# Patient Record
Sex: FEMALE | Race: ASIAN | NOT HISPANIC OR LATINO | Employment: OTHER | URBAN - METROPOLITAN AREA
[De-identification: names, ages, dates, MRNs, and addresses within clinical notes are randomized per-mention and may not be internally consistent; named-entity substitution may affect disease eponyms.]

---

## 2017-03-06 ENCOUNTER — HOSPITAL ENCOUNTER (OUTPATIENT)
Facility: MEDICAL CENTER | Age: 52
End: 2017-03-06
Attending: FAMILY MEDICINE
Payer: MEDICAID

## 2017-03-06 ENCOUNTER — OFFICE VISIT (OUTPATIENT)
Dept: MEDICAL GROUP | Facility: MEDICAL CENTER | Age: 52
End: 2017-03-06
Attending: FAMILY MEDICINE
Payer: MEDICAID

## 2017-03-06 VITALS
RESPIRATION RATE: 12 BRPM | OXYGEN SATURATION: 99 % | BODY MASS INDEX: 18.21 KG/M2 | TEMPERATURE: 97.7 F | WEIGHT: 116 LBS | SYSTOLIC BLOOD PRESSURE: 146 MMHG | HEIGHT: 67 IN | DIASTOLIC BLOOD PRESSURE: 106 MMHG | HEART RATE: 88 BPM

## 2017-03-06 DIAGNOSIS — E11.8 DM (DIABETES MELLITUS) WITH COMPLICATIONS (HCC): ICD-10-CM

## 2017-03-06 DIAGNOSIS — Z12.31 SCREENING MAMMOGRAM, ENCOUNTER FOR: ICD-10-CM

## 2017-03-06 DIAGNOSIS — E11.69 HYPERLIPIDEMIA ASSOCIATED WITH TYPE 2 DIABETES MELLITUS (HCC): ICD-10-CM

## 2017-03-06 DIAGNOSIS — F39 MOOD DISORDER (HCC): ICD-10-CM

## 2017-03-06 DIAGNOSIS — R18.8 CIRRHOSIS OF LIVER WITH ASCITES, UNSPECIFIED HEPATIC CIRRHOSIS TYPE (HCC): ICD-10-CM

## 2017-03-06 DIAGNOSIS — Z12.11 COLON CANCER SCREENING: ICD-10-CM

## 2017-03-06 DIAGNOSIS — K74.60 CIRRHOSIS OF LIVER WITH ASCITES, UNSPECIFIED HEPATIC CIRRHOSIS TYPE (HCC): ICD-10-CM

## 2017-03-06 DIAGNOSIS — I42.1 HOCM (HYPERTROPHIC OBSTRUCTIVE CARDIOMYOPATHY) (HCC): ICD-10-CM

## 2017-03-06 DIAGNOSIS — R32 INCONTINENCE IN FEMALE: ICD-10-CM

## 2017-03-06 DIAGNOSIS — E78.5 HYPERLIPIDEMIA ASSOCIATED WITH TYPE 2 DIABETES MELLITUS (HCC): ICD-10-CM

## 2017-03-06 LAB
APPEARANCE UR: CLEAR
APPEARANCE UR: CLEAR
BILIRUB UR QL STRIP.AUTO: NEGATIVE
COLOR UR AUTO: COLORLESS
COLOR UR AUTO: YELLOW
CULTURE IF INDICATED INDCX: NO UA CULTURE
GLUCOSE BLD-MCNC: 526 MG/DL (ref 70–100)
GLUCOSE UR STRIP.AUTO-MCNC: 2000 MG/DL
GLUCOSE UR STRIP.AUTO-MCNC: >1000 MG/DL
KETONES UR STRIP.AUTO-MCNC: NEGATIVE MG/DL
KETONES UR STRIP.AUTO-MCNC: NORMAL MG/DL
LEUKOCYTE ESTERASE UR QL STRIP.AUTO: NEGATIVE
LEUKOCYTE ESTERASE UR QL STRIP.AUTO: NORMAL
MICRO URNS: ABNORMAL
NITRITE UR QL STRIP.AUTO: NEGATIVE
NITRITE UR QL STRIP.AUTO: NORMAL
PH UR STRIP.AUTO: 5 [PH] (ref 5–8)
PH UR: 5.5 [PH]
PROT UR QL STRIP: NEGATIVE MG/DL
PROT UR QL STRIP: NORMAL MG/DL
RBC UR QL AUTO: NEGATIVE
RBC UR QL AUTO: NORMAL
SP GR UR STRIP.AUTO: 1.01
SP GR UR STRIP.AUTO: 1.03

## 2017-03-06 PROCEDURE — 99215 OFFICE O/P EST HI 40 MIN: CPT | Performed by: FAMILY MEDICINE

## 2017-03-06 PROCEDURE — 82948 REAGENT STRIP/BLOOD GLUCOSE: CPT | Performed by: FAMILY MEDICINE

## 2017-03-06 PROCEDURE — 81002 URINALYSIS NONAUTO W/O SCOPE: CPT | Mod: 59 | Performed by: FAMILY MEDICINE

## 2017-03-06 PROCEDURE — 99214 OFFICE O/P EST MOD 30 MIN: CPT | Performed by: FAMILY MEDICINE

## 2017-03-06 RX ORDER — INSULIN GLARGINE 100 [IU]/ML
20 INJECTION, SOLUTION SUBCUTANEOUS DAILY
Qty: 10 ML | Refills: 3 | Status: SHIPPED | OUTPATIENT
Start: 2017-03-06 | End: 2017-06-27

## 2017-03-06 RX ORDER — ATORVASTATIN CALCIUM 20 MG/1
20 TABLET, FILM COATED ORAL
Qty: 30 TAB | Refills: 3 | Status: SHIPPED | OUTPATIENT
Start: 2017-03-06 | End: 2017-06-05 | Stop reason: SDUPTHER

## 2017-03-06 RX ORDER — OXYBUTYNIN CHLORIDE 5 MG/1
5 TABLET ORAL 3 TIMES DAILY
Qty: 30 TAB | Refills: 3 | Status: SHIPPED | OUTPATIENT
Start: 2017-03-06 | End: 2017-06-05 | Stop reason: SDUPTHER

## 2017-03-06 RX ORDER — OXYBUTYNIN CHLORIDE 5 MG/1
5 TABLET ORAL 3 TIMES DAILY
Qty: 90 TAB | Refills: 3 | Status: CANCELLED | OUTPATIENT
Start: 2017-03-06

## 2017-03-06 RX ORDER — SPIRONOLACTONE 25 MG/1
25 TABLET ORAL DAILY
Qty: 30 TAB | Refills: 3 | Status: CANCELLED | OUTPATIENT
Start: 2017-03-06

## 2017-03-06 RX ORDER — METOPROLOL TARTRATE 100 MG/1
50 TABLET ORAL 2 TIMES DAILY
Qty: 180 TAB | Refills: 6 | Status: CANCELLED | OUTPATIENT
Start: 2017-03-06

## 2017-03-06 RX ORDER — SPIRONOLACTONE 25 MG/1
25 TABLET ORAL DAILY
Qty: 30 TAB | Refills: 3 | Status: SHIPPED | OUTPATIENT
Start: 2017-03-06 | End: 2017-06-05 | Stop reason: SDUPTHER

## 2017-03-06 RX ORDER — METOPROLOL TARTRATE 100 MG/1
50 TABLET ORAL 2 TIMES DAILY
Qty: 60 TAB | Refills: 3 | Status: SHIPPED | OUTPATIENT
Start: 2017-03-06 | End: 2017-06-05 | Stop reason: SDUPTHER

## 2017-03-06 RX ORDER — ATORVASTATIN CALCIUM 20 MG/1
20 TABLET, FILM COATED ORAL
Qty: 30 TAB | Refills: 11 | Status: CANCELLED | OUTPATIENT
Start: 2017-03-06

## 2017-03-06 RX ORDER — POTASSIUM CHLORIDE 750 MG/1
10 TABLET, FILM COATED, EXTENDED RELEASE ORAL DAILY
Qty: 30 TAB | Refills: 3 | Status: CANCELLED | OUTPATIENT
Start: 2017-03-06

## 2017-03-06 ASSESSMENT — ENCOUNTER SYMPTOMS
COUGH: 0
HEADACHES: 0
CHILLS: 0
FEVER: 0
SHORTNESS OF BREATH: 0
PALPITATIONS: 0
NAUSEA: 0
ABDOMINAL PAIN: 1
VOMITING: 0
DIARRHEA: 0
CONSTIPATION: 0

## 2017-03-06 ASSESSMENT — PATIENT HEALTH QUESTIONNAIRE - PHQ9: CLINICAL INTERPRETATION OF PHQ2 SCORE: 0

## 2017-03-06 NOTE — MR AVS SNAPSHOT
"        Rigoberto Lakhani Chew   3/6/2017 3:30 PM   Office Visit   MRN: [id number]    [facility]   Dept Phone:  [phone]    Description:  Female : 1965   Provider:  Vladimir Corral M.D.           Reason for Visit     Diabetes     Medication Management           Allergies as of 3/6/2017     No Known Allergies      You were diagnosed with     DM (diabetes mellitus) with complications (CMS-HCC)   [157522]       HOCM (hypertrophic obstructive cardiomyopathy) (CMS-HCC)   [417614]       Cirrhosis of liver with ascites, unspecified hepatic cirrhosis type (CMS-HCC)   [6421291]         Vital Signs     Blood Pressure Pulse Temperature Respirations Height Weight    146/106 mmHg 88 36.5 °C (97.7 °F) 12 1.702 m (5' 7.01\") 52.617 kg (116 lb)    Body Mass Index Oxygen Saturation Smoking Status             18.16 kg/m2 99% Current Every Day Smoker         Basic Information     Date Of Birth Sex Race Ethnicity Preferred Language    1965 Female  Non- English      Your appointments     Mar 20, 2017  2:10 PM   Established Patient with Vladimir Corral M.D.   [facility]    [address] [facility] [id number] [phone]           You will be receiving a confirmation call a few days before your appointment from our automated call confirmation system.            May 25, 2017 12:45 PM   FOLLOW UP with Liam Galicia M.D.   [facility] (--)    [address], [facility] [address]  [facility] [id number] [phone]              Problem List              ICD-10-CM Priority Class Noted - Resolved    Diabetes mellitus, type 2 (CMS-HCC) E11.9 Low  2011 - Present    Tobacco abuse Z72.0 Low  2014 - Present    Hypertension, benign I10 Medium  2014 - Present    HOCM (hypertrophic obstructive cardiomyopathy) (CMS-HCC) I42.1 Medium  2015 - Present    Liver disease due to alcohol (CMS-HCC) K70.9 Medium  2015 - Present    Macrocytic " anemia D53.9 Low  10/12/2015 - Present    Hypokalemia E87.6 Medium  10/12/2015 - Present    Weakness R53.1 Low  10/14/2015 - Present    Protein-calorie malnutrition, moderate (CMS-HCC) E44.0 Medium  10/23/2015 - Present    Cirrhosis (CMS-HCC) K74.60 Low  3/15/2016 - Present    Elevated troponin R79.89 Medium  6/23/2016 - Present    Hypotension I95.9 Medium  6/23/2016 - Present    Hyperammonemia (CMS-HCC) E72.20 High  6/24/2016 - Present    Thrombocytopenia (CMS-HCC) D69.6   6/24/2016 - Present    Cirrhosis of liver with ascites (CMS-HCC) K74.60   3/6/2017 - Present      Health Maintenance        Date Due Completion Dates    IMM HEP B VACCINE (1 of 3 - Primary Series) 1965 ---    DIABETES MONOFILAMENT / LE EXAM 1965 ---    RETINAL SCREENING 1/24/1984 ---    IMM DTaP/Tdap/Td Vaccine (1 - Tdap) 1/24/1985 ---    MAMMOGRAM 1/24/2006 9/12/2005    IMM INFLUENZA (1) 9/1/2016 10/14/2015, 11/30/2014    A1C SCREENING 10/18/2016 4/18/2016    FASTING LIPID PROFILE 4/18/2017 4/18/2016    URINE ACR / MICROALBUMIN 4/18/2017 4/18/2016    SERUM CREATININE 6/24/2017 6/24/2016, 6/23/2016, 6/23/2016, 6/23/2016, 4/18/2016, 10/27/2015, 10/18/2015, 10/15/2015, 10/14/2015, 10/13/2015, 10/12/2015, 10/4/2015, 12/8/2014, 12/7/2014, 5/27/2006, 5/27/2006    COLONOSCOPY 8/9/2017 8/9/2016    PAP SMEAR 4/13/2019 4/13/2016            Current Immunizations     Influenza TIV (IM) 11/30/2014    Influenza Vaccine Quad Inj (Preserved) 10/14/2015  4:47 PM    Pneumococcal polysaccharide vaccine (PPSV-23) 12/8/2014  9:05 PM      Below and/or attached are the medications your provider expects you to take. Review all of your home medications and newly ordered medications with your provider and/or pharmacist. Follow medication instructions as directed by your provider and/or pharmacist. Please keep your medication list with you and share with your provider. Update the information when medications are discontinued, doses are changed, or new  medications (including over-the-counter products) are added; and carry medication information at all times in the event of emergency situations     Allergies:  No Known Allergies          Medications  Valid as of: March 06, 2017 -  3:36 PM    Generic Name Brand Name Tablet Size Instructions for use    Atorvastatin Calcium (Tab) LIPITOR 20 MG Take 1 Tab by mouth every bedtime.        Insulin Glargine (Solution) LANTUS 100 UNIT/ML Inject 20 Units as instructed every day.        MetFORMIN HCl (Tab) GLUCOPHAGE 500 MG Take 1 Tab by mouth 2 times a day, with meals.        Metoprolol Tartrate (Tab) LOPRESSOR 100 MG Take 0.5 Tabs by mouth 2 times a day.        Oxybutynin Chloride (Tab) DITROPAN 5 MG Take 1 Tab by mouth 3 times a day.        Potassium Chloride (Tab CR) KLOR-CON 10 MEQ Take 1 Tab by mouth every day.        QUEtiapine Fumarate (Tab) SEROQUEL 50 MG Take 1 Tab by mouth every evening.        Spironolactone (Tab) ALDACTONE 25 MG Take 1 Tab by mouth every day.        .                 Medicines prescribed today were sent to:     [facility] [address] [place] [address] - [address] [facility]    [address] [place] [address]    Phone: [phone] Fax: [phone]    Open 24 Hours?: No      Medication refill instructions:       If your prescription bottle indicates you have medication refills left, it is not necessary to call your provider’s office. Please contact your pharmacy and they will refill your medication.    If your prescription bottle indicates you do not have any refills left, you may request refills at any time through one of the following ways: The online [employer] system (except Urgent Care), by calling your provider’s office, or by asking your pharmacy to contact your provider’s office with a refill request. Medication refills are processed only during regular business hours and may not be available until the next business day. Your provider may request additional information or to have a follow-up visit with you  prior to refilling your medication.   *Please Note: Medication refills are assigned a new Rx number when refilled electronically. Your pharmacy may indicate that no refills were authorized even though a new prescription for the same medication is available at the pharmacy. Please request the medicine by name with the pharmacy before contacting your provider for a refill.        Your To Do List     Future Labs/Procedures Complete By Expires    AMMONIA  As directed 3/6/2018    CBC WITH DIFFERENTIAL  As directed 3/6/2018    COMP METABOLIC PANEL  As directed 3/6/2018    HEMOGLOBIN A1C  As directed 3/6/2018    LIPID PROFILE  As directed 3/6/2018    MICROALBUMIN CREAT RATIO URINE  As directed 3/6/2018    TSH WITH REFLEX TO FT4  As directed 3/6/2018    US-ABDOMEN COMPLETE SURVEY  As directed 3/6/2018      Referral     A referral request has been sent to our patient care coordination department. Please allow 3-5 business days for us to process this request and contact you either by phone or mail. If you do not hear from us by the 5th business day, please call us at [phone].           GigSky Access Code: [email or web address]  Expires: 4/5/2017  3:36 PM    GigSky  A secure, online tool to manage your health information     [employer]’s GigSky® is a secure, online tool that connects you to your personalized health information from the privacy of your home -- day or night - making it very easy for you to manage your healthcare. Once the activation process is completed, you can even access your medical information using the GigSky torrey, which is available for free in the Apple Torrey store or Google Play store.     GigSky provides the following levels of access (as shown below):   My Chart Features   [facility] Doctor [facility]  Specialists [facility]  Urgent  Care Non-[facility]  Primary Care  Doctor   Email your healthcare team securely and privately 24/7 X X X    Manage appointments: schedule your next appointment; view  details of past/upcoming appointments X      Request prescription refills. X      View recent personal medical records, including lab and immunizations X X X X   View health record, including health history, allergies, medications X X X X   Read reports about your outpatient visits, procedures, consult and ER notes X X X X   See your discharge summary, which is a recap of your hospital and/or ER visit that includes your diagnosis, lab results, and care plan. X X       How to register for Medigram:  1. Go to  [email or web address].  2. Click on the Sign Up Now box, which takes you to the New Member Sign Up page. You will need to provide the following information:  a. Enter your Medigram Access Code exactly as it appears at the top of this page. (You will not need to use this code after you’ve completed the sign-up process. If you do not sign up before the expiration date, you must request a new code.)   b. Enter your date of birth.   c. Enter your home email address.   d. Click Submit, and follow the next screen’s instructions.  3. Create a Medigram ID. This will be your Medigram login ID and cannot be changed, so think of one that is secure and easy to remember.  4. Create a Medigram password. You can change your password at any time.  5. Enter your Password Reset Question and Answer. This can be used at a later time if you forget your password.   6. Enter your e-mail address. This allows you to receive e-mail notifications when new information is available in Medigram.  7. Click Sign Up. You can now view your health information.    For assistance activating your Medigram account, call [phone]

## 2017-03-06 NOTE — PROGRESS NOTES
Subjective:      Rigoberto Adames is a 52 y.o. female who presents with No chief complaint on file.            HPI Comments: Pt here to re establish with the clinic, she has not been seen since last July. She has not gotten any recent blood work and has not been testing her blood sugars regularly. Will reorder blood work and have her use her medications as directed. Will also have her start lantus 20 u and will have her check her blood sugars daily.  Since her blood sugar is above 500, she has been advised to go to the ER for better control of her blood sugar. She declined [facility] but states she will go on her own. An AMA has been signed by pt.    We'll have her continue taking her medication as directed. Will reorder an A1c and a microalbumin creatinine ratio as well as another metabolic panel. She has been advised to check her feet daily for any skin breakdown or changes in sensation. She has also been advised to schedule an appointment with an ophthalmologist for a yearly diabetic retinal exam.     She is also not been following up with her testing gastroenterologist for her history of cirrhosis. Will once again we order an ultrasound of her abdomen to further evaluate her liver. We'll also recheck her liver enzymes but the complete metabolic panel. I will refer to GI for continued assistance in management of her cirrhosis. Also have her continue to take her medication as directed.    Blood pressure is slightly elevated today level resume her blood pressure medications which she has not been taking on a regular basis. Due to her history of hypertrophic cardiomyopathy she will be referred back to cardiology as well as an echocardiogram being reorder to further evaluate her problem. She has not been having any chest pain, shortness of breath or unusual swelling in her lower extremities. She has been advised if she has any of these symptoms to see  emergency room for a further evaluation.    Pt also has a history of a  "mood disorder and has not recently been seen by psychiatry. She has not had any urges to harm herself or others but she has been once again warned that if she has any worsened sxs she will need to go to the er for a further evaluation of her sxs.      Current medications, allergies, and problem list reviewed with patient and updated in EPIC.        Review of Systems   Constitutional: Negative for fever and chills.   HENT: Negative for hearing loss.    Respiratory: Negative for cough and shortness of breath.    Cardiovascular: Negative for chest pain and palpitations.   Gastrointestinal: Positive for abdominal pain. Negative for nausea, vomiting, diarrhea and constipation.   Neurological: Negative for headaches.          Objective:     /106 mmHg  Pulse 88  Temp(Src) 36.5 °C (97.7 °F)  Resp 12  Ht 1.702 m (5' 7.01\")  Wt 52.617 kg (116 lb)  BMI 18.16 kg/m2  SpO2 99%     Physical Exam   HENT:   Right Ear: External ear normal.   Left Ear: External ear normal.   Nose: Nose normal.   Mouth/Throat: Oropharynx is clear and moist.   Eyes: EOM are normal. Pupils are equal, round, and reactive to light.   Neck: Normal range of motion.   Cardiovascular: Normal rate, regular rhythm and normal heart sounds.  Exam reveals no friction rub.    No murmur heard.  Pulmonary/Chest: Breath sounds normal. No respiratory distress. She has no wheezes. She has no rales.   Abdominal: Soft. Bowel sounds are normal. She exhibits distension. There is tenderness. There is no rebound and no guarding.   Neurological: She is alert.   Skin: Skin is warm and dry.   Psychiatric: Her behavior is normal.               Assessment/Plan:     1. DM (diabetes mellitus) with complications (CMS-[facility])  Will have her take her medications as directed. Will have her start lantus daily and check her blood sugars daily. Will also have her get an A1c and microalb/creat ratio. Will have her check her feet daily for skin breakdown and changes in sensation. " Will refer to ophthalmology for a further evaluation for diabetic retinopathies. Since her blood sugar was above 500 and pt refused to go to er for assistance in lowering her sugar, an AMA was signed by the pt. Will continue to follow.   - REFERRAL TO ENDOCRINOLOGY  - insulin glargine (LANTUS) 100 UNIT/ML Solution; Inject 20 Units as instructed every day.  Dispense: 10 mL; Refill: 3  - TSH WITH REFLEX TO FT4; Future  - COMP METABOLIC PANEL; Future  - LIPID PROFILE; Future  - CBC WITH DIFFERENTIAL; Future  - HEMOGLOBIN A1C; Future  - MICROALBUMIN CREAT RATIO URINE; Future  - REFERRAL FOR RETINAL SCREENING EXAM  - POCT Urinalysis Glucose  - metformin (GLUCOPHAGE) 500 MG Tab; Take 2 Tabs by mouth 2 times a day, with meals.  Dispense: 120 Tab; Refill: 3    2. HOCM (hypertrophic obstructive cardiomyopathy) (CMS-HCC)  An echocardiogram has been ordered and a referral was made back to cardiology. She will also continue to take her medication as directed. Will continue to follow. ER precautions have been given to pt.  - REFERRAL TO CARDIOLOGY  - ECHOCARDIOGRAM COMP W/O CONT; Future  - metoprolol (LOPRESSOR) 100 MG Tab; Take 0.5 Tabs by mouth 2 times a day.  Dispense: 60 Tab; Refill: 3    3. Cirrhosis of liver with ascites, unspecified hepatic cirrhosis type (CMS-HCC)  Will reorder an ultrasound of her liver and will refer back to GI. Will have her continue to take her medications as directed and will order a CMP for a further evaluation. Will continue to follow.   - AMMONIA; Future  - US-ABDOMEN COMPLETE SURVEY; Future  - REFERRAL TO GASTROENTEROLOGY  - spironolactone (ALDACTONE) 25 MG Tab; Take 1 Tab by mouth every day.  Dispense: 30 Tab; Refill: 3    4. Incontinence in female  Will have her continue to take her medication as directed. Will continue to follow.   - oxybutynin (DITROPAN) 5 MG Tab; Take 1 Tab by mouth 3 times a day.  Dispense: 30 Tab; Refill: 3    5. Hyperlipidemia associated with type 2 diabetes mellitus  (CMS-HCC)  Will have her restart her medication and will recheck her cholesterol. She has been advised to increase the fibers in his diet, avoid fatty/fried foods and try fish oil supplements if not allergic to seafood. Also advised to exercise as tolerated.     - atorvastatin (LIPITOR) 20 MG Tab; Take 1 Tab by mouth every bedtime.  Dispense: 30 Tab; Refill: 3    6. Mood disorder (CMS-HCC)  Will have her further evaluated by psych for the need to continue to take her psychiatric medication. She is not having any urge to hurt herself or others. Will continue to follow.   - REFERRAL TO PSYCHIATRY    >40 min spent face to face with patient, >50% of time spent counseling, coordinating care, reviewing records, discussing POC, educating patient

## 2017-03-21 ENCOUNTER — TELEPHONE (OUTPATIENT)
Dept: MEDICAL GROUP | Facility: MEDICAL CENTER | Age: 52
End: 2017-03-21

## 2017-03-21 NOTE — TELEPHONE ENCOUNTER
Phone Number Called: [phone] (home)     Message: patient left message for a call back. Called patient, voice mail full.    Left Message for patient to call back: no

## 2017-03-30 ENCOUNTER — HOSPITAL ENCOUNTER (OUTPATIENT)
Dept: LAB | Facility: MEDICAL CENTER | Age: 52
End: 2017-03-30
Attending: PHYSICIAN ASSISTANT
Payer: MEDICAID

## 2017-03-30 ENCOUNTER — TELEPHONE (OUTPATIENT)
Dept: MEDICAL GROUP | Facility: MEDICAL CENTER | Age: 52
End: 2017-03-30

## 2017-03-30 ENCOUNTER — HOSPITAL ENCOUNTER (OUTPATIENT)
Dept: LAB | Facility: MEDICAL CENTER | Age: 52
End: 2017-03-30
Attending: FAMILY MEDICINE
Payer: MEDICAID

## 2017-03-30 DIAGNOSIS — R18.8 CIRRHOSIS OF LIVER WITH ASCITES, UNSPECIFIED HEPATIC CIRRHOSIS TYPE (HCC): ICD-10-CM

## 2017-03-30 DIAGNOSIS — E11.8 DM (DIABETES MELLITUS) WITH COMPLICATIONS (HCC): ICD-10-CM

## 2017-03-30 DIAGNOSIS — K74.60 CIRRHOSIS OF LIVER WITH ASCITES, UNSPECIFIED HEPATIC CIRRHOSIS TYPE (HCC): ICD-10-CM

## 2017-03-30 LAB
ALBUMIN SERPL BCP-MCNC: 3.7 G/DL (ref 3.2–4.9)
ALBUMIN/GLOB SERPL: 0.7 G/DL
ALP SERPL-CCNC: 239 U/L (ref 30–99)
ALT SERPL-CCNC: 199 U/L (ref 2–50)
AMMONIA PLAS-SCNC: 85 UMOL/L (ref 11–45)
ANION GAP SERPL CALC-SCNC: 10 MMOL/L (ref 0–11.9)
AST SERPL-CCNC: 148 U/L (ref 12–45)
BASOPHILS # BLD AUTO: 0.05 K/UL (ref 0–0.12)
BASOPHILS NFR BLD AUTO: 0.9 % (ref 0–1.8)
BILIRUB SERPL-MCNC: 2.8 MG/DL (ref 0.1–1.5)
BUN SERPL-MCNC: 16 MG/DL (ref 8–22)
CALCIUM SERPL-MCNC: 9.7 MG/DL (ref 8.5–10.5)
CHLORIDE SERPL-SCNC: 91 MMOL/L (ref 96–112)
CHOLEST SERPL-MCNC: 159 MG/DL (ref 100–199)
CO2 SERPL-SCNC: 23 MMOL/L (ref 20–33)
CREAT SERPL-MCNC: 0.65 MG/DL (ref 0.5–1.4)
CREAT UR-MCNC: 93.3 MG/DL
EOSINOPHIL # BLD: 0.12 K/UL (ref 0–0.51)
EOSINOPHIL NFR BLD AUTO: 2.2 % (ref 0–6.9)
ERYTHROCYTE [DISTWIDTH] IN BLOOD BY AUTOMATED COUNT: 52.9 FL (ref 35.9–50)
EST. AVERAGE GLUCOSE BLD GHB EST-MCNC: 338 MG/DL
GLOBULIN SER CALC-MCNC: 5.2 G/DL (ref 1.9–3.5)
GLUCOSE SERPL-MCNC: 341 MG/DL (ref 65–99)
HBA1C MFR BLD: 13.4 % (ref 0–5.6)
HCT VFR BLD AUTO: 41.3 % (ref 37–47)
HDLC SERPL-MCNC: 24 MG/DL
HGB BLD-MCNC: 14.3 G/DL (ref 12–16)
IMM GRANULOCYTES # BLD AUTO: 0.01 K/UL (ref 0–0.11)
IMM GRANULOCYTES NFR BLD AUTO: 0.2 % (ref 0–0.9)
INR PPP: 0.93 (ref 0.87–1.13)
IRON SERPL-MCNC: 252 UG/DL (ref 40–170)
LDLC SERPL CALC-MCNC: ABNORMAL MG/DL
LYMPHOCYTES # BLD: 2.97 K/UL (ref 1–4.8)
LYMPHOCYTES NFR BLD AUTO: 54.7 % (ref 22–41)
MCH RBC QN AUTO: 32.4 PG (ref 27–33)
MCHC RBC AUTO-ENTMCNC: 34.6 G/DL (ref 33.6–35)
MCV RBC AUTO: 93.7 FL (ref 81.4–97.8)
MICROALBUMIN UR-MCNC: 10.2 MG/DL
MICROALBUMIN/CREAT UR: 109 MG/G (ref 0–30)
MONOCYTES # BLD: 0.31 K/UL (ref 0–0.85)
MONOCYTES NFR BLD AUTO: 5.7 % (ref 0–13.4)
NEUTROPHILS # BLD: 1.97 K/UL (ref 2–7.15)
NEUTROPHILS NFR BLD AUTO: 36.3 % (ref 44–72)
NRBC # BLD AUTO: 0.02 K/UL
NRBC BLD-RTO: 0.4 /100 WBC
PLATELET # BLD AUTO: 104 K/UL (ref 164–446)
PMV BLD AUTO: 13 FL (ref 9–12.9)
POTASSIUM SERPL-SCNC: 3.3 MMOL/L (ref 3.6–5.5)
PROT SERPL-MCNC: 8.9 G/DL (ref 6–8.2)
PROTHROMBIN TIME: 12.7 SEC (ref 12–14.6)
RBC # BLD AUTO: 4.41 M/UL (ref 4.2–5.4)
SODIUM SERPL-SCNC: 124 MMOL/L (ref 135–145)
T4 FREE SERPL-MCNC: 1.18 NG/DL (ref 0.53–1.43)
TRIGL SERPL-MCNC: 486 MG/DL (ref 0–149)
TSH SERPL DL<=0.005 MIU/L-ACNC: 5.53 UIU/ML (ref 0.3–3.7)
WBC # BLD AUTO: 5.4 K/UL (ref 4.8–10.8)

## 2017-03-30 PROCEDURE — 82390 ASSAY OF CERULOPLASMIN: CPT

## 2017-03-30 PROCEDURE — 86039 ANTINUCLEAR ANTIBODIES (ANA): CPT

## 2017-03-30 PROCEDURE — 85610 PROTHROMBIN TIME: CPT

## 2017-03-30 PROCEDURE — 83540 ASSAY OF IRON: CPT

## 2017-03-30 PROCEDURE — 86708 HEPATITIS A ANTIBODY: CPT

## 2017-03-30 PROCEDURE — 36415 COLL VENOUS BLD VENIPUNCTURE: CPT

## 2017-03-30 PROCEDURE — 86038 ANTINUCLEAR ANTIBODIES: CPT

## 2017-03-30 PROCEDURE — 83516 IMMUNOASSAY NONANTIBODY: CPT

## 2017-03-30 PROCEDURE — 86235 NUCLEAR ANTIGEN ANTIBODY: CPT | Mod: 91

## 2017-03-30 PROCEDURE — 86225 DNA ANTIBODY NATIVE: CPT

## 2017-03-30 NOTE — TELEPHONE ENCOUNTER
----- Message from Vladimir Corral M.D. sent at 3/30/2017  4:55 PM PDT -----  Please call pt to tell she should go to the er for a further evaluation. Her ammonia level is very high and her sodium level is very. Low is at risk for seizures or a coma.     Thanks

## 2017-03-31 LAB
CERULOPLASMIN SERPL-MCNC: 27 MG/DL (ref 17–54)
HAV AB SER QL IA: POSITIVE
SMA IGG SER-ACNC: 58 UNITS (ref 0–19)

## 2017-03-31 NOTE — TELEPHONE ENCOUNTER
Phone Number Called: [phone] (home)     Message: spoke with patient, she was advised of lab results and the risk of a seizure or a coma, and needing to follow up at the ER for further evaluation. Patient states ok.     Left Message for patient to call back: no

## 2017-04-02 LAB
NUCLEAR IGG SER QL IA: DETECTED
NUCLEAR IGG TITR SER IF: ABNORMAL {TITER}

## 2017-06-05 DIAGNOSIS — E11.8 DM (DIABETES MELLITUS) WITH COMPLICATIONS (HCC): ICD-10-CM

## 2017-06-05 DIAGNOSIS — K74.60 CIRRHOSIS OF LIVER WITH ASCITES, UNSPECIFIED HEPATIC CIRRHOSIS TYPE (HCC): ICD-10-CM

## 2017-06-05 DIAGNOSIS — R32 INCONTINENCE IN FEMALE: ICD-10-CM

## 2017-06-05 DIAGNOSIS — E78.5 HYPERLIPIDEMIA ASSOCIATED WITH TYPE 2 DIABETES MELLITUS (HCC): ICD-10-CM

## 2017-06-05 DIAGNOSIS — F39 MOOD DISORDER (HCC): ICD-10-CM

## 2017-06-05 DIAGNOSIS — R18.8 CIRRHOSIS OF LIVER WITH ASCITES, UNSPECIFIED HEPATIC CIRRHOSIS TYPE (HCC): ICD-10-CM

## 2017-06-05 DIAGNOSIS — E11.69 HYPERLIPIDEMIA ASSOCIATED WITH TYPE 2 DIABETES MELLITUS (HCC): ICD-10-CM

## 2017-06-05 DIAGNOSIS — I42.1 HOCM (HYPERTROPHIC OBSTRUCTIVE CARDIOMYOPATHY) (HCC): ICD-10-CM

## 2017-06-05 RX ORDER — QUETIAPINE FUMARATE 50 MG/1
50 TABLET, FILM COATED ORAL EVERY EVENING
Qty: 60 TAB | Refills: 3 | OUTPATIENT
Start: 2017-06-05

## 2017-06-05 RX ORDER — SPIRONOLACTONE 25 MG/1
25 TABLET ORAL DAILY
Qty: 30 TAB | Refills: 0 | Status: SHIPPED | OUTPATIENT
Start: 2017-06-05 | End: 2017-06-27 | Stop reason: SDUPTHER

## 2017-06-05 RX ORDER — METOPROLOL TARTRATE 100 MG/1
50 TABLET ORAL 2 TIMES DAILY
Qty: 60 TAB | Refills: 0 | Status: SHIPPED | OUTPATIENT
Start: 2017-06-05 | End: 2017-06-27 | Stop reason: SDUPTHER

## 2017-06-05 RX ORDER — OXYBUTYNIN CHLORIDE 5 MG/1
5 TABLET ORAL 3 TIMES DAILY
Qty: 30 TAB | Refills: 0 | Status: SHIPPED | OUTPATIENT
Start: 2017-06-05 | End: 2017-06-27 | Stop reason: SDUPTHER

## 2017-06-05 RX ORDER — ATORVASTATIN CALCIUM 20 MG/1
20 TABLET, FILM COATED ORAL
Qty: 30 TAB | Refills: 0 | Status: SHIPPED | OUTPATIENT
Start: 2017-06-05 | End: 2017-06-27 | Stop reason: SDUPTHER

## 2017-06-08 RX ORDER — GABAPENTIN 400 MG/1
400 CAPSULE ORAL 3 TIMES DAILY
Qty: 90 CAP | Refills: 3 | Status: SHIPPED | OUTPATIENT
Start: 2017-06-08 | End: 2017-06-27 | Stop reason: SDUPTHER

## 2017-06-27 ENCOUNTER — OFFICE VISIT (OUTPATIENT)
Dept: MEDICAL GROUP | Facility: MEDICAL CENTER | Age: 52
End: 2017-06-27
Attending: FAMILY MEDICINE
Payer: MEDICAID

## 2017-06-27 VITALS
OXYGEN SATURATION: 97 % | WEIGHT: 119 LBS | TEMPERATURE: 97.3 F | SYSTOLIC BLOOD PRESSURE: 150 MMHG | HEART RATE: 80 BPM | BODY MASS INDEX: 18.68 KG/M2 | DIASTOLIC BLOOD PRESSURE: 88 MMHG | RESPIRATION RATE: 16 BRPM | HEIGHT: 67 IN

## 2017-06-27 DIAGNOSIS — E78.5 HYPERLIPIDEMIA ASSOCIATED WITH TYPE 2 DIABETES MELLITUS (HCC): ICD-10-CM

## 2017-06-27 DIAGNOSIS — K74.60 CIRRHOSIS OF LIVER WITH ASCITES, UNSPECIFIED HEPATIC CIRRHOSIS TYPE (HCC): ICD-10-CM

## 2017-06-27 DIAGNOSIS — I42.1 HOCM (HYPERTROPHIC OBSTRUCTIVE CARDIOMYOPATHY) (HCC): ICD-10-CM

## 2017-06-27 DIAGNOSIS — E11.8 DM (DIABETES MELLITUS) WITH COMPLICATIONS (HCC): ICD-10-CM

## 2017-06-27 DIAGNOSIS — I10 ESSENTIAL HYPERTENSION: ICD-10-CM

## 2017-06-27 DIAGNOSIS — R32 INCONTINENCE IN FEMALE: ICD-10-CM

## 2017-06-27 DIAGNOSIS — E11.69 HYPERLIPIDEMIA ASSOCIATED WITH TYPE 2 DIABETES MELLITUS (HCC): ICD-10-CM

## 2017-06-27 DIAGNOSIS — R18.8 CIRRHOSIS OF LIVER WITH ASCITES, UNSPECIFIED HEPATIC CIRRHOSIS TYPE (HCC): ICD-10-CM

## 2017-06-27 LAB
GLUCOSE BLD-MCNC: 315 MG/DL (ref 70–100)
HBA1C MFR BLD: NORMAL % (ref ?–5.8)
INT CON NEG: NEGATIVE
INT CON POS: POSITIVE

## 2017-06-27 PROCEDURE — 83036 HEMOGLOBIN GLYCOSYLATED A1C: CPT | Performed by: FAMILY MEDICINE

## 2017-06-27 PROCEDURE — 99214 OFFICE O/P EST MOD 30 MIN: CPT | Performed by: FAMILY MEDICINE

## 2017-06-27 PROCEDURE — 82962 GLUCOSE BLOOD TEST: CPT | Performed by: FAMILY MEDICINE

## 2017-06-27 PROCEDURE — 82948 REAGENT STRIP/BLOOD GLUCOSE: CPT | Performed by: FAMILY MEDICINE

## 2017-06-27 PROCEDURE — 99213 OFFICE O/P EST LOW 20 MIN: CPT | Performed by: FAMILY MEDICINE

## 2017-06-27 RX ORDER — OXYBUTYNIN CHLORIDE 5 MG/1
5 TABLET ORAL 3 TIMES DAILY
Qty: 30 TAB | Refills: 3 | Status: SHIPPED | OUTPATIENT
Start: 2017-06-27 | End: 2017-09-27 | Stop reason: SDUPTHER

## 2017-06-27 RX ORDER — LISINOPRIL 10 MG/1
10 TABLET ORAL DAILY
Qty: 30 TAB | Refills: 3 | Status: SHIPPED | OUTPATIENT
Start: 2017-06-27 | End: 2018-01-16 | Stop reason: SDUPTHER

## 2017-06-27 RX ORDER — GABAPENTIN 400 MG/1
400 CAPSULE ORAL 3 TIMES DAILY
Qty: 90 CAP | Refills: 3 | Status: SHIPPED | OUTPATIENT
Start: 2017-06-27 | End: 2017-06-27

## 2017-06-27 RX ORDER — METOPROLOL TARTRATE 100 MG/1
50 TABLET ORAL 2 TIMES DAILY
Qty: 60 TAB | Refills: 3 | Status: SHIPPED | OUTPATIENT
Start: 2017-06-27 | End: 2017-09-21 | Stop reason: SDUPTHER

## 2017-06-27 RX ORDER — GABAPENTIN 800 MG/1
800 TABLET ORAL 3 TIMES DAILY
Qty: 90 TAB | Refills: 3 | Status: SHIPPED | OUTPATIENT
Start: 2017-06-27 | End: 2018-01-16 | Stop reason: SDUPTHER

## 2017-06-27 RX ORDER — SPIRONOLACTONE 25 MG/1
25 TABLET ORAL DAILY
Qty: 30 TAB | Refills: 3 | Status: SHIPPED | OUTPATIENT
Start: 2017-06-27 | End: 2018-01-16 | Stop reason: SDUPTHER

## 2017-06-27 RX ORDER — ATORVASTATIN CALCIUM 20 MG/1
20 TABLET, FILM COATED ORAL
Qty: 30 TAB | Refills: 3 | Status: SHIPPED | OUTPATIENT
Start: 2017-06-27 | End: 2018-01-16 | Stop reason: SDUPTHER

## 2017-06-27 ASSESSMENT — ENCOUNTER SYMPTOMS
FEVER: 0
PALPITATIONS: 0
ABDOMINAL PAIN: 0
COUGH: 0
SHORTNESS OF BREATH: 0
FOCAL WEAKNESS: 1
CHILLS: 0
HEADACHES: 0
TINGLING: 1
VOMITING: 0
NAUSEA: 0

## 2017-06-27 ASSESSMENT — PAIN SCALES - GENERAL: PAINLEVEL: NO PAIN

## 2017-06-27 NOTE — MR AVS SNAPSHOT
"        Rigoberto Lakhani Chew   2017 4:10 PM   Office Visit   MRN: [id number]    [facility]   Dept Phone:  [phone]    Description:  Female : 1965   Provider:  Vladimir Corral M.D.           Reason for Visit     Follow-Up           Allergies as of 2017     No Known Allergies      You were diagnosed with     Cirrhosis of liver with ascites, unspecified hepatic cirrhosis type (CMS-HCC)   [1206653]       DM (diabetes mellitus), secondary, uncontrolled, w/neurologic complic (CMS-HCC)   [182009]       Essential hypertension   [4404743]       Incontinence in female   [4811237]       HOCM (hypertrophic obstructive cardiomyopathy) (CMS-HCC)   [562336]       DM (diabetes mellitus) with complications (CMS-HCC)   [415326]   blood sugar today > 500    Hyperlipidemia associated with type 2 diabetes mellitus (CMS-HCC)   [0039576]         Vital Signs     Blood Pressure Pulse Temperature Respirations Height Weight    150/88 mmHg 80 36.3 °C (97.3 °F) 16 1.702 m (5' 7.01\") 53.978 kg (119 lb)    Body Mass Index Oxygen Saturation Breastfeeding? Smoking Status          18.63 kg/m2 97% No Current Every Day Smoker        Basic Information     Date Of Birth Sex Race Ethnicity Preferred Language    1965 Female  Non- English      Your appointments     Sep 20, 2017  8:00 AM   FOLLOW UP with Liam Galicia M.D.   [facility] (--)    [address] [place] [address]  [facility] [id number] [phone]              Problem List              ICD-10-CM Priority Class Noted - Resolved    Diabetes mellitus, type 2 (CMS-HCC) E11.9 Low  2011 - Present    Tobacco abuse Z72.0 Low  2014 - Present    Hypertension, benign I10 Medium  2014 - Present    HOCM (hypertrophic obstructive cardiomyopathy) (CMS-HCC) I42.1 Medium  2015 - Present    Liver disease due to alcohol (CMS-HCC) K70.9 Medium  2015 - Present    Macrocytic anemia D53.9 Low  10/12/2015 - " Present    Hypokalemia E87.6 Medium  10/12/2015 - Present    Weakness R53.1 Low  10/14/2015 - Present    Protein-calorie malnutrition, moderate (CMS-HCC) E44.0 Medium  10/23/2015 - Present    Cirrhosis (CMS-HCC) K74.60 Low  3/15/2016 - Present    Elevated troponin R74.8 Medium  6/23/2016 - Present    Hypotension I95.9 Medium  6/23/2016 - Present    Hyperammonemia (CMS-HCC) E72.20 High  6/24/2016 - Present    Thrombocytopenia (CMS-HCC) D69.6   6/24/2016 - Present    Cirrhosis of liver with ascites (CMS-HCC) K74.60   3/6/2017 - Present      Health Maintenance        Date Due Completion Dates    COLON CANCER SCREENING ANNUAL FIT 1965 ---    IMM HEP B VACCINE (1 of 3 - Primary Series) 1965 ---    DIABETES MONOFILAMENT / LE EXAM 1965 ---    RETINAL SCREENING 1/24/1984 ---    IMM DTaP/Tdap/Td Vaccine (1 - Tdap) 1/24/1985 ---    MAMMOGRAM 1/24/2006 9/12/2005    A1C SCREENING 9/30/2017 3/30/2017, 4/18/2016    FASTING LIPID PROFILE 3/30/2018 3/30/2017, 4/18/2016    URINE ACR / MICROALBUMIN 3/30/2018 3/30/2017, 4/18/2016    SERUM CREATININE 3/30/2018 3/30/2017, 6/24/2016, 6/23/2016, 6/23/2016, 6/23/2016, 4/18/2016, 10/27/2015, 10/18/2015, 10/15/2015, 10/14/2015, 10/13/2015, 10/12/2015, 10/4/2015, 12/8/2014, 12/7/2014, 5/27/2006, 5/27/2006    PAP SMEAR 4/13/2019 4/13/2016            Results     POCT Glucose      Component Value Standard Range & Units    Glucose - Accu-Ck 315 70 - 100 mg/dL    provider notified                POCT Hemoglobin A1C      Component    Glycohemoglobin    11.3%    Internal Control Negative    Negative    Internal Control Positive    Positive                        Current Immunizations     Influenza TIV (IM) 11/30/2014    Influenza Vaccine Quad Inj (Preserved) 10/14/2015  4:47 PM    Pneumococcal polysaccharide vaccine (PPSV-23) 12/8/2014  9:05 PM      Below and/or attached are the medications your provider expects you to take. Review all of your home medications and newly ordered  medications with your provider and/or pharmacist. Follow medication instructions as directed by your provider and/or pharmacist. Please keep your medication list with you and share with your provider. Update the information when medications are discontinued, doses are changed, or new medications (including over-the-counter products) are added; and carry medication information at all times in the event of emergency situations     Allergies:  No Known Allergies          Medications  Valid as of: June 27, 2017 -  4:47 PM    Generic Name Brand Name Tablet Size Instructions for use    Atorvastatin Calcium (Tab) LIPITOR 20 MG Take 1 Tab by mouth every bedtime.        Gabapentin (Tab) NEURONTIN 800 MG Take 1 Tab by mouth 3 times a day.        Insulin Glargine (Solution Pen-injector) LANTUS 100 UNIT/ML Inject 25 Units as instructed every evening.        Insulin Pen Needle (Misc) Insulin Pen Needle 33G X 5 MM 1 Device by Subdermal route every day.        Lisinopril (Tab) PRINIVIL 10 MG Take 1 Tab by mouth every day.        MetFORMIN HCl (Tab) GLUCOPHAGE 500 MG Take 2 Tabs by mouth 2 times a day, with meals.        Metoprolol Tartrate (Tab) LOPRESSOR 100 MG Take 0.5 Tabs by mouth 2 times a day.        Oxybutynin Chloride (Tab) DITROPAN 5 MG Take 1 Tab by mouth 3 times a day.        QUEtiapine Fumarate (Tab) SEROQUEL 50 MG Take 1 Tab by mouth every evening.        Spironolactone (Tab) ALDACTONE 25 MG Take 1 Tab by mouth every day.        .                 Medicines prescribed today were sent to:     [facility] [address] [place] [address]    Phone: [phone] Fax: [phone]    Open 24 Hours?: No      Medication refill instructions:       If your prescription bottle indicates you have medication refills left, it is not necessary to call your provider’s office. Please contact your pharmacy and they will refill your medication.    If your prescription bottle  indicates you do not have any refills left, you may request refills at any time through one of the following ways: The online [facility] system (except Urgent Care), by calling your provider’s office, or by asking your pharmacy to contact your provider’s office with a refill request. Medication refills are processed only during regular business hours and may not be available until the next business day. Your provider may request additional information or to have a follow-up visit with you prior to refilling your medication.   *Please Note: Medication refills are assigned a new Rx number when refilled electronically. Your pharmacy may indicate that no refills were authorized even though a new prescription for the same medication is available at the pharmacy. Please request the medicine by name with the pharmacy before contacting your provider for a refill.        Your To Do List     Future Labs/Procedures Complete By Expires    CBC WITH DIFFERENTIAL  As directed 6/27/2018    COMP METABOLIC PANEL  As directed 6/27/2018    HEMOGLOBIN A1C  As directed 6/27/2018    LIPID PROFILE  As directed 6/27/2018    MICROALBUMIN CREAT RATIO URINE  As directed 6/27/2018    TSH WITH REFLEX TO FT4  As directed 6/27/2018    US-ABDOMEN COMPLETE SURVEY  As directed 6/27/2018    VITAMIN D,25 HYDROXY  As directed 6/27/2018      Instructions    Pt advised to go to the er for worsened sxs.             [facility] Access Code: [email or web address]  Expires: 7/26/2017  8:51 AM    [facility]  A secure, online tool to manage your health information     [employer]’s [facility] [employer]® is a secure, online tool that connects you to your personalized health information from the privacy of your home -- day or night - making it very easy for you to manage your healthcare. Once the activation process is completed, you can even access your medical information using the [facility] torrey, which is available for free in the Apple Torrey store or Google Play store.     [facility] provides  the following levels of access (as shown below):   My Chart Features   [facility] Doctor [facility]  Specialists [facility]  Urgent  Care Non-[facility]  Primary Care  Doctor   Email your healthcare team securely and privately 24/7 X X X    Manage appointments: schedule your next appointment; view details of past/upcoming appointments X      Request prescription refills. X      View recent personal medical records, including lab and immunizations X X X X   View health record, including health history, allergies, medications X X X X   Read reports about your outpatient visits, procedures, consult and ER notes X X X X   See your discharge summary, which is a recap of your hospital and/or ER visit that includes your diagnosis, lab results, and care plan. X X       How to register for [employer]:  1. Go to  [email or web address].  2. Click on the Sign Up Now box, which takes you to the New Member Sign Up page. You will need to provide the following information:  a. Enter your [employer] Access Code exactly as it appears at the top of this page. (You will not need to use this code after you’ve completed the sign-up process. If you do not sign up before the expiration date, you must request a new code.)   b. Enter your date of birth.   c. Enter your home email address.   d. Click Submit, and follow the next screen’s instructions.  3. Create a [employer] ID. This will be your [employer] login ID and cannot be changed, so think of one that is secure and easy to remember.  4. Create a [employer] password. You can change your password at any time.  5. Enter your Password Reset Question and Answer. This can be used at a later time if you forget your password.   6. Enter your e-mail address. This allows you to receive e-mail notifications when new information is available in [employer].  7. Click Sign Up. You can now view your health information.    For assistance activating your [employer] account, call [phone]        Quit Tobacco  Information     Do you want to quit using tobacco?    Quitting tobacco decreases risks of cancer, heart and lung disease, increases life expectancy, improves sense of taste and smell, and increases spending money, among other benefits.    If you are thinking about quitting, we can help.  • [facility] Quit Tobacco Program: [phone]  o Program occurs weekly for four weeks and includes pharmacist consultation on products to support quitting smoking or chewing tobacco. A provider referral is needed for pharmacist consultation.  • Tobacco Users Help Hotline: 5-272-QUIT-NOW (203-9294) or https://[place].quitlogix.org/  o Free, confidential telephone and online coaching for [place] residents. Sessions are designed on a schedule that is convenient for you. Eligible clients receive free nicotine replacement therapy.  • Nationally: www.smokefree.gov  o Information and professional assistance to support both immediate and long-term needs as you become, and remain, a non-smoker. Smokefree.gov allows you to choose the help that best fits your needs.

## 2017-06-27 NOTE — PROGRESS NOTES
Subjective:      Rigoberto Adames is a 52 y.o. female who presents with Follow-Up            HPI Comments: Patient here for follow-up of her poorly controlled diabetes, neuropathy, hypertension, hyperlipidemia and cirrhosis with ascites.    She has been using her insulin as directed but her blood sugars are still elevated her last care A1c was at 11.3. We'll have her continue to use her medications as directed and check her blood sugars on a daily basis will increase her insulin Lantus to 25 units daily. If her blood sugars continue to remain elevated will increase her insulin. She has also been referred to endocrinology due to her poorly controlled numbers. We'll have her check her feet daily for any skin breakdown or changes in sensation. She has also been advised to follow-up with her ophthalmologist for a diabetic retinal exam.     Her blood pressure today is elevated but she states that she has been using her blood pressure medications as directed. Will add lisinopril 10 mg daily and will have her continue to check her blood pressures at home. She is not having any chest pain, shortness of breath or other neurologic changes. We'll continue to follow.    We'll have her recheck her lipid panel. Her previous lipid panel showed her triglycerides to be above 400. Will continue to use her cholesterol medications as directed. We'll also have her watch her diet avoiding fatty and fried foods. Also increasing fibers in her diet. She was advised to exercise as tolerated.    We'll increase her Neurontin to 800 mg 3 times a day since she has been doubling up on her 400 mg Neurontin, she states that it does work for her. If she continues to have uncontrolled neuropathic pain will refer to pain management for additional assistance in management of her chronic pain. We'll continue to follow.    For his cirrhosis and ascites will have her continue to take her medication as directed. Her swelling has diminished with her  "spironolactone. She also will continue to follow-up with GI for additional assistance in management of her cirrhosis. We'll continue to follow.     Current medications, allergies, and problem list reviewed with patient and updated in EPIC.        Review of Systems   Constitutional: Negative for fever and chills.   HENT: Negative for hearing loss.    Respiratory: Negative for cough and shortness of breath.    Cardiovascular: Negative for chest pain and palpitations.   Gastrointestinal: Negative for nausea, vomiting and abdominal pain.   Neurological: Positive for tingling and focal weakness. Negative for headaches.          Objective:     /88 mmHg  Pulse 80  Temp(Src) 36.3 °C (97.3 °F)  Resp 16  Ht 1.702 m (5' 7.01\")  Wt 53.978 kg (119 lb)  BMI 18.63 kg/m2  SpO2 97%  Breastfeeding? No     Physical Exam   HENT:   Right Ear: External ear normal.   Left Ear: External ear normal.   Nose: Nose normal.   Mouth/Throat: Oropharynx is clear and moist.   Eyes: EOM are normal. Pupils are equal, round, and reactive to light.   Cardiovascular: Normal rate, regular rhythm and normal heart sounds.  Exam reveals no friction rub.    No murmur heard.  Pulmonary/Chest: Breath sounds normal. No respiratory distress. She has no wheezes. She has no rales.   Abdominal: Soft. Bowel sounds are normal. She exhibits distension. There is tenderness. There is no rebound and no guarding.   Slight abdominal distension   Neurological: She is alert. She displays normal reflexes. No cranial nerve deficit. Coordination normal.   B neuropathy of LE   Skin: Skin is warm and dry.   Psychiatric: Her behavior is normal.               Assessment/Plan:     1. Cirrhosis of liver with ascites, unspecified hepatic cirrhosis type (CMS-HCC)  We'll have her repeat her metabolic panel as well as an ultrasound of her abdomen for further evaluation of her cirrhotic liver and presence of ascites. We'll have her continue to follow-up with GI.  - COMP " METABOLIC PANEL; Future  - LIPID PROFILE; Future  - CBC WITH DIFFERENTIAL; Future  - TSH WITH REFLEX TO FT4; Future  - HEMOGLOBIN A1C; Future  - MICROALBUMIN CREAT RATIO URINE; Future  - VITAMIN D,25 HYDROXY; Future  - gabapentin (NEURONTIN) 800 MG tablet; Take 1 Tab by mouth 3 times a day.  Dispense: 90 Tab; Refill: 3  - spironolactone (ALDACTONE) 25 MG Tab; Take 1 Tab by mouth every day.  Dispense: 30 Tab; Refill: 3  - US-ABDOMEN COMPLETE SURVEY; Future    2. DM (diabetes mellitus), secondary, uncontrolled, w/neurologic complic (CMS-HCC)  We'll have her continue to use her Lantus as directed and will increase her Lantus dose to 25 units. We'll repeat an A1c as well as a microalbumin creatinine ratio. We'll also have her continue to check her feet daily for any skin breakdown or changes in sensation. She has also been advised to follow up with ophthalmology for a diabetic retinal exam.  - COMP METABOLIC PANEL; Future  - LIPID PROFILE; Future  - CBC WITH DIFFERENTIAL; Future  - TSH WITH REFLEX TO FT4; Future  - HEMOGLOBIN A1C; Future  - MICROALBUMIN CREAT RATIO URINE; Future  - VITAMIN D,25 HYDROXY; Future  - gabapentin (NEURONTIN) 800 MG tablet; Take 1 Tab by mouth 3 times a day.  Dispense: 90 Tab; Refill: 3  - insulin glargine (BASAGLAR KWIKPEN) 100 UNIT/ML Solution Pen-injector injection; Inject 25 Units as instructed every evening.  Dispense: 10 PEN; Refill: 3  - Insulin Pen Needle 33G X 5 MM Misc; 1 Device by Subdermal route every day.  Dispense: 30 Each; Refill: 11    3. Essential hypertension  We'll have her continue to take her medication as directed will add lisinopril at 10 mg daily. She has been advised to monitor blood pressure at home and keep notes. If blood pressure elevated or having symptoms of CP, SOB or neurologic changes to go to the er.      4. Incontinence in female  We'll have her continue to take her Ditropan as directed. We'll continue to follow.  - oxybutynin (DITROPAN) 5 MG Tab; Take 1 Tab  by mouth 3 times a day.  Dispense: 30 Tab; Refill: 3    5. HOCM (hypertrophic obstructive cardiomyopathy) (CMS-HCC)  We'll have continue to follow-up with cardiology as needed and continue take her metoprolol as directed. Will continue to follow.  - metoprolol (LOPRESSOR) 100 MG Tab; Take 0.5 Tabs by mouth 2 times a day.  Dispense: 60 Tab; Refill: 3    6. DM (diabetes mellitus) with complications (CMS-HCC)  See above plan.  - metformin (GLUCOPHAGE) 500 MG Tab; Take 2 Tabs by mouth 2 times a day, with meals.  Dispense: 120 Tab; Refill: 3    7. Hyperlipidemia associated with type 2 diabetes mellitus (CMS-HCC)  We'll have her continue to take medication as directed. We'll repeat a lipid panel. She has been advised to increase the fibers in his diet, avoid fatty/fried foods and try fish oil supplements if not allergic to seafood. Also advised to exercise as tolerated.     - atorvastatin (LIPITOR) 20 MG Tab; Take 1 Tab by mouth every bedtime.  Dispense: 30 Tab; Refill: 3

## 2017-06-28 ENCOUNTER — TELEPHONE (OUTPATIENT)
Dept: MEDICAL GROUP | Facility: MEDICAL CENTER | Age: 52
End: 2017-06-28

## 2017-07-21 ENCOUNTER — HOSPITAL ENCOUNTER (OUTPATIENT)
Dept: LAB | Facility: MEDICAL CENTER | Age: 52
End: 2017-07-21
Attending: FAMILY MEDICINE
Payer: MEDICAID

## 2017-07-21 DIAGNOSIS — R18.8 CIRRHOSIS OF LIVER WITH ASCITES, UNSPECIFIED HEPATIC CIRRHOSIS TYPE (HCC): ICD-10-CM

## 2017-07-21 DIAGNOSIS — K74.60 CIRRHOSIS OF LIVER WITH ASCITES, UNSPECIFIED HEPATIC CIRRHOSIS TYPE (HCC): ICD-10-CM

## 2017-07-21 LAB
25(OH)D3 SERPL-MCNC: 34 NG/ML (ref 30–100)
ALBUMIN SERPL BCP-MCNC: 4.1 G/DL (ref 3.2–4.9)
ALBUMIN/GLOB SERPL: 0.8 G/DL
ALP SERPL-CCNC: 139 U/L (ref 30–99)
ALT SERPL-CCNC: 32 U/L (ref 2–50)
ANION GAP SERPL CALC-SCNC: 6 MMOL/L (ref 0–11.9)
AST SERPL-CCNC: 28 U/L (ref 12–45)
BASOPHILS # BLD AUTO: 0.3 % (ref 0–1.8)
BASOPHILS # BLD: 0.02 K/UL (ref 0–0.12)
BILIRUB SERPL-MCNC: 0.6 MG/DL (ref 0.1–1.5)
BUN SERPL-MCNC: 22 MG/DL (ref 8–22)
CALCIUM SERPL-MCNC: 10.5 MG/DL (ref 8.5–10.5)
CHLORIDE SERPL-SCNC: 103 MMOL/L (ref 96–112)
CHOLEST SERPL-MCNC: 62 MG/DL (ref 100–199)
CO2 SERPL-SCNC: 21 MMOL/L (ref 20–33)
CREAT SERPL-MCNC: 0.57 MG/DL (ref 0.5–1.4)
CREAT UR-MCNC: 102.8 MG/DL
EOSINOPHIL # BLD AUTO: 0.16 K/UL (ref 0–0.51)
EOSINOPHIL NFR BLD: 2 % (ref 0–6.9)
ERYTHROCYTE [DISTWIDTH] IN BLOOD BY AUTOMATED COUNT: 48.4 FL (ref 35.9–50)
EST. AVERAGE GLUCOSE BLD GHB EST-MCNC: 309 MG/DL
GFR SERPL CREATININE-BSD FRML MDRD: >60 ML/MIN/1.73 M 2
GLOBULIN SER CALC-MCNC: 4.9 G/DL (ref 1.9–3.5)
GLUCOSE SERPL-MCNC: 311 MG/DL (ref 65–99)
HBA1C MFR BLD: 12.4 % (ref 0–5.6)
HCT VFR BLD AUTO: 37.6 % (ref 37–47)
HDLC SERPL-MCNC: 28 MG/DL
HGB BLD-MCNC: 12.3 G/DL (ref 12–16)
IMM GRANULOCYTES # BLD AUTO: 0.04 K/UL (ref 0–0.11)
IMM GRANULOCYTES NFR BLD AUTO: 0.5 % (ref 0–0.9)
LDLC SERPL CALC-MCNC: 15 MG/DL
LYMPHOCYTES # BLD AUTO: 2.89 K/UL (ref 1–4.8)
LYMPHOCYTES NFR BLD: 37 % (ref 22–41)
MCH RBC QN AUTO: 32.3 PG (ref 27–33)
MCHC RBC AUTO-ENTMCNC: 32.7 G/DL (ref 33.6–35)
MCV RBC AUTO: 98.7 FL (ref 81.4–97.8)
MICROALBUMIN UR-MCNC: 4.9 MG/DL
MICROALBUMIN/CREAT UR: 48 MG/G (ref 0–30)
MONOCYTES # BLD AUTO: 0.51 K/UL (ref 0–0.85)
MONOCYTES NFR BLD AUTO: 6.5 % (ref 0–13.4)
NEUTROPHILS # BLD AUTO: 4.2 K/UL (ref 2–7.15)
NEUTROPHILS NFR BLD: 53.7 % (ref 44–72)
NRBC # BLD AUTO: 0 K/UL
NRBC BLD AUTO-RTO: 0 /100 WBC
PLATELET # BLD AUTO: 159 K/UL (ref 164–446)
PMV BLD AUTO: 13.1 FL (ref 9–12.9)
POTASSIUM SERPL-SCNC: 5.7 MMOL/L (ref 3.6–5.5)
PROT SERPL-MCNC: 9 G/DL (ref 6–8.2)
RBC # BLD AUTO: 3.81 M/UL (ref 4.2–5.4)
SODIUM SERPL-SCNC: 130 MMOL/L (ref 135–145)
TRIGL SERPL-MCNC: 95 MG/DL (ref 0–149)
TSH SERPL DL<=0.005 MIU/L-ACNC: 2.17 UIU/ML (ref 0.3–3.7)
WBC # BLD AUTO: 7.8 K/UL (ref 4.8–10.8)

## 2017-07-21 PROCEDURE — 82570 ASSAY OF URINE CREATININE: CPT

## 2017-07-21 PROCEDURE — 82306 VITAMIN D 25 HYDROXY: CPT

## 2017-07-21 PROCEDURE — 80061 LIPID PANEL: CPT

## 2017-07-21 PROCEDURE — 82043 UR ALBUMIN QUANTITATIVE: CPT

## 2017-07-21 PROCEDURE — 85025 COMPLETE CBC W/AUTO DIFF WBC: CPT

## 2017-07-21 PROCEDURE — 36415 COLL VENOUS BLD VENIPUNCTURE: CPT

## 2017-07-21 PROCEDURE — 80053 COMPREHEN METABOLIC PANEL: CPT

## 2017-07-21 PROCEDURE — 84443 ASSAY THYROID STIM HORMONE: CPT

## 2017-07-21 PROCEDURE — 83036 HEMOGLOBIN GLYCOSYLATED A1C: CPT

## 2017-07-31 DIAGNOSIS — E87.5 HYPERKALEMIA: ICD-10-CM

## 2017-08-01 ENCOUNTER — TELEPHONE (OUTPATIENT)
Dept: MEDICAL GROUP | Facility: MEDICAL CENTER | Age: 52
End: 2017-08-01

## 2017-08-01 NOTE — TELEPHONE ENCOUNTER
----- Message from Vladimir Corral M.D. sent at 7/31/2017  7:36 AM PDT -----  Please let pt know that her potassium is elevated so if she has any CP, SOB or irregular heart beats she should go to the er for an evaluation. Will reorder her BMP to recheck her level before her next appt. Please confirm her next appt.    Thanks

## 2017-08-08 ENCOUNTER — TELEPHONE (OUTPATIENT)
Dept: MEDICAL GROUP | Facility: MEDICAL CENTER | Age: 52
End: 2017-08-08

## 2017-08-08 NOTE — TELEPHONE ENCOUNTER
Phone Number Called: [phone] (home)     Message: called patient phone number not in service    Left Message for patient to call back: no

## 2017-08-19 ENCOUNTER — HOSPITAL ENCOUNTER (OUTPATIENT)
Dept: RADIOLOGY | Facility: MEDICAL CENTER | Age: 52
End: 2017-08-19
Attending: FAMILY MEDICINE
Payer: MEDICAID

## 2017-08-19 DIAGNOSIS — R18.8 CIRRHOSIS OF LIVER WITH ASCITES, UNSPECIFIED HEPATIC CIRRHOSIS TYPE (HCC): ICD-10-CM

## 2017-08-19 DIAGNOSIS — K74.60 CIRRHOSIS OF LIVER WITH ASCITES, UNSPECIFIED HEPATIC CIRRHOSIS TYPE (HCC): ICD-10-CM

## 2017-08-19 PROCEDURE — 76700 US EXAM ABDOM COMPLETE: CPT

## 2017-09-13 ENCOUNTER — OFFICE VISIT (OUTPATIENT)
Dept: MEDICAL GROUP | Facility: MEDICAL CENTER | Age: 52
End: 2017-09-13
Attending: FAMILY MEDICINE
Payer: MEDICAID

## 2017-09-13 VITALS
RESPIRATION RATE: 12 BRPM | DIASTOLIC BLOOD PRESSURE: 62 MMHG | HEIGHT: 67 IN | WEIGHT: 109 LBS | TEMPERATURE: 97 F | BODY MASS INDEX: 17.11 KG/M2 | SYSTOLIC BLOOD PRESSURE: 94 MMHG | OXYGEN SATURATION: 98 % | HEART RATE: 78 BPM

## 2017-09-13 DIAGNOSIS — E87.5 HYPERKALEMIA: ICD-10-CM

## 2017-09-13 DIAGNOSIS — K70.31 ALCOHOLIC CIRRHOSIS OF LIVER WITH ASCITES (HCC): ICD-10-CM

## 2017-09-13 DIAGNOSIS — R77.9 ELEVATED BLOOD PROTEIN: ICD-10-CM

## 2017-09-13 DIAGNOSIS — Z23 INFLUENZA VACCINE NEEDED: ICD-10-CM

## 2017-09-13 DIAGNOSIS — R01.1 MURMUR, CARDIAC: ICD-10-CM

## 2017-09-13 DIAGNOSIS — R05.9 COUGH: ICD-10-CM

## 2017-09-13 LAB
HBA1C MFR BLD: 12.7 % (ref ?–5.8)
INT CON NEG: NORMAL
INT CON POS: NORMAL

## 2017-09-13 PROCEDURE — 83036 HEMOGLOBIN GLYCOSYLATED A1C: CPT | Performed by: FAMILY MEDICINE

## 2017-09-13 PROCEDURE — 90686 IIV4 VACC NO PRSV 0.5 ML IM: CPT | Performed by: FAMILY MEDICINE

## 2017-09-13 PROCEDURE — 90471 IMMUNIZATION ADMIN: CPT | Performed by: FAMILY MEDICINE

## 2017-09-13 PROCEDURE — 99214 OFFICE O/P EST MOD 30 MIN: CPT | Performed by: FAMILY MEDICINE

## 2017-09-13 PROCEDURE — 99214 OFFICE O/P EST MOD 30 MIN: CPT | Mod: 25 | Performed by: FAMILY MEDICINE

## 2017-09-13 ASSESSMENT — ENCOUNTER SYMPTOMS
PALPITATIONS: 0
TREMORS: 0
VOMITING: 0
SPUTUM PRODUCTION: 0
WEIGHT LOSS: 0
RHINORRHEA: 0
COUGH: 1
HEADACHES: 0
HEMOPTYSIS: 0
SENSORY CHANGE: 0
HEARTBURN: 0
SHORTNESS OF BREATH: 0
SORE THROAT: 0
CHILLS: 0
MYALGIAS: 0
SPEECH CHANGE: 0
SWEATS: 0
TINGLING: 1
ABDOMINAL PAIN: 0
NAUSEA: 0
WHEEZING: 0
FEVER: 0

## 2017-09-13 NOTE — PROGRESS NOTES
Subjective:      Rigoberto Adames is a 52 y.o. female who presents with Diabetes and Results            Patient here for follow-up of her diabetes, cirrhosis, hyperkalemia, elevated blood protein, and low BMI.    Her recent blood work showed her hemoglobin A1c to be at 12.7. She states that she has not been using her insulin because she was not able to get it due to her insurance not covering the insulin. She did not bother to call the clinic, but is letting us know today. So a different prescription will be sent to her pharmacy for her to start using. We'll also have her continue to check her blood sugars at home as well as checking her feet daily for any skin breakdown or changes in sensation.    She has an appointment to see her liver specialist in November. She recently had an ultrasound of her abdomen and the results are as follows    1.  Slightly heterogeneous mildly enlarged liver without focal masses. This can be seen with fatty infiltration or underlying hepatocellular disease.  2.  There is cholelithiasis without biliary dilatation or wall thickening.    Her serum protein levels were slightly elevated so we will order a protein electrophoresis to further evaluate the elevations in her proteins. We'll continue to follow.    On her last blood work she was noted to have an elevated potassium level. Patient says she has not had any chest pain, palpitations or shortness of breath. Due to her elevated potassium levels patient had been advised if she has any symptoms to the emergency room for further assessment of possible treatment. Patient states she had never gone to the ER since she feels fine. Will repeat her metabolic panel to recheck her potassium levels as well as her protein levels. We'll continue to follow.    She had an echocardiogram ordered previously for murmur that she appears to have. She has not gotten the echocardiogram done yet so patient will be advised to call to schedule for the echocardiogram.  "We'll continue to follow.     Current medications, allergies, and problem list reviewed with patient and updated in [facility].        Cough   This is a new problem. The current episode started in the past 7 days. The problem has been resolved. The cough is non-productive. Associated symptoms include nasal congestion. Pertinent negatives include no chest pain, chills, ear congestion, ear pain, fever, headaches, heartburn, hemoptysis, myalgias, postnasal drip, rash, rhinorrhea, sore throat, shortness of breath, sweats, weight loss or wheezing. Nothing aggravates the symptoms. Risk factors for lung disease include smoking/tobacco exposure. She has tried nothing for the symptoms.       Review of Systems   Constitutional: Negative for chills, fever and weight loss.   HENT: Positive for congestion. Negative for ear pain, hearing loss, postnasal drip, rhinorrhea and sore throat.    Respiratory: Positive for cough. Negative for hemoptysis, sputum production, shortness of breath and wheezing.    Cardiovascular: Positive for leg swelling. Negative for chest pain and palpitations.   Gastrointestinal: Negative for abdominal pain, heartburn, nausea and vomiting.   Musculoskeletal: Negative for myalgias.   Skin: Negative for rash.   Neurological: Positive for tingling. Negative for tremors, sensory change, speech change and headaches.          Objective:     BP (!) 94/62   Pulse 78   Temp 36.1 °C (97 °F)   Resp 12   Ht 1.702 m (5' 7.01\")   Wt 49.4 kg (109 lb)   SpO2 98%   BMI 17.07 kg/m²      Physical Exam   Constitutional: She is oriented to person, place, and time.   BMI 17.07   HENT:   Head: Normocephalic and atraumatic.   Nose: Nose normal.   Mouth/Throat: Oropharynx is clear and moist.   Neck: Normal range of motion. No thyromegaly present.   Cardiovascular: Normal rate and regular rhythm.  Exam reveals no friction rub.    Murmur heard.  Pulmonary/Chest: Effort normal and breath sounds normal. No respiratory distress. She " has no wheezes.   Abdominal: Soft. Bowel sounds are normal. She exhibits no distension. There is no tenderness.   Lymphadenopathy:     She has no cervical adenopathy.   Neurological: She is alert and oriented to person, place, and time.   Skin: Skin is warm and dry.   Nursing note and vitals reviewed.              Assessment/Plan:     1. Diabetes mellitus due to underlying condition with hyperosmolarity without coma, with long-term current use of insulin (CMS-HCC)  We'll have her use her insulin as directed. We'll recheck her A1c in 2-3 months as well as repeat her complete metabolic panel. We'll also repeat a microalbumin creatinine ratio to further assess her kidney function. We'll continue to follow.  - POCT  A1C  - MICROALBUMIN CREAT RATIO URINE; Future    2. Hyperkalemia  We'll recheck her potassium levels have continued to take her medication as directed. If her levels continue to be on the higher end of normal start Lasix as directed.  ER precautions of also been given to patient.    3. Influenza vaccine needed  She would like to get a flu shot, she understands the risks and benefits of the flu shot. No recent fevers, no egg allergies, and no hx of GBS.    - Flu Quad Inj >3 Year Pre-Filled PF    4. Elevated blood protein  A serum protein electrophoresis has been ordered to further evaluate her elevated blood protein. We'll continue to follow.  - COMP METABOLIC PANEL; Future  - MISCELLANEOUS LAB TEST ([facility]/Other); Future      5. Murmur, cardiac  We'll have her schedule her echocardiogram to further assess her murmur. ER precautions have been given to patient she should develop any chest pain, shortness of breath or other concerning symptoms.    6. Alcoholic cirrhosis of liver with ascites (CMS-HCC)  Reviewed the results of her recent ultrasound. She has an appointment with GI for further evaluation and possible management of her cirrhosis. We'll continue to follow.

## 2017-09-19 ENCOUNTER — TELEPHONE (OUTPATIENT)
Dept: MEDICAL GROUP | Facility: MEDICAL CENTER | Age: 52
End: 2017-09-19

## 2017-09-20 ENCOUNTER — TELEPHONE (OUTPATIENT)
Dept: MEDICAL GROUP | Facility: MEDICAL CENTER | Age: 52
End: 2017-09-20

## 2017-09-21 DIAGNOSIS — I42.1 HOCM (HYPERTROPHIC OBSTRUCTIVE CARDIOMYOPATHY) (HCC): ICD-10-CM

## 2017-09-21 DIAGNOSIS — E11.8 DM (DIABETES MELLITUS) WITH COMPLICATIONS (HCC): ICD-10-CM

## 2017-09-21 RX ORDER — METOPROLOL TARTRATE 100 MG/1
50 TABLET ORAL 2 TIMES DAILY
Qty: 90 TAB | Refills: 3 | Status: SHIPPED | OUTPATIENT
Start: 2017-09-21 | End: 2017-10-11 | Stop reason: SDUPTHER

## 2017-10-11 ENCOUNTER — OFFICE VISIT (OUTPATIENT)
Dept: CARDIOLOGY | Facility: MEDICAL CENTER | Age: 52
End: 2017-10-11
Payer: MEDICAID

## 2017-10-11 VITALS
HEIGHT: 67 IN | OXYGEN SATURATION: 93 % | SYSTOLIC BLOOD PRESSURE: 122 MMHG | WEIGHT: 116 LBS | BODY MASS INDEX: 18.21 KG/M2 | HEART RATE: 72 BPM | DIASTOLIC BLOOD PRESSURE: 76 MMHG

## 2017-10-11 DIAGNOSIS — E11.9 TYPE 2 DIABETES MELLITUS WITHOUT COMPLICATION, UNSPECIFIED LONG TERM INSULIN USE STATUS: ICD-10-CM

## 2017-10-11 DIAGNOSIS — I10 HYPERTENSION, BENIGN: ICD-10-CM

## 2017-10-11 DIAGNOSIS — K70.9 LIVER DISEASE DUE TO ALCOHOL (HCC): ICD-10-CM

## 2017-10-11 DIAGNOSIS — K70.31 ALCOHOLIC CIRRHOSIS OF LIVER WITH ASCITES (HCC): ICD-10-CM

## 2017-10-11 DIAGNOSIS — Z72.0 TOBACCO ABUSE: ICD-10-CM

## 2017-10-11 DIAGNOSIS — I42.1 HOCM (HYPERTROPHIC OBSTRUCTIVE CARDIOMYOPATHY) (HCC): ICD-10-CM

## 2017-10-11 PROCEDURE — 99214 OFFICE O/P EST MOD 30 MIN: CPT | Performed by: INTERNAL MEDICINE

## 2017-10-11 RX ORDER — METOPROLOL TARTRATE 50 MG/1
50 TABLET, FILM COATED ORAL 2 TIMES DAILY
Qty: 180 TAB | Refills: 3 | Status: SHIPPED | OUTPATIENT
Start: 2017-10-11 | End: 2018-01-16 | Stop reason: SDUPTHER

## 2017-10-11 NOTE — PROGRESS NOTES
Subjective:   Rigoberto Adames is a 52 y.o. female who presents today for followup. I met her when she presented as a STEMI. She was heavily intoxicated at the time and complaining of substernal chest pain with a long-standing history of a murmur and exertional dyspnea NYHA class III. She had normal epicardial arteries with significant LVOT gradient with a normal-appearing valve. Subsequent cardiac MRI confirms a diagnosis of hypertrophic cardiomyopathy with obstruction. She has clear stigmata of chronic liver disease however and is unable to admit she is currently having a problem with alcohol. She has difficulty understanding medications and with medical compliance. Holter monitor was unrevealing for NSVT last year. MRI without scar.     In the interim she did not complete her follow-up echocardiogram as requested. She has ran out of her Lopressor and has developed some of her characteristic exertional chest discomfort since being off this medication. This visit she does remember the diagnosis of hypertrophic tendinopathy and has her daughter present with her again to help her remember. Otherwise she has been feeling well with NYHA class II dyspnea on exertion.    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) 01/20/2015     Priority: Medium   • Hypertension, benign 12/09/2014     Priority: Medium   • Cirrhosis (CMS-HCC) 03/15/2016     Priority: Low   • Weakness 10/14/2015     Priority: Low   • Macrocytic anemia 10/12/2015     Priority: Low   • Tobacco abuse 12/08/2014     Priority: Low   •  Diabetes mellitus, type 2 (CMS-HCC) 06/27/2011     Priority: Low   • Cirrhosis of liver with ascites (CMS-HCC) 03/06/2017   • Thrombocytopenia (CMS-HCC) 06/24/2016       Past Medical History:   Diagnosis Date   • Diabetes mellitus, type 2 (CMS-HCC)    • HOCM (hypertrophic obstructive cardiomyopathy) (CMS-HCC)    • Hypertension      Past Surgical History:   Procedure Laterality Date   • OTHER      c- section     Family History   Problem Relation Age of Onset   • Cancer Father      stomach   • Hypertension Mother      History   Smoking Status   • Current Every Day Smoker   • Packs/day: 1.00   • Years: 10.00   • Types: Cigarettes   • Start date: 8/12/2015   Smokeless Tobacco   • Never Used     No Known Allergies  Outpatient Encounter Prescriptions as of 10/11/2017   Medication Sig Dispense Refill   • metoprolol (LOPRESSOR) 50 MG Tab Take 1 Tab by mouth 2 times a day. 180 Tab 3   • oxybutynin (DITROPAN) 5 MG Tab TAKE ONE TABLET BY MOUTH THREE TIMES DAILY 90 Tab 0   • metformin (GLUCOPHAGE) 500 MG Tab Take 2 Tabs by mouth 2 times a day, with meals. 360 Tab 3   • [DISCONTINUED] metoprolol (LOPRESSOR) 100 MG Tab Take 0.5 Tabs by mouth 2 times a day. 90 Tab 3   • insulin glargine (LANTUS) 100 UNIT/ML Solution Pen-injector injection Inject 25 Units as instructed every evening. 10 PEN 11   • insulin glargine (BASAGLAR KWIKPEN) 100 UNIT/ML Solution Pen-injector injection Inject 25 Units as instructed every evening. 10 PEN 3   • Insulin Pen Needle 33G X 5 MM Misc 1 Device by Subdermal route every day. 30 Each 11   • gabapentin (NEURONTIN) 800 MG tablet Take 1 Tab by mouth 3 times a day. 90 Tab 3   • spironolactone (ALDACTONE) 25 MG Tab Take 1 Tab by mouth every day. 30 Tab 3   • atorvastatin (LIPITOR) 20 MG Tab Take 1 Tab by mouth every bedtime. 30 Tab 3   • lisinopril (PRINIVIL) 10 MG Tab Take 1 Tab by mouth every day. 30 Tab 3   • quetiapine (SEROQUEL) 50 MG tablet Take 1 Tab by mouth every evening. 60 Tab 3     No  "facility-administered encounter medications on file as of 10/11/2017.      Review of Systems   All other systems reviewed and are negative.       Objective:   /76   Pulse 72   Ht 1.702 m (5' 7\")   Wt 52.6 kg (116 lb)   SpO2 93%   BMI 18.17 kg/m²     Physical Exam   Constitutional: She is oriented to person, place, and time. She appears well-developed and well-nourished. No distress.   HENT:   Head: Normocephalic and atraumatic.   Mouth/Throat: Oropharynx is clear and moist. No oropharyngeal exudate.   Eyes: Conjunctivae are normal. Pupils are equal, round, and reactive to light.   Neck: Normal range of motion. Neck supple. No JVD present. No thyromegaly present.   Cardiovascular: Normal rate, regular rhythm and intact distal pulses.  Exam reveals no gallop and no friction rub.    Murmur (2/6 left upper sternal border) heard.  Pulmonary/Chest: Effort normal and breath sounds normal. She has no wheezes. She has no rales.   Abdominal: Soft. Bowel sounds are normal. She exhibits no distension. There is no tenderness.   Musculoskeletal: She exhibits no edema or tenderness.   Neurological: She is alert and oriented to person, place, and time. No cranial nerve deficit.   Skin: Skin is warm and dry. No rash noted. She is not diaphoretic. No erythema.   Spider veins present, scattered   Psychiatric: She has a normal mood and affect. Her behavior is normal.   Vitals reviewed.    Cardiac MRI (12/16/2014): Mid and basal septal hypertrophy with WENDY and mitral regurgitation. Septal thickness 16 MM. No fibrosis. Mild concentric LVH otherwise. Moderate mitral regurgitation.    HOLTER SUMMARY (2/19/2015):  Sinus rhythm with good heart rate variability and PVCs. No episodes of VT or nonsustained VT.    Assessment:     1. HOCM (hypertrophic obstructive cardiomyopathy) (CMS-HCC)  metoprolol (LOPRESSOR) 50 MG Tab    ECHOCARDIOGRAM COMP W/O CONT   2. Hypertension, benign  metoprolol (LOPRESSOR) 50 MG Tab   3. Liver disease due " to alcohol (CMS-HCC)     4. Type 2 diabetes mellitus without complication, unspecified long term insulin use status (CMS-HCC)     5. Tobacco abuse     6. Alcoholic cirrhosis of liver with ascites (CMS-HCC)     7. 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 She is off her medications again. She has a daughter with her today. When she is 19 she require screening for hypertrophic cardiopathy.    Recommended restarting her Lopressor at 50 mg twice a day and continue her other medical therapy. She indicates she has not been drinking recently.    Recommendations:    1. Avoid afterload reduction  2. Continue Lopressor uninterrupted  3. [place] hydration with salt-containing fluids including Gatorade and avoidance of dehydration  4. Treatment of alcohol abuse disorder.   5. Close followup with her primary care physician for her diabetes and judicious up titration of her beta blocker therapy both for blood pressure and LVOT obstruction, goal heart rate less than 55.  6. Family screening for hypertrophic cardiomyopathy when appropriate  7. I reordered her echocardiogram she failed to get done at last visit. She also failed to follow-up as scheduled previously.    She will require yearly Holter monitoring. This is due now I will order a 24-hour Holter.    Follow up with me in 6 months

## 2018-01-16 ENCOUNTER — OFFICE VISIT (OUTPATIENT)
Dept: MEDICAL GROUP | Facility: MEDICAL CENTER | Age: 53
End: 2018-01-16
Attending: FAMILY MEDICINE
Payer: MEDICAID

## 2018-01-16 VITALS
TEMPERATURE: 97.7 F | SYSTOLIC BLOOD PRESSURE: 165 MMHG | HEIGHT: 67 IN | OXYGEN SATURATION: 98 % | DIASTOLIC BLOOD PRESSURE: 100 MMHG | WEIGHT: 120 LBS | HEART RATE: 88 BPM | RESPIRATION RATE: 16 BRPM | BODY MASS INDEX: 18.83 KG/M2

## 2018-01-16 DIAGNOSIS — I10 HYPERTENSION, BENIGN: ICD-10-CM

## 2018-01-16 DIAGNOSIS — E11.69 HYPERLIPIDEMIA ASSOCIATED WITH TYPE 2 DIABETES MELLITUS (HCC): ICD-10-CM

## 2018-01-16 DIAGNOSIS — H92.02 OTALGIA OF LEFT EAR: ICD-10-CM

## 2018-01-16 DIAGNOSIS — E78.5 HYPERLIPIDEMIA ASSOCIATED WITH TYPE 2 DIABETES MELLITUS (HCC): ICD-10-CM

## 2018-01-16 DIAGNOSIS — R18.8 CIRRHOSIS OF LIVER WITH ASCITES, UNSPECIFIED HEPATIC CIRRHOSIS TYPE (HCC): ICD-10-CM

## 2018-01-16 DIAGNOSIS — E11.8 DM (DIABETES MELLITUS) WITH COMPLICATIONS (HCC): ICD-10-CM

## 2018-01-16 DIAGNOSIS — I42.1 HOCM (HYPERTROPHIC OBSTRUCTIVE CARDIOMYOPATHY) (HCC): ICD-10-CM

## 2018-01-16 DIAGNOSIS — K74.60 CIRRHOSIS OF LIVER WITH ASCITES, UNSPECIFIED HEPATIC CIRRHOSIS TYPE (HCC): ICD-10-CM

## 2018-01-16 PROCEDURE — 99214 OFFICE O/P EST MOD 30 MIN: CPT | Performed by: FAMILY MEDICINE

## 2018-01-16 PROCEDURE — 99213 OFFICE O/P EST LOW 20 MIN: CPT | Performed by: FAMILY MEDICINE

## 2018-01-16 RX ORDER — SPIRONOLACTONE 25 MG/1
25 TABLET ORAL DAILY
Qty: 30 TAB | Refills: 3 | Status: SHIPPED | OUTPATIENT
Start: 2018-01-16 | End: 2018-05-14 | Stop reason: SDUPTHER

## 2018-01-16 RX ORDER — GABAPENTIN 800 MG/1
800 TABLET ORAL 3 TIMES DAILY
Qty: 90 TAB | Refills: 3 | Status: SHIPPED | OUTPATIENT
Start: 2018-01-16 | End: 2018-08-07 | Stop reason: SDUPTHER

## 2018-01-16 RX ORDER — AMOXICILLIN 500 MG/1
500 CAPSULE ORAL 3 TIMES DAILY
Qty: 30 CAP | Refills: 0 | Status: SHIPPED | OUTPATIENT
Start: 2018-01-16 | End: 2018-03-13

## 2018-01-16 RX ORDER — ATORVASTATIN CALCIUM 20 MG/1
20 TABLET, FILM COATED ORAL
Qty: 30 TAB | Refills: 3 | Status: SHIPPED | OUTPATIENT
Start: 2018-01-16 | End: 2018-08-07 | Stop reason: SDUPTHER

## 2018-01-16 RX ORDER — METOPROLOL TARTRATE 50 MG/1
50 TABLET, FILM COATED ORAL 2 TIMES DAILY
Qty: 180 TAB | Refills: 3 | Status: SHIPPED | OUTPATIENT
Start: 2018-01-16 | End: 2018-11-08 | Stop reason: SDUPTHER

## 2018-01-16 RX ORDER — LISINOPRIL 10 MG/1
10 TABLET ORAL DAILY
Qty: 30 TAB | Refills: 3 | Status: SHIPPED | OUTPATIENT
Start: 2018-01-16 | End: 2018-11-08 | Stop reason: SDUPTHER

## 2018-01-16 ASSESSMENT — ENCOUNTER SYMPTOMS
NECK PAIN: 0
SHORTNESS OF BREATH: 0
CHILLS: 0
FOCAL WEAKNESS: 1
SORE THROAT: 1
PALPITATIONS: 0
TINGLING: 1
SPEECH CHANGE: 0
HEADACHES: 0
COUGH: 1
BACK PAIN: 1
VOMITING: 0
TREMORS: 0
SENSORY CHANGE: 0
RHINORRHEA: 1
DEPRESSION: 0
FEVER: 1
SPUTUM PRODUCTION: 0
HALLUCINATIONS: 0
ABDOMINAL PAIN: 0
DIARRHEA: 0

## 2018-01-16 ASSESSMENT — LIFESTYLE VARIABLES: SUBSTANCE_ABUSE: 0

## 2018-02-27 ENCOUNTER — HOSPITAL ENCOUNTER (OUTPATIENT)
Dept: LAB | Facility: MEDICAL CENTER | Age: 53
End: 2018-02-27
Attending: FAMILY MEDICINE
Payer: MEDICAID

## 2018-02-27 DIAGNOSIS — E11.8 DM (DIABETES MELLITUS) WITH COMPLICATIONS (HCC): ICD-10-CM

## 2018-02-27 DIAGNOSIS — E78.5 HYPERLIPIDEMIA ASSOCIATED WITH TYPE 2 DIABETES MELLITUS (HCC): ICD-10-CM

## 2018-02-27 DIAGNOSIS — I10 HYPERTENSION, BENIGN: ICD-10-CM

## 2018-02-27 DIAGNOSIS — E11.69 HYPERLIPIDEMIA ASSOCIATED WITH TYPE 2 DIABETES MELLITUS (HCC): ICD-10-CM

## 2018-02-27 LAB
ALBUMIN SERPL BCP-MCNC: 4 G/DL (ref 3.2–4.9)
ALBUMIN/GLOB SERPL: 0.9 G/DL
ALP SERPL-CCNC: 177 U/L (ref 30–99)
ALT SERPL-CCNC: 22 U/L (ref 2–50)
ANION GAP SERPL CALC-SCNC: 8 MMOL/L (ref 0–11.9)
AST SERPL-CCNC: 20 U/L (ref 12–45)
BASOPHILS # BLD AUTO: 0.7 % (ref 0–1.8)
BASOPHILS # BLD: 0.04 K/UL (ref 0–0.12)
BILIRUB SERPL-MCNC: 0.6 MG/DL (ref 0.1–1.5)
BUN SERPL-MCNC: 11 MG/DL (ref 8–22)
CALCIUM SERPL-MCNC: 9.6 MG/DL (ref 8.5–10.5)
CHLORIDE SERPL-SCNC: 97 MMOL/L (ref 96–112)
CHOLEST SERPL-MCNC: 80 MG/DL (ref 100–199)
CO2 SERPL-SCNC: 23 MMOL/L (ref 20–33)
CREAT SERPL-MCNC: 0.55 MG/DL (ref 0.5–1.4)
CREAT UR-MCNC: 30.2 MG/DL
EOSINOPHIL # BLD AUTO: 0.03 K/UL (ref 0–0.51)
EOSINOPHIL NFR BLD: 0.5 % (ref 0–6.9)
ERYTHROCYTE [DISTWIDTH] IN BLOOD BY AUTOMATED COUNT: 58.1 FL (ref 35.9–50)
EST. AVERAGE GLUCOSE BLD GHB EST-MCNC: 289 MG/DL
GLOBULIN SER CALC-MCNC: 4.3 G/DL (ref 1.9–3.5)
GLUCOSE SERPL-MCNC: 423 MG/DL (ref 65–99)
HBA1C MFR BLD: 11.7 % (ref 0–5.6)
HCT VFR BLD AUTO: 35.2 % (ref 37–47)
HDLC SERPL-MCNC: 36 MG/DL
HGB BLD-MCNC: 11.4 G/DL (ref 12–16)
IMM GRANULOCYTES # BLD AUTO: 0.02 K/UL (ref 0–0.11)
IMM GRANULOCYTES NFR BLD AUTO: 0.3 % (ref 0–0.9)
LDLC SERPL CALC-MCNC: 16 MG/DL
LYMPHOCYTES # BLD AUTO: 2.06 K/UL (ref 1–4.8)
LYMPHOCYTES NFR BLD: 36 % (ref 22–41)
MCH RBC QN AUTO: 36.4 PG (ref 27–33)
MCHC RBC AUTO-ENTMCNC: 32.4 G/DL (ref 33.6–35)
MCV RBC AUTO: 112.5 FL (ref 81.4–97.8)
MICROALBUMIN UR-MCNC: 5.9 MG/DL
MICROALBUMIN/CREAT UR: 195 MG/G (ref 0–30)
MONOCYTES # BLD AUTO: 0.61 K/UL (ref 0–0.85)
MONOCYTES NFR BLD AUTO: 10.7 % (ref 0–13.4)
NEUTROPHILS # BLD AUTO: 2.96 K/UL (ref 2–7.15)
NEUTROPHILS NFR BLD: 51.8 % (ref 44–72)
NRBC # BLD AUTO: 0 K/UL
NRBC BLD-RTO: 0 /100 WBC
PLATELET # BLD AUTO: 222 K/UL (ref 164–446)
PMV BLD AUTO: 11.9 FL (ref 9–12.9)
POTASSIUM SERPL-SCNC: 4.9 MMOL/L (ref 3.6–5.5)
PROT SERPL-MCNC: 8.3 G/DL (ref 6–8.2)
RBC # BLD AUTO: 3.13 M/UL (ref 4.2–5.4)
SODIUM SERPL-SCNC: 128 MMOL/L (ref 135–145)
TRIGL SERPL-MCNC: 139 MG/DL (ref 0–149)
TSH SERPL DL<=0.005 MIU/L-ACNC: 4.18 UIU/ML (ref 0.38–5.33)
WBC # BLD AUTO: 5.7 K/UL (ref 4.8–10.8)

## 2018-02-27 PROCEDURE — 83036 HEMOGLOBIN GLYCOSYLATED A1C: CPT

## 2018-02-27 PROCEDURE — 84443 ASSAY THYROID STIM HORMONE: CPT

## 2018-02-27 PROCEDURE — 36415 COLL VENOUS BLD VENIPUNCTURE: CPT

## 2018-02-27 PROCEDURE — 85025 COMPLETE CBC W/AUTO DIFF WBC: CPT

## 2018-02-27 PROCEDURE — 80053 COMPREHEN METABOLIC PANEL: CPT

## 2018-02-27 PROCEDURE — 82570 ASSAY OF URINE CREATININE: CPT

## 2018-02-27 PROCEDURE — 82043 UR ALBUMIN QUANTITATIVE: CPT

## 2018-02-27 PROCEDURE — 80061 LIPID PANEL: CPT

## 2018-03-13 ENCOUNTER — OFFICE VISIT (OUTPATIENT)
Dept: MEDICAL GROUP | Facility: MEDICAL CENTER | Age: 53
End: 2018-03-13
Attending: FAMILY MEDICINE
Payer: MEDICAID

## 2018-03-13 VITALS
RESPIRATION RATE: 14 BRPM | SYSTOLIC BLOOD PRESSURE: 115 MMHG | TEMPERATURE: 97.3 F | BODY MASS INDEX: 18.21 KG/M2 | HEIGHT: 67 IN | WEIGHT: 116 LBS | DIASTOLIC BLOOD PRESSURE: 80 MMHG | HEART RATE: 80 BPM | OXYGEN SATURATION: 98 %

## 2018-03-13 DIAGNOSIS — E11.8 DM (DIABETES MELLITUS) WITH COMPLICATIONS (HCC): ICD-10-CM

## 2018-03-13 DIAGNOSIS — I42.1 HOCM (HYPERTROPHIC OBSTRUCTIVE CARDIOMYOPATHY) (HCC): ICD-10-CM

## 2018-03-13 DIAGNOSIS — H92.02 OTALGIA OF LEFT EAR: ICD-10-CM

## 2018-03-13 DIAGNOSIS — D53.9 MACROCYTIC ANEMIA: ICD-10-CM

## 2018-03-13 DIAGNOSIS — R18.8 CIRRHOSIS OF LIVER WITH ASCITES, UNSPECIFIED HEPATIC CIRRHOSIS TYPE (HCC): ICD-10-CM

## 2018-03-13 DIAGNOSIS — K74.60 CIRRHOSIS OF LIVER WITH ASCITES, UNSPECIFIED HEPATIC CIRRHOSIS TYPE (HCC): ICD-10-CM

## 2018-03-13 LAB
HBA1C MFR BLD: 14.3 % (ref ?–5.8)
INT CON NEG: NEGATIVE
INT CON POS: POSITIVE

## 2018-03-13 PROCEDURE — 99213 OFFICE O/P EST LOW 20 MIN: CPT | Performed by: FAMILY MEDICINE

## 2018-03-13 PROCEDURE — 83036 HEMOGLOBIN GLYCOSYLATED A1C: CPT | Performed by: FAMILY MEDICINE

## 2018-03-13 PROCEDURE — 99214 OFFICE O/P EST MOD 30 MIN: CPT | Performed by: FAMILY MEDICINE

## 2018-03-13 RX ORDER — OXYBUTYNIN CHLORIDE 5 MG/1
TABLET ORAL
Qty: 90 TAB | Refills: 0 | Status: SHIPPED | OUTPATIENT
Start: 2018-03-13 | End: 2018-05-10 | Stop reason: SDUPTHER

## 2018-03-13 RX ORDER — AMOXICILLIN AND CLAVULANATE POTASSIUM 875; 125 MG/1; MG/1
1 TABLET, FILM COATED ORAL 2 TIMES DAILY
Qty: 14 TAB | Refills: 1 | Status: SHIPPED | OUTPATIENT
Start: 2018-03-13 | End: 2018-11-08

## 2018-03-13 ASSESSMENT — ENCOUNTER SYMPTOMS
NECK PAIN: 0
VOMITING: 0
ABDOMINAL PAIN: 0
COUGH: 0
SPEECH CHANGE: 0
TREMORS: 0
DIARRHEA: 0
CHILLS: 0
FEVER: 0
SHORTNESS OF BREATH: 0
SORE THROAT: 0
RHINORRHEA: 1
TINGLING: 1
PALPITATIONS: 0
PSYCHIATRIC NEGATIVE: 1
SINUS PAIN: 0
SENSORY CHANGE: 1
SPUTUM PRODUCTION: 0
EYES NEGATIVE: 1
HEADACHES: 0

## 2018-03-13 NOTE — PROGRESS NOTES
Subjective:      Rigoberto Adames is a 53 y.o. female who presents with Follow-Up and Blood Pressure Problem            Patient here for follow-up of her diabetes, hypertrophic obstructive cardiomyopathy, macrocytic anemia, cirrhosis, and otalgia for left ear.    Will increase her insulin Lantus to 30 units daily. Her hemoglobin A1c today was at 14. She states that she had not been using her insulin on a daily basis, so we'll have her start using her insulin daily but since her A1c is still high will increase her Lantus to 30 units from 25. We'll also have her check her blood sugars on a daily basis and also check her feet daily for any skin breakdown or changes in sensation. She has also been reminded to schedule a diabetic retinal exam.    Will repeat her CBC to recheck her macrocytosis as well as anemia. Patient states that she has completely stopped drinking since her last blood test was done. We'll continue to follow.    She will also continue to follow-up with gastroenterology for her cirrhosis as well as alcoholic liver disease. His CMP has been reordered to recheck her liver enzymes. We'll continue to follow.    She'll continue to take her medications as directed by cardiology for her cardiomyopathy. She has not had any recent chest pain, shortness of breath or leg swelling. We will continue to follow.     Current medications, allergies, and problem list reviewed with patient and updated in [facility].        Otalgia    There is pain in the left ear. This is a recurrent problem. The current episode started 1 to 4 weeks ago. The problem occurs constantly. The problem has been unchanged. There has been no fever. The pain is mild. Associated symptoms include ear discharge, hearing loss and rhinorrhea. Pertinent negatives include no abdominal pain, coughing, diarrhea, headaches, neck pain, rash, sore throat or vomiting. She has tried antibiotics (will repeat with augmentin, will continue to follow) for the symptoms.  "      Review of Systems   Constitutional: Negative for chills and fever.   HENT: Positive for congestion, ear discharge, ear pain, hearing loss and rhinorrhea. Negative for sinus pain and sore throat.    Eyes: Negative.    Respiratory: Negative for cough, sputum production and shortness of breath.    Cardiovascular: Negative for chest pain, palpitations and leg swelling.   Gastrointestinal: Negative for abdominal pain, diarrhea and vomiting.   Musculoskeletal: Negative for neck pain.   Skin: Negative for rash.   Neurological: Positive for tingling and sensory change. Negative for tremors, speech change and headaches.   Psychiatric/Behavioral: Negative.           Objective:     /80   Pulse 80   Temp 36.3 °C (97.3 °F)   Resp 14   Ht 1.702 m (5' 7\")   Wt 52.6 kg (116 lb)   SpO2 98%   BMI 18.17 kg/m²      Physical Exam   Constitutional: She is oriented to person, place, and time.   BMI 18   HENT:   Head: Normocephalic and atraumatic.   Discharge in L ear canal   Neck: Normal range of motion. Neck supple. No thyromegaly present.   Cardiovascular: Normal rate and regular rhythm.  Exam reveals no friction rub.    Murmur heard.  Pulmonary/Chest: Effort normal and breath sounds normal. No respiratory distress. She has no wheezes.   Abdominal: Soft. Bowel sounds are normal. She exhibits no distension. There is no tenderness.   Lymphadenopathy:     She has no cervical adenopathy.   Neurological: She is alert and oriented to person, place, and time.   Skin: Skin is warm and dry.   Psychiatric: She has a normal mood and affect. Her behavior is normal.   Nursing note and vitals reviewed.              Assessment/Plan:     1. HOCM (hypertrophic obstructive cardiomyopathy) (CMS-HCC)  Will have her continue to take her medications as directed by her cardiologist. She has been given instructions to go to the er if she has any worsened sxs.    2. Macrocytic anemia  Will have her recheck her CBC, will continue to " follow.    3. Cirrhosis of liver with ascites, unspecified hepatic cirrhosis type (CMS-HCC)  Will have her follow up with GI and take her medications as directed. She will repeat a CMP and will continue to follow.    4. Otalgia of left ear  Will repeat augmentin and if still present at next check will refer to ENT. Will continue to follow.    5. DM (diabetes mellitus) with complications (CMS-HCC)  Will have her increase her insulin to 30 u and will have her continue to check her blood sugars. Labs also reordered.   - COMP METABOLIC PANEL; Future  - CBC WITH DIFFERENTIAL; Future  - MICROALBUMIN CREAT RATIO URINE; Future  - HEMOGLOBIN A1C; Future

## 2018-03-21 ENCOUNTER — TELEPHONE (OUTPATIENT)
Dept: MEDICAL GROUP | Facility: MEDICAL CENTER | Age: 53
End: 2018-03-21

## 2018-03-21 NOTE — TELEPHONE ENCOUNTER
1. Caller Name: [email or web address]                                         Call Back Number: [phone] (home)         Patient approves a detailed voicemail message: N\A    Patient states that insurance will not cover lantus and is requesting a different rx be sent in to pharmacy. Please advise.

## 2018-04-02 ENCOUNTER — TELEPHONE (OUTPATIENT)
Dept: MEDICAL GROUP | Facility: MEDICAL CENTER | Age: 53
End: 2018-04-02

## 2018-04-02 NOTE — TELEPHONE ENCOUNTER
MEDICATION PRIOR AUTHORIZATION NEEDED:    1. Name of Medication: toujeo    2. Requested By (Name of Pharmacy): [facility]     3. Is insurance on file current? yes    4. What is the name & phone number of the 3rd party payor? Medicaid ffs

## 2018-04-17 ENCOUNTER — TELEPHONE (OUTPATIENT)
Dept: MEDICAL GROUP | Facility: MEDICAL CENTER | Age: 53
End: 2018-04-17

## 2018-05-14 ENCOUNTER — TELEPHONE (OUTPATIENT)
Dept: MEDICAL GROUP | Facility: MEDICAL CENTER | Age: 53
End: 2018-05-14

## 2018-05-14 DIAGNOSIS — K74.60 CIRRHOSIS OF LIVER WITH ASCITES, UNSPECIFIED HEPATIC CIRRHOSIS TYPE (HCC): ICD-10-CM

## 2018-05-14 DIAGNOSIS — R18.8 CIRRHOSIS OF LIVER WITH ASCITES, UNSPECIFIED HEPATIC CIRRHOSIS TYPE (HCC): ICD-10-CM

## 2018-05-14 RX ORDER — AMOXICILLIN AND CLAVULANATE POTASSIUM 875; 125 MG/1; MG/1
TABLET, FILM COATED ORAL
Refills: 1 | OUTPATIENT
Start: 2018-05-14

## 2018-05-14 RX ORDER — SPIRONOLACTONE 25 MG/1
25 TABLET ORAL DAILY
Qty: 90 TAB | Refills: 1 | Status: SHIPPED | OUTPATIENT
Start: 2018-05-14 | End: 2018-05-22 | Stop reason: SDUPTHER

## 2018-05-14 RX ORDER — OXYBUTYNIN CHLORIDE 5 MG/1
TABLET ORAL
Qty: 90 TAB | Refills: 0 | Status: SHIPPED | OUTPATIENT
Start: 2018-05-14 | End: 2018-06-25 | Stop reason: SDUPTHER

## 2018-05-14 NOTE — TELEPHONE ENCOUNTER
1. Caller Name: [facility]                                         Call Back Number: [phone] (home)         Patient approves a detailed voicemail message: N\A    Pharmacy is requesting a 90 day supply for spironolactone 25 mg. Please advise.

## 2018-05-15 ENCOUNTER — HOSPITAL ENCOUNTER (OUTPATIENT)
Dept: LAB | Facility: MEDICAL CENTER | Age: 53
End: 2018-05-15
Attending: FAMILY MEDICINE
Payer: MEDICAID

## 2018-05-15 ENCOUNTER — HOSPITAL ENCOUNTER (OUTPATIENT)
Dept: RADIOLOGY | Facility: MEDICAL CENTER | Age: 53
End: 2018-05-15
Attending: INTERNAL MEDICINE
Payer: MEDICAID

## 2018-05-15 DIAGNOSIS — E11.8 DM (DIABETES MELLITUS) WITH COMPLICATIONS (HCC): ICD-10-CM

## 2018-05-15 DIAGNOSIS — C18.9 MALIGNANT NEOPLASM OF COLON, UNSPECIFIED PART OF COLON (HCC): ICD-10-CM

## 2018-05-15 DIAGNOSIS — K70.30 ALCOHOLIC CIRRHOSIS OF LIVER WITHOUT ASCITES (HCC): ICD-10-CM

## 2018-05-15 LAB
ALBUMIN SERPL BCP-MCNC: 4.3 G/DL (ref 3.2–4.9)
ALBUMIN/GLOB SERPL: 1.1 G/DL
ALP SERPL-CCNC: 137 U/L (ref 30–99)
ALT SERPL-CCNC: 27 U/L (ref 2–50)
ANION GAP SERPL CALC-SCNC: 10 MMOL/L (ref 0–11.9)
AST SERPL-CCNC: 21 U/L (ref 12–45)
BASOPHILS # BLD AUTO: 0.3 % (ref 0–1.8)
BASOPHILS # BLD: 0.02 K/UL (ref 0–0.12)
BILIRUB SERPL-MCNC: 0.6 MG/DL (ref 0.1–1.5)
BUN SERPL-MCNC: 16 MG/DL (ref 8–22)
CALCIUM SERPL-MCNC: 10.4 MG/DL (ref 8.5–10.5)
CHLORIDE SERPL-SCNC: 96 MMOL/L (ref 96–112)
CO2 SERPL-SCNC: 25 MMOL/L (ref 20–33)
CREAT SERPL-MCNC: 0.7 MG/DL (ref 0.5–1.4)
CREAT UR-MCNC: 29.4 MG/DL
EOSINOPHIL # BLD AUTO: 0.17 K/UL (ref 0–0.51)
EOSINOPHIL NFR BLD: 2.7 % (ref 0–6.9)
ERYTHROCYTE [DISTWIDTH] IN BLOOD BY AUTOMATED COUNT: 45.6 FL (ref 35.9–50)
EST. AVERAGE GLUCOSE BLD GHB EST-MCNC: 453 MG/DL
GLOBULIN SER CALC-MCNC: 4 G/DL (ref 1.9–3.5)
GLUCOSE SERPL-MCNC: 494 MG/DL (ref 65–99)
HBA1C MFR BLD: 17.4 % (ref 0–5.6)
HCT VFR BLD AUTO: 38.8 % (ref 37–47)
HGB BLD-MCNC: 12.8 G/DL (ref 12–16)
IMM GRANULOCYTES # BLD AUTO: 0.02 K/UL (ref 0–0.11)
IMM GRANULOCYTES NFR BLD AUTO: 0.3 % (ref 0–0.9)
LYMPHOCYTES # BLD AUTO: 2.46 K/UL (ref 1–4.8)
LYMPHOCYTES NFR BLD: 39.1 % (ref 22–41)
MCH RBC QN AUTO: 30.9 PG (ref 27–33)
MCHC RBC AUTO-ENTMCNC: 33 G/DL (ref 33.6–35)
MCV RBC AUTO: 93.7 FL (ref 81.4–97.8)
MICROALBUMIN UR-MCNC: 1.1 MG/DL
MICROALBUMIN/CREAT UR: 37 MG/G (ref 0–30)
MONOCYTES # BLD AUTO: 0.4 K/UL (ref 0–0.85)
MONOCYTES NFR BLD AUTO: 6.4 % (ref 0–13.4)
NEUTROPHILS # BLD AUTO: 3.22 K/UL (ref 2–7.15)
NEUTROPHILS NFR BLD: 51.2 % (ref 44–72)
NRBC # BLD AUTO: 0 K/UL
NRBC BLD-RTO: 0 /100 WBC
PLATELET # BLD AUTO: 145 K/UL (ref 164–446)
PMV BLD AUTO: 12.7 FL (ref 9–12.9)
POTASSIUM SERPL-SCNC: 5.2 MMOL/L (ref 3.6–5.5)
PROT SERPL-MCNC: 8.3 G/DL (ref 6–8.2)
RBC # BLD AUTO: 4.14 M/UL (ref 4.2–5.4)
SODIUM SERPL-SCNC: 131 MMOL/L (ref 135–145)
WBC # BLD AUTO: 6.3 K/UL (ref 4.8–10.8)

## 2018-05-15 PROCEDURE — 36415 COLL VENOUS BLD VENIPUNCTURE: CPT

## 2018-05-15 PROCEDURE — 82043 UR ALBUMIN QUANTITATIVE: CPT

## 2018-05-15 PROCEDURE — 76700 US EXAM ABDOM COMPLETE: CPT

## 2018-05-15 PROCEDURE — 80053 COMPREHEN METABOLIC PANEL: CPT

## 2018-05-15 PROCEDURE — 83036 HEMOGLOBIN GLYCOSYLATED A1C: CPT

## 2018-05-15 PROCEDURE — 82570 ASSAY OF URINE CREATININE: CPT

## 2018-05-15 PROCEDURE — 85025 COMPLETE CBC W/AUTO DIFF WBC: CPT

## 2018-05-22 DIAGNOSIS — K74.60 CIRRHOSIS OF LIVER WITH ASCITES, UNSPECIFIED HEPATIC CIRRHOSIS TYPE (HCC): ICD-10-CM

## 2018-05-22 DIAGNOSIS — R18.8 CIRRHOSIS OF LIVER WITH ASCITES, UNSPECIFIED HEPATIC CIRRHOSIS TYPE (HCC): ICD-10-CM

## 2018-05-22 RX ORDER — SPIRONOLACTONE 25 MG/1
25 TABLET ORAL DAILY
Qty: 90 TAB | Refills: 0 | Status: SHIPPED | OUTPATIENT
Start: 2018-05-22 | End: 2018-11-08 | Stop reason: SDUPTHER

## 2018-06-05 ENCOUNTER — OFFICE VISIT (OUTPATIENT)
Dept: MEDICAL GROUP | Facility: MEDICAL CENTER | Age: 53
End: 2018-06-05
Attending: FAMILY MEDICINE
Payer: MEDICAID

## 2018-06-05 VITALS
RESPIRATION RATE: 14 BRPM | HEART RATE: 96 BPM | SYSTOLIC BLOOD PRESSURE: 110 MMHG | HEIGHT: 67 IN | TEMPERATURE: 98.5 F | OXYGEN SATURATION: 100 % | DIASTOLIC BLOOD PRESSURE: 65 MMHG | BODY MASS INDEX: 17.74 KG/M2 | WEIGHT: 113 LBS

## 2018-06-05 DIAGNOSIS — H92.02 OTALGIA OF LEFT EAR: ICD-10-CM

## 2018-06-05 DIAGNOSIS — H91.92 DECREASED HEARING OF LEFT EAR: ICD-10-CM

## 2018-06-05 PROCEDURE — 99214 OFFICE O/P EST MOD 30 MIN: CPT | Performed by: FAMILY MEDICINE

## 2018-06-05 PROCEDURE — 99213 OFFICE O/P EST LOW 20 MIN: CPT | Performed by: FAMILY MEDICINE

## 2018-06-05 RX ORDER — AZITHROMYCIN 250 MG/1
TABLET, FILM COATED ORAL
Qty: 6 TAB | Refills: 0 | Status: SHIPPED | OUTPATIENT
Start: 2018-06-05 | End: 2018-11-08

## 2018-06-05 ASSESSMENT — ENCOUNTER SYMPTOMS
NECK PAIN: 0
SENSORY CHANGE: 0
FEVER: 0
COUGH: 0
CHILLS: 0
TINGLING: 1
TREMORS: 0
PALPITATIONS: 0
SORE THROAT: 0
RHINORRHEA: 1
DIARRHEA: 0
SPUTUM PRODUCTION: 0
FOCAL WEAKNESS: 1
SPEECH CHANGE: 0
ABDOMINAL PAIN: 0
VOMITING: 0
HEADACHES: 0
SHORTNESS OF BREATH: 0
BACK PAIN: 1

## 2018-06-05 NOTE — PROGRESS NOTES
"Subjective:      Rigoberto Adames is a 53 y.o. female who presents with Follow-Up (ear infection)            Otalgia    There is pain in the left ear. This is a recurrent problem. The current episode started 1 to 4 weeks ago. The problem occurs constantly. The problem has been gradually worsening. There has been no fever. Associated symptoms include hearing loss and rhinorrhea. Pertinent negatives include no abdominal pain, coughing, diarrhea, ear discharge, headaches, neck pain, rash, sore throat or vomiting. She has tried antibiotics for the symptoms.       Review of Systems   Constitutional: Negative for chills and fever.   HENT: Positive for congestion, ear pain, hearing loss and rhinorrhea. Negative for ear discharge, sore throat and tinnitus.    Respiratory: Negative for cough, sputum production and shortness of breath.    Cardiovascular: Negative for chest pain and palpitations.   Gastrointestinal: Negative for abdominal pain, diarrhea and vomiting.   Musculoskeletal: Positive for back pain and joint pain. Negative for neck pain.   Skin: Negative for rash.   Neurological: Positive for tingling and focal weakness. Negative for tremors, sensory change, speech change and headaches.          Objective:     /65   Pulse 96   Temp 36.9 °C (98.5 °F)   Resp 14   Ht 1.702 m (5' 7\")   Wt 51.3 kg (113 lb)   SpO2 100%   BMI 17.70 kg/m²      Physical Exam   Constitutional: She is oriented to person, place, and time.   BMI 17   HENT:   Head: Normocephalic and atraumatic.   TM dull on L    Eyes: Conjunctivae and EOM are normal. Pupils are equal, round, and reactive to light.   Neck: Normal range of motion. Neck supple.   Cardiovascular: Normal rate, regular rhythm and normal heart sounds.  Exam reveals no friction rub.    No murmur heard.  Pulmonary/Chest: Effort normal and breath sounds normal. No respiratory distress. She has no wheezes. She has no rales.   Abdominal: Soft. Bowel sounds are normal. She exhibits " no distension. There is no tenderness.   Neurological: She is alert and oriented to person, place, and time.   Skin: Skin is warm and dry.   Psychiatric: She has a normal mood and affect. Her behavior is normal.   Nursing note and vitals reviewed.              Assessment/Plan:     1. Otalgia of left ear  Will have her try nasal saline and will have her try a Zpak and a referral to ENT also made, will continue to follow.  - azithromycin (ZITHROMAX) 250 MG Tab; Use as directed  Dispense: 6 Tab; Refill: 0  - REFERRAL TO ENT    2. Decreased hearing of left ear  See above plan.   - azithromycin (ZITHROMAX) 250 MG Tab; Use as directed  Dispense: 6 Tab; Refill: 0  - REFERRAL TO ENT

## 2018-06-25 RX ORDER — OXYBUTYNIN CHLORIDE 5 MG/1
TABLET ORAL
Qty: 90 TAB | Refills: 0 | Status: SHIPPED | OUTPATIENT
Start: 2018-06-25 | End: 2018-08-07 | Stop reason: SDUPTHER

## 2018-08-07 DIAGNOSIS — R18.8 CIRRHOSIS OF LIVER WITH ASCITES, UNSPECIFIED HEPATIC CIRRHOSIS TYPE (HCC): ICD-10-CM

## 2018-08-07 DIAGNOSIS — E78.5 HYPERLIPIDEMIA ASSOCIATED WITH TYPE 2 DIABETES MELLITUS (HCC): ICD-10-CM

## 2018-08-07 DIAGNOSIS — K74.60 CIRRHOSIS OF LIVER WITH ASCITES, UNSPECIFIED HEPATIC CIRRHOSIS TYPE (HCC): ICD-10-CM

## 2018-08-07 DIAGNOSIS — E11.69 HYPERLIPIDEMIA ASSOCIATED WITH TYPE 2 DIABETES MELLITUS (HCC): ICD-10-CM

## 2018-08-13 RX ORDER — OXYBUTYNIN CHLORIDE 5 MG/1
TABLET ORAL
Qty: 270 TAB | Refills: 0 | Status: SHIPPED | OUTPATIENT
Start: 2018-08-13 | End: 2018-11-08 | Stop reason: SDUPTHER

## 2018-08-13 RX ORDER — GABAPENTIN 800 MG/1
TABLET ORAL
Qty: 270 TAB | Refills: 0 | Status: SHIPPED | OUTPATIENT
Start: 2018-08-13 | End: 2018-11-08 | Stop reason: SDUPTHER

## 2018-08-13 RX ORDER — ATORVASTATIN CALCIUM 20 MG/1
TABLET, FILM COATED ORAL
Qty: 90 TAB | Refills: 0 | Status: SHIPPED | OUTPATIENT
Start: 2018-08-13 | End: 2018-11-08 | Stop reason: SDUPTHER

## 2018-11-08 ENCOUNTER — OFFICE VISIT (OUTPATIENT)
Dept: MEDICAL GROUP | Facility: MEDICAL CENTER | Age: 53
End: 2018-11-08
Attending: FAMILY MEDICINE
Payer: MEDICAID

## 2018-11-08 VITALS
TEMPERATURE: 97.2 F | DIASTOLIC BLOOD PRESSURE: 80 MMHG | RESPIRATION RATE: 14 BRPM | BODY MASS INDEX: 17.27 KG/M2 | SYSTOLIC BLOOD PRESSURE: 120 MMHG | HEART RATE: 100 BPM | OXYGEN SATURATION: 99 % | WEIGHT: 110 LBS | HEIGHT: 67 IN

## 2018-11-08 DIAGNOSIS — E11.8 DM (DIABETES MELLITUS) WITH COMPLICATIONS (HCC): ICD-10-CM

## 2018-11-08 DIAGNOSIS — E78.5 HYPERLIPIDEMIA ASSOCIATED WITH TYPE 2 DIABETES MELLITUS (HCC): ICD-10-CM

## 2018-11-08 DIAGNOSIS — R29.898 LEG WEAKNESS, BILATERAL: ICD-10-CM

## 2018-11-08 DIAGNOSIS — E11.69 HYPERLIPIDEMIA ASSOCIATED WITH TYPE 2 DIABETES MELLITUS (HCC): ICD-10-CM

## 2018-11-08 DIAGNOSIS — Z78.9 DECREASED ACTIVITIES OF DAILY LIVING (ADL): ICD-10-CM

## 2018-11-08 DIAGNOSIS — R32 INCONTINENCE IN FEMALE: ICD-10-CM

## 2018-11-08 DIAGNOSIS — I42.1 HOCM (HYPERTROPHIC OBSTRUCTIVE CARDIOMYOPATHY) (HCC): ICD-10-CM

## 2018-11-08 DIAGNOSIS — R18.8 CIRRHOSIS OF LIVER WITH ASCITES, UNSPECIFIED HEPATIC CIRRHOSIS TYPE (HCC): ICD-10-CM

## 2018-11-08 DIAGNOSIS — Z12.31 SCREENING MAMMOGRAM, ENCOUNTER FOR: ICD-10-CM

## 2018-11-08 DIAGNOSIS — K74.60 CIRRHOSIS OF LIVER WITH ASCITES, UNSPECIFIED HEPATIC CIRRHOSIS TYPE (HCC): ICD-10-CM

## 2018-11-08 DIAGNOSIS — Z23 INFLUENZA VACCINE NEEDED: ICD-10-CM

## 2018-11-08 DIAGNOSIS — I10 HYPERTENSION, BENIGN: ICD-10-CM

## 2018-11-08 LAB
HBA1C MFR BLD: 12.6 % (ref ?–5.8)
INT CON NEG: NEGATIVE
INT CON POS: POSITIVE

## 2018-11-08 PROCEDURE — 83036 HEMOGLOBIN GLYCOSYLATED A1C: CPT | Performed by: FAMILY MEDICINE

## 2018-11-08 PROCEDURE — 99214 OFFICE O/P EST MOD 30 MIN: CPT | Performed by: FAMILY MEDICINE

## 2018-11-08 PROCEDURE — 99213 OFFICE O/P EST LOW 20 MIN: CPT | Mod: 25 | Performed by: FAMILY MEDICINE

## 2018-11-08 PROCEDURE — 90686 IIV4 VACC NO PRSV 0.5 ML IM: CPT

## 2018-11-08 RX ORDER — SPIRONOLACTONE 25 MG/1
25 TABLET ORAL DAILY
Qty: 90 TAB | Refills: 0 | Status: SHIPPED | OUTPATIENT
Start: 2018-11-08 | End: 2019-05-14 | Stop reason: SDUPTHER

## 2018-11-08 RX ORDER — METOPROLOL TARTRATE 50 MG/1
50 TABLET, FILM COATED ORAL 2 TIMES DAILY
Qty: 180 TAB | Refills: 3 | Status: SHIPPED | OUTPATIENT
Start: 2018-11-08 | End: 2019-12-03 | Stop reason: SDUPTHER

## 2018-11-08 RX ORDER — ATORVASTATIN CALCIUM 20 MG/1
20 TABLET, FILM COATED ORAL DAILY
Qty: 90 TAB | Refills: 3 | Status: SHIPPED | OUTPATIENT
Start: 2018-11-08 | End: 2020-01-30

## 2018-11-08 RX ORDER — GABAPENTIN 800 MG/1
800 TABLET ORAL 3 TIMES DAILY
Qty: 270 TAB | Refills: 3 | Status: SHIPPED | OUTPATIENT
Start: 2018-11-08 | End: 2019-11-17 | Stop reason: SDUPTHER

## 2018-11-08 RX ORDER — LISINOPRIL 10 MG/1
10 TABLET ORAL DAILY
Qty: 30 TAB | Refills: 3 | Status: SHIPPED | OUTPATIENT
Start: 2018-11-08 | End: 2019-05-14 | Stop reason: SDUPTHER

## 2018-11-08 RX ORDER — OXYBUTYNIN CHLORIDE 5 MG/1
5 TABLET ORAL 3 TIMES DAILY
Qty: 270 TAB | Refills: 3 | Status: SHIPPED | OUTPATIENT
Start: 2018-11-08 | End: 2020-04-26

## 2018-11-08 ASSESSMENT — ENCOUNTER SYMPTOMS
SHORTNESS OF BREATH: 0
SENSORY CHANGE: 1
FOCAL WEAKNESS: 1
NAUSEA: 0
SPEECH CHANGE: 0
TINGLING: 1
NERVOUS/ANXIOUS: 0
HALLUCINATIONS: 0
ABDOMINAL PAIN: 0
BACK PAIN: 1
FEVER: 0
MEMORY LOSS: 1
TREMORS: 0
DEPRESSION: 0
VOMITING: 0
PALPITATIONS: 0
CHILLS: 0
COUGH: 0
NECK PAIN: 0
SPUTUM PRODUCTION: 0

## 2018-11-08 ASSESSMENT — LIFESTYLE VARIABLES: SUBSTANCE_ABUSE: 0

## 2018-11-08 NOTE — PROGRESS NOTES
Subjective:      Rigoberto Adames is a 53 y.o. female who presents with Diabetes and Medication Refill            Patient 53-year-old female here for follow-up of her bilateral leg weakness, decreased IADLs, diabetes, hyperlipidemia, cirrhosis, cardiomyopathy, hypertension and need for flu shot and mammogram.    She will need all of her medications refilled today.  Patient states that she ran out of her medications several days ago and has not been on any medication since.  She states that before she ran out of her medications she had been taking her medications as directed without any issues.  Medications will be sent to her pharmacy for her to continue using as previously directed.    Patient states that she has not been using her insulin as previously directed.  She had been referred to an endocrinologist in the past but has never seen them.  She is currently not on any diabetic medications but her hemoglobin A1c today was at 12.6.  We will have her continue to take her medications as directed and will also have her check her blood sugars at home.  She will also check her feet at home for any further skin breakdown or changes in sensation.  She has also been reminded to schedule an appointment with an ophthalmologist for diabetic retinal exam.    She has been noticing that her legs have been coming increasingly weak.  She will be given a 4 wheeled walker with a seat today and a referral will be made to physical therapy for additional assistance in management.  We will continue to follow.    She will continue to take her medications for her cardiomyopathy as directed.  She will also continue to follow-up with her cardiologist.  She is not having any chest pain, shortness of breath or other cardiac related symptoms.  We will continue to follow.    She will also continue to follow-up with her gastroenterologist for her cirrhosis.  She recently had an ultrasound of her abdomen which did not show any masses.  We will  "continue to follow.    We will have her get a flu shot today.  She is not allergic to eggs has no history of GBS and no recent fevers.    We will also order a mammogram for her today.  She has had no recent mammograms.  We will continue to follow.     Current medications, allergies, and problem list reviewed with patient and updated in EPIC.          Review of Systems   Constitutional: Negative for chills and fever.   HENT: Negative for hearing loss and tinnitus.    Respiratory: Negative for cough, sputum production and shortness of breath.    Cardiovascular: Negative for chest pain and palpitations.   Gastrointestinal: Negative for abdominal pain, nausea and vomiting.   Musculoskeletal: Positive for back pain and joint pain. Negative for neck pain.   Skin: Negative for rash.   Neurological: Positive for tingling, sensory change and focal weakness. Negative for tremors and speech change.   Psychiatric/Behavioral: Positive for memory loss. Negative for depression, hallucinations, substance abuse and suicidal ideas. The patient is not nervous/anxious.           Objective:     /80 (BP Location: Left arm, Patient Position: Sitting)   Pulse 100   Temp 36.2 °C (97.2 °F)   Resp 14   Ht 1.702 m (5' 7\")   Wt 49.9 kg (110 lb)   SpO2 99%   BMI 17.23 kg/m²      Physical Exam   Constitutional: She is oriented to person, place, and time.   BMI 17   HENT:   Head: Normocephalic and atraumatic.   Cardiovascular: Normal rate, regular rhythm and normal heart sounds.  Exam reveals no friction rub.    No murmur heard.  Pulmonary/Chest: Effort normal and breath sounds normal. No respiratory distress. She has no wheezes. She has no rales.   Abdominal: Soft. Bowel sounds are normal. She exhibits no distension. There is no tenderness.   Neurological: She is alert and oriented to person, place, and time. She displays abnormal reflex. She exhibits abnormal muscle tone.   weakness   Skin: Skin is warm and dry.   Psychiatric: She " has a normal mood and affect. Her behavior is normal.   Nursing note and vitals reviewed.              Assessment/Plan:     1. Leg weakness, bilateral  A referral has been made to physical therapy and an order for a walker has also been written.  Will continue to follow.  - REFERRAL TO PHYSICAL THERAPY Reason for Therapy: Eval/Treat/Report  - Flu Quad Inj >3 Year Pre-Filled PF  - atorvastatin (LIPITOR) 20 MG Tab; Take 1 Tab by mouth every day.  Dispense: 90 Tab; Refill: 3  - gabapentin (NEURONTIN) 800 MG tablet; Take 1 Tab by mouth 3 times a day.  Dispense: 270 Tab; Refill: 3  - oxybutynin (DITROPAN) 5 MG Tab; Take 1 Tab by mouth 3 times a day.  Dispense: 270 Tab; Refill: 3  - spironolactone (ALDACTONE) 25 MG Tab; Take 1 Tab by mouth every day.  Dispense: 90 Tab; Refill: 0  - metoprolol (LOPRESSOR) 50 MG Tab; Take 1 Tab by mouth 2 times a day.  Dispense: 180 Tab; Refill: 3  - metFORMIN (GLUCOPHAGE) 500 MG Tab; Take 2 Tabs by mouth 2 times a day, with meals.  Dispense: 360 Tab; Refill: 3  - Insulin Pen Needle 33G X 5 MM Misc; 1 Device by Subdermal route every day.  Dispense: 30 Each; Refill: 11  - lisinopril (PRINIVIL) 10 MG Tab; Take 1 Tab by mouth every day.  Dispense: 30 Tab; Refill: 3  - Insulin Glargine (TOUJEO SOLOSTAR) 300 UNIT/ML Solution Pen-injector; Inject 30 Units as instructed every day.  Dispense: 16 PEN; Refill: 2  - TSH WITH REFLEX TO FT4; Future  - COMP METABOLIC PANEL; Future  - Lipid Profile; Future  - CBC WITH DIFFERENTIAL; Future  - VITAMIN D,25 HYDROXY; Future  - Misc. Devices Misc; Walker with 4 wheels and a seat to use as directed  Dispense: 1 Device; Refill: 0    2. Decreased activities of daily living (ADL)  See above plan.  - REFERRAL TO PHYSICAL THERAPY Reason for Therapy: Eval/Treat/Report  - Flu Quad Inj >3 Year Pre-Filled PF  - atorvastatin (LIPITOR) 20 MG Tab; Take 1 Tab by mouth every day.  Dispense: 90 Tab; Refill: 3  - gabapentin (NEURONTIN) 800 MG tablet; Take 1 Tab by mouth 3  times a day.  Dispense: 270 Tab; Refill: 3  - oxybutynin (DITROPAN) 5 MG Tab; Take 1 Tab by mouth 3 times a day.  Dispense: 270 Tab; Refill: 3  - spironolactone (ALDACTONE) 25 MG Tab; Take 1 Tab by mouth every day.  Dispense: 90 Tab; Refill: 0  - metoprolol (LOPRESSOR) 50 MG Tab; Take 1 Tab by mouth 2 times a day.  Dispense: 180 Tab; Refill: 3  - metFORMIN (GLUCOPHAGE) 500 MG Tab; Take 2 Tabs by mouth 2 times a day, with meals.  Dispense: 360 Tab; Refill: 3  - Insulin Pen Needle 33G X 5 MM Misc; 1 Device by Subdermal route every day.  Dispense: 30 Each; Refill: 11  - lisinopril (PRINIVIL) 10 MG Tab; Take 1 Tab by mouth every day.  Dispense: 30 Tab; Refill: 3  - Insulin Glargine (TOUJEO SOLOSTAR) 300 UNIT/ML Solution Pen-injector; Inject 30 Units as instructed every day.  Dispense: 16 PEN; Refill: 2  - TSH WITH REFLEX TO FT4; Future  - COMP METABOLIC PANEL; Future  - Lipid Profile; Future  - CBC WITH DIFFERENTIAL; Future  - VITAMIN D,25 HYDROXY; Future  - Misc. Devices Misc; Walker with 4 wheels and a seat to use as directed  Dispense: 1 Device; Refill: 0    3. Influenza vaccine needed  She would like to get a flu shot, she understands the risks and benefits of the flu shot. No recent fevers, no egg allergies, and no hx of GBS.    - REFERRAL TO PHYSICAL THERAPY Reason for Therapy: Eval/Treat/Report  - Flu Quad Inj >3 Year Pre-Filled PF  - atorvastatin (LIPITOR) 20 MG Tab; Take 1 Tab by mouth every day.  Dispense: 90 Tab; Refill: 3  - gabapentin (NEURONTIN) 800 MG tablet; Take 1 Tab by mouth 3 times a day.  Dispense: 270 Tab; Refill: 3  - oxybutynin (DITROPAN) 5 MG Tab; Take 1 Tab by mouth 3 times a day.  Dispense: 270 Tab; Refill: 3  - spironolactone (ALDACTONE) 25 MG Tab; Take 1 Tab by mouth every day.  Dispense: 90 Tab; Refill: 0  - metoprolol (LOPRESSOR) 50 MG Tab; Take 1 Tab by mouth 2 times a day.  Dispense: 180 Tab; Refill: 3  - metFORMIN (GLUCOPHAGE) 500 MG Tab; Take 2 Tabs by mouth 2 times a day, with meals.   Dispense: 360 Tab; Refill: 3  - Insulin Pen Needle 33G X 5 MM Misc; 1 Device by Subdermal route every day.  Dispense: 30 Each; Refill: 11  - lisinopril (PRINIVIL) 10 MG Tab; Take 1 Tab by mouth every day.  Dispense: 30 Tab; Refill: 3  - Insulin Glargine (TOUJEO SOLOSTAR) 300 UNIT/ML Solution Pen-injector; Inject 30 Units as instructed every day.  Dispense: 16 PEN; Refill: 2  - TSH WITH REFLEX TO FT4; Future  - COMP METABOLIC PANEL; Future  - Lipid Profile; Future  - CBC WITH DIFFERENTIAL; Future  - VITAMIN D,25 HYDROXY; Future    4. Hyperlipidemia associated with type 2 diabetes mellitus (HCC)  We will have her continue to use her medication as directed.  We will recheck her lipid levels. She has been advised to increase the fibers in his diet, avoid fatty/fried foods and try fish oil supplements if not allergic to seafood. Also advised to exercise as tolerated.     - REFERRAL TO PHYSICAL THERAPY Reason for Therapy: Eval/Treat/Report  - Flu Quad Inj >3 Year Pre-Filled PF  - atorvastatin (LIPITOR) 20 MG Tab; Take 1 Tab by mouth every day.  Dispense: 90 Tab; Refill: 3  - gabapentin (NEURONTIN) 800 MG tablet; Take 1 Tab by mouth 3 times a day.  Dispense: 270 Tab; Refill: 3  - oxybutynin (DITROPAN) 5 MG Tab; Take 1 Tab by mouth 3 times a day.  Dispense: 270 Tab; Refill: 3  - spironolactone (ALDACTONE) 25 MG Tab; Take 1 Tab by mouth every day.  Dispense: 90 Tab; Refill: 0  - metoprolol (LOPRESSOR) 50 MG Tab; Take 1 Tab by mouth 2 times a day.  Dispense: 180 Tab; Refill: 3  - metFORMIN (GLUCOPHAGE) 500 MG Tab; Take 2 Tabs by mouth 2 times a day, with meals.  Dispense: 360 Tab; Refill: 3  - Insulin Pen Needle 33G X 5 MM Misc; 1 Device by Subdermal route every day.  Dispense: 30 Each; Refill: 11  - lisinopril (PRINIVIL) 10 MG Tab; Take 1 Tab by mouth every day.  Dispense: 30 Tab; Refill: 3  - Insulin Glargine (TOUJEO SOLOSTAR) 300 UNIT/ML Solution Pen-injector; Inject 30 Units as instructed every day.  Dispense: 16 PEN;  Refill: 2  - TSH WITH REFLEX TO FT4; Future  - COMP METABOLIC PANEL; Future  - Lipid Profile; Future  - CBC WITH DIFFERENTIAL; Future  - VITAMIN D,25 HYDROXY; Future    5. Cirrhosis of liver with ascites, unspecified hepatic cirrhosis type (HCC)  She will continue to take her medications as directed by GI.  We will continue to follow.  - REFERRAL TO PHYSICAL THERAPY Reason for Therapy: Eval/Treat/Report  - Flu Quad Inj >3 Year Pre-Filled PF  - atorvastatin (LIPITOR) 20 MG Tab; Take 1 Tab by mouth every day.  Dispense: 90 Tab; Refill: 3  - gabapentin (NEURONTIN) 800 MG tablet; Take 1 Tab by mouth 3 times a day.  Dispense: 270 Tab; Refill: 3  - oxybutynin (DITROPAN) 5 MG Tab; Take 1 Tab by mouth 3 times a day.  Dispense: 270 Tab; Refill: 3  - spironolactone (ALDACTONE) 25 MG Tab; Take 1 Tab by mouth every day.  Dispense: 90 Tab; Refill: 0  - metoprolol (LOPRESSOR) 50 MG Tab; Take 1 Tab by mouth 2 times a day.  Dispense: 180 Tab; Refill: 3  - metFORMIN (GLUCOPHAGE) 500 MG Tab; Take 2 Tabs by mouth 2 times a day, with meals.  Dispense: 360 Tab; Refill: 3  - Insulin Pen Needle 33G X 5 MM Misc; 1 Device by Subdermal route every day.  Dispense: 30 Each; Refill: 11  - lisinopril (PRINIVIL) 10 MG Tab; Take 1 Tab by mouth every day.  Dispense: 30 Tab; Refill: 3  - Insulin Glargine (TOUJEO SOLOSTAR) 300 UNIT/ML Solution Pen-injector; Inject 30 Units as instructed every day.  Dispense: 16 PEN; Refill: 2  - TSH WITH REFLEX TO FT4; Future  - COMP METABOLIC PANEL; Future  - Lipid Profile; Future  - CBC WITH DIFFERENTIAL; Future  - VITAMIN D,25 HYDROXY; Future    6. DM (diabetes mellitus), secondary, uncontrolled, w/neurologic complic (HCC)  We will have her restart her insulin.  We will recheck labs.  Her last hemoglobin A1c was 12.6.  We will continue to follow.  - REFERRAL TO PHYSICAL THERAPY Reason for Therapy: Eval/Treat/Report  - Flu Quad Inj >3 Year Pre-Filled PF  - atorvastatin (LIPITOR) 20 MG Tab; Take 1 Tab by mouth every  day.  Dispense: 90 Tab; Refill: 3  - gabapentin (NEURONTIN) 800 MG tablet; Take 1 Tab by mouth 3 times a day.  Dispense: 270 Tab; Refill: 3  - oxybutynin (DITROPAN) 5 MG Tab; Take 1 Tab by mouth 3 times a day.  Dispense: 270 Tab; Refill: 3  - spironolactone (ALDACTONE) 25 MG Tab; Take 1 Tab by mouth every day.  Dispense: 90 Tab; Refill: 0  - metoprolol (LOPRESSOR) 50 MG Tab; Take 1 Tab by mouth 2 times a day.  Dispense: 180 Tab; Refill: 3  - metFORMIN (GLUCOPHAGE) 500 MG Tab; Take 2 Tabs by mouth 2 times a day, with meals.  Dispense: 360 Tab; Refill: 3  - Insulin Pen Needle 33G X 5 MM Misc; 1 Device by Subdermal route every day.  Dispense: 30 Each; Refill: 11  - lisinopril (PRINIVIL) 10 MG Tab; Take 1 Tab by mouth every day.  Dispense: 30 Tab; Refill: 3  - Insulin Glargine (TOUJEO SOLOSTAR) 300 UNIT/ML Solution Pen-injector; Inject 30 Units as instructed every day.  Dispense: 16 PEN; Refill: 2  - TSH WITH REFLEX TO FT4; Future  - COMP METABOLIC PANEL; Future  - Lipid Profile; Future  - CBC WITH DIFFERENTIAL; Future  - VITAMIN D,25 HYDROXY; Future    7. HOCM (hypertrophic obstructive cardiomyopathy) (HCC)  She will continue to take her medications as directed by cardiology.  We will continue to follow.  - REFERRAL TO PHYSICAL THERAPY Reason for Therapy: Eval/Treat/Report  - Flu Quad Inj >3 Year Pre-Filled PF  - atorvastatin (LIPITOR) 20 MG Tab; Take 1 Tab by mouth every day.  Dispense: 90 Tab; Refill: 3  - gabapentin (NEURONTIN) 800 MG tablet; Take 1 Tab by mouth 3 times a day.  Dispense: 270 Tab; Refill: 3  - oxybutynin (DITROPAN) 5 MG Tab; Take 1 Tab by mouth 3 times a day.  Dispense: 270 Tab; Refill: 3  - spironolactone (ALDACTONE) 25 MG Tab; Take 1 Tab by mouth every day.  Dispense: 90 Tab; Refill: 0  - metoprolol (LOPRESSOR) 50 MG Tab; Take 1 Tab by mouth 2 times a day.  Dispense: 180 Tab; Refill: 3  - metFORMIN (GLUCOPHAGE) 500 MG Tab; Take 2 Tabs by mouth 2 times a day, with meals.  Dispense: 360 Tab; Refill:  3  - Insulin Pen Needle 33G X 5 MM Misc; 1 Device by Subdermal route every day.  Dispense: 30 Each; Refill: 11  - lisinopril (PRINIVIL) 10 MG Tab; Take 1 Tab by mouth every day.  Dispense: 30 Tab; Refill: 3  - Insulin Glargine (TOUJEO SOLOSTAR) 300 UNIT/ML Solution Pen-injector; Inject 30 Units as instructed every day.  Dispense: 16 PEN; Refill: 2  - TSH WITH REFLEX TO FT4; Future  - COMP METABOLIC PANEL; Future  - Lipid Profile; Future  - CBC WITH DIFFERENTIAL; Future  - VITAMIN D,25 HYDROXY; Future    8. Hypertension, benign  We will have her continue to take her medications as directed. She has been advised to monitor blood pressure at home and keep notes. If blood pressure elevated or having symptoms of CP, SOB or neurologic changes to go to the er.    - REFERRAL TO PHYSICAL THERAPY Reason for Therapy: Eval/Treat/Report  - Flu Quad Inj >3 Year Pre-Filled PF  - atorvastatin (LIPITOR) 20 MG Tab; Take 1 Tab by mouth every day.  Dispense: 90 Tab; Refill: 3  - gabapentin (NEURONTIN) 800 MG tablet; Take 1 Tab by mouth 3 times a day.  Dispense: 270 Tab; Refill: 3  - oxybutynin (DITROPAN) 5 MG Tab; Take 1 Tab by mouth 3 times a day.  Dispense: 270 Tab; Refill: 3  - spironolactone (ALDACTONE) 25 MG Tab; Take 1 Tab by mouth every day.  Dispense: 90 Tab; Refill: 0  - metoprolol (LOPRESSOR) 50 MG Tab; Take 1 Tab by mouth 2 times a day.  Dispense: 180 Tab; Refill: 3  - metFORMIN (GLUCOPHAGE) 500 MG Tab; Take 2 Tabs by mouth 2 times a day, with meals.  Dispense: 360 Tab; Refill: 3  - Insulin Pen Needle 33G X 5 MM Misc; 1 Device by Subdermal route every day.  Dispense: 30 Each; Refill: 11  - lisinopril (PRINIVIL) 10 MG Tab; Take 1 Tab by mouth every day.  Dispense: 30 Tab; Refill: 3  - Insulin Glargine (TOUJEO SOLOSTAR) 300 UNIT/ML Solution Pen-injector; Inject 30 Units as instructed every day.  Dispense: 16 PEN; Refill: 2  - TSH WITH REFLEX TO FT4; Future  - COMP METABOLIC PANEL; Future  - Lipid Profile; Future  - CBC WITH  DIFFERENTIAL; Future  - VITAMIN D,25 HYDROXY; Future    9. DM (diabetes mellitus) with complications (HCC)  See above plan.  - REFERRAL TO PHYSICAL THERAPY Reason for Therapy: Eval/Treat/Report  - Flu Quad Inj >3 Year Pre-Filled PF  - atorvastatin (LIPITOR) 20 MG Tab; Take 1 Tab by mouth every day.  Dispense: 90 Tab; Refill: 3  - gabapentin (NEURONTIN) 800 MG tablet; Take 1 Tab by mouth 3 times a day.  Dispense: 270 Tab; Refill: 3  - oxybutynin (DITROPAN) 5 MG Tab; Take 1 Tab by mouth 3 times a day.  Dispense: 270 Tab; Refill: 3  - spironolactone (ALDACTONE) 25 MG Tab; Take 1 Tab by mouth every day.  Dispense: 90 Tab; Refill: 0  - metoprolol (LOPRESSOR) 50 MG Tab; Take 1 Tab by mouth 2 times a day.  Dispense: 180 Tab; Refill: 3  - metFORMIN (GLUCOPHAGE) 500 MG Tab; Take 2 Tabs by mouth 2 times a day, with meals.  Dispense: 360 Tab; Refill: 3  - Insulin Pen Needle 33G X 5 MM Misc; 1 Device by Subdermal route every day.  Dispense: 30 Each; Refill: 11  - lisinopril (PRINIVIL) 10 MG Tab; Take 1 Tab by mouth every day.  Dispense: 30 Tab; Refill: 3  - Insulin Glargine (TOUJEO SOLOSTAR) 300 UNIT/ML Solution Pen-injector; Inject 30 Units as instructed every day.  Dispense: 16 PEN; Refill: 2  - TSH WITH REFLEX TO FT4; Future  - COMP METABOLIC PANEL; Future  - Lipid Profile; Future  - CBC WITH DIFFERENTIAL; Future  - VITAMIN D,25 HYDROXY; Future    10. Incontinence in female  She will continue to take her medication as directed.  We will continue to follow.  - REFERRAL TO PHYSICAL THERAPY Reason for Therapy: Eval/Treat/Report  - Flu Quad Inj >3 Year Pre-Filled PF  - atorvastatin (LIPITOR) 20 MG Tab; Take 1 Tab by mouth every day.  Dispense: 90 Tab; Refill: 3  - gabapentin (NEURONTIN) 800 MG tablet; Take 1 Tab by mouth 3 times a day.  Dispense: 270 Tab; Refill: 3  - oxybutynin (DITROPAN) 5 MG Tab; Take 1 Tab by mouth 3 times a day.  Dispense: 270 Tab; Refill: 3  - spironolactone (ALDACTONE) 25 MG Tab; Take 1 Tab by mouth every  day.  Dispense: 90 Tab; Refill: 0  - metoprolol (LOPRESSOR) 50 MG Tab; Take 1 Tab by mouth 2 times a day.  Dispense: 180 Tab; Refill: 3  - metFORMIN (GLUCOPHAGE) 500 MG Tab; Take 2 Tabs by mouth 2 times a day, with meals.  Dispense: 360 Tab; Refill: 3  - Insulin Pen Needle 33G X 5 MM Misc; 1 Device by Subdermal route every day.  Dispense: 30 Each; Refill: 11  - lisinopril (PRINIVIL) 10 MG Tab; Take 1 Tab by mouth every day.  Dispense: 30 Tab; Refill: 3  - Insulin Glargine (TOUJEO SOLOSTAR) 300 UNIT/ML Solution Pen-injector; Inject 30 Units as instructed every day.  Dispense: 16 PEN; Refill: 2  - TSH WITH REFLEX TO FT4; Future  - COMP METABOLIC PANEL; Future  - Lipid Profile; Future  - CBC WITH DIFFERENTIAL; Future  - VITAMIN D,25 HYDROXY; Future    11. Screening mammogram, encounter for  A mammogram has been ordered for her.  We will continue to follow.  - MA-SCREENING DIGITAL MAMMO; Future

## 2018-11-20 ENCOUNTER — TELEPHONE (OUTPATIENT)
Dept: MEDICAL GROUP | Facility: MEDICAL CENTER | Age: 53
End: 2018-11-20

## 2018-11-20 DIAGNOSIS — E11.8 DM (DIABETES MELLITUS) WITH COMPLICATIONS (HCC): ICD-10-CM

## 2018-11-20 DIAGNOSIS — K70.9 LIVER DISEASE DUE TO ALCOHOL (HCC): ICD-10-CM

## 2018-11-20 DIAGNOSIS — E44.0 PROTEIN-CALORIE MALNUTRITION, MODERATE (HCC): ICD-10-CM

## 2018-11-20 NOTE — TELEPHONE ENCOUNTER
1. Caller Name: Rigoberto Adames                                           Call Back Number: [phone] (home)         Patient approves a detailed voicemail message: N\A    Patient is requesting a rx sent in for vitamins. Please advise.

## 2018-12-05 ENCOUNTER — APPOINTMENT (OUTPATIENT)
Dept: PHYSICAL THERAPY | Facility: REHABILITATION | Age: 53
End: 2018-12-05
Attending: FAMILY MEDICINE
Payer: MEDICAID

## 2018-12-24 ENCOUNTER — APPOINTMENT (OUTPATIENT)
Dept: PHYSICAL THERAPY | Facility: REHABILITATION | Age: 53
End: 2018-12-24
Payer: MEDICAID

## 2018-12-26 ENCOUNTER — APPOINTMENT (OUTPATIENT)
Dept: PHYSICAL THERAPY | Facility: REHABILITATION | Age: 53
End: 2018-12-26
Payer: MEDICAID

## 2018-12-31 ENCOUNTER — APPOINTMENT (OUTPATIENT)
Dept: PHYSICAL THERAPY | Facility: REHABILITATION | Age: 53
End: 2018-12-31
Payer: MEDICAID

## 2019-01-02 ENCOUNTER — APPOINTMENT (OUTPATIENT)
Dept: PHYSICAL THERAPY | Facility: REHABILITATION | Age: 54
End: 2019-01-02
Payer: MEDICAID

## 2019-01-07 ENCOUNTER — APPOINTMENT (OUTPATIENT)
Dept: PHYSICAL THERAPY | Facility: REHABILITATION | Age: 54
End: 2019-01-07
Payer: MEDICAID

## 2019-01-09 ENCOUNTER — APPOINTMENT (OUTPATIENT)
Dept: PHYSICAL THERAPY | Facility: REHABILITATION | Age: 54
End: 2019-01-09
Payer: MEDICAID

## 2019-01-14 ENCOUNTER — APPOINTMENT (OUTPATIENT)
Dept: PHYSICAL THERAPY | Facility: REHABILITATION | Age: 54
End: 2019-01-14
Payer: MEDICAID

## 2019-02-19 ENCOUNTER — HOSPITAL ENCOUNTER (OUTPATIENT)
Dept: RADIOLOGY | Facility: MEDICAL CENTER | Age: 54
End: 2019-02-19
Attending: PHYSICIAN ASSISTANT
Payer: MEDICAID

## 2019-02-19 ENCOUNTER — HOSPITAL ENCOUNTER (OUTPATIENT)
Dept: LAB | Facility: MEDICAL CENTER | Age: 54
End: 2019-02-19
Attending: PHYSICIAN ASSISTANT
Payer: MEDICAID

## 2019-02-19 ENCOUNTER — HOSPITAL ENCOUNTER (OUTPATIENT)
Dept: LAB | Facility: MEDICAL CENTER | Age: 54
End: 2019-02-19
Attending: FAMILY MEDICINE
Payer: MEDICAID

## 2019-02-19 DIAGNOSIS — K21.9 GASTROESOPHAGEAL REFLUX DISEASE, ESOPHAGITIS PRESENCE NOT SPECIFIED: ICD-10-CM

## 2019-02-19 DIAGNOSIS — R18.8 CIRRHOSIS OF LIVER WITH ASCITES, UNSPECIFIED HEPATIC CIRRHOSIS TYPE (HCC): ICD-10-CM

## 2019-02-19 DIAGNOSIS — R29.898 LEG WEAKNESS, BILATERAL: ICD-10-CM

## 2019-02-19 DIAGNOSIS — E11.69 HYPERLIPIDEMIA ASSOCIATED WITH TYPE 2 DIABETES MELLITUS (HCC): ICD-10-CM

## 2019-02-19 DIAGNOSIS — Z78.9 DECREASED ACTIVITIES OF DAILY LIVING (ADL): ICD-10-CM

## 2019-02-19 DIAGNOSIS — K92.9: ICD-10-CM

## 2019-02-19 DIAGNOSIS — I10 HYPERTENSION, BENIGN: ICD-10-CM

## 2019-02-19 DIAGNOSIS — Z23 INFLUENZA VACCINE NEEDED: ICD-10-CM

## 2019-02-19 DIAGNOSIS — K74.60 CIRRHOSIS OF LIVER WITH ASCITES, UNSPECIFIED HEPATIC CIRRHOSIS TYPE (HCC): ICD-10-CM

## 2019-02-19 DIAGNOSIS — I42.1 HOCM (HYPERTROPHIC OBSTRUCTIVE CARDIOMYOPATHY) (HCC): ICD-10-CM

## 2019-02-19 DIAGNOSIS — F10.10 ALCOHOL ABUSE: ICD-10-CM

## 2019-02-19 DIAGNOSIS — K70.30 ALCOHOLIC CIRRHOSIS, UNSPECIFIED WHETHER ASCITES PRESENT (HCC): ICD-10-CM

## 2019-02-19 DIAGNOSIS — R32 INCONTINENCE IN FEMALE: ICD-10-CM

## 2019-02-19 DIAGNOSIS — E78.5 HYPERLIPIDEMIA ASSOCIATED WITH TYPE 2 DIABETES MELLITUS (HCC): ICD-10-CM

## 2019-02-19 DIAGNOSIS — E11.8 DM (DIABETES MELLITUS) WITH COMPLICATIONS (HCC): ICD-10-CM

## 2019-02-19 LAB
25(OH)D3 SERPL-MCNC: 11 NG/ML (ref 30–100)
25(OH)D3 SERPL-MCNC: 12 NG/ML (ref 30–100)
ALBUMIN SERPL BCP-MCNC: 4 G/DL (ref 3.2–4.9)
ALBUMIN SERPL BCP-MCNC: 4.1 G/DL (ref 3.2–4.9)
ALBUMIN/GLOB SERPL: 0.9 G/DL
ALBUMIN/GLOB SERPL: 0.9 G/DL
ALP SERPL-CCNC: 107 U/L (ref 30–99)
ALP SERPL-CCNC: 108 U/L (ref 30–99)
ALT SERPL-CCNC: 12 U/L (ref 2–50)
ALT SERPL-CCNC: 14 U/L (ref 2–50)
ANION GAP SERPL CALC-SCNC: 7 MMOL/L (ref 0–11.9)
ANION GAP SERPL CALC-SCNC: 8 MMOL/L (ref 0–11.9)
AST SERPL-CCNC: 18 U/L (ref 12–45)
AST SERPL-CCNC: 19 U/L (ref 12–45)
BASOPHILS # BLD AUTO: 0.3 % (ref 0–1.8)
BASOPHILS # BLD AUTO: 0.5 % (ref 0–1.8)
BASOPHILS # BLD: 0.02 K/UL (ref 0–0.12)
BASOPHILS # BLD: 0.03 K/UL (ref 0–0.12)
BILIRUB SERPL-MCNC: 0.9 MG/DL (ref 0.1–1.5)
BILIRUB SERPL-MCNC: 0.9 MG/DL (ref 0.1–1.5)
BUN SERPL-MCNC: 13 MG/DL (ref 8–22)
BUN SERPL-MCNC: 14 MG/DL (ref 8–22)
CALCIUM SERPL-MCNC: 10 MG/DL (ref 8.5–10.5)
CALCIUM SERPL-MCNC: 9.9 MG/DL (ref 8.5–10.5)
CHLORIDE SERPL-SCNC: 99 MMOL/L (ref 96–112)
CHLORIDE SERPL-SCNC: 99 MMOL/L (ref 96–112)
CHOLEST SERPL-MCNC: 126 MG/DL (ref 100–199)
CO2 SERPL-SCNC: 27 MMOL/L (ref 20–33)
CO2 SERPL-SCNC: 27 MMOL/L (ref 20–33)
CREAT SERPL-MCNC: 0.47 MG/DL (ref 0.5–1.4)
CREAT SERPL-MCNC: 0.47 MG/DL (ref 0.5–1.4)
EOSINOPHIL # BLD AUTO: 0.18 K/UL (ref 0–0.51)
EOSINOPHIL # BLD AUTO: 0.19 K/UL (ref 0–0.51)
EOSINOPHIL NFR BLD: 3 % (ref 0–6.9)
EOSINOPHIL NFR BLD: 3.1 % (ref 0–6.9)
ERYTHROCYTE [DISTWIDTH] IN BLOOD BY AUTOMATED COUNT: 53.1 FL (ref 35.9–50)
ERYTHROCYTE [DISTWIDTH] IN BLOOD BY AUTOMATED COUNT: 53.6 FL (ref 35.9–50)
FASTING STATUS PATIENT QL REPORTED: NORMAL
GLOBULIN SER CALC-MCNC: 4.7 G/DL (ref 1.9–3.5)
GLOBULIN SER CALC-MCNC: 4.7 G/DL (ref 1.9–3.5)
GLUCOSE SERPL-MCNC: 232 MG/DL (ref 65–99)
GLUCOSE SERPL-MCNC: 234 MG/DL (ref 65–99)
HBV CORE AB SERPL QL IA: NEGATIVE
HCT VFR BLD AUTO: 39.5 % (ref 37–47)
HCT VFR BLD AUTO: 40 % (ref 37–47)
HDLC SERPL-MCNC: 36 MG/DL
HGB BLD-MCNC: 12.2 G/DL (ref 12–16)
HGB BLD-MCNC: 12.4 G/DL (ref 12–16)
IMM GRANULOCYTES # BLD AUTO: 0.01 K/UL (ref 0–0.11)
IMM GRANULOCYTES # BLD AUTO: 0.03 K/UL (ref 0–0.11)
IMM GRANULOCYTES NFR BLD AUTO: 0.2 % (ref 0–0.9)
IMM GRANULOCYTES NFR BLD AUTO: 0.5 % (ref 0–0.9)
INR PPP: 1.04 (ref 0.87–1.13)
LDLC SERPL CALC-MCNC: 68 MG/DL
LYMPHOCYTES # BLD AUTO: 2.09 K/UL (ref 1–4.8)
LYMPHOCYTES # BLD AUTO: 2.16 K/UL (ref 1–4.8)
LYMPHOCYTES NFR BLD: 34.4 % (ref 22–41)
LYMPHOCYTES NFR BLD: 36.1 % (ref 22–41)
MCH RBC QN AUTO: 32.8 PG (ref 27–33)
MCH RBC QN AUTO: 33.4 PG (ref 27–33)
MCHC RBC AUTO-ENTMCNC: 30.5 G/DL (ref 33.6–35)
MCHC RBC AUTO-ENTMCNC: 31.4 G/DL (ref 33.6–35)
MCV RBC AUTO: 106.5 FL (ref 81.4–97.8)
MCV RBC AUTO: 107.5 FL (ref 81.4–97.8)
MONOCYTES # BLD AUTO: 0.32 K/UL (ref 0–0.85)
MONOCYTES # BLD AUTO: 0.34 K/UL (ref 0–0.85)
MONOCYTES NFR BLD AUTO: 5.3 % (ref 0–13.4)
MONOCYTES NFR BLD AUTO: 5.6 % (ref 0–13.4)
NEUTROPHILS # BLD AUTO: 3.28 K/UL (ref 2–7.15)
NEUTROPHILS # BLD AUTO: 3.41 K/UL (ref 2–7.15)
NEUTROPHILS NFR BLD: 54.8 % (ref 44–72)
NEUTROPHILS NFR BLD: 56.2 % (ref 44–72)
NRBC # BLD AUTO: 0 K/UL
NRBC # BLD AUTO: 0 K/UL
NRBC BLD-RTO: 0 /100 WBC
NRBC BLD-RTO: 0 /100 WBC
PLATELET # BLD AUTO: 72 K/UL (ref 164–446)
PLATELET # BLD AUTO: 78 K/UL (ref 164–446)
PMV BLD AUTO: 13 FL (ref 9–12.9)
PMV BLD AUTO: 13.6 FL (ref 9–12.9)
POTASSIUM SERPL-SCNC: 4.1 MMOL/L (ref 3.6–5.5)
POTASSIUM SERPL-SCNC: 4.1 MMOL/L (ref 3.6–5.5)
PROT SERPL-MCNC: 8.7 G/DL (ref 6–8.2)
PROT SERPL-MCNC: 8.8 G/DL (ref 6–8.2)
PROTHROMBIN TIME: 13.7 SEC (ref 12–14.6)
RBC # BLD AUTO: 3.71 M/UL (ref 4.2–5.4)
RBC # BLD AUTO: 3.72 M/UL (ref 4.2–5.4)
SODIUM SERPL-SCNC: 133 MMOL/L (ref 135–145)
SODIUM SERPL-SCNC: 134 MMOL/L (ref 135–145)
TRIGL SERPL-MCNC: 109 MG/DL (ref 0–149)
TSH SERPL DL<=0.005 MIU/L-ACNC: 3.4 UIU/ML (ref 0.38–5.33)
WBC # BLD AUTO: 6.1 K/UL (ref 4.8–10.8)
WBC # BLD AUTO: 6.2 K/UL (ref 4.8–10.8)

## 2019-02-19 PROCEDURE — 36415 COLL VENOUS BLD VENIPUNCTURE: CPT

## 2019-02-19 PROCEDURE — 82306 VITAMIN D 25 HYDROXY: CPT | Mod: 91

## 2019-02-19 PROCEDURE — 86704 HEP B CORE ANTIBODY TOTAL: CPT

## 2019-02-19 PROCEDURE — 82306 VITAMIN D 25 HYDROXY: CPT

## 2019-02-19 PROCEDURE — 82105 ALPHA-FETOPROTEIN SERUM: CPT

## 2019-02-19 PROCEDURE — 76700 US EXAM ABDOM COMPLETE: CPT

## 2019-02-19 PROCEDURE — 84590 ASSAY OF VITAMIN A: CPT

## 2019-02-19 PROCEDURE — 84443 ASSAY THYROID STIM HORMONE: CPT

## 2019-02-19 PROCEDURE — 84630 ASSAY OF ZINC: CPT

## 2019-02-19 PROCEDURE — 85025 COMPLETE CBC W/AUTO DIFF WBC: CPT | Mod: 91

## 2019-02-19 PROCEDURE — 80053 COMPREHEN METABOLIC PANEL: CPT

## 2019-02-19 PROCEDURE — 80061 LIPID PANEL: CPT

## 2019-02-19 PROCEDURE — 85610 PROTHROMBIN TIME: CPT

## 2019-02-19 PROCEDURE — 80053 COMPREHEN METABOLIC PANEL: CPT | Mod: 91

## 2019-02-19 PROCEDURE — 85025 COMPLETE CBC W/AUTO DIFF WBC: CPT

## 2019-02-21 LAB — AFP-TM SERPL-MCNC: 9 NG/ML (ref 0–9)

## 2019-02-22 LAB — ZINC BLD-MCNC: 710.6 UG/DL (ref 440–860)

## 2019-02-23 LAB
ANNOTATION COMMENT IMP: NORMAL
RETINYL PALMITATE SERPL-MCNC: <0.02 MG/L (ref 0–0.1)
VIT A SERPL-MCNC: 0.42 MG/L (ref 0.3–1.2)

## 2019-04-02 ENCOUNTER — OFFICE VISIT (OUTPATIENT)
Dept: MEDICAL GROUP | Facility: MEDICAL CENTER | Age: 54
End: 2019-04-02
Attending: FAMILY MEDICINE
Payer: MEDICAID

## 2019-04-02 VITALS
WEIGHT: 118 LBS | TEMPERATURE: 97.8 F | HEART RATE: 84 BPM | HEIGHT: 67 IN | RESPIRATION RATE: 14 BRPM | OXYGEN SATURATION: 100 % | BODY MASS INDEX: 18.52 KG/M2 | SYSTOLIC BLOOD PRESSURE: 112 MMHG | DIASTOLIC BLOOD PRESSURE: 60 MMHG

## 2019-04-02 DIAGNOSIS — K74.60 CIRRHOSIS OF LIVER WITH ASCITES, UNSPECIFIED HEPATIC CIRRHOSIS TYPE (HCC): ICD-10-CM

## 2019-04-02 DIAGNOSIS — E78.5 HYPERLIPIDEMIA ASSOCIATED WITH TYPE 2 DIABETES MELLITUS (HCC): ICD-10-CM

## 2019-04-02 DIAGNOSIS — I42.1 HOCM (HYPERTROPHIC OBSTRUCTIVE CARDIOMYOPATHY) (HCC): ICD-10-CM

## 2019-04-02 DIAGNOSIS — E11.69 HYPERLIPIDEMIA ASSOCIATED WITH TYPE 2 DIABETES MELLITUS (HCC): ICD-10-CM

## 2019-04-02 DIAGNOSIS — I10 HYPERTENSION, BENIGN: ICD-10-CM

## 2019-04-02 DIAGNOSIS — R29.898 LEG WEAKNESS, BILATERAL: ICD-10-CM

## 2019-04-02 DIAGNOSIS — Z78.9 DECREASED ACTIVITIES OF DAILY LIVING (ADL): ICD-10-CM

## 2019-04-02 DIAGNOSIS — R18.8 CIRRHOSIS OF LIVER WITH ASCITES, UNSPECIFIED HEPATIC CIRRHOSIS TYPE (HCC): ICD-10-CM

## 2019-04-02 PROCEDURE — 99214 OFFICE O/P EST MOD 30 MIN: CPT | Performed by: FAMILY MEDICINE

## 2019-04-02 PROCEDURE — 99212 OFFICE O/P EST SF 10 MIN: CPT | Performed by: FAMILY MEDICINE

## 2019-04-02 RX ORDER — ERGOCALCIFEROL 1.25 MG/1
50000 CAPSULE ORAL
Qty: 3 CAP | Refills: 3 | Status: SHIPPED | OUTPATIENT
Start: 2019-04-02 | End: 2022-03-18

## 2019-04-02 ASSESSMENT — ENCOUNTER SYMPTOMS
DEPRESSION: 0
SPEECH CHANGE: 0
PALPITATIONS: 0
SHORTNESS OF BREATH: 0
HEADACHES: 0
NECK PAIN: 0
SPUTUM PRODUCTION: 0
SEIZURES: 0
VOMITING: 0
FEVER: 0
HALLUCINATIONS: 0
ABDOMINAL PAIN: 0
COUGH: 0
BACK PAIN: 1
TINGLING: 1
FOCAL WEAKNESS: 1
NAUSEA: 0
CHILLS: 0
NERVOUS/ANXIOUS: 1
TREMORS: 0
SENSORY CHANGE: 1

## 2019-04-02 ASSESSMENT — LIFESTYLE VARIABLES: SUBSTANCE_ABUSE: 0

## 2019-04-02 NOTE — PROGRESS NOTES
Subjective:      Rigoberto Adames is a 54 y.o. female who presents with Follow-Up            Patient 54-year-old female here for follow-up of her recent abdominal ultrasound as well as blood work.  The results of her recent tests are as follows:    1. Heterogeneous liver with equivocal contra nodularity. It may be due to underlying hepatocellular disease. No liver lesions detected.  2. Cholelithiasis. No cholecystitis.  3. Simple renal cysts.    2/19/2019 11:50  WBC: 6.2  RBC: 3.71 (L)  Hemoglobin: 12.4  Hematocrit: 39.5  MCV: 106.5 (H)  MCH: 33.4 (H)  MCHC: 31.4 (L)  RDW: 53.1 (H)  Platelet Count: 72 (L)  MPV: 13.6 (H)  Neutrophils-Polys: 54.80  Neutrophils (Absolute): 3.28  Lymphocytes: 36.10  Lymphs (Absolute): 2.16  Monocytes: 5.30  Monos (Absolute): 0.32  Eosinophils: 3.00  Eos (Absolute): 0.18  Basophils: 0.30  Baso (Absolute): 0.02  Immature Granulocytes: 0.50  Immature Granulocytes (abs): 0.03  Nucleated RBC: 0.00  NRBC (Absolute): 0.00  Sodium: 134 (L)  Potassium: 4.1  Chloride: 99  Co2: 27  Anion Gap: 8.0  Glucose: 232 (H)  Bun: 13  Creatinine: 0.47 (L)  GFR If : >60  GFR If Non : >60  Calcium: 10.0  AST(SGOT): 18  ALT(SGPT): 14  Alkaline Phosphatase: 108 (H)  Total Bilirubin: 0.9  Albumin: 4.1  Total Protein: 8.8 (H)  Globulin: 4.7 (H)  A-G Ratio: 0.9  Zinc Serum: 710.6  PT: 13.7  INR: 1.04  Alpha Fetoprotein: 9  Vitamin A: 0.42  Retinol Palm: <0.02  Retinal Interp: Normal  25-Hydroxy   Vitamin D 25: 11 (L)  Hepatitis B Core Ab, Total: Negative    2/19/2019 11:53  WBC: 6.1  RBC: 3.72 (L)  Hemoglobin: 12.2  Hematocrit: 40.0  MCV: 107.5 (H)  MCH: 32.8  MCHC: 30.5 (L)  RDW: 53.6 (H)  Platelet Count: 78 (L)  MPV: 13.0 (H)  Neutrophils-Polys: 56.20  Neutrophils (Absolute): 3.41  Lymphocytes: 34.40  Lymphs (Absolute): 2.09  Monocytes: 5.60  Monos (Absolute): 0.34  Eosinophils: 3.10  Eos (Absolute): 0.19  Basophils: 0.50  Baso (Absolute): 0.03  Immature Granulocytes: 0.20  Immature  Granulocytes (abs): 0.01  Nucleated RBC: 0.00  NRBC (Absolute): 0.00  Sodium: 133 (L)  Potassium: 4.1  Chloride: 99  Co2: 27  Anion Gap: 7.0  Glucose: 234 (H)  Bun: 14  Creatinine: 0.47 (L)  GFR If : >60  GFR If Non : >60  Calcium: 9.9  AST(SGOT): 19  ALT(SGPT): 12  Alkaline Phosphatase: 107 (H)  Total Bilirubin: 0.9  Albumin: 4.0  Total Protein: 8.7 (H)  Globulin: 4.7 (H)  A-G Ratio: 0.9  Fasting Status: Fasting  Cholesterol,Tot: 126  Triglycerides: 109  HDL: 36 (A)  LDL: 68  25-Hydroxy   Vitamin D 25: 12 (L)  TSH: 3.400    She has a past medical history of alcoholic cirrhosis, insulin-dependent diabetes, neuropathy with bilateral leg weakness, hyperlipidemia, hypertension and cardiomyopathy.    Her hemoglobin A1c today was at 12.6.  She has been using 25 units of Lantus daily with metformin 1000 mg twice daily.  We will have her start using 30 units of Lantus on a daily basis, and will increase her Lantus by 2 units every week until her blood sugars average below 200.  We will have her check her blood sugars at least twice daily.  She will also check her feet daily for any skin changes or changes in sensation.  She will also continue to follow-up with ophthalmology for diabetic retinal exams as well as management of her cataracts.  We will continue to follow.    The results of her recent abdominal ultrasound showed a nodularity to her liver but no masses.  She was also noted to have stones in her gallbladder.  She will also address these problems with her gastroenterologist for continued management of her liver disease.  We will continue to follow.    Her vitamin D level is also noted to be low so we will have her do monthly vitamin D at 50,000 units.  We will continue to monitor her vitamin D levels.  We will continue to follow.        Review of Systems   Constitutional: Negative for chills and fever.   HENT: Negative for hearing loss and tinnitus.    Respiratory: Negative for  "cough, sputum production and shortness of breath.    Cardiovascular: Negative for chest pain and palpitations.   Gastrointestinal: Negative for abdominal pain, nausea and vomiting.   Musculoskeletal: Positive for back pain and joint pain. Negative for neck pain.   Skin: Negative for rash.   Neurological: Positive for tingling, sensory change and focal weakness. Negative for tremors, speech change, seizures and headaches.   Psychiatric/Behavioral: Negative for depression, hallucinations, substance abuse and suicidal ideas. The patient is nervous/anxious.           Objective:     /60 (BP Location: Left arm, Patient Position: Sitting)   Pulse 84   Temp 36.6 °C (97.8 °F)   Resp 14   Ht 1.702 m (5' 7\")   Wt 53.5 kg (118 lb)   SpO2 100%   BMI 18.48 kg/m²      Physical Exam   Constitutional: She is oriented to person, place, and time.   BMI 18   HENT:   Head: Normocephalic and atraumatic.   Eyes: Pupils are equal, round, and reactive to light. Conjunctivae and EOM are normal.   Cardiovascular: Normal rate, regular rhythm and normal heart sounds.  Exam reveals no friction rub.    No murmur heard.  Pulmonary/Chest: Effort normal and breath sounds normal. No respiratory distress. She has no wheezes. She has no rales.   Abdominal: Soft. Bowel sounds are normal. She exhibits no distension. There is no tenderness.   Neurological: She is alert and oriented to person, place, and time.   Slight altered gait   Skin: Skin is warm and dry. No erythema.   Psychiatric: She has a normal mood and affect. Her behavior is normal.   Nursing note and vitals reviewed.              Assessment/Plan:     1. DM (diabetes mellitus), secondary, uncontrolled, w/neurologic complic (HCC)  We will have her increase her insulin Lantus by 2 units every week until her blood sugars average below 200.  We will have her check her feet on a daily basis for any changes in sensation or skin breakdown.  We will also have her continue to follow-up " with ophthalmology for retinal exams.  And also for management of her cataracts.  - Insulin Glargine (TOUJEO SOLOSTAR) 300 UNIT/ML Solution Pen-injector; Inject 30 Units as instructed every day.  Dispense: 16 PEN; Refill: 2  - Misc. Devices Misc; Glucometer covered by insurance to use as directed  Dispense: 1 Device; Refill: 0    2. Leg weakness, bilateral  We will have her continue to use her walker as needed.  We will continue to follow.  - Insulin Glargine (TOUJEO SOLOSTAR) 300 UNIT/ML Solution Pen-injector; Inject 30 Units as instructed every day.  Dispense: 16 PEN; Refill: 2    3. Decreased activities of daily living (ADL)  See above plan.  - Insulin Glargine (TOUJEO SOLOSTAR) 300 UNIT/ML Solution Pen-injector; Inject 30 Units as instructed every day.  Dispense: 16 PEN; Refill: 2    4. Hyperlipidemia associated with type 2 diabetes mellitus (HCC)  We will have her continue to use her medications as directed.  Discussed diet and exercise to help manage her cholesterol as well as her blood sugars.  We will continue to follow.  - Insulin Glargine (TOUJEO SOLOSTAR) 300 UNIT/ML Solution Pen-injector; Inject 30 Units as instructed every day.  Dispense: 16 PEN; Refill: 2    5. Cirrhosis of liver with ascites, unspecified hepatic cirrhosis type (HCC)  We will have her continue to follow-up with GI for continued assistance in management of her cirrhosis.  Reviewed the results of her recent blood work as well as ultrasound.  We will continue to follow.  - Insulin Glargine (TOUJEO SOLOSTAR) 300 UNIT/ML Solution Pen-injector; Inject 30 Units as instructed every day.  Dispense: 16 PEN; Refill: 2    6. HOCM (hypertrophic obstructive cardiomyopathy) (HCC)  We will have her continue to take her medications as directed and also to follow-up with cardiology as needed.  ER precautions have been given if she should develop any chest pain, shortness of breath or syncope.  We will continue to follow.  - Insulin Glargine (TOUJEO  SOLOSTAR) 300 UNIT/ML Solution Pen-injector; Inject 30 Units as instructed every day.  Dispense: 16 PEN; Refill: 2    7. Hypertension, benign  We will have her continue to take her medications as directed.  We will also have her check her blood pressures at home and keep notes.  We will continue to follow.  - Insulin Glargine (TOUJEO SOLOSTAR) 300 UNIT/ML Solution Pen-injector; Inject 30 Units as instructed every day.  Dispense: 16 PEN; Refill: 2

## 2019-04-11 ENCOUNTER — HOSPITAL ENCOUNTER (OUTPATIENT)
Dept: LAB | Facility: MEDICAL CENTER | Age: 54
End: 2019-04-11
Attending: PHYSICIAN ASSISTANT
Payer: MEDICAID

## 2019-04-11 LAB
BASOPHILS # BLD AUTO: 0.3 % (ref 0–1.8)
BASOPHILS # BLD: 0.02 K/UL (ref 0–0.12)
EOSINOPHIL # BLD AUTO: 0.32 K/UL (ref 0–0.51)
EOSINOPHIL NFR BLD: 4.6 % (ref 0–6.9)
ERYTHROCYTE [DISTWIDTH] IN BLOOD BY AUTOMATED COUNT: 47.3 FL (ref 35.9–50)
FOLATE SERPL-MCNC: >23.6 NG/ML
HCT VFR BLD AUTO: 41 % (ref 37–47)
HGB BLD-MCNC: 12.7 G/DL (ref 12–16)
IMM GRANULOCYTES # BLD AUTO: 0.01 K/UL (ref 0–0.11)
IMM GRANULOCYTES NFR BLD AUTO: 0.1 % (ref 0–0.9)
LYMPHOCYTES # BLD AUTO: 3.03 K/UL (ref 1–4.8)
LYMPHOCYTES NFR BLD: 44 % (ref 22–41)
MCH RBC QN AUTO: 28.9 PG (ref 27–33)
MCHC RBC AUTO-ENTMCNC: 31 G/DL (ref 33.6–35)
MCV RBC AUTO: 93.4 FL (ref 81.4–97.8)
MONOCYTES # BLD AUTO: 0.31 K/UL (ref 0–0.85)
MONOCYTES NFR BLD AUTO: 4.5 % (ref 0–13.4)
NEUTROPHILS # BLD AUTO: 3.2 K/UL (ref 2–7.15)
NEUTROPHILS NFR BLD: 46.5 % (ref 44–72)
NRBC # BLD AUTO: 0 K/UL
NRBC BLD-RTO: 0 /100 WBC
PLATELET # BLD AUTO: 147 K/UL (ref 164–446)
PMV BLD AUTO: 13.1 FL (ref 9–12.9)
RBC # BLD AUTO: 4.39 M/UL (ref 4.2–5.4)
VIT B12 SERPL-MCNC: 326 PG/ML (ref 211–911)
WBC # BLD AUTO: 6.9 K/UL (ref 4.8–10.8)

## 2019-04-11 PROCEDURE — 36415 COLL VENOUS BLD VENIPUNCTURE: CPT

## 2019-04-11 PROCEDURE — 82607 VITAMIN B-12: CPT

## 2019-04-11 PROCEDURE — 82746 ASSAY OF FOLIC ACID SERUM: CPT

## 2019-04-11 PROCEDURE — 80053 COMPREHEN METABOLIC PANEL: CPT

## 2019-04-11 PROCEDURE — 85025 COMPLETE CBC W/AUTO DIFF WBC: CPT

## 2019-04-12 LAB
ALBUMIN SERPL BCP-MCNC: 4.2 G/DL (ref 3.2–4.9)
ALBUMIN/GLOB SERPL: 1 G/DL
ALP SERPL-CCNC: 100 U/L (ref 30–99)
ALT SERPL-CCNC: 12 U/L (ref 2–50)
ANION GAP SERPL CALC-SCNC: 7 MMOL/L (ref 0–11.9)
AST SERPL-CCNC: 13 U/L (ref 12–45)
BILIRUB SERPL-MCNC: 0.5 MG/DL (ref 0.1–1.5)
BUN SERPL-MCNC: 19 MG/DL (ref 8–22)
CALCIUM SERPL-MCNC: 10.1 MG/DL (ref 8.5–10.5)
CHLORIDE SERPL-SCNC: 102 MMOL/L (ref 96–112)
CO2 SERPL-SCNC: 27 MMOL/L (ref 20–33)
CREAT SERPL-MCNC: 0.57 MG/DL (ref 0.5–1.4)
GLOBULIN SER CALC-MCNC: 4.2 G/DL (ref 1.9–3.5)
GLUCOSE SERPL-MCNC: 285 MG/DL (ref 65–99)
POTASSIUM SERPL-SCNC: 4.6 MMOL/L (ref 3.6–5.5)
PROT SERPL-MCNC: 8.4 G/DL (ref 6–8.2)
SODIUM SERPL-SCNC: 136 MMOL/L (ref 135–145)

## 2019-05-02 ENCOUNTER — HOME HEALTH ADMISSION (OUTPATIENT)
Dept: HOME HEALTH SERVICES | Facility: HOME HEALTHCARE | Age: 54
End: 2019-05-02
Payer: MEDICAID

## 2019-05-02 ENCOUNTER — OFFICE VISIT (OUTPATIENT)
Dept: MEDICAL GROUP | Facility: MEDICAL CENTER | Age: 54
End: 2019-05-02
Attending: FAMILY MEDICINE
Payer: MEDICAID

## 2019-05-02 VITALS
TEMPERATURE: 97.7 F | RESPIRATION RATE: 14 BRPM | WEIGHT: 114 LBS | BODY MASS INDEX: 17.89 KG/M2 | SYSTOLIC BLOOD PRESSURE: 100 MMHG | HEART RATE: 80 BPM | HEIGHT: 67 IN | OXYGEN SATURATION: 97 % | DIASTOLIC BLOOD PRESSURE: 62 MMHG

## 2019-05-02 DIAGNOSIS — Z78.9 DECREASED ACTIVITIES OF DAILY LIVING (ADL): ICD-10-CM

## 2019-05-02 DIAGNOSIS — R29.898 LEG WEAKNESS, BILATERAL: ICD-10-CM

## 2019-05-02 DIAGNOSIS — I10 HYPERTENSION, BENIGN: ICD-10-CM

## 2019-05-02 DIAGNOSIS — H00.015 HORDEOLUM EXTERNUM OF LEFT LOWER EYELID: ICD-10-CM

## 2019-05-02 LAB
HBA1C MFR BLD: 12.5 % (ref 0–5.6)
INT CON NEG: NEGATIVE
INT CON POS: POSITIVE

## 2019-05-02 PROCEDURE — 83036 HEMOGLOBIN GLYCOSYLATED A1C: CPT | Performed by: FAMILY MEDICINE

## 2019-05-02 PROCEDURE — 99213 OFFICE O/P EST LOW 20 MIN: CPT | Performed by: FAMILY MEDICINE

## 2019-05-02 PROCEDURE — 99214 OFFICE O/P EST MOD 30 MIN: CPT | Performed by: FAMILY MEDICINE

## 2019-05-02 RX ORDER — POLYMYXIN B SULFATE AND TRIMETHOPRIM 1; 10000 MG/ML; [USP'U]/ML
1 SOLUTION OPHTHALMIC EVERY 4 HOURS
Qty: 10 ML | Refills: 0 | Status: SHIPPED | OUTPATIENT
Start: 2019-05-02 | End: 2019-07-18

## 2019-05-02 RX ORDER — ESOMEPRAZOLE MAGNESIUM 20 MG
20 CAPSULE,DELAYED RELEASE (ENTERIC COATED) ORAL DAILY
Refills: 6 | COMMUNITY
Start: 2019-04-23 | End: 2020-04-26

## 2019-05-02 ASSESSMENT — ENCOUNTER SYMPTOMS
CHILLS: 0
FOCAL WEAKNESS: 1
FEVER: 0
NERVOUS/ANXIOUS: 1
VOMITING: 0
SHORTNESS OF BREATH: 0
SPEECH CHANGE: 0
ABDOMINAL PAIN: 0
BACK PAIN: 1
NAUSEA: 0
TREMORS: 0
MEMORY LOSS: 1
SENSORY CHANGE: 0
HEADACHES: 0
COUGH: 0
TINGLING: 1
NECK PAIN: 0
PALPITATIONS: 0
EYES NEGATIVE: 1
SPUTUM PRODUCTION: 0

## 2019-05-02 NOTE — PROGRESS NOTES
Subjective:      Rigoberto Adames is a 54 y.o. female who presents with Diabetes            Patient 54-year-old female here for follow-up of her diabetes, decreased instrumental activities of daily living, and hordeolum on her left lower leg.    Today her hemoglobin A1c was at 12.5.  She states she has been using her insulin as directed but has not been increasing her dose beyond 30 units.  Today since her hemoglobin A1c is still elevated we will have her start with 34 units daily and will have her increase her dose by 2 units every week until her blood sugars average below 200.  We will also add Januvia at 25 mg daily along with her metformin of 1000 mg twice daily.  She will also continue checking her blood sugars on a regular basis as well as checking her feet daily for any skin breakdown or changes in sensation.  We will also have her follow-up with her ophthalmologist for diabetic retinal exam.    On her left lower eyelid she is noticed some swelling and redness and tenderness.  She has not noticed any drainage from the lesion.  The lesion is not affecting her vision, but very irritating to her.  We will have her start by using warm compresses, and information on hordeolum's have been given to patient today.  Since patient is insisting on antibiotic we will have her hold onto Polytrim to use as needed if the hordeolum persists.  If it continues to persist after doing conservative management at using the antibiotic will refer to ophthalmology for further evaluation and possible drainage if necessary.  We will continue to follow.    Due to her inability to perform her activities of daily living, and her leg weakness.  She has been using her daughter for assistance in doing these tasks.  Soon her daughter will be going off to college and is also working part-time, and will not be able to help her as much with her daily activities.  She is requesting a home health aide to assist her with some of the test that her  "daughter has been performing.  A referral to home health for home health has been made today.  We will continue to follow.     Current medications, allergies, and problem list reviewed with patient and updated in EPIC.          Review of Systems   Constitutional: Negative for chills and fever.   HENT: Negative for hearing loss and tinnitus.    Eyes: Negative.         Hordeolum on left lower eyelid   Respiratory: Negative for cough, sputum production and shortness of breath.    Cardiovascular: Negative for chest pain and palpitations.   Gastrointestinal: Negative for abdominal pain, nausea and vomiting.   Musculoskeletal: Positive for back pain and joint pain. Negative for neck pain.   Skin: Negative for rash.   Neurological: Positive for tingling and focal weakness. Negative for tremors, sensory change, speech change and headaches.   Psychiatric/Behavioral: Positive for memory loss. The patient is nervous/anxious.           Objective:     /62 (BP Location: Left arm, Patient Position: Sitting)   Pulse 80   Temp 36.5 °C (97.7 °F)   Resp 14   Ht 1.702 m (5' 7\")   Wt 51.7 kg (114 lb)   SpO2 97%   BMI 17.85 kg/m²      Physical Exam   Constitutional: She is oriented to person, place, and time.   BMI 17   HENT:   Head: Normocephalic and atraumatic.   Cardiovascular: Normal rate, regular rhythm and normal heart sounds.  Exam reveals no friction rub.    No murmur heard.  Pulmonary/Chest: Effort normal and breath sounds normal. No respiratory distress. She has no wheezes. She has no rales.   Abdominal: Soft. Bowel sounds are normal. She exhibits no distension. There is no tenderness.   Neurological: She is alert and oriented to person, place, and time. She displays abnormal reflex. She exhibits abnormal muscle tone. Coordination abnormal.   Bilateral leg weakness   Skin: Skin is warm and dry.   Psychiatric: She has a normal mood and affect. Her behavior is normal.   Nursing note and vitals reviewed.            "   Assessment/Plan:     1. DM (diabetes mellitus), secondary, uncontrolled, w/neurologic complic (HCC)  We will have her increase her insulin on a weekly basis if necessary to control her blood sugars, will also start Januvia at 25 mg daily to use along with her metformin 1000 mg twice daily.  Her A1c today was at 12.5.  We will have her check her feet daily for any skin breakdown or changes in sensation.  And also follow-up with ophthalmology for retinal exams.  - Insulin Glargine (TOUJEO SOLOSTAR) 300 UNIT/ML Solution Pen-injector; Inject 34 Units as instructed every day.  Dispense: 16 PEN; Refill: 2  - Insulin Pen Needle 33G X 5 MM Misc; 1 Device by Subdermal route every day.  Dispense: 30 Each; Refill: 11    2. Leg weakness, bilateral  She is requesting a home health aide to help her with her daily activities due to her leg weakness and inability to perform her activities of daily living.  We will continue to follow.  - Insulin Glargine (TOUJEO SOLOSTAR) 300 UNIT/ML Solution Pen-injector; Inject 34 Units as instructed every day.  Dispense: 16 PEN; Refill: 2  - Insulin Pen Needle 33G X 5 MM Misc; 1 Device by Subdermal route every day.  Dispense: 30 Each; Refill: 11  - REFERRAL TO HOME HEALTH    3. Decreased activities of daily living (ADL)  See above plan.  - Insulin Glargine (TOUJEO SOLOSTAR) 300 UNIT/ML Solution Pen-injector; Inject 34 Units as instructed every day.  Dispense: 16 PEN; Refill: 2  - Insulin Pen Needle 33G X 5 MM Misc; 1 Device by Subdermal route every day.  Dispense: 30 Each; Refill: 11  - REFERRAL TO HOME HEALTH    4. Hypertension, benign  We will have her monitor her blood pressure since her blood pressure is slightly on the lower side.  She is not having any dizziness or syncopal episodes.  If her blood pressures remain low we may need to adjust medications.  ER precautions have also been given to patient.  - Insulin Glargine (TOUJEO SOLOSTAR) 300 UNIT/ML Solution Pen-injector; Inject 34 Units  as instructed every day.  Dispense: 16 PEN; Refill: 2  - Insulin Pen Needle 33G X 5 MM Misc; 1 Device by Subdermal route every day.  Dispense: 30 Each; Refill: 11    5. Hordeolum externum of left lower eyelid  Information on hordeolum's have been given to patient today.  Advised to use warm compresses regularly, and if that does not work after a week or so then Polytrim will be added.  If she continues to have issues will refer to ophthalmology for further assistance and management.  - polymixin-trimethoprim (POLYTRIM) 87842-1.1 UNIT/ML-% Solution; Place 1 Drop in both eyes every 4 hours.  Dispense: 10 mL; Refill: 0

## 2019-05-06 ENCOUNTER — TELEPHONE (OUTPATIENT)
Dept: MEDICAL GROUP | Facility: MEDICAL CENTER | Age: 54
End: 2019-05-06

## 2019-05-06 NOTE — TELEPHONE ENCOUNTER
Toujeo requires prior auth, below are the medications his insurance will cover.  Thank you,please advise.          Insulins (Vials, Pens and Inhaled)    APIDRA®       HUMALOG®      HUMULIN®      LANTUS®      LEVEMIR ®      NOVOLIN®      NOVOLOG®      TRESIBA FLEX INJ

## 2019-05-09 ENCOUNTER — HOME CARE VISIT (OUTPATIENT)
Dept: HOME HEALTH SERVICES | Facility: HOME HEALTHCARE | Age: 54
End: 2019-05-09
Payer: MEDICAID

## 2019-05-09 PROCEDURE — 665001 SOC-HOME HEALTH

## 2019-05-09 PROCEDURE — G0493 RN CARE EA 15 MIN HH/HOSPICE: HCPCS

## 2019-05-09 ASSESSMENT — ENCOUNTER SYMPTOMS
NAUSEA: DENIES
SHORTNESS OF BREATH: T
VOMITING: DENIES

## 2019-05-09 ASSESSMENT — PATIENT HEALTH QUESTIONNAIRE - PHQ9
1. LITTLE INTEREST OR PLEASURE IN DOING THINGS: 00
2. FEELING DOWN, DEPRESSED, IRRITABLE, OR HOPELESS: 00

## 2019-05-09 ASSESSMENT — ACTIVITIES OF DAILY LIVING (ADL): OASIS_M1830: 03

## 2019-05-10 ENCOUNTER — TELEPHONE (OUTPATIENT)
Dept: VASCULAR LAB | Facility: MEDICAL CENTER | Age: 54
End: 2019-05-10

## 2019-05-10 ENCOUNTER — HOME CARE VISIT (OUTPATIENT)
Dept: HOME HEALTH SERVICES | Facility: HOME HEALTHCARE | Age: 54
End: 2019-05-10
Payer: MEDICAID

## 2019-05-10 NOTE — TELEPHONE ENCOUNTER
Medications reviewed  No clinically significant interactions.  Pt has been on lisinopril and spironolactone together since 2016, and her K+ have been monitored appropriately.     Katharina Michaels, PharmD

## 2019-05-11 VITALS
HEART RATE: 89 BPM | SYSTOLIC BLOOD PRESSURE: 132 MMHG | TEMPERATURE: 98.6 F | HEIGHT: 67 IN | BODY MASS INDEX: 16.79 KG/M2 | RESPIRATION RATE: 20 BRPM | OXYGEN SATURATION: 99 % | DIASTOLIC BLOOD PRESSURE: 84 MMHG | WEIGHT: 107 LBS

## 2019-05-11 SDOH — ECONOMIC STABILITY: HOUSING INSECURITY
HOME SAFETY: PT HAS SMALL DOGS, THROW RUGS, MODERATE CLUTTER IN BATHROOM AND BEDROOM THAT CREATES A POTENTIAL RISK AS A TRIP/SLIP HAZARD.

## 2019-05-11 ASSESSMENT — ENCOUNTER SYMPTOMS
SEVERE DYSPNEA: 1
DEBILITATING PAIN: 1

## 2019-05-13 ENCOUNTER — HOME CARE VISIT (OUTPATIENT)
Dept: HOME HEALTH SERVICES | Facility: HOME HEALTHCARE | Age: 54
End: 2019-05-13
Payer: MEDICAID

## 2019-05-14 ENCOUNTER — HOME CARE VISIT (OUTPATIENT)
Dept: HOME HEALTH SERVICES | Facility: HOME HEALTHCARE | Age: 54
End: 2019-05-14
Payer: MEDICAID

## 2019-05-14 VITALS
TEMPERATURE: 97.2 F | SYSTOLIC BLOOD PRESSURE: 160 MMHG | HEART RATE: 77 BPM | DIASTOLIC BLOOD PRESSURE: 100 MMHG | RESPIRATION RATE: 18 BRPM | OXYGEN SATURATION: 98 %

## 2019-05-14 DIAGNOSIS — Z78.9 DECREASED ACTIVITIES OF DAILY LIVING (ADL): ICD-10-CM

## 2019-05-14 DIAGNOSIS — R18.8 CIRRHOSIS OF LIVER WITH ASCITES, UNSPECIFIED HEPATIC CIRRHOSIS TYPE (HCC): ICD-10-CM

## 2019-05-14 DIAGNOSIS — E11.69 HYPERLIPIDEMIA ASSOCIATED WITH TYPE 2 DIABETES MELLITUS (HCC): ICD-10-CM

## 2019-05-14 DIAGNOSIS — R29.898 LEG WEAKNESS, BILATERAL: ICD-10-CM

## 2019-05-14 DIAGNOSIS — K74.60 CIRRHOSIS OF LIVER WITH ASCITES, UNSPECIFIED HEPATIC CIRRHOSIS TYPE (HCC): ICD-10-CM

## 2019-05-14 DIAGNOSIS — I10 HYPERTENSION, BENIGN: ICD-10-CM

## 2019-05-14 DIAGNOSIS — I42.1 HOCM (HYPERTROPHIC OBSTRUCTIVE CARDIOMYOPATHY) (HCC): ICD-10-CM

## 2019-05-14 DIAGNOSIS — E11.8 DM (DIABETES MELLITUS) WITH COMPLICATIONS (HCC): ICD-10-CM

## 2019-05-14 DIAGNOSIS — E78.5 HYPERLIPIDEMIA ASSOCIATED WITH TYPE 2 DIABETES MELLITUS (HCC): ICD-10-CM

## 2019-05-14 DIAGNOSIS — R32 INCONTINENCE IN FEMALE: ICD-10-CM

## 2019-05-14 DIAGNOSIS — Z23 INFLUENZA VACCINE NEEDED: ICD-10-CM

## 2019-05-14 PROCEDURE — G0495 RN CARE TRAIN/EDU IN HH: HCPCS

## 2019-05-14 RX ORDER — LISINOPRIL 10 MG/1
10 TABLET ORAL DAILY
Qty: 30 TAB | Refills: 3 | Status: SHIPPED | OUTPATIENT
Start: 2019-05-14 | End: 2020-02-21

## 2019-05-14 RX ORDER — SPIRONOLACTONE 25 MG/1
25 TABLET ORAL DAILY
Qty: 90 TAB | Refills: 0 | Status: SHIPPED | OUTPATIENT
Start: 2019-05-14 | End: 2019-09-09 | Stop reason: SDUPTHER

## 2019-05-14 ASSESSMENT — ENCOUNTER SYMPTOMS
NAUSEA: DENIES
VOMITING: DENIES

## 2019-05-15 ENCOUNTER — HOME CARE VISIT (OUTPATIENT)
Dept: HOME HEALTH SERVICES | Facility: HOME HEALTHCARE | Age: 54
End: 2019-05-15
Payer: MEDICAID

## 2019-05-16 ENCOUNTER — HOME CARE VISIT (OUTPATIENT)
Dept: HOME HEALTH SERVICES | Facility: HOME HEALTHCARE | Age: 54
End: 2019-05-16
Payer: MEDICAID

## 2019-05-16 VITALS
OXYGEN SATURATION: 98 % | TEMPERATURE: 98.2 F | HEART RATE: 75 BPM | SYSTOLIC BLOOD PRESSURE: 118 MMHG | RESPIRATION RATE: 18 BRPM | DIASTOLIC BLOOD PRESSURE: 70 MMHG

## 2019-05-16 PROCEDURE — G0299 HHS/HOSPICE OF RN EA 15 MIN: HCPCS

## 2019-05-16 PROCEDURE — G0152 HHCP-SERV OF OT,EA 15 MIN: HCPCS

## 2019-05-16 ASSESSMENT — ENCOUNTER SYMPTOMS
NAUSEA: DENIES
VOMITING: DENIES

## 2019-05-17 ENCOUNTER — HOME CARE VISIT (OUTPATIENT)
Dept: HOME HEALTH SERVICES | Facility: HOME HEALTHCARE | Age: 54
End: 2019-05-17
Payer: MEDICAID

## 2019-05-17 VITALS
SYSTOLIC BLOOD PRESSURE: 144 MMHG | DIASTOLIC BLOOD PRESSURE: 70 MMHG | OXYGEN SATURATION: 98 % | TEMPERATURE: 97.6 F | RESPIRATION RATE: 18 BRPM | HEART RATE: 75 BPM

## 2019-05-17 VITALS
HEART RATE: 72 BPM | DIASTOLIC BLOOD PRESSURE: 70 MMHG | OXYGEN SATURATION: 98 % | RESPIRATION RATE: 18 BRPM | TEMPERATURE: 99.2 F | SYSTOLIC BLOOD PRESSURE: 118 MMHG

## 2019-05-17 PROCEDURE — G0151 HHCP-SERV OF PT,EA 15 MIN: HCPCS

## 2019-05-17 ASSESSMENT — ACTIVITIES OF DAILY LIVING (ADL)
ORAL_CARE_ASSISTANCE: 4
SHOPPING_ASSISTANCE: 6
DRESSING_LB_ASSISTANCE: 6
TOILETING_ASSISTANCE: 5
GROOMING_ASSISTANCE: 4
BATHING_ASSISTIVE_EQUIPMENT_NEEDED: SHOWER CHAIR, HANDHELD SHOWER
GROOMING_ASSISTANCE: 5
MEAL_PREP_ASSISTANCE: 6
TOILETING_ASSISTANCE: 4
TELEPHONE_ASSISTANCE: 0
DRESSING_LB_ASSISTANCE: 5
TRANSPORTATION_ASSISTANCE: 6
BATHING_ASSISTANCE: 5
EATING_ASSISTANCE: 4
LAUNDRY_ASSISTANCE: 6
DRESSING_UB_ASSISTANCE: 4
BATHING_ASSISTANCE: 6
HOUSEKEEPING_ASSISTANCE: 6

## 2019-05-17 ASSESSMENT — ENCOUNTER SYMPTOMS: DEBILITATING PAIN: 1

## 2019-05-18 ASSESSMENT — BALANCE ASSESSMENTS
EYES CLOSED AT MAXIMUM POSITION NUDGED: 0
ATTEMPTS TO ARISE: 1
TURNING 360 DEGREES STEADINESS: 0
SITTING BALANCE: 1
SITTING DOWN: 1
BALANCE SCORE: 5
TURNING 360 DEGREES STEPS: 0
IMMEDIATE STANDING BALANCE FIRST 5 SECONDS: 0
STANDING BALANCE: 1
SURVEY COMPLETE: TRUE
ARISES: 1
NUDGED: 0

## 2019-05-18 ASSESSMENT — GAIT ASSESSMENTS
GAIT SCORE: 3
LEFT STEP PASS: 1
SURVEY COMPLETE: TRUE
STEP SYMMETRY: 0
STEP CONTINUITY: 0
TIME: 0
RIGHT STEP CLEAR: 0
TRUNK: 0
BALANCE AND GAIT SCORE: 8
INITIATION OF GAIT IMMEDIATELY AFTER GO: 1
PATH: 0
RIGHT STEP PASS: 1
LEFT STEP CLEAR: 0

## 2019-05-18 ASSESSMENT — ACTIVITIES OF DAILY LIVING (ADL)
IADLS_COMMENTS: <!--EPICS-->SEE OT REPORT<!--EPICE-->
ADLS_COMMENTS: <!--EPICS-->SEE OT REPORT<!--EPICE-->

## 2019-05-20 ENCOUNTER — HOME CARE VISIT (OUTPATIENT)
Dept: HOME HEALTH SERVICES | Facility: HOME HEALTHCARE | Age: 54
End: 2019-05-20
Payer: MEDICAID

## 2019-05-20 VITALS
TEMPERATURE: 98.1 F | OXYGEN SATURATION: 97 % | SYSTOLIC BLOOD PRESSURE: 148 MMHG | HEART RATE: 72 BPM | RESPIRATION RATE: 18 BRPM | DIASTOLIC BLOOD PRESSURE: 86 MMHG

## 2019-05-20 PROCEDURE — G0299 HHS/HOSPICE OF RN EA 15 MIN: HCPCS

## 2019-05-20 ASSESSMENT — ENCOUNTER SYMPTOMS
VOMITING: DENIES
NAUSEA: DENIES

## 2019-05-21 ENCOUNTER — HOME CARE VISIT (OUTPATIENT)
Dept: HOME HEALTH SERVICES | Facility: HOME HEALTHCARE | Age: 54
End: 2019-05-21
Payer: MEDICAID

## 2019-05-21 VITALS
HEART RATE: 75 BPM | RESPIRATION RATE: 18 BRPM | DIASTOLIC BLOOD PRESSURE: 68 MMHG | TEMPERATURE: 97.8 F | SYSTOLIC BLOOD PRESSURE: 144 MMHG | OXYGEN SATURATION: 98 %

## 2019-05-21 PROCEDURE — G0151 HHCP-SERV OF PT,EA 15 MIN: HCPCS

## 2019-05-22 ENCOUNTER — HOME CARE VISIT (OUTPATIENT)
Dept: HOME HEALTH SERVICES | Facility: HOME HEALTHCARE | Age: 54
End: 2019-05-22
Payer: MEDICAID

## 2019-05-22 PROCEDURE — G0155 HHCP-SVS OF CSW,EA 15 MIN: HCPCS

## 2019-05-22 ASSESSMENT — SOCIAL DETERMINANTS OF HEALTH (SDOH): IS CONCERNED ABOUT INCOME: Y

## 2019-05-23 ENCOUNTER — HOME CARE VISIT (OUTPATIENT)
Dept: HOME HEALTH SERVICES | Facility: HOME HEALTHCARE | Age: 54
End: 2019-05-23
Payer: MEDICAID

## 2019-05-23 VITALS
TEMPERATURE: 97.8 F | DIASTOLIC BLOOD PRESSURE: 64 MMHG | OXYGEN SATURATION: 99 % | SYSTOLIC BLOOD PRESSURE: 142 MMHG | HEART RATE: 81 BPM | RESPIRATION RATE: 16 BRPM

## 2019-05-23 PROCEDURE — G0151 HHCP-SERV OF PT,EA 15 MIN: HCPCS

## 2019-05-23 PROCEDURE — G0152 HHCP-SERV OF OT,EA 15 MIN: HCPCS

## 2019-05-23 PROCEDURE — G0495 RN CARE TRAIN/EDU IN HH: HCPCS

## 2019-05-23 ASSESSMENT — ENCOUNTER SYMPTOMS
VOMITING: DENIES
NAUSEA: INTERMITTENT

## 2019-05-24 VITALS
RESPIRATION RATE: 16 BRPM | SYSTOLIC BLOOD PRESSURE: 136 MMHG | DIASTOLIC BLOOD PRESSURE: 78 MMHG | HEART RATE: 85 BPM | TEMPERATURE: 97.9 F | OXYGEN SATURATION: 98 %

## 2019-05-27 ENCOUNTER — HOME CARE VISIT (OUTPATIENT)
Dept: HOME HEALTH SERVICES | Facility: HOME HEALTHCARE | Age: 54
End: 2019-05-27
Payer: MEDICAID

## 2019-05-27 VITALS
WEIGHT: 111.4 LBS | SYSTOLIC BLOOD PRESSURE: 140 MMHG | BODY MASS INDEX: 17.45 KG/M2 | DIASTOLIC BLOOD PRESSURE: 82 MMHG | TEMPERATURE: 96.8 F | OXYGEN SATURATION: 98 % | RESPIRATION RATE: 16 BRPM | HEART RATE: 78 BPM

## 2019-05-27 PROCEDURE — G0495 RN CARE TRAIN/EDU IN HH: HCPCS

## 2019-05-27 ASSESSMENT — ENCOUNTER SYMPTOMS
VOMITING: DENIES
NAUSEA: DENIES

## 2019-05-28 ENCOUNTER — HOME CARE VISIT (OUTPATIENT)
Dept: HOME HEALTH SERVICES | Facility: HOME HEALTHCARE | Age: 54
End: 2019-05-28
Payer: MEDICAID

## 2019-05-28 VITALS
HEART RATE: 82 BPM | RESPIRATION RATE: 16 BRPM | TEMPERATURE: 97.6 F | SYSTOLIC BLOOD PRESSURE: 126 MMHG | OXYGEN SATURATION: 98 % | DIASTOLIC BLOOD PRESSURE: 80 MMHG

## 2019-05-28 PROCEDURE — G0180 MD CERTIFICATION HHA PATIENT: HCPCS | Performed by: FAMILY MEDICINE

## 2019-05-28 PROCEDURE — G0151 HHCP-SERV OF PT,EA 15 MIN: HCPCS

## 2019-05-29 ENCOUNTER — HOME CARE VISIT (OUTPATIENT)
Dept: HOME HEALTH SERVICES | Facility: HOME HEALTHCARE | Age: 54
End: 2019-05-29
Payer: MEDICAID

## 2019-05-29 PROCEDURE — G0152 HHCP-SERV OF OT,EA 15 MIN: HCPCS

## 2019-05-30 ENCOUNTER — HOME CARE VISIT (OUTPATIENT)
Dept: HOME HEALTH SERVICES | Facility: HOME HEALTHCARE | Age: 54
End: 2019-05-30
Payer: MEDICAID

## 2019-05-30 VITALS
OXYGEN SATURATION: 96 % | TEMPERATURE: 97.9 F | SYSTOLIC BLOOD PRESSURE: 100 MMHG | DIASTOLIC BLOOD PRESSURE: 68 MMHG | HEART RATE: 81 BPM | RESPIRATION RATE: 16 BRPM

## 2019-05-30 VITALS
DIASTOLIC BLOOD PRESSURE: 72 MMHG | OXYGEN SATURATION: 98 % | RESPIRATION RATE: 16 BRPM | SYSTOLIC BLOOD PRESSURE: 124 MMHG | TEMPERATURE: 97.8 F | HEART RATE: 81 BPM

## 2019-05-30 PROCEDURE — G0299 HHS/HOSPICE OF RN EA 15 MIN: HCPCS

## 2019-05-30 PROCEDURE — G0151 HHCP-SERV OF PT,EA 15 MIN: HCPCS

## 2019-05-30 ASSESSMENT — ENCOUNTER SYMPTOMS
NAUSEA: DENIES
VOMITING: DENIES

## 2019-06-04 ENCOUNTER — HOME CARE VISIT (OUTPATIENT)
Dept: HOME HEALTH SERVICES | Facility: HOME HEALTHCARE | Age: 54
End: 2019-06-04
Payer: MEDICAID

## 2019-06-04 VITALS
DIASTOLIC BLOOD PRESSURE: 100 MMHG | HEART RATE: 80 BPM | SYSTOLIC BLOOD PRESSURE: 180 MMHG | RESPIRATION RATE: 16 BRPM | OXYGEN SATURATION: 98 % | TEMPERATURE: 97.7 F

## 2019-06-04 PROCEDURE — G0151 HHCP-SERV OF PT,EA 15 MIN: HCPCS

## 2019-06-05 ENCOUNTER — HOME CARE VISIT (OUTPATIENT)
Dept: HOME HEALTH SERVICES | Facility: HOME HEALTHCARE | Age: 54
End: 2019-06-05
Payer: MEDICAID

## 2019-06-05 VITALS
RESPIRATION RATE: 16 BRPM | DIASTOLIC BLOOD PRESSURE: 70 MMHG | SYSTOLIC BLOOD PRESSURE: 128 MMHG | HEART RATE: 77 BPM | TEMPERATURE: 98.4 F

## 2019-06-05 VITALS
DIASTOLIC BLOOD PRESSURE: 70 MMHG | RESPIRATION RATE: 16 BRPM | SYSTOLIC BLOOD PRESSURE: 128 MMHG | OXYGEN SATURATION: 99 % | TEMPERATURE: 98.4 F | HEART RATE: 77 BPM

## 2019-06-05 PROCEDURE — G0299 HHS/HOSPICE OF RN EA 15 MIN: HCPCS

## 2019-06-05 PROCEDURE — G0152 HHCP-SERV OF OT,EA 15 MIN: HCPCS

## 2019-06-05 ASSESSMENT — ACTIVITIES OF DAILY LIVING (ADL): TRANSPORTATION COMMENTS: NEEDS ASSISTANCE TO LEAVE HOME

## 2019-06-05 ASSESSMENT — ENCOUNTER SYMPTOMS
VOMITING: DENIES
DEPRESSED MOOD: 1
NAUSEA: DENIES

## 2019-06-06 ENCOUNTER — HOME CARE VISIT (OUTPATIENT)
Dept: HOME HEALTH SERVICES | Facility: HOME HEALTHCARE | Age: 54
End: 2019-06-06
Payer: MEDICAID

## 2019-06-06 VITALS
HEART RATE: 88 BPM | OXYGEN SATURATION: 99 % | DIASTOLIC BLOOD PRESSURE: 68 MMHG | RESPIRATION RATE: 16 BRPM | TEMPERATURE: 97.6 F | SYSTOLIC BLOOD PRESSURE: 128 MMHG

## 2019-06-06 PROCEDURE — G0151 HHCP-SERV OF PT,EA 15 MIN: HCPCS

## 2019-06-06 ASSESSMENT — BALANCE ASSESSMENTS
SITTING DOWN: 1
STANDING BALANCE: 1
ARISES: 1
EYES CLOSED AT MAXIMUM POSITION NUDGED: 1
TURNING 360 DEGREES STEADINESS: 1
SITTING BALANCE: 1
NUDGED: 1
BALANCE SCORE: 10
SURVEY COMPLETE: TRUE
IMMEDIATE STANDING BALANCE FIRST 5 SECONDS: 1
ATTEMPTS TO ARISE: 2
TURNING 360 DEGREES STEPS: 0

## 2019-06-06 ASSESSMENT — GAIT ASSESSMENTS
BALANCE AND GAIT SCORE: 17
STEP SYMMETRY: 1
RIGHT STEP PASS: 1
INITIATION OF GAIT IMMEDIATELY AFTER GO: 1
SURVEY COMPLETE: TRUE
RIGHT STEP CLEAR: 1
STEP CONTINUITY: 1
PATH: 0
LEFT STEP CLEAR: 1
TIME: 0
GAIT SCORE: 7
LEFT STEP PASS: 1
TRUNK: 0

## 2019-06-12 ENCOUNTER — HOME CARE VISIT (OUTPATIENT)
Dept: HOME HEALTH SERVICES | Facility: HOME HEALTHCARE | Age: 54
End: 2019-06-12
Payer: MEDICAID

## 2019-06-12 PROCEDURE — G0299 HHS/HOSPICE OF RN EA 15 MIN: HCPCS

## 2019-06-13 VITALS
SYSTOLIC BLOOD PRESSURE: 138 MMHG | RESPIRATION RATE: 16 BRPM | DIASTOLIC BLOOD PRESSURE: 82 MMHG | TEMPERATURE: 98.8 F | HEART RATE: 77 BPM | OXYGEN SATURATION: 97 %

## 2019-06-13 ASSESSMENT — ENCOUNTER SYMPTOMS
VOMITING: DENIES
NAUSEA: DENIES

## 2019-06-18 ENCOUNTER — TELEPHONE (OUTPATIENT)
Dept: MEDICAL GROUP | Facility: MEDICAL CENTER | Age: 54
End: 2019-06-18

## 2019-06-18 DIAGNOSIS — I10 ESSENTIAL HYPERTENSION: ICD-10-CM

## 2019-06-18 NOTE — TELEPHONE ENCOUNTER
1. Caller Name: Rigoberto Adames                                           Call Back Number: [phone] (home)         Patient approves a detailed voicemail message: N\A     is requesting a blood pressure cuff order be sent to [facility] chest. Please advise.

## 2019-06-19 ENCOUNTER — HOME CARE VISIT (OUTPATIENT)
Dept: HOME HEALTH SERVICES | Facility: HOME HEALTHCARE | Age: 54
End: 2019-06-19
Payer: MEDICAID

## 2019-06-19 VITALS
HEART RATE: 70 BPM | SYSTOLIC BLOOD PRESSURE: 142 MMHG | RESPIRATION RATE: 16 BRPM | OXYGEN SATURATION: 97 % | DIASTOLIC BLOOD PRESSURE: 82 MMHG | TEMPERATURE: 97.9 F

## 2019-06-19 PROCEDURE — G0493 RN CARE EA 15 MIN HH/HOSPICE: HCPCS

## 2019-06-19 ASSESSMENT — PATIENT HEALTH QUESTIONNAIRE - PHQ9: CLINICAL INTERPRETATION OF PHQ2 SCORE: 0

## 2019-06-19 ASSESSMENT — ACTIVITIES OF DAILY LIVING (ADL)
OASIS_M1830: 02
HOME_HEALTH_OASIS: 01

## 2019-07-18 ENCOUNTER — OFFICE VISIT (OUTPATIENT)
Dept: MEDICAL GROUP | Facility: MEDICAL CENTER | Age: 54
End: 2019-07-18
Attending: FAMILY MEDICINE
Payer: MEDICAID

## 2019-07-18 VITALS
WEIGHT: 106 LBS | HEART RATE: 78 BPM | RESPIRATION RATE: 16 BRPM | BODY MASS INDEX: 16.64 KG/M2 | SYSTOLIC BLOOD PRESSURE: 104 MMHG | OXYGEN SATURATION: 96 % | TEMPERATURE: 98.6 F | DIASTOLIC BLOOD PRESSURE: 64 MMHG | HEIGHT: 67 IN

## 2019-07-18 DIAGNOSIS — R00.2 HEART PALPITATIONS: ICD-10-CM

## 2019-07-18 DIAGNOSIS — I42.1 HOCM (HYPERTROPHIC OBSTRUCTIVE CARDIOMYOPATHY) (HCC): ICD-10-CM

## 2019-07-18 DIAGNOSIS — R18.8 CIRRHOSIS OF LIVER WITH ASCITES, UNSPECIFIED HEPATIC CIRRHOSIS TYPE (HCC): ICD-10-CM

## 2019-07-18 DIAGNOSIS — G62.9 NEUROPATHY: ICD-10-CM

## 2019-07-18 DIAGNOSIS — R19.7 DIARRHEA, UNSPECIFIED TYPE: ICD-10-CM

## 2019-07-18 DIAGNOSIS — E11.8 DM (DIABETES MELLITUS) WITH COMPLICATIONS (HCC): ICD-10-CM

## 2019-07-18 DIAGNOSIS — K74.60 CIRRHOSIS OF LIVER WITH ASCITES, UNSPECIFIED HEPATIC CIRRHOSIS TYPE (HCC): ICD-10-CM

## 2019-07-18 PROCEDURE — 99214 OFFICE O/P EST MOD 30 MIN: CPT | Performed by: FAMILY MEDICINE

## 2019-07-18 RX ORDER — DIPHENOXYLATE HYDROCHLORIDE AND ATROPINE SULFATE 2.5; .025 MG/1; MG/1
1 TABLET ORAL 4 TIMES DAILY PRN
Qty: 60 TAB | Refills: 1 | Status: SHIPPED | OUTPATIENT
Start: 2019-07-18 | End: 2019-08-17

## 2019-07-18 ASSESSMENT — ENCOUNTER SYMPTOMS
SPEECH CHANGE: 0
VOMITING: 0
ABDOMINAL PAIN: 0
BACK PAIN: 1
EYE DISCHARGE: 0
FOCAL WEAKNESS: 1
FEVER: 0
PHOTOPHOBIA: 0
TINGLING: 1
TREMORS: 0
HALLUCINATIONS: 0
NERVOUS/ANXIOUS: 1
CHILLS: 0
SPUTUM PRODUCTION: 0
DIZZINESS: 1
BLURRED VISION: 1
DIARRHEA: 1
PALPITATIONS: 1
EYE PAIN: 0
NAUSEA: 0
DEPRESSION: 0
HEADACHES: 0
COUGH: 0
DOUBLE VISION: 0
SENSORY CHANGE: 1

## 2019-07-18 ASSESSMENT — LIFESTYLE VARIABLES: SUBSTANCE_ABUSE: 0

## 2019-07-18 NOTE — PROGRESS NOTES
Subjective:      Rigoberto Adames is a 54 y.o. female who presents with Diabetes Mellitus (follow up)            Patient 54-year-old female here for follow-up of her heart palpitations, hypertrophic obstructive cardiomyopathy, poorly controlled diabetes, cirrhosis, and neuropathy.    She is also here for follow-up of her recent abdominal ultrasound, her ultrasound results were as follows:    1. Heterogeneous liver with equivocal contra nodularity. It may be due to underlying hepatocellular disease. No liver lesions detected.  2. Cholelithiasis. No cholecystitis.  3. Simple renal cysts.    She was noted to have a nodular liver which is consistent with her cirrhosis.  But no masses were seen.  She also has choledocholithiasis, but no cholecystitis.  She is not having any right upper quadrant pain, nausea or vomiting, but has been having diarrhea.  We will have her increase her fluid intake as well as increase her fibers.  We will have her use either over-the-counter Imodium or Lomotil as needed.  She also sees gastroenterology and was scheduled for an endoscopy, until she was noted to have palpitations on her heart monitor.  The procedure was not completed, she was instructed to follow-up with cardiology.  She has an upcoming appointment with her cardiologist whom she has not seen in over a year.  Discussed the possibility that she will need to get a Holter or event monitor for a further evaluation of her palpitations.  Currently she is not having any palpitations, no chest pain, no shortness of breath and no worsening leg swelling.  We will have her continue to take her medications as previously directed and will continue to follow.  We will also order a complete metabolic panel as well as a thyroid function to assess her electrolyte levels as well as her other metabolic functions.  We will also order a microalbumin creatinine ratio to assess for any proteins in her urine.  We will continue to follow.    Her blood sugar  "at home patient states have been running under 150.  Her hemoglobin A1c today was at 7.4.  Her last hemoglobin A1c was at 12.5.  She is currently taking insulin as well as Januvia.  She has been using her medications regularly, and has not been having any issues with them.  A referral to the pharmacotherapy service is also been made today for additional assistance in evaluating as well as managing if necessary.  She will also continue checking her feet daily for any skin breakdown or changes in sensation.  She has noticed that she has been having some numbness and tingling in her fingers as well as her feet.  We will also have her follow-up with ophthalmology for yearly retinal exams.     Current medications, allergies, and problem list reviewed with patient and updated in EPIC.          Review of Systems   Constitutional: Negative for chills and fever.   HENT: Negative for hearing loss and tinnitus.    Eyes: Positive for blurred vision. Negative for double vision, photophobia, pain and discharge.   Respiratory: Negative for cough and sputum production.    Cardiovascular: Positive for palpitations. Negative for chest pain and leg swelling.   Gastrointestinal: Positive for diarrhea. Negative for abdominal pain, nausea and vomiting.   Musculoskeletal: Positive for back pain and joint pain.   Skin: Negative for rash.   Neurological: Positive for dizziness, tingling, sensory change and focal weakness. Negative for tremors, speech change and headaches.   Psychiatric/Behavioral: Negative for depression, hallucinations, substance abuse and suicidal ideas. The patient is nervous/anxious.           Objective:     /64 (BP Location: Left arm, Patient Position: Sitting, BP Cuff Size: Adult)   Pulse 78   Temp 37 °C (98.6 °F) (Temporal)   Resp 16   Ht 1.702 m (5' 7\")   Wt 48.1 kg (106 lb)   SpO2 96%   BMI 16.60 kg/m²      Physical Exam   Constitutional: She is oriented to person, place, and time.   BMI 16.60   HENT: "   Head: Normocephalic and atraumatic.   Nose: Nose normal.   Mouth/Throat: Oropharynx is clear and moist.   Neck: Normal range of motion. Neck supple.   Cardiovascular: Normal rate, regular rhythm and normal heart sounds.  Exam reveals no friction rub.    No murmur heard.  Pulmonary/Chest: Effort normal and breath sounds normal. No respiratory distress. She has no wheezes. She has no rales.   Abdominal: Soft. Bowel sounds are normal. She exhibits no distension. There is no tenderness.   Neurological: She is alert and oriented to person, place, and time.   Skin: Skin is warm and dry.   Psychiatric: She has a normal mood and affect. Her behavior is normal.   Nursing note and vitals reviewed.              Assessment/Plan:       1. HOCM (hypertrophic obstructive cardiomyopathy) ([facility])  We will have her continue to follow-up with her cardiologist as directed.  We will order an echocardiogram of her heart for a further evaluation.  Discussed going to the emergency room for the EKG changes noted today.  Patient is not having any chest pain at this moment, but has been feeling off and on dizzy.  Patient is declining ambulance transport at this time.  Precautions to go to the emergency room if she should develop any dizziness or other systemic symptoms.    2. Heart palpitations  Discussed a possible event or Holter monitor to further evaluate her heart palpitations.  She will contact her cardiologist for a further evaluation.  We will continue to follow.    3. DM (diabetes mellitus) with complications ([facility])  We will have her continue to use her medication as directed.  Her hemoglobin A1c today was at 7.4.  We will have her referred to pharmacotherapy service as well.  We will continue to follow.  - REFERRAL TO PHARMACOTHERAPY SERVICE  - Comp Metabolic Panel; Future  - TSH WITH REFLEX TO FT4; Future  - MICROALBUMIN CREAT RATIO URINE; Future    4. Neuropathy (HCC)  We will have her continue to take her medications as directed.   She will also continue following up with her neurologist as needed.  We will continue to follow.    5. Cirrhosis of liver with ascites, unspecified hepatic cirrhosis type (HCC)  She will continue to follow-up with her gastroenterologist for continued management of her cirrhosis as well as her other GI issues.  Since she has been having diarrhea we will have her stay hydrated as well as using over-the-counter Imodium or Lomotil as needed.  We will also have her increase her fiber intake.  We will continue to follow.    6. Diarrhea, unspecified type  See above plan.

## 2019-07-25 ENCOUNTER — OFFICE VISIT (OUTPATIENT)
Dept: MEDICAL GROUP | Facility: MEDICAL CENTER | Age: 54
End: 2019-07-25
Attending: FAMILY MEDICINE
Payer: MEDICAID

## 2019-07-25 VITALS
DIASTOLIC BLOOD PRESSURE: 60 MMHG | HEART RATE: 78 BPM | WEIGHT: 109 LBS | HEIGHT: 67 IN | BODY MASS INDEX: 17.11 KG/M2 | OXYGEN SATURATION: 96 % | RESPIRATION RATE: 16 BRPM | SYSTOLIC BLOOD PRESSURE: 110 MMHG | TEMPERATURE: 97.6 F

## 2019-07-25 DIAGNOSIS — E11.8 DM (DIABETES MELLITUS) WITH COMPLICATIONS (HCC): ICD-10-CM

## 2019-07-25 DIAGNOSIS — K74.60 CIRRHOSIS OF LIVER WITH ASCITES, UNSPECIFIED HEPATIC CIRRHOSIS TYPE (HCC): ICD-10-CM

## 2019-07-25 DIAGNOSIS — I42.1 HOCM (HYPERTROPHIC OBSTRUCTIVE CARDIOMYOPATHY) (HCC): ICD-10-CM

## 2019-07-25 DIAGNOSIS — R18.8 CIRRHOSIS OF LIVER WITH ASCITES, UNSPECIFIED HEPATIC CIRRHOSIS TYPE (HCC): ICD-10-CM

## 2019-07-25 LAB — GLUCOSE BLD-MCNC: 134 MG/DL (ref 70–100)

## 2019-07-25 PROCEDURE — 99213 OFFICE O/P EST LOW 20 MIN: CPT | Performed by: FAMILY MEDICINE

## 2019-07-25 PROCEDURE — 82962 GLUCOSE BLOOD TEST: CPT | Performed by: FAMILY MEDICINE

## 2019-07-25 PROCEDURE — 99212 OFFICE O/P EST SF 10 MIN: CPT | Performed by: FAMILY MEDICINE

## 2019-07-25 ASSESSMENT — ENCOUNTER SYMPTOMS
COUGH: 0
NAUSEA: 0
PSYCHIATRIC NEGATIVE: 1
VOMITING: 0
SHORTNESS OF BREATH: 0
ABDOMINAL PAIN: 0
SPUTUM PRODUCTION: 0
PALPITATIONS: 0
BACK PAIN: 1
FEVER: 0
TINGLING: 1
CHILLS: 0

## 2019-07-25 NOTE — PROGRESS NOTES
"Subjective:      Rigoberto Adames is a 54 y.o. female who presents with Follow-Up            Patient 54-year-old female here for follow-up of visit last week she had been having discomfort in her chest with heart palpitations.  She had not been in contact with her cardiologist in some time prior to her visit last week.  Today her symptoms have improved and she is not having any further discomfort in her chest or palpitations.  She had not gone to the ER as initially advised, but she does have an appointment with her cardiologist set up for next week.  She understands that she develops any further discomfort in her chest or further worsening of her heart palpitations she is to go to the emergency room for further assistance and management.    Patient is also no longer having loose bowels.  She has been given Lomotil at her last visit.  We will have her continue to stay well-hydrated.  I will continue to follow.    Her blood sugar today was at 134.  She has been taking her medications for diabetes without any issues.  She will be following up with her diabetic specialist for continued assistance in management of her diabetes.  We will continue to follow.     Current medications, allergies, and problem list reviewed with patient and updated in EPIC.          Review of Systems   Constitutional: Negative for chills and fever.   HENT: Negative for hearing loss and tinnitus.    Respiratory: Negative for cough, sputum production and shortness of breath.    Cardiovascular: Negative for chest pain and palpitations.   Gastrointestinal: Negative for abdominal pain, nausea and vomiting.   Musculoskeletal: Positive for back pain and joint pain.   Neurological: Positive for tingling.   Psychiatric/Behavioral: Negative.           Objective:     /60 (BP Location: Left arm, Patient Position: Sitting, BP Cuff Size: Adult)   Pulse 78   Temp 36.4 °C (97.6 °F) (Temporal)   Resp 16   Ht 1.702 m (5' 7\")   Wt 49.4 kg (109 lb)   " SpO2 96%   BMI 17.07 kg/m²      Physical Exam   Constitutional: She is oriented to person, place, and time.   BMI 17.07   HENT:   Head: Normocephalic and atraumatic.   Cardiovascular: Normal rate, regular rhythm and normal heart sounds.  Exam reveals no friction rub.    No murmur heard.  Pulmonary/Chest: Effort normal and breath sounds normal. No respiratory distress. She has no wheezes. She has no rales.   Abdominal: Soft. Bowel sounds are normal. She exhibits no distension. There is no tenderness.   Neurological: She is alert and oriented to person, place, and time.   Skin: Skin is warm and dry.   Psychiatric: She has a normal mood and affect. Her behavior is normal.   Nursing note and vitals reviewed.              Assessment/Plan:     1. DM (diabetes mellitus) with complications (HCC)  We will have her continue to take her medications as previously directed.  Her blood sugar today was at 134, and her A1c last week was at 7.4.  She will continue to check her feet daily for any skin breakdown or changes in sensation.  I will continue to follow.    2. Cirrhosis of liver with ascites, unspecified hepatic cirrhosis type (HCC)  We will have her follow-up with her gastroenterologist for further assistance in management of her cirrhosis as well as other GI problems.  We will continue to follow.    3. HOCM (hypertrophic obstructive cardiomyopathy) (HCC)  She will also follow-up with her cardiologist in regards to her hyper trophic cardiomyopathy.  Last week she had been having palpitations which have since resolved.  We will continue to follow.  ER precautions have been given if she should develop any worsening of her previous symptoms.

## 2019-07-30 ENCOUNTER — NON-PROVIDER VISIT (OUTPATIENT)
Dept: MEDICAL GROUP | Facility: MEDICAL CENTER | Age: 54
End: 2019-07-30
Attending: FAMILY MEDICINE
Payer: MEDICAID

## 2019-07-30 VITALS
BODY MASS INDEX: 16.95 KG/M2 | HEART RATE: 80 BPM | SYSTOLIC BLOOD PRESSURE: 62 MMHG | DIASTOLIC BLOOD PRESSURE: 42 MMHG | HEIGHT: 67 IN | WEIGHT: 108 LBS

## 2019-07-30 DIAGNOSIS — E11.8 DM (DIABETES MELLITUS) WITH COMPLICATIONS (HCC): Primary | ICD-10-CM

## 2019-07-30 PROCEDURE — 99213 OFFICE O/P EST LOW 20 MIN: CPT | Performed by: FAMILY MEDICINE

## 2019-07-30 NOTE — Clinical Note
Patient's feeling of tiredness and dizziness along with her very low BP could be due to the combination of the three BP medications she has on board (lisinopril, metoprolol and spironolactone). Pt might benefit from stopping er spironolactone and metoprolol and increasing her lisinopril as it becomes necessary in the future.

## 2019-07-30 NOTE — PROGRESS NOTES
New Patient Consult Note 02:30-03:30pm  Primary care physician: Vladimir Corral M.D.    Reason for consult: Management of Uncontrolled Type 2 Diabetes    HPI:  Rigoberto Adames is a 54 y.o. old patient who comes in today for evaluation of above stated problem. Pt is here with her two kids who helped a lot with some of the life style questions that I had for her. Pt seems to eat two meals a day and continuously snacks through out the day. She eats more than 3 big croissants a day and she also eats rice on daily basis. This patient is very thin despite all of the carbohydrate she eats every day which got me thinking that she might have a type I diabetes and that she might not be making any insulin. I ordered few labs for her in order to confirm the diagnosis of type II diabetes. This pt also seems to be dehydrated and from what she said she has been having diarrhea for the past 2 months and she barely drink 2 small bottles of water a day (1000ml). I explained the importance of hydration to help slow down any kidney diseases that she might need up fighting as a result of her diabetes. She also reported being tired and dizzy most of the times which could be due to her diabetes and dehydration as well. Her blood pressure was take in the office today and was very low at 62/42 !! which could be a result of her dehydration and one of the causes for her dizziness. Basic physiology of DMII was explained to patient as well as microvascular/macrovascular complications. The importance of increasing physical activity to improve diabetes control was discussed with the patient. Patient was also educated on changing her diet and making better choices to help control blood sugar. Her most recent lab work shows good kidney function. Pt is due for a retinal exam and foot exam will be done next visit.  Patient's feeling of tiredness and dizziness along with her very low BP could be due to the combination of the three BP medications she has on  board (lisinopril, metoprolol and spironolactone). Pt might benefit from stopping er spironolactone and metoprolol and increasing her lisinopril as it becomes necessary in the future. PCP was notified.      Most Recent HbA1c:   Lab Results   Component Value Date/Time    HBA1C 12.5 (A) 05/02/2019 10:48 AM        Current Diabetes Regimen:  Metformin 2000mg daily    Before Breakfast: not checking  Before Lunch:  Before Dinner:  Before Bedtime:  Other times:  Hypoglycemia:  None      ROS:  Constitutional: No weight loss  Cardiac: No palpitations or racing heart  Resp: No shortness of breath  Neuro: No numbness or tinging in feet  Endo: No heat or cold intolerance, no polyuria or polydipsia  All other systems were reviewed and were negative.    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) 01/20/2015     Priority: Medium   • Hypertension, benign 12/09/2014     Priority: Medium   • Cirrhosis (HCC) 03/15/2016     Priority: Low   • Weakness 10/14/2015     Priority: Low   • Macrocytic anemia 10/12/2015     Priority: Low   • Tobacco abuse 12/08/2014     Priority: Low   • Diabetes mellitus, type 2 (HCC) 06/27/2011     Priority: Low   • Neuropathy (HCC) 07/18/2019   • Diarrhea 07/18/2019   • Cirrhosis of liver with ascites (HCC) 03/06/2017   • Thrombocytopenia (HCC) 06/24/2016       Past Surgical History:  Past Surgical History:   Procedure Laterality Date   • OTHER      c- section       Allergies:  Patient has no known allergies.    Social History:  Social History     Social History   •  Marital status: Single     Spouse name: N/A   • Number of children: N/A   • Years of education: N/A     Occupational History   • restaurant owner      Social History Main Topics   • Smoking status: Current Every Day Smoker     Packs/day: 1.00     Years: 10.00     Types: Cigarettes     Start date: 8/12/2015   • Smokeless tobacco: Never Used   • Alcohol use No      Comment: quit 08/2015, occasionally prior to then   • Drug use: No   • Sexual activity: Not on file     Other Topics Concern   • Not on file     Social History Narrative    Single- 2 children.       Family History:  Family History   Problem Relation Age of Onset   • Cancer Father         stomach   • Hypertension Mother        Medications:    Current Outpatient Prescriptions:   •  metformin (GLUCOPHAGE) 1000 MG tablet, Take 1 Tab by mouth 2 times a day, with meals., Disp: 60 Tab, Rfl: 5  •  Empagliflozin (JARDIANCE) 25 MG Tab, Take 1 tablet by mouth every morning before breakfast., Disp: 30 Tab, Rfl: 5  •  diphenoxylate-atropine (LOMOTIL) 2.5-0.025 MG Tab, Take 1 Tab by mouth 4 times a day as needed for Diarrhea for up to 30 days., Disp: 60 Tab, Rfl: 1  •  Misc. Devices Misc, Blood pressure cuff to use as directed, Disp: 1 Device, Rfl: 0  •  spironolactone (ALDACTONE) 25 MG Tab, Take 1 Tab by mouth every day., Disp: 90 Tab, Rfl: 0  •  lisinopril (PRINIVIL) 10 MG Tab, Take 1 Tab by mouth every day., Disp: 30 Tab, Rfl: 3  •  insulin glargine (LANTUS) 100 UNIT/ML Solution Pen-injector injection, Inject 34 Units as instructed every evening., Disp: 10 PEN, Rfl: 11  •  Misc. Devices Misc, Test strips and fine gauge lancets covered by insurance to test blood sugars bid, Disp: 150 Device, Rfl: 3  •  Insulin Pen Needle 33G X 5 MM Misc, 1 Device by Subdermal route every day., Disp: 30 Each, Rfl: 11  •  SITagliptin (JANUVIA) 25 MG Tab, Take 1 Tab by mouth every day., Disp: 30 Tab, Rfl: 3  •  NEXIUM 20 MG capsule, Take 20 mg by mouth every day., Disp: , Rfl: 6  •   vitamin D, Ergocalciferol, (DRISDOL) 41560 units Cap capsule, Take 1 Cap by mouth every 28 days., Disp: 3 Cap, Rfl: 3  •  Misc. Devices Misc, Glucometer covered by insurance to use as directed, Disp: 1 Device, Rfl: 0  •  Multiple Vitamins-Minerals (MULTI-VITAMIN MENOPAUSAL) Tab, Take 1 Each by mouth every day., Disp: 90 Tab, Rfl: 3  •  atorvastatin (LIPITOR) 20 MG Tab, Take 1 Tab by mouth every day., Disp: 90 Tab, Rfl: 3  •  gabapentin (NEURONTIN) 800 MG tablet, Take 1 Tab by mouth 3 times a day., Disp: 270 Tab, Rfl: 3  •  oxybutynin (DITROPAN) 5 MG Tab, Take 1 Tab by mouth 3 times a day., Disp: 270 Tab, Rfl: 3  •  metoprolol (LOPRESSOR) 50 MG Tab, Take 1 Tab by mouth 2 times a day., Disp: 180 Tab, Rfl: 3  •  Misc. Devices Misc, Walker with 4 wheels and a seat to use as directed, Disp: 1 Device, Rfl: 0  •  quetiapine (SEROQUEL) 50 MG tablet, Take 1 Tab by mouth every evening. (Patient not taking: Reported on 5/9/2019), Disp: 60 Tab, Rfl: 3    Labs: Reviewed    Physical Examination:  Vital signs: There were no vitals taken for this visit. There is no height or weight on file to calculate BMI.  General: No apparent distress, cooperative  Eyes: No scleral icterus or discharge  ENMT: Normal on external inspection of nose, lips, normal thyroid exam  Neck: No abnormal masses on inspection  Resp: Normal effort, clear to auscultation bilaterally   CVS: Regular rate and rhythm, S1 S2 normal, no murmur   Extremities: No edema  Abdomen: abdominal obesity present  Neuro: Alert and oriented  Skin: No rash  Psych: Normal mood and affect, intact memory and able to make informed decisions    Assessment and Plan:    1. DM (diabetes mellitus) with complications (HCC)      Start Jardiance 25 mg daily, SE and MOA was explaiened to patient and the importance of drinking an additional 500 ml of water was also explained.     DC metformin 500mg 2 po BID and replace it with Metformin 1000 mg BID    Get below labs and the ones ordered by   done ASAP.    - GAD65 AND INSULIN AB W/RFLX TO IA-2 AB; Future  - C-PEPTIDE; Future  - Blood Glucose; Future    F/u in 2 weeks      Thank you for allowing me to participate in the care of this patient.    Tiarra Batista, PharmD  07/30/19    CC:   Vladimir Corral M.D.

## 2019-07-31 ENCOUNTER — APPOINTMENT (OUTPATIENT)
Dept: CARDIOLOGY | Facility: MEDICAL CENTER | Age: 54
End: 2019-07-31
Attending: FAMILY MEDICINE
Payer: MEDICAID

## 2019-08-09 ENCOUNTER — APPOINTMENT (OUTPATIENT)
Dept: CARDIOLOGY | Facility: MEDICAL CENTER | Age: 54
End: 2019-08-09
Attending: FAMILY MEDICINE
Payer: MEDICAID

## 2019-08-21 ENCOUNTER — APPOINTMENT (OUTPATIENT)
Dept: CARDIOLOGY | Facility: MEDICAL CENTER | Age: 54
End: 2019-08-21
Attending: FAMILY MEDICINE
Payer: MEDICAID

## 2019-09-02 ENCOUNTER — HOSPITAL ENCOUNTER (OUTPATIENT)
Dept: CARDIOLOGY | Facility: MEDICAL CENTER | Age: 54
End: 2019-09-02
Attending: FAMILY MEDICINE
Payer: MEDICAID

## 2019-09-02 DIAGNOSIS — I42.1 HOCM (HYPERTROPHIC OBSTRUCTIVE CARDIOMYOPATHY) (HCC): ICD-10-CM

## 2019-09-02 DIAGNOSIS — R00.2 HEART PALPITATIONS: ICD-10-CM

## 2019-09-02 LAB
LV EJECT FRACT  99904: 75
LV EJECT FRACT MOD 2C 99903: 65.59
LV EJECT FRACT MOD 4C 99902: 77.87
LV EJECT FRACT MOD BP 99901: 78.14

## 2019-09-02 PROCEDURE — 93306 TTE W/DOPPLER COMPLETE: CPT

## 2019-09-02 PROCEDURE — 93306 TTE W/DOPPLER COMPLETE: CPT | Mod: 26 | Performed by: INTERNAL MEDICINE

## 2019-09-04 ENCOUNTER — TELEPHONE (OUTPATIENT)
Dept: MEDICAL GROUP | Facility: MEDICAL CENTER | Age: 54
End: 2019-09-04

## 2019-09-05 ENCOUNTER — OFFICE VISIT (OUTPATIENT)
Dept: CARDIOLOGY | Facility: MEDICAL CENTER | Age: 54
End: 2019-09-05
Payer: MEDICAID

## 2019-09-05 VITALS
BODY MASS INDEX: 17.55 KG/M2 | OXYGEN SATURATION: 97 % | DIASTOLIC BLOOD PRESSURE: 70 MMHG | WEIGHT: 111.8 LBS | HEIGHT: 67 IN | SYSTOLIC BLOOD PRESSURE: 132 MMHG | HEART RATE: 80 BPM

## 2019-09-05 DIAGNOSIS — R18.8 CIRRHOSIS OF LIVER WITH ASCITES, UNSPECIFIED HEPATIC CIRRHOSIS TYPE (HCC): ICD-10-CM

## 2019-09-05 DIAGNOSIS — I10 HYPERTENSION, BENIGN: ICD-10-CM

## 2019-09-05 DIAGNOSIS — I42.1 HOCM (HYPERTROPHIC OBSTRUCTIVE CARDIOMYOPATHY) (HCC): ICD-10-CM

## 2019-09-05 DIAGNOSIS — K74.60 CIRRHOSIS OF LIVER WITH ASCITES, UNSPECIFIED HEPATIC CIRRHOSIS TYPE (HCC): ICD-10-CM

## 2019-09-05 DIAGNOSIS — K70.9 LIVER DISEASE DUE TO ALCOHOL (HCC): ICD-10-CM

## 2019-09-05 DIAGNOSIS — E11.9 TYPE 2 DIABETES MELLITUS WITHOUT COMPLICATION, UNSPECIFIED WHETHER LONG TERM INSULIN USE (HCC): ICD-10-CM

## 2019-09-05 PROCEDURE — 99214 OFFICE O/P EST MOD 30 MIN: CPT | Performed by: INTERNAL MEDICINE

## 2019-09-05 NOTE — TELEPHONE ENCOUNTER
Phone Number Called: [phone] (home)       Call outcome: spoke to patient regarding message below    Message: Pt currently in an appt at [facility] with a heart doctor. Pt is wondering if she still needs another appt?  Thank you

## 2019-09-05 NOTE — PROGRESS NOTES
Subjective:   Rigoberto Adames is a 54 y.o. female who presents today for followup. I met her when she presented as a STEMI. She was heavily intoxicated at the time and complaining of substernal chest pain with a long-standing history of a murmur and exertional dyspnea NYHA class III. She had normal epicardial arteries with significant LVOT gradient with a normal-appearing valve. Subsequent cardiac MRI confirms a diagnosis of hypertrophic cardiomyopathy with obstruction. She has clear stigmata of chronic liver disease however and is unable to admit she is currently having a problem with alcohol. She has difficulty understanding medications and with medical compliance. Holter monitor was unrevealing for NSVT last year. MRI without scar.     In the interim she continues to have issues with medical noncompliance and lost to follow-up.  She returns at the best of her primary care physician with an echocardiogram that was ordered unfortunately by her PCP and therefore not interpreted in the context of her clinical scenario by another provider.  Personal review today shows progressive thickness of her intraventricular septum but no significant LVOT obstruction at the time of that imaging.  She notes palpitations.  She has not followed up repeatedly for her surveillance Holter monitoring or monitoring of her medical therapy.  She denies any alcohol use currently.  Her daughter who is about to turn 18 and go to college is with her.  She does not have much input into the situation even when asked.    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) 01/20/2015     Priority: Medium   • Hypertension, benign 12/09/2014     Priority: Medium   • Cirrhosis (HCC) 03/15/2016     Priority: Low   • Weakness 10/14/2015     Priority: Low   • Macrocytic anemia 10/12/2015     Priority: Low   • Tobacco abuse 12/08/2014     Priority: Low   • Diabetes mellitus, type 2 (HCC) 06/27/2011     Priority: Low   • Neuropathy (HCC) 07/18/2019   • Diarrhea 07/18/2019   • Cirrhosis of liver with ascites (HCC) 03/06/2017   • Thrombocytopenia (HCC) 06/24/2016       Past Medical History:   Diagnosis Date   • Diabetes mellitus, type 2 (HCC)    • HOCM (hypertrophic obstructive cardiomyopathy) (HCC)    • Hypertension      Past Surgical History:   Procedure Laterality Date   • OTHER      c- section     Family History   Problem Relation Age of Onset   • Cancer Father         stomach   • Hypertension Mother      Social History     Tobacco Use   Smoking Status Current Every Day Smoker   • Packs/day: 1.00   • Years: 10.00   • Pack years: 10.00   • Types: Cigarettes   • Start date: 8/12/2015   Smokeless Tobacco Never Used     No Known Allergies  Outpatient Encounter Medications as of 9/5/2019   Medication Sig Dispense Refill   • metformin (GLUCOPHAGE) 1000 MG tablet Take 1 Tab by mouth 2 times a day, with meals. 60 Tab 5   • Empagliflozin (JARDIANCE) 25 MG Tab Take 1 tablet by mouth every morning before breakfast. 30 Tab 5   • Misc. Devices Misc Blood pressure cuff to use as directed 1 Device 0   • spironolactone (ALDACTONE) 25 MG Tab Take 1 Tab by mouth every day. 90 Tab 0   • lisinopril (PRINIVIL) 10 MG Tab Take 1 Tab by mouth every day. 30 Tab 3   • insulin glargine (LANTUS) 100 UNIT/ML Solution Pen-injector injection Inject 34 Units as instructed every evening. 10 PEN 11   • Misc. Devices Misc Test strips and fine gauge lancets covered by insurance to test blood sugars bid 150 Device 3   • Insulin Pen Needle 33G X 5 MM Misc 1 Device by Subdermal route every day.  "30 Each 11   • SITagliptin (JANUVIA) 25 MG Tab Take 1 Tab by mouth every day. 30 Tab 3   • NEXIUM 20 MG capsule Take 20 mg by mouth every day.  6   • vitamin D, Ergocalciferol, (DRISDOL) 92763 units Cap capsule Take 1 Cap by mouth every 28 days. 3 Cap 3   • Misc. Devices Misc Glucometer covered by insurance to use as directed 1 Device 0   • Multiple Vitamins-Minerals (MULTI-VITAMIN MENOPAUSAL) Tab Take 1 Each by mouth every day. 90 Tab 3   • atorvastatin (LIPITOR) 20 MG Tab Take 1 Tab by mouth every day. 90 Tab 3   • gabapentin (NEURONTIN) 800 MG tablet Take 1 Tab by mouth 3 times a day. 270 Tab 3   • oxybutynin (DITROPAN) 5 MG Tab Take 1 Tab by mouth 3 times a day. 270 Tab 3   • metoprolol (LOPRESSOR) 50 MG Tab Take 1 Tab by mouth 2 times a day. 180 Tab 3   • Misc. Devices Misc Walker with 4 wheels and a seat to use as directed 1 Device 0   • quetiapine (SEROQUEL) 50 MG tablet Take 1 Tab by mouth every evening. (Patient not taking: Reported on 5/9/2019) 60 Tab 3     No facility-administered encounter medications on file as of 9/5/2019.      Review of Systems   All other systems reviewed and are negative.       Objective:   /70 (BP Location: Left arm, Patient Position: Sitting, BP Cuff Size: Adult)   Pulse 80   Ht 1.702 m (5' 7\")   Wt 50.7 kg (111 lb 12.8 oz)   SpO2 97%   BMI 17.51 kg/m²     Physical Exam   Constitutional: She is oriented to person, place, and time. She appears well-developed and well-nourished. No distress.   HENT:   Head: Normocephalic and atraumatic.   Mouth/Throat: Oropharynx is clear and moist. No oropharyngeal exudate.   Eyes: Pupils are equal, round, and reactive to light. Conjunctivae are normal.   Neck: Normal range of motion. Neck supple. No JVD present. No thyromegaly present.   Cardiovascular: Normal rate, regular rhythm and intact distal pulses. Exam reveals no gallop and no friction rub.   Murmur (2/6 left upper sternal border) heard.  Pulmonary/Chest: Effort normal and " breath sounds normal. She has no wheezes. She has no rales.   Abdominal: Soft. Bowel sounds are normal. She exhibits no distension. There is no tenderness.   Musculoskeletal: She exhibits no edema or tenderness.   Neurological: She is alert and oriented to person, place, and time. No cranial nerve deficit.   Skin: Skin is warm and dry. No rash noted. She is not diaphoretic. No erythema.   Spider veins present, scattered   Psychiatric: She has a normal mood and affect. Her behavior is normal.   Vitals reviewed.    Cardiac MRI (12/16/2014): Mid and basal septal hypertrophy with WENDY and mitral regurgitation. Septal thickness 16 MM. No fibrosis. Mild concentric LVH otherwise. Moderate mitral regurgitation.    HOLTER SUMMARY (2/19/2015):  Sinus rhythm with good heart rate variability and PVCs. No episodes of VT or nonsustained VT.    Assessment:     1. HOCM (hypertrophic obstructive cardiomyopathy) ([facility])  Holter Monitor Study   2. Cirrhosis of liver with ascites, unspecified hepatic cirrhosis type (HCC)  Holter Monitor Study   3. Type 2 diabetes mellitus without complication, unspecified whether long term insulin use (HCC)  Holter Monitor Study   4. 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 Hypertension, benign     6. 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  She indicates she is taking her medications.  This is new as she usually is noncompliant.  It is unclear whether this is truthful.  We discussed again that this is a genetic disease that her family members will need screening.  I told her daughter who is almost 18 that in person today.  Also her diabetes is completely uncontrolled with hemoglobin A1c greater than 12.    We discussed that if she continues to not follow through with any  testing medication recommendations or follow-up she will be discharged from this practice for chronic medical noncompliance.  Her and her daughter both demonstrated understanding of this.    Recommendations:    1. Avoid afterload reduction  2. Continue Lopressor uninterrupted  3. [place] hydration with salt-containing fluids including Gatorade and avoidance of dehydration  4. Treatment of alcohol abuse disorder.   5. Close followup with her primary care physician for her diabetes and judicious up titration of her beta blocker therapy both for blood pressure and LVOT obstruction, goal heart rate less than 55.  6. Family screening for hypertrophic cardiomyopathy when appropriate  7.  Yearly Holter monitoring.  I have ordered this again for her today.    Follow up with me in 6 months.  Again she was educated that if she no-shows or cancels her appointments again she will be discharged from the practice permanently.

## 2019-09-05 NOTE — TELEPHONE ENCOUNTER
Phone Number Called: [phone] (home)       Call outcome: left message for patient to call back regarding message below    Message: Lm for patient to call (1st attempt)

## 2019-09-05 NOTE — TELEPHONE ENCOUNTER
----- Message from Vladimir Corral M.D. sent at 9/3/2019  7:58 AM PDT -----  She will need to be further evaluated by cardiology. Will also discuss with pt at her next appt.     Thanks

## 2019-09-09 ENCOUNTER — TELEPHONE (OUTPATIENT)
Dept: CARDIOLOGY | Facility: MEDICAL CENTER | Age: 54
End: 2019-09-09

## 2019-09-09 DIAGNOSIS — Z78.9 DECREASED ACTIVITIES OF DAILY LIVING (ADL): ICD-10-CM

## 2019-09-09 DIAGNOSIS — E11.8 DM (DIABETES MELLITUS) WITH COMPLICATIONS (HCC): ICD-10-CM

## 2019-09-09 DIAGNOSIS — I10 HYPERTENSION, BENIGN: ICD-10-CM

## 2019-09-09 DIAGNOSIS — Z23 INFLUENZA VACCINE NEEDED: ICD-10-CM

## 2019-09-09 DIAGNOSIS — I42.1 HOCM (HYPERTROPHIC OBSTRUCTIVE CARDIOMYOPATHY) (HCC): ICD-10-CM

## 2019-09-09 DIAGNOSIS — R18.8 CIRRHOSIS OF LIVER WITH ASCITES, UNSPECIFIED HEPATIC CIRRHOSIS TYPE (HCC): ICD-10-CM

## 2019-09-09 DIAGNOSIS — R32 INCONTINENCE IN FEMALE: ICD-10-CM

## 2019-09-09 DIAGNOSIS — E11.69 HYPERLIPIDEMIA ASSOCIATED WITH TYPE 2 DIABETES MELLITUS (HCC): ICD-10-CM

## 2019-09-09 DIAGNOSIS — R29.898 LEG WEAKNESS, BILATERAL: ICD-10-CM

## 2019-09-09 DIAGNOSIS — K74.60 CIRRHOSIS OF LIVER WITH ASCITES, UNSPECIFIED HEPATIC CIRRHOSIS TYPE (HCC): ICD-10-CM

## 2019-09-09 DIAGNOSIS — E78.5 HYPERLIPIDEMIA ASSOCIATED WITH TYPE 2 DIABETES MELLITUS (HCC): ICD-10-CM

## 2019-09-09 RX ORDER — SITAGLIPTIN 25 MG/1
TABLET, FILM COATED ORAL
Qty: 90 TAB | Refills: 1 | Status: SHIPPED | OUTPATIENT
Start: 2019-09-09 | End: 2019-12-04

## 2019-09-09 RX ORDER — SPIRONOLACTONE 25 MG/1
TABLET ORAL
Qty: 90 TAB | Refills: 0 | Status: SHIPPED | OUTPATIENT
Start: 2019-09-09 | End: 2020-02-21

## 2019-09-19 ENCOUNTER — APPOINTMENT (OUTPATIENT)
Dept: ADMISSIONS | Facility: MEDICAL CENTER | Age: 54
End: 2019-09-19
Payer: MEDICAID

## 2019-09-23 DIAGNOSIS — Z01.812 PRE-OPERATIVE LABORATORY EXAMINATION: ICD-10-CM

## 2019-09-23 DIAGNOSIS — Z01.810 PRE-OPERATIVE CARDIOVASCULAR EXAMINATION: ICD-10-CM

## 2019-09-23 LAB
EKG IMPRESSION: NORMAL
ERYTHROCYTE [DISTWIDTH] IN BLOOD BY AUTOMATED COUNT: 52.8 FL (ref 35.9–50)
HCT VFR BLD AUTO: 39.8 % (ref 37–47)
HGB BLD-MCNC: 12 G/DL (ref 12–16)
MCH RBC QN AUTO: 29.9 PG (ref 27–33)
MCHC RBC AUTO-ENTMCNC: 30.2 G/DL (ref 33.6–35)
MCV RBC AUTO: 99 FL (ref 81.4–97.8)
PLATELET # BLD AUTO: 104 K/UL (ref 164–446)
PMV BLD AUTO: 12.8 FL (ref 9–12.9)
RBC # BLD AUTO: 4.02 M/UL (ref 4.2–5.4)
WBC # BLD AUTO: 9.2 K/UL (ref 4.8–10.8)

## 2019-09-23 PROCEDURE — 36415 COLL VENOUS BLD VENIPUNCTURE: CPT

## 2019-09-23 PROCEDURE — 85027 COMPLETE CBC AUTOMATED: CPT

## 2019-09-23 PROCEDURE — 93010 ELECTROCARDIOGRAM REPORT: CPT | Performed by: INTERNAL MEDICINE

## 2019-09-23 PROCEDURE — 80053 COMPREHEN METABOLIC PANEL: CPT

## 2019-09-23 PROCEDURE — 93005 ELECTROCARDIOGRAM TRACING: CPT | Performed by: INTERNAL MEDICINE

## 2019-09-23 NOTE — OR NURSING
"Preadmit appointment: \" Preparing for your Procedure information\" sheet given to patient with verbal and written instructions. Patient instructed to continue prescribed medications through the day before surgery, instructed to take the following medications the day of surgery per anesthesia protocol:  Neurontin, Metoprolol.  Pt is scheduled at 0800, Pt will hold oral diabetic medications.  Pt is a poor historian, not sure what medications she is really taking.  Daughter was trying to help out the best she could.  Pt states, \"no issues with anesthesia\".  New Fasting guidelines handout given to Pt with 4 hour cut off for clear liquids.  All Pt's and Daughter's questions and concerns answered.  "

## 2019-09-24 ENCOUNTER — NON-PROVIDER VISIT (OUTPATIENT)
Dept: MEDICAL GROUP | Facility: MEDICAL CENTER | Age: 54
End: 2019-09-24
Attending: FAMILY MEDICINE
Payer: MEDICAID

## 2019-09-24 VITALS
BODY MASS INDEX: 17.7 KG/M2 | SYSTOLIC BLOOD PRESSURE: 80 MMHG | HEART RATE: 101 BPM | WEIGHT: 113 LBS | DIASTOLIC BLOOD PRESSURE: 54 MMHG

## 2019-09-24 LAB
ALBUMIN SERPL BCP-MCNC: 4 G/DL (ref 3.2–4.9)
ALBUMIN/GLOB SERPL: 1.3 G/DL
ALP SERPL-CCNC: 107 U/L (ref 30–99)
ALT SERPL-CCNC: 60 U/L (ref 2–50)
ANION GAP SERPL CALC-SCNC: 7 MMOL/L (ref 0–11.9)
AST SERPL-CCNC: 53 U/L (ref 12–45)
BILIRUB SERPL-MCNC: 0.9 MG/DL (ref 0.1–1.5)
BUN SERPL-MCNC: 12 MG/DL (ref 8–22)
CALCIUM SERPL-MCNC: 9 MG/DL (ref 8.5–10.5)
CHLORIDE SERPL-SCNC: 104 MMOL/L (ref 96–112)
CO2 SERPL-SCNC: 26 MMOL/L (ref 20–33)
CREAT SERPL-MCNC: 0.58 MG/DL (ref 0.5–1.4)
GLOBULIN SER CALC-MCNC: 3.2 G/DL (ref 1.9–3.5)
GLUCOSE SERPL-MCNC: 117 MG/DL (ref 65–99)
HBA1C MFR BLD: 6.1 % (ref 0–5.6)
INT CON NEG: ABNORMAL
INT CON POS: ABNORMAL
POTASSIUM SERPL-SCNC: 3.8 MMOL/L (ref 3.6–5.5)
PROT SERPL-MCNC: 7.2 G/DL (ref 6–8.2)
SODIUM SERPL-SCNC: 137 MMOL/L (ref 135–145)

## 2019-09-24 PROCEDURE — 83036 HEMOGLOBIN GLYCOSYLATED A1C: CPT

## 2019-09-24 PROCEDURE — 99212 OFFICE O/P EST SF 10 MIN: CPT | Performed by: FAMILY MEDICINE

## 2019-09-24 PROCEDURE — 83036 HEMOGLOBIN GLYCOSYLATED A1C: CPT | Performed by: FAMILY MEDICINE

## 2019-09-24 NOTE — NON-PROVIDER
New Patient Consult Note Time: 5789-1784  Primary care physician: Vladimir Corral M.D.    Reason for consult: Management of Uncontrolled Type 2 Diabetes    HPI:  Rigoberto Adames is a 54 y.o. old patient who comes in today for evaluation of above stated problem.    Pt missed her last 2 appointments. She came to today's appt without any BG readings. She has been tolerating the new medications we put her on (SGLT2i and DPPi). She's been following her dietary recommendations. Pt needs to be more compliant with checking her FBG. Pt understands that in order to stay adherent, she needs to come to her appts with her BG log filled out.    A1c was checked in the office today and demonstrated a 50% reduction.    Pt is past due for foot exam; this will be done at next visit. Most recent labs show good renal function. Pt is due for retinal exam.     Pt asked to be seen in 2 weeks and promised to bring BG log and to be more adherent with her therapy in order for us to help her. Pt did not get labs done (C-peptide, GAD65, and blood glucose) as instructed, so we provided her with hard copies of the lab orders for her to get done.    Most Recent HbA1c:   Lab Results   Component Value Date/Time    HBA1C 6.1 (A) 09/24/2019 10:42 AM        Current Diabetes Regimen:  DPP4i: Januvia 25 mg qday  SGLT-2 Inhibitor:  Empagliflozin 25 mg once daily   Metformin: 2000 mg qday    Before Breakfast: 200-300s  Before Lunch:  Before Dinner:  Before Bedtime:  Other times:  Hypoglycemia:  None      ROS:  Constitutional: No weight loss  Cardiac: No palpitations or racing heart  Resp: No shortness of breath  Neuro: No numbness or tinging in feet  Endo: No heat or cold intolerance, no polyuria or polydipsia  All other systems were reviewed and were negative.    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) 01/20/2015     Priority: Medium   • Hypertension, benign 12/09/2014     Priority: Medium   • Cirrhosis (HCC) 03/15/2016     Priority: Low   • Weakness 10/14/2015     Priority: Low   • Macrocytic anemia 10/12/2015     Priority: Low   • Tobacco abuse 12/08/2014     Priority: Low   • Diabetes mellitus, type 2 (HCC) 06/27/2011     Priority: Low   • Neuropathy (HCC) 07/18/2019   • Diarrhea 07/18/2019   • Cirrhosis of liver with ascites (HCC) 03/06/2017   • Thrombocytopenia (HCC) 06/24/2016       Past Surgical History:  Past Surgical History:   Procedure Laterality Date   • PRIMARY C SECTION  2006   • OTHER      c- section       Allergies:  Patient has no known allergies.    Social History:  Social History     Socioeconomic History   • Marital status: Single     Spouse name: Not on file   • Number of children: Not on file   • Years of education: Not on file   • Highest education level: Not on file   Occupational History   • Occupation: restaurant owner   Social Needs   • Financial resource strain: Not on file   • Food insecurity:     Worry: Not on file     Inability: Not on file   • Transportation needs:     Medical: Not on file     Non-medical: Not on file   Tobacco Use   • Smoking status: Current Every Day Smoker     Packs/day: 1.00     Years: 10.00     Pack years: 10.00     Types: Cigarettes     Start date: 8/12/2015   • Smokeless tobacco: Never Used   Substance and Sexual Activity   • Alcohol use: No     Comment: quit 08/2015, occasionally prior to then   • Drug use: No   • Sexual activity: Not on file   Lifestyle   • Physical activity:     Days per week: Not on file     Minutes per session: Not on file   • Stress: Not on file    Relationships   • Social connections:     Talks on phone: Not on file     Gets together: Not on file     Attends [employer] service: Not on file     Active member of club or organization: Not on file     Attends meetings of clubs or organizations: Not on file     Relationship status: Not on file   • Intimate partner violence:     Fear of current or ex partner: Not on file     Emotionally abused: Not on file     Physically abused: Not on file     Forced sexual activity: Not on file   Other Topics Concern   • Not on file   Social History Narrative    Single- 2 children.       Family History:  Family History   Problem Relation Age of Onset   • Cancer Father         stomach   • Hypertension Mother        Medications:    Current Outpatient Medications:   •  metFORMIN (GLUCOPHAGE) 500 MG Tab, Take 1,000 mg by mouth., Disp: , Rfl: 3  •  spironolactone (ALDACTONE) 25 MG Tab, TAKE 1 TABLET BY MOUTH ONCE DAILY, Disp: 90 Tab, Rfl: 0  •  JANUVIA 25 MG Tab, TAKE 1 TABLET BY MOUTH ONCE DAILY, Disp: 90 Tab, Rfl: 1  •  Empagliflozin (JARDIANCE) 25 MG Tab, Take 1 tablet by mouth every morning before breakfast., Disp: 30 Tab, Rfl: 5  •  Misc. Devices Misc, Blood pressure cuff to use as directed, Disp: 1 Device, Rfl: 0  •  lisinopril (PRINIVIL) 10 MG Tab, Take 1 Tab by mouth every day., Disp: 30 Tab, Rfl: 3  •  Misc. Devices Misc, Test strips and fine gauge lancets covered by insurance to test blood sugars bid, Disp: 150 Device, Rfl: 3  •  NEXIUM 20 MG capsule, Take 20 mg by mouth every day., Disp: , Rfl: 6  •  vitamin D, Ergocalciferol, (DRISDOL) 09412 units Cap capsule, Take 1 Cap by mouth every 28 days., Disp: 3 Cap, Rfl: 3  •  Misc. Devices Misc, Glucometer covered by insurance to use as directed, Disp: 1 Device, Rfl: 0  •  Multiple Vitamins-Minerals (MULTI-VITAMIN MENOPAUSAL) Tab, Take 1 Each by mouth every day., Disp: 90 Tab, Rfl: 3  •  atorvastatin (LIPITOR) 20 MG Tab, Take 1 Tab by mouth every day., Disp: 90 Tab, Rfl: 3  •   gabapentin (NEURONTIN) 800 MG tablet, Take 1 Tab by mouth 3 times a day., Disp: 270 Tab, Rfl: 3  •  oxybutynin (DITROPAN) 5 MG Tab, Take 1 Tab by mouth 3 times a day., Disp: 270 Tab, Rfl: 3  •  metoprolol (LOPRESSOR) 50 MG Tab, Take 1 Tab by mouth 2 times a day., Disp: 180 Tab, Rfl: 3  •  Misc. Devices Misc, Walker with 4 wheels and a seat to use as directed, Disp: 1 Device, Rfl: 0    Labs: Reviewed    Physical Examination:  Vital signs: BP (!) 80/54   Pulse (!) 101   Wt 51.3 kg (113 lb)   BMI 17.70 kg/m²  Body mass index is 17.7 kg/m².  General: No apparent distress, cooperative  Eyes: No scleral icterus or discharge  ENMT: Normal on external inspection of nose, lips, normal thyroid exam  Neck: No abnormal masses on inspection  Resp: Normal effort, clear to auscultation bilaterally   CVS: Regular rate and rhythm, S1 S2 normal, no murmur   Extremities: No edema  Abdomen: abdominal obesity present  Neuro: Alert and oriented  Skin: No rash  Psych: Normal mood and affect, intact memory and able to make informed decisions    Assessment and Plan:    Continue current therapy  Check FBG for the next 2 weeks and bring BG log at next visit  Get labs done ASAP    F/u in 2 weeks    Thank you for allowing me to participate in the care of this patient.  Tiarra Batista. PharmD  Sanjay OlmsteadD  09/24/19    CC:   Vladimir Corral M.D.

## 2019-09-25 ENCOUNTER — ANESTHESIA (OUTPATIENT)
Dept: SURGERY | Facility: MEDICAL CENTER | Age: 54
End: 2019-09-25
Payer: MEDICAID

## 2019-09-25 ENCOUNTER — HOSPITAL ENCOUNTER (OUTPATIENT)
Facility: MEDICAL CENTER | Age: 54
End: 2019-09-25
Attending: INTERNAL MEDICINE | Admitting: INTERNAL MEDICINE
Payer: MEDICAID

## 2019-09-25 ENCOUNTER — ANESTHESIA EVENT (OUTPATIENT)
Dept: SURGERY | Facility: MEDICAL CENTER | Age: 54
End: 2019-09-25
Payer: MEDICAID

## 2019-09-25 VITALS
HEIGHT: 67 IN | OXYGEN SATURATION: 95 % | TEMPERATURE: 98.8 F | HEART RATE: 92 BPM | BODY MASS INDEX: 17.37 KG/M2 | DIASTOLIC BLOOD PRESSURE: 86 MMHG | WEIGHT: 110.67 LBS | RESPIRATION RATE: 16 BRPM | SYSTOLIC BLOOD PRESSURE: 140 MMHG

## 2019-09-25 LAB
GLUCOSE BLD-MCNC: 146 MG/DL (ref 65–99)
PATHOLOGY CONSULT NOTE: NORMAL

## 2019-09-25 PROCEDURE — 700101 HCHG RX REV CODE 250: Performed by: INTERNAL MEDICINE

## 2019-09-25 PROCEDURE — 700101 HCHG RX REV CODE 250: Performed by: ANESTHESIOLOGY

## 2019-09-25 PROCEDURE — 160203 HCHG ENDO MINUTES - 1ST 30 MINS LEVEL 4: Performed by: INTERNAL MEDICINE

## 2019-09-25 PROCEDURE — 700105 HCHG RX REV CODE 258: Performed by: INTERNAL MEDICINE

## 2019-09-25 PROCEDURE — 160208 HCHG ENDO MINUTES - EA ADDL 1 MIN LEVEL 4: Performed by: INTERNAL MEDICINE

## 2019-09-25 PROCEDURE — 82962 GLUCOSE BLOOD TEST: CPT

## 2019-09-25 PROCEDURE — 88305 TISSUE EXAM BY PATHOLOGIST: CPT | Mod: 59

## 2019-09-25 PROCEDURE — 160046 HCHG PACU - 1ST 60 MINS PHASE II: Performed by: INTERNAL MEDICINE

## 2019-09-25 PROCEDURE — 500066 HCHG BITE BLOCK, ECT: Performed by: INTERNAL MEDICINE

## 2019-09-25 PROCEDURE — 160048 HCHG OR STATISTICAL LEVEL 1-5: Performed by: INTERNAL MEDICINE

## 2019-09-25 PROCEDURE — 700111 HCHG RX REV CODE 636 W/ 250 OVERRIDE (IP): Performed by: ANESTHESIOLOGY

## 2019-09-25 PROCEDURE — 88312 SPECIAL STAINS GROUP 1: CPT

## 2019-09-25 PROCEDURE — 160002 HCHG RECOVERY MINUTES (STAT): Performed by: INTERNAL MEDICINE

## 2019-09-25 PROCEDURE — 160035 HCHG PACU - 1ST 60 MINS PHASE I: Performed by: INTERNAL MEDICINE

## 2019-09-25 PROCEDURE — 160009 HCHG ANES TIME/MIN: Performed by: INTERNAL MEDICINE

## 2019-09-25 PROCEDURE — 160025 RECOVERY II MINUTES (STATS): Performed by: INTERNAL MEDICINE

## 2019-09-25 RX ORDER — DIPHENHYDRAMINE HYDROCHLORIDE 50 MG/ML
12.5 INJECTION INTRAMUSCULAR; INTRAVENOUS
Status: DISCONTINUED | OUTPATIENT
Start: 2019-09-25 | End: 2019-09-25 | Stop reason: HOSPADM

## 2019-09-25 RX ORDER — LABETALOL HYDROCHLORIDE 5 MG/ML
5 INJECTION, SOLUTION INTRAVENOUS
Status: DISCONTINUED | OUTPATIENT
Start: 2019-09-25 | End: 2019-09-25 | Stop reason: HOSPADM

## 2019-09-25 RX ORDER — LABETALOL HYDROCHLORIDE 5 MG/ML
INJECTION, SOLUTION INTRAVENOUS PRN
Status: DISCONTINUED | OUTPATIENT
Start: 2019-09-25 | End: 2019-09-25 | Stop reason: SURG

## 2019-09-25 RX ORDER — ONDANSETRON 2 MG/ML
INJECTION INTRAMUSCULAR; INTRAVENOUS PRN
Status: DISCONTINUED | OUTPATIENT
Start: 2019-09-25 | End: 2019-09-25 | Stop reason: SURG

## 2019-09-25 RX ORDER — SODIUM CHLORIDE, SODIUM LACTATE, POTASSIUM CHLORIDE, CALCIUM CHLORIDE 600; 310; 30; 20 MG/100ML; MG/100ML; MG/100ML; MG/100ML
INJECTION, SOLUTION INTRAVENOUS CONTINUOUS
Status: DISCONTINUED | OUTPATIENT
Start: 2019-09-25 | End: 2019-09-25 | Stop reason: HOSPADM

## 2019-09-25 RX ORDER — DEXAMETHASONE SODIUM PHOSPHATE 4 MG/ML
INJECTION, SOLUTION INTRA-ARTICULAR; INTRALESIONAL; INTRAMUSCULAR; INTRAVENOUS; SOFT TISSUE PRN
Status: DISCONTINUED | OUTPATIENT
Start: 2019-09-25 | End: 2019-09-25 | Stop reason: SURG

## 2019-09-25 RX ORDER — METOPROLOL TARTRATE 1 MG/ML
1 INJECTION, SOLUTION INTRAVENOUS
Status: DISCONTINUED | OUTPATIENT
Start: 2019-09-25 | End: 2019-09-25 | Stop reason: HOSPADM

## 2019-09-25 RX ORDER — METOPROLOL TARTRATE 1 MG/ML
INJECTION, SOLUTION INTRAVENOUS PRN
Status: DISCONTINUED | OUTPATIENT
Start: 2019-09-25 | End: 2019-09-25 | Stop reason: SURG

## 2019-09-25 RX ORDER — ROCURONIUM BROMIDE 10 MG/ML
INJECTION, SOLUTION INTRAVENOUS PRN
Status: DISCONTINUED | OUTPATIENT
Start: 2019-09-25 | End: 2019-09-25 | Stop reason: SURG

## 2019-09-25 RX ORDER — SUCCINYLCHOLINE CHLORIDE 20 MG/ML
INJECTION INTRAMUSCULAR; INTRAVENOUS PRN
Status: DISCONTINUED | OUTPATIENT
Start: 2019-09-25 | End: 2019-09-25 | Stop reason: SURG

## 2019-09-25 RX ORDER — HALOPERIDOL 5 MG/ML
1 INJECTION INTRAMUSCULAR
Status: DISCONTINUED | OUTPATIENT
Start: 2019-09-25 | End: 2019-09-25 | Stop reason: HOSPADM

## 2019-09-25 RX ORDER — MIDAZOLAM HYDROCHLORIDE 1 MG/ML
INJECTION INTRAMUSCULAR; INTRAVENOUS PRN
Status: DISCONTINUED | OUTPATIENT
Start: 2019-09-25 | End: 2019-09-25 | Stop reason: SURG

## 2019-09-25 RX ADMIN — METOPROLOL TARTRATE 1 MG: 5 INJECTION INTRAVENOUS at 08:09

## 2019-09-25 RX ADMIN — LABETALOL HYDROCHLORIDE 5 MG: 5 INJECTION, SOLUTION INTRAVENOUS at 08:17

## 2019-09-25 RX ADMIN — PROPOFOL 150 MG: 10 INJECTION, EMULSION INTRAVENOUS at 08:17

## 2019-09-25 RX ADMIN — SUCCINYLCHOLINE CHLORIDE 100 MG: 20 INJECTION, SOLUTION INTRAMUSCULAR; INTRAVENOUS at 08:17

## 2019-09-25 RX ADMIN — LIDOCAINE HYDROCHLORIDE 0.5 ML: 10 INJECTION, SOLUTION INFILTRATION; PERINEURAL at 06:59

## 2019-09-25 RX ADMIN — METOPROLOL TARTRATE 1 MG: 5 INJECTION INTRAVENOUS at 08:30

## 2019-09-25 RX ADMIN — LABETALOL HYDROCHLORIDE 10 MG: 5 INJECTION, SOLUTION INTRAVENOUS at 08:40

## 2019-09-25 RX ADMIN — ONDANSETRON 4 MG: 2 INJECTION INTRAMUSCULAR; INTRAVENOUS at 08:21

## 2019-09-25 RX ADMIN — LABETALOL HYDROCHLORIDE 5 MG: 5 INJECTION INTRAVENOUS at 09:14

## 2019-09-25 RX ADMIN — LABETALOL HYDROCHLORIDE 5 MG: 5 INJECTION INTRAVENOUS at 09:07

## 2019-09-25 RX ADMIN — SODIUM CHLORIDE, POTASSIUM CHLORIDE, SODIUM LACTATE AND CALCIUM CHLORIDE: 600; 310; 30; 20 INJECTION, SOLUTION INTRAVENOUS at 07:00

## 2019-09-25 RX ADMIN — METOPROLOL TARTRATE 1 MG: 5 INJECTION INTRAVENOUS at 08:15

## 2019-09-25 RX ADMIN — METOPROLOL TARTRATE 2 MG: 5 INJECTION INTRAVENOUS at 08:39

## 2019-09-25 RX ADMIN — DEXAMETHASONE SODIUM PHOSPHATE 4 MG: 4 INJECTION, SOLUTION INTRAMUSCULAR; INTRAVENOUS at 08:04

## 2019-09-25 RX ADMIN — ROCURONIUM BROMIDE 5 MG: 10 INJECTION, SOLUTION INTRAVENOUS at 08:17

## 2019-09-25 RX ADMIN — MIDAZOLAM HYDROCHLORIDE 1 MG: 1 INJECTION, SOLUTION INTRAMUSCULAR; INTRAVENOUS at 08:04

## 2019-09-25 ASSESSMENT — PAIN SCALES - GENERAL: PAIN_LEVEL: 0

## 2019-09-25 NOTE — ANESTHESIA QCDR
2019 [facility] Clinical Data Registry (for Quality Improvement)     Postoperative nausea/vomiting risk protocol (Adult = 18 yrs and Pediatric 3-17 yrs)- (430 and 463)  General inhalation anesthetic (NOT TIVA) with PONV risk factors: Yes  Provision of anti-emetic therapy with at least 2 different classes of agents: Yes   Patient DID NOT receive anti-emetic therapy and reason is documented in Medical Record:  N/A    Multimodal Pain Management- (AQI59)  Patient undergoing Elective Surgery (i.e. Outpatient, or ASC, or Prescheduled Surgery prior to Hospital Admission): No  Use of Multimodal Pain Management, two or more drugs and/or interventions, NOT including systemic opioids: N/A  Exception: Documented allergy to multiple classes of analgesics: N/A    PACU assessment of acute postoperative pain prior to Anesthesia Care End- Applies to Patients Age = 18- (ABG7)  Initial PACU pain score is which of the following: < 7/10  Patient unable to report pain score: N/A    Post-anesthetic transfer of care checklist/protocol to PACU/ICU- (426 and 427)  Upon conclusion of case, patient transferred to which of the following locations: PACU/Non-ICU  Use of transfer checklist/protocol: Yes  Exclusion: Service Performed in Patient Hospital Room (and thus did not require transfer): N/A    PACU Reintubation- (AQI31)  General anesthesia requiring endotracheal intubation (ETT) along with subsequent extubation in OR or PACU: Yes  Required reintubation in the PACU: No   Extubation was a planned trial documented in the medical record prior to removal of the original airway device:  N/A    Unplanned admission to ICU related to anesthesia service up through end of PACU care- (MD51)  Unplanned admission to ICU (not initially anticipated at anesthesia start time): No

## 2019-09-25 NOTE — ANESTHESIA PREPROCEDURE EVALUATION
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)      ENDO   (+) Diabetes mellitus, type 2 (HCC)       Physical Exam    Airway   Mallampati: II  TM distance: >3 FB  Neck ROM: full       Cardiovascular - normal exam  Rhythm: regular  Rate: normal  (-) murmur     Dental - normal exam         Pulmonary - normal exam  Breath sounds clear to auscultation     Abdominal    Neurological - normal exam                 Anesthesia Plan    ASA 3   ASA physical status 3 criteria: alcohol and/or substance dependence or abuse    Plan - general       Airway plan will be ETT        Induction: intravenous    Postoperative Plan: Postoperative administration of opioids is intended.    Pertinent diagnostic labs and testing reviewed    Informed Consent:    Anesthetic plan and risks discussed with patient.    Use of blood products discussed with: patient whom consented to blood products.

## 2019-09-25 NOTE — OR SURGEON
Immediate Post OP Note    PreOp Diagnosis: cirrhosis, gastric intestinal metaplasia  PostOp Diagnosis: suspected Salamanca's esophagus not biopsied, portal hypertensive gastropathy, scalloped duodenal folds biopsied    Procedure(s):  GASTROSCOPY - Wound Class: Clean Contaminated  GASTROSCOPY, WITH VARICEAL BANDING - WITH GASTRIC MAPPING - Wound Class: Clean Contaminated    Surgeon(s):  Sandi Espinoza M.D.    Anesthesiologist/Type of Anesthesia:  Anesthesiologist: Jasmeet Green D.O./* No anesthesia type entered *    Surgical Staff:  Circulator: Papito Arboleda R.N.  Endoscopy Technician: Nia Brewer    Specimens removed if any:  ID Type Source Tests Collected by Time Destination   A :  Tissue Duodenum PATHOLOGY SPECIMEN Sandi Espinoza M.D. 9/25/2019  8:21 AM    B : geater curvature of antrum bx Tissue Gastric PATHOLOGY SPECIMEN Sandi Espinoza M.D. 9/25/2019  8:25 AM    C : lesser curvature of antrum bx Tissue Gastric PATHOLOGY SPECIMEN Sandi Espinzoa M.D. 9/25/2019  8:26 AM    D : incisura bx Tissue Gastric PATHOLOGY SPECIMEN Sandi Espinoza M.D. 9/25/2019  8:27 AM    E : lesser curvature of body bx Tissue Gastric PATHOLOGY SPECIMEN Sandi Espinoza M.D. 9/25/2019  8:30 AM    F : greater curvature of body bx Tissue Gastric PATHOLOGY SPECIMEN Sandi Espinoza M.D. 9/25/2019  8:31 AM        Estimated Blood Loss: none    Findings:   Esoph:  No varices, columnar appearing mucosa 40-41cm not biopsied  Stomach:  Mosaic pattern proximal half of stomach, patchy erythema antrum.  Gastric mapping biopsies as noted above  Duod:  Folds in bulb and D2 had scalloped appearance, bx taken to eval for celiac     Complications: none      9/25/2019 8:36 AM Sandi Espinoza M.D.

## 2019-09-25 NOTE — OR NURSING
0842: To PACU post EGD. OPA dc'd on arrival. Breathing is spontaneous and unlabored. Pt denies pain/nausea.  0900: BP trending upward, see MAR.  0925: Remains pain/nausea free. Meets criteria for stage ll per anesthesiologist.

## 2019-09-25 NOTE — ANESTHESIA POSTPROCEDURE EVALUATION
Patient: Rigoberto Adames    Procedure Summary     Date:  09/25/19 Room / Location:   ENDOSCOPIC ULTRASOUND ROOM / SURGERY [facility]    Anesthesia Start:  0804 Anesthesia Stop:  0845    Procedures:       GASTROSCOPY      GASTROSCOPY, WITH VARICEAL BANDING - WITH GASTRIC MAPPING Diagnosis:       Cirrhosis (HCC)      (CIRRHOSIS)    Surgeon:  Sandi Espinoza M.D. Responsible Provider:  Jasmeet Green D.O.    Anesthesia Type:  general ASA Status:  3          Final Anesthesia Type: general  Last vitals  BP   Blood Pressure: 131/82    Temp   36.8 °C (98.2 °F)    Pulse   Pulse: (!) 105   Resp   16    SpO2   97 %      Anesthesia Post Evaluation    Patient location during evaluation: PACU  Patient participation: complete - patient participated  Level of consciousness: awake and alert  Pain score: 0    Airway patency: patent  Anesthetic complications: no  Cardiovascular status: hemodynamically stable  Respiratory status: acceptable  Hydration status: euvolemic    PONV: none           Nurse Pain Score: 8 (NPRS)

## 2019-09-25 NOTE — OR NURSING
0950- Pt dc'd home to daughter via w/c to private vehicle.  VSS. Pt denied pain/nausea.  Pt and daughter verbalized DC instructions, all questions answered.

## 2019-09-25 NOTE — DISCHARGE INSTRUCTIONS
ENDOSCOPY HOME CARE INSTRUCTIONS    GASTROSCOPY OR ERCP  1. Don't eat or drink anything for about an hour after the test. You can then resume your regular diet.  2. Don't drive or drink alcohol for 24 hours. The medication you received will make you too drowsy.  3. Don't take any coffee, tea, or aspirin products until after you see your doctor. These can harm the lining of your stomach.  4. If you begin to vomit bloody material, or develop black or bloody stools, call your doctor as soon as possible.  5. If you have any neck, chest, abdominal pain or temp of 100 degrees, call your doctor.  6. See your doctor ***  7. Additional instructions: ***  8. Prescriptions: ***  May resume home diet and meds.  Dr. Espinoza's office will contact you in 7-10 days with pathology results.    Dr. Espinoza [phone]    You should call 911 if you develop problems with breathing or chest pain.  If any questions arise, call your doctor. If your doctor is not available, please feel free to call [phone].     You can also call the HEALTH HOTLINE open 24 hours/day, 7 days/week and speak to a nurse at [phone], or toll free [phone].    Depression / Suicide Risk    As you are discharged from this [facility] facility, it is important to learn how to keep safe from harming yourself.    Recognize the warning signs:  · Abrupt changes in personality, positive or negative- including increase in energy   · Giving away possessions  · Change in eating patterns- significant weight changes-  positive or negative  · Change in sleeping patterns- unable to sleep or sleeping all the time   · Unwillingness or inability to communicate  · Depression  · Unusual sadness, discouragement and loneliness  · Talk of wanting to die  · Neglect of personal appearance   · Rebelliousness- reckless behavior  · Withdrawal from people/activities they love  · Confusion- inability to concentrate     If you or a loved one observes any of these  behaviors or has concerns about self-harm, here's what you can do:  · Talk about it- your feelings and reasons for harming yourself  · Remove any means that you might use to hurt yourself (examples: pills, rope, extension cords, firearm)  · Get professional help from the community (Mental Health, Substance Abuse, psychological counseling)  · Do not be alone:Call your Safe Contact- someone whom you trust who will be there for you.  · Call your local CRISIS HOTLINE 419-2476 or 533-204-8623  · Call your local Children's Mobile Crisis Response Team [place] (692) 698-6761 or www.Pin or Peg  · Call the toll free National Suicide Prevention Hotlines   · National Suicide Prevention Lifeline 761-713-SAXE (0392)  · National Hope Line Network 800-SUICIDE (331-4885)    I acknowledge receipt and understanding of these Home Care Instructions.

## 2019-09-25 NOTE — ANESTHESIA TIME REPORT
Anesthesia Start and Stop Event Times     Date Time Event    9/25/2019 0804 Anesthesia Start     0845 Anesthesia Stop        Responsible Staff  09/25/19    Name Role Begin End    Jasmeet Green D.O. Anesth 0804 0845        Preop Diagnosis (Free Text):  Pre-op Diagnosis     CIRRHOSIS, ALCOHOL ABUSE, GERD, GASTRIC INTESTINAL METAPLASIA, DIABETES MELLITUS TYPE 2        Preop Diagnosis (Codes):  Diagnosis Information     Diagnosis Code(s): Cirrhosis (HCC) [K74.60]        Post op Diagnosis  Alcoholic cirrhosis (HCC)      Premium Reason  Non-Premium    Comments:

## 2019-09-25 NOTE — ANESTHESIA PROCEDURE NOTES
Airway  Date/Time: 9/25/2019 8:17 AM  Performed by: Jasmeet Green D.O.  Authorized by: Jasmeet Green D.O.     Location:  OR  Urgency:  Elective  Indications for Airway Management:  Anesthesia  Spontaneous Ventilation: absent    Sedation Level:  Deep  Preoxygenated: Yes    Patient Position:  Sniffing  Final Airway Type:  Endotracheal airway  Final Endotracheal Airway:  ETT  Cuffed: Yes    Technique Used for Successful ETT Placement:  Direct laryngoscopy  Insertion Site:  Oral  Blade Type:  Hay  Laryngoscope Blade/Videolaryngoscope Blade Size:  3  ETT Size (mm):  7.0  Measured from:  Lips  ETT to Lips (cm):  21  Placement Verified by: auscultation and capnometry    Cormack-Lehane Classification:  Grade I - full view of glottis  Number of Attempts at Approach:  1

## 2019-09-26 ENCOUNTER — TELEPHONE (OUTPATIENT)
Dept: MEDICAL GROUP | Facility: MEDICAL CENTER | Age: 54
End: 2019-09-26

## 2019-09-26 NOTE — TELEPHONE ENCOUNTER
Left message with patient about no show to appointment today 9/26/19.  Explained this was his first no show and the no show policy.

## 2019-09-26 NOTE — PROCEDURES
DATE OF SERVICE:  09/25/2019    PROCEDURE:  Esophagogastroduodenoscopy with biopsies.    PREPROCEDURE DIAGNOSES:  Cirrhosis and gastric intestinal metaplasia.    POSTPROCEDURE DIAGNOSES:  1.  Suspected Salamanca's esophagus, not biopsied.  2.  Portal hypertensive gastropathy.  3.  Nonerosive antral gastritis.  4.  Abnormal-appearing duodenal folds.    CONSENT:  Risks, benefits, and alternatives had been explained to the patient.    Patient gave consent to proceed.    ANESTHESIA:  General anesthesia.    ANESTHESIOLOGIST:  Jasmeet Green DO    PROCEDURE:  The patient was turned into the left lateral decubitus position   and anesthesia monitoring was in place.  The Olympus flexible adult endoscope   was inserted into the esophagus under direct visualization.  The endoscope was   advanced slowly to the GE junction.  There was no evidence for esophageal   varices.  In the distal esophagus, there was columnar-appearing mucosa from   40-41 cm.  Biopsies were not taken due to risk of subclinical varices.  There   was no mucosal nodularity.  The endoscope was advanced into the stomach and   immediately the proximal gastric folds had a mosaic pattern consistent with   portal hypertensive gastropathy.  The endoscope was advanced slowly to the   antrum.  There was patchy erythema throughout the antrum.  Retroflexion was   performed and no abnormalities in the fundic cap or at the cardia.    Retroflexion was taken down and the endoscope was advanced easily through the   pylorus into the first and second portions of the duodenum.  The folds of the   duodenum had a scalloped appearance.  Biopsies were taken to evaluate for   celiac disease and labeled jar A.  The endoscope was pulled back into the   stomach and gastric mapping was performed.  Jar A was biopsies from the   greater curvature of the antrum.  Jar C was biopsies from the lesser curvature   of the antrum.  Jar D. was biopsies from the incisura.  The endoscope was   pulled  more proximally and biopsies in jar E were from the lesser curvature of   the body and fundus.  Jar F was biopsies from the greater curvature of the   body and fundus.  There was no significant bleeding with multiple biopsies   taken.  Air was removed and the endoscope was removed.  The patient tolerated   procedure well.  There were no complications.       ____________________________________     MD PARTH CANELA / LAZARO    DD:  09/25/2019 08:43:29  DT:  09/26/2019 06:49:55    D#:  [id number]  Job#:  [id number]

## 2019-10-10 ENCOUNTER — TELEPHONE (OUTPATIENT)
Dept: MEDICAL GROUP | Facility: MEDICAL CENTER | Age: 54
End: 2019-10-10

## 2019-10-10 NOTE — TELEPHONE ENCOUNTER
Left message with patient about no show to appointment today 10/10/19.  Explained this was her 2nd no show and the no show policy.

## 2019-11-07 ENCOUNTER — HOSPITAL ENCOUNTER (OUTPATIENT)
Dept: LAB | Facility: MEDICAL CENTER | Age: 54
End: 2019-11-07
Attending: FAMILY MEDICINE
Payer: MEDICAID

## 2019-11-07 DIAGNOSIS — E11.8 DM (DIABETES MELLITUS) WITH COMPLICATIONS (HCC): ICD-10-CM

## 2019-11-07 LAB — GLUCOSE SERPL-MCNC: 621 MG/DL (ref 65–99)

## 2019-11-07 PROCEDURE — 36415 COLL VENOUS BLD VENIPUNCTURE: CPT

## 2019-11-07 PROCEDURE — 82947 ASSAY GLUCOSE BLOOD QUANT: CPT

## 2019-11-07 PROCEDURE — 84681 ASSAY OF C-PEPTIDE: CPT

## 2019-11-07 PROCEDURE — 86341 ISLET CELL ANTIBODY: CPT

## 2019-11-07 PROCEDURE — 86337 INSULIN ANTIBODIES: CPT

## 2019-11-09 LAB — C PEPTIDE SERPL-MCNC: 3 NG/ML (ref 0.8–3.5)

## 2019-11-10 LAB
GAD65 AB SER IA-ACNC: <5 IU/ML (ref 0–5)
INSULIN HUMAN AB SER-ACNC: <0.4 U/ML (ref 0–0.4)

## 2019-11-15 LAB — ISLET CELL512 AB SER IA-ACNC: <5.4 U/ML (ref 0–7.4)

## 2019-11-17 DIAGNOSIS — R29.898 LEG WEAKNESS, BILATERAL: ICD-10-CM

## 2019-11-17 DIAGNOSIS — I42.1 HOCM (HYPERTROPHIC OBSTRUCTIVE CARDIOMYOPATHY) (HCC): ICD-10-CM

## 2019-11-17 DIAGNOSIS — I10 HYPERTENSION, BENIGN: ICD-10-CM

## 2019-11-17 DIAGNOSIS — E11.69 HYPERLIPIDEMIA ASSOCIATED WITH TYPE 2 DIABETES MELLITUS (HCC): ICD-10-CM

## 2019-11-17 DIAGNOSIS — R32 INCONTINENCE IN FEMALE: ICD-10-CM

## 2019-11-17 DIAGNOSIS — Z78.9 DECREASED ACTIVITIES OF DAILY LIVING (ADL): ICD-10-CM

## 2019-11-17 DIAGNOSIS — R18.8 CIRRHOSIS OF LIVER WITH ASCITES, UNSPECIFIED HEPATIC CIRRHOSIS TYPE (HCC): ICD-10-CM

## 2019-11-17 DIAGNOSIS — Z23 INFLUENZA VACCINE NEEDED: ICD-10-CM

## 2019-11-17 DIAGNOSIS — E78.5 HYPERLIPIDEMIA ASSOCIATED WITH TYPE 2 DIABETES MELLITUS (HCC): ICD-10-CM

## 2019-11-17 DIAGNOSIS — K74.60 CIRRHOSIS OF LIVER WITH ASCITES, UNSPECIFIED HEPATIC CIRRHOSIS TYPE (HCC): ICD-10-CM

## 2019-11-17 DIAGNOSIS — E11.8 DM (DIABETES MELLITUS) WITH COMPLICATIONS (HCC): ICD-10-CM

## 2019-11-18 RX ORDER — GABAPENTIN 800 MG/1
TABLET ORAL
Qty: 270 TAB | Refills: 0 | Status: ON HOLD
Start: 2019-11-18 | End: 2020-05-01

## 2019-12-04 ENCOUNTER — NON-PROVIDER VISIT (OUTPATIENT)
Dept: MEDICAL GROUP | Facility: MEDICAL CENTER | Age: 54
End: 2019-12-04
Attending: NURSE PRACTITIONER
Payer: MEDICAID

## 2019-12-04 VITALS
SYSTOLIC BLOOD PRESSURE: 121 MMHG | DIASTOLIC BLOOD PRESSURE: 71 MMHG | HEART RATE: 92 BPM | BODY MASS INDEX: 18.42 KG/M2 | WEIGHT: 117.6 LBS

## 2019-12-04 DIAGNOSIS — E11.8 DM (DIABETES MELLITUS) WITH COMPLICATIONS (HCC): Primary | ICD-10-CM

## 2019-12-04 LAB
HBA1C MFR BLD: 14.8 % (ref 0–5.6)
INT CON NEG: ABNORMAL
INT CON POS: ABNORMAL

## 2019-12-04 PROCEDURE — 83036 HEMOGLOBIN GLYCOSYLATED A1C: CPT | Performed by: NURSE PRACTITIONER

## 2019-12-04 PROCEDURE — 83036 HEMOGLOBIN GLYCOSYLATED A1C: CPT

## 2019-12-04 PROCEDURE — 99213 OFFICE O/P EST LOW 20 MIN: CPT | Performed by: PHARMACIST

## 2019-12-04 NOTE — PROGRESS NOTES
New Patient Consult Note 110:00-12:00 pm  Primary care physician: Vladimir Corral M.D.    Reason for consult: Management of Uncontrolled Type 2 Diabetes    HPI:  Rigoberto Adames is a 54 y.o. old patient who comes in today for evaluation of above stated problem.  This patient is very non compliant with her diet and tried to overeat carbs to gain weight. I have explained to her several times that if her sugar is not controlled she will continue to lose weight because she is probably in ketosis most times.    She reports that her BG reading at home ranges from 130-150 and her daughter agreed with her as she is the one that checks her BG for her but her A1C doesn't reflect that. Her A1C went from 6.1 - 14.8 over the past 3 months which indicated that her BG are in the 400+ most of the times.    This patient is not a T1DM based on the most recent labs that I did but her problem could be missing medication doses and over eating carbs. She is hard to understand as she has a very thick accent but her daughter usually helps translating.    She is due for a monofilament exam which will be does'n next visit.    She refuses to be on GLP1 agonist because she doesn't want to have a loss of appetite. We might be able to get her to be on GLP1 after controlling her glycemic level with insulin and when she sees that she is actually gaining weight as a SE of insulin.    Most Recent HbA1c:   Lab Results   Component Value Date/Time    HBA1C 14.8 (A) 12/04/2019 11:00 AM        Current Diabetes Regimen:  SGLT-2 Inhibitor:  Empagliflozin 25 mg once daily   Metformin 2000 mg daily  januvia 25 mg daily    Before Breakfast:130-150 pt reported two readings to be above 500, above machine range.  Before Lunch:  Before Dinner:  Before Bedtime:  Other times:  Hypoglycemia:  None      ROS:  Constitutional: No weight loss  Cardiac: No palpitations or racing heart  Resp: No shortness of breath  Neuro: No numbness or tinging in feet  Endo: No heat or cold  intolerance, no polyuria or polydipsia  All other systems were reviewed and were negative.    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) 01/20/2015     Priority: Medium   • Hypertension, benign 12/09/2014     Priority: Medium   • Cirrhosis (HCC) 03/15/2016     Priority: Low   • Weakness 10/14/2015     Priority: Low   • Macrocytic anemia 10/12/2015     Priority: Low   • Tobacco abuse 12/08/2014     Priority: Low   • Diabetes mellitus, type 2 (HCC) 06/27/2011     Priority: Low   • Neuropathy (HCC) 07/18/2019   • Diarrhea 07/18/2019   • Cirrhosis of liver with ascites (HCC) 03/06/2017   • Thrombocytopenia (HCC) 06/24/2016       Past Surgical History:  Past Surgical History:   Procedure Laterality Date   • PB UPPER GI ENDOSCOPY,LIGAT VARIX  9/25/2019    Procedure: GASTROSCOPY, WITH VARICEAL BANDING - WITH GASTRIC MAPPING;  Surgeon: Sandi Espinoza M.D.;  Location: [facility];  Service: Gastroenterology   • GASTROSCOPY-ENDO  9/25/2019    Procedure: GASTROSCOPY;  Surgeon: Sandi Espinoza M.D.;  Location: [facility];  Service: Gastroenterology   • PRIMARY C SECTION  2006   • OTHER      c- section       Allergies:  Patient has no known allergies.    Social History:  Social History     Socioeconomic History   • Marital status: Single     Spouse name: Not on file   • Number of children: Not on file   • Years of education: Not on file   • Highest education level: Not on file   Occupational History   • Occupation: restaurant owner   Social Needs   •  Financial resource strain: Not on file   • Food insecurity:     Worry: Not on file     Inability: Not on file   • Transportation needs:     Medical: Not on file     Non-medical: Not on file   Tobacco Use   • Smoking status: Current Every Day Smoker     Packs/day: 1.00     Years: 10.00     Pack years: 10.00     Types: Cigarettes     Start date: 8/12/2015   • Smokeless tobacco: Never Used   Substance and Sexual Activity   • Alcohol use: No     Comment: quit 08/2015, occasionally prior to then   • Drug use: No   • Sexual activity: Not on file   Lifestyle   • Physical activity:     Days per week: Not on file     Minutes per session: Not on file   • Stress: Not on file   Relationships   • Social connections:     Talks on phone: Not on file     Gets together: Not on file     Attends Hinduism service: Not on file     Active member of club or organization: Not on file     Attends meetings of clubs or organizations: Not on file     Relationship status: Not on file   • Intimate partner violence:     Fear of current or ex partner: Not on file     Emotionally abused: Not on file     Physically abused: Not on file     Forced sexual activity: Not on file   Other Topics Concern   • Not on file   Social History Narrative    Single- 2 children.       Family History:  Family History   Problem Relation Age of Onset   • Cancer Father         stomach   • Hypertension Mother        Medications:    Current Outpatient Medications:   •  insulin glargine (INSULIN GLARGINE) 100 UNIT/ML Solution Pen-injector injection, Inject 20 Units as instructed every evening., Disp: 15 mL, Rfl: 5  •  SITagliptin (JANUVIA) 100 MG Tab, Take 1 Tab by mouth every day., Disp: 30 Tab, Rfl: 3  •  Blood Glucose Test Strips, Please dispense lancets and test strips 100 plus 5 refills to test up to 3 times daily., Disp: 1 Each, Rfl: 0  •  metformin (GLUCOPHAGE) 1000 MG tablet, Take 1 Tab by mouth 2 times a day, with meals., Disp: 60 Tab, Rfl: 5  •  metoprolol  (LOPRESSOR) 50 MG Tab, TAKE 1 TABLET BY MOUTH TWICE DAILY, Disp: 90 Tab, Rfl: 2  •  gabapentin (NEURONTIN) 800 MG tablet, TAKE 1 TABLET BY MOUTH THREE TIMES DAILY, Disp: 270 Tab, Rfl: 0  •  metFORMIN (GLUCOPHAGE) 500 MG Tab, Take 1,000 mg by mouth., Disp: , Rfl: 3  •  spironolactone (ALDACTONE) 25 MG Tab, TAKE 1 TABLET BY MOUTH ONCE DAILY, Disp: 90 Tab, Rfl: 0  •  Empagliflozin (JARDIANCE) 25 MG Tab, Take 1 tablet by mouth every morning before breakfast., Disp: 30 Tab, Rfl: 5  •  Misc. Devices Misc, Blood pressure cuff to use as directed, Disp: 1 Device, Rfl: 0  •  lisinopril (PRINIVIL) 10 MG Tab, Take 1 Tab by mouth every day., Disp: 30 Tab, Rfl: 3  •  Misc. Devices Misc, Test strips and fine gauge lancets covered by insurance to test blood sugars bid, Disp: 150 Device, Rfl: 3  •  NEXIUM 20 MG capsule, Take 20 mg by mouth every day., Disp: , Rfl: 6  •  vitamin D, Ergocalciferol, (DRISDOL) 84387 units Cap capsule, Take 1 Cap by mouth every 28 days., Disp: 3 Cap, Rfl: 3  •  Misc. Devices Misc, Glucometer covered by insurance to use as directed, Disp: 1 Device, Rfl: 0  •  Multiple Vitamins-Minerals (MULTI-VITAMIN MENOPAUSAL) Tab, Take 1 Each by mouth every day., Disp: 90 Tab, Rfl: 3  •  atorvastatin (LIPITOR) 20 MG Tab, Take 1 Tab by mouth every day., Disp: 90 Tab, Rfl: 3  •  oxybutynin (DITROPAN) 5 MG Tab, Take 1 Tab by mouth 3 times a day., Disp: 270 Tab, Rfl: 3  •  Misc. Devices Misc, Walker with 4 wheels and a seat to use as directed, Disp: 1 Device, Rfl: 0    Labs: Reviewed    Physical Examination:  Vital signs: /71   Pulse 92   Wt 53.3 kg (117 lb 9.6 oz)   BMI 18.42 kg/m²  Body mass index is 18.42 kg/m².  General: No apparent distress, cooperative  Eyes: No scleral icterus or discharge  ENMT: Normal on external inspection of nose, lips, normal thyroid exam  Neck: No abnormal masses on inspection  Resp: Normal effort, clear to auscultation bilaterally   CVS: Regular rate and rhythm, S1 S2 normal, no  murmur   Extremities: No edema  Abdomen: abdominal obesity present  Neuro: Alert and oriented  Skin: No rash  Psych: Normal mood and affect, intact memory and able to make informed decisions    Assessment and Plan:  Start checking you BG at : fasting before breakfast and 2 hours after each meal. Log numbers in the log provided and bring with you to next visit.    Increase Januvia to 100 mg daily    Continue Jardiance 25 mg daily and metformin 2000mg daily    Start Basaglar 20 units qhs. Patient was educated on how to identify and treat hypoglycemia if indicated (BG <70).    F/u in 2 weeks.    Thank you for allowing me to participate in the care of this patient.    Tiarra Batista, PharmD  12/04/19    CC:   Vladimir Corral M.D.

## 2019-12-18 ENCOUNTER — NON-PROVIDER VISIT (OUTPATIENT)
Dept: MEDICAL GROUP | Facility: MEDICAL CENTER | Age: 54
End: 2019-12-18
Attending: FAMILY MEDICINE
Payer: MEDICAID

## 2019-12-18 VITALS
WEIGHT: 123 LBS | SYSTOLIC BLOOD PRESSURE: 130 MMHG | HEART RATE: 77 BPM | BODY MASS INDEX: 19.26 KG/M2 | DIASTOLIC BLOOD PRESSURE: 90 MMHG

## 2019-12-18 PROCEDURE — 99213 OFFICE O/P EST LOW 20 MIN: CPT | Performed by: PHARMACIST

## 2019-12-18 NOTE — PROGRESS NOTES
New Patient Consult Note 11:30-12:30 pm  Primary care physician: Vladimir Corral M.D.    Reason for consult: Management of Uncontrolled Type 2 Diabetes    HPI:  Rigoberto Adames is a 54 y.o. old patient who comes in today for evaluation of above stated problem.    This patient is finally on track as far as showing up to her visit and actually taking her meds and checking her BG and logging them.    Last visit she was placed on insulin 20 units qhs and was asked to log her FBG and PPBG after lunch and dinner. Her numbers shows a drop from 300s to 170-190 as an average through out the day.    Pt reports felling better and excited to gain 5 pounds.    She does complain of diarrhea which she said she had over 5 months ago and Dr Corral  Gave her a pill for it but it didn't do anything.    I have a suspetion that it is due to the Metformin dose so I instructed her daughter to titrate the does down and watch for a respond.     Most Recent HbA1c:   Lab Results   Component Value Date/Time    HBA1C 14.8 (A) 12/04/2019 11:00 AM        Current Diabetes Regimen:  SGLT-2 Inhibitor:  Empagliflozin 25 mg once daily   Metformin 2000 mg daily  Basal Insulin: Lantus(Insulin glargine)  20 qhs sc once daily     Before Breakfast: 170-190  Before Lunch:  Before Dinner:  Before Bedtime:  Other times:  Hypoglycemia:  None      ROS:  Constitutional: No weight loss  Cardiac: No palpitations or racing heart  Resp: No shortness of breath  Neuro: No numbness or tinging in feet  Endo: No heat or cold intolerance, no polyuria or polydipsia  All other systems were reviewed and were negative.    Past Medical History:  Patient Active Problem List    Diagnosis Date Noted   • Hyperammonemia (HCC) 06/24/2016     Priority: High   • Elevated troponin 06/23/2016     Priority: Medium   • Hypotension 06/23/2016     Priority: Medium   • Protein-calorie malnutrition, moderate (HCC) 10/23/2015     Priority: Medium   • Hypokalemia 10/12/2015     Priority: Medium   •  HOCM (hypertrophic obstructive cardiomyopathy) (HCC) 01/20/2015     Priority: Medium   • 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) 01/20/2015     Priority: Medium   • Hypertension, benign 12/09/2014     Priority: Medium   • Cirrhosis (HCC) 03/15/2016     Priority: Low   • Weakness 10/14/2015     Priority: Low   • Macrocytic anemia 10/12/2015     Priority: Low   • Tobacco abuse 12/08/2014     Priority: Low   • Diabetes mellitus, type 2 (HCC) 06/27/2011     Priority: Low   • Neuropathy (HCC) 07/18/2019   • Diarrhea 07/18/2019   • Cirrhosis of liver with ascites (HCC) 03/06/2017   • Thrombocytopenia (HCC) 06/24/2016       Past Surgical History:  Past Surgical History:   Procedure Laterality Date   • PB UPPER GI ENDOSCOPY,LIGAT VARIX  9/25/2019    Procedure: GASTROSCOPY, WITH VARICEAL BANDING - WITH GASTRIC MAPPING;  Surgeon: Sandi Espinoza M.D.;  Location: [facility];  Service: Gastroenterology   • GASTROSCOPY-ENDO  9/25/2019    Procedure: GASTROSCOPY;  Surgeon: Sandi Espinoza M.D.;  Location: [facility];  Service: Gastroenterology   • PRIMARY C SECTION  2006   • OTHER      c- section       Allergies:  Patient has no known allergies.    Social History:  Social History     Socioeconomic History   • Marital status: Single     Spouse name: Not on file   • Number of children: Not on file   • Years of education: Not on file   • Highest education level: Not on file   Occupational History   • Occupation: restaurant owner   Social Needs   • Financial resource strain: Not on file   • Food insecurity:     Worry: Not on file     Inability: Not on file   • Transportation needs:     Medical: Not on file     Non-medical: Not on file   Tobacco Use   • Smoking status: Current Every Day Smoker     Packs/day: 1.00     Years: 10.00     Pack years: 10.00     Types: Cigarettes     Start date: 8/12/2015   • Smokeless tobacco: Never Used   Substance and Sexual Activity   • Alcohol use: No     Comment: quit  08/2015, occasionally prior to then   • Drug use: No   • Sexual activity: Not on file   Lifestyle   • Physical activity:     Days per week: Not on file     Minutes per session: Not on file   • Stress: Not on file   Relationships   • Social connections:     Talks on phone: Not on file     Gets together: Not on file     Attends Christian service: Not on file     Active member of club or organization: Not on file     Attends meetings of clubs or organizations: Not on file     Relationship status: Not on file   • Intimate partner violence:     Fear of current or ex partner: Not on file     Emotionally abused: Not on file     Physically abused: Not on file     Forced sexual activity: Not on file   Other Topics Concern   • Not on file   Social History Narrative    Single- 2 children.       Family History:  Family History   Problem Relation Age of Onset   • Cancer Father         stomach   • Hypertension Mother        Medications:    Current Outpatient Medications:   •  metoprolol (LOPRESSOR) 50 MG Tab, TAKE 1 TABLET BY MOUTH TWICE DAILY, Disp: 90 Tab, Rfl: 2  •  insulin glargine (INSULIN GLARGINE) 100 UNIT/ML Solution Pen-injector injection, Inject 20 Units as instructed every evening., Disp: 15 mL, Rfl: 5  •  SITagliptin (JANUVIA) 100 MG Tab, Take 1 Tab by mouth every day., Disp: 30 Tab, Rfl: 3  •  Blood Glucose Test Strips, Please dispense lancets and test strips 100 plus 5 refills to test up to 3 times daily., Disp: 1 Each, Rfl: 0  •  metformin (GLUCOPHAGE) 1000 MG tablet, Take 1 Tab by mouth 2 times a day, with meals., Disp: 60 Tab, Rfl: 5  •  gabapentin (NEURONTIN) 800 MG tablet, TAKE 1 TABLET BY MOUTH THREE TIMES DAILY, Disp: 270 Tab, Rfl: 0  •  spironolactone (ALDACTONE) 25 MG Tab, TAKE 1 TABLET BY MOUTH ONCE DAILY, Disp: 90 Tab, Rfl: 0  •  Empagliflozin (JARDIANCE) 25 MG Tab, Take 1 tablet by mouth every morning before breakfast., Disp: 30 Tab, Rfl: 5  •  lisinopril (PRINIVIL) 10 MG Tab, Take 1 Tab by mouth every  day., Disp: 30 Tab, Rfl: 3  •  NEXIUM 20 MG capsule, Take 20 mg by mouth every day., Disp: , Rfl: 6  •  vitamin D, Ergocalciferol, (DRISDOL) 07327 units Cap capsule, Take 1 Cap by mouth every 28 days., Disp: 3 Cap, Rfl: 3  •  Multiple Vitamins-Minerals (MULTI-VITAMIN MENOPAUSAL) Tab, Take 1 Each by mouth every day., Disp: 90 Tab, Rfl: 3  •  atorvastatin (LIPITOR) 20 MG Tab, Take 1 Tab by mouth every day., Disp: 90 Tab, Rfl: 3  •  oxybutynin (DITROPAN) 5 MG Tab, Take 1 Tab by mouth 3 times a day., Disp: 270 Tab, Rfl: 3    Labs: Reviewed    Physical Examination:  Vital signs: /90   Pulse 77   Wt 55.8 kg (123 lb)   BMI 19.26 kg/m²  Body mass index is 19.26 kg/m².  General: No apparent distress, cooperative  Eyes: No scleral icterus or discharge  ENMT: Normal on external inspection of nose, lips, normal thyroid exam  Neck: No abnormal masses on inspection  Resp: Normal effort, clear to auscultation bilaterally   CVS: Regular rate and rhythm, S1 S2 normal, no murmur   Extremities: No edema  Abdomen: abdominal obesity present  Neuro: Alert and oriented  Skin: No rash  Psych: Normal mood and affect, intact memory and able to make informed decisions    Assessment and Plan:  Continue Januvia and Jardiance dose, (might stop Januvia in the future to decrease pill burden)    Decrease metformin to half of current dose (give 1/2 tab BID ) for few days and look for improvement in her diarrhea then stop all metformin and look for improvement in diarrhea for few days. Report back to me in next f/u    Increase Insulin to 25 units qhs for a better control of glucose levels. Reevaluate next f/u.    F/u in 2 weeks.    Thank you for allowing me to participate in the care of this patient.    Tiarra Batista, PharmD  12/18/19    CC:   Vladimir Corral M.D.

## 2020-01-02 ENCOUNTER — NON-PROVIDER VISIT (OUTPATIENT)
Dept: MEDICAL GROUP | Facility: MEDICAL CENTER | Age: 55
End: 2020-01-02
Attending: FAMILY MEDICINE
Payer: MEDICAID

## 2020-01-02 VITALS
BODY MASS INDEX: 18.79 KG/M2 | SYSTOLIC BLOOD PRESSURE: 118 MMHG | DIASTOLIC BLOOD PRESSURE: 62 MMHG | WEIGHT: 120 LBS | HEART RATE: 61 BPM

## 2020-01-02 DIAGNOSIS — E11.8 DM (DIABETES MELLITUS) WITH COMPLICATIONS (HCC): ICD-10-CM

## 2020-01-02 RX ORDER — PEN NEEDLE, DIABETIC 30 GX3/16"
1 NEEDLE, DISPOSABLE MISCELLANEOUS DAILY
Qty: 100 EACH | Refills: 5 | Status: SHIPPED
Start: 2020-01-02 | End: 2020-04-26

## 2020-01-02 NOTE — PROGRESS NOTES
New Patient Consult Note 09:00-10:00 am  Primary care physician: Vladimir Corral M.D.    Reason for consult: Management of Uncontrolled Type 2 Diabetes    HPI:  Rigoberto Adames is a 54 y.o. old patient who comes in today for evaluation of above stated problem.  This pt is still struggling with diarrhea and she is looks dehydrated even though she reported drinking plenty of water. She feels dizzy today and doesn't has lack of energy.     I instructed her to drink plenty of water. I read the last note from her GI specialist after she had her last colonoscopy procedure and he recommended some fiber to help bulk her stool and also some imodium but she didn't try either.    She denies any blood or mucus in her stool. No cramping or pain and no n/v.    I gave her a pack of OTC imodium and explained to her how to use it.    She stopped her Metformin for the past 2 weeks but it didn't help with her diarrhea so I instructed her to start back on it.      Her BG is continuing to be high which could have something to do with her diarrhea but we will be increasing her insulin TDD.    She is due for a foot exam which will be done next visit and she is aware of her due appointment for retinal exam.    Most Recent HbA1c:   Lab Results   Component Value Date/Time    HBA1C 14.8 (A) 12/04/2019 11:00 AM        Current Diabetes Regimen:  SGLT-2 Inhibitor:  Empagliflozin 25 mg once daily   Metformin 2000 mg daily  Basal Insulin: 25 u of basaglar       Before Breakfast: 200-300  Before Lunch:  Before Dinner:  Before Bedtime:  Other times: 200-300  Hypoglycemia:  None      ROS:  Constitutional: No weight loss  Cardiac: No palpitations or racing heart  Resp: No shortness of breath  Neuro: No numbness or tinging in feet  Endo: No heat or cold intolerance, no polyuria or polydipsia  All other systems were reviewed and were negative.    Past Medical History:  Patient Active Problem List    Diagnosis Date Noted   • Hyperammonemia (HCC) 06/24/2016      Priority: High   • Elevated troponin 06/23/2016     Priority: Medium   • Hypotension 06/23/2016     Priority: Medium   • Protein-calorie malnutrition, moderate (HCC) 10/23/2015     Priority: Medium   • Hypokalemia 10/12/2015     Priority: Medium   • HOCM (hypertrophic obstructive cardiomyopathy) (HCC) 01/20/2015     Priority: Medium   • 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) 01/20/2015     Priority: Medium   • Hypertension, benign 12/09/2014     Priority: Medium   • Cirrhosis (HCC) 03/15/2016     Priority: Low   • Weakness 10/14/2015     Priority: Low   • Macrocytic anemia 10/12/2015     Priority: Low   • Tobacco abuse 12/08/2014     Priority: Low   • Diabetes mellitus, type 2 (HCC) 06/27/2011     Priority: Low   • Neuropathy (HCC) 07/18/2019   • Diarrhea 07/18/2019   • Cirrhosis of liver with ascites (HCC) 03/06/2017   • Thrombocytopenia (HCC) 06/24/2016       Past Surgical History:  Past Surgical History:   Procedure Laterality Date   • PB UPPER GI ENDOSCOPY,LIGAT VARIX  9/25/2019    Procedure: GASTROSCOPY, WITH VARICEAL BANDING - WITH GASTRIC MAPPING;  Surgeon: Sandi Espinoza M.D.;  Location: [facility];  Service: Gastroenterology   • GASTROSCOPY-ENDO  9/25/2019    Procedure: GASTROSCOPY;  Surgeon: Sandi Espinoza M.D.;  Location: [facility];  Service: Gastroenterology   • PRIMARY C SECTION  2006   • OTHER      c- section       Allergies:  Patient has no known allergies.    Social History:  Social History     Socioeconomic History   • Marital status: Single     Spouse name: Not on file   • Number of children: Not on file   • Years of education: Not on file   • Highest education level: Not on file   Occupational History   • Occupation: restaurant owner   Social Needs   • Financial resource strain: Not on file   • Food insecurity:     Worry: Not on file     Inability: Not on file   • Transportation needs:     Medical: Not on file     Non-medical: Not on file   Tobacco Use   •  Smoking status: Current Every Day Smoker     Packs/day: 1.00     Years: 10.00     Pack years: 10.00     Types: Cigarettes     Start date: 8/12/2015   • Smokeless tobacco: Never Used   Substance and Sexual Activity   • Alcohol use: No     Comment: quit 08/2015, occasionally prior to then   • Drug use: No   • Sexual activity: Not on file   Lifestyle   • Physical activity:     Days per week: Not on file     Minutes per session: Not on file   • Stress: Not on file   Relationships   • Social connections:     Talks on phone: Not on file     Gets together: Not on file     Attends Baptism service: Not on file     Active member of club or organization: Not on file     Attends meetings of clubs or organizations: Not on file     Relationship status: Not on file   • Intimate partner violence:     Fear of current or ex partner: Not on file     Emotionally abused: Not on file     Physically abused: Not on file     Forced sexual activity: Not on file   Other Topics Concern   • Not on file   Social History Narrative    Single- 2 children.       Family History:  Family History   Problem Relation Age of Onset   • Cancer Father         stomach   • Hypertension Mother        Medications:    Current Outpatient Medications:   •  insulin glargine (INSULIN GLARGINE) 100 UNIT/ML Solution Pen-injector injection, Inject 35 Units as instructed every evening. E11.8, Disp: 15 mL, Rfl: 5  •  Insulin Pen Needle (PEN NEEDLES) 32G X 4 MM Misc, 1 Each by Does not apply route every day. Use with insulin pen to inject once daily E11.8, Disp: 100 Each, Rfl: 5  •  metoprolol (LOPRESSOR) 50 MG Tab, TAKE 1 TABLET BY MOUTH TWICE DAILY, Disp: 90 Tab, Rfl: 2  •  SITagliptin (JANUVIA) 100 MG Tab, Take 1 Tab by mouth every day., Disp: 30 Tab, Rfl: 3  •  Blood Glucose Test Strips, Please dispense lancets and test strips 100 plus 5 refills to test up to 3 times daily., Disp: 1 Each, Rfl: 0  •  metformin (GLUCOPHAGE) 1000 MG tablet, Take 1 Tab by mouth 2 times a  day, with meals., Disp: 60 Tab, Rfl: 5  •  gabapentin (NEURONTIN) 800 MG tablet, TAKE 1 TABLET BY MOUTH THREE TIMES DAILY, Disp: 270 Tab, Rfl: 0  •  spironolactone (ALDACTONE) 25 MG Tab, TAKE 1 TABLET BY MOUTH ONCE DAILY, Disp: 90 Tab, Rfl: 0  •  Empagliflozin (JARDIANCE) 25 MG Tab, Take 1 tablet by mouth every morning before breakfast., Disp: 30 Tab, Rfl: 5  •  lisinopril (PRINIVIL) 10 MG Tab, Take 1 Tab by mouth every day., Disp: 30 Tab, Rfl: 3  •  NEXIUM 20 MG capsule, Take 20 mg by mouth every day., Disp: , Rfl: 6  •  vitamin D, Ergocalciferol, (DRISDOL) 40952 units Cap capsule, Take 1 Cap by mouth every 28 days., Disp: 3 Cap, Rfl: 3  •  Multiple Vitamins-Minerals (MULTI-VITAMIN MENOPAUSAL) Tab, Take 1 Each by mouth every day., Disp: 90 Tab, Rfl: 3  •  atorvastatin (LIPITOR) 20 MG Tab, Take 1 Tab by mouth every day., Disp: 90 Tab, Rfl: 3  •  oxybutynin (DITROPAN) 5 MG Tab, Take 1 Tab by mouth 3 times a day., Disp: 270 Tab, Rfl: 3    Labs: Reviewed    Physical Examination:  Vital signs: /62   Pulse 61   Wt 54.4 kg (120 lb)   BMI 18.79 kg/m²  Body mass index is 18.79 kg/m².  General: No apparent distress, cooperative  Eyes: No scleral icterus or discharge  ENMT: Normal on external inspection of nose, lips, normal thyroid exam  Neck: No abnormal masses on inspection  Resp: Normal effort, clear to auscultation bilaterally   CVS: Regular rate and rhythm, S1 S2 normal, no murmur   Extremities: No edema  Abdomen: abdominal obesity present  Neuro: Alert and oriented  Skin: No rash  Psych: Normal mood and affect, intact memory and able to make informed decisions    Assessment and Plan:  Drink water to avoid dehydration    Increase insulin to 35 u qhs, call in 7 days with numbers or as soon as she start having lows (<70)    Star taking Imodium to help with the diarrhea.      Thank you for allowing me to participate in the care of this patient.    Tiarra Batista, PharmD  01/02/20    CC:   Vladimir Corral M.D.

## 2020-01-29 DIAGNOSIS — E78.5 HYPERLIPIDEMIA ASSOCIATED WITH TYPE 2 DIABETES MELLITUS (HCC): ICD-10-CM

## 2020-01-29 DIAGNOSIS — R29.898 LEG WEAKNESS, BILATERAL: ICD-10-CM

## 2020-01-29 DIAGNOSIS — Z78.9 DECREASED ACTIVITIES OF DAILY LIVING (ADL): ICD-10-CM

## 2020-01-29 DIAGNOSIS — R18.8 CIRRHOSIS OF LIVER WITH ASCITES, UNSPECIFIED HEPATIC CIRRHOSIS TYPE (HCC): ICD-10-CM

## 2020-01-29 DIAGNOSIS — Z23 INFLUENZA VACCINE NEEDED: ICD-10-CM

## 2020-01-29 DIAGNOSIS — R32 INCONTINENCE IN FEMALE: ICD-10-CM

## 2020-01-29 DIAGNOSIS — E11.8 DM (DIABETES MELLITUS) WITH COMPLICATIONS (HCC): ICD-10-CM

## 2020-01-29 DIAGNOSIS — I10 HYPERTENSION, BENIGN: ICD-10-CM

## 2020-01-29 DIAGNOSIS — I42.1 HOCM (HYPERTROPHIC OBSTRUCTIVE CARDIOMYOPATHY) (HCC): ICD-10-CM

## 2020-01-29 DIAGNOSIS — K74.60 CIRRHOSIS OF LIVER WITH ASCITES, UNSPECIFIED HEPATIC CIRRHOSIS TYPE (HCC): ICD-10-CM

## 2020-01-29 DIAGNOSIS — E11.69 HYPERLIPIDEMIA ASSOCIATED WITH TYPE 2 DIABETES MELLITUS (HCC): ICD-10-CM

## 2020-01-30 RX ORDER — ATORVASTATIN CALCIUM 20 MG/1
TABLET, FILM COATED ORAL
Qty: 90 TAB | Refills: 0 | Status: SHIPPED | OUTPATIENT
Start: 2020-01-30 | End: 2023-05-11

## 2020-02-06 ENCOUNTER — TELEPHONE (OUTPATIENT)
Dept: MEDICAL GROUP | Facility: MEDICAL CENTER | Age: 55
End: 2020-02-06

## 2020-02-07 NOTE — TELEPHONE ENCOUNTER
Voicemail not set up.  Emailed patient about no show to appointment today 2/6/20.  Explained this was her 3rd no show, the no show policy and that she will be dismissed from our [facility].

## 2020-02-20 DIAGNOSIS — R18.8 CIRRHOSIS OF LIVER WITH ASCITES, UNSPECIFIED HEPATIC CIRRHOSIS TYPE (HCC): ICD-10-CM

## 2020-02-20 DIAGNOSIS — I42.1 HOCM (HYPERTROPHIC OBSTRUCTIVE CARDIOMYOPATHY) (HCC): ICD-10-CM

## 2020-02-20 DIAGNOSIS — E11.8 DM (DIABETES MELLITUS) WITH COMPLICATIONS (HCC): ICD-10-CM

## 2020-02-20 DIAGNOSIS — K74.60 CIRRHOSIS OF LIVER WITH ASCITES, UNSPECIFIED HEPATIC CIRRHOSIS TYPE (HCC): ICD-10-CM

## 2020-02-20 DIAGNOSIS — E11.69 HYPERLIPIDEMIA ASSOCIATED WITH TYPE 2 DIABETES MELLITUS (HCC): ICD-10-CM

## 2020-02-20 DIAGNOSIS — I10 HYPERTENSION, BENIGN: ICD-10-CM

## 2020-02-20 DIAGNOSIS — R32 INCONTINENCE IN FEMALE: ICD-10-CM

## 2020-02-20 DIAGNOSIS — Z23 INFLUENZA VACCINE NEEDED: ICD-10-CM

## 2020-02-20 DIAGNOSIS — R29.898 LEG WEAKNESS, BILATERAL: ICD-10-CM

## 2020-02-20 DIAGNOSIS — E78.5 HYPERLIPIDEMIA ASSOCIATED WITH TYPE 2 DIABETES MELLITUS (HCC): ICD-10-CM

## 2020-02-20 DIAGNOSIS — Z78.9 DECREASED ACTIVITIES OF DAILY LIVING (ADL): ICD-10-CM

## 2020-02-21 RX ORDER — SPIRONOLACTONE 25 MG/1
TABLET ORAL
Qty: 90 TAB | Refills: 0 | Status: ON HOLD
Start: 2020-02-21 | End: 2020-05-01

## 2020-02-21 RX ORDER — LISINOPRIL 10 MG/1
TABLET ORAL
Qty: 90 TAB | Refills: 0 | Status: ON HOLD
Start: 2020-02-21 | End: 2020-05-01

## 2020-02-21 NOTE — TELEPHONE ENCOUNTER
Please advise patient to schedule appointment with new PCP for f/u on chronic conditions. Thanks! Eun Olivera P.A.-C. covering for Vladimir Corral M.D.

## 2020-02-27 ENCOUNTER — HOSPITAL ENCOUNTER (OUTPATIENT)
Dept: RADIOLOGY | Facility: MEDICAL CENTER | Age: 55
End: 2020-02-27
Attending: PHYSICIAN ASSISTANT
Payer: MEDICAID

## 2020-03-03 ENCOUNTER — APPOINTMENT (OUTPATIENT)
Dept: CARDIOLOGY | Facility: MEDICAL CENTER | Age: 55
End: 2020-03-03
Payer: MEDICAID

## 2020-04-26 ENCOUNTER — HOSPITAL ENCOUNTER (INPATIENT)
Facility: MEDICAL CENTER | Age: 55
LOS: 9 days | DRG: 871 | End: 2020-05-05
Attending: EMERGENCY MEDICINE | Admitting: INTERNAL MEDICINE
Payer: MEDICAID

## 2020-04-26 ENCOUNTER — APPOINTMENT (OUTPATIENT)
Dept: RADIOLOGY | Facility: MEDICAL CENTER | Age: 55
DRG: 871 | End: 2020-04-26
Attending: STUDENT IN AN ORGANIZED HEALTH CARE EDUCATION/TRAINING PROGRAM
Payer: MEDICAID

## 2020-04-26 ENCOUNTER — APPOINTMENT (OUTPATIENT)
Dept: RADIOLOGY | Facility: MEDICAL CENTER | Age: 55
DRG: 871 | End: 2020-04-26
Attending: EMERGENCY MEDICINE
Payer: MEDICAID

## 2020-04-26 DIAGNOSIS — R65.21 SEPTIC SHOCK DUE TO ESCHERICHIA COLI (HCC): ICD-10-CM

## 2020-04-26 DIAGNOSIS — A41.9 SEPSIS, DUE TO UNSPECIFIED ORGANISM, UNSPECIFIED WHETHER ACUTE ORGAN DYSFUNCTION PRESENT (HCC): ICD-10-CM

## 2020-04-26 DIAGNOSIS — N10 ACUTE PYELONEPHRITIS: ICD-10-CM

## 2020-04-26 DIAGNOSIS — R29.898 WEAKNESS OF BOTH LOWER EXTREMITIES: Primary | ICD-10-CM

## 2020-04-26 DIAGNOSIS — N12 PYELONEPHRITIS: ICD-10-CM

## 2020-04-26 DIAGNOSIS — E11.621 DIABETIC ULCER OF TOE ASSOCIATED WITH TYPE 2 DIABETES MELLITUS, UNSPECIFIED LATERALITY, UNSPECIFIED ULCER STAGE (HCC): ICD-10-CM

## 2020-04-26 DIAGNOSIS — E11.10 DIABETIC KETOACIDOSIS WITHOUT COMA ASSOCIATED WITH TYPE 2 DIABETES MELLITUS (HCC): ICD-10-CM

## 2020-04-26 DIAGNOSIS — L97.509 DIABETIC ULCER OF TOE ASSOCIATED WITH TYPE 2 DIABETES MELLITUS, UNSPECIFIED LATERALITY, UNSPECIFIED ULCER STAGE (HCC): ICD-10-CM

## 2020-04-26 DIAGNOSIS — I95.9 HYPOTENSION, UNSPECIFIED HYPOTENSION TYPE: ICD-10-CM

## 2020-04-26 DIAGNOSIS — A41.51 SEPTIC SHOCK DUE TO ESCHERICHIA COLI (HCC): ICD-10-CM

## 2020-04-26 DIAGNOSIS — G62.9 NEUROPATHY: ICD-10-CM

## 2020-04-26 PROBLEM — N39.0 URINARY TRACT INFECTION: Status: ACTIVE | Noted: 2020-04-26

## 2020-04-26 PROBLEM — E87.1 HYPONATREMIA: Status: ACTIVE | Noted: 2020-04-26

## 2020-04-26 PROBLEM — J06.9 URI (UPPER RESPIRATORY INFECTION): Status: ACTIVE | Noted: 2020-04-26

## 2020-04-26 PROBLEM — N17.9 ACUTE KIDNEY INJURY (HCC): Status: ACTIVE | Noted: 2020-04-26

## 2020-04-26 LAB
ALBUMIN SERPL BCP-MCNC: 2.2 G/DL (ref 3.2–4.9)
ALBUMIN/GLOB SERPL: 0.6 G/DL
ALP SERPL-CCNC: 241 U/L (ref 30–99)
ALT SERPL-CCNC: 21 U/L (ref 2–50)
ANION GAP SERPL CALC-SCNC: 16 MMOL/L (ref 7–16)
ANION GAP SERPL CALC-SCNC: 18 MMOL/L (ref 7–16)
ANISOCYTOSIS BLD QL SMEAR: ABNORMAL
APPEARANCE UR: ABNORMAL
AST SERPL-CCNC: 21 U/L (ref 12–45)
B-OH-BUTYR SERPL-MCNC: 1.12 MMOL/L (ref 0.02–0.27)
BACTERIA #/AREA URNS HPF: ABNORMAL /HPF
BASOPHILS # BLD AUTO: 0 % (ref 0–1.8)
BASOPHILS # BLD: 0 K/UL (ref 0–0.12)
BILIRUB SERPL-MCNC: 1 MG/DL (ref 0.1–1.5)
BILIRUB UR QL STRIP.AUTO: NEGATIVE
BUN SERPL-MCNC: 37 MG/DL (ref 8–22)
BUN SERPL-MCNC: 44 MG/DL (ref 8–22)
CALCIUM SERPL-MCNC: 7 MG/DL (ref 8.5–10.5)
CALCIUM SERPL-MCNC: 7.8 MG/DL (ref 8.5–10.5)
CHLORIDE SERPL-SCNC: 82 MMOL/L (ref 96–112)
CHLORIDE SERPL-SCNC: 90 MMOL/L (ref 96–112)
CO2 SERPL-SCNC: 16 MMOL/L (ref 20–33)
CO2 SERPL-SCNC: 16 MMOL/L (ref 20–33)
COLOR UR: YELLOW
COVID ORDER STATUS COVID19: NORMAL
CREAT SERPL-MCNC: 1.05 MG/DL (ref 0.5–1.4)
CREAT SERPL-MCNC: 1.24 MG/DL (ref 0.5–1.4)
EKG IMPRESSION: NORMAL
EOSINOPHIL # BLD AUTO: 0 K/UL (ref 0–0.51)
EOSINOPHIL NFR BLD: 0 % (ref 0–6.9)
EPI CELLS #/AREA URNS HPF: NEGATIVE /HPF
ERYTHROCYTE [DISTWIDTH] IN BLOOD BY AUTOMATED COUNT: 51.8 FL (ref 35.9–50)
GLOBULIN SER CALC-MCNC: 3.6 G/DL (ref 1.9–3.5)
GLUCOSE BLD-MCNC: 461 MG/DL (ref 65–99)
GLUCOSE BLD-MCNC: 483 MG/DL (ref 65–99)
GLUCOSE BLD-MCNC: 569 MG/DL (ref 65–99)
GLUCOSE SERPL-MCNC: 514 MG/DL (ref 65–99)
GLUCOSE SERPL-MCNC: 624 MG/DL (ref 65–99)
GLUCOSE UR STRIP.AUTO-MCNC: >=1000 MG/DL
HCT VFR BLD AUTO: 30.3 % (ref 37–47)
HGB BLD-MCNC: 9.9 G/DL (ref 12–16)
HYALINE CASTS #/AREA URNS LPF: ABNORMAL /LPF
HYPOCHROMIA BLD QL SMEAR: ABNORMAL
INR PPP: 1.31 (ref 0.87–1.13)
KETONES UR STRIP.AUTO-MCNC: NEGATIVE MG/DL
LACTATE BLD-SCNC: 2.2 MMOL/L (ref 0.5–2)
LACTATE BLD-SCNC: 3.1 MMOL/L (ref 0.5–2)
LACTATE BLD-SCNC: 3.7 MMOL/L (ref 0.5–2)
LEUKOCYTE ESTERASE UR QL STRIP.AUTO: ABNORMAL
LG PLATELETS BLD QL SMEAR: NORMAL
LYMPHOCYTES # BLD AUTO: 0.38 K/UL (ref 1–4.8)
LYMPHOCYTES NFR BLD: 1.7 % (ref 22–41)
MACROCYTES BLD QL SMEAR: ABNORMAL
MAGNESIUM SERPL-MCNC: 1.4 MG/DL (ref 1.5–2.5)
MANUAL DIFF BLD: NORMAL
MCH RBC QN AUTO: 30.2 PG (ref 27–33)
MCHC RBC AUTO-ENTMCNC: 32.7 G/DL (ref 33.6–35)
MCV RBC AUTO: 92.4 FL (ref 81.4–97.8)
METAMYELOCYTES NFR BLD MANUAL: 0.9 %
MICRO URNS: ABNORMAL
MONOCYTES # BLD AUTO: 0 K/UL (ref 0–0.85)
MONOCYTES NFR BLD AUTO: 0 % (ref 0–13.4)
MORPHOLOGY BLD-IMP: NORMAL
NEUTROPHILS # BLD AUTO: 21.72 K/UL (ref 2–7.15)
NEUTROPHILS NFR BLD: 90.5 % (ref 44–72)
NEUTS BAND NFR BLD MANUAL: 6.9 % (ref 0–10)
NITRITE UR QL STRIP.AUTO: NEGATIVE
NRBC # BLD AUTO: 0 K/UL
NRBC BLD-RTO: 0 /100 WBC
PH UR STRIP.AUTO: 5 [PH] (ref 5–8)
PHOSPHATE SERPL-MCNC: 3 MG/DL (ref 2.5–4.5)
PLATELET # BLD AUTO: 209 K/UL (ref 164–446)
PLATELET BLD QL SMEAR: NORMAL
PMV BLD AUTO: 10.9 FL (ref 9–12.9)
POIKILOCYTOSIS BLD QL SMEAR: NORMAL
POLYCHROMASIA BLD QL SMEAR: NORMAL
POTASSIUM SERPL-SCNC: 4.2 MMOL/L (ref 3.6–5.5)
POTASSIUM SERPL-SCNC: 4.6 MMOL/L (ref 3.6–5.5)
PROCALCITONIN SERPL-MCNC: 5.23 NG/ML
PROT SERPL-MCNC: 5.8 G/DL (ref 6–8.2)
PROT UR QL STRIP: NEGATIVE MG/DL
PROTHROMBIN TIME: 16.6 SEC (ref 12–14.6)
RBC # BLD AUTO: 3.28 M/UL (ref 4.2–5.4)
RBC # URNS HPF: ABNORMAL /HPF
RBC BLD AUTO: PRESENT
RBC UR QL AUTO: ABNORMAL
ROULEAUX BLD QL SMEAR: SLIGHT
SARS-COV-2 RNA RESP QL NAA+PROBE: NEGATIVE
SODIUM SERPL-SCNC: 116 MMOL/L (ref 135–145)
SODIUM SERPL-SCNC: 122 MMOL/L (ref 135–145)
SP GR UR STRIP.AUTO: 1.02
SPECIMEN SOURCE: NORMAL
TOXIC GRANULES BLD QL SMEAR: NORMAL
TROPONIN T SERPL-MCNC: 46 NG/L (ref 6–19)
UROBILINOGEN UR STRIP.AUTO-MCNC: 1 MG/DL
WBC # BLD AUTO: 22.3 K/UL (ref 4.8–10.8)
WBC #/AREA URNS HPF: ABNORMAL /HPF

## 2020-04-26 PROCEDURE — 80048 BASIC METABOLIC PNL TOTAL CA: CPT

## 2020-04-26 PROCEDURE — 85610 PROTHROMBIN TIME: CPT

## 2020-04-26 PROCEDURE — 84100 ASSAY OF PHOSPHORUS: CPT

## 2020-04-26 PROCEDURE — 81001 URINALYSIS AUTO W/SCOPE: CPT

## 2020-04-26 PROCEDURE — 700105 HCHG RX REV CODE 258: Performed by: STUDENT IN AN ORGANIZED HEALTH CARE EDUCATION/TRAINING PROGRAM

## 2020-04-26 PROCEDURE — 700102 HCHG RX REV CODE 250 W/ 637 OVERRIDE(OP): Performed by: INTERNAL MEDICINE

## 2020-04-26 PROCEDURE — 700105 HCHG RX REV CODE 258: Performed by: INTERNAL MEDICINE

## 2020-04-26 PROCEDURE — 82962 GLUCOSE BLOOD TEST: CPT | Mod: 91

## 2020-04-26 PROCEDURE — 700102 HCHG RX REV CODE 250 W/ 637 OVERRIDE(OP): Performed by: EMERGENCY MEDICINE

## 2020-04-26 PROCEDURE — 83735 ASSAY OF MAGNESIUM: CPT

## 2020-04-26 PROCEDURE — 700111 HCHG RX REV CODE 636 W/ 250 OVERRIDE (IP): Performed by: STUDENT IN AN ORGANIZED HEALTH CARE EDUCATION/TRAINING PROGRAM

## 2020-04-26 PROCEDURE — 87040 BLOOD CULTURE FOR BACTERIA: CPT | Mod: 91

## 2020-04-26 PROCEDURE — 700117 HCHG RX CONTRAST REV CODE 255: Performed by: STUDENT IN AN ORGANIZED HEALTH CARE EDUCATION/TRAINING PROGRAM

## 2020-04-26 PROCEDURE — 87077 CULTURE AEROBIC IDENTIFY: CPT

## 2020-04-26 PROCEDURE — 85027 COMPLETE CBC AUTOMATED: CPT

## 2020-04-26 PROCEDURE — 3E043XZ INTRODUCTION OF VASOPRESSOR INTO CENTRAL VEIN, PERCUTANEOUS APPROACH: ICD-10-PCS | Performed by: INTERNAL MEDICINE

## 2020-04-26 PROCEDURE — 87186 SC STD MICRODIL/AGAR DIL: CPT

## 2020-04-26 PROCEDURE — 700111 HCHG RX REV CODE 636 W/ 250 OVERRIDE (IP): Performed by: EMERGENCY MEDICINE

## 2020-04-26 PROCEDURE — 84145 PROCALCITONIN (PCT): CPT

## 2020-04-26 PROCEDURE — B548ZZA ULTRASONOGRAPHY OF SUPERIOR VENA CAVA, GUIDANCE: ICD-10-PCS | Performed by: INTERNAL MEDICINE

## 2020-04-26 PROCEDURE — G2023 SPECIMEN COLLECT COVID-19: HCPCS | Performed by: EMERGENCY MEDICINE

## 2020-04-26 PROCEDURE — A9576 INJ PROHANCE MULTIPACK: HCPCS | Performed by: STUDENT IN AN ORGANIZED HEALTH CARE EDUCATION/TRAINING PROGRAM

## 2020-04-26 PROCEDURE — 96375 TX/PRO/DX INJ NEW DRUG ADDON: CPT

## 2020-04-26 PROCEDURE — 36556 INSERT NON-TUNNEL CV CATH: CPT

## 2020-04-26 PROCEDURE — A9270 NON-COVERED ITEM OR SERVICE: HCPCS | Performed by: INTERNAL MEDICINE

## 2020-04-26 PROCEDURE — 72158 MRI LUMBAR SPINE W/O & W/DYE: CPT

## 2020-04-26 PROCEDURE — 02HV33Z INSERTION OF INFUSION DEVICE INTO SUPERIOR VENA CAVA, PERCUTANEOUS APPROACH: ICD-10-PCS | Performed by: INTERNAL MEDICINE

## 2020-04-26 PROCEDURE — 99291 CRITICAL CARE FIRST HOUR: CPT

## 2020-04-26 PROCEDURE — 770021 HCHG ROOM/CARE - ISO PRIVATE

## 2020-04-26 PROCEDURE — 87086 URINE CULTURE/COLONY COUNT: CPT

## 2020-04-26 PROCEDURE — 83605 ASSAY OF LACTIC ACID: CPT

## 2020-04-26 PROCEDURE — 72157 MRI CHEST SPINE W/O & W/DYE: CPT

## 2020-04-26 PROCEDURE — 99292 CRITICAL CARE ADDL 30 MIN: CPT | Performed by: INTERNAL MEDICINE

## 2020-04-26 PROCEDURE — A9270 NON-COVERED ITEM OR SERVICE: HCPCS | Performed by: EMERGENCY MEDICINE

## 2020-04-26 PROCEDURE — 700101 HCHG RX REV CODE 250: Performed by: STUDENT IN AN ORGANIZED HEALTH CARE EDUCATION/TRAINING PROGRAM

## 2020-04-26 PROCEDURE — U0004 COV-19 TEST NON-CDC HGH THRU: HCPCS

## 2020-04-26 PROCEDURE — C1751 CATH, INF, PER/CENT/MIDLINE: HCPCS | Performed by: EMERGENCY MEDICINE

## 2020-04-26 PROCEDURE — 96367 TX/PROPH/DG ADDL SEQ IV INF: CPT

## 2020-04-26 PROCEDURE — 85007 BL SMEAR W/DIFF WBC COUNT: CPT

## 2020-04-26 PROCEDURE — 80053 COMPREHEN METABOLIC PANEL: CPT

## 2020-04-26 PROCEDURE — 71045 X-RAY EXAM CHEST 1 VIEW: CPT

## 2020-04-26 PROCEDURE — 82010 KETONE BODYS QUAN: CPT

## 2020-04-26 PROCEDURE — 96368 THER/DIAG CONCURRENT INF: CPT

## 2020-04-26 PROCEDURE — 93005 ELECTROCARDIOGRAM TRACING: CPT

## 2020-04-26 PROCEDURE — 99291 CRITICAL CARE FIRST HOUR: CPT | Performed by: INTERNAL MEDICINE

## 2020-04-26 PROCEDURE — 700105 HCHG RX REV CODE 258: Performed by: EMERGENCY MEDICINE

## 2020-04-26 PROCEDURE — C1751 CATH, INF, PER/CENT/MIDLINE: HCPCS

## 2020-04-26 PROCEDURE — 96365 THER/PROPH/DIAG IV INF INIT: CPT

## 2020-04-26 PROCEDURE — 84484 ASSAY OF TROPONIN QUANT: CPT

## 2020-04-26 PROCEDURE — 93005 ELECTROCARDIOGRAM TRACING: CPT | Performed by: EMERGENCY MEDICINE

## 2020-04-26 RX ORDER — DEXTROSE, SODIUM CHLORIDE, SODIUM LACTATE, POTASSIUM CHLORIDE, AND CALCIUM CHLORIDE 5; .6; .31; .03; .02 G/100ML; G/100ML; G/100ML; G/100ML; G/100ML
INJECTION, SOLUTION INTRAVENOUS CONTINUOUS
Status: DISCONTINUED | OUTPATIENT
Start: 2020-04-26 | End: 2020-04-26

## 2020-04-26 RX ORDER — EMPAGLIFLOZIN 25 MG/1
25 TABLET, FILM COATED ORAL EVERY MORNING
Status: ON HOLD | COMMUNITY
End: 2020-05-01

## 2020-04-26 RX ORDER — MAGNESIUM SULFATE HEPTAHYDRATE 40 MG/ML
4 INJECTION, SOLUTION INTRAVENOUS
Status: DISCONTINUED | OUTPATIENT
Start: 2020-04-26 | End: 2020-04-28

## 2020-04-26 RX ORDER — SODIUM CHLORIDE, SODIUM LACTATE, POTASSIUM CHLORIDE, AND CALCIUM CHLORIDE .6; .31; .03; .02 G/100ML; G/100ML; G/100ML; G/100ML
30 INJECTION, SOLUTION INTRAVENOUS
Status: COMPLETED | OUTPATIENT
Start: 2020-04-26 | End: 2020-04-26

## 2020-04-26 RX ORDER — MAGNESIUM SULFATE HEPTAHYDRATE 40 MG/ML
2 INJECTION, SOLUTION INTRAVENOUS
Status: DISCONTINUED | OUTPATIENT
Start: 2020-04-26 | End: 2020-04-28

## 2020-04-26 RX ORDER — OXYBUTYNIN CHLORIDE 5 MG/1
5 TABLET ORAL 3 TIMES DAILY
Status: ON HOLD | COMMUNITY
End: 2020-05-01

## 2020-04-26 RX ORDER — SODIUM CHLORIDE 9 MG/ML
2000 INJECTION, SOLUTION INTRAVENOUS ONCE
Status: DISCONTINUED | OUTPATIENT
Start: 2020-04-26 | End: 2020-04-26

## 2020-04-26 RX ORDER — SODIUM CHLORIDE, SODIUM LACTATE, POTASSIUM CHLORIDE, CALCIUM CHLORIDE 600; 310; 30; 20 MG/100ML; MG/100ML; MG/100ML; MG/100ML
INJECTION, SOLUTION INTRAVENOUS CONTINUOUS
Status: DISCONTINUED | OUTPATIENT
Start: 2020-04-26 | End: 2020-04-26

## 2020-04-26 RX ORDER — AMOXICILLIN 250 MG
2 CAPSULE ORAL 2 TIMES DAILY
Status: DISCONTINUED | OUTPATIENT
Start: 2020-04-26 | End: 2020-05-05 | Stop reason: HOSPADM

## 2020-04-26 RX ORDER — BISACODYL 10 MG
10 SUPPOSITORY, RECTAL RECTAL
Status: DISCONTINUED | OUTPATIENT
Start: 2020-04-26 | End: 2020-05-05 | Stop reason: HOSPADM

## 2020-04-26 RX ORDER — SODIUM CHLORIDE 9 MG/ML
1000 INJECTION, SOLUTION INTRAVENOUS ONCE
Status: COMPLETED | OUTPATIENT
Start: 2020-04-26 | End: 2020-04-26

## 2020-04-26 RX ORDER — ACETAMINOPHEN 325 MG/1
650 TABLET ORAL ONCE
Status: COMPLETED | OUTPATIENT
Start: 2020-04-26 | End: 2020-04-26

## 2020-04-26 RX ORDER — MAGNESIUM SULFATE HEPTAHYDRATE 40 MG/ML
4 INJECTION, SOLUTION INTRAVENOUS ONCE
Status: COMPLETED | OUTPATIENT
Start: 2020-04-26 | End: 2020-04-27

## 2020-04-26 RX ORDER — ACETAMINOPHEN 325 MG/1
650 TABLET ORAL EVERY 6 HOURS PRN
Status: DISCONTINUED | OUTPATIENT
Start: 2020-04-26 | End: 2020-05-05 | Stop reason: HOSPADM

## 2020-04-26 RX ORDER — LINEZOLID 2 MG/ML
600 INJECTION, SOLUTION INTRAVENOUS EVERY 12 HOURS
Status: DISCONTINUED | OUTPATIENT
Start: 2020-04-26 | End: 2020-04-27

## 2020-04-26 RX ORDER — SODIUM CHLORIDE, SODIUM LACTATE, POTASSIUM CHLORIDE, AND CALCIUM CHLORIDE .6; .31; .03; .02 G/100ML; G/100ML; G/100ML; G/100ML
2000 INJECTION, SOLUTION INTRAVENOUS ONCE
Status: DISCONTINUED | OUTPATIENT
Start: 2020-04-26 | End: 2020-04-26

## 2020-04-26 RX ORDER — SODIUM CHLORIDE, SODIUM LACTATE, POTASSIUM CHLORIDE, AND CALCIUM CHLORIDE .6; .31; .03; .02 G/100ML; G/100ML; G/100ML; G/100ML
2000 INJECTION, SOLUTION INTRAVENOUS ONCE
Status: COMPLETED | OUTPATIENT
Start: 2020-04-26 | End: 2020-04-27

## 2020-04-26 RX ORDER — ONDANSETRON 2 MG/ML
4 INJECTION INTRAMUSCULAR; INTRAVENOUS ONCE
Status: COMPLETED | OUTPATIENT
Start: 2020-04-26 | End: 2020-04-26

## 2020-04-26 RX ORDER — METOPROLOL TARTRATE 50 MG/1
50 TABLET, FILM COATED ORAL 2 TIMES DAILY
Status: ON HOLD | COMMUNITY
End: 2020-05-04

## 2020-04-26 RX ORDER — POLYETHYLENE GLYCOL 3350 17 G/17G
1 POWDER, FOR SOLUTION ORAL
Status: DISCONTINUED | OUTPATIENT
Start: 2020-04-26 | End: 2020-05-05 | Stop reason: HOSPADM

## 2020-04-26 RX ORDER — NOREPINEPHRINE BITARTRATE 0.03 MG/ML
0-30 INJECTION, SOLUTION INTRAVENOUS CONTINUOUS
Status: DISCONTINUED | OUTPATIENT
Start: 2020-04-26 | End: 2020-04-27

## 2020-04-26 RX ORDER — DEXTROSE AND SODIUM CHLORIDE 10; .45 G/100ML; G/100ML
INJECTION, SOLUTION INTRAVENOUS CONTINUOUS
Status: DISCONTINUED | OUTPATIENT
Start: 2020-04-26 | End: 2020-04-26

## 2020-04-26 RX ADMIN — CEFTRIAXONE SODIUM 1 G: 1 INJECTION, POWDER, FOR SOLUTION INTRAMUSCULAR; INTRAVENOUS at 16:21

## 2020-04-26 RX ADMIN — GADOTERIDOL 10 ML: 279.3 INJECTION, SOLUTION INTRAVENOUS at 23:24

## 2020-04-26 RX ADMIN — ONDANSETRON 4 MG: 2 INJECTION INTRAMUSCULAR; INTRAVENOUS at 15:14

## 2020-04-26 RX ADMIN — SODIUM CHLORIDE 6 UNITS/HR: 9 INJECTION, SOLUTION INTRAVENOUS at 20:49

## 2020-04-26 RX ADMIN — ACETAMINOPHEN 650 MG: 325 TABLET, FILM COATED ORAL at 15:15

## 2020-04-26 RX ADMIN — NOREPINEPHRINE BITARTRATE 2 MCG/MIN: 1 INJECTION INTRAVENOUS at 20:46

## 2020-04-26 RX ADMIN — MAGNESIUM SULFATE IN WATER 4 G: 40 INJECTION, SOLUTION INTRAVENOUS at 23:51

## 2020-04-26 RX ADMIN — SODIUM CHLORIDE, POTASSIUM CHLORIDE, SODIUM LACTATE AND CALCIUM CHLORIDE 2000 ML: 600; 310; 30; 20 INJECTION, SOLUTION INTRAVENOUS at 18:57

## 2020-04-26 RX ADMIN — INSULIN HUMAN 6 UNITS: 100 INJECTION, SOLUTION PARENTERAL at 18:31

## 2020-04-26 RX ADMIN — SODIUM CHLORIDE 1000 ML: 9 INJECTION, SOLUTION INTRAVENOUS at 17:19

## 2020-04-26 RX ADMIN — LINEZOLID 600 MG: 600 INJECTION, SOLUTION INTRAVENOUS at 20:30

## 2020-04-26 RX ADMIN — ACETAMINOPHEN 650 MG: 325 TABLET, FILM COATED ORAL at 22:09

## 2020-04-26 RX ADMIN — SODIUM CHLORIDE, POTASSIUM CHLORIDE, SODIUM LACTATE AND CALCIUM CHLORIDE: 600; 310; 30; 20 INJECTION, SOLUTION INTRAVENOUS at 23:41

## 2020-04-26 RX ADMIN — SODIUM CHLORIDE, POTASSIUM CHLORIDE, SODIUM LACTATE AND CALCIUM CHLORIDE 1632 ML: 600; 310; 30; 20 INJECTION, SOLUTION INTRAVENOUS at 15:15

## 2020-04-26 SDOH — HEALTH STABILITY: MENTAL HEALTH: HOW MANY STANDARD DRINKS CONTAINING ALCOHOL DO YOU HAVE ON A TYPICAL DAY?: 1 OR 2

## 2020-04-26 ASSESSMENT — ENCOUNTER SYMPTOMS
TREMORS: 0
FOCAL WEAKNESS: 0
EYE DISCHARGE: 0
WEAKNESS: 1
PALPITATIONS: 1
LOSS OF CONSCIOUSNESS: 0
SPUTUM PRODUCTION: 0
FOCAL WEAKNESS: 1
SEIZURES: 0
SORE THROAT: 1
BACK PAIN: 1
SENSORY CHANGE: 0
PHOTOPHOBIA: 0
HEADACHES: 0
MYALGIAS: 0
WHEEZING: 0
CARDIOVASCULAR NEGATIVE: 1
FALLS: 0
VOMITING: 1
FEVER: 1
DIAPHORESIS: 0
SENSORY CHANGE: 1
POLYDIPSIA: 0
NAUSEA: 0
ABDOMINAL PAIN: 1
MUSCULOSKELETAL NEGATIVE: 1
BRUISES/BLEEDS EASILY: 0
COUGH: 1
VOMITING: 0
HEMOPTYSIS: 0
BLOOD IN STOOL: 0
DIARRHEA: 0
SPEECH CHANGE: 0
NERVOUS/ANXIOUS: 0
CHILLS: 1
WEIGHT LOSS: 1
TINGLING: 0
NECK PAIN: 0
CONSTIPATION: 0
BLURRED VISION: 1
DIZZINESS: 0
NAUSEA: 1
SHORTNESS OF BREATH: 0

## 2020-04-26 ASSESSMENT — LIFESTYLE VARIABLES
DO YOU DRINK ALCOHOL: NO
SUBSTANCE_ABUSE: 0

## 2020-04-26 ASSESSMENT — FIBROSIS 4 INDEX: FIB4 SCORE: 3.55

## 2020-04-26 ASSESSMENT — PAIN SCALES - WONG BAKER: WONGBAKER_NUMERICALRESPONSE: DOESN'T HURT AT ALL

## 2020-04-26 NOTE — ED TRIAGE NOTES
Brought in by EMS, pt hasn't been eating/drinking or taking meds in 3 days - family told EMS. Glucose on arrival to ER is 569. Pt lethargic and states her muscles ache.    EMS VS: BP 69/43, , sats 91% on RA

## 2020-04-26 NOTE — ED NOTES
Med Rec complete per phone interview with Pt daughter   Allergies Reviewed  No ABX in the last 14 days

## 2020-04-26 NOTE — ED PROVIDER NOTES
ED Provider Note    CHIEF COMPLAINT  Chief Complaint   Patient presents with   • Weakness   • Fever       HPI  Rigoberto Adames is a 55 y.o. female with history of type 2 diabetes mellitus, hypertension, noncompliance with medication who presents by EMS with complaints of not feeling well for the past 3 days.  The patient has had some intermittent nausea, vomiting, and left upper quadrant abdominal pain.  She says she has not been able to eat or drink or take her medications for the past 3 days.  The patient has had a mild cough, but denies any difficulty breathing.  She has had fever, chills, and body aches and pains.  She has been noncompliant with her diabetes medications.  Her fingerstick blood sugar was 569 on arrival.  The patient was previously seen at the [facility] but was recently discharged from the practice because of missed appointments.    REVIEW OF SYSTEMS  See HPI for further details. All other systems are negative.     PAST MEDICAL HISTORY  Past Medical History:   Diagnosis Date   • Cataract     Bilateral   • Dental disorder     Upper   • Diabetes mellitus, type 2 (HCC)     Oral medications   • diarrhea 2019    been going on for about a year   • Heart murmur    • HOCM (hypertrophic obstructive cardiomyopathy) (HCC)    • Hypertension        FAMILY HISTORY  Family History   Problem Relation Age of Onset   • Cancer Father         stomach   • Hypertension Mother        SOCIAL HISTORY  Social History     Tobacco Use   • Smoking status: Former Smoker     Packs/day: 1.00     Years: 10.00     Pack years: 10.00     Types: Cigarettes     Start date: 8/12/2015   • Smokeless tobacco: Never Used   Substance Use Topics   • Alcohol use: No     Comment: quit 08/2015, occasionally prior to then   • Drug use: No      Social History     Substance and Sexual Activity   Drug Use No       SURGICAL HISTORY  Past Surgical History:   Procedure Laterality Date   • PB UPPER GI ENDOSCOPY,LIGAT VARIX  9/25/2019     "Procedure: GASTROSCOPY, WITH VARICEAL BANDING - WITH GASTRIC MAPPING;  Surgeon: Sandi Espinoza M.D.;  Location: [facility];  Service: Gastroenterology   • GASTROSCOPY-ENDO  9/25/2019    Procedure: GASTROSCOPY;  Surgeon: Sandi Espinoza M.D.;  Location: [facility];  Service: Gastroenterology   • PRIMARY C SECTION  2006   • OTHER      c- section       CURRENT MEDICATIONS  Home Medications     Reviewed by Ally Vale (Pharmacy Tech) on 04/26/20 at 1612  Med List Status: Complete    Medication Last Dose Status    atorvastatin (LIPITOR) 20 MG Tab 4/23/2020 Active    Empagliflozin (JARDIANCE) 25 MG Tab 4/25/2020 Active    esomeprazole (NEXIUM) 20 MG capsule 4/25/2020 Active    gabapentin (NEURONTIN) 800 MG tablet 4/25/2020 Active    insulin glargine (INSULIN GLARGINE) 100 UNIT/ML Solution Pen-injector injection 4/24/2020 Active    lisinopril (PRINIVIL) 10 MG Tab 4/25/2020 Active    metFORMIN (GLUCOPHAGE) 500 MG Tab 4/25/2020 Active    metoprolol (LOPRESSOR) 50 MG Tab 4/25/2020 Active    oxybutynin (DITROPAN) 5 MG Tab 4/24/2020 Active    spironolactone (ALDACTONE) 25 MG Tab 4/25/2020 Active    vitamin D, Ergocalciferol, (DRISDOL) 58161 units Cap capsule 4/1/2020 Active                ALLERGIES  No Known Allergies    PHYSICAL EXAM0  VITAL SIGNS: Blood Pressure (Abnormal) 82/53   Pulse 92   Temperature 37.1 °C (98.8 °F) (Temporal)   Respiration (Abnormal) 22   Height 1.702 m (5' 7\")   Weight 54.4 kg (120 lb)   Oxygen Saturation 100%   Body Mass Index 18.79 kg/m²   Constitutional: Awake, somewhat somnolent appearing but arousable, in no acute distress, mildly ill-appearing.  HENT: Atraumatic. Bilateral external ears normal, mucous membranes very dry, throat nonerythematous without exudates, nose is normal.  Eyes: PERRL, EOMI, conjunctiva moist, noninjected.  Neck: Nontender, Normal range of motion, No nuchal rigidity, No stridor.   Lymphatic: No lymphadenopathy noted. "   Cardiovascular: Regular rhythm, tachycardic, rate in the 110s, no murmurs, rubs, gallops.  Thorax & Lungs:  Good breath sounds bilaterally, no wheezes, rales, or retractions.  No chest tenderness.  Abdomen: Bowel sounds normal, Soft, nontender, nondistended, no rebound, guarding, masses.  Back: No CVA or spinal tenderness.  Extremities: Intact distal pulses, No edema, No tenderness.   Skin: Warm, Dry, No rashes.   Musculoskeletal: No joint swelling or tenderness.  Neurologic: Alert & oriented x 3, cranial nerves II through XII appear intact, no facial asymmetry, no dysarthria, sensory intact light touch, and motor function shows good movement of the upper and lower extremities, with 4/5 strength noted to the upper and lower extremities.   Psychiatric: Affect normal, Judgment normal, Mood normal.     EKG  EKG Interpretation:  Interpreted by me    Rhythm: Sinus tachycardia  Rate: 111  Intervals: Normal  Axis: normal  Ectopy: none  Conduction: normal  ST Segments: no evidence of elevation or depression  T Waves: Normal  Q Waves: none  Clinical Impression: No acute injury or ischemic pattern.   Comparison to previous EKG from September 2019 shows resolution of previous ST depressions and T wave inversions in leads V4 through V6.      LABS  Labs Reviewed   LACTIC ACID - Abnormal; Notable for the following components:       Result Value    Lactic Acid 3.7 (*)     All other components within normal limits   LACTIC ACID - Abnormal; Notable for the following components:    Lactic Acid 3.1 (*)     All other components within normal limits    Narrative:     Repeat if initial lactic acid result is greater than 2   CBC WITH DIFFERENTIAL - Abnormal; Notable for the following components:    WBC 22.3 (*)     RBC 3.28 (*)     Hemoglobin 9.9 (*)     Hematocrit 30.3 (*)     MCHC 32.7 (*)     RDW 51.8 (*)     Neutrophils-Polys 90.50 (*)     Lymphocytes 1.70 (*)     Neutrophils (Absolute) 21.72 (*)     Lymphs (Absolute) 0.38 (*)      All other components within normal limits   COMP METABOLIC PANEL - Abnormal; Notable for the following components:    Sodium 116 (*)     Chloride 82 (*)     Co2 16 (*)     Anion Gap 18.0 (*)     Glucose 624 (*)     Bun 44 (*)     Calcium 7.8 (*)     Alkaline Phosphatase 241 (*)     Albumin 2.2 (*)     Total Protein 5.8 (*)     Globulin 3.6 (*)     All other components within normal limits   PROCALCITONIN - Abnormal; Notable for the following components:    Procalcitonin 5.23 (*)     All other components within normal limits    Narrative:     Droplet, Contact, and Eye Protection   TROPONIN - Abnormal; Notable for the following components:    Troponin T 46 (*)     All other components within normal limits    Narrative:     Droplet, Contact, and Eye Protection   MAGNESIUM - Abnormal; Notable for the following components:    Magnesium 1.4 (*)     All other components within normal limits    Narrative:     Droplet, Contact, and Eye Protection   BETA-HYDROXYBUTYRIC ACID - Abnormal; Notable for the following components:    beta-Hydroxybutyric Acid 1.12 (*)     All other components within normal limits    Narrative:     Droplet, Contact, and Eye Protection   ESTIMATED GFR - Abnormal; Notable for the following components:    GFR If  54 (*)     GFR If Non  45 (*)     All other components within normal limits   URINALYSIS,CULTURE IF INDICATED - Abnormal; Notable for the following components:    Character Turbid (*)     Glucose >=1000 (*)     Leukocyte Esterase Moderate (*)     Occult Blood Moderate (*)     All other components within normal limits    Narrative:     Droplet, Contact, and Eye Protection   PROTHROMBIN TIME - Abnormal; Notable for the following components:    PT 16.6 (*)     INR 1.31 (*)     All other components within normal limits    Narrative:     Droplet, Contact, and Eye Protection  If not done within the last 4 hours  Indicate which anticoagulants the patient is on:->UNKNOWN  "  URINE MICROSCOPIC (W/UA) - Abnormal; Notable for the following components:    WBC Packed (*)     Bacteria Many (*)     All other components within normal limits    Narrative:     Droplet, Contact, and Eye Protection   ACCU-CHEK GLUCOSE - Abnormal; Notable for the following components:    Glucose - Accu-Ck 569 (*)     All other components within normal limits   ACCU-CHEK GLUCOSE - Abnormal; Notable for the following components:    Glucose - Accu-Ck 483 (*)     All other components within normal limits   BLOOD CULTURE    Narrative:     Per Hospital Policy: Only change Specimen Src: to \"Line\" if  specified by physician order.   BLOOD CULTURE    Narrative:     Per Hospital Policy: Only change Specimen Src: to \"Line\" if  specified by physician order.   COVID/SARS COV-2    Narrative:     Droplet, Contact, and Eye Protection  Rule-out COVID-19 panel, includes droplet/contact/eye  isolation order.  Check influenza to order this test only if  specifically indicated.   PHOSPHORUS    Narrative:     Droplet, Contact, and Eye Protection   SARS-COV-2, PCR (IN-HOUSE)    Narrative:     Droplet, Contact, and Eye Protection  Rule-out COVID-19 panel, includes droplet/contact/eye  isolation order.  Check influenza to order this test only if  specifically indicated.   DIFFERENTIAL MANUAL   PERIPHERAL SMEAR REVIEW   PLATELET ESTIMATE   MORPHOLOGY   COVID/SARS COV-2   URINE CULTURE(NEW)    Narrative:     Droplet, Contact, and Eye Protection   LACTIC ACID   URINALYSIS   URINE CULTURE(NEW)   BASIC METABOLIC PANEL   LACTIC ACID       All labs reviewed by me.      RADIOLOGY/PROCEDURES  DX-CHEST-PORTABLE (1 VIEW)   Final Result      No radiographic evidence of acute cardiopulmonary process.      MR-THORACIC SPINE-WITH & W/O    (Results Pending)   MR-LUMBAR SPINE-WITH & W/O    (Results Pending)          COURSE & MEDICAL DECISION MAKING  Pertinent Labs & Imaging studies reviewed. (See chart for details)  The patient presents with the above " complaints.  She was noted to be febrile, hypotensive, and tachycardic on arrival.  Sepsis protocol was initiated by nursing.  IV is placed, she was given a 30 cc/kg bolus of lactated Ringer's.  She is given Tylenol for the fever, Zofran for nausea.    EKG showed a sinus tachycardia.  Chest ray shows no acute cardiac or pulmonary abnormalities.  CBC showed a white count 22,300, hemoglobin 9.9, 91% polys, Chem-8 shows a sodium of 116, CO2 16, anion gap 18, glucose 624, BUN 44, creatinine 1.24, lactic acid 3.7, troponin 46, beta hydroxybutyric acid elevated 1.12.  Urine shows greater than 1000 glucose, moderate blood, moderate leukocyte esterase, and packed WBCs.  Patient appears to be in DKA with an anion gap and elevated beta hydroxybutyric acid.  She also appears to have pyelonephritis with packed white blood cells.  Patient was given Rocephin 1 g IVPB.  Patient will be given insulin and started on insulin infusion.  Dr. Lopez of [facility] [place] was consulted for admission to the ICU.  Critical care time of 35 minutes    FINAL IMPRESSION  1. Diabetic ketoacidosis without coma associated with type 2 diabetes mellitus (HCC)    2. Hypotension, unspecified hypotension type    3. Sepsis, due to unspecified organism, unspecified whether acute organ dysfunction present (HCC)    4. Pyelonephritis          Electronically signed by: Joshua Lara M.D., 4/26/2020 3:05 PM

## 2020-04-26 NOTE — ED NOTES
Pt resting with eyes closed, arouses to verbal stimuli. Follows commands. Very dry mucous membranes.  IVF's as ordered. Continue to monitor.

## 2020-04-26 NOTE — ED NOTES
Familia from Lab called with critical result of Na 116 and glucose 624 at 0325. Critical lab result read back to Familia.   Dr. Lara notified of critical lab result at 1530.  Critical lab result read back by Dr. Lara.

## 2020-04-27 ENCOUNTER — APPOINTMENT (OUTPATIENT)
Dept: RADIOLOGY | Facility: MEDICAL CENTER | Age: 55
DRG: 871 | End: 2020-04-27
Attending: INTERNAL MEDICINE
Payer: MEDICAID

## 2020-04-27 ENCOUNTER — APPOINTMENT (OUTPATIENT)
Dept: RADIOLOGY | Facility: MEDICAL CENTER | Age: 55
DRG: 871 | End: 2020-04-27
Attending: STUDENT IN AN ORGANIZED HEALTH CARE EDUCATION/TRAINING PROGRAM
Payer: MEDICAID

## 2020-04-27 ENCOUNTER — APPOINTMENT (OUTPATIENT)
Dept: CARDIOLOGY | Facility: MEDICAL CENTER | Age: 55
DRG: 871 | End: 2020-04-27
Attending: STUDENT IN AN ORGANIZED HEALTH CARE EDUCATION/TRAINING PROGRAM
Payer: MEDICAID

## 2020-04-27 ENCOUNTER — APPOINTMENT (OUTPATIENT)
Dept: RADIOLOGY | Facility: MEDICAL CENTER | Age: 55
DRG: 871 | End: 2020-04-27
Attending: UROLOGY
Payer: MEDICAID

## 2020-04-27 PROBLEM — E11.10 DKA (DIABETIC KETOACIDOSES): Status: ACTIVE | Noted: 2020-04-27

## 2020-04-27 PROBLEM — E83.42 HYPOMAGNESEMIA: Status: ACTIVE | Noted: 2020-04-27

## 2020-04-27 PROBLEM — D64.9 NORMOCYTIC ANEMIA: Status: ACTIVE | Noted: 2020-04-27

## 2020-04-27 PROBLEM — E83.39 HYPOPHOSPHATEMIA: Status: ACTIVE | Noted: 2020-04-27

## 2020-04-27 LAB
ALBUMIN SERPL BCP-MCNC: 1.9 G/DL (ref 3.2–4.9)
ALBUMIN/GLOB SERPL: 0.6 G/DL
ALP SERPL-CCNC: 135 U/L (ref 30–99)
ALT SERPL-CCNC: 17 U/L (ref 2–50)
ANION GAP SERPL CALC-SCNC: 11 MMOL/L (ref 7–16)
ANION GAP SERPL CALC-SCNC: 12 MMOL/L (ref 7–16)
ANION GAP SERPL CALC-SCNC: 14 MMOL/L (ref 7–16)
ANION GAP SERPL CALC-SCNC: 14 MMOL/L (ref 7–16)
ANION GAP SERPL CALC-SCNC: 9 MMOL/L (ref 7–16)
ANISOCYTOSIS BLD QL SMEAR: ABNORMAL
AST SERPL-CCNC: 18 U/L (ref 12–45)
BASOPHILS # BLD AUTO: 0 % (ref 0–1.8)
BASOPHILS # BLD: 0 K/UL (ref 0–0.12)
BILIRUB SERPL-MCNC: 0.7 MG/DL (ref 0.1–1.5)
BUN SERPL-MCNC: 23 MG/DL (ref 8–22)
BUN SERPL-MCNC: 25 MG/DL (ref 8–22)
BUN SERPL-MCNC: 30 MG/DL (ref 8–22)
BUN SERPL-MCNC: 32 MG/DL (ref 8–22)
BUN SERPL-MCNC: 35 MG/DL (ref 8–22)
CA-I SERPL-SCNC: 1.1 MMOL/L (ref 1.1–1.3)
CALCIUM SERPL-MCNC: 6.8 MG/DL (ref 8.5–10.5)
CALCIUM SERPL-MCNC: 6.9 MG/DL (ref 8.5–10.5)
CALCIUM SERPL-MCNC: 7.2 MG/DL (ref 8.5–10.5)
CALCIUM SERPL-MCNC: 7.3 MG/DL (ref 8.5–10.5)
CALCIUM SERPL-MCNC: 7.4 MG/DL (ref 8.5–10.5)
CHLORIDE SERPL-SCNC: 91 MMOL/L (ref 96–112)
CHLORIDE SERPL-SCNC: 93 MMOL/L (ref 96–112)
CHLORIDE SERPL-SCNC: 94 MMOL/L (ref 96–112)
CHLORIDE SERPL-SCNC: 96 MMOL/L (ref 96–112)
CHLORIDE SERPL-SCNC: 97 MMOL/L (ref 96–112)
CO2 SERPL-SCNC: 16 MMOL/L (ref 20–33)
CO2 SERPL-SCNC: 17 MMOL/L (ref 20–33)
CO2 SERPL-SCNC: 18 MMOL/L (ref 20–33)
CREAT SERPL-MCNC: 0.71 MG/DL (ref 0.5–1.4)
CREAT SERPL-MCNC: 0.8 MG/DL (ref 0.5–1.4)
CREAT SERPL-MCNC: 0.85 MG/DL (ref 0.5–1.4)
CREAT SERPL-MCNC: 0.98 MG/DL (ref 0.5–1.4)
CREAT SERPL-MCNC: 1.02 MG/DL (ref 0.5–1.4)
EOSINOPHIL # BLD AUTO: 0 K/UL (ref 0–0.51)
EOSINOPHIL NFR BLD: 0 % (ref 0–6.9)
ERYTHROCYTE [DISTWIDTH] IN BLOOD BY AUTOMATED COUNT: 50.2 FL (ref 35.9–50)
EST. AVERAGE GLUCOSE BLD GHB EST-MCNC: 458 MG/DL
FERRITIN SERPL-MCNC: 408 NG/ML (ref 10–291)
FOLATE SERPL-MCNC: 7.7 NG/ML
GLOBULIN SER CALC-MCNC: 3.1 G/DL (ref 1.9–3.5)
GLUCOSE BLD-MCNC: 100 MG/DL (ref 65–99)
GLUCOSE BLD-MCNC: 103 MG/DL (ref 65–99)
GLUCOSE BLD-MCNC: 105 MG/DL (ref 65–99)
GLUCOSE BLD-MCNC: 105 MG/DL (ref 65–99)
GLUCOSE BLD-MCNC: 113 MG/DL (ref 65–99)
GLUCOSE BLD-MCNC: 117 MG/DL (ref 65–99)
GLUCOSE BLD-MCNC: 138 MG/DL (ref 65–99)
GLUCOSE BLD-MCNC: 144 MG/DL (ref 65–99)
GLUCOSE BLD-MCNC: 153 MG/DL (ref 65–99)
GLUCOSE BLD-MCNC: 199 MG/DL (ref 65–99)
GLUCOSE BLD-MCNC: 262 MG/DL (ref 65–99)
GLUCOSE BLD-MCNC: 264 MG/DL (ref 65–99)
GLUCOSE BLD-MCNC: 348 MG/DL (ref 65–99)
GLUCOSE BLD-MCNC: 385 MG/DL (ref 65–99)
GLUCOSE BLD-MCNC: 413 MG/DL (ref 65–99)
GLUCOSE BLD-MCNC: 496 MG/DL (ref 65–99)
GLUCOSE BLD-MCNC: 57 MG/DL (ref 65–99)
GLUCOSE BLD-MCNC: 73 MG/DL (ref 65–99)
GLUCOSE BLD-MCNC: 76 MG/DL (ref 65–99)
GLUCOSE BLD-MCNC: 76 MG/DL (ref 65–99)
GLUCOSE BLD-MCNC: 94 MG/DL (ref 65–99)
GLUCOSE SERPL-MCNC: 100 MG/DL (ref 65–99)
GLUCOSE SERPL-MCNC: 105 MG/DL (ref 65–99)
GLUCOSE SERPL-MCNC: 121 MG/DL (ref 65–99)
GLUCOSE SERPL-MCNC: 254 MG/DL (ref 65–99)
GLUCOSE SERPL-MCNC: 476 MG/DL (ref 65–99)
GRAM STN SPEC: NORMAL
GRAM STN SPEC: NORMAL
HBA1C MFR BLD: 17.6 % (ref 0–5.6)
HCT VFR BLD AUTO: 25.3 % (ref 37–47)
HGB BLD-MCNC: 8.5 G/DL (ref 12–16)
HGB RETIC QN AUTO: 24.4 PG/CELL (ref 29–35)
IMM RETICS NFR: 6.3 % (ref 9.3–17.4)
IRON SATN MFR SERPL: ABNORMAL % (ref 15–55)
IRON SERPL-MCNC: 7 UG/DL (ref 40–170)
LACTATE BLD-SCNC: 1.6 MMOL/L (ref 0.5–2)
LACTATE BLD-SCNC: 2.2 MMOL/L (ref 0.5–2)
LACTATE BLD-SCNC: 3.2 MMOL/L (ref 0.5–2)
LACTATE BLD-SCNC: 3.8 MMOL/L (ref 0.5–2)
LV EJECT FRACT  99904: 75
LV EJECT FRACT MOD 2C 99903: 66.04
LV EJECT FRACT MOD 4C 99902: 75.02
LV EJECT FRACT MOD BP 99901: 75.39
LYMPHOCYTES # BLD AUTO: 0.33 K/UL (ref 1–4.8)
LYMPHOCYTES NFR BLD: 0.9 % (ref 22–41)
MACROCYTES BLD QL SMEAR: ABNORMAL
MAGNESIUM SERPL-MCNC: 1.3 MG/DL (ref 1.5–2.5)
MAGNESIUM SERPL-MCNC: 2 MG/DL (ref 1.5–2.5)
MAGNESIUM SERPL-MCNC: 2.4 MG/DL (ref 1.5–2.5)
MANUAL DIFF BLD: NORMAL
MCH RBC QN AUTO: 30.4 PG (ref 27–33)
MCHC RBC AUTO-ENTMCNC: 33.6 G/DL (ref 33.6–35)
MCV RBC AUTO: 90.4 FL (ref 81.4–97.8)
METAMYELOCYTES NFR BLD MANUAL: 0.9 %
MONOCYTES # BLD AUTO: 0.33 K/UL (ref 0–0.85)
MONOCYTES NFR BLD AUTO: 0.9 % (ref 0–13.4)
MORPHOLOGY BLD-IMP: NORMAL
NEUTROPHILS # BLD AUTO: 35.81 K/UL (ref 2–7.15)
NEUTROPHILS NFR BLD: 93 % (ref 44–72)
NEUTS BAND NFR BLD MANUAL: 4.3 % (ref 0–10)
NRBC # BLD AUTO: 0 K/UL
NRBC BLD-RTO: 0 /100 WBC
OSMOLALITY SERPL: 284 MOSM/KG H2O (ref 278–298)
PHOSPHATE SERPL-MCNC: 1 MG/DL (ref 2.5–4.5)
PHOSPHATE SERPL-MCNC: 2.2 MG/DL (ref 2.5–4.5)
PHOSPHATE SERPL-MCNC: 3.3 MG/DL (ref 2.5–4.5)
PLATELET # BLD AUTO: 206 K/UL (ref 164–446)
PLATELET BLD QL SMEAR: NORMAL
PMV BLD AUTO: 11.4 FL (ref 9–12.9)
POLYCHROMASIA BLD QL SMEAR: NORMAL
POTASSIUM SERPL-SCNC: 3.8 MMOL/L (ref 3.6–5.5)
POTASSIUM SERPL-SCNC: 3.9 MMOL/L (ref 3.6–5.5)
POTASSIUM SERPL-SCNC: 3.9 MMOL/L (ref 3.6–5.5)
POTASSIUM SERPL-SCNC: 4.2 MMOL/L (ref 3.6–5.5)
POTASSIUM SERPL-SCNC: 4.8 MMOL/L (ref 3.6–5.5)
PROCALCITONIN SERPL-MCNC: 24.07 NG/ML
PROT SERPL-MCNC: 5 G/DL (ref 6–8.2)
RBC # BLD AUTO: 2.8 M/UL (ref 4.2–5.4)
RBC BLD AUTO: PRESENT
RETICS # AUTO: 0.03 M/UL (ref 0.04–0.06)
RETICS/RBC NFR: 1 % (ref 0.8–2.1)
SIGNIFICANT IND 70042: NORMAL
SIGNIFICANT IND 70042: NORMAL
SITE SITE: NORMAL
SITE SITE: NORMAL
SODIUM SERPL-SCNC: 122 MMOL/L (ref 135–145)
SODIUM SERPL-SCNC: 122 MMOL/L (ref 135–145)
SODIUM SERPL-SCNC: 123 MMOL/L (ref 135–145)
SODIUM SERPL-SCNC: 124 MMOL/L (ref 135–145)
SODIUM SERPL-SCNC: 125 MMOL/L (ref 135–145)
SOURCE SOURCE: NORMAL
SOURCE SOURCE: NORMAL
TIBC SERPL-MCNC: 123 UG/DL (ref 250–450)
TOXIC GRANULES BLD QL SMEAR: SLIGHT
TSH SERPL DL<=0.005 MIU/L-ACNC: 1.33 UIU/ML (ref 0.38–5.33)
UIBC SERPL-MCNC: 116 UG/DL (ref 110–370)
VIT B12 SERPL-MCNC: 1401 PG/ML (ref 211–911)
WBC # BLD AUTO: 36.8 K/UL (ref 4.8–10.8)

## 2020-04-27 PROCEDURE — 700105 HCHG RX REV CODE 258: Performed by: INTERNAL MEDICINE

## 2020-04-27 PROCEDURE — 700105 HCHG RX REV CODE 258: Performed by: STUDENT IN AN ORGANIZED HEALTH CARE EDUCATION/TRAINING PROGRAM

## 2020-04-27 PROCEDURE — 93306 TTE W/DOPPLER COMPLETE: CPT

## 2020-04-27 PROCEDURE — 82330 ASSAY OF CALCIUM: CPT

## 2020-04-27 PROCEDURE — 87186 SC STD MICRODIL/AGAR DIL: CPT | Mod: 91

## 2020-04-27 PROCEDURE — 700117 HCHG RX CONTRAST REV CODE 255: Performed by: STUDENT IN AN ORGANIZED HEALTH CARE EDUCATION/TRAINING PROGRAM

## 2020-04-27 PROCEDURE — 83735 ASSAY OF MAGNESIUM: CPT | Mod: 91

## 2020-04-27 PROCEDURE — 700111 HCHG RX REV CODE 636 W/ 250 OVERRIDE (IP)

## 2020-04-27 PROCEDURE — 0T913ZX DRAINAGE OF LEFT KIDNEY, PERCUTANEOUS APPROACH, DIAGNOSTIC: ICD-10-PCS | Performed by: RADIOLOGY

## 2020-04-27 PROCEDURE — 99153 MOD SED SAME PHYS/QHP EA: CPT

## 2020-04-27 PROCEDURE — 83036 HEMOGLOBIN GLYCOSYLATED A1C: CPT

## 2020-04-27 PROCEDURE — 80053 COMPREHEN METABOLIC PANEL: CPT

## 2020-04-27 PROCEDURE — 700102 HCHG RX REV CODE 250 W/ 637 OVERRIDE(OP): Performed by: STUDENT IN AN ORGANIZED HEALTH CARE EDUCATION/TRAINING PROGRAM

## 2020-04-27 PROCEDURE — 87070 CULTURE OTHR SPECIMN AEROBIC: CPT

## 2020-04-27 PROCEDURE — 82962 GLUCOSE BLOOD TEST: CPT | Mod: 91

## 2020-04-27 PROCEDURE — 87205 SMEAR GRAM STAIN: CPT | Mod: 91

## 2020-04-27 PROCEDURE — 82607 VITAMIN B-12: CPT

## 2020-04-27 PROCEDURE — 80048 BASIC METABOLIC PNL TOTAL CA: CPT | Mod: 91

## 2020-04-27 PROCEDURE — 83605 ASSAY OF LACTIC ACID: CPT | Mod: 91

## 2020-04-27 PROCEDURE — 84443 ASSAY THYROID STIM HORMONE: CPT

## 2020-04-27 PROCEDURE — 10160 PNXR ASPIR ABSC HMTMA BULLA: CPT | Mod: LT

## 2020-04-27 PROCEDURE — 700111 HCHG RX REV CODE 636 W/ 250 OVERRIDE (IP): Performed by: INTERNAL MEDICINE

## 2020-04-27 PROCEDURE — 0T9130Z DRAINAGE OF LEFT KIDNEY WITH DRAINAGE DEVICE, PERCUTANEOUS APPROACH: ICD-10-PCS | Performed by: RADIOLOGY

## 2020-04-27 PROCEDURE — 83540 ASSAY OF IRON: CPT

## 2020-04-27 PROCEDURE — 700111 HCHG RX REV CODE 636 W/ 250 OVERRIDE (IP): Performed by: STUDENT IN AN ORGANIZED HEALTH CARE EDUCATION/TRAINING PROGRAM

## 2020-04-27 PROCEDURE — 82746 ASSAY OF FOLIC ACID SERUM: CPT

## 2020-04-27 PROCEDURE — 87086 URINE CULTURE/COLONY COUNT: CPT

## 2020-04-27 PROCEDURE — 87077 CULTURE AEROBIC IDENTIFY: CPT | Mod: 91

## 2020-04-27 PROCEDURE — 82728 ASSAY OF FERRITIN: CPT

## 2020-04-27 PROCEDURE — 85046 RETICYTE/HGB CONCENTRATE: CPT

## 2020-04-27 PROCEDURE — 74170 CT ABD WO CNTRST FLWD CNTRST: CPT

## 2020-04-27 PROCEDURE — 700102 HCHG RX REV CODE 250 W/ 637 OVERRIDE(OP): Performed by: INTERNAL MEDICINE

## 2020-04-27 PROCEDURE — 99291 CRITICAL CARE FIRST HOUR: CPT | Performed by: INTERNAL MEDICINE

## 2020-04-27 PROCEDURE — 85027 COMPLETE CBC AUTOMATED: CPT

## 2020-04-27 PROCEDURE — 83550 IRON BINDING TEST: CPT

## 2020-04-27 PROCEDURE — 83930 ASSAY OF BLOOD OSMOLALITY: CPT

## 2020-04-27 PROCEDURE — 700111 HCHG RX REV CODE 636 W/ 250 OVERRIDE (IP): Performed by: RADIOLOGY

## 2020-04-27 PROCEDURE — 84100 ASSAY OF PHOSPHORUS: CPT | Mod: 91

## 2020-04-27 PROCEDURE — 770022 HCHG ROOM/CARE - ICU (200)

## 2020-04-27 PROCEDURE — 76775 US EXAM ABDO BACK WALL LIM: CPT

## 2020-04-27 PROCEDURE — A9270 NON-COVERED ITEM OR SERVICE: HCPCS | Performed by: STUDENT IN AN ORGANIZED HEALTH CARE EDUCATION/TRAINING PROGRAM

## 2020-04-27 PROCEDURE — 84145 PROCALCITONIN (PCT): CPT

## 2020-04-27 PROCEDURE — 700101 HCHG RX REV CODE 250: Performed by: INTERNAL MEDICINE

## 2020-04-27 PROCEDURE — 85007 BL SMEAR W/DIFF WBC COUNT: CPT

## 2020-04-27 PROCEDURE — 93306 TTE W/DOPPLER COMPLETE: CPT | Mod: 26 | Performed by: INTERNAL MEDICINE

## 2020-04-27 PROCEDURE — 700117 HCHG RX CONTRAST REV CODE 255: Performed by: RADIOLOGY

## 2020-04-27 RX ORDER — DEXTROSE MONOHYDRATE 25 G/50ML
50 INJECTION, SOLUTION INTRAVENOUS
Status: DISCONTINUED | OUTPATIENT
Start: 2020-04-27 | End: 2020-04-30

## 2020-04-27 RX ORDER — INSULIN GLARGINE 100 [IU]/ML
30 INJECTION, SOLUTION SUBCUTANEOUS EVERY EVENING
Status: DISCONTINUED | OUTPATIENT
Start: 2020-04-28 | End: 2020-04-29

## 2020-04-27 RX ORDER — INSULIN GLARGINE 100 [IU]/ML
0.2 INJECTION, SOLUTION SUBCUTANEOUS EVERY EVENING
Status: DISCONTINUED | OUTPATIENT
Start: 2020-04-28 | End: 2020-04-27

## 2020-04-27 RX ORDER — ONDANSETRON 2 MG/ML
4 INJECTION INTRAMUSCULAR; INTRAVENOUS PRN
Status: DISCONTINUED | OUTPATIENT
Start: 2020-04-27 | End: 2020-04-27 | Stop reason: HOSPADM

## 2020-04-27 RX ORDER — DIPHENHYDRAMINE HYDROCHLORIDE 50 MG/ML
25 INJECTION INTRAMUSCULAR; INTRAVENOUS EVERY 6 HOURS PRN
Status: DISCONTINUED | OUTPATIENT
Start: 2020-04-27 | End: 2020-04-27

## 2020-04-27 RX ORDER — POTASSIUM CHLORIDE 7.45 MG/ML
10 INJECTION INTRAVENOUS
Status: COMPLETED | OUTPATIENT
Start: 2020-04-27 | End: 2020-04-27

## 2020-04-27 RX ORDER — MIDAZOLAM HYDROCHLORIDE 1 MG/ML
.5-2 INJECTION INTRAMUSCULAR; INTRAVENOUS PRN
Status: DISCONTINUED | OUTPATIENT
Start: 2020-04-27 | End: 2020-04-27 | Stop reason: HOSPADM

## 2020-04-27 RX ORDER — POTASSIUM CHLORIDE 14.9 MG/ML
20 INJECTION INTRAVENOUS ONCE
Status: COMPLETED | OUTPATIENT
Start: 2020-04-27 | End: 2020-04-27

## 2020-04-27 RX ORDER — MIDAZOLAM HYDROCHLORIDE 1 MG/ML
INJECTION INTRAMUSCULAR; INTRAVENOUS
Status: COMPLETED
Start: 2020-04-27 | End: 2020-04-27

## 2020-04-27 RX ORDER — FOLIC ACID 1 MG/1
1 TABLET ORAL DAILY
Status: DISCONTINUED | OUTPATIENT
Start: 2020-04-27 | End: 2020-05-05 | Stop reason: HOSPADM

## 2020-04-27 RX ORDER — SODIUM CHLORIDE 9 MG/ML
500 INJECTION, SOLUTION INTRAVENOUS
Status: DISCONTINUED | OUTPATIENT
Start: 2020-04-27 | End: 2020-04-27 | Stop reason: HOSPADM

## 2020-04-27 RX ORDER — SODIUM CHLORIDE, SODIUM LACTATE, POTASSIUM CHLORIDE, CALCIUM CHLORIDE 600; 310; 30; 20 MG/100ML; MG/100ML; MG/100ML; MG/100ML
INJECTION, SOLUTION INTRAVENOUS CONTINUOUS
Status: DISCONTINUED | OUTPATIENT
Start: 2020-04-27 | End: 2020-04-28

## 2020-04-27 RX ORDER — THIAMINE MONONITRATE (VIT B1) 100 MG
100 TABLET ORAL DAILY
Status: DISCONTINUED | OUTPATIENT
Start: 2020-04-27 | End: 2020-05-05 | Stop reason: HOSPADM

## 2020-04-27 RX ORDER — DEXTROSE, SODIUM CHLORIDE, SODIUM LACTATE, POTASSIUM CHLORIDE, AND CALCIUM CHLORIDE 5; .6; .31; .03; .02 G/100ML; G/100ML; G/100ML; G/100ML; G/100ML
INJECTION, SOLUTION INTRAVENOUS CONTINUOUS
Status: DISCONTINUED | OUTPATIENT
Start: 2020-04-27 | End: 2020-04-28

## 2020-04-27 RX ORDER — ONDANSETRON 2 MG/ML
INJECTION INTRAMUSCULAR; INTRAVENOUS
Status: COMPLETED
Start: 2020-04-27 | End: 2020-04-27

## 2020-04-27 RX ORDER — POTASSIUM CHLORIDE 7.45 MG/ML
10 INJECTION INTRAVENOUS ONCE
Status: COMPLETED | OUTPATIENT
Start: 2020-04-27 | End: 2020-04-28

## 2020-04-27 RX ORDER — DEXTROSE AND SODIUM CHLORIDE 10; .45 G/100ML; G/100ML
INJECTION, SOLUTION INTRAVENOUS CONTINUOUS
Status: DISCONTINUED | OUTPATIENT
Start: 2020-04-27 | End: 2020-04-28

## 2020-04-27 RX ORDER — NOREPINEPHRINE BITARTRATE 0.03 MG/ML
0-30 INJECTION, SOLUTION INTRAVENOUS CONTINUOUS
Status: DISCONTINUED | OUTPATIENT
Start: 2020-04-27 | End: 2020-04-29

## 2020-04-27 RX ORDER — DIPHENHYDRAMINE HYDROCHLORIDE 50 MG/ML
25 INJECTION INTRAMUSCULAR; INTRAVENOUS ONCE
Status: COMPLETED | OUTPATIENT
Start: 2020-04-27 | End: 2020-04-27

## 2020-04-27 RX ADMIN — PIPERACILLIN AND TAZOBACTAM 4.5 G: 4; .5 INJECTION, POWDER, LYOPHILIZED, FOR SOLUTION INTRAVENOUS; PARENTERAL at 21:12

## 2020-04-27 RX ADMIN — POTASSIUM CHLORIDE 10 MEQ: 7.46 INJECTION, SOLUTION INTRAVENOUS at 02:37

## 2020-04-27 RX ADMIN — DIPHENHYDRAMINE HYDROCHLORIDE 25 MG: 50 INJECTION INTRAMUSCULAR; INTRAVENOUS at 20:10

## 2020-04-27 RX ADMIN — FENTANYL CITRATE 25 MCG: 50 INJECTION, SOLUTION INTRAMUSCULAR; INTRAVENOUS at 10:14

## 2020-04-27 RX ADMIN — DEXTROSE AND SODIUM CHLORIDE: 10; .45 INJECTION, SOLUTION INTRAVENOUS at 06:13

## 2020-04-27 RX ADMIN — POTASSIUM CHLORIDE 10 MEQ: 7.46 INJECTION, SOLUTION INTRAVENOUS at 23:20

## 2020-04-27 RX ADMIN — POTASSIUM CHLORIDE 10 MEQ: 7.46 INJECTION, SOLUTION INTRAVENOUS at 05:47

## 2020-04-27 RX ADMIN — NICOTINE 7 MG: 7 PATCH TRANSDERMAL at 05:50

## 2020-04-27 RX ADMIN — DEXTROSE AND SODIUM CHLORIDE: 10; .45 INJECTION, SOLUTION INTRAVENOUS at 16:43

## 2020-04-27 RX ADMIN — MIDAZOLAM HYDROCHLORIDE 1 MG: 1 INJECTION, SOLUTION INTRAMUSCULAR; INTRAVENOUS at 10:13

## 2020-04-27 RX ADMIN — SODIUM CHLORIDE 12 UNITS/HR: 9 INJECTION, SOLUTION INTRAVENOUS at 05:57

## 2020-04-27 RX ADMIN — ONDANSETRON 4 MG: 2 INJECTION INTRAMUSCULAR; INTRAVENOUS at 10:10

## 2020-04-27 RX ADMIN — IOHEXOL 94 ML: 350 INJECTION, SOLUTION INTRAVENOUS at 04:46

## 2020-04-27 RX ADMIN — CEFTRIAXONE SODIUM 1 G: 1 INJECTION, POWDER, FOR SOLUTION INTRAMUSCULAR; INTRAVENOUS at 05:48

## 2020-04-27 RX ADMIN — FOLIC ACID 1 MG: 1 TABLET ORAL at 05:49

## 2020-04-27 RX ADMIN — MIDAZOLAM HYDROCHLORIDE 1 MG: 1 INJECTION, SOLUTION INTRAMUSCULAR; INTRAVENOUS at 10:24

## 2020-04-27 RX ADMIN — SODIUM CHLORIDE, SODIUM LACTATE, POTASSIUM CHLORIDE, CALCIUM CHLORIDE AND DEXTROSE MONOHYDRATE: 5; 600; 310; 30; 20 INJECTION, SOLUTION INTRAVENOUS at 05:17

## 2020-04-27 RX ADMIN — POTASSIUM CHLORIDE 10 MEQ: 7.46 INJECTION, SOLUTION INTRAVENOUS at 07:25

## 2020-04-27 RX ADMIN — PIPERACILLIN AND TAZOBACTAM 4.5 G: 4; .5 INJECTION, POWDER, LYOPHILIZED, FOR SOLUTION INTRAVENOUS; PARENTERAL at 07:24

## 2020-04-27 RX ADMIN — PIPERACILLIN AND TAZOBACTAM 4.5 G: 4; .5 INJECTION, POWDER, LYOPHILIZED, FOR SOLUTION INTRAVENOUS; PARENTERAL at 12:02

## 2020-04-27 RX ADMIN — POTASSIUM CHLORIDE 10 MEQ: 7.46 INJECTION, SOLUTION INTRAVENOUS at 01:27

## 2020-04-27 RX ADMIN — POTASSIUM PHOSPHATE, MONOBASIC AND POTASSIUM PHOSPHATE, DIBASIC 30 MMOL: 224; 236 INJECTION, SOLUTION, CONCENTRATE INTRAVENOUS at 12:02

## 2020-04-27 RX ADMIN — IOHEXOL 25 ML: 300 INJECTION, SOLUTION INTRAVENOUS at 11:00

## 2020-04-27 RX ADMIN — Medication 100 MG: at 05:48

## 2020-04-27 RX ADMIN — THERA TABS 1 TABLET: TAB at 05:49

## 2020-04-27 RX ADMIN — DEXTROSE AND SODIUM CHLORIDE: 10; .45 INJECTION, SOLUTION INTRAVENOUS at 22:57

## 2020-04-27 RX ADMIN — POTASSIUM CHLORIDE 20 MEQ: 14.9 INJECTION, SOLUTION INTRAVENOUS at 18:48

## 2020-04-27 ASSESSMENT — ENCOUNTER SYMPTOMS
VOMITING: 0
DIARRHEA: 0
SEIZURES: 0
EYE PAIN: 0
FLANK PAIN: 0
EYES NEGATIVE: 1
BRUISES/BLEEDS EASILY: 0
EYE DISCHARGE: 0
NECK PAIN: 0
PALPITATIONS: 0
PSYCHIATRIC NEGATIVE: 1
TREMORS: 0
LOSS OF CONSCIOUSNESS: 0
NEUROLOGICAL NEGATIVE: 1
FLANK PAIN: 1
POLYDIPSIA: 0
PHOTOPHOBIA: 0
SHORTNESS OF BREATH: 0
MYALGIAS: 1
EYE REDNESS: 0
FOCAL WEAKNESS: 0
BLURRED VISION: 0
HALLUCINATIONS: 0
COUGH: 1
ORTHOPNEA: 0
WEAKNESS: 1
SENSORY CHANGE: 1
CHILLS: 1
BACK PAIN: 1
ABDOMINAL PAIN: 1
NERVOUS/ANXIOUS: 0
SPEECH CHANGE: 0
FEVER: 1
CONSTIPATION: 0
DIAPHORESIS: 0

## 2020-04-27 ASSESSMENT — LIFESTYLE VARIABLES: SUBSTANCE_ABUSE: 1

## 2020-04-27 NOTE — PROGRESS NOTES
2 RN Skin Check    2 RN skin check complete.   Devices in place: NA  Skin assessed under devices: NA  Confirmed pressure ulcers found on: R foot wound, R toes 1,2,and3, sacrum.   New potential pressure ulcers noted on  Yes  The following interventions in place DEMETRIUS BARNES

## 2020-04-27 NOTE — ASSESSMENT & PLAN NOTE
Progressive lower extremity strength over several months per the patient.  Seen by PMD for this problem attributed to progressive diabetic neuropathy  - MRI T and L-spine showed no evidence of epidural abscess or transverse myelitis  - Neuro consult, consider EMG pending above evaluation

## 2020-04-27 NOTE — ASSESSMENT & PLAN NOTE
Slightly elevated troponin 40 likely secondary to demand ischemia, NSTEMI type II and pre-renal RUBEN. Patient is without chest pain / PND prior. Will stop trending.

## 2020-04-27 NOTE — ASSESSMENT & PLAN NOTE
History of, seen on MRI and ECHO. Recent Echo 9/2019. EF 75% with left ventricular concentric hypertrophy, hyperdynamic LV, dilated LA, RA enlargement, mild MR.  Aorta slightly dilated but normal.    Plan:  -Goal MAP > 65 - will be cautious given HOCM  -metoprolol 100mg BID  -Start verapamil if still hypertensive  -Holding aldactone, ACEI  -No plans to resumed ACEI due to history of HOCM

## 2020-04-27 NOTE — ASSESSMENT & PLAN NOTE
Resolved. Likely prerenal due to severe dehydration in setting of severe hyperglycemia and septic shock.   - avoid nephrotoxics and monitor

## 2020-04-27 NOTE — ASSESSMENT & PLAN NOTE
- Last hemoglobin A1c 14.8 in Dec 2019.    - Endorses compliance with her home medications Lantus 35 U/day, Jardiance 25 mg/d and metformin 1000 mg BID.    - Note her A1c was down to 6 in Sept 2019, was high at 12 many times prior to that ---> indicating there may have been changes to meds between Sept 2019 and Dec 2019 ?  - Previous work-up including BEVERLEY-65 and C-peptide were unremarkable  - admitted with BG > 600, BHB 1.12, acidosis 16   - on DKA protocol now    Plan:  - Repeat hemoglobin A1c pending  - DKA protocol to continue for now as continues to remain septic, and is undergoing procedures, may consider transitioning to S/C later this evening / tomorrow  - NPO until on subQ insulin  - increase fluid rate given dry volume status  - Hold home diabetic medications, hold metformin as she just had contrast, and may need future procedures

## 2020-04-27 NOTE — ED NOTES
Dr Khan at bedside, updated him on glucometer of 483. Per Dr Khan give NS, MD is aware of potassium and wants NS bolus of 2000 ml.

## 2020-04-27 NOTE — CONSULTS
Patient being transported to IR at time of attempted consult. I will consult on her tomorrow.     Carlos Alberto Gallardo PA-C  [facility]

## 2020-04-27 NOTE — ASSESSMENT & PLAN NOTE
History of poorly controlled hypertension with multiple home medications including metoprolol 50 mg BID, lisinopril 10 mg, aldactone 25 mg.     Plan:  -metoprolol 100mg BID  -Holding ACEI and aldactone currently  -Likely not a candidate for ACEI due to HOCM

## 2020-04-27 NOTE — H&P
Pulmonary History & Physical Note    Date of Service  4/26/2020    Primary Care Physician  Pcp Pt States None (left local practice because many missed appointments)    Code Status  Full Code    Chief Complaint  Chief Complaint   Patient presents with   • Weakness   • Fever       History of Presenting Illness  55 y.o. female who presented 4/26/2020 with weakness x four weeks, newly bedridden, poor appetite for several days, subjective fever for 3 days with chills, difficulty with urination. Indonesian speaking. Intermittent incontinence of urine (wears diaper- new), stools in toilet. Hx of poor medical compliance. She not taken her diabetes medicines in several days. Denies known COVID-19 contacts, no travel, lives with  who is not ill. Denies SOB, productive cough, rash, change in mental status. Noted to have low BP in ED and given 3 liters of fluids with continued soft blood pressures but not started on pressors in ED.    COVID negative in ED tonight, clear CXR, no objective/ subjective respiratory complains, U/A consistent with UTI, given Rocephin and Vancomycin in ED after cultures sent. MRI of spine pending. Glucose 569 and insulin drip started.     Review of Systems  Review of Systems   Constitutional: Positive for chills, fever, malaise/fatigue and weight loss. Negative for diaphoresis.        Ill appearing, febrile, no overt distress, breathing comfortably with SATs 91 percent on room air.    HENT: Negative.    Respiratory: Negative for hemoptysis, sputum production, shortness of breath and wheezing.         Mild unproductive cough, denies SOB but feels globally weak   Cardiovascular: Negative.    Gastrointestinal: Positive for nausea and vomiting. Negative for blood in stool, constipation, diarrhea and melena.        Nausea with one episode of vomiting   Genitourinary: Negative for dysuria, frequency, hematuria and urgency.   Musculoskeletal: Negative.    Skin: Negative.    Neurological: Positive for  weakness. Negative for dizziness, tingling, tremors, sensory change, speech change, focal weakness, seizures, loss of consciousness and headaches.        Has not been out of bed for ? Three or more days       Past Medical History   has a past medical history of Cataract, Dental disorder, Diabetes mellitus, type 2 (HCC), diarrhea (2019), Heart murmur, HOCM (hypertrophic obstructive cardiomyopathy) (HCC), and Hypertension.    Surgical History   has a past surgical history that includes other; primary c section (2006); pr upper gi endoscopy,ligat varix (9/25/2019); and gastroscopy-endo (9/25/2019).     Family History  family history includes Cancer in her father; Hypertension in her mother.     Social History   reports that she has been smoking cigarettes. She started smoking about 4 years ago. She has a 10.00 pack-year smoking history. She has never used smokeless tobacco. She reports current alcohol use of about 0.6 oz of alcohol per week. She reports that she does not use drugs.    Allergies  No Known Allergies    Medications  Prior to Admission Medications   Prescriptions Last Dose Informant Patient Reported? Taking?   Empagliflozin (JARDIANCE) 25 MG Tab 4/25/2020 at AM Family Member Yes Yes   Sig: Take 25 mg by mouth every morning.   atorvastatin (LIPITOR) 20 MG Tab 4/23/2020 at PM Family Member No No   Sig: TAKE 1 TABLET BY MOUTH AT BEDTIME   esomeprazole (NEXIUM) 20 MG capsule 4/25/2020 at AM Family Member Yes Yes   Sig: Take 20 mg by mouth every morning before breakfast.   gabapentin (NEURONTIN) 800 MG tablet 4/25/2020 at PM Family Member No No   Sig: TAKE 1 TABLET BY MOUTH THREE TIMES DAILY   insulin glargine (INSULIN GLARGINE) 100 UNIT/ML Solution Pen-injector injection 4/24/2020 at PM Family Member No No   Sig: Inject 35 Units as instructed every evening. E11.8   lisinopril (PRINIVIL) 10 MG Tab 4/25/2020 at AM Family Member No No   Sig: TAKE 1 TABLET BY MOUTH ONCE DAILY   metFORMIN (GLUCOPHAGE) 500 MG Tab  4/25/2020 at PM Family Member No No   Sig: TAKE 2 TABLETS BY MOUTH TWICE DAILY WITH MEALS   metoprolol (LOPRESSOR) 50 MG Tab 4/25/2020 at PM Family Member Yes Yes   Sig: Take 50 mg by mouth 2 times a day.   oxybutynin (DITROPAN) 5 MG Tab 4/24/2020 at PM Family Member Yes Yes   Sig: Take 5 mg by mouth 3 times a day.   spironolactone (ALDACTONE) 25 MG Tab 4/25/2020 at AM Family Member No No   Sig: TAKE 1 TABLET BY MOUTH ONCE DAILY   vitamin D, Ergocalciferol, (DRISDOL) 54997 units Cap capsule 4/1/2020 at PM Family Member No No   Sig: Take 1 Cap by mouth every 28 days.      Facility-Administered Medications: None       Physical Exam  Temp:  [37.1 °C (98.8 °F)-38.2 °C (100.8 °F)] 37.1 °C (98.8 °F)  Pulse:  [] 90  Resp:  [18-27] 22  BP: ()/(40-71) 115/64  SpO2:  [91 %-100 %] 92 %    Physical Exam  Vitals signs and nursing note reviewed.   Constitutional:       General: She is not in acute distress.     Appearance: She is ill-appearing. She is not toxic-appearing or diaphoretic.   HENT:      Left Ear: Tympanic membrane normal.      Nose: Nose normal.      Mouth/Throat:      Mouth: Mucous membranes are dry.   Neck:      Musculoskeletal: Normal range of motion and neck supple.   Cardiovascular:      Rate and Rhythm: Normal rate and regular rhythm.   Pulmonary:      Effort: Pulmonary effort is normal. No respiratory distress.      Breath sounds: Normal breath sounds. No stridor. No wheezing, rhonchi or rales.   Chest:      Chest wall: No tenderness.   Abdominal:      General: Abdomen is flat. Bowel sounds are normal. There is no distension.      Palpations: Abdomen is soft.      Tenderness: There is no abdominal tenderness. There is right CVA tenderness. There is no left CVA tenderness, guarding or rebound.      Hernia: No hernia is present.      Comments: Right flank pain and right lower quadrant pain   Musculoskeletal: Normal range of motion.   Skin:     General: Skin is warm and dry.      Capillary Refill:  Capillary refill takes less than 2 seconds.   Neurological:      General: No focal deficit present.      Mental Status: She is alert.      Comments: Motor strenght 3/5 RLE/LLE, painful feet to light touch bilaterally         Laboratory:  Recent Labs     04/26/20  1437   WBC 22.3*   RBC 3.28*   HEMOGLOBIN 9.9*   HEMATOCRIT 30.3*   MCV 92.4   MCH 30.2   MCHC 32.7*   RDW 51.8*   PLATELETCT 209   MPV 10.9     Recent Labs     04/26/20  1437   SODIUM 116*   POTASSIUM 4.6   CHLORIDE 82*   CO2 16*   GLUCOSE 624*   BUN 44*   CREATININE 1.24   CALCIUM 7.8*     Recent Labs     04/26/20  1437   ALTSGPT 21   ASTSGOT 21   ALKPHOSPHAT 241*   TBILIRUBIN 1.0   GLUCOSE 624*     Recent Labs     04/26/20  1437   INR 1.31*     No results for input(s): NTPROBNP in the last 72 hours.      Recent Labs     04/26/20  1437   TROPONINT 46*       Imaging:  DX-CHEST-LIMITED (1 VIEW)   Final Result      1.  Supportive tubing as described above.   2.  No pneumothorax.   3.  Minimal pulmonary edema, worse than prior exam.      DX-CHEST-PORTABLE (1 VIEW)   Final Result      No radiographic evidence of acute cardiopulmonary process.      MR-THORACIC SPINE-WITH & W/O    (Results Pending)   MR-LUMBAR SPINE-WITH & W/O    (Results Pending)   EC-ECHOCARDIOGRAM COMPLETE W/O CONT    (Results Pending)         Assessment/Plan:      * Septic shock due to undetermined organism (HCC)- (present on admission)  Assessment & Plan  Hypotension on low dose pressors in setting of severe sepsis. Meets criteria for septic shock likely secondary UTI  - antibiotics  - abdominal US to R/O obstruction  - Continue to evaluate for other sources but UTI seems most likely (MRI of spine given new onset weakness lower extremities for one month)    Weakness of both lower extremities- (present on admission)  Assessment & Plan  Etiology unclear, motor strength in upper extremities grossly OK, lower is 3/5 and symmetric. Ongoing per hx of pt for one months where she is newly  bedridden  - check MRI spine  - neuro consult after MRI  - consider EMG pending above evaluation  - this febrile illness seems likely secondary to immobility and not the cause of the immobility  - diabetic neuropathy likely playing a role    Uncontrolled type 2 DM with hyperosmolar nonketotic hyperglycemia (HCC)- (present on admission)  Assessment & Plan  UTI related to poor mobility and poor hygiene in past month related to poor mobility/ weakness    - fluids  - insulin drip  - empiric antibiotics (Rocephin, Vanco)  - abdom US to R/O obstruction  - follow urine outpt closely  - coley to completely drain infection in bladder and monitor urine    HOCM (hypertrophic obstructive cardiomyopathy) (HCC)- (present on admission)  Assessment & Plan  This dose not appear to be an active problem   Pressor use as noted above  Check echo   If new concerns would get cardiology consult    Hypertension, benign- (present on admission)  Assessment & Plan  Has low BP- with Hx hypertrophic obstructive cardiomyopathy would tolerate MAPs 55-60  - check ECHO in am to see if any changes  - if develops cardiogenic shock would use pure Alpha agent (phenyleprine) otherwise for distributive shock low dose norepi OK  - repeat evaluations for fluid status      Critical Care = 80 minutes. Time spend adjusting fluids, pressors, review of labs, serial patient exams if patient with septic shock and at risk to develop multi-organ failure and death on this hospital stay

## 2020-04-27 NOTE — ASSESSMENT & PLAN NOTE
On presentation. Initial concern was for epidural abscess vs. transverse myelitis, but imaging was negative. Neuro was consulted and suspect that this is more chronic in nature due to diabetic neuropathy vs alcohol related peripheral neuropathy and recommend outpatient followup. Patient declined rehab and wants HH.     Plan  -Monitoring inpatient  -Continue folate and thiamine  -Per neuro --> will need EMG/NCS as out-pt  -Patient declines rehab, wants to go home with

## 2020-04-27 NOTE — CONSULTS
DATE OF SERVICE:  04/27/2020    UROLOGY CONSULTATION    REASON FOR CONSULTATION:  Urology service was consulted by Dr. Grant of the   intensivist service for advice and opinion regarding this woman's likely left   emphysematous pyelonephritis.      HISTORY OF PRESENT ILLNESS:  The patient is 55-year-old who presented   yesterday with weakness progressive over several weeks, nearly bedridden with   poor appetite,  subjective fevers, chills, problems with urination as well as   lower extremity weakness.  She has had incontinence of urine, which is new and   history of poor medical compliance notable for severely poorly controlled   insulin-dependent diabetes.      Denies known Covid-19 contacts.  No travel.  Lives with  who is not   ill.  There has not been significant respiratory symptoms.  A CT scan has been   performed, which demonstrated the presence of left emphysematous   pyelonephritis with likely obstruction.  She has been hypotensive, tachycardic   and febrile.  Glucose was over 500 and an insulin drip has begun.      For detailed account of the patient's past medical, surgical, social history   and review of systems, please see Dr. Grant's history and physical dated   yesterday in the patient's chart.      PHYSICAL EXAMINATION:    GENERAL:  Woman looking older than her stated age, resting in ICU bed, ill   appearing.    VITAL SIGNS:  Afebrile, currently pulse 80, blood pressure 130/80.    HEENT:  Oropharynx is dry mucosal membranes.    NECK:  No cervical lymphadenopathy.    CHEST:  Rises symmetric.    HEART:  Regular, 2+ radial pulses bilaterally.    SKIN:  Warm and dry.    NEUROLOGIC:  Grossly intact, though she is generally listless in bed.    EXTREMITIES:  No lower extremity edema.    GENITOURINARY:  She has a left nephrostomy tube draining purulent red   drainage.      LABORATORY DATA:  White blood cell count 37,000, hematocrit 25, creatinine   0.8, potassium 3.9, sodium 122.  Urinalysis showed  packed white cells, many   bacteria.  Negative for epithelials.      IMAGING:  CT scan as above.  There are multiple regions of gas collection and   otherwise extremely swollen, edematous left kidney.  There is evidence of   dilation of the collecting system demonstrating mild-to-moderate   hydronephrosis.  CT scan did not go down to the pelvis.  Thus, the distal   ureter was not visualized.  There is regions of the kidney, the large   measuring approximately 3.7 cm suggestive of developing abscess.  Remaining   abscess pockets would be difficult to discern at present time.      Blood cultures positive for gram-negative rods.      IMPRESSION AND PLAN:  A 55-year-old poorly controlled insulin-dependent   diabetic, history of noncompliance, with left emphysematous pyelonephritis,   bacteremia with sepsis.  Intensive medical management is recommended along   with the recently placed left nephrostomy tube.  It is my understanding that   interventional radiology was able to attempt to drain the region of abscess,   though no drain was left.  Broad-spectrum antibiotics should be utilized until   cultures come back, but typically this is an Escherichia coli or   enterococcus.      RECOMMENDATIONS:  For ongoing intensive medical management and support as well   as nephrostomy tube drainage.  We will likely repeat imaging in approximately   2-3 days to assess for progression of any abscesses that would be amenable to   percutaneous drainage in that left kidney.      Thank you very much for this consult.  We will continue to follow along.       ____________________________________     Dean Perry MD [facility] / [facility]    DD:  04/27/2020 11:40:12  DT:  04/27/2020 14:21:24    D#:  [id number]  Job#:  [id number]    cc: Binh Grant MD

## 2020-04-27 NOTE — H&P
History & Physical Note    Date of Admission: 4/26/2020  Admission Status: Inpatient  [facility] Team: [facility] Gold [facility]  Attending: Binh Grant M.D.  Senior Resident: Quan Khan M.D.    Contact Number: [phone]    Chief Complaint: Sepsis, lower extremity weakness x 4 weeks    History of Present Illness (HPI):   Rigoberto Lakhani is a 55 y.o. [place] female who presented 4/26/2020 with history of diabetes mellitus type 2 on insulin, end-stage cirrhosis presumably secondary to alcohol, cardiomyopathy secondary to left ventricular outflow tract obstruction on MRI 2014, hypertension, hyperlipidemia, and active smoking who was brought in by ambulance because she had not been eating or drinking for 3 days.  Her glucose was 569 on arrival.  She reports she has been taking all of her medications as prescribed.  She reports a 4-week history of progressive low back pain and lower extremity weakness.  She reports she has not been able to ambulate for approximately 4 weeks.  She has decreased sensation in the lower extremities.  She reports a sore throat as well as a persistent cough and fever, which has been going on for a couple of weeks.  She does smoke less than 1 pack/day.  She also reports worsening of her vision recently.  She also complains of some chest discomfort and palpitations.  She denies any recent travel within the past month or known contact with sick persons.  COVID-19 PCR testing was performed and was negative in the ER.  She does report some dysuria.  Urine was notably cloudy.    She did come in with a fever of 100.8, heart rate of 112, blood pressure of 74/45, SPO2 91% on room air.  MAP began to improve mildly to 62 after 3.1 L of fluids but SBP remained in the 80s.    Review of Systems:   Review of Systems   Constitutional: Positive for chills, fever and malaise/fatigue.   HENT: Positive for sore throat. Negative for congestion, ear pain and hearing loss.    Eyes: Positive for blurred vision. Negative for  photophobia and discharge.   Respiratory: Positive for cough. Negative for sputum production and shortness of breath.    Cardiovascular: Positive for chest pain and palpitations. Negative for leg swelling.   Gastrointestinal: Positive for abdominal pain. Negative for constipation, diarrhea, nausea and vomiting.   Genitourinary: Positive for dysuria and frequency.   Musculoskeletal: Positive for back pain (Low back). Negative for falls, joint pain, myalgias and neck pain.   Skin: Negative for itching and rash.   Neurological: Positive for sensory change, focal weakness (Lower extremities) and weakness. Negative for headaches.   Endo/Heme/Allergies: Negative for polydipsia. Does not bruise/bleed easily.   Psychiatric/Behavioral: Negative for substance abuse. The patient is not nervous/anxious.        Past Medical History:   Past Medical History was reviewed with patient.   has a past medical history of Cataract, Dental disorder, Diabetes mellitus, type 2 (HCC), diarrhea (2019), Heart murmur, HOCM (hypertrophic obstructive cardiomyopathy) ([facility]), and Hypertension.    Past Surgical History: Past Surgical History was reviewed with patient.   has a past surgical history that includes other; primary c section (2006); pr upper gi endoscopy,ligat varix (9/25/2019); and gastroscopy-endo (9/25/2019).    Medications: Medications have been reviewed with patient.  Prior to Admission Medications   Prescriptions Last Dose Informant Patient Reported? Taking?   Empagliflozin (JARDIANCE) 25 MG Tab 4/25/2020 at AM Family Member Yes Yes   Sig: Take 25 mg by mouth every morning.   atorvastatin (LIPITOR) 20 MG Tab 4/23/2020 at PM Family Member No No   Sig: TAKE 1 TABLET BY MOUTH AT BEDTIME   esomeprazole (NEXIUM) 20 MG capsule 4/25/2020 at AM Family Member Yes Yes   Sig: Take 20 mg by mouth every morning before breakfast.   gabapentin (NEURONTIN) 800 MG tablet 4/25/2020 at PM Family Member No No   Sig: TAKE 1 TABLET BY MOUTH THREE TIMES  DAILY   insulin glargine (INSULIN GLARGINE) 100 UNIT/ML Solution Pen-injector injection 4/24/2020 at PM Family Member No No   Sig: Inject 35 Units as instructed every evening. E11.8   lisinopril (PRINIVIL) 10 MG Tab 4/25/2020 at AM Family Member No No   Sig: TAKE 1 TABLET BY MOUTH ONCE DAILY   metFORMIN (GLUCOPHAGE) 500 MG Tab 4/25/2020 at PM Family Member No No   Sig: TAKE 2 TABLETS BY MOUTH TWICE DAILY WITH MEALS   metoprolol (LOPRESSOR) 50 MG Tab 4/25/2020 at PM Family Member Yes Yes   Sig: Take 50 mg by mouth 2 times a day.   oxybutynin (DITROPAN) 5 MG Tab 4/24/2020 at PM Family Member Yes Yes   Sig: Take 5 mg by mouth 3 times a day.   spironolactone (ALDACTONE) 25 MG Tab 4/25/2020 at AM Family Member No No   Sig: TAKE 1 TABLET BY MOUTH ONCE DAILY   vitamin D, Ergocalciferol, (DRISDOL) 31484 units Cap capsule 4/1/2020 at PM Family Member No No   Sig: Take 1 Cap by mouth every 28 days.      Facility-Administered Medications: None        Allergies: Allergies have been reviewed with patient.  No Known Allergies    Family History: Reviewed  family history includes Cancer in her father; Hypertension in her mother.     Social History:   Tobacco: smokes <1 PPD   Alcohol: 1 glass wine daily  Recreational drugs (illegal and prescription):  None  Employment: Self-empolyed.  Restaurant owner.  Activity Level:  None recently.   Living situation:  Lives with  in house.  Recent travel:  None in 1 month  Primary Care Provider: reviewed Pcp Pt States None  Other (stressors, spirituality, exposures):  Not reviewed.  Physical Exam:     Vitals:  Temp:  [37.1 °C (98.8 °F)-38.2 °C (100.8 °F)] 37.1 °C (98.8 °F)  Pulse:  [] 90  Resp:  [18-27] 22  BP: ()/(40-71) 115/64  SpO2:  [91 %-100 %] 92 %    Physical Exam  Vitals signs and nursing note reviewed.   Constitutional:       General: She is not in acute distress.     Appearance: She is well-developed. She is ill-appearing. She is not toxic-appearing or diaphoretic.    HENT:      Head: Normocephalic and atraumatic.      Right Ear: External ear normal.      Left Ear: External ear normal.      Nose: Nose normal. No congestion or rhinorrhea.      Mouth/Throat:      Comments: Mask in place  Eyes:      General: No scleral icterus.        Right eye: No discharge.         Left eye: No discharge.      Extraocular Movements: Extraocular movements intact.      Conjunctiva/sclera: Conjunctivae normal.      Pupils: Pupils are equal, round, and reactive to light.   Neck:      Musculoskeletal: Neck supple. No neck rigidity or muscular tenderness.      Thyroid: No thyromegaly.      Vascular: No JVD.      Trachea: No tracheal deviation.   Cardiovascular:      Rate and Rhythm: Tachycardia present.      Heart sounds: Murmur (grade 3/6 MARIA DEL CARMEN) present. No friction rub. No gallop.    Pulmonary:      Effort: Pulmonary effort is normal. No respiratory distress.      Breath sounds: Normal breath sounds. No stridor. No wheezing, rhonchi or rales.   Abdominal:      General: Bowel sounds are normal. There is no distension.      Palpations: Abdomen is soft. There is no mass.      Tenderness: There is no abdominal tenderness. There is no guarding or rebound.   Musculoskeletal:         General: No swelling, tenderness or deformity.      Right lower leg: No edema.      Left lower leg: No edema.   Skin:     General: Skin is warm and dry.      Coloration: Skin is not pale.      Findings: No erythema or rash.   Neurological:      Mental Status: She is alert.      Sensory: Sensory deficit (decreased sensation in feet) present.      Motor: Weakness (3/5 weakness in bilaterally lower extremities) present. No abnormal muscle tone.      Deep Tendon Reflexes: Reflexes abnormal (0).   Psychiatric:         Behavior: Behavior normal.         Thought Content: Thought content normal.         Judgment: Judgment normal.         Labs:   Remarkable for a white count of 22.3 with a left shift, hemoglobin 9.5 with MCV 92.4,  corrected sodium of 129, potassium 4.6, chloride 82, CO2 16, anion gap of 18, glucose 624, BUN 44, creatinine 1.24, GFR 45, calcium 7.8.  Magnesium 1.4, lactic acid 3.7 improved to 3.1.  Troponin T 46.  Beta hydroxybutyrate 1.12, PT 16.6, INR 1.31, procalcitonin 5.23.    Liver function tests normal except for elevated alkaline phosphatase of 241, albumin 2.2, total bilirubin 1.0, total protein 5.8, increased globulin 3.6.    Urinalysis shows moderate occult blood, packed WBCs, moderate leukocyte esterase, and many bacteria.    EKG: Per my read, sinus tachycardia, no significant ST changes, heart rate 111, QTc 447.    Imaging:   Initial chest x-ray clear.  Repeated after central line placement.    DX-CHEST-LIMITED (1 VIEW)   Final Result      1.  Supportive tubing as described above.   2.  No pneumothorax.   3.  Minimal pulmonary edema, worse than prior exam.      DX-CHEST-PORTABLE (1 VIEW)   Final Result      No radiographic evidence of acute cardiopulmonary process.      MR-THORACIC SPINE-WITH & W/O    (Results Pending)   MR-LUMBAR SPINE-WITH & W/O    (Results Pending)   EC-ECHOCARDIOGRAM COMPLETE W/O CONT    (Results Pending)         Previous Data Review: reviewed    * Septic shock due to undetermined organism (HCC)- (present on admission)  Assessment & Plan  This is Septic shock Present on admission  SIRS criteria identified on my evaluation include: Fever, with temperature greater than 101 deg F, Tachycardia, with heart rate greater than 90 BPM, Tachypnea, with respirations greater than 20 per minute and Leukocytosis, with WBC greater than 12,000   Source is urinary tract vs. Lumbar spine.  Presentation includes: Severe sepsis present and persistent hypotension after 30 ml/kg completed.   Despite appropriate fluid resuscitation with crystalloid given per sepsis guidelines, the patient remains hypotensive with systolic blood pressure less than 90 or MAP less than 65  Hemodynamic support with additional fluids and  IV vasopressors as needed to maintain a SBP of 90 or MAP of 65  IV antibiotics as appropriate for source of sepsis  Reassessment: I have reassessed the patient's hemodynamic status    Plan:  -Fluids per sepsis protocol and DKA protocol with LR.  -MRI lumbar spine to assess for epidural abscess versus transverse myelitis.  -Ceftriaxone and linezolid IV, given RUBEN.  -Levophed to keep map greater than 55 given LVOT obstruction.  This is likely distributive shock secondary to sepsis rather than cardiogenic at this time.    -Follow-up blood and urine cultures.  -Echocardiogram to assess for endocarditis.      Weakness of both lower extremities- (present on admission)  Assessment & Plan  Low back pain and lower extremity weakness for 4 weeks concerning for transverse myelitis versus epidural abscess in setting of septic shock.    Plan:  -MRI of the thoracolumbar with and without contrast spine ordered.    Uncontrolled type 2 DM with hyperosmolar nonketotic hyperglycemia (HCC)- (present on admission)  Assessment & Plan  Last hemoglobin A1c 14.8 5 months ago.  She reports compliance with her medications.  Recent hyperglycemia may be secondary to infectious process.  She does have mild ketoacidosis which is more suggestive of HCC rather than DKA.  Previous work-up including BEVERLEY-65 and C-peptide were unremarkable    Plan:  -Recheck hemoglobin A1c.  -Insulin drip per DKA protocol.  -NPO until on subQ insulin.  -Continue fluid boluses with LR.  -Hold home diabetic medications.    URI (upper respiratory infection)- (present on admission)  Assessment & Plan  Non productive cough with fever.  COVID testing negative in the ER.  Per discussion with Dr. Kimble, no further testing indicated given low risk factors.    Plan:  -Discontinue rule out COVID.  -Droplet precautions for URI.    Acute kidney injury (HCC)- (present on admission)  Assessment & Plan  Likely prerenal due to severe dehydration in setting of severe hyperglycemia.      Plan:  -Continue IV fluids with LR per DKA and sepsis protocols.    Hyponatremia- (present on admission)  Assessment & Plan  Corrected sodium level 129.    Plan:  -Continue LR fluids per sepsis and DKA protocol.    Elevated troponin- (present on admission)  Assessment & Plan  Slightly elevated troponin likely secondary to demand ischemia, NSTEMI type 2.  EKG shows sinus tachycardia.    Plan:  -Continue to monitor.    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  Continues to drink 1 drink per day, wine.  Followed by gastroenterology.  No significant transaminitis on presentation.  Plan:  -Continue to monitor.    HOCM (hypertrophic obstructive cardiomyopathy) (HCC)- (present on admission)  Assessment & Plan  On MRI 12/2014;  1.  Narrowing of the aortic outflow tract secondary to mid and basal asymmetric septal hypertrophy and systolic anterior motion of the anterior leaflet of the mitral valve.  2.  Mild concentric left ventricular hypertrophy.  3.  No myocardial scarring is identified.  4.  Hyperdynamic left ventricle with an ejection fraction of 74%.  5.  Moderate mitral regurgitation.  6.  Mild tricuspid regurgitation.    Recent Echo 9/2019. EF 75% with left ventricular concentric hypertrophy.  Aorta slightly dilated but normal.  Plan:  -Levophed for MAp greater than 55 given history of left ventricular outflow tract obstruction.  -Repeat ECHO.    Hypertension, benign- (present on admission)  Assessment & Plan  History of poorly controlled hypertension.    Plan:  -Hold home blood pressure medications given septic shock.    Tobacco abuse- (present on admission)  Assessment & Plan  Continues to smoke less than 1 pack/day.    Plan:  -Nicotine replacement therapy.  - smoking cessation.       Discussed with Dr. Grant.

## 2020-04-27 NOTE — ASSESSMENT & PLAN NOTE
Secondary to alcohol. Continues to drink 1 drink per day, wine.  History of outpatient esophageal banding.   -Stable  -Monitoring inpatient, follow up outpatinet

## 2020-04-27 NOTE — ASSESSMENT & PLAN NOTE
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

## 2020-04-27 NOTE — PROGRESS NOTES
0700- Report received from [facility] PRUDENCE Glass. Patient resting in bed.    0800- Report given to IR.     0930- Patient transported to IR for nephrostomy and abscess drain.    1120- Patient transported back to R115.    1200- Patient FSBG 57- [facility] gold pageashkan. Held insulin gtt.     1250- Per MD, restart insulin gtt at half previous rate, 6units/hr, and increase D10/0.45NS to 150mL/hr. FSBG 76    1350- Spoke with Katharina in Infection prevention. Patient appropriate for de-escalation

## 2020-04-27 NOTE — PROGRESS NOTES
Critical Care Progress Note     Assessment:  - Septic shock  - DKA    US kidney - with left sided hydronephrosis and right sided pyelonephritis (debris)    MRI lumbar spine (prelim) - possible perinephric abscess    CT with contrast has been ordered already to identity a perinephric abscess    Plan:  - IR nephrostomy tube for left sided hydronephrosis, can be done first thing in the morning, clinically doing well with 1 mcg/min of NE    Hal Tillman MD  Critical Care Medicine  CCT 15 min

## 2020-04-27 NOTE — ASSESSMENT & PLAN NOTE
The patient presented with septic shock requiring pressors in the ICU. Source is due to left pyleonephritis with abscesses. She is now s/p drainage and nephrostomy tube placement 4/27. Cultures have all returned as pansensitive E. Coli.     Plan:   -Continue with IV Abx for total of 4 weeks, end date of 5/25/20  -PICC placed 5/1/20  -Patient to be discharged with nephrostomy tubes in place, will followup with urology 4 weeks post discharge  -Awaiting insurance approval for antibiotics discharge

## 2020-04-27 NOTE — DIETARY
Nutrition Services: Day 1 of admit.  Rigoberto Adames is a 55 y.o. female with admitting DX of Septic shock, Diabetic ketoacidosis  Consult received for Low BMI    Assessment:  Ht: 170.2 cm, Wt 4/26: 54.432 kg via unknown how wt was obtained, BMI: 18.79 - Normal  Diet: NPO x1 day    Evaluation:   1. Per current department guidelines dietary staff not permitted to enter isolation rooms at this time.  2. Per chart review pt's wt on 1/2/20 was 54.4 kg and wt on 12/4/19 was 53.3 kg. Wt appears stable.   3. Pt had procedure today of left neph tube; left lower pole renal abscess aspiration  4. Labs: Na 122, Glucose 121, BUN 30, Phosphorus 1.0, POC glucose 117, 153, 144  5. Meds: K+ replacement scale, lovenox, folvite, humulin R, MVI, zosyn, pericolace, thiamine, humulin @ 12 units/hr, levophed @ 2 mcg/min    Malnutrition Risk: Unable to determine at this time.     Recommendations/Plan:  1. Start PO diet when medically feasible.    2. Please obtain a measured weight. Monitor weight.    RD following

## 2020-04-27 NOTE — CARE PLAN
Problem: Nutritional:  Goal: Achieve adequate nutritional intake  Description: Patient will start a PO diet and consume >50% of meals   Outcome: NOT MET

## 2020-04-27 NOTE — CONSULTS
Consults ICU    Pt presents with two plus weeks of weakness. On presentation has fever, elevated WBC, clear chest xray, no respiratory complaints, adequate oxygenation on room air (Sat low 903), COVID- negative, positive urine for UTI and unexplained lower extremity weakness. Patient have a cord lesion/infection such as transverse myelitis.    This presentation is c/w infection, but inconsistent with COVID-19. Low pre-test clinical probability, a negative COVID-19 PCR, there is no further need, in my opinion, to rule-out COVID and patient may be managed as a non-COVID patient in the hospital with severe sepsis/ septic shock.    Discussed patient with Dr Waldemar Kimble from COVID/ infection control. Dr Kimble agrees that patient warrants no further COVID- R/O and may be safely managed as a non-COVID patient in the ICU.

## 2020-04-27 NOTE — THERAPY
Occupational Therapy Contact Note    OT order received. RN requesting to hold. Pt going down for a stent. Will attempt tomorrow as appropriate.    Priya Gregory, OTR/L

## 2020-04-27 NOTE — PROCEDURES
Central Venous Catheter (CVC, Central Line) Placement    Indication: Hemodynamic monitoring/Intravenous access    Resident: Dr. Quan Khan  Attending: Dr. Binh Grant     Consent was obtained from patient.  The patient was placed in a reverse Trendelenburg position appropriate for central line placement based on the vein to be cannulated. The patient’s right neck and shoulder was prepped and draped in sterile fashion. A triple lumen catheter was introduced into the the internal jugular using the Seldinger technique and under ultrasound guidance to 16 cm. The catheter was threaded smoothly over the guide wire and appropriate blood return was obtained. Each lumen of the catheter was evacuated of air and flushed with sterile saline. The catheter was then sutured in place to the skin and a sterile dressing applied. Perfusion to the extremity distal to the point of catheter insertion was checked and found to be adequate.      Estimated Blood Loss: 4 mL   The patient tolerated the procedure well and there were no complications.      Chest x-ray was performed confirming placement of central line into superior vena cava.

## 2020-04-27 NOTE — PROGRESS NOTES
Daily Progress Note:     Date of Service: 4/27/2020  Primary Team: [facility] Gold Team   Attending: Emiliano Cortes M.D.   Senior Resident: Dr. Serenity Bradley  Contact:  [phone]    Chief Complaint:   Reduced PO intake, fever, bilateral lower extremity weakness      HPI (4/26/20) :   Rigoberto Lakhani is a 55 year old lady with poorly controlled DM type II (on lantus 35 Units, metformin 1000 mg BID, Jardiance 25 mg/d, unremarkable C-peptide and BEVERLEY in 2019), hypertrophic obstructive cardiomyopathy with systolic motion of the anterior leaflet of mitral valve and asymmetric septal hypertrophy on cardiac MRI in 2014, EF 75% with severe concentric LVH on TTE in Sept 2019, hepatic cirrhosis, was admitted with 3 day h/o fever, chills, cough, sore throat, low back pain, dysuria, reduced appetite, and 4 week h/o lower extremity bilateral weakness. She was febrile with temp 100.8 F, tachycardic with HR in 110s, SBP 70s-80s on admission. She had WBC 22.8, Procal 5, BHB 1.12, AG 18, CO2 of 16, glucose > 500, mild pre-renal RUBEN with eGFR 54 and creat 1.24 (baseline 0.58) on admission. UA showed glucose large, leucocyte esterase and WBCs. MRI T-L spine showed abnormal left kidney. Renal US followed by CT showed atrophic right kidney, left renal abscess with gas and left hydronephrosis. Distal ureter not imaged - hence unsure if there's distal ureteric stone / unsure of cause of left hydronephrosis. She was treated for septic shock with IV ceftriaxone, IV Linezolid, IV fluids, levophed and was started on DKA protocol. Blood cultures +ve 2/2 bottles for GNR.  COVID-19 PCR testing was performed and was negative in the ER. Per Dr. Kimble - not for further work-up.     Subjective:     Awake, alert, feels weak  Maltese, speaks minimal English but understands  Low back pain, dysuria  UE function - normal  B/L LE weakness - strength 3+/5 , symmetric   Cough improving  Soft non-tender abdomen  Systolic murmur all across precordium 4/6, no carotid  bruit    WBC elevated to 38  Procal up to 24  AG closed, CO2 still 16  BG improving on DKA protocol  RUBEN improving   mls overnight  + 350 this am  Corrected Na 125-127  Corrected calcium 8.9 (from 4 am labs)    On levophed 2 mcg/min  On D10 half NS @ 83 m/hr  Linezolid discontinued, ceftriaxone switched to Zosyn  Remains on DKA protocol  Lytes being repleted    Discussed with Dr. Perry, Urology who reviewed CT scans, coordinated with OR staff as well as with Dr. Pride in IR. [place] this is best managed at present with left nephrostomy, and left perinephric drain into abscess.       Consultants/Specialty:  Urology, Dr. Vicky Pride - IR    Review of Systems:   Review of Systems   Constitutional: Positive for chills, fever and malaise/fatigue. Negative for diaphoresis.   HENT: Negative for ear pain and nosebleeds.    Eyes: Negative for blurred vision, photophobia, pain, discharge and redness.   Respiratory: Positive for cough. Negative for shortness of breath.         Cough improving   Cardiovascular: Negative for chest pain, palpitations and orthopnea.   Gastrointestinal: Positive for abdominal pain. Negative for constipation, diarrhea and vomiting.   Genitourinary: Positive for dysuria and frequency. Negative for flank pain and hematuria.   Musculoskeletal: Positive for back pain. Negative for neck pain.   Skin: Negative for itching and rash.   Neurological: Positive for sensory change and weakness. Negative for tremors, speech change, focal weakness, seizures and loss of consciousness.   Endo/Heme/Allergies: Negative for polydipsia. Does not bruise/bleed easily.   Psychiatric/Behavioral: Positive for substance abuse. Negative for hallucinations and suicidal ideas. The patient is not nervous/anxious.         Nicotine - ~ 1 ppd         Objective Data:   Physical Exam:   Vitals:   Temp:  [36.2 °C (97.1 °F)-38.2 °C (100.8 °F)] 36.4 °C (97.6 °F)  Pulse:  [] (P) 82  Resp:  [14-38] 20  BP:  ()/(40-79) (P) 103/62  SpO2:  [91 %-100 %] (P) 99 %     Physical Exam  Constitutional:       General: She is not in acute distress.     Appearance: She is toxic-appearing. She is not diaphoretic.      Comments: Looks lean   HENT:      Head: Normocephalic and atraumatic.      Mouth/Throat:      Mouth: Mucous membranes are dry.      Pharynx: No oropharyngeal exudate or posterior oropharyngeal erythema.   Eyes:      General: No scleral icterus.        Right eye: No discharge.         Left eye: No discharge.      Extraocular Movements: Extraocular movements intact.      Conjunctiva/sclera: Conjunctivae normal.   Neck:      Musculoskeletal: Normal range of motion and neck supple. No neck rigidity or muscular tenderness.   Cardiovascular:      Rate and Rhythm: Normal rate and regular rhythm.      Heart sounds: Murmur present.      Comments: Grade 4/6 systolic murmur most of precordium, prominent left upper sternal border, no radiation to carotid  Pulmonary:      Effort: Pulmonary effort is normal. No respiratory distress.      Breath sounds: Normal breath sounds. No wheezing or rales.   Abdominal:      General: Abdomen is flat. There is no distension.      Tenderness: There is no abdominal tenderness. There is left CVA tenderness. There is no right CVA tenderness or guarding.   Musculoskeletal: Normal range of motion.         General: No swelling or deformity.      Right lower leg: No edema.      Left lower leg: No edema.   Skin:     General: Skin is warm and dry.      Coloration: Skin is not jaundiced.      Findings: No bruising.   Neurological:      General: No focal deficit present.      Mental Status: She is alert and oriented to person, place, and time.      Cranial Nerves: No cranial nerve deficit.      Comments: B/L UE - normal strength and sensation  B/L LE - 4/5, endorses reduced sensation to crude touch B/L, but able to feel pain sensation  No asymmetry   Psychiatric:         Mood and Affect: Mood normal.          Thought Content: Thought content normal.         Judgment: Judgment normal.       Labs:   WBC elevated form 22 to 38  Procal elevated form 5 to 24  LA initially trended down from 3.8 to 1.6 overnight, but elevated thi atul back to 3.6 and now 3.2   Creat down to 0.9  CO2 at 16  Glucose 121 (down form 245 this am, > 600 on admission)  Ionized calcium 1.1  Mag 2.4   Phos down to 1  Corrected sodium 122-123  GNR in 2/2 BCx bottles      Imaging:   CT Renal 4/26/20   Left hydronephrosis with gas --> Pyonephrosis, complex fluid collections in left kidney concerning for renal abscesses  Atrophic right kidney  Hepatic cirrhosis  Distended GB with stone  Extrahepatic biliary dilation  Small B/L pleural efusions      MRI L-spine 4/26/20   No e/o epidural abscess or transverse myelitis  Mild central canal stenosis at L4-L5 level secondary to facet arthropathy  Mild L-spondylotic changes at L4-L5 and L5-S1 levels    MRI T-spine 4/26/20  No evidence of epidural abscess or transverse myelitis.  But radiologist noticed abnormality in left kidney which triggered renal US followed by CT      * Septic shock due to undetermined organism (HCC)- (present on admission)  Assessment & Plan  - This is Septic shock Present on admission  - SIRS criteria identified include: Fever, with temperature greater than 101 deg F, Tachycardia, with heart rate greater than 90 BPM, Tachypnea, with respirations greater than 20 per minute and Leukocytosis, with WBC greater than 12,000   - Urinary / renal source with left pyonephrosis, multiple collections in renal parenchyma in keeping with left renal abscesses  - causing pre-renal RUBEN on admission  - Presentation includes: Severe sepsis present and persistent hypotension after 30 ml/kg completed.   - WBC trended up to 38 (22 on admission)  - LA trending up was 3.8 --> 1.6 overnight --> 3.6 this am --> 3.2 after initiation of zosyn  - Procal trended up from 5 --> 24  - GNR on blood cultures 2/2 +ve  -  MRI thoraco-lumbar spine is negative for epidural abscess or transverse myelitis  - CT Renal suggested left pyonephrosis, multiple complex left renal parenchymal collections in keeping with evolving left renal abscess  - unsure of cause of left hydronephrosis, as imaging does not include pelvis --> will likely need retrograde pyelography with urology with view to ureteroscopy when infection / sepsis resolves (likely out-pt) versus in-pt non-contrast pelvic CT to check distal ureter  - had TTE this am, report awaited to r/o IE  - Urology, Dr. Perry consulted     Plan:  - increase IVF infusion rate for DKA protocol  - ceftriaxone and zyvox discontinued  - continue zosyn  - strict Is & Os  - Levophed to keep map greater than 65 given - this is likely distributive shock secondary to sepsis rather than cardiogenic at this time.  - Follow-up blood and urine cultures  - Appreciate Urol help  - Per discussion between Dr. Perry and Dr. Pride --> pt will have left nephrostomy and percutaneous drain for left renal abscess in IR  - might need completion pelvic non-contrast CT in the future to look for ? Distal ureteric stones. However, he might need follow up contrast imaging anyway in the next 48-72 hrs to assess left kidney, will wait for recommendations from urology in due course      Uncontrolled type 2 DM with hyperosmolar nonketotic hyperglycemia (HCC)- (present on admission)  Assessment & Plan  - Last hemoglobin A1c 14.8 in Dec 2019.    - Endorses compliance with her home medications Lantus 35 U/day, Jardiance 25 mg/d and metformin 1000 mg BID.    - Note her A1c was down to 6 in Sept 2019, was high at 12 many times prior to that ---> indicating there may have been changes to meds between Sept 2019 and Dec 2019 ?  - Previous work-up including BEVERLEY-65 and C-peptide were unremarkable  - admitted with BG > 600, BHB 1.12, acidosis 16   - on DKA protocol now    Plan:  - Repeat hemoglobin A1c pending  - DKA protocol to  continue for now as continues to remain septic, and is undergoing procedures, may consider transitioning to S/C later this evening / tomorrow  - NPO until on subQ insulin  - increase fluid rate given dry volume status  - Hold home diabetic medications, hold metformin as she just had contrast, and may need future procedures    Urinary tract infection- (present on admission)  Assessment & Plan  - dysuria  - abnormal UA  - CT in keeping with left pyonephrosis and left renal abscess evolving with gas  - GNR on BCx  - currently on zosyn    URI (upper respiratory infection)- (present on admission)  Assessment & Plan  - Non productive cough with fever  - COVID PCR negative in ER    - Per discussion with Dr. Kimble by night team, no further testing indicated given low risk factors  - on droplet precautions at present  - RN will contact infection control reg. Droplet precautions    Acute kidney injury (HCC)- (present on admission)  Assessment & Plan  - likely prerenal due to severe dehydration in setting of severe hyperglycemia and septic shock   - renal function improving  - has pyonephrosis and left renal abscess  -  mls overnight + 350 mls this am    Plan:  - continue IVF per DKA protocol for now   - increase rate of IVF infusion as seems to be dry    Hyponatremia- (present on admission)  Assessment & Plan  - corrected sodium this am is 122-123  - on D10 half NS at present    Weakness of both lower extremities- (present on admission)  Assessment & Plan  - LBP for few weeks, B/L LE weakness 4 weeks - initial concern for transverse myelitis vs epidural abscess  - symmetric generalized weakness in LE, with power 4/5 but no DTRs, reducd sensation to crude touch, has pain sensation - but no asymmetry  - MRI T-L spine - no e/o epidural abscess or transverse myelitis  - likely generalized weakness due to reduced PO intake and sepsis    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)- (present on admission)  Assessment &  Plan  Presumably secondary to alcohol intake.  Continues to drink 1 drink per day, wine.  Followed by gastroenterology.  No significant transaminitis on presentation.  Plan:  -Continue to monitor.    HOCM (hypertrophic obstructive cardiomyopathy) (HCC)- (present on admission)  Assessment & Plan  On MRI 12/2014;  1.  Narrowing of the aortic outflow tract secondary to mid and basal asymmetric septal hypertrophy and systolic anterior motion of the anterior leaflet of the mitral valve.  2.  Mild concentric left ventricular hypertrophy.  3.  No myocardial scarring is identified.  4.  Hyperdynamic left ventricle with an ejection fraction of 74%.  5.  Moderate mitral regurgitation.  6.  Mild tricuspid regurgitation.    Recent Echo 9/2019. EF 75% with left ventricular concentric hypertrophy, hyperdynamic LV, dilated LA, RA enlargement, mild MR.  Aorta slightly dilated but normal.    Plan:  - Levophed to maintain MAP > 65 - will be cautious given HOCM  - repeat TTE result awaited  - will increase IVF rate given dry volume status in the setting of sepsis and hyperosmolar state    Hypertension, benign- (present on admission)  Assessment & Plan  - h/o poorly controlled HTN  - holding home metoprolol 50 mg BID, lisinopril 10 mg/d, aldactone 25 mg/d in the context of septic shock needing pressors    Hypomagnesemia  Assessment & Plan  - Mag 1.3 --. 2.2  - had 4 gms of IV Mag last night  - aim Mag > 2    Hypophosphatemia  Assessment & Plan  - phosp 1  - being repleted with K Phos    Normocytic anemia- (present on admission)  Assessment & Plan  - Etiology unclear.   - low iron but elevated ferritin 408 in keeping with possible inflammatory anemia, however ferritin can be elevated in acute infections   - Will need repeat ferritin once infection resolves      Elevated troponin- (present on admission)  Assessment & Plan  - Slightly elevated troponin 40 likely secondary to demand ischemia, NSTEMI type II and pre-renal RUBEN  - no chest  pain / PND prior  - EKG shows sinus tachycardia  - not trending  - she could having underlying atherosclerotic disease involving coronaries despite being asymptomatic prior to admission given her DM poor control and A1c 14.8   - already on BB, ACEI (in setting of HOCM? but not high dose), aldactone and statin at home    Tobacco abuse- (present on admission)  Assessment & Plan  - continues to smoke less than 1 pack/day.  - NRT  - smoking cessation counseling

## 2020-04-27 NOTE — ASSESSMENT & PLAN NOTE
UTI related to poor mobility and poor hygiene in past month related to poor mobility/ weakness    - fluids  - insulin drip  - empiric antibiotics (Rocephin, Vanco)  - abdom US to R/O obstruction  - follow urine outpt closely  - coley to completely drain infection in bladder and monitor urine

## 2020-04-27 NOTE — OR SURGEON
Immediate Post- Operative Note        PostOp Diagnosis: LEFT ureteral obstruction with pyonephrosis and at least one LEFT renal parenchymal abscess      Procedure(s): LEFT neph tube; LEFT lower pole renal abscess aspiration -- all sent for microbiology      Estimated Blood Loss: Less than 5 ml        Complications: None            4/27/2020     10:42 AM     Kamlesh Pride M.D.

## 2020-04-27 NOTE — ASSESSMENT & PLAN NOTE
Etiology unclear. Low iron but elevated ferritin 408 in keeping with possible inflammatory anemia, however ferritin can be elevated in acute infections.  - Will need repeat ferritin once infection resolves  - Monitoring inpatient

## 2020-04-27 NOTE — PROGRESS NOTES
Left nephrostomy with aspiration of left lower pole renal fluid collection performed by Dr Pride via posterior left flank approach. Procedure BARs explained to patient by physician and consent obtained. Patient to IR1 and assisted to prone position on table. Patient was monitored and assessed continuously throughout procedure; ETCO2 40-45 with consistent waveform. #12F left nephrostomy placed, secured to patient's skin, and attached to leg bag for gravity drainage; tube draining thick, purulent pinkish-brown fluid. Urine sample (tan, thick material) sent to lab for culture. Left kidney lower pole fluid collection aspirated and sample sent to lab for cultures (presumed to be abscess). Procedure completed without complication. Left flank catheter insertion site CDI; sterile guaze/medipore dressing applied. Patient tolerated procedure quite well, was slightly drowsy but appropriately responsive afterward (patient has weak, scratchy voice and is very difficult to understand. But oriented and appropriate). Patient returned to RICU in stable condition with transport and RN.     [facility] Flexima APDL #12F x 25cm  Ref [id number]

## 2020-04-28 DIAGNOSIS — F10.10 CHRONIC ALCOHOL ABUSE: ICD-10-CM

## 2020-04-28 DIAGNOSIS — R73.09 HEMOGLOBIN A1C 8.0% OR GREATER: ICD-10-CM

## 2020-04-28 DIAGNOSIS — G62.9 NEUROPATHY: ICD-10-CM

## 2020-04-28 PROBLEM — E87.8 REFEEDING SYNDROME: Status: ACTIVE | Noted: 2020-04-28

## 2020-04-28 LAB
AMMONIA PLAS-SCNC: 23 UMOL/L (ref 11–45)
ANION GAP SERPL CALC-SCNC: 11 MMOL/L (ref 7–16)
ANION GAP SERPL CALC-SCNC: 12 MMOL/L (ref 7–16)
BASOPHILS # BLD AUTO: 0 % (ref 0–1.8)
BASOPHILS # BLD: 0 K/UL (ref 0–0.12)
BUN SERPL-MCNC: 17 MG/DL (ref 8–22)
BUN SERPL-MCNC: 21 MG/DL (ref 8–22)
BURR CELLS BLD QL SMEAR: NORMAL
CA-I SERPL-SCNC: 1.1 MMOL/L (ref 1.1–1.3)
CALCIUM SERPL-MCNC: 6.9 MG/DL (ref 8.5–10.5)
CALCIUM SERPL-MCNC: 7 MG/DL (ref 8.5–10.5)
CHLORIDE SERPL-SCNC: 95 MMOL/L (ref 96–112)
CHLORIDE SERPL-SCNC: 97 MMOL/L (ref 96–112)
CK SERPL-CCNC: <7 U/L (ref 0–154)
CO2 SERPL-SCNC: 17 MMOL/L (ref 20–33)
CO2 SERPL-SCNC: 17 MMOL/L (ref 20–33)
CREAT SERPL-MCNC: 0.7 MG/DL (ref 0.5–1.4)
CREAT SERPL-MCNC: 0.82 MG/DL (ref 0.5–1.4)
EOSINOPHIL # BLD AUTO: 0 K/UL (ref 0–0.51)
EOSINOPHIL NFR BLD: 0 % (ref 0–6.9)
ERYTHROCYTE [DISTWIDTH] IN BLOOD BY AUTOMATED COUNT: 48.7 FL (ref 35.9–50)
GLUCOSE BLD-MCNC: 121 MG/DL (ref 65–99)
GLUCOSE BLD-MCNC: 145 MG/DL (ref 65–99)
GLUCOSE BLD-MCNC: 151 MG/DL (ref 65–99)
GLUCOSE BLD-MCNC: 155 MG/DL (ref 65–99)
GLUCOSE BLD-MCNC: 178 MG/DL (ref 65–99)
GLUCOSE BLD-MCNC: 187 MG/DL (ref 65–99)
GLUCOSE BLD-MCNC: 98 MG/DL (ref 65–99)
GLUCOSE SERPL-MCNC: 164 MG/DL (ref 65–99)
GLUCOSE SERPL-MCNC: 211 MG/DL (ref 65–99)
GRAM STN SPEC: NORMAL
HCT VFR BLD AUTO: 25.7 % (ref 37–47)
HGB BLD-MCNC: 8.7 G/DL (ref 12–16)
LYMPHOCYTES # BLD AUTO: 1.18 K/UL (ref 1–4.8)
LYMPHOCYTES NFR BLD: 5.2 % (ref 22–41)
MAGNESIUM SERPL-MCNC: 1.7 MG/DL (ref 1.5–2.5)
MAGNESIUM SERPL-MCNC: 2.3 MG/DL (ref 1.5–2.5)
MANUAL DIFF BLD: NORMAL
MCH RBC QN AUTO: 29.8 PG (ref 27–33)
MCHC RBC AUTO-ENTMCNC: 33.9 G/DL (ref 33.6–35)
MCV RBC AUTO: 88 FL (ref 81.4–97.8)
MONOCYTES # BLD AUTO: 0.39 K/UL (ref 0–0.85)
MONOCYTES NFR BLD AUTO: 1.7 % (ref 0–13.4)
MORPHOLOGY BLD-IMP: NORMAL
NEUTROPHILS # BLD AUTO: 21.13 K/UL (ref 2–7.15)
NEUTROPHILS NFR BLD: 93.1 % (ref 44–72)
NRBC # BLD AUTO: 0 K/UL
NRBC BLD-RTO: 0 /100 WBC
PHOSPHATE SERPL-MCNC: 2.5 MG/DL (ref 2.5–4.5)
PLATELET # BLD AUTO: 127 K/UL (ref 164–446)
PLATELET BLD QL SMEAR: NORMAL
PMV BLD AUTO: 11 FL (ref 9–12.9)
POIKILOCYTOSIS BLD QL SMEAR: NORMAL
POTASSIUM SERPL-SCNC: 4.7 MMOL/L (ref 3.6–5.5)
POTASSIUM SERPL-SCNC: 4.8 MMOL/L (ref 3.6–5.5)
RBC # BLD AUTO: 2.92 M/UL (ref 4.2–5.4)
RBC BLD AUTO: PRESENT
SIGNIFICANT IND 70042: NORMAL
SITE SITE: NORMAL
SODIUM SERPL-SCNC: 123 MMOL/L (ref 135–145)
SODIUM SERPL-SCNC: 126 MMOL/L (ref 135–145)
SOURCE SOURCE: NORMAL
WBC # BLD AUTO: 22.7 K/UL (ref 4.8–10.8)

## 2020-04-28 PROCEDURE — 700102 HCHG RX REV CODE 250 W/ 637 OVERRIDE(OP): Performed by: STUDENT IN AN ORGANIZED HEALTH CARE EDUCATION/TRAINING PROGRAM

## 2020-04-28 PROCEDURE — 87205 SMEAR GRAM STAIN: CPT

## 2020-04-28 PROCEDURE — 97161 PT EVAL LOW COMPLEX 20 MIN: CPT

## 2020-04-28 PROCEDURE — A9270 NON-COVERED ITEM OR SERVICE: HCPCS | Performed by: INTERNAL MEDICINE

## 2020-04-28 PROCEDURE — 99255 IP/OBS CONSLTJ NEW/EST HI 80: CPT | Performed by: NURSE PRACTITIONER

## 2020-04-28 PROCEDURE — 99233 SBSQ HOSP IP/OBS HIGH 50: CPT | Performed by: INTERNAL MEDICINE

## 2020-04-28 PROCEDURE — 770022 HCHG ROOM/CARE - ICU (200)

## 2020-04-28 PROCEDURE — 700111 HCHG RX REV CODE 636 W/ 250 OVERRIDE (IP): Performed by: STUDENT IN AN ORGANIZED HEALTH CARE EDUCATION/TRAINING PROGRAM

## 2020-04-28 PROCEDURE — 85027 COMPLETE CBC AUTOMATED: CPT

## 2020-04-28 PROCEDURE — 700105 HCHG RX REV CODE 258: Performed by: INTERNAL MEDICINE

## 2020-04-28 PROCEDURE — 82962 GLUCOSE BLOOD TEST: CPT

## 2020-04-28 PROCEDURE — A9270 NON-COVERED ITEM OR SERVICE: HCPCS | Performed by: STUDENT IN AN ORGANIZED HEALTH CARE EDUCATION/TRAINING PROGRAM

## 2020-04-28 PROCEDURE — 83735 ASSAY OF MAGNESIUM: CPT

## 2020-04-28 PROCEDURE — 82550 ASSAY OF CK (CPK): CPT

## 2020-04-28 PROCEDURE — 97165 OT EVAL LOW COMPLEX 30 MIN: CPT

## 2020-04-28 PROCEDURE — 87070 CULTURE OTHR SPECIMN AEROBIC: CPT

## 2020-04-28 PROCEDURE — 700111 HCHG RX REV CODE 636 W/ 250 OVERRIDE (IP): Performed by: INTERNAL MEDICINE

## 2020-04-28 PROCEDURE — 85007 BL SMEAR W/DIFF WBC COUNT: CPT

## 2020-04-28 PROCEDURE — 700102 HCHG RX REV CODE 250 W/ 637 OVERRIDE(OP): Performed by: INTERNAL MEDICINE

## 2020-04-28 PROCEDURE — 82140 ASSAY OF AMMONIA: CPT

## 2020-04-28 PROCEDURE — 82330 ASSAY OF CALCIUM: CPT

## 2020-04-28 PROCEDURE — 87077 CULTURE AEROBIC IDENTIFY: CPT

## 2020-04-28 PROCEDURE — 700105 HCHG RX REV CODE 258: Performed by: STUDENT IN AN ORGANIZED HEALTH CARE EDUCATION/TRAINING PROGRAM

## 2020-04-28 PROCEDURE — 84100 ASSAY OF PHOSPHORUS: CPT

## 2020-04-28 PROCEDURE — 80048 BASIC METABOLIC PNL TOTAL CA: CPT

## 2020-04-28 RX ORDER — SODIUM CHLORIDE 9 MG/ML
1000 INJECTION, SOLUTION INTRAVENOUS ONCE
Status: COMPLETED | OUTPATIENT
Start: 2020-04-28 | End: 2020-04-28

## 2020-04-28 RX ORDER — SODIUM CHLORIDE 9 MG/ML
INJECTION, SOLUTION INTRAVENOUS CONTINUOUS
Status: DISCONTINUED | OUTPATIENT
Start: 2020-04-28 | End: 2020-04-30

## 2020-04-28 RX ORDER — MAGNESIUM SULFATE HEPTAHYDRATE 40 MG/ML
2 INJECTION, SOLUTION INTRAVENOUS ONCE
Status: COMPLETED | OUTPATIENT
Start: 2020-04-28 | End: 2020-04-28

## 2020-04-28 RX ADMIN — Medication 100 MG: at 05:49

## 2020-04-28 RX ADMIN — INSULIN GLARGINE 30 UNITS: 100 INJECTION, SOLUTION SUBCUTANEOUS at 00:22

## 2020-04-28 RX ADMIN — PIPERACILLIN AND TAZOBACTAM 4.5 G: 4; .5 INJECTION, POWDER, LYOPHILIZED, FOR SOLUTION INTRAVENOUS; PARENTERAL at 05:49

## 2020-04-28 RX ADMIN — PIPERACILLIN AND TAZOBACTAM 4.5 G: 4; .5 INJECTION, POWDER, LYOPHILIZED, FOR SOLUTION INTRAVENOUS; PARENTERAL at 15:40

## 2020-04-28 RX ADMIN — NICOTINE 7 MG: 7 PATCH TRANSDERMAL at 05:49

## 2020-04-28 RX ADMIN — INSULIN GLARGINE 30 UNITS: 100 INJECTION, SOLUTION SUBCUTANEOUS at 18:18

## 2020-04-28 RX ADMIN — MAGNESIUM SULFATE IN WATER 2 G: 40 INJECTION, SOLUTION INTRAVENOUS at 09:37

## 2020-04-28 RX ADMIN — SODIUM CHLORIDE, POTASSIUM CHLORIDE, SODIUM LACTATE AND CALCIUM CHLORIDE: 600; 310; 30; 20 INJECTION, SOLUTION INTRAVENOUS at 01:30

## 2020-04-28 RX ADMIN — INSULIN LISPRO 4 UNITS: 100 INJECTION, SOLUTION INTRAVENOUS; SUBCUTANEOUS at 07:39

## 2020-04-28 RX ADMIN — SODIUM CHLORIDE: 9 INJECTION, SOLUTION INTRAVENOUS at 20:23

## 2020-04-28 RX ADMIN — SODIUM CHLORIDE 1000 ML: 9 INJECTION, SOLUTION INTRAVENOUS at 08:30

## 2020-04-28 RX ADMIN — THERA TABS 1 TABLET: TAB at 05:49

## 2020-04-28 RX ADMIN — INSULIN LISPRO 4 UNITS: 100 INJECTION, SOLUTION INTRAVENOUS; SUBCUTANEOUS at 18:19

## 2020-04-28 RX ADMIN — PIPERACILLIN AND TAZOBACTAM 4.5 G: 4; .5 INJECTION, POWDER, LYOPHILIZED, FOR SOLUTION INTRAVENOUS; PARENTERAL at 21:19

## 2020-04-28 RX ADMIN — SODIUM CHLORIDE: 9 INJECTION, SOLUTION INTRAVENOUS at 10:15

## 2020-04-28 RX ADMIN — ACETAMINOPHEN 650 MG: 325 TABLET, FILM COATED ORAL at 20:17

## 2020-04-28 RX ADMIN — SENNOSIDES AND DOCUSATE SODIUM 2 TABLET: 8.6; 5 TABLET ORAL at 05:48

## 2020-04-28 RX ADMIN — INSULIN LISPRO 4 UNITS: 100 INJECTION, SOLUTION INTRAVENOUS; SUBCUTANEOUS at 12:25

## 2020-04-28 RX ADMIN — FOLIC ACID 1 MG: 1 TABLET ORAL at 05:49

## 2020-04-28 RX ADMIN — ENOXAPARIN SODIUM 40 MG: 100 INJECTION SUBCUTANEOUS at 05:48

## 2020-04-28 ASSESSMENT — ENCOUNTER SYMPTOMS
CONSTIPATION: 0
VOMITING: 0
POLYDIPSIA: 0
MYALGIAS: 0
ABDOMINAL PAIN: 1
ORTHOPNEA: 0
SENSORY CHANGE: 1
PSYCHIATRIC NEGATIVE: 1
COUGH: 1
NECK PAIN: 0
BLURRED VISION: 0
WEAKNESS: 1
FEVER: 0
EYE PAIN: 0
HALLUCINATIONS: 0
EYE DISCHARGE: 0
LOSS OF CONSCIOUSNESS: 0
FOCAL WEAKNESS: 0
ABDOMINAL PAIN: 0
EYES NEGATIVE: 1
BACK PAIN: 1
PHOTOPHOBIA: 0
CHILLS: 0
SHORTNESS OF BREATH: 0
SEIZURES: 0
SPEECH CHANGE: 0
FLANK PAIN: 0
FOCAL WEAKNESS: 1
TREMORS: 0
DIAPHORESIS: 0
DIARRHEA: 0
NERVOUS/ANXIOUS: 0
PALPITATIONS: 0
BRUISES/BLEEDS EASILY: 0
EYE REDNESS: 0

## 2020-04-28 ASSESSMENT — COGNITIVE AND FUNCTIONAL STATUS - GENERAL
PERSONAL GROOMING: A LITTLE
CLIMB 3 TO 5 STEPS WITH RAILING: A LOT
SUGGESTED CMS G CODE MODIFIER MOBILITY: CK
SUGGESTED CMS G CODE MODIFIER DAILY ACTIVITY: CK
MOBILITY SCORE: 17
MOVING FROM LYING ON BACK TO SITTING ON SIDE OF FLAT BED: A LITTLE
TURNING FROM BACK TO SIDE WHILE IN FLAT BAD: A LITTLE
DRESSING REGULAR UPPER BODY CLOTHING: A LITTLE
DRESSING REGULAR LOWER BODY CLOTHING: A LOT
STANDING UP FROM CHAIR USING ARMS: A LITTLE
EATING MEALS: A LITTLE
WALKING IN HOSPITAL ROOM: A LITTLE
DAILY ACTIVITIY SCORE: 15
HELP NEEDED FOR BATHING: A LOT
MOVING TO AND FROM BED TO CHAIR: A LITTLE
TOILETING: A LOT

## 2020-04-28 ASSESSMENT — GAIT ASSESSMENTS
DEVIATION: DECREASED BASE OF SUPPORT
GAIT LEVEL OF ASSIST: MINIMAL ASSIST
ASSISTIVE DEVICE: FRONT WHEEL WALKER
DISTANCE (FEET): 100

## 2020-04-28 ASSESSMENT — LIFESTYLE VARIABLES: SUBSTANCE_ABUSE: 1

## 2020-04-28 ASSESSMENT — FIBROSIS 4 INDEX: FIB4 SCORE: 1.86

## 2020-04-28 ASSESSMENT — ACTIVITIES OF DAILY LIVING (ADL): TOILETING: REQUIRES ASSIST

## 2020-04-28 NOTE — CARE PLAN
Problem: Communication  Goal: The ability to communicate needs accurately and effectively will improve  Outcome: PROGRESSING AS EXPECTED     Problem: Safety  Goal: Will remain free from injury  Outcome: PROGRESSING AS EXPECTED  Goal: Will remain free from falls  Outcome: PROGRESSING AS EXPECTED     Problem: Infection  Goal: Will remain free from infection  Outcome: PROGRESSING AS EXPECTED     Problem: Venous Thromboembolism (VTW)/Deep Vein Thrombosis (DVT) Prevention:  Goal: Patient will participate in Venous Thrombosis (VTE)/Deep Vein Thrombosis (DVT)Prevention Measures  Outcome: PROGRESSING AS EXPECTED     Problem: Bowel/Gastric:  Goal: Normal bowel function is maintained or improved  Outcome: PROGRESSING AS EXPECTED  Goal: Will not experience complications related to bowel motility  Outcome: PROGRESSING AS EXPECTED     Problem: Knowledge Deficit  Goal: Knowledge of disease process/condition, treatment plan, diagnostic tests, and medications will improve  Outcome: PROGRESSING AS EXPECTED  Goal: Knowledge of the prescribed therapeutic regimen will improve  Outcome: PROGRESSING AS EXPECTED     Problem: Discharge Barriers/Planning  Goal: Patient's continuum of care needs will be met  Outcome: PROGRESSING AS EXPECTED     Problem: Respiratory:  Goal: Respiratory status will improve  Outcome: PROGRESSING AS EXPECTED     Problem: Fluid Volume:  Goal: Will maintain balanced intake and output  Outcome: PROGRESSING AS EXPECTED     Problem: Skin Integrity  Goal: Risk for impaired skin integrity will decrease  Outcome: PROGRESSING AS EXPECTED     Problem: Pain Management  Goal: Pain level will decrease to patient's comfort goal  Outcome: PROGRESSING AS EXPECTED

## 2020-04-28 NOTE — CONSULTS
Chief Complaint   Patient presents with   • Weakness   • Fever       Problem List Items Addressed This Visit     Hypotension    Relevant Medications    metoprolol (LOPRESSOR) 50 MG Tab    enoxaparin (LOVENOX) inj 40 mg    norepinephrine (Levophed) infusion 8 mg/250 mL (premix)      Other Visit Diagnoses     Diabetic ketoacidosis without coma associated with type 2 diabetes mellitus (HCC)        Relevant Medications    Empagliflozin (JARDIANCE) 25 MG Tab    insulin regular (HUMULIN R) injection 6 Units (Completed)    insulin glargine (LANTUS) injection 30 Units    insulin lispro (HUMALOG) injection 4 Units    insulin lispro (HUMALOG) injection 2-9 Units    glucose 4 g chewable tablet 16 g    Sepsis, due to unspecified organism, unspecified whether acute organ dysfunction present (HCC)        Pyelonephritis          Neurology Consultation     History of present illness:  This is a 55-year old female with PMhx significant for poorly controlled type II diabetes mellitus (more recent A1c reportedly >13%), associated neuropathy, hypertension, reported cirrhosis of the liver (?ETOH related, this is unclear), hypertrophic obstructive cardiomyopathy, STEMI, and suspected ETOH and tobacco abuse with a long standing history of non-compliance who [facility] for a chief complaint of generalized weakness, urinary incontinence, nausea, vomiting and malaise x 3 days. On arrival here, patient was found to have , also found to have severe leukocytosis (WBC 22.3), also noted to be hypotensive, tachycardic and febrile on arrival. Patient was admitted to ICU for presumed sepsis, source thought to be urosepsis (note UA with packed WBC, +leukocytesterase, bacteria), now s/p IR percutaneous nephrostomy tube placement for emphysematous pyelonephritis.  Our neurology service was asked to see the patient given persistent, generalized weakness, BLE worse than BUE, also with some concern for areflexia. In  "the setting of the severe acute medical co morbidities above, there was concern for an acute process such as GBS/AIDP. Patient admits to generalized weakness, however states that she is \"getting better\" today and states that she feels that weakness is overall improved. She was also reportedly able to ambulate today with the assistance of a walker/PT. The patient denies headache, dizziness, problem with vision, speech or swallowing.     Neurology has been consulted by Dr. Emiliano Cortes to further evaluate the findings noted above.     Past medical history:   Past Medical History:   Diagnosis Date   • Cataract     Bilateral   • Dental disorder     Upper   • Diabetes mellitus, type 2 (HCC)     Oral medications   • diarrhea 2019    been going on for about a year   • Heart murmur    • HOCM (hypertrophic obstructive cardiomyopathy) (HCC)    • Hypertension        Past surgical history:   Past Surgical History:   Procedure Laterality Date   • PB UPPER GI ENDOSCOPY,LIGAT VARIX  9/25/2019    Procedure: GASTROSCOPY, WITH VARICEAL BANDING - WITH GASTRIC MAPPING;  Surgeon: Sandi Espinoza M.D.;  Location: SURGERY [facility];  Service: Gastroenterology   • GASTROSCOPY-ENDO  9/25/2019    Procedure: GASTROSCOPY;  Surgeon: Sandi Espinoza M.D.;  Location: SURGERY [facility];  Service: Gastroenterology   • PRIMARY C SECTION  2006   • OTHER      c- section       Family history:   Family History   Problem Relation Age of Onset   • Cancer Father         stomach   • Hypertension Mother        Social history:   Social History     Socioeconomic History   • Marital status: Single     Spouse name: Not on file   • Number of children: Not on file   • Years of education: Not on file   • Highest education level: Not on file   Occupational History   • Occupation: restaurant owner   Social Needs   • Financial resource strain: Not on file   • Food insecurity     Worry: Not on file     Inability: Not on file   • Transportation " needs     Medical: Not on file     Non-medical: Not on file   Tobacco Use   • Smoking status: Current Every Day Smoker     Packs/day: 1.00     Years: 10.00     Pack years: 10.00     Types: Cigarettes     Start date: 8/12/2015   • Smokeless tobacco: Never Used   Substance and Sexual Activity   • Alcohol use: Yes     Alcohol/week: 0.6 oz     Types: 1 Glasses of wine per week     Drinks per session: 1 or 2   • Drug use: No   • Sexual activity: Not on file   Lifestyle   • Physical activity     Days per week: Not on file     Minutes per session: Not on file   • Stress: Not on file   Relationships   • Social connections     Talks on phone: Not on file     Gets together: Not on file     Attends Muslim service: Not on file     Active member of club or organization: Not on file     Attends meetings of clubs or organizations: Not on file     Relationship status: Not on file   • Intimate partner violence     Fear of current or ex partner: Not on file     Emotionally abused: Not on file     Physically abused: Not on file     Forced sexual activity: Not on file   Other Topics Concern   • Not on file   Social History Narrative    Single- 2 children.       Current medications:   Current Facility-Administered Medications   Medication Dose   • NS infusion     • thiamine tablet 100 mg  100 mg   • multivitamin (THERAGRAN) tablet 1 Tab  1 Tab   • folic acid (FOLVITE) tablet 1 mg  1 mg   • piperacillin-tazobactam (ZOSYN) 4.5 g in  mL IVPB  4.5 g   • norepinephrine (Levophed) infusion 8 mg/250 mL (premix)  0-30 mcg/min   • insulin glargine (LANTUS) injection 30 Units  30 Units   • insulin lispro (HUMALOG) injection 4 Units  0.2 Units/kg/day    And   • insulin lispro (HUMALOG) injection 2-9 Units  2-9 Units    And   • glucose 4 g chewable tablet 16 g  16 g    And   • dextrose 50% (D50W) injection 50 mL  50 mL   • enoxaparin (LOVENOX) inj 40 mg  40 mg   • acetaminophen (TYLENOL) tablet 650 mg  650 mg   • senna-docusate (PERICOLACE  or SENOKOT S) 8.6-50 MG per tablet 2 Tab  2 Tab    And   • polyethylene glycol/lytes (MIRALAX) PACKET 1 Packet  1 Packet    And   • magnesium hydroxide (MILK OF MAGNESIA) suspension 30 mL  30 mL    And   • bisacodyl (DULCOLAX) suppository 10 mg  10 mg   • nicotine (NICODERM) 7 MG/24HR 7 mg  7 mg    And   • nicotine polacrilex (NICORETTE) 2 MG piece 2 mg  2 mg       Medication Allergy:  No Known Allergies    Review of systems:   Constitutional: As noted above. Currently denies fever, night sweats, weight loss.   Eyes: denies acute vision change, eye pain or secretion.   Ears, Nose, Mouth, Throat: denies nasal secretion, nasal bleeding, difficulty swallowing, hearing loss, tinnitus, vertigo, ear pain, acute dental problems, oral ulcers or lesions.   Endocrine: denies recent weight changes, heat or cold intolerance, polyuria, polydypsia, polyphagia,abnormal hair growth.  Cardiovascular: denies new onset of chest pain, palpitations, syncope, or dyspnea of exertion.  Pulmonary: denies shortness of breath, new onset of cough, hemoptysis, wheezing, chest pain or flu-like symptoms.   GI: As noted above; currently denies nausea, vomiting, diarrhea, GI bleeding, change in appetite, abdominal pain, and change in bowel habits.  : denies dysuria, urinary incontinence, hematuria.  Heme/oncology: denies history of easy bruising or bleeding. No history of cancer, DVTor PE.  Allergy/immunology: denies hives/urticaria, or itching.   Dermatologic: denies new rash, or new skin lesions.  Musculoskeletal: As noted in detail above; denies joint swelling or pain, muscle pain, neck and back pain.   Neurologic: As noted in detail above.   Psychiatric: denies symptoms of depression, anxiety, hallucinations, mood swings or changes, suicidal or homicidal thoughts.     Physical examination:   Vitals:    04/28/20 0600 04/28/20 0700 04/28/20 0800 04/28/20 0900   BP: 110/69 112/77 106/69 123/79   Pulse: 97 93 (!) 101 90   Resp: (!) 27 20 (!) 24  (!) 30   Temp:   36.4 °C (97.5 °F)    TempSrc:   Temporal    SpO2: 95% 96% 98% 98%   Weight:    59.1 kg (130 lb 4.7 oz)   Height:         General: Patient in no acute distress, cooperative.  HEENT: Normocephalic, no signs of acute trauma.   Neck: supple, no meningeal signs or carotid bruits. There is normal range of motion. No tenderness on exam.   Chest: clear to auscultation. No cough.   CV: RRR, no murmurs.   Skin: no signs of acute rashes or trauma.   Musculoskeletal: joints exhibit full range of motion, without any pain to palpation. There are no signs of joint or muscle swelling. There is no tenderness to deep palpation of muscles.   Psychiatric: No hallucinatory behavior.       NEUROLOGICAL EXAM:   Mental status, orientation: Awake, alert and oriented to self, place, time (month).   Speech and language: speech is clear and fluent. The patient is able to name, repeat and comprehend.   Cranial nerve exam: Pupils are 3-4 mm bilaterally and equally reactive to light. Visual fields are intact by confrontation.There is no nystagmus on primary or secondary gaze. Intact full EOM in all directions of gaze. Face appears symmetric. Sensation in the face is intact to light touch. Tongue is midline and without any signs of tongue biting or fasciculations.Shoulder shrug is intact bilaterally.   Motor exam: Strength is 4+/5 to proximal BUE/BLE, 4/5 to distal BUE/BLE. Tone is normal. Muscle bulk is decreased throughout. No abnormal movements were seen on exam.   Sensory exam reveals normal sense of light touch and pinprick in all extremities.   Deep tendon reflexes:  Hyporeflexic (1 to 1+) throughout. Plantar responses are flexor. There is no clonus.   Coordination: shows a normal finger-nose-finger, no obvious dysmetria/ataxia.  Perhaps mildly altered rapidly alternating movements.   Gait: Not assessed at this time as patient is a fall risk.         ANCILLARY DATA REVIEWED:     Lab Data Review:  Recent Results (from the  past 24 hour(s))   ACCU-CHEK GLUCOSE    Collection Time: 04/27/20 12:10 PM   Result Value Ref Range    Glucose - Accu-Ck 57 (L) 65 - 99 mg/dL   ACCU-CHEK GLUCOSE    Collection Time: 04/27/20 12:49 PM   Result Value Ref Range    Glucose - Accu-Ck 73 65 - 99 mg/dL   EC-ECHOCARDIOGRAM COMPLETE W/O CONT    Collection Time: 04/27/20  1:08 PM   Result Value Ref Range    Eject.Frac. MOD BP 75.39     Eject.Frac. MOD 4C 75.02     Eject.Frac. MOD 2C 66.04     Left Ventrical Ejection Fraction 75    ACCU-CHEK GLUCOSE    Collection Time: 04/27/20  2:09 PM   Result Value Ref Range    Glucose - Accu-Ck 105 (H) 65 - 99 mg/dL   ACCU-CHEK GLUCOSE    Collection Time: 04/27/20  3:13 PM   Result Value Ref Range    Glucose - Accu-Ck 105 (H) 65 - 99 mg/dL   ACCU-CHEK GLUCOSE    Collection Time: 04/27/20  4:42 PM   Result Value Ref Range    Glucose - Accu-Ck 94 65 - 99 mg/dL   Basic Metabolic Panel    Collection Time: 04/27/20  4:44 PM   Result Value Ref Range    Sodium 125 (L) 135 - 145 mmol/L    Potassium 4.2 3.6 - 5.5 mmol/L    Chloride 96 96 - 112 mmol/L    Co2 17 (L) 20 - 33 mmol/L    Glucose 105 (H) 65 - 99 mg/dL    Bun 25 (H) 8 - 22 mg/dL    Creatinine 0.80 0.50 - 1.40 mg/dL    Calcium 6.8 (LL) 8.5 - 10.5 mg/dL    Anion Gap 12.0 7.0 - 16.0   PHOSPHORUS    Collection Time: 04/27/20  4:44 PM   Result Value Ref Range    Phosphorus 3.3 2.5 - 4.5 mg/dL   MAGNESIUM    Collection Time: 04/27/20  4:44 PM   Result Value Ref Range    Magnesium 2.0 1.5 - 2.5 mg/dL   LACTIC ACID    Collection Time: 04/27/20  4:44 PM   Result Value Ref Range    Lactic Acid 2.2 (H) 0.5 - 2.0 mmol/L   ESTIMATED GFR    Collection Time: 04/27/20  4:44 PM   Result Value Ref Range    GFR If African American >60 >60 mL/min/1.73 m 2    GFR If Non African American >60 >60 mL/min/1.73 m 2   ACCU-CHEK GLUCOSE    Collection Time: 04/27/20  6:11 PM   Result Value Ref Range    Glucose - Accu-Ck 100 (H) 65 - 99 mg/dL   ACCU-CHEK GLUCOSE    Collection Time: 04/27/20  7:04 PM    Result Value Ref Range    Glucose - Accu-Ck 76 65 - 99 mg/dL   ACCU-CHEK GLUCOSE    Collection Time: 04/27/20  7:57 PM   Result Value Ref Range    Glucose - Accu-Ck 76 65 - 99 mg/dL   ACCU-CHEK GLUCOSE    Collection Time: 04/27/20  8:57 PM   Result Value Ref Range    Glucose - Accu-Ck 103 (H) 65 - 99 mg/dL   Basic Metabolic Panel (BMP)    Collection Time: 04/27/20  9:10 PM   Result Value Ref Range    Sodium 124 (L) 135 - 145 mmol/L    Potassium 4.8 3.6 - 5.5 mmol/L    Chloride 97 96 - 112 mmol/L    Co2 18 (L) 20 - 33 mmol/L    Glucose 100 (H) 65 - 99 mg/dL    Bun 23 (H) 8 - 22 mg/dL    Creatinine 0.71 0.50 - 1.40 mg/dL    Calcium 6.9 (LL) 8.5 - 10.5 mg/dL    Anion Gap 9.0 7.0 - 16.0   ESTIMATED GFR    Collection Time: 04/27/20  9:10 PM   Result Value Ref Range    GFR If African American >60 >60 mL/min/1.73 m 2    GFR If Non African American >60 >60 mL/min/1.73 m 2   ACCU-CHEK GLUCOSE    Collection Time: 04/27/20 10:00 PM   Result Value Ref Range    Glucose - Accu-Ck 113 (H) 65 - 99 mg/dL   ACCU-CHEK GLUCOSE    Collection Time: 04/27/20 11:07 PM   Result Value Ref Range    Glucose - Accu-Ck 138 (H) 65 - 99 mg/dL   ACCU-CHEK GLUCOSE    Collection Time: 04/28/20 12:15 AM   Result Value Ref Range    Glucose - Accu-Ck 155 (H) 65 - 99 mg/dL   ACCU-CHEK GLUCOSE    Collection Time: 04/28/20  1:02 AM   Result Value Ref Range    Glucose - Accu-Ck 151 (H) 65 - 99 mg/dL   ACCU-CHEK GLUCOSE    Collection Time: 04/28/20  1:52 AM   Result Value Ref Range    Glucose - Accu-Ck 178 (H) 65 - 99 mg/dL   Basic Metabolic Panel    Collection Time: 04/28/20  3:33 AM   Result Value Ref Range    Sodium 123 (L) 135 - 145 mmol/L    Potassium 4.8 3.6 - 5.5 mmol/L    Chloride 95 (L) 96 - 112 mmol/L    Co2 17 (L) 20 - 33 mmol/L    Glucose 211 (H) 65 - 99 mg/dL    Bun 21 8 - 22 mg/dL    Creatinine 0.70 0.50 - 1.40 mg/dL    Calcium 6.9 (LL) 8.5 - 10.5 mg/dL    Anion Gap 11.0 7.0 - 16.0   MAGNESIUM    Collection Time: 04/28/20  3:33 AM   Result  Value Ref Range    Magnesium 1.7 1.5 - 2.5 mg/dL   CBC WITH DIFFERENTIAL    Collection Time: 04/28/20  3:33 AM   Result Value Ref Range    WBC 22.7 (H) 4.8 - 10.8 K/uL    RBC 2.92 (L) 4.20 - 5.40 M/uL    Hemoglobin 8.7 (L) 12.0 - 16.0 g/dL    Hematocrit 25.7 (L) 37.0 - 47.0 %    MCV 88.0 81.4 - 97.8 fL    MCH 29.8 27.0 - 33.0 pg    MCHC 33.9 33.6 - 35.0 g/dL    RDW 48.7 35.9 - 50.0 fL    Platelet Count 127 (L) 164 - 446 K/uL    MPV 11.0 9.0 - 12.9 fL    Neutrophils-Polys 93.10 (H) 44.00 - 72.00 %    Lymphocytes 5.20 (L) 22.00 - 41.00 %    Monocytes 1.70 0.00 - 13.40 %    Eosinophils 0.00 0.00 - 6.90 %    Basophils 0.00 0.00 - 1.80 %    Nucleated RBC 0.00 /100 WBC    Neutrophils (Absolute) 21.13 (H) 2.00 - 7.15 K/uL    Lymphs (Absolute) 1.18 1.00 - 4.80 K/uL    Monos (Absolute) 0.39 0.00 - 0.85 K/uL    Eos (Absolute) 0.00 0.00 - 0.51 K/uL    Baso (Absolute) 0.00 0.00 - 0.12 K/uL    NRBC (Absolute) 0.00 K/uL   DIFFERENTIAL MANUAL    Collection Time: 04/28/20  3:33 AM   Result Value Ref Range    Manual Diff Status PERFORMED    PERIPHERAL SMEAR REVIEW    Collection Time: 04/28/20  3:33 AM   Result Value Ref Range    Peripheral Smear Review see below    PLATELET ESTIMATE    Collection Time: 04/28/20  3:33 AM   Result Value Ref Range    Plt Estimation Decreased    MORPHOLOGY    Collection Time: 04/28/20  3:33 AM   Result Value Ref Range    RBC Morphology Present     Poikilocytosis 1+     Echinocytes 1+    ESTIMATED GFR    Collection Time: 04/28/20  3:33 AM   Result Value Ref Range    GFR If African American >60 >60 mL/min/1.73 m 2    GFR If Non African American >60 >60 mL/min/1.73 m 2   ACCU-CHEK GLUCOSE    Collection Time: 04/28/20  7:34 AM   Result Value Ref Range    Glucose - Accu-Ck 187 (H) 65 - 99 mg/dL       Labs reviewed by me.     Records reviewed:   Reviewed records from patient's current hospitalization as well as from her previous outpatient office visists (PCP, Cardiology).       Imaging reviewed by me:      EC-ECHOCARDIOGRAM COMPLETE W/O CONT   Final Result      IR-NEPHROSTOGRAM W/ NEW TUBE PLACEMENT (ALL RADIOLOGY) LEFT   Final Result      1.  Successful image guided LEFT nephrostomy tube placement.   2.  Successful ultrasound-guided aspiration of the LEFT lower pole renal abscess               IR-DRAIN-RENAL-PERIRENAL LEFT   Final Result      1.  Successful image guided LEFT nephrostomy tube placement.   2.  Successful ultrasound-guided aspiration of the LEFT lower pole renal abscess               CT-RENAL WITH & W/O   Final Result      1.  Moderate to severe LEFT hydroureteronephrosis with gas present within the renal collecting system indicating pyonephrosis.  Distal ureters are not visualized as the pelvis was not imaged.   2.  Complex fluid collections containing gas in the LEFT kidney at the midportion anteriorly and posteriorly concerning for renal abscess is suspected on previous MRI lumbar spine.   3.  Atrophic RIGHT kidney with scarring.   4.  Small amount of peritoneal fluid.   5.  Findings suggest hepatic cirrhosis.   6.  Distended gallbladder with stone.   7.  Extrahepatic biliary dilation.   8.  Small bilateral pleural effusions with associated atelectasis.   9.  Probable splenic cyst.      US-RENAL   Final Result      1.  Heterogeneous appearance of RIGHT kidney with ill-defined hypoechoic areas possibly indicating edema, raising the possibility of pyelonephritis.  Small amount of perinephric fluid also present.   2.  Moderate LEFT hydronephrosis with apparent debris within the collecting system.   3.  Contour deformity lower pole LEFT kidney with ill-defined hypoechoic area may indicate mass or abscess.      MR-LUMBAR SPINE-WITH & W/O   Final Result      1.  Mild lumbar spondylotic changes at the L4-5 and L5-S1 levels.      2.  Mild central canal stenosis at the L4-5 level secondary to facet arthropathy.      3.  No evidence of epidural abscess or transverse myelitis.      MR-THORACIC SPINE-WITH &  W/O   Final Result         1. Normal MRI scan of the thoracic spine with or without contrast.      2. No evidence of epidural abscess or transverse myelitis.      DX-CHEST-LIMITED (1 VIEW)   Final Result      1.  Supportive tubing as described above.   2.  No pneumothorax.   3.  Minimal pulmonary edema, worse than prior exam.      DX-CHEST-PORTABLE (1 VIEW)   Final Result      No radiographic evidence of acute cardiopulmonary process.            ASSESSMENT AND PLAN:  55-year old female with PMhx significant for poorly controlled type II diabetes mellitus (more recent A1c reportedly >13%), associated neuropathy, hypertension, reported cirrhosis of the liver (?ETOH related, this is unclear), hypertrophic obstructive cardiomyopathy, STEMI, and suspected ETOH and tobacco abuse with a long standing history of non-compliance who presented to [facility] on 4/26/20 for a chief complaint of generalized weakness, urinary incontinence, nausea, vomiting and malaise x 3 days; found to have DKA (BG nearly 600 on arrival); also with urosepsis, now s/p IR percutaneous nephrostomy tube placement for emphysematous pyelonephritis.   Our neurology service was asked to evaluate the patient given concern for acute weakness; there was also some concern for ascending weakness/ BLE weakness greater than BUE. Per my exam, patient is generally weak, somewhat atrophied throughout; weakness appears worse distally, perhaps speaking to an underlying/chronic process. She is hypo vs areflexic, however suspect this is related to sequeala of chonic disease-- note patient's hemoglobin A1c 17.6 on arrival here; also note long-standing history of ETOH abuse, both of which are likely contributing.  Differential diagnoses include acute on chronic weakness, likely at least partially related to burden/sequela of acute (though improving) infection, sepsis and toxic/metabolic derangements. Note persistent electrolyte abnormalities-- hyponatremia,  hypo-phosphatemia, hypocalcemia, etc-- that are also likely contributing. Ammonia, B12, TSH all normal; CPK pending. Have relatively low suspicion for AIDP/Guillean Philadelphia Syndrome as patient appears to be improving rather than worsening; weakness also does not appear to be ascending, is generalized in nature, no respiratory involvement. She would however likely benefit from EMG/NCS (to formally rule out/rule in a chronic, underlying peripheral neuropathy related to malnutrition, ETOH, and the like); given the current COVID19 crisis, this will need to be performed as outpatient.      Impression:   Acute weakness, suspect related to sepsis and toxic/metabolic derangements, superimposed upon chronic weakness related to multiple severe medical co-morbidities.     Additional Recommendations/Plan:     -Continue replacement/treament of patient's electrolyte abnormalities and metabolic derangements, per primary/ICU team.   -Continue daily PO Folic Acid and Thiamine.   -PT/OT/SLP eval and treat; physiatry consult.   -As was noted above, will recommend and place referral for outpatient neurology for possible EMG/NCS and further neuropathy work-up.    -Will defer all other medical management to primary team.     The plan of care above has been discussed with Dr. Briceno.     ROSITA [email or web address].  [facility]

## 2020-04-28 NOTE — ASSESSMENT & PLAN NOTE
Continue volume resuscitation to achieve intravascular euvolemia  Moderate glycemic control with insulin therapy  Closely monitor and replete potassium and phosphate

## 2020-04-28 NOTE — PROGRESS NOTES
ICU Daily Progress Note:     Date of Service: 4/28/2020  Primary Team: [facility] ICU Gold Team   Attending: Emiliano Cortes M.D.   Senior Resident: Dr. Serenity Bradley  Contact:  [phone]    Chief Complaint:   Reduced PO intake, fever, bilateral lower extremity weakness      HPI (4/26/20) :   Rigoberto Lakhani is a 55 year old lady with poorly controlled DM type II (on lantus 35 Units, metformin 1000 mg BID, Jardiance 25 mg/d, unremarkable C-peptide and BEVERLEY in 2019), hypertrophic obstructive cardiomyopathy with systolic motion of the anterior leaflet of mitral valve and asymmetric septal hypertrophy on cardiac MRI in 2014, EF 75% with severe concentric LVH on TTE in Sept 2019, hepatic cirrhosis, was admitted with 3 day h/o fever, chills, cough, sore throat, low back pain, dysuria, reduced appetite, and 4 week h/o lower extremity bilateral weakness. She was febrile with temp 100.8 F, tachycardic with HR in 110s, SBP 70s-80s on admission. She had WBC 22.8, Procal 5, BHB 1.12, AG 18, CO2 of 16, glucose > 500, mild pre-renal RUBEN with eGFR 54 and creat 1.24 (baseline 0.58) on admission. UA showed glucose large, leucocyte esterase and WBCs. MRI T-L spine showed abnormal left kidney. Renal US followed by CT showed atrophic right kidney, left renal abscess with gas and left hydronephrosis. Distal ureter not imaged - hence unsure if there's distal ureteric stone / unsure of cause of left hydronephrosis. She was treated for septic shock with IV ceftriaxone, IV Linezolid, IV fluids, levophed and was started on DKA protocol. Blood cultures +ve 2/2 bottles for GNR.  COVID-19 PCR testing was performed and was negative in the ER. Per Dr. Kimble - not for further work-up.     Subjective:     Awake, alert, feels weak  Had left nephrostomy 4/27 for left pyonephrosis, purulent fluid drained at insertion in IR with many WBCs and few GNRs - likely obstruction at level of mid ureter  Left lower pole renal abscess aspirated with 6 ml of purulent  fluid, no growth so far, left emphysematous pyelonephritis  Had an episode of hypoglycemia down to 57 on 4/27 ~ noon at which point insulin drip rate reduced and D10 infusion rate increased.   SBP was down to high 80s and 90s early hours this am, but has sustained > 100 mostly past 24 hrs off  pressor support   Transitioned to Lantus s/c at midnight, given 30 units (home dose 35 units). BG this am was 211  B/L LE weakness persists - will consult neurology    Ambulated with walker and assistance from PT today, still c/o B/L LE weakness, strength 3+ B/L LE, no DTRs LE B/L    WBC down to 22  AG closed, CO2 still 17-18  RUBEN resolved, creat down to 0.7  Good UOP - 2 lts out (900 mls overnight via nephrostomy + 275 mls this am)  Corrected Na 125-126    levophed discontinued 4/27 afternoon  DKA protocol discontinued midnight of 4/27  LR changed to NS @ 83 ml/h this am in view of K 4.8  Lytes being repleted    Had TTE 4/27 - no change from Sept 2019, severe concentric LVH, EF 75% with hyperdynamic LV, dilated LA    Also mentioned that her PCP moved to [place], and hence enquired if she could have me as her PCP - will need to set up [facility] prior to discharge      Consultants/Specialty:  Urology, Dr. Vicky Pride - IR  Neurology - Dr. Binh Mccormack      Review of Systems:   Review of Systems   Constitutional: Positive for malaise/fatigue. Negative for chills, diaphoresis and fever.   HENT: Negative for ear pain and nosebleeds.    Eyes: Negative for blurred vision, photophobia, pain, discharge and redness.   Respiratory: Positive for cough. Negative for shortness of breath.         Cough improving   Cardiovascular: Negative for chest pain, palpitations and orthopnea.   Gastrointestinal: Positive for abdominal pain. Negative for constipation, diarrhea and vomiting.   Genitourinary: Positive for dysuria and frequency. Negative for flank pain and hematuria.   Musculoskeletal: Positive for back pain.  Negative for neck pain.   Skin: Negative for itching and rash.   Neurological: Positive for sensory change and weakness. Negative for tremors, speech change, focal weakness, seizures and loss of consciousness.   Endo/Heme/Allergies: Negative for polydipsia. Does not bruise/bleed easily.   Psychiatric/Behavioral: Positive for substance abuse. Negative for hallucinations and suicidal ideas. The patient is not nervous/anxious.         Nicotine - ~ 1 ppd         Objective Data:   Physical Exam:   Vitals:   Temp:  [36.2 °C (97.2 °F)-36.8 °C (98.2 °F)] 36.2 °C (97.2 °F)  Pulse:  [] 97  Resp:  [14-47] 27  BP: ()/(49-85) 110/69  SpO2:  [92 %-100 %] 95 %       Physical Exam  Constitutional:       General: She is not in acute distress.     Appearance: She is not diaphoretic.      Comments: Looks lean, more communicative   HENT:      Head: Normocephalic and atraumatic.      Mouth/Throat:      Mouth: Mucous membranes are dry.      Pharynx: No oropharyngeal exudate or posterior oropharyngeal erythema.   Eyes:      General: No scleral icterus.        Right eye: No discharge.         Left eye: No discharge.      Extraocular Movements: Extraocular movements intact.      Conjunctiva/sclera: Conjunctivae normal.   Neck:      Musculoskeletal: Normal range of motion and neck supple. No neck rigidity or muscular tenderness.   Cardiovascular:      Rate and Rhythm: Normal rate and regular rhythm.      Heart sounds: Murmur present.      Comments: Grade 4/6 systolic murmur most of precordium, prominent left upper sternal border, no radiation to carotid  Pulmonary:      Effort: Pulmonary effort is normal. No respiratory distress.      Breath sounds: Normal breath sounds. No wheezing or rales.   Abdominal:      General: Abdomen is flat. There is no distension.      Tenderness: There is no abdominal tenderness. There is left CVA tenderness. There is no right CVA tenderness or guarding.   Genitourinary:     Comments: Left  nephrostomy with turbid, cloudy urine in drainage bag  Musculoskeletal: Normal range of motion.         General: No swelling or deformity.      Right lower leg: No edema.      Left lower leg: No edema.   Skin:     General: Skin is warm and dry.      Coloration: Skin is not jaundiced.      Findings: No bruising.   Neurological:      General: No focal deficit present.      Mental Status: She is alert and oriented to person, place, and time.      Cranial Nerves: No cranial nerve deficit.      Comments: B/L UE - normal strength and sensation  B/L LE - 3+/5, endorses reduced sensation to crude touch B/L, but able to feel pain sensation  No asymmetry   Psychiatric:         Mood and Affect: Mood normal.         Thought Content: Thought content normal.         Judgment: Judgment normal.         Labs:   WBC down to 22   Creat down to 0.7  CO2 at 18  Ionized calcium pending  Mag 1.7  Corrected sodium 125-126  GNR in 2/2 BCx bottles - lactose fermenting GNR    Imaging:   CT Renal 4/26/20   Left hydronephrosis with gas --> Pyonephrosis, complex fluid collections in left kidney concerning for renal abscesses  Atrophic right kidney  Hepatic cirrhosis  Distended GB with stone  Extrahepatic biliary dilation  Small B/L pleural efusions    MRI L-spine 4/26/20   No e/o epidural abscess or transverse myelitis  Mild central canal stenosis at L4-L5 level secondary to facet arthropathy  Mild L-spondylotic changes at L4-L5 and L5-S1 levels    MRI T-spine 4/26/20  No evidence of epidural abscess or transverse myelitis.  But radiologist noticed abnormality in left kidney which triggered renal US followed by CT    Transthoracic echo 4/27/20  No change from Sept 2019, severe concentric LVH, EF 75% with hyperdynamic LV, dilated LA      * Septic shock due to undetermined organism (HCC)- (present on admission)  Assessment & Plan  - This is Septic shock Present on admission  - SIRS criteria identified include: Fever, with temperature greater than  101 deg F, Tachycardia, with heart rate greater than 90 BPM, Tachypnea, with respirations greater than 20 per minute and Leukocytosis, with WBC greater than 12,000   - Urinary / renal source with left pyonephrosis, multiple collections in renal parenchyma in keeping with left renal abscesses and left emphysematous pyelonephritis  - causing pre-renal RUBEN on admission  - Presentation includes: Severe sepsis present and persistent hypotension after 30 ml/kg completed.   - MRI thoraco-lumbar spine is negative for epidural abscess or transverse myelitis  - CT Renal suggested left pyonephrosis, multiple complex left renal parenchymal collections in keeping with evolving left renal abscess  - unsure of cause of left hydronephrosis, as imaging does not include pelvis --> will likely need retrograde pyelography with urology with view to ureteroscopy when infection / sepsis resolves (likely out-pt) versus in-pt non-contrast pelvic CT to check distal ureter  - had left nephrostomy on 4/27, aspiration of renal abscess 4/27  - GNR on blood cultures 2/2 +ve, UXx 4/26 lactose fermenting GNRs, nephrostomy urine - GNRs  - on zosy D 2 of 5  - was on pressor support - discontinued 4/27  - Urology, Dr. Perry on board    Plan:  - continue zosyn day 2 of 5   - strict Is & Os  - Aim MAP > 65 given distributive shock secondary to sepsis rather than cardiogenic  - Follow-up blood and urine cultures  - Appreciate Urol help, will need repeat renal CT imaging 4/29 or 4/30  - IVF changed to NS @ 83 ml/hr      Urinary tract infection- (present on admission)  Assessment & Plan  - dysuria  - abnormal UA  - CT in keeping with left pyonephrosis and left renal abscess evolving with gas - left emphysematous pyelonephritis   - GNR on BCx 4/26  - lactose fermenting GNRs on UCx 4/26  - lot of WBCs and few GNRs on nephrostomy Cx 4/27  - currently on zosyn D2 of 5  - Urology on board    URI (upper respiratory infection)- (present on  admission)  Assessment & Plan  - Non productive cough with fever  - COVID PCR negative in ER    - Per discussion with Dr. Kimble by night team from ER, no further testing indicated given low risk factors  - droplet precautions discontinued 4/27 by infection control RN    Acute kidney injury (HCC)- (present on admission)  Assessment & Plan  - likely prerenal due to severe dehydration in setting of severe hyperglycemia and septic shock   - has pyonephrosis and left renal abscess  - renal function improving, creat down to 0.7  - UOP 2 liters past 24 hrs, 900 mls via nephrostomy until this am + 275 mls this am    Plan:  - continue IVF NS @ 83 ml/hr  - avoid nephrotoxics    Hyponatremia- (present on admission)  Assessment & Plan  - corrected sodium this am is 125-126  - IVF switched to NS @ 83 ml/hr    Weakness of both lower extremities- (present on admission)  Assessment & Plan  - LBP for few weeks, B/L LE weakness 4 weeks - initial concern for transverse myelitis vs epidural abscess  - symmetric generalized weakness in LE, with power 3+/5 but no DTRs, reducd sensation to crude touch, has pain sensation - but no asymmetry  - MRI T-L spine - no e/o epidural abscess or transverse myelitis  - ambulated this am with walker and PT assistance  - neurology Dr. Mccormack consulted, advised CPK and possible lumbar tap  - following discussion with Intensivist, assessment of ambulatory status of patient with PT assistance, and current sepsis and bacteremia from urinary source - LP held off  - Per neuro, EMG cannot be performed in-patient  - B12 normal limits  IMP : Could be diabetic neuropathy vs alcohol related peripheral neuropathy. 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  Continues to drink 1 drink per day, wine.    No significant transaminitis on presentation.  Cirrhosis on CT  No acute GI  needs at this time as in-pt  Continue to monitor.    HOCM (hypertrophic obstructive cardiomyopathy) (HCC)- (present on admission)  Assessment & Plan  On MRI 12/2014;  1.  Narrowing of the aortic outflow tract secondary to mid and basal asymmetric septal hypertrophy and systolic anterior motion of the anterior leaflet of the mitral valve.  2.  Mild concentric left ventricular hypertrophy.  3.  No myocardial scarring is identified.  4.  Hyperdynamic left ventricle with an ejection fraction of 74%.  5.  Moderate mitral regurgitation.  6.  Mild tricuspid regurgitation.    Recent Echo 9/2019. EF 75% with left ventricular concentric hypertrophy, hyperdynamic LV, dilated LA, RA enlargement, mild MR.  Aorta slightly dilated but normal.    Plan:  - Aim for MAP > 65 - will be cautious given HOCM  - IVF NS @ 83 ml/hr  - Home ACEI, BB, aldactone being held currently    Hypertension, benign- (present on admission)  Assessment & Plan  - h/o poorly controlled HTN  - holding home metoprolol 50 mg BID, lisinopril 10 mg/d, aldactone 25 mg/d in the context of septic shock needing pressors    Hypomagnesemia  Assessment & Plan  - Mag 1.7  - 2 gms of IV Mag   - aim Mag > 2    Hypophosphatemia  Assessment & Plan  - replete as needed    Normocytic anemia- (present on admission)  Assessment & Plan  - Etiology unclear.   - low iron but elevated ferritin 408 in keeping with possible inflammatory anemia, however ferritin can be elevated in acute infections   - Will need repeat ferritin once infection resolves      Elevated troponin- (present on admission)  Assessment & Plan  - Slightly elevated troponin 40 likely secondary to demand ischemia, NSTEMI type II and pre-renal RUBEN  - no chest pain / PND prior  - EKG shows sinus tachycardia  - not trending  - she could having underlying atherosclerotic disease involving coronaries despite being asymptomatic prior to admission given her DM poor control and A1c 14.8   - already on BB, ACEI (in setting  of HOCM? but not high dose), aldactone and statin at home    Tobacco abuse- (present on admission)  Assessment & Plan  - continues to smoke less than 1 pack/day.  - NRT  - smoking cessation counseling

## 2020-04-28 NOTE — THERAPY
"Occupational Therapy Evaluation completed.   Functional Status: Pt is a 56 y/o female admitted with AMS, sepsis, DKA, and now s/p L nephrostomy tube. She also had BLE weakness but MRI negative. She was pleasant and cooperative. Slight disorientation to situation but appears to have some language barrier as well. ModA bed mobility. Joe sit<>stand. ModA stand pivot txf to BSC. Joe toileting. MaxA LB dressing. Setup grooming seated. She is limited by weakness, fatigue, impaired balance, and impaired safety awareness which impacts independence in self care and functional mobility.  Plan of Care: Will benefit from Occupational Therapy 3 times per week  Discharge Recommendations:  Equipment: Will Continue to Assess for Equipment Needs. Recommend post-acute placement for additional occupational therapy services prior to discharge home.      See \"Rehab Therapy-Acute\" Patient Summary Report for complete documentation.    "

## 2020-04-28 NOTE — PROGRESS NOTES
"[facility]    Progress Note    Service: [facility]  Patient's Name: Rigoberto Adames  MRN: [id number]  Admit Date:4/26/2020  Today's Date: 4/28/2020   Room #: R113/00      Identification:  55 y.o. female with emphysematous pyelonephritis         Overnight-Interval events:   - none Subjective/ROS:   Patient seen and examined. Pain well controlled. Reports no fevers or chills. L NT in place and draining. WBC trending down  No CP/SOB/F/C     Physical Exam:  Current Vitals:   /79   Pulse 90   Temp 36.4 °C (97.5 °F) (Temporal)   Resp (!) 30   Ht 1.702 m (5' 7\")   Wt 59.1 kg (130 lb 4.7 oz)   SpO2 98%   BMI 20.41 kg/m²     04/26 1900 - 04/28 0659  In: 5932.7 [I.V.:2224.3]  Out: 2360 [Urine:2360]   GEN : NAD, A&O X4   RES:  no acute respiratory distress  ABD: Soft ND, L NT in place, mildly purulent output  :   No coley  EXT:  No edema, SCD's in place     Labs:   Lab Results   Component Value Date/Time    WBC 22.7 (H) 04/28/2020 03:33 AM    HEMATOCRIT 25.7 (L) 04/28/2020 03:33 AM    SODIUM 123 (L) 04/28/2020 03:33 AM    POTASSIUM 4.8 04/28/2020 03:33 AM    CHLORIDE 95 (L) 04/28/2020 03:33 AM    CO2 17 (L) 04/28/2020 03:33 AM    BUN 21 04/28/2020 03:33 AM    CREATININE 0.70 04/28/2020 03:33 AM    CREATININE 0.5 05/27/2006 05:10 PM    CALCIUM 6.9 (LL) 04/28/2020 03:33 AM    MAGNESIUM 1.7 04/28/2020 03:33 AM        Lab Results   Component Value Date/Time    GLUCOSE 211 (H) 04/28/2020 03:33 AM    GLUCOSE 100 (H) 04/27/2020 09:10 PM    GLUCOSE 105 (H) 04/27/2020 04:44 PM    GLUCOSE 121 (H) 04/27/2020 08:17 AM          Assessment/Plan  Rigoberto Adames is a 55 y.o. female with left emphysematous pyelonephritis s/p IR perc neph tube placement    - Follow labs  - monitor I&O  - manage pain  - repeat CT in 1-2 days  - abx per medicine and culture directed  - urology following    Carlos Alberto Gallardo PA-C  [facility]           "

## 2020-04-28 NOTE — ASSESSMENT & PLAN NOTE
Improving, attributed to DKA  Continue to monitor and replete potassium and phosphate aggressively.

## 2020-04-28 NOTE — PROGRESS NOTES
Critical Care Progress Note    Date of admission  4/26/2020    Chief Complaint  55 y.o. female who presented 4/26/2020 with weakness x four weeks, newly bedridden, poor appetite for several days, subjective fever for 3 days with chills, difficulty with urination. French speaking. Intermittent incontinence of urine (wears diaper- new), stools in toilet. Hx of poor medical compliance. She not taken her diabetes medicines in several days. Denies known COVID-19 contacts, no travel, lives with  who is not ill. Denies SOB, productive cough, rash, change in mental status. Noted to have low BP in ED and given 3 liters of fluids with continued soft blood pressures but not started on pressors in ED.     COVID negative in ED tonight, clear CXR, no objective/ subjective respiratory complains, U/A consistent with UTI, given Rocephin and Vancomycin in ED after cultures sent. MRI of spine pending. Glucose 569 and insulin drip started.   Hospital Course    4/27-persistent DKA, septic shock, nephrostomy tubes placed.      Interval Problem Update  Reviewed last 24 hour events:              - Transitioned off Insulin infusion              - Tm: Afebrile              - HR:               - SBP:               - Neuro: Awake alert and following commands              - GI: Diabetic diet              - UOP: 2.1 L, net fluid balance +3.1 L              - Vela: No              - Lines: CVC, 2X PIV, nephrostomy tube output milky 900ml              - PPx: Lovenox, no GI              - Antibiotic Day 2/5, Zosyn   - Cx's GNR    Infusions:  Norepinephrine on pause      Review of Systems  Review of Systems   Constitutional: Negative for chills, fever and malaise/fatigue.   HENT: Negative.    Eyes: Negative.    Respiratory: Negative for shortness of breath.    Cardiovascular: Negative for chest pain.   Gastrointestinal: Negative for abdominal pain.   Genitourinary: Negative for dysuria and flank pain.   Musculoskeletal: Negative  for myalgias.   Neurological: Positive for focal weakness.        Lower extremities   Psychiatric/Behavioral: Negative.    All other systems reviewed and are negative.       Vital Signs for last 24 hours   Temp:  [36.2 °C (97.2 °F)-37.3 °C (99.2 °F)] 37.3 °C (99.2 °F)  Pulse:  [] 96  Resp:  [15-47] 32  BP: ()/(55-85) 119/77  SpO2:  [80 %-98 %] 97 %    Hemodynamic parameters for last 24 hours       Respiratory Information for the last 24 hours       Physical Exam   Physical Exam  Constitutional:       General: She is not in acute distress.     Appearance: She is ill-appearing.   HENT:      Mouth/Throat:      Mouth: Mucous membranes are dry.   Eyes:      Pupils: Pupils are equal, round, and reactive to light.   Neck:      Musculoskeletal: Neck supple.   Cardiovascular:      Rate and Rhythm: Regular rhythm.      Heart sounds: No murmur. No friction rub. No gallop.    Pulmonary:      Effort: No respiratory distress.      Breath sounds: Normal breath sounds.   Abdominal:      General: There is no distension.      Palpations: Abdomen is soft.      Tenderness: There is no abdominal tenderness. There is no guarding or rebound.   Musculoskeletal: Normal range of motion.   Skin:     General: Skin is warm and dry.   Neurological:      Mental Status: She is alert and oriented to person, place, and time.      Cranial Nerves: No cranial nerve deficit.      Comments: Awake alert answering questions appropriately.  Deltoid biceps and triceps are 4/5 bilaterally.  Hip and knee flexors 3/5 bilaterally.  Diminished sensation to light touch throughout the lower legs.       Psychiatric:         Mood and Affect: Mood normal.         Behavior: Behavior normal.         Medications  Current Facility-Administered Medications   Medication Dose Route Frequency Provider Last Rate Last Dose   • NS infusion   Intravenous Continuous Emiliano Cortes M.D. 83 mL/hr at 04/28/20 1015     • thiamine tablet 100 mg  100 mg Oral DAILY Quan ROSA  LAMAR Khan   100 mg at 04/28/20 0549   • multivitamin (THERAGRAN) tablet 1 Tab  1 Tab Oral DAILY Quan Khan M.D.   1 Tab at 04/28/20 0549   • folic acid (FOLVITE) tablet 1 mg  1 mg Oral DAILY Quan Khan M.D.   1 mg at 04/28/20 0549   • piperacillin-tazobactam (ZOSYN) 4.5 g in  mL IVPB  4.5 g Intravenous Q8HRS Quan Khan M.D.   Stopped at 04/28/20 0949   • norepinephrine (Levophed) infusion 8 mg/250 mL (premix)  0-30 mcg/min Intravenous Continuous Serenity Bradley M.D.   Stopped at 04/27/20 1407   • insulin glargine (LANTUS) injection 30 Units  30 Units Subcutaneous Q EVENING Jj Castellon M.D.   30 Units at 04/28/20 0022   • insulin lispro (HUMALOG) injection 4 Units  0.2 Units/kg/day Subcutaneous TID AC Jj Castellon M.D.   4 Units at 04/28/20 1225    And   • insulin lispro (HUMALOG) injection 2-9 Units  2-9 Units Subcutaneous 4X/DAY ACHS Jj Castellon M.D.   2 Units at 04/28/20 0741    And   • glucose 4 g chewable tablet 16 g  16 g Oral Q15 MIN PRN Jj Castellon M.D.        And   • dextrose 50% (D50W) injection 50 mL  50 mL Intravenous Q15 MIN PRN Jj Castellon M.D.       • enoxaparin (LOVENOX) inj 40 mg  40 mg Subcutaneous DAILY Ivet Lopez M.D.   40 mg at 04/28/20 0548   • acetaminophen (TYLENOL) tablet 650 mg  650 mg Oral Q6HRS PRN Ivet Lopez M.D.   650 mg at 04/26/20 2209   • senna-docusate (PERICOLACE or SENOKOT S) 8.6-50 MG per tablet 2 Tab  2 Tab Oral BID Ivet Lopez M.D.   2 Tab at 04/28/20 0548    And   • polyethylene glycol/lytes (MIRALAX) PACKET 1 Packet  1 Packet Oral QDAY PRN Ivet Lopez M.D.        And   • magnesium hydroxide (MILK OF MAGNESIA) suspension 30 mL  30 mL Oral QDAY PRN Ivet Lopez M.D.        And   • bisacodyl (DULCOLAX) suppository 10 mg  10 mg Rectal QDAY PRN Ivet Lopez M.D.       • nicotine (NICODERM) 7 MG/24HR 7 mg  7 mg Transdermal Daily-0600 Quan Khan M.D.   7 mg at 04/28/20 0549    And   • nicotine polacrilex (NICORETTE) 2 MG  piece 2 mg  2 mg Oral Q HOUR PRN Quan Khan M.D.           Fluids    Intake/Output Summary (Last 24 hours) at 4/28/2020 1458  Last data filed at 4/28/2020 1330  Gross per 24 hour   Intake 1586.17 ml   Output 2360 ml   Net -773.83 ml       Laboratory          Recent Labs     04/27/20  0011  04/27/20  0817 04/27/20  1644 04/27/20 2110 04/28/20  0333   SODIUM 122*   < > 122* 125* 124* 123*   POTASSIUM 3.9   < > 3.9 4.2 4.8 4.8   CHLORIDE 91*   < > 94* 96 97 95*   CO2 17*   < > 17* 17* 18* 17*   BUN 35*   < > 30* 25* 23* 21   CREATININE 1.02   < > 0.85 0.80 0.71 0.70   MAGNESIUM 1.3*  --  2.4 2.0  --  1.7   PHOSPHORUS 2.2*  --  1.0* 3.3  --   --    CALCIUM 7.2*   < > 7.3* 6.8* 6.9* 6.9*    < > = values in this interval not displayed.     Recent Labs     04/26/20  1437 04/27/20  0011 04/27/20  1644 04/27/20 2110 04/28/20  0333   ALTSGPT 21  --  17  --   --   --   --    ASTSGOT 21  --  18  --   --   --   --    ALKPHOSPHAT 241*  --  135*  --   --   --   --    TBILIRUBIN 1.0  --  0.7  --   --   --   --    GLUCOSE 624*   < > 476*   < > 105* 100* 211*    < > = values in this interval not displayed.     Recent Labs     04/26/20  1437 04/27/20  0011 04/28/20  0333   WBC 22.3* 36.8* 22.7*   NEUTSPOLYS 90.50* 93.00* 93.10*   LYMPHOCYTES 1.70* 0.90* 5.20*   MONOCYTES 0.00 0.90 1.70   EOSINOPHILS 0.00 0.00 0.00   BASOPHILS 0.00 0.00 0.00   ASTSGOT 21 18  --    ALTSGPT 21 17  --    ALKPHOSPHAT 241* 135*  --    TBILIRUBIN 1.0 0.7  --      Recent Labs     04/26/20  1437 04/27/20  0011 04/27/20  0412 04/28/20  0333   RBC 3.28* 2.80*  --  2.92*   HEMOGLOBIN 9.9* 8.5*  --  8.7*   HEMATOCRIT 30.3* 25.3*  --  25.7*   PLATELETCT 209 206  --  127*   PROTHROMBTM 16.6*  --   --   --    INR 1.31*  --   --   --    IRON  --   --  7*  --    FERRITIN  --   --  408.0*  --    TOTIRONBC  --   --  123*  --        Imaging  X-Ray:  No film today    Assessment/Plan  * Septic shock due to undetermined organism (HCC)- (present on  admission)  Assessment & Plan  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    Hyponatremia- (present on admission)  Assessment & Plan  Improving.  Continue sodium chloride repletion    Weakness of both lower extremities- (present on admission)  Assessment & Plan  Progressive lower extremity strength over several months per the patient.  Seen by PMD for this problem attributed to progressive diabetic neuropathy  - MRI T and L-spine showed no evidence of epidural abscess or transverse myelitis  - Neuro consult, consider EMG pending above evaluation      HOCM (hypertrophic obstructive cardiomyopathy) (HCC)- (present on admission)  Assessment & Plan  TTE 75% with LVH  Achieve and maintain intravascular euvolemia    Hypertension, benign- (present on admission)  Assessment & Plan  Vasopressors recently discontinued    Refeeding syndrome  Assessment & Plan  Improving, attributed to DKA  Continue to monitor and replete potassium and phosphate aggressively.    DKA (diabetic ketoacidoses) (HCC)  Assessment & Plan  Continue volume resuscitation to achieve intravascular euvolemia  Moderate glycemic control with insulin therapy  Closely monitor and replete potassium and phosphate    Thrombocytopenia (HCC)- (present on admission)  Assessment & Plan  Attributed to sepsis and cirrhosis  No active bleeding  Restrictive transfusion strategies       VTE:  Lovenox  Ulcer: Not Indicated  Lines: Central Line  Ongoing indication addressed    I have performed a physical exam and reviewed and updated ROS and Plan today (4/28/2020). In review of yesterday's note (4/27/2020), there are no changes except as documented above.     The patient remains critically ill,  I have assessed and reassessed the blood pressure,  cardiovascular status, labs and response to interventions.  I am actively  providing IV fluid resuscitation and managing life-threatening electrolyte derangements.  This patient remains at high risk for worsening shock and death without the above critical care interventions.    Discussed patient condition and risk of morbidity and/or mortality with RN, Pharmacy, [facility] resident and Patient

## 2020-04-28 NOTE — PROGRESS NOTES
Critical Care Progress Note    Date of admission  4/26/2020    Chief Complaint  55 y.o. female who presented 4/26/2020 with weakness x four weeks, newly bedridden, poor appetite for several days, subjective fever for 3 days with chills, difficulty with urination. Danish speaking. Intermittent incontinence of urine (wears diaper- new), stools in toilet. Hx of poor medical compliance. She not taken her diabetes medicines in several days. Denies known COVID-19 contacts, no travel, lives with  who is not ill. Denies SOB, productive cough, rash, change in mental status. Noted to have low BP in ED and given 3 liters of fluids with continued soft blood pressures but not started on pressors in ED.     COVID negative in ED tonight, clear CXR, no objective/ subjective respiratory complains, U/A consistent with UTI, given Rocephin and Vancomycin in ED after cultures sent. MRI of spine pending. Glucose 569 and insulin drip started.   Hospital Course    4/27-persistent DKA, septic shock, nephrostomy tubes placed.      Interval Problem Update  Reviewed last 24 hour events:              - acute events overnight              - Tm: 38.2              - HR: 80s              - SBP: 96-1 04              - Neuro: Awake alert and following commands              - GI: N.p.o.              - UOP: 250 mL              - Vela: No              - Lines: CVC, 2X PIV              - PPx: Lovenox, no GI              - Antibiotic Day 2, Zosyn    Infusions:  Norepinephrine    Review of Systems  Review of Systems   Constitutional: Positive for chills, fever and malaise/fatigue.   HENT: Negative.    Eyes: Negative.    Respiratory: Negative for shortness of breath.    Cardiovascular: Negative for chest pain.   Gastrointestinal: Positive for abdominal pain.   Genitourinary: Positive for dysuria and flank pain.   Musculoskeletal: Positive for myalgias.   Neurological: Negative.    Psychiatric/Behavioral: Negative.    All other systems reviewed and  are negative.       Vital Signs for last 24 hours   Temp:  [36.2 °C (97.1 °F)-36.8 °C (98.2 °F)] 36.7 °C (98 °F)  Pulse:  [] 81  Resp:  [14-38] 19  BP: ()/(42-79) 103/66  SpO2:  [92 %-100 %] 97 %    Hemodynamic parameters for last 24 hours       Respiratory Information for the last 24 hours       Physical Exam   Physical Exam  Constitutional:       General: She is not in acute distress.     Appearance: She is ill-appearing.   HENT:      Mouth/Throat:      Mouth: Mucous membranes are dry.   Eyes:      Pupils: Pupils are equal, round, and reactive to light.   Neck:      Musculoskeletal: Neck supple.   Cardiovascular:      Rate and Rhythm: Regular rhythm.      Heart sounds: No murmur. No friction rub. No gallop.    Pulmonary:      Effort: No respiratory distress.      Breath sounds: Normal breath sounds.   Abdominal:      General: There is no distension.      Palpations: Abdomen is soft.      Tenderness: There is abdominal tenderness. There is no guarding or rebound.   Musculoskeletal: Normal range of motion.   Skin:     General: Skin is warm and dry.   Neurological:      Mental Status: She is alert and oriented to person, place, and time.      Cranial Nerves: No cranial nerve deficit.      Comments: Awake alert answering questions appropriately.  Deltoid biceps and triceps are 4/5 bilaterally.  Hip and knee flexors 3/5 bilaterally.  Diminished sensation to light touch throughout the lower legs.  Patellar tendons and spasm unable to obtain reflexes.     Psychiatric:         Mood and Affect: Mood normal.         Behavior: Behavior normal.         Medications  Current Facility-Administered Medications   Medication Dose Route Frequency Provider Last Rate Last Dose   • D10%-0.45% NaCl infusion   Intravenous Continuous Serenity Bradley M.D. 150 mL/hr at 04/27/20 1643     • D5LR infusion   Intravenous Continuous Serenity Bradley M.D.   Stopped at 04/27/20 0612   • lactated ringers infusion   Intravenous  Continuous Hal Tillman M.D.   Stopped at 04/27/20 0115   • thiamine tablet 100 mg  100 mg Oral DAILY Quan Khan M.D.   100 mg at 04/27/20 0548   • multivitamin (THERAGRAN) tablet 1 Tab  1 Tab Oral DAILY Quan Khan M.D.   1 Tab at 04/27/20 0549   • folic acid (FOLVITE) tablet 1 mg  1 mg Oral DAILY Quan Khan M.D.   1 mg at 04/27/20 0549   • piperacillin-tazobactam (ZOSYN) 4.5 g in  mL IVPB  4.5 g Intravenous Q8HRS Quan Khan M.D.   Stopped at 04/27/20 1602   • norepinephrine (Levophed) infusion 8 mg/250 mL (premix)  0-30 mcg/min Intravenous Continuous Serenity Bradley M.D.   Stopped at 04/27/20 1407   • MD ALERT-PHARMACY TO CONSULT FOR DKA MONITORING 1 Each  1 Each Other PRN Ivet Lopez M.D.       • Adult DKA potassium(K+) replacement scale  1 Each Intravenous Q4HRS Ivet Lopez M.D.   Stopped at 04/27/20 1605   • magnesium sulfate IVPB premix 2 g  2 g Intravenous Once PRN Ivet Lopez M.D.        Or   • magnesium sulfate IVPB premix 4 g  4 g Intravenous Once PRN Ivet Lopez M.D.       • enoxaparin (LOVENOX) inj 40 mg  40 mg Subcutaneous DAILY Ivet Lopez M.D.   Stopped at 04/27/20 0600   • acetaminophen (TYLENOL) tablet 650 mg  650 mg Oral Q6HRS PRN Ivet Lopez M.D.   650 mg at 04/26/20 2209   • senna-docusate (PERICOLACE or SENOKOT S) 8.6-50 MG per tablet 2 Tab  2 Tab Oral BID Ivet Lopez M.D.   Stopped at 04/26/20 1815    And   • polyethylene glycol/lytes (MIRALAX) PACKET 1 Packet  1 Packet Oral QDAY PRN Ivet Lopez M.D.        And   • magnesium hydroxide (MILK OF MAGNESIA) suspension 30 mL  30 mL Oral QDAY PRN Ivet Lopez M.D.        And   • bisacodyl (DULCOLAX) suppository 10 mg  10 mg Rectal QDAY PRN Ivet Lopez M.D.       • insulin regular human (HUMULIN/NOVOLIN R) 62.5 Units in  mL Infusion for DKA  6 Units/hr Intravenous Continuous Ivet Lopez M.D. 24 mL/hr at 04/27/20 1251 6 Units/hr at 04/27/20 1251   • insulin regular (HUMULIN R)  injection 8 Units  8 Units Intravenous Once Quan Khan M.D.   Stopped at 04/26/20 2130   • nicotine (NICODERM) 7 MG/24HR 7 mg  7 mg Transdermal Daily-0600 Quan Khan M.D.   7 mg at 04/27/20 0550    And   • nicotine polacrilex (NICORETTE) 2 MG piece 2 mg  2 mg Oral Q HOUR PRN Quan Khan M.D.           Fluids    Intake/Output Summary (Last 24 hours) at 4/27/2020 1825  Last data filed at 4/27/2020 1602  Gross per 24 hour   Intake 6432.66 ml   Output 1210 ml   Net 5222.66 ml       Laboratory          Recent Labs     04/27/20  0011 04/27/20  0412 04/27/20  0817 04/27/20  1644   SODIUM 122* 123* 122* 125*   POTASSIUM 3.9 3.8 3.9 4.2   CHLORIDE 91* 93* 94* 96   CO2 17* 16* 17* 17*   BUN 35* 32* 30* 25*   CREATININE 1.02 0.98 0.85 0.80   MAGNESIUM 1.3*  --  2.4 2.0   PHOSPHORUS 2.2*  --  1.0* 3.3   CALCIUM 7.2* 7.4* 7.3* 6.8*     Recent Labs     04/26/20  1437 04/27/20  0011 04/27/20  0412 04/27/20  0817 04/27/20  1644   ALTSGPT 21  --  17  --   --   --    ASTSGOT 21  --  18  --   --   --    ALKPHOSPHAT 241*  --  135*  --   --   --    TBILIRUBIN 1.0  --  0.7  --   --   --    GLUCOSE 624*   < > 476* 254* 121* 105*    < > = values in this interval not displayed.     Recent Labs     04/26/20  1437 04/27/20  0011   WBC 22.3* 36.8*   NEUTSPOLYS 90.50* 93.00*   LYMPHOCYTES 1.70* 0.90*   MONOCYTES 0.00 0.90   EOSINOPHILS 0.00 0.00   BASOPHILS 0.00 0.00   ASTSGOT 21 18   ALTSGPT 21 17   ALKPHOSPHAT 241* 135*   TBILIRUBIN 1.0 0.7     Recent Labs     04/26/20 1437 04/27/20  0011 04/27/20  0412   RBC 3.28* 2.80*  --    HEMOGLOBIN 9.9* 8.5*  --    HEMATOCRIT 30.3* 25.3*  --    PLATELETCT 209 206  --    PROTHROMBTM 16.6*  --   --    INR 1.31*  --   --    IRON  --   --  7*   FERRITIN  --   --  408.0*   TOTIRONBC  --   --  123*       Imaging  CT:    Reviewed    Assessment/Plan  * Septic shock due to undetermined organism (HCC)- (present on admission)  Assessment & Plan  Septic shock attributed to emphysematous  pyelonephritis  Continue volume resuscitation  Continue broad-spectrum antibiotics  Source control via nephrostomy tube placement  Continue Levophed to achieve and maintain MAP>65mmHg    HOCM (hypertrophic obstructive cardiomyopathy) (HCC)- (present on admission)  Assessment & Plan  TTE 75% with LVH  Achieve and maintain intravascular euvolemia    Hypertension, benign- (present on admission)  Assessment & Plan  Currently hypotensive requiring vasopressors    Weakness of both lower extremities- (present on admission)  Assessment & Plan  Progressive lower extremity strength over several months per the patient.  Seen by PMD for this problem attributed to progressive diabetic neuropathy  - MRI T and L-spine showed no evidence of epidural abscess or transverse myelitis  - Neuro consult, consider EMG pending above evaluation         VTE:  Lovenox  Ulcer: Not Indicated  Lines: Central Line  Ongoing indication addressed    I have performed a physical exam and reviewed and updated ROS and Plan today (4/27/2020). In review of yesterday's note (4/26/2020), there are no changes except as documented above.     The patient remains critically ill,  I have assessed and reassessed the blood pressure,  cardiovascular status, labs and response to interventions.  I am actively providing IV fluid resuscitation and titrating Levophed.  This patient remains at high risk for worsening shock and death without the above critical care interventions.    Discussed patient condition and risk of morbidity and/or mortality with RN, Pharmacy, [facility] resident and Patient     Critical care time 45 minutes in directly providing and coordinating critical care and extensive data review.  No time overlap and excludes procedures.

## 2020-04-28 NOTE — THERAPY
"Physical Therapy Evaluation completed.   Bed Mobility:  Supine to Sit: Minimal Assist  Transfers: Sit to Stand: Minimal Assist  Gait: Level Of Assist: Minimal Assist with Front-Wheel Walker       Plan of Care: Will benefit from Physical Therapy 4 times per week  Discharge Recommendations: Equipment: Will Continue to Assess for Equipment Needs.    Pt presents with c/o increasing weakness over last 4 weeks, now s/p  LEFT neph tube. Today, pt needed Nina to come to EOB, min A transfers and Nina for ambulation x 100 feet using FWW. Pt lives with supportive family, will need to be up and walking daily using FWW in halls with nsg supervision to build strength if she is to d/c home with family assist. If does not progress, may need a transitional care stay upon d/c.   See \"Rehab Therapy-Acute\" Patient Summary Report for complete documentation.     "

## 2020-04-28 NOTE — WOUND TEAM
[facility]  Inpatient Services  Initial Wound and Skin Care Evaluation    Admission Date: 4/26/2020     Last order of IP CONSULT TO WOUND CARE was found on 4/27/2020 from Hospital Encounter on 4/26/2020       HPI, PMH, SH: Reviewed    Unit where seen by Wound Team: [id number]     WOUND CONSULT RELATED TO:  R foot and R sacrum     Self Report / Pain Level:  No c/o pain       OBJECTIVE:  On MARIVEL bed, wounds DEMETRIUS    WOUND TYPE, LOCATION, CHARACTERISTICS (Pressure Injuries: location, stage, POA or date identified)  Wound 04/27/20 Diabetic Ulcer Toe, Hallux;Toe, 2nd Plantar Right (Active)      4/28/2020 12:15 PM   Site Assessment Red;Brown 4/28/2020 12:15 PM   Periwound Assessment Callused;Purple 4/28/2020 12:15 PM   Margins Defined edges 4/28/2020 12:15 PM   Closure Secondary intention 4/28/2020 12:15 PM   Drainage Amount Scant 4/28/2020 12:15 PM   Drainage Description Serosanguineous 4/28/2020 12:15 PM   Treatments Cleansed 4/28/2020 12:15 PM   Wound Cleansing Normal Saline Irrigation 4/28/2020 12:15 PM   Periwound Protectant Not Applicable 4/28/2020 12:15 PM   Dressing Cleansing/Solutions Not Applicable 4/28/2020 12:15 PM   Dressing Options Honey Colloid;Dry Gauze 4/28/2020 12:15 PM   Dressing Changed New 4/28/2020 12:15 PM   Dressing Status Intact 4/28/2020 12:15 PM   Dressing Change/Treatment Frequency Every 72 hrs, and As Needed 4/28/2020 12:15 PM   NEXT Dressing Change/Treatment Date 05/01/20 4/28/2020 12:15 PM   NEXT Weekly Photo (Inpatient Only) 05/05/20 4/28/2020 12:15 PM   Non-staged Wound Description Full thickness 4/28/2020 12:15 PM   Wound Length (cm) 3 cm  1st toe 1.5 4/28/2020 12:15 PM   Wound Width (cm) 2.7 cm  1st toe 2 4/28/2020 12:15 PM   Wound Depth (cm) 0.3 cm 4/28/2020 12:15 PM   Wound Surface Area (cm^2) 8.1 cm^2 4/28/2020 12:15 PM   Wound Volume (cm^3) 2.43 cm^3 4/28/2020 12:15 PM   Tunneling (cm) 0 cm 4/28/2020 12:15 PM   Undermining (cm) 0 cm 4/28/2020 12:15 PM   Shape Irregular 1st  toe irregular 4/28/2020 12:15 PM   Wound Odor None 4/28/2020 12:15 PM   Pulses 2+;DP;PT 4/28/2020 12:15 PM   Exposed Structures None 4/28/2020 12:15 PM   Number of days: 1       Wound 04/27/20 Diabetic Ulcer Foot Medial Right (Active)      4/28/2020 12:15 PM   Site Assessment Red;Tan 4/28/2020 12:15 PM   Periwound Assessment Pink 4/28/2020 12:15 PM   Margins Defined edges 4/28/2020 12:15 PM   Closure Secondary intention 4/28/2020 12:15 PM   Drainage Amount None 4/28/2020 12:15 PM   Treatments Cleansed 4/28/2020 12:15 PM   Wound Cleansing Normal Saline Irrigation 4/28/2020 12:15 PM   Periwound Protectant Not Applicable 4/28/2020 12:15 PM   Dressing Cleansing/Solutions Not Applicable 4/28/2020 12:15 PM   Dressing Options Honey Colloid;Silicone Adhesive Foam 4/28/2020 12:15 PM   Dressing Changed New 4/28/2020 12:15 PM   Dressing Status Intact 4/28/2020 12:15 PM   Dressing Change/Treatment Frequency Every 72 hrs, and As Needed 4/28/2020 12:15 PM   NEXT Dressing Change/Treatment Date 05/01/20 4/28/2020 12:15 PM   NEXT Weekly Photo (Inpatient Only) 05/05/20 4/28/2020 12:15 PM   Non-staged Wound Description Full thickness 4/28/2020 12:15 PM   Wound Length (cm) 1 cm   4/28/2020 12:15 PM   Wound Width (cm) 2 cm    4/28/2020 12:15 PM   Wound Surface Area (cm^2) 2 cm^2 4/28/2020 12:15 PM   Tunneling (cm) 0 cm 4/28/2020 12:15 PM   Undermining (cm) 0 cm 4/28/2020 12:15 PM   Shape Oval    4/28/2020 12:15 PM   Wound Odor None 4/28/2020 12:15 PM   Pulses 2+;DP;PT 4/28/2020 12:15 PM   Exposed Structures None 4/28/2020 12:15 PM   Number of days: 1          Vascular:    PEACE:   No results found.      Lab Values:    Lab Results   Component Value Date/Time    WBC 22.7 (H) 04/28/2020 03:33 AM    RBC 2.92 (L) 04/28/2020 03:33 AM    HEMOGLOBIN 8.7 (L) 04/28/2020 03:33 AM    HEMATOCRIT 25.7 (L) 04/28/2020 03:33 AM    HBA1C 17.6 (H) 04/27/2020 12:11 AM          Culture:   Obtained   Culture Results show:  No results found for this or any  previous visit (from the past 720 hour(s)).      INTERVENTIONS BY WOUND TEAM:  Cleaned R toe wounds with NS, dried. 2nd toe eschar loose and was removed with gauze revealing red tissue, edges brown hard and firmly adherent. Cleaned and culture taken. 1st toe eschar firmly adherent. 2nd toe applied piece of honey colloid and wrapped gauze around it. 1st toe painted with iodine. Medial R foot cleaned and crust was lifting so used gauze and it was easily removed revealing 98% red and 2% tan. Cleaned and piece of honey colloid to wound, covered with adhesive foam. Pt then turned to R side, assessed R side of sacrum, left DEMETRIUS    Interdisciplinary consultation: Patient, Bedside RN     EVALUATION: pt has DM ulcers to lateral plantar surface of 1 and 2 toes with another wound to medial foot. Honey colloid applied to cleanse and autolytically debride due to its high osmolarity. As well as help lower overall wound pH, while promoting a moisture-balanced environment conducive to wound healing.  Pt has what appears as scar tissue with comedone to R side of sacrum. She has purple discoloration 9 x 4 cm from anus to sacrum. LPS consulted for R foot wounds. Concern of infection 2nd toe so culture collected.     Goals: Steady decrease in wound area and depth weekly.    NURSING PLAN OF CARE ORDERS (X):    Dressing changes: See Dressing Care orders: x  Skin care: See Skin Care orders: x  Rectal tube care: See Rectal Tube Care orders:   Other orders:    RSKIN:   CURRENTLY IN PLACE (X), APPLIED THIS VISIT (A), ORDERED (O):   Q shift Moshe:  x  Q shift pressure point assessments:    Pressure redistribution mattress            Low Airloss    x      Bariatric MARIVEL         Bariatric foam           Heel float boots     Heel Silicone dressing        Float Heels off Bed with Pillows  o             Barrier wipes         Barrier Cream         Barrier paste          Sacral silicone dressing  o       Silicone O2 tubing         Anchorfast          Cannula fixation Device (Tender )          Gray Foam Ear protectors           Trach with Optifoam split foam                 Waffle cushion        Waffle Overlay         Rectal tube or BMS    Purwick/Condom Cath          Antifungal tx      Interdry          Reposition q 2 hours  x      Up to chair        Ambulate      PT/OT        Dietician        Diabetes Education      PO  x   TF     TPN     NPO   # days   Other        WOUND TEAM PLAN OF CARE   Dressing changes by wound team:          Follow up 1-2 times weekly:               Follow up 3 times weekly:                NPWT change 3 times weekly:     Follow up as needed:  x     Other (explain):  LPS consulted    Anticipated discharge plans:  LTACH:        SNF/Rehab:                  Home Care:           Outpatient Wound Center:            Self Care:            Other: May need wound care R foot

## 2020-04-28 NOTE — DISCHARGE SUMMARY
Discharge Summary    Date of Admission: 4/26/2020  Date of Discharge: 05/05/20  Discharging Attending: Dr. Wayne   Discharging Senior Resident: Dr. Degroot  Discharging Intern: Dr. Andrews    CHIEF COMPLAINT ON ADMISSION  Chief Complaint   Patient presents with   • Weakness   • Fever       Reason for Admission  Acute pyelonephritis and septic shock    Secondary diagnoses include:  Left renal abscess  Hydronephrosis due to acute infection  Type II diabetes with hyperglycemia  Diabetic foot ulcer  Bilateral lower extremity weakness  Hyponatremia   Refeeding syndrome    Admission Date  4/26/2020    CODE STATUS  Full Code    HPI & HOSPITAL COURSE    Rigoberto Lakhani is a 55 year old lady with poorly controlled DM type 2, hypertrophic obstructive cardiomyopathy and hepatic cirrhosis was admitted with 3 day h/o fever, chills, cough, sore throat, low back pain, dysuria, weakness and a reduced appetite. She was febrile with temp 100.8 F, tachycardic with HR in 110s, SBP 70s-80s on admission. She had WBC 22.8, Procal 5, BHB 1.12, AG 18, CO2 of 16, glucose > 500, mild pre-renal RUBEN with eGFR 54 and creat 1.24 (baseline 0.58). UA showed glucose large, leucocyte esterase and WBCs. MRI T-L spine showed abnormal left kidney. Renal US followed by CT showed atrophic right kidney, left renal abscess with gas in keeping with left emphysematous pyelonephritis and left hydronephrosis. She was treated for septic shock in ER with IV ceftriaxone, IV Linezolid, IV fluids, levophed and was started on DKA protocol for hyperosmolar hyperglycemia. Antibiotics were changed to Zosyn and she underwent nephrostomy tube placement with IR. Her vitals improved, her labs improved and she was weaned off pressors and was transferred to the floor. She was then transitioned to IV Ceftriaxone and repeat CT scan was obtained which showed interval improvement of her hydronephrosis as well as her infection. Her WBC continued to trend down and she felt improved. The  patient will require 4 weeks of IV antibiotic therapy. PT/OT evaluation recommended placement, but the patient adamantly refused this despite counseling. Home health was set up for her as well as home infusions through a PICC line that was placed on 5/1/20. The patient will leave the hospital with a nephrostomy tube in place and followup with urology outpatient for further management. She will also require home health wound care for her diabetic foot wound. She was set up with a primary care followup for 5/6/20 for ongoing management of her diabetes and chronic medical conditions.     Therefore, she is discharged in fair and stable condition to home with organized home healthcare and close outpatient follow-up.    Septic shock secondary to left sided emphysematous pyelonephritis, left renal abscess, left pyonephrosis - cause of obstruction likely due to infection : On 4/27, her WBC escalated to 38, procalcitonin trended up to 24. Blood cultures were positive for gram negative rods in 2/2 bottles. Ceftriaxone and linezolid were discontinued. Zosyn was started on 4/27.  Following discussion with urology (Dr. Perry) and IR (Dr. Pride), left nephrostomy was placed on 4/27 and drained turbid urine +ve for WBCs and gram negative rods. Lower pole left renal abscess was aspirated, 6 mls purulent fluid drained. All cultures are positive for pan sensitive E.coli. She was switched to Ceftriaxone to finish a full 4 weeks of IV antibiotics. She was set up to receive home infusions as she refused to go to a facility.      Hypertrophic obstructive cardiomyopathy and hypertension : She is on lisinopril 10 mg/d (generally not advised in HOC), aldactone 25 mg/d at home as well as metoprolol 50 mg BID (first choice agent in HOC) at home. These were held on admission. Metoprolol was resumed and her blood pressure remained stable on an increased dose of this at 100mg BID.      Poorly controlled diabetes with hyperosmolar  hyperglycemia on admission : She is usually on metformin 1000 mg BID, jardiance 25 mg/d, and Lantus 35 units daily at home.  HbA1c this admission is > 17. Reports non-compliance with the medication as her PCP moved to [place].  All home DM meds were held initially on admission, she was placed on IV insulin drip. She did become hypoglycemic with BG down to 57 on 4/27 needing reduction in insulin rate, and increase in D10 infusion rate. She was eventually transitioned to subcutaneous insulin and stabilized on a regimen of lantus 20 units QHS. Diabetes education and nutrition consulted. Given her ongoing kidney infection, she will be discharged on 20u of Lantus only and will followup with her PCP for ongoing management.      Diabetic foot ulcer : Involving right 4th toe, under medial malleolus and 2nd right toe: Wound care consulted and dressed the wound. Recommend outpatient followup. Cultures grew group B streptococcus. She will complete a course of doxycyline as well as get home health wound care. She will also need to followup in the wound clinic.      Bilateral lower extremeity weakness and sensory disturbances : She endorsed these symptoms for one  month prior to admission. Strength was 4/5, and reduced sensation to touch and absent DTRs in lower extremities. MRI thoraco-lumbar spine did not reveal any spinal abnormalities. B12 was elevated 1400. Neurology was consulted. It is likely she has acute weakness due to septic shock on a background of more chronic weakness due to polyneuropathy, possibly due to poorly controlled DM and possibly EtOH use. Per neurology recommendations, she will need to have out-patient EMG/NCS. She will see her primary care provider for a referral.      Refeeding syndrome : Hypoalbuminemia, reduced oral intake for a month prior to admission. Phosphate, potassium and magnesium repleted.  Resolved with repletion.     Hyponatremia: Patient with hyponatremia that remained relatively stable  throughout her hospitalization between 120 and 130. She was 126 on the day of discharge. She was always asymptomatic. Workup showed normal serum osmolarity, increased urine osmolarities. The cause if felt to be multifactorial and will need monitoring outpatient through her PCP.     The patient met 2-midnight criteria for an inpatient stay at the time of discharge.    Discharge Date  05/05/20    FOLLOW UP ITEMS POST DISCHARGE  -Outpatient infusions of Ceftriaxone with end date of 5/25/2020  -Home health for PT and OT  -Home health wound care for diabetic foot ulcers  -Outpatient followup with urology for nephrotomy tube management and following of infection  -PCP followup for diabetic care and chronic medical care    DISCHARGE DIAGNOSES  Principal Problem:    Septic shock due to Escherichia coli (HCC) POA: Yes  Active Problems:    Hypertension, benign POA: Yes    HOCM (hypertrophic obstructive cardiomyopathy) (HCC) POA: Yes      Overview: Symptomatic CP/COURTNEY/Orthostatis. Dx 12/2014. Normal coronaries. Complicated       by alcohol abuse and liver disease.    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) POA: Yes    Weakness of both lower extremities POA: Yes    Acute necrotizing pyelonephritis POA: Yes    Diabetic ulcer of toe (HCC) POA: Unknown    DKA (diabetic ketoacidoses) (HCC) POA: Unknown    Tobacco abuse POA: Yes    Elevated troponin POA: Yes    Thrombocytopenia (HCC) POA: Yes    Hyponatremia POA: Yes    Acute kidney injury (HCC) POA: Yes    URI (upper respiratory infection) POA: Yes    Normocytic anemia POA: Yes    Hypophosphatemia POA: Unknown    Hypomagnesemia POA: Unknown    Refeeding syndrome POA: Unknown  Resolved Problems:    Uncontrolled type 2 DM with hyperosmolar nonketotic hyperglycemia (HCC) POA: Yes    FOLLOW UP  Dr. Vaz  [address] [place] [address].  [phone].  Go on 5/6/2020  at 3PM for your new primary care appointment, bring the paperwork from your hospitilization with you and all your  prescriptions    [facility]  [address], [address]  [place] [id number] [phone]    ask your primary care provider for a referral to see the neurologist about your leg numbness    [facility] [place] [id number]  [phone]    their office will call you for followup    [facility]  [address]  [place] [id number] [phone]  Schedule an appointment as soon as possible for a visit  For wound re-check    MEDICATIONS ON DISCHARGE     Medication List      START taking these medications      Instructions   Alcohol Swabs   Doctor's comments:  Per formulary preference. ICD-10 code: E11.65 Uncontrolled type 2 Diabetes Mellitus  Wipe site with prep pad prior to injection.     * Blood Glucose Meter Kit   Doctor's comments:  Or per formulary preference. ICD-10 code: E11.65 Uncontrolled type 2 Diabetes Mellitus  Test blood sugar as recommended by provider. True Metrix blood glucose monitoring kit.     * Blood Glucose Test Strips   Doctor's comments:  Or per formulary preference. ICD-10 code: E11.65 Uncontrolled type 2 Diabetes Mellitus  Use one True Metrix strip to test blood sugar three times daily before meals.     doxycycline 100 MG capsule  Commonly known as:  MONODOX   Take 1 Cap by mouth 2 times a day for 6 days.  Dose:  100 mg     Insulin Pen Needle 32 G x 4 mm   Doctor's comments:  Per patient/formulary preference. ICD-10 code: E11.65 Uncontrolled type 2 Diabetes Mellitus  Use one pen needle in pen device to inject insulin once daily.     Lancets   Doctor's comments:  Or per formulary preference. ICD-10 code: E11.65 Uncontrolled type 2 Diabetes Mellitus  Use one True Metrix lancet to test blood sugar three times daily before meals.     oxybutynin SR 10 MG CR tablet  Commonly known as:  DITROPAN-XL  Replaces:  oxybutynin 5 MG Tabs   Take 1 Tab by mouth every day for 30 days.  Dose:  10 mg         * This list has 2 medication(s) that are the  same as other medications prescribed for you. Read the directions carefully, and ask your doctor or other care provider to review them with you.            CHANGE how you take these medications      Instructions   insulin glargine 100 UNIT/ML Sopn injection  What changed:    · how much to take  · additional instructions  Generic drug:  insulin glargine   Inject 20 Units as instructed every evening for 30 days.  Dose:  20 Units     metoprolol 100 MG Tabs  What changed:    · medication strength  · how much to take  · when to take this  Commonly known as:  LOPRESSOR   Take 1 Tab by mouth 2 Times a Day for 30 days.  Dose:  100 mg        CONTINUE taking these medications      Instructions   atorvastatin 20 MG Tabs  Commonly known as:  LIPITOR   TAKE 1 TABLET BY MOUTH AT BEDTIME     NexIUM 20 MG capsule  Generic drug:  esomeprazole   Take 20 mg by mouth every morning before breakfast.  Dose:  20 mg     vitamin D (Ergocalciferol) 1.25 MG (86656 UT) Caps capsule  Commonly known as:  DRISDOL   Take 1 Cap by mouth every 28 days.  Dose:  50,000 Units        STOP taking these medications    gabapentin 800 MG tablet  Commonly known as:  NEURONTIN     Jardiance 25 MG Tabs  Generic drug:  Empagliflozin     lisinopril 10 MG Tabs  Commonly known as:  PRINIVIL     metFORMIN 500 MG Tabs  Commonly known as:  GLUCOPHAGE     oxybutynin 5 MG Tabs  Commonly known as:  DITROPAN  Replaced by:  oxybutynin SR 10 MG CR tablet     spironolactone 25 MG Tabs  Commonly known as:  ALDACTONE          Allergies  No Known Allergies     IMAGING  CT Renal 4/26/20   Left hydronephrosis with gas --> Pyonephrosis, complex fluid collections in left kidney concerning for renal abscesses  Atrophic right kidney  Hepatic cirrhosis  Distended GB with stone  Extrahepatic biliary dilation  Small B/L pleural efusions     MRI L-spine 4/26/20   No e/o epidural abscess or transverse myelitis  Mild central canal stenosis at L4-L5 level secondary to facet  arthropathy  Mild L-spondylotic changes at L4-L5 and L5-S1 levels     MRI T-spine 4/26/20  No evidence of epidural abscess or transverse myelitis.  But radiologist noticed abnormality in left kidney which triggered renal US followed by CT     Transthoracic echo 4/27/20  No change from Sept 2019, severe concentric LVH, EF 75% with hyperdynamic LV, dilated LA    CT Abdomen/pelvis with and without contrast 4/30/20   Interval improvement of left hydronephrosis. Collection along the posterior right kidney measures 4 x 2.2 cm and is mildly decreased in size compared to prior. Collection in the anterior left kidney with subcapsular extension is significantly decreased in size.       DIET  Orders Placed This Encounter   Procedures   • Diet Order Diabetic     Standing Status:   Standing     Number of Occurrences:   1     Order Specific Question:   Diet:     Answer:   Diabetic [3]     ACTIVITY  As tolerated.  Weight bearing as tolerated    CONSULTATIONS  Dr. Perry with Urology consulted.  Treatment options were discussed and plan of care agreed upon.    PROCEDURES  Central line insertion (right internal jugular ) - 4/26/20 in ER  Vasopressors : 4/26 - 4/27 @ 2pm  IV Insulin : 4/26 - 4/27 midnight  Left nephrostomy + aspiration of left lower pole renal abscess - 4/27  PICC placement 5/1/20    Time spent on discharge including coordination of outpatient care and counseling was 120 minutes. This was a complex discharge that required complex outpatient care to be set up

## 2020-04-28 NOTE — PROGRESS NOTES
ICU transfer note     Attending: Dr. Grant / Dr. Cortes  Resident: NEHEMIAH Matos  Date of admission: 4/26/2020   Date of transferring out from ICU : 4/29/2020  Receiving Team : [facility]  Floor attending : Dr. Hakeem Wayne  Floor resident / intern : Dr. Gilmar Degroot / Dr. Jane Andrews     Primary diagnosis:   Septic shock secondary to urinary source - left pyonephrosis and emphysematous pyelonephritis with renal abscesses, cause of ureteric obstruction to be determined     ICU hospital course:    Rigoberto Lakhani is a 55 year old lady with poorly controlled DM type II (on lantus 35 Units, metformin 1000 mg BID, Jardiance 25 mg/d, unremarkable C-peptide and BEEVRLEY in 2019), hypertrophic obstructive cardiomyopathy with systolic motion of the anterior leaflet of mitral valve and asymmetric septal hypertrophy on cardiac MRI in 2014, EF 75% with severe concentric LVH on TTE in Sept 2019, hepatic cirrhosis, was admitted with 3 day h/o fever, chills, cough, sore throat, low back pain, dysuria, reduced appetite, and 4 week h/o lower extremity bilateral weakness. She was febrile with temp 100.8 F, tachycardic with HR in 110s, SBP 70s-80s on admission. She had WBC 22.8, Procal 5, BHB 1.12, AG 18, CO2 of 16, glucose > 500, mild pre-renal RUBEN with eGFR 54 and creat 1.24 (baseline 0.58) on admission. UA showed glucose large, leucocyte esterase and WBCs. MRI T-L spine showed abnormal left kidney. Renal US followed by CT showed atrophic right kidney, left renal abscess with gas in keeping with left emphysematous pyelonephritis and left hydronephrosis. Distal ureter not imaged - hence unsure if there's distal ureteric stone / unsure of cause of left hydronephrosis. She was treated for septic shock in ER with IV ceftriaxone, IV Linezolid, IV fluids, levophed (via right internal jugular line) and was started on DKA protocol for hyperosmolar hyperglycemia in ER. COVID-19 PCR  testing was performed and was negative in ER. Per Dr. Kimble - no further work-up was needed and droplet precautions discontinued on 4/27 by infection control. .    Septic shock secondary to left sided emphysematous pyelonephritis, left renal abscess, left pyonephrosis - cause of obstruction yet to be determined : On 4/27, her WBC escalated to 38, procalcitonin trended up to 24. Blood cultures were positive for gram negative rods in 2/2 bottles. Ceftriaxone and linezolid were discontinued. Zosyn was started on 4/27.  Following discussion with urology (Dr. Perry) and IR (Dr. Pride), left nephrostomy was placed on 4/27 and drained turbid urine +ve for WBCs and gram negative rods. Lower pole left renal abscess was aspirated, 6 mls purulent fluid drained and culture of this is growing lactose fermenting GNRs. Transthoracic echo confirmed the already known HOCM with EF 75%, hyperdynamic LV, severe concentric LVH. Per Urology, repeat CT imaging of kidney requested for 4/30. All cultures are positive for pan sensitive E.coli, but given the severity of sepsis, it was decided to finish 5 days of IV zosyn prior to transitioning her to oral antibiotics.     Hypertrophic obstructive cardiomyopathy and hypertension : She is on lisinopril 10 mg/d (generally not advised in HOCM), aldactone 25 mg/d at home as well as metoprolol 50 mg BID (first choice agent in HOCM) at home. These were held on admission. Metoprolol was restarted at half home dose on 4/29 and can be increased to home dose based on BP and HR.    Poorly controlled diabetes : She is usually on metformin 1000 mg BID, jardiance 25 mg/d, and Lantus 35 units daily at home.  HbA1c this admission is > 17. Likely non-complaint with medication as her PCP moved to [place].  All home DM meds were held initially on admission, she was placed on IV insulin drip. She did become hypoglycemic with BG down to 57 on 4/27 needing reduction in insulin rate, and increase in D10 infusion  rate. She was eventually transitioned to subcutaneous insulin 30 units on 4/27 midnight, lantus reduced to 25 units on 4/29 with 4 units pre-meal insulin TID and sliding scale.  Wound care is following for diabetic right foot ulcer. Diabetes education and nutrition consulted.    Diabetic foot ulcer : Involving right 4th toe, and under medial malleolus : Wound care on board.    Bilateral lower extremeity weakness and sensory disturbances : She endorsed these symptoms for one  month prior to admission. Strength was 4/5, and reduced sensation to touch and absent DTRs in lower extremities. MRI thoraco-lumbar spine did not reveal any spinal abnormalities. B12 was elevated 1400. Neurology was consulted. It is likely she has acute weakness due to septic shock on a background of more chronic weakness due to polyneuropathy, possibly due to poorly controlled DM and possibly EtOH use. Per neurology recommendations, she will need to have out-patient EMG/NCS.     Refeeding syndrome : Hypoalbuminemia, reduced oral intake for a month prior to admission. Phosphate, potassium and magnesium repleted.     Patient wanted to be discharged today 4/29 to take care of family's financial needs, explained in detail that she isn't ready yet, and will need IV antibiotics, repeat imaging, DM control, DM education, urology plan for nephrostomy. She agreed to stay today. She did not want me to contact her family to discuss her condition.        Important events / procedures in ICU:  Central line insertion (right internal jugular ) - 4/26/20 in ER  Vasopressors : 4/26 - 4/27 @ 2pm  IV Insulin : 4/26 - 4/27 midnight  Left nephrostomy + aspiration of left lower pole renal abscess - 4/27      Imaging:  CT Renal 4/26/20   Left hydronephrosis with gas --> Pyonephrosis, complex fluid collections in left kidney concerning for renal abscesses  Atrophic right kidney  Hepatic cirrhosis  Distended GB with stone  Extrahepatic biliary dilation  Small B/L  pleural efusions     MRI L-spine 4/26/20   No e/o epidural abscess or transverse myelitis  Mild central canal stenosis at L4-L5 level secondary to facet arthropathy  Mild L-spondylotic changes at L4-L5 and L5-S1 levels     MRI T-spine 4/26/20  No evidence of epidural abscess or transverse myelitis.  But radiologist noticed abnormality in left kidney which triggered renal US followed by CT     Transthoracic echo 4/27/20  No change from Sept 2019, severe concentric LVH, EF 75% with hyperdynamic LV, dilated LA       Things to follow:   - repeat renal CT imaging requested for 4/30  - follow up with urology for further plan (may need nephrostogram and antegrade stent followed by elective ureteric intervention following discharge vs ureteric intervention this admission)   - continue zosyn 5 days (stop date will be 5/1 night) and then transition to PO antibiotics   - currently, has left nephrostomy in place - need plan for this from urology  - follow up on urine, nephrostomy output, and blood cultures  - blood glucose monitoring and adjust insulin dose  - follow up on diabetes education consult  - wound care following for Rt foot ulcer and sacral sore  - increase metoprolol to home dose based on BP and HR  - assess need and safety for ACEI and aldactone in the setting of HOCM. Can consider non-dihydropyridine CCB if required.   - repeat ferritin after infection resolved completely  - might likely benefit from acute rehab, physiatry consult placed  - follow neurology recommendations, will need out-patient EMG  - will need to establish care with [facility] for PCP        Labs and imaging need to continue :  - CBC: yes,   - BMP: yes,   - check phosp, Mag, LFTs once in 3 days

## 2020-04-29 ENCOUNTER — APPOINTMENT (OUTPATIENT)
Dept: RADIOLOGY | Facility: MEDICAL CENTER | Age: 55
DRG: 871 | End: 2020-04-29
Attending: STUDENT IN AN ORGANIZED HEALTH CARE EDUCATION/TRAINING PROGRAM
Payer: MEDICAID

## 2020-04-29 ENCOUNTER — APPOINTMENT (OUTPATIENT)
Dept: RADIOLOGY | Facility: MEDICAL CENTER | Age: 55
DRG: 871 | End: 2020-04-29
Attending: HOSPITALIST
Payer: MEDICAID

## 2020-04-29 LAB
ALBUMIN SERPL BCP-MCNC: 1.8 G/DL (ref 3.2–4.9)
ALBUMIN/GLOB SERPL: 0.6 G/DL
ALP SERPL-CCNC: 102 U/L (ref 30–99)
ALT SERPL-CCNC: 13 U/L (ref 2–50)
ANION GAP SERPL CALC-SCNC: 9 MMOL/L (ref 7–16)
AST SERPL-CCNC: 16 U/L (ref 12–45)
BACTERIA BLD CULT: ABNORMAL
BACTERIA UR CULT: ABNORMAL
BACTERIA UR CULT: ABNORMAL
BACTERIA WND AEROBE CULT: ABNORMAL
BACTERIA WND AEROBE CULT: ABNORMAL
BASOPHILS # BLD AUTO: 0 % (ref 0–1.8)
BASOPHILS # BLD: 0 K/UL (ref 0–0.12)
BILIRUB SERPL-MCNC: 0.4 MG/DL (ref 0.1–1.5)
BUN SERPL-MCNC: 15 MG/DL (ref 8–22)
BURR CELLS BLD QL SMEAR: NORMAL
CALCIUM SERPL-MCNC: 7.1 MG/DL (ref 8.5–10.5)
CHLORIDE SERPL-SCNC: 103 MMOL/L (ref 96–112)
CO2 SERPL-SCNC: 19 MMOL/L (ref 20–33)
CREAT SERPL-MCNC: 0.8 MG/DL (ref 0.5–1.4)
EOSINOPHIL # BLD AUTO: 0.16 K/UL (ref 0–0.51)
EOSINOPHIL NFR BLD: 0.9 % (ref 0–6.9)
ERYTHROCYTE [DISTWIDTH] IN BLOOD BY AUTOMATED COUNT: 52.3 FL (ref 35.9–50)
GLOBULIN SER CALC-MCNC: 3.1 G/DL (ref 1.9–3.5)
GLUCOSE BLD-MCNC: 120 MG/DL (ref 65–99)
GLUCOSE BLD-MCNC: 73 MG/DL (ref 65–99)
GLUCOSE BLD-MCNC: 79 MG/DL (ref 65–99)
GLUCOSE SERPL-MCNC: 84 MG/DL (ref 65–99)
GRAM STN SPEC: ABNORMAL
HCT VFR BLD AUTO: 24.6 % (ref 37–47)
HGB BLD-MCNC: 8.2 G/DL (ref 12–16)
LYMPHOCYTES # BLD AUTO: 1.53 K/UL (ref 1–4.8)
LYMPHOCYTES NFR BLD: 8.7 % (ref 22–41)
MAGNESIUM SERPL-MCNC: 2 MG/DL (ref 1.5–2.5)
MANUAL DIFF BLD: NORMAL
MCH RBC QN AUTO: 30 PG (ref 27–33)
MCHC RBC AUTO-ENTMCNC: 33.3 G/DL (ref 33.6–35)
MCV RBC AUTO: 90.1 FL (ref 81.4–97.8)
MONOCYTES # BLD AUTO: 0.16 K/UL (ref 0–0.85)
MONOCYTES NFR BLD AUTO: 0.9 % (ref 0–13.4)
MORPHOLOGY BLD-IMP: NORMAL
NEUTROPHILS # BLD AUTO: 15.75 K/UL (ref 2–7.15)
NEUTROPHILS NFR BLD: 89.5 % (ref 44–72)
NRBC # BLD AUTO: 0 K/UL
NRBC BLD-RTO: 0 /100 WBC
PHOSPHATE SERPL-MCNC: 3.2 MG/DL (ref 2.5–4.5)
PLATELET # BLD AUTO: 118 K/UL (ref 164–446)
PLATELET BLD QL SMEAR: NORMAL
PMV BLD AUTO: 11 FL (ref 9–12.9)
POIKILOCYTOSIS BLD QL SMEAR: NORMAL
POTASSIUM SERPL-SCNC: 4.4 MMOL/L (ref 3.6–5.5)
PROT SERPL-MCNC: 4.9 G/DL (ref 6–8.2)
RBC # BLD AUTO: 2.73 M/UL (ref 4.2–5.4)
RBC BLD AUTO: PRESENT
SIGNIFICANT IND 70042: ABNORMAL
SITE SITE: ABNORMAL
SODIUM SERPL-SCNC: 131 MMOL/L (ref 135–145)
SOURCE SOURCE: ABNORMAL
WBC # BLD AUTO: 17.6 K/UL (ref 4.8–10.8)

## 2020-04-29 PROCEDURE — A9270 NON-COVERED ITEM OR SERVICE: HCPCS | Performed by: STUDENT IN AN ORGANIZED HEALTH CARE EDUCATION/TRAINING PROGRAM

## 2020-04-29 PROCEDURE — 700102 HCHG RX REV CODE 250 W/ 637 OVERRIDE(OP): Performed by: STUDENT IN AN ORGANIZED HEALTH CARE EDUCATION/TRAINING PROGRAM

## 2020-04-29 PROCEDURE — 80053 COMPREHEN METABOLIC PANEL: CPT

## 2020-04-29 PROCEDURE — 700111 HCHG RX REV CODE 636 W/ 250 OVERRIDE (IP): Performed by: STUDENT IN AN ORGANIZED HEALTH CARE EDUCATION/TRAINING PROGRAM

## 2020-04-29 PROCEDURE — 700102 HCHG RX REV CODE 250 W/ 637 OVERRIDE(OP): Performed by: INTERNAL MEDICINE

## 2020-04-29 PROCEDURE — 85007 BL SMEAR W/DIFF WBC COUNT: CPT

## 2020-04-29 PROCEDURE — 99254 IP/OBS CNSLTJ NEW/EST MOD 60: CPT | Performed by: PHYSICAL MEDICINE & REHABILITATION

## 2020-04-29 PROCEDURE — 700111 HCHG RX REV CODE 636 W/ 250 OVERRIDE (IP): Performed by: INTERNAL MEDICINE

## 2020-04-29 PROCEDURE — 84100 ASSAY OF PHOSPHORUS: CPT

## 2020-04-29 PROCEDURE — 700105 HCHG RX REV CODE 258: Performed by: STUDENT IN AN ORGANIZED HEALTH CARE EDUCATION/TRAINING PROGRAM

## 2020-04-29 PROCEDURE — 82962 GLUCOSE BLOOD TEST: CPT

## 2020-04-29 PROCEDURE — 83735 ASSAY OF MAGNESIUM: CPT

## 2020-04-29 PROCEDURE — A9270 NON-COVERED ITEM OR SERVICE: HCPCS | Performed by: INTERNAL MEDICINE

## 2020-04-29 PROCEDURE — 770020 HCHG ROOM/CARE - TELE (206)

## 2020-04-29 PROCEDURE — 85027 COMPLETE CBC AUTOMATED: CPT

## 2020-04-29 PROCEDURE — 700105 HCHG RX REV CODE 258: Performed by: INTERNAL MEDICINE

## 2020-04-29 RX ORDER — SODIUM CHLORIDE 9 MG/ML
INJECTION, SOLUTION INTRAVENOUS
Status: ACTIVE
Start: 2020-04-29 | End: 2020-04-30

## 2020-04-29 RX ORDER — INSULIN GLARGINE 100 [IU]/ML
25 INJECTION, SOLUTION SUBCUTANEOUS EVERY EVENING
Status: DISCONTINUED | OUTPATIENT
Start: 2020-04-29 | End: 2020-04-30

## 2020-04-29 RX ORDER — ATORVASTATIN CALCIUM 20 MG/1
20 TABLET, FILM COATED ORAL EVERY EVENING
Status: DISCONTINUED | OUTPATIENT
Start: 2020-04-29 | End: 2020-05-05 | Stop reason: HOSPADM

## 2020-04-29 RX ORDER — OMEPRAZOLE 20 MG/1
20 CAPSULE, DELAYED RELEASE ORAL
Status: DISCONTINUED | OUTPATIENT
Start: 2020-04-29 | End: 2020-05-04

## 2020-04-29 RX ORDER — TRAZODONE HYDROCHLORIDE 50 MG/1
25 TABLET ORAL ONCE
Status: COMPLETED | OUTPATIENT
Start: 2020-04-29 | End: 2020-04-29

## 2020-04-29 RX ADMIN — METOPROLOL TARTRATE 25 MG: 25 TABLET, FILM COATED ORAL at 10:04

## 2020-04-29 RX ADMIN — INSULIN GLARGINE 25 UNITS: 100 INJECTION, SOLUTION SUBCUTANEOUS at 16:25

## 2020-04-29 RX ADMIN — CEFTRIAXONE SODIUM 2 G: 2 INJECTION, POWDER, FOR SOLUTION INTRAMUSCULAR; INTRAVENOUS at 14:19

## 2020-04-29 RX ADMIN — PIPERACILLIN AND TAZOBACTAM 4.5 G: 4; .5 INJECTION, POWDER, LYOPHILIZED, FOR SOLUTION INTRAVENOUS; PARENTERAL at 05:46

## 2020-04-29 RX ADMIN — SODIUM CHLORIDE: 9 INJECTION, SOLUTION INTRAVENOUS at 16:30

## 2020-04-29 RX ADMIN — NICOTINE 7 MG: 7 PATCH TRANSDERMAL at 05:46

## 2020-04-29 RX ADMIN — SODIUM CHLORIDE: 9 INJECTION, SOLUTION INTRAVENOUS at 09:03

## 2020-04-29 RX ADMIN — Medication 100 MG: at 05:46

## 2020-04-29 RX ADMIN — OMEPRAZOLE 20 MG: 20 CAPSULE, DELAYED RELEASE ORAL at 10:04

## 2020-04-29 RX ADMIN — METOPROLOL TARTRATE 25 MG: 25 TABLET, FILM COATED ORAL at 20:32

## 2020-04-29 RX ADMIN — ATORVASTATIN CALCIUM 20 MG: 20 TABLET, FILM COATED ORAL at 16:25

## 2020-04-29 RX ADMIN — INSULIN LISPRO 4 UNITS: 100 INJECTION, SOLUTION INTRAVENOUS; SUBCUTANEOUS at 05:47

## 2020-04-29 RX ADMIN — TRAZODONE HYDROCHLORIDE 25 MG: 50 TABLET ORAL at 20:33

## 2020-04-29 RX ADMIN — ENOXAPARIN SODIUM 40 MG: 100 INJECTION SUBCUTANEOUS at 05:46

## 2020-04-29 RX ADMIN — FOLIC ACID 1 MG: 1 TABLET ORAL at 05:46

## 2020-04-29 RX ADMIN — THERA TABS 1 TABLET: TAB at 05:46

## 2020-04-29 RX ADMIN — SENNOSIDES AND DOCUSATE SODIUM 2 TABLET: 8.6; 5 TABLET ORAL at 05:46

## 2020-04-29 RX ADMIN — PIPERACILLIN AND TAZOBACTAM 4.5 G: 4; .5 INJECTION, POWDER, LYOPHILIZED, FOR SOLUTION INTRAVENOUS; PARENTERAL at 13:30

## 2020-04-29 ASSESSMENT — ENCOUNTER SYMPTOMS
LOSS OF CONSCIOUSNESS: 0
NERVOUS/ANXIOUS: 0
SHORTNESS OF BREATH: 0
VOMITING: 0
EYE PAIN: 0
DIARRHEA: 1
POLYDIPSIA: 0
FLANK PAIN: 0
DIAPHORESIS: 0
PALPITATIONS: 0
BLURRED VISION: 0
ORTHOPNEA: 0
CHILLS: 0
BRUISES/BLEEDS EASILY: 0
BACK PAIN: 1
FEVER: 0
CONSTIPATION: 0
WEAKNESS: 1
COUGH: 1
SEIZURES: 0
PHOTOPHOBIA: 0
NECK PAIN: 0
SPEECH CHANGE: 0
SENSORY CHANGE: 1
EYE DISCHARGE: 0
TREMORS: 0
FOCAL WEAKNESS: 0
HALLUCINATIONS: 0
EYE REDNESS: 0
ABDOMINAL PAIN: 1

## 2020-04-29 ASSESSMENT — LIFESTYLE VARIABLES
HAVE YOU EVER FELT YOU SHOULD CUT DOWN ON YOUR DRINKING: NO
ON A TYPICAL DAY WHEN YOU DRINK ALCOHOL HOW MANY DRINKS DO YOU HAVE: 0
TOTAL SCORE: 0
HAVE PEOPLE ANNOYED YOU BY CRITICIZING YOUR DRINKING: NO
HOW MANY TIMES IN THE PAST YEAR HAVE YOU HAD 5 OR MORE DRINKS IN A DAY: 0
TOTAL SCORE: 0
EVER HAD A DRINK FIRST THING IN THE MORNING TO STEADY YOUR NERVES TO GET RID OF A HANGOVER: NO
CONSUMPTION TOTAL: NEGATIVE
SUBSTANCE_ABUSE: 1
EVER FELT BAD OR GUILTY ABOUT YOUR DRINKING: NO
TOTAL SCORE: 0
AVERAGE NUMBER OF DAYS PER WEEK YOU HAVE A DRINK CONTAINING ALCOHOL: 0

## 2020-04-29 ASSESSMENT — PATIENT HEALTH QUESTIONNAIRE - PHQ9
1. LITTLE INTEREST OR PLEASURE IN DOING THINGS: NOT AT ALL
2. FEELING DOWN, DEPRESSED, IRRITABLE, OR HOPELESS: NOT AT ALL
SUM OF ALL RESPONSES TO PHQ9 QUESTIONS 1 AND 2: 0

## 2020-04-29 ASSESSMENT — FIBROSIS 4 INDEX
FIB4 SCORE: 2.03
FIB4 SCORE: 2

## 2020-04-29 NOTE — PROGRESS NOTES
LIMB PRESERVATION SERVICE - RN NOTE        Reason for LPS Consultation: Right foot      History of Present Illness: Patient is a 55 y.o. female with past medical history that includes type 2 diabetes, HTN, cataracts, and HOCM, admitted 4/26/2020 for Septic shock (HCC), Diabetic ketoacidosis (HCC).  An LPS consult has been requested for evaluation of right great toe lateral plantar aspect and right 2nd toe medial plantar aspect.  This wound started 1 year ago around May 2019, as a blister.  She has been treating the wound drying it out.      Patient was diagnosed with diabetes and was not managing it due to lost of primary care physician, hence no diabetic medication supplies since December 2019.  Patient does not check blood sugars. Has had diabetes education before from her primary.  Reports has neuropathy.  Has not had any previous foot surgeries.  Does not have orthotics.  Patient does not currently work.     Past medical history, medicines, allergies, family history, social history,    reviewed      DIAGNOSTICS this admission    Xray:  none   MRI: none             CT: none             Vascular: PEACE                  RIGHT      Waveform            Systolic BPs (mmHg)                              150           Brachial   Triphasic                                Common Femoral   Biphasic                   173           Posterior Tibial   Monophasic                 189           Dorsalis Pedis                                            Peroneal                              1.26          PEACE                                            TBI                           LEFT   Waveform        Systolic BPs (mmHg)                                            Brachial   Triphasic                                Common Femoral   Biphasic                   182           Posterior Tibial   Biphasic                   174           Dorsalis Pedis                                            Peroneal                              1.21           PEACE                                            TBI         Findings   Bilateral.    Doppler waveform of the common femoral artery is of high amplitude and    triphasic.    Ankle-brachial index is normal.    TBI index is normal.   Other: none               Labs                                      ESR:   pending                      CRP:   pending           A1c:   Lab Results   Component Value Date/Time    HBA1C 17.6 (H) 04/27/2020 12:11 AM        PHYSICAL EXAMINATION:   General Appearance:  Well developed, well nourished, in no acute distress    Vascular Assessment:  Pedal pulses palpable faint, cool toes  Left DP 1+, doppler multiphasic  Left PT 1+, doppler multiphasic  Right DP 1+, doppler multiphasic  Right PT 1+, doppler multiphasic    Sensory Assessment  Monofilament testing  LOPS  Wound Assessment:      Wound 04/27/20 Diabetic Ulcer Toe, Hallux;Toe, 2nd Plantar Right (Active)   Wound Image      Site Assessment Red;Brown    Periwound Assessment Callused;Purple    Margins Defined edges    Closure Secondary intention    Drainage Amount None    Drainage Description Serosanguineous    Treatments Cleansed    Wound Cleansing Normal Saline Irrigation    Periwound Protectant Not Applicable    Dressing Cleansing/Solutions Not Applicable    Dressing Options Honey Colloid;Dry Gauze    Dressing Changed Observed    Dressing Status Clean;Dry;Intact    Dressing Change/Treatment Frequency Every 72 hrs, and As Needed    NEXT Dressing Change/Treatment Date 05/01/20    NEXT Weekly Photo (Inpatient Only) 05/05/20    Non-staged Wound Description Full thickness    Wound Length (cm) 3 cm    Wound Width (cm) 2.7 cm    Wound Depth (cm) 0.3 cm    Wound Surface Area (cm^2) 8.1 cm^2    Wound Volume (cm^3) 2.43 cm^3    Tunneling (cm) 0 cm    Undermining (cm) 0 cm    Shape irregular    Wound Odor None    Pulses 2+;DP;PT    Exposed Structures None      Patient Education:    • Implications of loss of protective sensation (LOPS)  discussed with patient- including increased risk for amputation.  Advised to check feet at least daily, moisturize feet, and to always wear protective foot wear.   • Avoid trimming own nails. See podiatrist or certified foot and nail RN  • Keep blood sugars <150 for improved wound healing          PLAN:  Continue with wound care, PEACE and TBI are normal.  Patient to discharge home, with home health and if needed referral for outpatient wound care clinic.      Wound care: Continue with wound care dressing orders    Labs/Imaging: no need at this time.    Vascular status: pedal pulses faint, feet cool to touch, eschar on wounds.  PEACE and arterial studies ordered.    Surgery: None indicated at this time.    Antibiotics: Continue current ABX    Weight Bearing Status: Weight bearing as tolerated    Offloading: Offloading boot  when ambulating; will order    PT/OT Consult: involved    Diabetes Education: involved    DISCHARGE PLAN:    Disposition: pending medical clearance    Follow-up: [facility].       Professional collaboration: RN, patient, Dr. Andrews

## 2020-04-29 NOTE — DISCHARGE PLANNING
Care Transition Team Assessment       RNCM spoke with the pt via the phone to obtained the information used in this assessment. The pt verified the accuracy of the facesheet. Pt is single and lives in a single level apartment with her adult daughter, Mónica Adames, and her 14 year old son. Pt reports feeling safe and supported by her family. Pt's daughter has a vehicle and is able to assist the pt in obtaining out patient services and medication from the pharmacy. Pt does not drive. Pt states she was independent with all ADLS/IADLS until 1 month prior to admit to the unit.      Pt collects disability. Pt verbalized concerns regarding Medicaid FFS coverage expiring in May. Discussed situation with Patient [facility], Ekaterina. Ekaterina states that due to COVID 19 an automatic 60 day extension is in place for all recipients. Pt can also call the welfare office at [phone] and obtain details regarding her case and preform a phone interview if needed. RNCM relayed this information to the pt and pt verbalized understanding. Pt expressed an interest in AD booklet, booklet given to bedside RN, Ivet, for the pt.      Pt uses the [facility] pharmacy, on [address]. Pt states her PCP was Dr. Corral, at the [facility]. Pt states that Dr. Corral moved to [place], thus she needs to establish care with a new provider. Client denies history of mental illness. Pt denies history of substance abuse and reports drinking 1 glass of wine per day. Comment in chart indicates liver cirrhosis secondary to alcohol abuse.       Information Source  Orientation : Oriented x 4  Information Given By: Patient  Who is responsible for making decisions for patient? : Patient         Elopement Risk  Legal Hold: No  Ambulatory or Self Mobile in Wheelchair: No-Not an Elopement Risk  Disoriented: No  Elopement Risk: Not at Risk for Elopement    Interdisciplinary Discharge Planning  Does Admitting Nurse Feel This Could be a Complex  Discharge?: Yes  Primary Care Physician: (Dr. Corral PCP moved. Pt needs to establish care with PCP)  Lives with - Patient's Self Care Capacity: Adult Children  Support Systems: Children  Housing / Facility: 1 Story Apartment / Condo  Prior Services: Continuous (24 Hour) Care Giving Family  Patient Expects to be Discharged to:: (Simultaneous filing. User may not have seen previous data.)    Discharge Preparedness  What is your plan after discharge?: Other (comment)(Acute Rehab)  What are your discharge supports?: Child  Prior Functional Level: Ambulatory, Independent with Activities of Daily Living  Difficulity with ADLs: None  Difficulity with IADLs: None    Functional Assesment  Prior Functional Level: Ambulatory, Independent with Activities of Daily Living    Finances  Financial Barriers to Discharge: No  Prescription Coverage: Yes    Vision / Hearing Impairment  Vision Impairment : Yes  Right Eye Vision: Impaired, Wears Glasses  Left Eye Vision: Impaired, Wears Glasses  Hearing Impairment : No         Advance Directive  Advance Directive?: None  Advance Directive offered?: AD Booklet given    Domestic Abuse  Have you ever been the victim of abuse or violence?: No  Physical Abuse or Sexual Abuse: No  Verbal Abuse or Emotional Abuse: No  Possible Abuse Reported to:: Not Applicable    Psychological Assessment  History of Substance Abuse: Alcohol  Date Last Used - Alcohol: day before admit  Substance Abuse Comments: Pt reports 1 glass of wine per day. comment in chart states liver cirrhosis secondary to alcohol abuse  History of Psychiatric Problems: No  Non-compliant with Treatment: No  Newly Diagnosed Illness: Yes    Discharge Risks or Barriers  Discharge risks or barriers?: No PCP  Patient risk factors: Complex medical needs, No PCP, Substance abuse    Anticipated Discharge Information  Anticipated discharge disposition: Acute rehab  Discharge Address: [address] [place] [address] [address] [facility], [place] [address]  Discharge Contact  Phone Number: [phone]

## 2020-04-29 NOTE — DISCHARGE PLANNING
[facility] Transitional Care Coordination     Referral from:   Dr. Bradley  Facesheet indicates:  Medicaid FFS  Potential Rehab Diagnosis:  Sepsis - Debility    Chart review indicates patient has on going medical management and therapy needs to possibly meet inpatient rehab facility criteria with the goal of returning to community.    D/C support:  Spouse/Adult Children      Physiatry consultation forwarded per protocol.  TCC will follow.       Thank you for the referral.

## 2020-04-29 NOTE — PROGRESS NOTES
Patient arrived to the unit, placed on cardiac monitor SR 74, bed in low and locked position, waffle mattress overlay on bed, call light within reach.

## 2020-04-29 NOTE — CARE PLAN
Problem: Nutritional:  Goal: Patient to verbalize or demonstrate understanding of diet  Outcome: NOT MET

## 2020-04-29 NOTE — ASSESSMENT & PLAN NOTE
Resolved. Poor PO intake prior to admission + h/o EtOH   - Phosp, K, Mag repleted, and now normalized

## 2020-04-29 NOTE — CONSULTS
Physical Medicine and Rehabilitation Consultation         Initial Consult      Initial Consultation Date: 4/29/2020  Consulting provider: Dr. Serenity Bradley  Reason for consultation: assess for acute inpatient rehab appropriateness  LOS: 3 Day(s)      Chief complaint: leg weakness      HPI:   The patient is a 55 y.o. right hand dominant female with a past medical history of DM2, HOCM with severe concentric LVG on TTE in 9/2019, hepatic cirrhosis;  who presented on 4/26/2020  2:17 PM with f/c/cough/sore throat, LBP, dysuria, weakness x1 month.    The patient currently reports:  -Need to go home as soon as possible, she was expecting to go home possibly tomorrow, 4/30  -Reports stable fingertip numbness x3 months; chronic LE numbness  -Describes weakness with BLE compared to baseline, but still has decent movement  -Aware that she needs to control her glucose/DM  -Pain: tolerable  -Bladder: has nephrostomy tubes  -Bowel: denies issues   -Denies fever, chills, nausea, vomiting, SOB      ROS:  Pertinent positives are listed in HPI, all other systems reviewed and are negative      Social Hx:  Pre-morbidly, this patient lived in:   1 story home  With 0 steps to enter  Lives with spouse and 2 son (14 and 19 yo)  States she needs to go home ASAP to take care of her children        Current level of function: The patient was evaluated by:  PT: 4/28, Min A for bed mobility/transfers, Min A for gait x100ft with FWW  OT: 4/28, Max A for LBD        PMH:  Past Medical History:   Diagnosis Date   • Cataract     Bilateral   • Dental disorder     Upper   • Diabetes mellitus, type 2 (HCC)     Oral medications   • diarrhea 2019    been going on for about a year   • Heart murmur    • HOCM (hypertrophic obstructive cardiomyopathy) (HCC)    • Hypertension          PSH:  Past Surgical History:   Procedure Laterality Date   • PB UPPER GI ENDOSCOPY,LIGAT VARIX  9/25/2019    Procedure: GASTROSCOPY, WITH VARICEAL BANDING - WITH GASTRIC  "MAPPING;  Surgeon: Sandi Espinoza M.D.;  Location: SURGERY [facility];  Service: Gastroenterology   • GASTROSCOPY-ENDO  9/25/2019    Procedure: GASTROSCOPY;  Surgeon: Sandi Espinoza M.D.;  Location: SURGERY [facility];  Service: Gastroenterology   • PRIMARY C SECTION  2006   • OTHER      c- section         FHX: Reviewed. Non-pertinent to today's issues.    Family History   Problem Relation Age of Onset   • Cancer Father         stomach   • Hypertension Mother          Medications:  Current Facility-Administered Medications   Medication Dose   • atorvastatin (LIPITOR) tablet 20 mg  20 mg   • omeprazole (PRILOSEC) capsule 20 mg  20 mg   • metoprolol (LOPRESSOR) tablet 25 mg  25 mg   • insulin glargine (LANTUS) injection 25 Units  25 Units   • SODIUM CHLORIDE 0.9 % IV SOLN     • NS infusion     • thiamine tablet 100 mg  100 mg   • multivitamin (THERAGRAN) tablet 1 Tab  1 Tab   • folic acid (FOLVITE) tablet 1 mg  1 mg   • piperacillin-tazobactam (ZOSYN) 4.5 g in  mL IVPB  4.5 g   • insulin lispro (HUMALOG) injection 4 Units  0.2 Units/kg/day    And   • insulin lispro (HUMALOG) injection 2-9 Units  2-9 Units    And   • glucose 4 g chewable tablet 16 g  16 g    And   • dextrose 50% (D50W) injection 50 mL  50 mL   • enoxaparin (LOVENOX) inj 40 mg  40 mg   • acetaminophen (TYLENOL) tablet 650 mg  650 mg   • senna-docusate (PERICOLACE or SENOKOT S) 8.6-50 MG per tablet 2 Tab  2 Tab    And   • polyethylene glycol/lytes (MIRALAX) PACKET 1 Packet  1 Packet    And   • magnesium hydroxide (MILK OF MAGNESIA) suspension 30 mL  30 mL    And   • bisacodyl (DULCOLAX) suppository 10 mg  10 mg   • nicotine (NICODERM) 7 MG/24HR 7 mg  7 mg    And   • nicotine polacrilex (NICORETTE) 2 MG piece 2 mg  2 mg       Allergies:  No Known Allergies      Physical Exam:  Vitals: /108   Pulse 83   Temp 37.1 °C (98.7 °F) (Temporal)   Resp 16   Ht 1.702 m (5' 7.01\")   Wt 58.6 kg (129 lb 3 oz)   SpO2 98% "     Gen: pleasant  Head: no visible head lesions or abrasion  Eyes/ Nose/ Mouth: moist mucous membranes  Cardio: well perfused extremities, no peripheral edema  Pulm: on room air, with normal respiratory effort  Abd: Soft NTND  Skin: No visible rashes  : left nephrostomy tube in place, draining clear urine  Ext: No atrophy of muscles, no joint contractures of extremities   Psych: answers questions appropriately follows commands, calm affect    Neuro:   -Speech: fluent, no aphasia or dysarthria  -Hearing and visual acuity functional and grossly intact  -Face symmetric    Motor:  -Bilateral upper extremities: 4/5 with arm abduction, elbow flexion, elbow extension, wrist extension, finger flexion  -Bilateral lower extremities: 4/5 with hip flexion, 5/5 with knee extension, ankle dorsiflexion, plantarflexion    Sensory:   -numb to light touch in bilateral fingertips/feet    Reflexes:  -Negative clonus bilateral ankles          Labs: Reviewed and significant for 4/29 stable anemia, improving hyponatremia, normal Cr, and improving WBC   Recent Labs     04/26/20  1437 04/27/20  0011 04/27/20  0412 04/28/20  0333 04/29/20  0415   RBC 3.28* 2.80*  --  2.92* 2.73*   HEMOGLOBIN 9.9* 8.5*  --  8.7* 8.2*   HEMATOCRIT 30.3* 25.3*  --  25.7* 24.6*   PLATELETCT 209 206  --  127* 118*   PROTHROMBTM 16.6*  --   --   --   --    INR 1.31*  --   --   --   --    IRON  --   --  7*  --   --    FERRITIN  --   --  408.0*  --   --    TOTIRONBC  --   --  123*  --   --      Recent Labs     04/28/20  0333 04/28/20  1440 04/29/20  0415   SODIUM 123* 126* 131*   POTASSIUM 4.8 4.7 4.4   CHLORIDE 95* 97 103   CO2 17* 17* 19*   GLUCOSE 211* 164* 84   BUN 21 17 15   CREATININE 0.70 0.82 0.80   CALCIUM 6.9* 7.0* 7.1*     Recent Results (from the past 24 hour(s))   Basic Metabolic Panel    Collection Time: 04/28/20  2:40 PM   Result Value Ref Range    Sodium 126 (L) 135 - 145 mmol/L    Potassium 4.7 3.6 - 5.5 mmol/L    Chloride 97 96 - 112 mmol/L     Co2 17 (L) 20 - 33 mmol/L    Glucose 164 (H) 65 - 99 mg/dL    Bun 17 8 - 22 mg/dL    Creatinine 0.82 0.50 - 1.40 mg/dL    Calcium 7.0 (L) 8.5 - 10.5 mg/dL    Anion Gap 12.0 7.0 - 16.0   IONIZED CALCIUM    Collection Time: 04/28/20  2:40 PM   Result Value Ref Range    Ionized Calcium 1.1 1.1 - 1.3 mmol/L   MAGNESIUM    Collection Time: 04/28/20  2:40 PM   Result Value Ref Range    Magnesium 2.3 1.5 - 2.5 mg/dL   CREATINE KINASE    Collection Time: 04/28/20  2:40 PM   Result Value Ref Range    CPK Total <7 0 - 154 U/L   PHOSPHORUS    Collection Time: 04/28/20  2:40 PM   Result Value Ref Range    Phosphorus 2.5 2.5 - 4.5 mg/dL   ESTIMATED GFR    Collection Time: 04/28/20  2:40 PM   Result Value Ref Range    GFR If African American >60 >60 mL/min/1.73 m 2    GFR If Non African American >60 >60 mL/min/1.73 m 2   ACCU-CHEK GLUCOSE    Collection Time: 04/28/20  5:25 PM   Result Value Ref Range    Glucose - Accu-Ck 121 (H) 65 - 99 mg/dL   ACCU-CHEK GLUCOSE    Collection Time: 04/28/20  9:16 PM   Result Value Ref Range    Glucose - Accu-Ck 98 65 - 99 mg/dL   CBC WITH DIFFERENTIAL    Collection Time: 04/29/20  4:15 AM   Result Value Ref Range    WBC 17.6 (H) 4.8 - 10.8 K/uL    RBC 2.73 (L) 4.20 - 5.40 M/uL    Hemoglobin 8.2 (L) 12.0 - 16.0 g/dL    Hematocrit 24.6 (L) 37.0 - 47.0 %    MCV 90.1 81.4 - 97.8 fL    MCH 30.0 27.0 - 33.0 pg    MCHC 33.3 (L) 33.6 - 35.0 g/dL    RDW 52.3 (H) 35.9 - 50.0 fL    Platelet Count 118 (L) 164 - 446 K/uL    MPV 11.0 9.0 - 12.9 fL    Neutrophils-Polys 89.50 (H) 44.00 - 72.00 %    Lymphocytes 8.70 (L) 22.00 - 41.00 %    Monocytes 0.90 0.00 - 13.40 %    Eosinophils 0.90 0.00 - 6.90 %    Basophils 0.00 0.00 - 1.80 %    Nucleated RBC 0.00 /100 WBC    Neutrophils (Absolute) 15.75 (H) 2.00 - 7.15 K/uL    Lymphs (Absolute) 1.53 1.00 - 4.80 K/uL    Monos (Absolute) 0.16 0.00 - 0.85 K/uL    Eos (Absolute) 0.16 0.00 - 0.51 K/uL    Baso (Absolute) 0.00 0.00 - 0.12 K/uL    NRBC (Absolute) 0.00 K/uL   Comp  Metabolic Panel    Collection Time: 04/29/20  4:15 AM   Result Value Ref Range    Sodium 131 (L) 135 - 145 mmol/L    Potassium 4.4 3.6 - 5.5 mmol/L    Chloride 103 96 - 112 mmol/L    Co2 19 (L) 20 - 33 mmol/L    Anion Gap 9.0 7.0 - 16.0    Glucose 84 65 - 99 mg/dL    Bun 15 8 - 22 mg/dL    Creatinine 0.80 0.50 - 1.40 mg/dL    Calcium 7.1 (L) 8.5 - 10.5 mg/dL    AST(SGOT) 16 12 - 45 U/L    ALT(SGPT) 13 2 - 50 U/L    Alkaline Phosphatase 102 (H) 30 - 99 U/L    Total Bilirubin 0.4 0.1 - 1.5 mg/dL    Albumin 1.8 (L) 3.2 - 4.9 g/dL    Total Protein 4.9 (L) 6.0 - 8.2 g/dL    Globulin 3.1 1.9 - 3.5 g/dL    A-G Ratio 0.6 g/dL   MAGNESIUM    Collection Time: 04/29/20  4:15 AM   Result Value Ref Range    Magnesium 2.0 1.5 - 2.5 mg/dL   PHOSPHORUS    Collection Time: 04/29/20  4:15 AM   Result Value Ref Range    Phosphorus 3.2 2.5 - 4.5 mg/dL   ESTIMATED GFR    Collection Time: 04/29/20  4:15 AM   Result Value Ref Range    GFR If African American >60 >60 mL/min/1.73 m 2    GFR If Non African American >60 >60 mL/min/1.73 m 2   DIFFERENTIAL MANUAL    Collection Time: 04/29/20  4:15 AM   Result Value Ref Range    Manual Diff Status PERFORMED    PERIPHERAL SMEAR REVIEW    Collection Time: 04/29/20  4:15 AM   Result Value Ref Range    Peripheral Smear Review see below    PLATELET ESTIMATE    Collection Time: 04/29/20  4:15 AM   Result Value Ref Range    Plt Estimation Decreased    MORPHOLOGY    Collection Time: 04/29/20  4:15 AM   Result Value Ref Range    RBC Morphology Present     Poikilocytosis 2+     Echinocytes 2+    ACCU-CHEK GLUCOSE    Collection Time: 04/29/20 11:37 AM   Result Value Ref Range    Glucose - Accu-Ck 79 65 - 99 mg/dL           Imaging:   Ct-renal With & W/o  Result Date: 4/27/2020 4/27/2020 4:26 AM HISTORY/REASON FOR EXAM:  Sepsis; Concern for renal abscess on MRI. TECHNIQUE/EXAM DESCRIPTION:  CT scan of the abdomen without and with contrast. Initial precontrast images were obtained from the diaphragmatic  domes through the iliac crests using helical technique.  Following nonionic contrast administration in an intravenous bolus fashion, and postcontrast, thin-section helical scanning obtained from the diaphragmatic domes through the iliac crests. 94 mL of Omnipaque 350 nonionic contrast was administered. Low dose optimization technique was utilized for this CT exam including automated exposure control and adjustment of the mA and/or kV according to patient size. COMPARISON: MRI lumbar spine 4/26/2020 FINDINGS: Visualized lung bases show small bilateral pleural effusions with associated atelectasis. Liver shows slightly nodular margins. Fluid present in the upper abdomen. The spleen shows low-attenuation lesion superiorly measuring 17 mm, likely cyst. Adrenal glands are unremarkable. RIGHT kidney shows cortical thinning with multifocal scarring and small cortical cyst. LEFT kidney is enlarged, shows diminished enhancement with dilated collecting system.  Complex fluid collection anteriorly at the midportion measuring 4.5 cm, with gas present.  Gas present within the collecting systems as well.  Complex fluid collection  posteriorly at the midportion measuring 3.8 cm.  Proximal ureter is dilated. Pelvis was not imaged. Gallbladder is mildly dilated with stone present.  Common bile duct measures 11 mm. Abdominal aorta shows moderate atherosclerotic changes. Degenerative change of lumbar spine.     1.  Moderate to severe LEFT hydroureteronephrosis with gas present within the renal collecting system indicating pyonephrosis.  Distal ureters are not visualized as the pelvis was not imaged. 2.  Complex fluid collections containing gas in the LEFT kidney at the midportion anteriorly and posteriorly concerning for renal abscess is suspected on previous MRI lumbar spine. 3.  Atrophic RIGHT kidney with scarring. 4.  Small amount of peritoneal fluid. 5.  Findings suggest hepatic cirrhosis. 6.  Distended gallbladder with stone. 7.   Extrahepatic biliary dilation. 8.  Small bilateral pleural effusions with associated atelectasis. 9.  Probable splenic cyst.          Mr-lumbar Spine-with & W/o  Result Date: 4/27/2020 4/26/2020 10:35 PM HISTORY/REASON FOR EXAM:  Back pain, progressive neurologic deficit; Lower extremity weakness x 4 weeks now with sepsis. Assess for epidural abscess, transverse myelitis.. TECHNIQUE/EXAM DESCRIPTION: MRI of the lumbar spine without and with contrast. The study was performed on a [employer] 1.5 Velma MRI scanner. T1 sagittal, T2 fast spin-echo sagittal, and T2 axial images were obtained of the lumbar spine. T1 post-contrast fat-suppressed sagittal images were obtained. 10 mL ProHance contrast was administered intravenously. COMPARISON:  None. FINDINGS: Alignment in the lumbar spine is normal. Marrow signal in the vertebral bodies is within normal limits. There is no abnormal osseous enhancement. There is no other abnormal extradural enhancement. There are no significant osteophytic changes. The prevertebral and paraspinous soft tissues are unremarkable. The conus is normal in position and signal. There is no abnormal cord enhancement. There is mild disc space narrowing at the L4-5 and L5-S1 levels Level specific findings: L5-S1 level mild central disc protrusion effacing the ventral surface of thecal sac. Mild bilateral neural foraminal narrowing. L4-5 level mild posterior spurring and annular bulging. Mild central canal stenosis secondary to facet arthropathy. Mild bilateral neural foraminal narrowing with mild disc bulging into the inferior aspects of the neural foramina. L3-4 level minimal annular bulging. Borderline central canal stenosis secondary to facet arthropathy. L2-3 level normal. L1-2 level normal.     1.  Mild lumbar spondylotic changes at the L4-5 and L5-S1 levels. 2.  Mild central canal stenosis at the L4-5 level secondary to facet arthropathy. 3.  No evidence of epidural abscess or transverse  myelitis.          Mr-thoracic Spine-with & W/o  Result Date: 4/27/2020 4/26/2020 10:35 PM HISTORY/REASON FOR EXAM:  Mid-back pain; Lower extremity weakness. Assess for epidural abscess, transverse myelitis.. TECHNIQUE/EXAM DESCRIPTION: MRI of the thoracic spine without and with contrast. The study was performed on a [employer] 1.5 Velma MRI scanner. T1 sagittal, T2 fast spin-echo sagittal, and T2 axial images were obtained of the thoracic spine. T1 post-contrast fat suppressed sagittal images were obtained. Optional T1 post-contrast axial images may be obtained. 10 mL ProHance contrast was administered intravenously. COMPARISON:  None. FINDINGS: The thoracic vertebral bodies have a normal height and alignment. The intervertebral disc have a normal appearance and signal intensity. The thoracic cord has a normal caliber course and signal intensity. There is no evidence of abnormal enhancement in the thoracic spinal cord. There is no evidence of disc extrusion, central canal stenosis, or neural foraminal stenosis.     1. Normal MRI scan of the thoracic spine with or without contrast. 2. No evidence of epidural abscess or transverse myelitis.        Us-renal  Result Date: 4/27/2020 4/27/2020 12:43 AM HISTORY/REASON FOR EXAM:  UTI with right flank pain, septic shock, r/o obstruction TECHNIQUE/EXAM DESCRIPTION: Renal ultrasound. COMPARISON:  2/19/2019 FINDINGS: The right kidney measures 11.73 cm.  Minimal perinephric fluid.  Parenchyma is heterogeneous appearance. The left kidney measures 13.06 cm. Dilated collecting system with echogenic material present indicating debris.  Complex hypoechoic area inferiorly within the parenchyma.  Contour deformity at the lower pole. The bladder demonstrates no focal wall abnormality.     1.  Heterogeneous appearance of RIGHT kidney with ill-defined hypoechoic areas possibly indicating edema, raising the possibility of pyelonephritis.  Small amount of perinephric fluid also present.  "2.  Moderate LEFT hydronephrosis with apparent debris within the collecting system. 3.  Contour deformity lower pole LEFT kidney with ill-defined hypoechoic area may indicate mass or abscess.        Ec-echocardiogram Complete W/o Cont  Result Date: 4/27/2020  Transthoracic Echo Report Echocardiography Laboratory CONCLUSIONS Compared to the report of the study done 09/02/2019  there has been no significant change. LVOT ibstruction also noted on prior echo. Severe concentric left ventricular hypertrophy. Hyperdynamic left ventricular systolic function. Left ventricular ejection fraction is visually 75%. Evidence of LVOT obstruction with 23 mm gradient without valsalva Mitral annular calcification. Mild mitral regurgitation. Mitral annular calcification. Structurally normal aortic valve. Mild tricuspid regurgitation. Estimated right ventricular systolic pressure  is 55 mmHg. Trivial pericardial effusion.                     ASSESSMENT:  Patient is a 55 y.o. female admitted with fever/chills/cough, UTI/septic shock on antibiotics, also found to have \"emphysematous pyelonephritis\" managed per urology and poorly controlled DM (A1C 17.6%).      # Rehabilitation: After additional medical improvement (nephrostomy tube/updated CT per urology, IVF NS for hyponatremia, and ongoign BP management), anticipate she will meet criteria for inpatient rehab. However, patient states that she needs to go home ASAP to take care of her children and does not want to go to IRF. She is agreeable to home health/outpatient therapies as needed. She will have good support from her  on discharge.   -Rehab Impairment Code: 16 Debility (Non Cardiac, Non Pulmonary)  -Plan for discharge home with HH when medically cleared  -If patient changes her mind and is agreeable to IRF, can reassess        #Neuro: B/L LE weakness and sensory disturbance, MRI T/L spine without abnormalities, likely due to septic shock on top of chronic " polyneuropathy  -outpatient EMG      #DM: DM A1C 17.6%, SSI, lantus 25 QHS  -DM educator recommend DC of home Jardiance on discharge due to UTI (previously also on metformin 1g BID and Lantus 38 u daily)      #CV: HOCM, off pressors since 4/27, holding home lisinopril and home aldactone  -metoprolol      #ID: +UTI, ICU with septic shock on IV ceftriaxone, IV linezolid, IVF, and levophed, DKA protocol. Negative Covid x1, off droplet precautions on 4/27. Leukocytosis improving, afebrile  -Zosyn (4/27-5/2), then transition to oral antibiotics.       #Anemia: stable      #Hyponatremia: improving IVF NS       #Skin: right diabetic foot ulcer, wound care      #/Bladder: emphysematous pyelonephritis  -pain management  -monitor ins/outs  -L nephrostomy tube  -repeat CT pending/based on clinical picture per urology, possible on 4/30      #Supp: folate, MVI, thiamine      #GI: omeprazole       #Smoking: nicotine       #Bowel: 2x on 4/29, senna s      #DVT: lovenox        Discussed with pt, summarized hospitalization and care, options for next step of care  Labs reviewed   Discussed with rehab team about recommendations       Thank you for allowing us to participate in the care of this patient.           Emiliano Bonner MD  Physical Medicine and Rehabilitation   4/29/2020

## 2020-04-29 NOTE — PROGRESS NOTES
"[facility]    Progress Note    Service: [facility]  Patient's Name: Rigoberto Adames  MRN: [id number]  Admit Date:4/26/2020  Today's Date: 4/28/2020   Room #: R113/00      Identification:  55 y.o. female with emphysematous pyelonephritis          Overnight-Interval events:   - none Subjective/ROS:   Patient seen and examined. Pain well controlled. Reports no fevers or chills. L NT in place and draining. WBC continues trending down  No CP/SOB/F/C     Physical Exam:  Current Vitals:   BP (!) 172/99   Pulse 83   Temp 36.8 °C (98.3 °F) (Temporal)   Resp 18   Ht 1.702 m (5' 7.01\")   Wt 58.6 kg (129 lb 3 oz)   SpO2 99%   BMI 20.23 kg/m²     04/27 1900 - 04/29 0659  In: 2668.3 [I.V.:1813.8]  Out: 3200 [Urine:3200]   GEN : NAD, A&O X4   RES:  no acute respiratory distress  ABD: Soft ND, L NT in place, yellow cloudy output  :   No coley  EXT:  No edema, SCD's in place     Labs:   Lab Results   Component Value Date/Time    WBC 17.6 (H) 04/29/2020 04:15 AM    HEMATOCRIT 24.6 (L) 04/29/2020 04:15 AM    SODIUM 131 (L) 04/29/2020 04:15 AM    POTASSIUM 4.4 04/29/2020 04:15 AM    CHLORIDE 103 04/29/2020 04:15 AM    CO2 19 (L) 04/29/2020 04:15 AM    BUN 15 04/29/2020 04:15 AM    CREATININE 0.80 04/29/2020 04:15 AM    CREATININE 0.5 05/27/2006 05:10 PM    CALCIUM 7.1 (L) 04/29/2020 04:15 AM    MAGNESIUM 2.0 04/29/2020 04:15 AM        Lab Results   Component Value Date/Time    GLUCOSE 84 04/29/2020 04:15 AM    GLUCOSE 164 (H) 04/28/2020 02:40 PM    GLUCOSE 211 (H) 04/28/2020 03:33 AM    GLUCOSE 100 (H) 04/27/2020 09:10 PM          Assessment/Plan  Rigoberto Adames is a 55 y.o. female with left emphysematous pyelonephritis s/p IR perc neph tube placement    - Follow labs  - monitor I&O  - manage pain  - repeat CT in a couple of days, will determine when needed based on clinical picture  - abx per medicine and culture directed  - urology following    Carlos Alberto Gallardo PA-C  [facility]           "

## 2020-04-29 NOTE — CONSULTS
Diabetes Education:  Patient with existing type II DM and sepsis, HbA1c= 17.6%.  Per chart, patient had been taking metformin 1 g BID, Jardiance 25 mg daily and Lantus 38 units daily, however stopped these medications for several days prior to admission.   Due to UTI, I would recommend discontinuing Jardiance upon discharge, as this can precipitate UTI by adding glucose to the urine if FSBG not controlled and patient not consuming enough water. Patient is critically ill so not seen today, RN, CDE will continue to follow and meet with patient closer to discharge.

## 2020-04-29 NOTE — DIETARY
Nutrition Services: Update   Day 3 of admit.  Consult received for poor PO intake and poor DM control.  Noted to be consuming 25% or < of meals per IDT rounds.  Boost Glucose Control added in attempt to optimize intake - suspect poor appetite/intake also affected by current medical status.  Pt has been critically ill requiring re-orientation, though able to transfer out of ICU this afternoon.    RD(s) continue to monitor for sufficient intake and will follow-up with diabetes education prior to d/c.

## 2020-04-29 NOTE — CARE PLAN
Problem: Nutritional:  Goal: Achieve adequate nutritional intake  Description: Patient will start a PO diet and consume >50% of meals   Outcome: PROGRESSING SLOWER THAN EXPECTED

## 2020-04-29 NOTE — ASSESSMENT & PLAN NOTE
Slowly trending down. Likely due to a combination of sepsis + rehydration + cirrhosis.   - no active bleeding  - Monitoring

## 2020-04-29 NOTE — PROGRESS NOTES
ICU Daily Progress Note:     Date of Service: 4/29/2020  Primary Team: [facility] ICU Gold Team   Attending: Emiliano Cortes M.D.   Senior Resident: Dr. Serenity Bradley  Contact:  [phone]    Chief Complaint:   Reduced PO intake, fever, bilateral lower extremity weakness      HPI (4/26/20) :   Rigoberto Lakhani is a 55 year old lady with poorly controlled DM type II (on lantus 35 Units, metformin 1000 mg BID, Jardiance 25 mg/d, unremarkable C-peptide and BEVERLEY in 2019), hypertrophic obstructive cardiomyopathy with systolic motion of the anterior leaflet of mitral valve and asymmetric septal hypertrophy on cardiac MRI in 2014, EF 75% with severe concentric LVH on TTE in Sept 2019, hepatic cirrhosis, was admitted with 3 day h/o fever, chills, cough, sore throat, low back pain, dysuria, reduced appetite, and 4 week h/o lower extremity bilateral weakness. She was febrile with temp 100.8 F, tachycardic with HR in 110s, SBP 70s-80s on admission. She had WBC 22.8, Procal 5, BHB 1.12, AG 18, CO2 of 16, glucose > 500, mild pre-renal RUBEN with eGFR 54 and creat 1.24 (baseline 0.58) on admission. UA showed glucose large, leucocyte esterase and WBCs. MRI T-L spine showed abnormal left kidney. Renal US followed by CT showed atrophic right kidney, left renal abscess with gas and left hydronephrosis. Distal ureter not imaged - hence unsure if there's distal ureteric stone / unsure of cause of left hydronephrosis. She was treated for septic shock with IV ceftriaxone, IV Linezolid, IV fluids, levophed and was started on DKA protocol. Blood cultures +ve 2/2 bottles for GNR.  COVID-19 PCR testing was performed and was negative in the ER. Per Dr. Kimble - not for further work-up.     Subjective:     Improving, awake and alert  Afebrile, SBP up in 120s-140s, HR 70s-80s  Off vasopressor support since 4/27  Pain and LE weakness ongoing  Ambulating with walker and PT assistance  Good UOP via left nephrostomy  Pan sensitive E.coli on UCx and BCx - to  continue with IV antibiotics given severity of sepsis  On D3 of 5 of zosyn (received ceftriaxone + linezolid in ER)  One small episode of non-bloody diarrhea this morning    BG 84 this am, was on 30 units lantus ---> will reduce to 25 units, has 4 units pre meal TID, and SSI (had episode of hypoglycemia with BG down to 57 on 4/27 when on IV insulin drip). HbA1c 17    Urology on board - repeat CT tomorrow to assess left renal abscess, and determine cause of left ureteric obstruction  Neuro consulted 4/28 - likely acute on chronic weakness. Chronic weakness related to polyneuropathy, plan for out-pt EMG/NCS  Wound care on board    Pt wanted to be discharged today. Spent time trying to explain why she isn't ready for discharge, need for further urological work-up and plan for nephrostomy, need for repeat CT, need for DM education consult. Pt did not want me to update her family.     Also mentioned that her PCP moved to [place], and hence enquired if she could have me as her PCP - will need to set up [facility] prior to discharge      Consultants/Specialty:  Urology, Dr. Vicky Pride -   Neurology - Dr. Binh Stewart  Wound care  Diabetes education  Physiatry     Review of Systems:   Review of Systems   Constitutional: Negative for chills, diaphoresis, fever and malaise/fatigue.        Feels better, improving   HENT: Negative for ear pain and nosebleeds.    Eyes: Negative for blurred vision, photophobia, pain, discharge and redness.   Respiratory: Positive for cough. Negative for shortness of breath.         Cough improving   Cardiovascular: Negative for chest pain, palpitations and orthopnea.   Gastrointestinal: Positive for abdominal pain and diarrhea. Negative for constipation and vomiting.   Genitourinary: Positive for frequency. Negative for dysuria, flank pain and hematuria.   Musculoskeletal: Positive for back pain. Negative for neck pain.   Skin: Negative for itching and rash.    Neurological: Positive for sensory change and weakness. Negative for tremors, speech change, focal weakness, seizures and loss of consciousness.   Endo/Heme/Allergies: Negative for polydipsia. Does not bruise/bleed easily.   Psychiatric/Behavioral: Positive for substance abuse. Negative for hallucinations and suicidal ideas. The patient is not nervous/anxious.         Nicotine - ~ 1 ppd         Objective Data:   Physical Exam:   Vitals:   Temp:  [36.4 °C (97.5 °F)-37.3 °C (99.2 °F)] 36.8 °C (98.2 °F)  Pulse:  [] 87  Resp:  [13-36] 29  BP: ()/(61-99) 148/95  SpO2:  [80 %-100 %] 99 %       Physical Exam  Constitutional:       General: She is not in acute distress.     Appearance: She is not diaphoretic.      Comments: Looks lean, more communicative   HENT:      Head: Normocephalic and atraumatic.      Mouth/Throat:      Mouth: Mucous membranes are dry.      Pharynx: No oropharyngeal exudate or posterior oropharyngeal erythema.   Eyes:      General: No scleral icterus.        Right eye: No discharge.         Left eye: No discharge.      Extraocular Movements: Extraocular movements intact.      Conjunctiva/sclera: Conjunctivae normal.   Neck:      Musculoskeletal: Normal range of motion and neck supple. No neck rigidity or muscular tenderness.   Cardiovascular:      Rate and Rhythm: Normal rate and regular rhythm.      Heart sounds: Murmur present.      Comments: Grade 4/6 systolic murmur most of precordium, prominent left upper sternal border, no radiation to carotid  Pulmonary:      Effort: Pulmonary effort is normal. No respiratory distress.      Breath sounds: Normal breath sounds. No wheezing or rales.   Abdominal:      General: Abdomen is flat. There is no distension.      Tenderness: There is no abdominal tenderness. There is left CVA tenderness. There is no right CVA tenderness or guarding.   Genitourinary:     Comments: Left nephrostomy with turbid, cloudy urine in drainage bag  Musculoskeletal:  Normal range of motion.         General: No swelling or deformity.      Right lower leg: No edema.      Left lower leg: No edema.      Comments: Rt foot diabetic ulcer 1-1.5 cm on fourth toe and on medial aspect below malleolus   Skin:     General: Skin is warm and dry.      Coloration: Skin is not jaundiced.      Findings: No bruising.   Neurological:      General: No focal deficit present.      Mental Status: She is alert and oriented to person, place, and time.      Cranial Nerves: No cranial nerve deficit.      Comments: B/L UE - normal strength and sensation  B/L LE - 4/5, endorses reduced sensation to crude touch B/L, but able to feel pain sensation  No asymmetry   Psychiatric:         Mood and Affect: Mood normal.         Thought Content: Thought content normal.         Judgment: Judgment normal.         Labs:   WBC down to 17  RUBEN resolved  CO2 at 19  Corrected sodium 131  BG 84 this am  Corrected calcium 8.9  E.coli pansensitive on BCx 4/26, UCx 4/26, nephrostomy urine 4/27, and renal abscess aspirate 4/27      Imaging:   CT Renal 4/26/20   Left hydronephrosis with gas --> Pyonephrosis, complex fluid collections in left kidney concerning for renal abscesses  Atrophic right kidney  Hepatic cirrhosis  Distended GB with stone  Extrahepatic biliary dilation  Small B/L pleural efusions    MRI L-spine 4/26/20   No e/o epidural abscess or transverse myelitis  Mild central canal stenosis at L4-L5 level secondary to facet arthropathy  Mild L-spondylotic changes at L4-L5 and L5-S1 levels    MRI T-spine 4/26/20  No evidence of epidural abscess or transverse myelitis.  But radiologist noticed abnormality in left kidney which triggered renal US followed by CT    Transthoracic echo 4/27/20  No change from Sept 2019, severe concentric LVH, EF 75% with hyperdynamic LV, dilated LA      * Septic shock due to undetermined organism (HCC)- (present on admission)  Assessment & Plan  - This is Septic shock Present on admission  -  SIRS criteria identified include: Fever, with temperature greater than 101 deg F, Tachycardia, with heart rate greater than 90 BPM, Tachypnea, with respirations greater than 20 per minute and Leukocytosis, with WBC greater than 12,000 on admission   - Urinary / renal source with left pyonephrosis, multiple collections in renal parenchyma in keeping with left renal abscesses and left emphysematous pyelonephritis  - causing pre-renal RUBEN on admission - now resolved  - Presentation includes: Severe sepsis present and persistent hypotension after 30 ml/kg completed.   - MRI thoraco-lumbar spine is negative for epidural abscess or transverse myelitis  - CT Renal suggested left pyonephrosis, multiple complex left renal parenchymal collections in keeping with evolving left renal abscess  - unsure of cause of left hydronephrosis, as imaging does not include pelvis --> will likely need retrograde pyelography with urology with view to ureteroscopy when infection / sepsis resolves (likely out-pt) versus in-pt non-contrast pelvic CT to check distal ureter  - had left nephrostomy on 4/27, aspiration of renal abscess 4/27  - Pan-sensitive E.coli on BCx 4/26, UCx 4/26, nephrostomy urine 4/27, renal abscess aspirate 4/27  - on zosy D 3 of 5  - was on pressor support - discontinued 4/27  - Urology, Dr. Perry on board    Plan:  - continue zosyn day 3 of 5 - 5-day course to complete given severity of sepsis and renal abscesses  - strict Is & Os  - Follow-up cultures and sensitivities  - will need to transition to PO antibiotics, might need prolonged duration for at least 2-week course / until nephrostomy is removed  - Appreciate Urol help, will need repeat renal CT imaging on 4/30  - IVF NS @ 83 ml/hr       Urinary tract infection- (present on admission)  Assessment & Plan  - dysuria  - abnormal UA  - CT in keeping with left pyonephrosis and left renal abscess evolving with gas - left emphysematous pyelonephritis   - GNR on BCx  4/26  - lactose fermenting GNRs on pan cultures suggesting urinary source  - currently on zosyn D3 of 5  - Urology on board  - repeat CT 4/30    URI (upper respiratory infection)- (present on admission)  Assessment & Plan  - Non productive cough with fever  - COVID PCR negative in ER    - Per discussion with Dr. Kimble by night team from ER, no further testing indicated given low risk factors  - droplet precautions discontinued 4/27 by infection control RN    Acute kidney injury (HCC)- (present on admission)  Assessment & Plan  - likely prerenal due to severe dehydration in setting of severe hyperglycemia and septic shock   - has pyonephrosis and left renal abscess  - RUBEN resolved  - good UOP     Plan:  - continue IVF NS @ 83 ml/hr  - avoid nephrotoxics    Hyponatremia- (present on admission)  Assessment & Plan  - corrected sodium 131  - IVF NS @ 83 ml/hr    Weakness of both lower extremities- (present on admission)  Assessment & Plan  - LBP for few weeks, B/L LE weakness 4 weeks - initial concern for transverse myelitis vs epidural abscess  - symmetric generalized weakness in LE, with power 3+/5 but no DTRs, reducd sensation to crude touch, has pain sensation - but no asymmetry  - MRI T-L spine - no e/o epidural abscess or transverse myelitis  - ambulating with walker and PT assistance  - neurology Dr. Mccormack consulted, advised CPK and possible lumbar tap  - following discussion with Intensivist, assessment of ambulatory status of patient with PT assistance, and current sepsis and bacteremia from urinary source - LP held off  - Per neuro, EMG cannot be performed in-patient  - B12 normal limits  IMP : Could be diabetic neuropathy vs alcohol related peripheral neuropathy  - Neuro consult 4/28 - likely acute on chronic weakness. 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  Continues to drink 1 drink per day, wine.    No significant transaminitis on presentation.  Cirrhosis on CT  No acute GI needs at this time as in-pt  Continue to monitor.    HOCM (hypertrophic obstructive cardiomyopathy) (HCC)- (present on admission)  Assessment & Plan  On MRI 12/2014;  1.  Narrowing of the aortic outflow tract secondary to mid and basal asymmetric septal hypertrophy and systolic anterior motion of the anterior leaflet of the mitral valve.  2.  Mild concentric left ventricular hypertrophy.  3.  No myocardial scarring is identified.  4.  Hyperdynamic left ventricle with an ejection fraction of 74%.  5.  Moderate mitral regurgitation.  6.  Mild tricuspid regurgitation.    Recent Echo 9/2019. EF 75% with left ventricular concentric hypertrophy, hyperdynamic LV, dilated LA, RA enlargement, mild MR.  Aorta slightly dilated but normal.    Plan:  - Aim for MAP > 65 - will be cautious given HOCM  - IVF NS @ 83 ml/hr  - Home ACEI, BB, aldactone held since admission  - will resume half dose of home metoprolol today, I.e. start 25 mg BID  - ACEI contraindicated in HOCM  - her EF > 25% , unsure of indication for aldactone    Hypertension, benign- (present on admission)  Assessment & Plan  - h/o poorly controlled HTN  - held home metoprolol 50 mg BID, lisinopril 10 mg/d, aldactone 25 mg/d in the context of septic shock needing pressor support  - now SBP in 120s-140s  - start metoprolol at half home dose, and escalate to home dose tomorrow based on SBP and HR (per cardiology out-patient note, she needs to be on BB)  - ACEI contraindicated in HOCM, aldactone not recommended. Might need to co-ordinate with cardiology if this needs to be restarted.    Refeeding syndrome  Assessment & Plan  - Poor PO intake prior to admission + h/o EtOH   - Phosp, K, Mag repleted, and now normalized    Hypomagnesemia  Assessment & Plan  - Mag 1.7  - 2 gms of IV  Mag   - aim Mag > 2    Hypophosphatemia  Assessment & Plan  - replete as needed    Normocytic anemia- (present on admission)  Assessment & Plan  - Etiology unclear.   - low iron but elevated ferritin 408 in keeping with possible inflammatory anemia, however ferritin can be elevated in acute infections   - Will need repeat ferritin once infection resolves      Thrombocytopenia (HCC)- (present on admission)  Assessment & Plan  - slowly trending down 118 today  - combination of sepsis + rehydration + cirrhosis (although transaminases wnl, ALP improving, but albumin low 1.8)   - no active bleeding  - sepsis improving  - ctm    Elevated troponin- (present on admission)  Assessment & Plan  - Slightly elevated troponin 40 likely secondary to demand ischemia, NSTEMI type II and pre-renal RUBEN  - no chest pain / PND prior  - EKG shows sinus tachycardia  - not trending  - she could having underlying atherosclerotic disease involving coronaries despite being asymptomatic prior to admission given her DM poor control and A1c 14.8   - already on BB, ACEI (in setting of HOCM? but not high dose), aldactone and statin at home    Tobacco abuse- (present on admission)  Assessment & Plan  - continues to smoke less than 1 pack/day.  - NRT  - smoking cessation counseling

## 2020-04-30 ENCOUNTER — APPOINTMENT (OUTPATIENT)
Dept: RADIOLOGY | Facility: MEDICAL CENTER | Age: 55
DRG: 871 | End: 2020-04-30
Attending: STUDENT IN AN ORGANIZED HEALTH CARE EDUCATION/TRAINING PROGRAM
Payer: MEDICAID

## 2020-04-30 ENCOUNTER — PATIENT OUTREACH (OUTPATIENT)
Dept: HEALTH INFORMATION MANAGEMENT | Facility: OTHER | Age: 55
End: 2020-04-30

## 2020-04-30 PROBLEM — L97.509 DIABETIC ULCER OF TOE (HCC): Status: ACTIVE | Noted: 2020-04-30

## 2020-04-30 PROBLEM — A41.51 SEPTIC SHOCK DUE TO ESCHERICHIA COLI (HCC): Status: ACTIVE | Noted: 2020-04-26

## 2020-04-30 PROBLEM — E11.621 DIABETIC ULCER OF TOE (HCC): Status: ACTIVE | Noted: 2020-04-30

## 2020-04-30 LAB
ALBUMIN SERPL BCP-MCNC: 1.7 G/DL (ref 3.2–4.9)
ALBUMIN/GLOB SERPL: 0.5 G/DL
ALP SERPL-CCNC: 93 U/L (ref 30–99)
ALT SERPL-CCNC: 14 U/L (ref 2–50)
ANION GAP SERPL CALC-SCNC: 10 MMOL/L (ref 7–16)
AST SERPL-CCNC: 18 U/L (ref 12–45)
BACTERIA UR CULT: ABNORMAL
BACTERIA UR CULT: ABNORMAL
BACTERIA WND AEROBE CULT: ABNORMAL
BACTERIA WND AEROBE CULT: ABNORMAL
BASOPHILS # BLD AUTO: 0.4 % (ref 0–1.8)
BASOPHILS # BLD: 0.05 K/UL (ref 0–0.12)
BILIRUB SERPL-MCNC: 0.3 MG/DL (ref 0.1–1.5)
BUN SERPL-MCNC: 12 MG/DL (ref 8–22)
CALCIUM SERPL-MCNC: 7.2 MG/DL (ref 8.5–10.5)
CHLORIDE SERPL-SCNC: 102 MMOL/L (ref 96–112)
CO2 SERPL-SCNC: 17 MMOL/L (ref 20–33)
CREAT SERPL-MCNC: 0.66 MG/DL (ref 0.5–1.4)
EOSINOPHIL # BLD AUTO: 0.03 K/UL (ref 0–0.51)
EOSINOPHIL NFR BLD: 0.2 % (ref 0–6.9)
ERYTHROCYTE [DISTWIDTH] IN BLOOD BY AUTOMATED COUNT: 56.8 FL (ref 35.9–50)
GLOBULIN SER CALC-MCNC: 3.5 G/DL (ref 1.9–3.5)
GLUCOSE BLD-MCNC: 84 MG/DL (ref 65–99)
GLUCOSE SERPL-MCNC: 100 MG/DL (ref 65–99)
GRAM STN SPEC: ABNORMAL
GRAM STN SPEC: ABNORMAL
HCT VFR BLD AUTO: 27.1 % (ref 37–47)
HGB BLD-MCNC: 8.3 G/DL (ref 12–16)
IMM GRANULOCYTES # BLD AUTO: 0.22 K/UL (ref 0–0.11)
IMM GRANULOCYTES NFR BLD AUTO: 1.7 % (ref 0–0.9)
LYMPHOCYTES # BLD AUTO: 1.28 K/UL (ref 1–4.8)
LYMPHOCYTES NFR BLD: 10.1 % (ref 22–41)
MAGNESIUM SERPL-MCNC: 1.7 MG/DL (ref 1.5–2.5)
MCH RBC QN AUTO: 29.9 PG (ref 27–33)
MCHC RBC AUTO-ENTMCNC: 30.6 G/DL (ref 33.6–35)
MCV RBC AUTO: 97.5 FL (ref 81.4–97.8)
MONOCYTES # BLD AUTO: 0.44 K/UL (ref 0–0.85)
MONOCYTES NFR BLD AUTO: 3.5 % (ref 0–13.4)
NEUTROPHILS # BLD AUTO: 10.65 K/UL (ref 2–7.15)
NEUTROPHILS NFR BLD: 84.1 % (ref 44–72)
NRBC # BLD AUTO: 0 K/UL
NRBC BLD-RTO: 0 /100 WBC
PHOSPHATE SERPL-MCNC: 3.8 MG/DL (ref 2.5–4.5)
PLATELET # BLD AUTO: 113 K/UL (ref 164–446)
PMV BLD AUTO: 11.1 FL (ref 9–12.9)
POTASSIUM SERPL-SCNC: 4.2 MMOL/L (ref 3.6–5.5)
PROT SERPL-MCNC: 5.2 G/DL (ref 6–8.2)
RBC # BLD AUTO: 2.78 M/UL (ref 4.2–5.4)
SIGNIFICANT IND 70042: ABNORMAL
SIGNIFICANT IND 70042: ABNORMAL
SITE SITE: ABNORMAL
SITE SITE: ABNORMAL
SODIUM SERPL-SCNC: 129 MMOL/L (ref 135–145)
SOURCE SOURCE: ABNORMAL
SOURCE SOURCE: ABNORMAL
WBC # BLD AUTO: 12.7 K/UL (ref 4.8–10.8)

## 2020-04-30 PROCEDURE — 700102 HCHG RX REV CODE 250 W/ 637 OVERRIDE(OP): Performed by: STUDENT IN AN ORGANIZED HEALTH CARE EDUCATION/TRAINING PROGRAM

## 2020-04-30 PROCEDURE — 80053 COMPREHEN METABOLIC PANEL: CPT

## 2020-04-30 PROCEDURE — 82962 GLUCOSE BLOOD TEST: CPT

## 2020-04-30 PROCEDURE — A9270 NON-COVERED ITEM OR SERVICE: HCPCS | Performed by: STUDENT IN AN ORGANIZED HEALTH CARE EDUCATION/TRAINING PROGRAM

## 2020-04-30 PROCEDURE — 36415 COLL VENOUS BLD VENIPUNCTURE: CPT

## 2020-04-30 PROCEDURE — 700105 HCHG RX REV CODE 258: Performed by: STUDENT IN AN ORGANIZED HEALTH CARE EDUCATION/TRAINING PROGRAM

## 2020-04-30 PROCEDURE — 99233 SBSQ HOSP IP/OBS HIGH 50: CPT | Mod: GC | Performed by: HOSPITALIST

## 2020-04-30 PROCEDURE — 97116 GAIT TRAINING THERAPY: CPT | Mod: CQ

## 2020-04-30 PROCEDURE — 700105 HCHG RX REV CODE 258: Performed by: INTERNAL MEDICINE

## 2020-04-30 PROCEDURE — 700117 HCHG RX CONTRAST REV CODE 255: Performed by: STUDENT IN AN ORGANIZED HEALTH CARE EDUCATION/TRAINING PROGRAM

## 2020-04-30 PROCEDURE — 85025 COMPLETE CBC W/AUTO DIFF WBC: CPT

## 2020-04-30 PROCEDURE — 700111 HCHG RX REV CODE 636 W/ 250 OVERRIDE (IP): Performed by: STUDENT IN AN ORGANIZED HEALTH CARE EDUCATION/TRAINING PROGRAM

## 2020-04-30 PROCEDURE — 74177 CT ABD & PELVIS W/CONTRAST: CPT

## 2020-04-30 PROCEDURE — 83735 ASSAY OF MAGNESIUM: CPT

## 2020-04-30 PROCEDURE — 84100 ASSAY OF PHOSPHORUS: CPT

## 2020-04-30 PROCEDURE — 700111 HCHG RX REV CODE 636 W/ 250 OVERRIDE (IP): Performed by: INTERNAL MEDICINE

## 2020-04-30 PROCEDURE — 97535 SELF CARE MNGMENT TRAINING: CPT

## 2020-04-30 PROCEDURE — 97530 THERAPEUTIC ACTIVITIES: CPT | Mod: CQ

## 2020-04-30 PROCEDURE — 770020 HCHG ROOM/CARE - TELE (206)

## 2020-04-30 PROCEDURE — 93922 UPR/L XTREMITY ART 2 LEVELS: CPT

## 2020-04-30 RX ORDER — INSULIN GLARGINE 100 [IU]/ML
20 INJECTION, SOLUTION SUBCUTANEOUS EVERY EVENING
Status: DISCONTINUED | OUTPATIENT
Start: 2020-04-30 | End: 2020-05-02

## 2020-04-30 RX ORDER — DEXTROSE MONOHYDRATE 25 G/50ML
50 INJECTION, SOLUTION INTRAVENOUS
Status: DISCONTINUED | OUTPATIENT
Start: 2020-04-30 | End: 2020-05-02

## 2020-04-30 RX ORDER — SODIUM CHLORIDE, SODIUM LACTATE, POTASSIUM CHLORIDE, CALCIUM CHLORIDE 600; 310; 30; 20 MG/100ML; MG/100ML; MG/100ML; MG/100ML
INJECTION, SOLUTION INTRAVENOUS CONTINUOUS
Status: DISCONTINUED | OUTPATIENT
Start: 2020-04-30 | End: 2020-05-01

## 2020-04-30 RX ORDER — MAGNESIUM SULFATE HEPTAHYDRATE 40 MG/ML
2 INJECTION, SOLUTION INTRAVENOUS ONCE
Status: COMPLETED | OUTPATIENT
Start: 2020-04-30 | End: 2020-04-30

## 2020-04-30 RX ORDER — DOXYCYCLINE 100 MG/1
100 TABLET ORAL EVERY 12 HOURS
Status: COMPLETED | OUTPATIENT
Start: 2020-04-30 | End: 2020-05-02

## 2020-04-30 RX ADMIN — THERA TABS 1 TABLET: TAB at 04:57

## 2020-04-30 RX ADMIN — DOXYCYCLINE 100 MG: 100 TABLET, FILM COATED ORAL at 16:45

## 2020-04-30 RX ADMIN — MAGNESIUM SULFATE IN WATER 2 G: 40 INJECTION, SOLUTION INTRAVENOUS at 11:31

## 2020-04-30 RX ADMIN — FOLIC ACID 1 MG: 1 TABLET ORAL at 04:57

## 2020-04-30 RX ADMIN — CEFTRIAXONE SODIUM 2 G: 2 INJECTION, POWDER, FOR SOLUTION INTRAMUSCULAR; INTRAVENOUS at 04:57

## 2020-04-30 RX ADMIN — NICOTINE 7 MG: 7 PATCH TRANSDERMAL at 04:57

## 2020-04-30 RX ADMIN — SODIUM CHLORIDE, POTASSIUM CHLORIDE, SODIUM LACTATE AND CALCIUM CHLORIDE: 600; 310; 30; 20 INJECTION, SOLUTION INTRAVENOUS at 16:43

## 2020-04-30 RX ADMIN — ATORVASTATIN CALCIUM 20 MG: 20 TABLET, FILM COATED ORAL at 16:45

## 2020-04-30 RX ADMIN — ENOXAPARIN SODIUM 40 MG: 100 INJECTION SUBCUTANEOUS at 04:57

## 2020-04-30 RX ADMIN — Medication 100 MG: at 04:57

## 2020-04-30 RX ADMIN — OMEPRAZOLE 20 MG: 20 CAPSULE, DELAYED RELEASE ORAL at 05:40

## 2020-04-30 RX ADMIN — IOHEXOL 100 ML: 350 INJECTION, SOLUTION INTRAVENOUS at 09:39

## 2020-04-30 RX ADMIN — SODIUM CHLORIDE: 9 INJECTION, SOLUTION INTRAVENOUS at 04:58

## 2020-04-30 RX ADMIN — DOXYCYCLINE 100 MG: 100 TABLET, FILM COATED ORAL at 11:31

## 2020-04-30 RX ADMIN — METOPROLOL TARTRATE 25 MG: 25 TABLET, FILM COATED ORAL at 04:57

## 2020-04-30 RX ADMIN — METOPROLOL TARTRATE 25 MG: 25 TABLET, FILM COATED ORAL at 16:45

## 2020-04-30 ASSESSMENT — COGNITIVE AND FUNCTIONAL STATUS - GENERAL
HELP NEEDED FOR BATHING: A LOT
STANDING UP FROM CHAIR USING ARMS: A LITTLE
MOBILITY SCORE: 17
MOVING TO AND FROM BED TO CHAIR: A LITTLE
PERSONAL GROOMING: A LITTLE
CLIMB 3 TO 5 STEPS WITH RAILING: A LOT
DAILY ACTIVITIY SCORE: 18
TOILETING: A LITTLE
SUGGESTED CMS G CODE MODIFIER MOBILITY: CK
TURNING FROM BACK TO SIDE WHILE IN FLAT BAD: A LITTLE
SUGGESTED CMS G CODE MODIFIER DAILY ACTIVITY: CK
DRESSING REGULAR LOWER BODY CLOTHING: A LITTLE
MOVING FROM LYING ON BACK TO SITTING ON SIDE OF FLAT BED: A LITTLE
WALKING IN HOSPITAL ROOM: A LITTLE
DRESSING REGULAR UPPER BODY CLOTHING: A LITTLE

## 2020-04-30 ASSESSMENT — ENCOUNTER SYMPTOMS
ABDOMINAL PAIN: 0
HEADACHES: 0
FEVER: 0
EYE PAIN: 0

## 2020-04-30 ASSESSMENT — GAIT ASSESSMENTS
GAIT LEVEL OF ASSIST: MINIMAL ASSIST
ASSISTIVE DEVICE: FRONT WHEEL WALKER
DISTANCE (FEET): 15
DEVIATION: BRADYKINETIC;SHUFFLED GAIT

## 2020-04-30 NOTE — CARE PLAN
Problem: Knowledge Deficit  Goal: Knowledge of the prescribed therapeutic regimen will improve  Outcome: PROGRESSING SLOWER THAN EXPECTED  Intervention: Discuss information regarding therpeutic regimen and document in education  Note:   Discussed POC and medications with patient, patient verbalized understanding.                 Problem: Safety  Goal: Will remain free from falls  Outcome: PROGRESSING AS EXPECTED  Intervention: Implement fall precautions  Flowsheets (Taken 4/30/2020 0900)  Environmental Precautions:   Treaded Slipper Socks on Patient   Personal Belongings, Wastebasket, Call Bell etc. in Easy Reach   Transferred to Stronger Side   Mobility Assessed & Appropriate Sign Placed   Communication Sign for Patients & Families   Bed in Low Position   Report Given to Other Health Care Providers Regarding Fall Risk

## 2020-04-30 NOTE — ASSESSMENT & PLAN NOTE
Ulcer of the great and second toe found on admission. Culture has grown Group B strep. Wound care evaluated the patient and dressed the wound, recommends outpatient follow-up at this time.     Plan:  -Continue with dressing changes per wound care instructions  -Doxycyline

## 2020-04-30 NOTE — PROGRESS NOTES
Assumed care at 0700, bedside report received from night shift RN. Pt is SR on the telemetry monitor. Patient is AO x 4 and is sitting up in bed. Initial assessment completed and orders reviewed. POC discussed with patient. Call light within reach and hourly rounding in place. No further questions at this time. Fall precautions in place.

## 2020-04-30 NOTE — DISCHARGE PLANNING
Patient is eligible for Medicaid Meds to Beds at discharge if they have coverage with [place] Medicaid, Medicaid FFS, Medicaid HMO ([facility]), or [place]. This service is provided through the [facility] if orders are received prior to 4 p.m. Monday through Friday, excluding holidays. Please call [phone] prior to discharge.

## 2020-04-30 NOTE — PROGRESS NOTES
Daily Progress Note:     Date of Service: 4/30/2020  Primary Team: [facility] Purple Team   Attending: MATEO Wayne M.D.   Senior Resident: Dr. Degroot  Intern: Dr. Andrews  Contact:  [phone]    Chief Complaint:   Fever    Subjective  -Patient transferred from the ICU to the floor yesterday. Vitals have remained stable.   -Cultures returned with pansensitive E. Coli, patient on Ceftriaxone with continued trending down WBC count. Will remain on IV antibiotics until 5/2 with plan to switch to a two week oral course   -Repeat CT this AM shows improvement of hydronephrosis and fluid collections   -Blood sugars are well controlled on insulin here, pending DM education consult  -Patient declines rehab, wants to go home with HH. Referral has been placed  -She has been set up for an outpatient PCP appointment   -Patient feels well this morning, she is anxious to go home    Consultants/Specialty:  -Wound care  -Urology    Review of Systems:   Review of Systems   Constitutional: Negative for fever.   HENT: Negative for ear pain.    Eyes: Negative for pain.   Cardiovascular: Negative for chest pain.   Gastrointestinal: Negative for abdominal pain.   Skin: Negative for rash.   Neurological: Negative for headaches.     Objective Data:   Physical Exam:   Vitals:   Temp:  [36.1 °C (97 °F)-37.1 °C (98.7 °F)] 36.1 °C (97 °F)  Pulse:  [68-91] 68  Resp:  [15-20] 16  BP: (114-172)/() 114/65  SpO2:  [91 %-100 %] 95 %    Physical Exam  Constitutional:       General: She is not in acute distress.     Appearance: She is not toxic-appearing.   HENT:      Head: Normocephalic and atraumatic.      Mouth/Throat:      Mouth: Mucous membranes are moist.   Eyes:      General:         Right eye: No discharge.         Left eye: No discharge.   Cardiovascular:      Rate and Rhythm: Normal rate and regular rhythm.      Heart sounds: No murmur.   Pulmonary:      Effort: Pulmonary effort is normal. No respiratory distress.      Breath sounds:  Normal breath sounds. No wheezing or rales.   Abdominal:      General: There is no distension.      Tenderness: There is no abdominal tenderness. There is no guarding.   Musculoskeletal:      Comments: Left nephrostomy tube draining yellow urine with purulence   Skin:     General: Skin is warm and dry.      Capillary Refill: Capillary refill takes less than 2 seconds.      Findings: No rash.   Neurological:      General: No focal deficit present.      Mental Status: She is alert and oriented to person, place, and time.       Labs:   Recent Labs     04/28/20  0333 04/29/20  0415 04/30/20  0220   WBC 22.7* 17.6* 12.7*   RBC 2.92* 2.73* 2.78*   HEMOGLOBIN 8.7* 8.2* 8.3*   HEMATOCRIT 25.7* 24.6* 27.1*   MCV 88.0 90.1 97.5   MCH 29.8 30.0 29.9   RDW 48.7 52.3* 56.8*   PLATELETCT 127* 118* 113*   MPV 11.0 11.0 11.1   NEUTSPOLYS 93.10* 89.50* 84.10*   LYMPHOCYTES 5.20* 8.70* 10.10*   MONOCYTES 1.70 0.90 3.50   EOSINOPHILS 0.00 0.90 0.20   BASOPHILS 0.00 0.00 0.40   RBCMORPHOLO Present Present  --      Recent Labs     04/28/20  1440 04/29/20 0415 04/30/20  0220   SODIUM 126* 131* 129*   POTASSIUM 4.7 4.4 4.2   CHLORIDE 97 103 102   CO2 17* 19* 17*   GLUCOSE 164* 84 100*   BUN 17 15 12   CPKTOTAL <7  --   --        Imaging:   CT-ABDOMEN-PELVIS WITH   Final Result      Interval improvement in left hydronephrosis with mild hydronephrosis now seen. Left nephrostomy tube is present.      Collection along the posterior right kidney measures 4 x 2.2 cm and is mildly decreased in size compared to prior. Collection in the anterior left kidney with subcapsular extension is significantly decreased in size.      Small hypodensities throughout the kidney worrisome for small abscesses      Ureteral thickening and enhancement may be infectious/inflammatory.      Cortical scarring of the right kidney.      Findings compatible with cirrhosis.      Splenic cystic lesion is again noted. Mild splenomegaly.      Small amount of ascites.       Thickened endometrial stripe if the patient is postmenopausal. Correlation with pelvic ultrasound is recommended.      Small bilateral pleural effusions with overlying atelectasis.      Third spacing.      Cholelithiasis.      Biliary ductal dilatation is again noted.            * Septic shock due to Escherichia coli (HCC)- (present on admission)  Assessment & Plan  The patient presented with septic shock requiring pressors in the ICU. Source is due to left pyleonephritis with abscesses. She is now s/p drainage and nephrostomy tube placement 4/27. Cultures have all returned as pansensitive E. Coli.     Plan:  -Continue with IV Abx day 3/5, plan to switch to Augmentin for 2 week course on discharge  -CT abdomen today shows improvement from prior CT of hydronephrosis and fluid collections  -Urology is following    Diabetic ulcer of toe (HCC)  Assessment & Plan  Ulcer of the great and second toe found on admission. Culture has grown Group B strep. Wound care evaluated the patient and dressed the wound, recommends outpatient follow-up at this time.     Plan:  -Continue with dressing changes per wound care instructions  -Doxycyline until she is switched to PO Augmentin    Urinary tract infection- (present on admission)  Assessment & Plan  Patient with pyelonephritis with abscess. Plan as in septic shock due to E. Coli.    Weakness of both lower extremities- (present on admission)  Assessment & Plan  On presentation. Initial concern was for epidural abscess vs. transverse myelitis, but imaging was negative. Neuro was consulted and suspect that this is more chronic in nature due to diabetic neuropathy vs alcohol related peripheral neuropathy and recommend outpatient followup. Patient declined rehab and wants HH.     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 Continues to drink 1 drink per day, wine.  History of outpatient esophageal banding.   -Stable  -Monitoring inpatient, follow up outpatinet    HOCM (hypertrophic obstructive cardiomyopathy) (HCC)- (present on admission)  Assessment & Plan  History of, seen on MRI and ECHO. Recent Echo 9/2019. EF 75% with left ventricular concentric hypertrophy, hyperdynamic LV, dilated LA, RA enlargement, mild MR.  Aorta slightly dilated but normal.    Plan:  -Goal MAP > 65 - will be cautious given HOCM  -Resumed metoprolol 25mg BID  -Holding aldactone, ACEI  -No plans to resumed ACEI due to history of HOCM    Hypertension, benign- (present on admission)  Assessment & Plan  History of poorly controlled hypertension with multiple home medications including metoprolol 50 mg BID, lisinopril 10 mg/d, aldactone 25 mg/d.     Plan:  -Continue metoprolol 25mg BID  -Holding ACEI and aldactone currently  -Likely not a candidate for ACEI due to HOCM    Refeeding syndrome  Assessment & Plan  Resolved. Poor PO intake prior to admission + h/o EtOH   - Phosp, K, Mag repleted, and now normalized    Hypomagnesemia  Assessment & Plan  Monitoring and repletion as needed    Hypophosphatemia  Assessment & Plan  Resolved. replete as needed    Normocytic anemia- (present on admission)  Assessment & Plan  Etiology unclear. Low iron but elevated ferritin 408 in keeping with possible inflammatory anemia, however ferritin can be elevated in acute infections.  - Will need repeat ferritin once infection resolves  - Monitoring inpatient    URI (upper respiratory infection)- (present on admission)  Assessment & Plan  COVID PCR negative in ER      Acute kidney injury (HCC)- (present on admission)  Assessment & Plan  Resolved. Likely prerenal due to severe dehydration in setting of severe hyperglycemia and septic shock.   - avoid nephrotoxics and monitor    Hyponatremia- (present on admission)  Assessment &  Plan  Has been stable, continue to monitor    Thrombocytopenia (HCC)- (present on admission)  Assessment & Plan  Slowly trending down. Likely due to a combination of sepsis + rehydration + cirrhosis.   - no active bleeding  - Monitoring    Elevated troponin- (present on admission)  Assessment & Plan  Slightly elevated troponin 40 likely secondary to demand ischemia, NSTEMI type II and pre-renal RUBEN. Patient is without chest pain / PND prior. Will stop trending.    Tobacco abuse- (present on admission)  Assessment & Plan  Continues to smoke less than 1 pack/day.  - NRT  - smoking cessation counseling

## 2020-04-30 NOTE — PROGRESS NOTES
Diabetes Education:  Patient's FSBG running low,  on Lantus 25 units.  I recommend decreasing the Lantus dose to achieve FSBG 100-150.  Her documented dose at home is Lantus 38 units.  It is unclear if the low blood glucose are due to sepsis or if patient was noncompliant with insulin at home.  RN, CDE will continue to follow

## 2020-04-30 NOTE — PROGRESS NOTES
"[facility]    Progress Note    Service: [facility]  Patient's Name: Rigoberto Adames  MRN: [id number]  Admit Date:4/26/2020  Today's Date: 4/28/2020   Room #: R113/00      Identification:  55 y.o. female with emphysematous pyelonephritis          Overnight-Interval events:   - none Subjective/ROS:   Patient seen and examined. Pain well controlled. Reports no fevers or chills. L NT in place and draining. WBC continues trending down. Updated CT scan performed this morning  No CP/SOB/F/C     Physical Exam:  Current Vitals:   /89   Pulse 78   Temp 36.2 °C (97.2 °F) (Temporal)   Resp 14   Ht 1.702 m (5' 7.01\")   Wt 61.3 kg (135 lb 2.3 oz)   SpO2 98%   BMI 21.16 kg/m²     04/28 1900 - 04/30 0659  In: 1817 [P.O.:240; I.V.:1577]  Out: 2250 [Urine:2250]   GEN : NAD, A&O X4   RES:  no acute respiratory distress  ABD: Soft ND, L NT in place, yellow cloudy output  :   No coley  EXT:  No edema, SCD's in place     Labs:   Lab Results   Component Value Date/Time    WBC 12.7 (H) 04/30/2020 02:20 AM    HEMATOCRIT 27.1 (L) 04/30/2020 02:20 AM    SODIUM 129 (L) 04/30/2020 02:20 AM    POTASSIUM 4.2 04/30/2020 02:20 AM    CHLORIDE 102 04/30/2020 02:20 AM    CO2 17 (L) 04/30/2020 02:20 AM    BUN 12 04/30/2020 02:20 AM    CREATININE 0.66 04/30/2020 02:20 AM    CREATININE 0.5 05/27/2006 05:10 PM    CALCIUM 7.2 (L) 04/30/2020 02:20 AM    MAGNESIUM 1.7 04/30/2020 02:20 AM        Lab Results   Component Value Date/Time    GLUCOSE 100 (H) 04/30/2020 02:20 AM    GLUCOSE 84 04/29/2020 04:15 AM    GLUCOSE 164 (H) 04/28/2020 02:40 PM    GLUCOSE 211 (H) 04/28/2020 03:33 AM          Assessment/Plan  Rigoberto Adames is a 55 y.o. female with left emphysematous pyelonephritis s/p IR perc neph tube placement    - Follow labs  - monitor I&O  - manage pain  - abx per medicine and culture directed, will need minimum 4 weeks IV abx   - posterior abscess of Left kidney seen on CT, NPO at [facility] for IR drainage of this abscess  - L Nephrostomy will " remain for duration of treatment, she will go home with NT  - urology following    Carlos Alberto Gallardo PA-C  [facility]

## 2020-04-30 NOTE — CARE PLAN
Problem: Communication  Goal: The ability to communicate needs accurately and effectively will improve  Outcome: PROGRESSING AS EXPECTED  Intervention: [place] patient and significant other/support system to call light to alert staff of needs  Flowsheets (Taken 4/29/2020 2152)  Oriented to:: All of the Following : Location of Bathroom, Visiting Policy, Unit Routine, Call Light and Bedside Controls, Bedside Rail Policy, Smoking Policy, Rights and Responsibilities, Bedside Report, and Patient Education Notebook     Problem: Safety  Goal: Will remain free from falls  Outcome: PROGRESSING AS EXPECTED  Intervention: Implement fall precautions  Flowsheets (Taken 4/29/2020 2152)  Environmental Precautions:   Treaded Slipper Socks on Patient   Personal Belongings, Wastebasket, Call Bell etc. in Easy Reach   Transferred to Stronger Side   Report Given to Other Health Care Providers Regarding Fall Risk   Bed in Low Position   Communication Sign for Patients & Families   Mobility Assessed & Appropriate Sign Placed  Chair/Bed Strip Alarm: Yes - Alarm On

## 2020-04-30 NOTE — THERAPY
"Physical Therapy Treatment completed.   Bed Mobility:  Supine to Sit: Supervised  Transfers: Sit to Stand: Supervised  Gait: Level Of Assist: Minimal Assist with Front-Wheel Walker       Plan of Care: Will benefit from Physical Therapy 3 times per week  Discharge Recommendations: Equipment: Will Continue to Assess for Equipment Needs. Post-acute therapy Recommend post-acute placement for continued physical therapy services prior to discharge home.       See \"Rehab Therapy-Acute\" Patient Summary Report for complete documentation.     Pt progressing slowly w/ therapy. Pt very focused on sleeping so she self limited her mobility. During bed mobility, pt kept reaching for therapist but appeared to have the strength to peform w/out assist. Pt ambulating 15 feet x 2 w/ the FWW. She had no LOB during gait and appeared to have the strength to go farther but just kept stating she needed to sleep. Pt educated on increasing OOB activity and ambulating w/ the nurses as she is adamantly refusing rehab. Currently pt is at a level in which she will require post acute therapy.  "

## 2020-04-30 NOTE — FACE TO FACE
Face to Face Supporting Documentation - Home Health    The encounter with this patient was in whole or in part the primary reason for home health admission.    Date of encounter:   Patient:                    MRN:                       YOB: 2019  Rigoberto Adames  [id number]  1965     Home health to see patient for:  Physical Therapy evaluation and treatment and Occupational therapy evaluation and treatment    Skilled need for:  Exacerbation of Chronic Disease State diabetic neuropathy and New Onset Medical Diagnosis Weakness of both lower extremities    Skilled nursing interventions to include:  Comment: None    Homebound status evidenced by:  Needs the assistance of another person in order to leave the home. Leaving home requires a considerable and taxing effort. There is a normal inability to leave the home.    Community Physician to provide follow up care: Pcp Pt States None     Optional Interventions? No      I certify the face to face encounter for this home health care referral meets the CMS requirements and the encounter/clinical assessment with the patient was, in whole, or in part, for the medical condition(s) listed above, which is the primary reason for home health care. Based on my clinical findings: the service(s) are medically necessary, support the need for home health care, and the homebound criteria are met.  I certify that this patient has had a face to face encounter by myself.  Jane Andrews M.D. - NPI: [id number]

## 2020-05-01 ENCOUNTER — APPOINTMENT (OUTPATIENT)
Dept: RADIOLOGY | Facility: MEDICAL CENTER | Age: 55
DRG: 871 | End: 2020-05-01
Attending: STUDENT IN AN ORGANIZED HEALTH CARE EDUCATION/TRAINING PROGRAM
Payer: MEDICAID

## 2020-05-01 ENCOUNTER — PATIENT OUTREACH (OUTPATIENT)
Dept: HEALTH INFORMATION MANAGEMENT | Facility: OTHER | Age: 55
End: 2020-05-01

## 2020-05-01 ENCOUNTER — HOME HEALTH ADMISSION (OUTPATIENT)
Dept: HOME HEALTH SERVICES | Facility: HOME HEALTHCARE | Age: 55
End: 2020-05-01
Payer: MEDICAID

## 2020-05-01 PROBLEM — N10: Status: ACTIVE | Noted: 2020-04-26

## 2020-05-01 LAB
ALBUMIN SERPL BCP-MCNC: 1.8 G/DL (ref 3.2–4.9)
ALBUMIN/GLOB SERPL: 0.5 G/DL
ALP SERPL-CCNC: 101 U/L (ref 30–99)
ALT SERPL-CCNC: 10 U/L (ref 2–50)
ANION GAP SERPL CALC-SCNC: 10 MMOL/L (ref 7–16)
AST SERPL-CCNC: 14 U/L (ref 12–45)
BASOPHILS # BLD AUTO: 0.3 % (ref 0–1.8)
BASOPHILS # BLD: 0.03 K/UL (ref 0–0.12)
BILIRUB SERPL-MCNC: 0.2 MG/DL (ref 0.1–1.5)
BUN SERPL-MCNC: 11 MG/DL (ref 8–22)
CALCIUM SERPL-MCNC: 7.6 MG/DL (ref 8.5–10.5)
CHLORIDE SERPL-SCNC: 102 MMOL/L (ref 96–112)
CO2 SERPL-SCNC: 16 MMOL/L (ref 20–33)
CREAT SERPL-MCNC: 0.73 MG/DL (ref 0.5–1.4)
EOSINOPHIL # BLD AUTO: 0.02 K/UL (ref 0–0.51)
EOSINOPHIL NFR BLD: 0.2 % (ref 0–6.9)
ERYTHROCYTE [DISTWIDTH] IN BLOOD BY AUTOMATED COUNT: 55.6 FL (ref 35.9–50)
GLOBULIN SER CALC-MCNC: 3.3 G/DL (ref 1.9–3.5)
GLUCOSE BLD-MCNC: 105 MG/DL (ref 65–99)
GLUCOSE BLD-MCNC: 133 MG/DL (ref 65–99)
GLUCOSE BLD-MCNC: 181 MG/DL (ref 65–99)
GLUCOSE BLD-MCNC: 63 MG/DL (ref 65–99)
GLUCOSE BLD-MCNC: 77 MG/DL (ref 65–99)
GLUCOSE BLD-MCNC: 82 MG/DL (ref 65–99)
GLUCOSE BLD-MCNC: 95 MG/DL (ref 65–99)
GLUCOSE BLD-MCNC: 98 MG/DL (ref 65–99)
GLUCOSE BLD-MCNC: 98 MG/DL (ref 65–99)
GLUCOSE SERPL-MCNC: 104 MG/DL (ref 65–99)
HCT VFR BLD AUTO: 25.5 % (ref 37–47)
HGB BLD-MCNC: 8.2 G/DL (ref 12–16)
IMM GRANULOCYTES # BLD AUTO: 0.14 K/UL (ref 0–0.11)
IMM GRANULOCYTES NFR BLD AUTO: 1.3 % (ref 0–0.9)
LYMPHOCYTES # BLD AUTO: 1.69 K/UL (ref 1–4.8)
LYMPHOCYTES NFR BLD: 15.2 % (ref 22–41)
MAGNESIUM SERPL-MCNC: 1.8 MG/DL (ref 1.5–2.5)
MCH RBC QN AUTO: 30.1 PG (ref 27–33)
MCHC RBC AUTO-ENTMCNC: 32.2 G/DL (ref 33.6–35)
MCV RBC AUTO: 93.8 FL (ref 81.4–97.8)
MONOCYTES # BLD AUTO: 0.55 K/UL (ref 0–0.85)
MONOCYTES NFR BLD AUTO: 5 % (ref 0–13.4)
NEUTROPHILS # BLD AUTO: 8.68 K/UL (ref 2–7.15)
NEUTROPHILS NFR BLD: 78 % (ref 44–72)
NRBC # BLD AUTO: 0 K/UL
NRBC BLD-RTO: 0 /100 WBC
PLATELET # BLD AUTO: 147 K/UL (ref 164–446)
PMV BLD AUTO: 10.9 FL (ref 9–12.9)
POTASSIUM SERPL-SCNC: 4.3 MMOL/L (ref 3.6–5.5)
PROT SERPL-MCNC: 5.1 G/DL (ref 6–8.2)
RBC # BLD AUTO: 2.72 M/UL (ref 4.2–5.4)
SODIUM SERPL-SCNC: 128 MMOL/L (ref 135–145)
WBC # BLD AUTO: 11.1 K/UL (ref 4.8–10.8)

## 2020-05-01 PROCEDURE — 36573 INSJ PICC RS&I 5 YR+: CPT

## 2020-05-01 PROCEDURE — 85025 COMPLETE CBC W/AUTO DIFF WBC: CPT

## 2020-05-01 PROCEDURE — 700102 HCHG RX REV CODE 250 W/ 637 OVERRIDE(OP): Performed by: INTERNAL MEDICINE

## 2020-05-01 PROCEDURE — 700102 HCHG RX REV CODE 250 W/ 637 OVERRIDE(OP): Performed by: STUDENT IN AN ORGANIZED HEALTH CARE EDUCATION/TRAINING PROGRAM

## 2020-05-01 PROCEDURE — A9270 NON-COVERED ITEM OR SERVICE: HCPCS | Performed by: STUDENT IN AN ORGANIZED HEALTH CARE EDUCATION/TRAINING PROGRAM

## 2020-05-01 PROCEDURE — 770020 HCHG ROOM/CARE - TELE (206)

## 2020-05-01 PROCEDURE — B548ZZA ULTRASONOGRAPHY OF SUPERIOR VENA CAVA, GUIDANCE: ICD-10-PCS | Performed by: HOSPITALIST

## 2020-05-01 PROCEDURE — 83735 ASSAY OF MAGNESIUM: CPT

## 2020-05-01 PROCEDURE — 36415 COLL VENOUS BLD VENIPUNCTURE: CPT

## 2020-05-01 PROCEDURE — 99232 SBSQ HOSP IP/OBS MODERATE 35: CPT | Mod: GC,25 | Performed by: HOSPITALIST

## 2020-05-01 PROCEDURE — A9270 NON-COVERED ITEM OR SERVICE: HCPCS | Performed by: INTERNAL MEDICINE

## 2020-05-01 PROCEDURE — 80053 COMPREHEN METABOLIC PANEL: CPT

## 2020-05-01 PROCEDURE — 700111 HCHG RX REV CODE 636 W/ 250 OVERRIDE (IP): Performed by: STUDENT IN AN ORGANIZED HEALTH CARE EDUCATION/TRAINING PROGRAM

## 2020-05-01 PROCEDURE — 02HV33Z INSERTION OF INFUSION DEVICE INTO SUPERIOR VENA CAVA, PERCUTANEOUS APPROACH: ICD-10-PCS | Performed by: HOSPITALIST

## 2020-05-01 PROCEDURE — 700105 HCHG RX REV CODE 258: Performed by: STUDENT IN AN ORGANIZED HEALTH CARE EDUCATION/TRAINING PROGRAM

## 2020-05-01 PROCEDURE — 99358 PROLONG SERVICE W/O CONTACT: CPT | Performed by: HOSPITALIST

## 2020-05-01 PROCEDURE — 82962 GLUCOSE BLOOD TEST: CPT | Mod: 91

## 2020-05-01 RX ORDER — SODIUM BICARBONATE 650 MG/1
650 TABLET ORAL 2 TIMES DAILY
Qty: 60 TAB | Refills: 0 | Status: SHIPPED
Start: 2020-05-01 | End: 2020-05-04

## 2020-05-01 RX ORDER — SODIUM BICARBONATE 650 MG/1
1300 TABLET ORAL EVERY EVENING
Status: COMPLETED | OUTPATIENT
Start: 2020-05-01 | End: 2020-05-01

## 2020-05-01 RX ORDER — METOPROLOL TARTRATE 50 MG/1
50 TABLET, FILM COATED ORAL 2 TIMES DAILY
Status: DISCONTINUED | OUTPATIENT
Start: 2020-05-01 | End: 2020-05-03

## 2020-05-01 RX ORDER — DOXYCYCLINE 100 MG/1
100 CAPSULE ORAL 2 TIMES DAILY
Qty: 12 CAP | Refills: 0 | Status: SHIPPED | OUTPATIENT
Start: 2020-05-01 | End: 2020-05-07

## 2020-05-01 RX ORDER — SODIUM BICARBONATE 650 MG/1
1300 TABLET ORAL ONCE
Status: COMPLETED | OUTPATIENT
Start: 2020-05-01 | End: 2020-05-01

## 2020-05-01 RX ORDER — SODIUM BICARBONATE 650 MG/1
650 TABLET ORAL 2 TIMES DAILY
Status: DISCONTINUED | OUTPATIENT
Start: 2020-05-01 | End: 2020-05-04

## 2020-05-01 RX ORDER — INSULIN GLARGINE 100 [IU]/ML
20 INJECTION, SOLUTION SUBCUTANEOUS EVERY EVENING
Qty: 6 ML | Refills: 0 | Status: SHIPPED | OUTPATIENT
Start: 2020-05-01 | End: 2020-05-31

## 2020-05-01 RX ORDER — LANCETS 30 GAUGE
EACH MISCELLANEOUS
Qty: 100 EACH | Refills: 0 | Status: SHIPPED | OUTPATIENT
Start: 2020-05-01 | End: 2021-08-09

## 2020-05-01 RX ORDER — GLUCOSAMINE HCL/CHONDROITIN SU 500-400 MG
CAPSULE ORAL
Qty: 100 EACH | Refills: 0 | Status: SHIPPED | OUTPATIENT
Start: 2020-05-01 | End: 2021-08-09

## 2020-05-01 RX ADMIN — SODIUM BICARBONATE 1300 MG: 650 TABLET ORAL at 16:15

## 2020-05-01 RX ADMIN — CEFTRIAXONE SODIUM 2 G: 2 INJECTION, POWDER, FOR SOLUTION INTRAMUSCULAR; INTRAVENOUS at 04:31

## 2020-05-01 RX ADMIN — SODIUM BICARBONATE 650 MG: 650 TABLET ORAL at 11:30

## 2020-05-01 RX ADMIN — SODIUM BICARBONATE 650 MG: 650 TABLET ORAL at 16:13

## 2020-05-01 RX ADMIN — NICOTINE 7 MG: 7 PATCH TRANSDERMAL at 06:21

## 2020-05-01 RX ADMIN — DOXYCYCLINE 100 MG: 100 TABLET, FILM COATED ORAL at 16:14

## 2020-05-01 RX ADMIN — OMEPRAZOLE 20 MG: 20 CAPSULE, DELAYED RELEASE ORAL at 06:21

## 2020-05-01 RX ADMIN — Medication 16 G: at 16:24

## 2020-05-01 RX ADMIN — ATORVASTATIN CALCIUM 20 MG: 20 TABLET, FILM COATED ORAL at 16:14

## 2020-05-01 RX ADMIN — METOPROLOL TARTRATE 50 MG: 50 TABLET, FILM COATED ORAL at 16:14

## 2020-05-01 RX ADMIN — SODIUM BICARBONATE 1300 MG: 650 TABLET ORAL at 11:29

## 2020-05-01 RX ADMIN — METOPROLOL TARTRATE 25 MG: 25 TABLET, FILM COATED ORAL at 04:32

## 2020-05-01 RX ADMIN — FOLIC ACID 1 MG: 1 TABLET ORAL at 04:32

## 2020-05-01 RX ADMIN — Medication 100 MG: at 04:32

## 2020-05-01 RX ADMIN — DOXYCYCLINE 100 MG: 100 TABLET, FILM COATED ORAL at 04:31

## 2020-05-01 RX ADMIN — SODIUM CHLORIDE, POTASSIUM CHLORIDE, SODIUM LACTATE AND CALCIUM CHLORIDE: 600; 310; 30; 20 INJECTION, SOLUTION INTRAVENOUS at 04:31

## 2020-05-01 RX ADMIN — THERA TABS 1 TABLET: TAB at 04:32

## 2020-05-01 RX ADMIN — METOPROLOL TARTRATE 25 MG: 25 TABLET, FILM COATED ORAL at 14:25

## 2020-05-01 RX ADMIN — ACETAMINOPHEN 650 MG: 325 TABLET, FILM COATED ORAL at 16:13

## 2020-05-01 ASSESSMENT — ENCOUNTER SYMPTOMS
ABDOMINAL PAIN: 0
EYE PAIN: 0
HEADACHES: 0
FEVER: 0

## 2020-05-01 ASSESSMENT — FIBROSIS 4 INDEX: FIB4 SCORE: 1.626314225229452228

## 2020-05-01 NOTE — CARE PLAN
Problem: Communication  Goal: The ability to communicate needs accurately and effectively will improve  Outcome: PROGRESSING AS EXPECTED   Pt A&Ox4; notifies staff of needs appropriately.    Problem: Safety  Goal: Will remain free from falls  Outcome: PROGRESSING AS EXPECTED   Pt high fall risk; bed alarm in use; pt calls prior to ambulating.    Problem: Mobility  Goal: Risk for activity intolerance will decrease  Outcome: PROGRESSING SLOWER THAN EXPECTED   Pt still has gen. Weakness in BLE requiring x1 assist.

## 2020-05-01 NOTE — PROGRESS NOTES
Assumed care at 0700, bedside report received from PRUDENCE Haro. Pt is SR on the telemetry monitor. Patient is AO x 4 and is laying in bed. Initial assessment completed and orders reviewed. POC discussed with patient. Call light within reach and hourly rounding in place. No further questions at this time. Fall precautions in place.

## 2020-05-01 NOTE — DISCHARGE PLANNING
Acute Rehab Hospital/ Transitional Care Coordination  Physiatry consult completed indicating:    After additional medical improvement (nephrostomy tube/updated CT per urology, IVF NS for hyponatremia, and ongoign BP management), anticipate she will meet criteria for inpatient rehab. However, patient states that she needs to go home ASAP to take care of her children and does not want to go to IRF. She is agreeable to home health/outpatient therapies as needed. She will have good support from her  on discharge.   -Rehab Impairment Code: 16 Debility (Non Cardiac, Non Pulmonary)  -Plan for discharge home with HH when medically cleared  -If patient changes her mind and is agreeable to IRF, can reassess

## 2020-05-01 NOTE — DISCHARGE PLANNING
Hospital Care Management Discharge Planning       Anticipated Discharge Disposition:   · Home w/ HH and Home Infusion     Action:   · This RN CM spoke to Korey at [facility] ([phone]). Per Korey, Medicaid required 3 days minimum for infusion authorization so the earliest the patient will be able to discharge home will be Monday.   · This RN CM provided update to Jane Andrews, Resident.   · Dr. Chang reports that the following physician for labs will be Dr. Hakeem Wayne.  · Patient HHC is still pending due to no PCP.       Barriers to Discharge:   · Infusion insurance authorization.     Plan:    · Continue to provide support services and assistance with discharge planning as needed.       Thank you for allowing me the pleasure of participating in this patient's care coordination and discharge planning.       For further assistance please contact the RN Case Manager at [phone].

## 2020-05-01 NOTE — THERAPY
"Occupational Therapy Treatment completed with focus on ADLs, ADL transfers and patient education.  Functional Status:  Pt seen for OT session. Improving with activity tolerance. Demo'd decreased safety awareness and insight into deficits. Supine>sit with min A. LB dressing with SPV. Sit>stand with SPV and FWW. Functional ambulation with close SPV and FWW. Toilet txf with min A. Toileting with SPV. Standing grooming with close SPV. To door, then BTB with SPV, req BUE assistance to get legs into bed; reports her bed at home is lower. Req extra time for all activities. Encouraged OOB activity while in house with nsg. Spent time educating pt on benefit of post acute care and current safety concerns. Pt reports her 15yo son can assist 24/7, and 19yo dtr and \"children's father\" can both assist when not working (8hrs/day) (all live together). Pt requesting w/c for longer distances/IADLs (grocery shopping); will defer to PT for recommendation. Continues to be limited by decreased functional mobility, activity tolerance, cognition, strength, and balance which are currently affecting pt's ability to complete ADLs/IADLs at baseline. Currently recommend acute OT, and post-acute placement for additional occupational therapy services prior to discharge home. However, pt reports she will be refusing post acute transitional care. If so, recommend home health for continued occupational therapy services.      Plan of Care: Will benefit from Occupational Therapy 3 times per week  Discharge Recommendations:  Equipment Tub Transfer Bench and Will Continue to Assess for Equipment Needs. Post-acute therapy: Recommend post-acute placement for additional occupational therapy services prior to discharge home. However, pt reports she will be refusing post acute transitional care. If so, recommend home health for continued occupational therapy services.         See \"Rehab Therapy-Acute\" Patient Summary Report for complete documentation.   "

## 2020-05-01 NOTE — DISCHARGE PLANNING
Received Choice form at [phone]  Agency/Facility Name: [facility]  Referral sent per Choice form @ [phone]

## 2020-05-01 NOTE — PROGRESS NOTES
Report received from Juanis. Pt weak w/ambulating. Urology f/u pending. Pt awake in bed A&Ox4; no complaints at this time. POC discussed; all questions answered. Bed locked in lowest position; call light in reach; bed alarm in use.

## 2020-05-01 NOTE — PROCEDURES
Vascular Access Team     Date of Insertion: 5/1/2020  Arm Circumference: 22.5  Internal length: 39  External Length: hub  Vein Occupancy %: 32   Reason for PICC: antibiotic therapy   Labs: WBC 11.1, , BUN 11, Cr 0.73, GFR >60, INR 1.31 on 4/26/2020     Consents confirmed, vessel patency confirmed with ultrasound. Risks and benefits of procedure explained to patient and education regarding central line associated bloodstream infections provided. Questions answered.      PICC placed in LUE per licensed provider order with ultrasound guidance.  4 Fr, single lumen PICC placed in brachial vein after 1 attempt(s). 2 mL of 1% lidocaine injected intradermally, 21 gauge microintroducer needle and modified Seldinger technique used. 39 cm catheter inserted with good blood return. Secured at hub cm marker. Each lumen flushed without resistance with 10 mL 0.9% normal saline. PICC line secured with Biopatch and Tegaderm.     PICC tip placement location is confirmed by nurse to be in the Superior Vena Cava (SVC) utilizing 3CG technology. PICC line is appropriate for use at this time. Patient tolerated procedure well, without complications.  Patient condition relayed to unit RN or ordering physician via this post procedure note in the EMR.      Ultrasound images uploaded to PACS and viewable in the EMR - yes  Ultrasound imaged printed and placed in paper chart - no     BARD Power PICC ref # [id number], Lot # [id number], Expiration Date 12/31/2020

## 2020-05-01 NOTE — PROGRESS NOTES
Daily Progress Note:     Date of Service: 5/1/2020  Primary Team: [facility] Purple Team   Attending: MATEO Wayne M.D.   Senior Resident: Dr. Degroot  Intern: Dr. Andrews  Contact:  [phone]    Chief Complaint:   Fever    Subjective  -No acute events overnight, vitals remain stable.   -Continue on Ceftriaxone, patient will require 4 weeks total of IV antibiotics. Will place PICC today and plan for continuation of Ceftriaxone with end date of 5/25/20  -Insulin slightly decreased yesterday, will continue to monitor blood sugars  -Patient still declines rehab, wants to go home with . Referral has been placed, pending acceptance    Consultants/Specialty:  -Urology    Review of Systems:   Review of Systems   Constitutional: Negative for fever.   HENT: Negative for ear pain.    Eyes: Negative for pain.   Cardiovascular: Negative for chest pain.   Gastrointestinal: Negative for abdominal pain.   Skin: Negative for rash.   Neurological: Negative for headaches.     Objective Data:   Physical Exam:   Vitals:   Temp:  [36.2 °C (97.2 °F)-37.2 °C (98.9 °F)] 37.2 °C (98.9 °F)  Pulse:  [77-88] 88  Resp:  [14-18] 16  BP: (123-148)/(74-90) 148/87  SpO2:  [94 %-98 %] 94 %    Physical Exam  Constitutional:       General: She is not in acute distress.     Appearance: She is not toxic-appearing.   HENT:      Head: Normocephalic and atraumatic.      Mouth/Throat:      Mouth: Mucous membranes are moist.   Eyes:      General:         Right eye: No discharge.         Left eye: No discharge.   Cardiovascular:      Rate and Rhythm: Normal rate and regular rhythm.      Heart sounds: No murmur.   Pulmonary:      Effort: Pulmonary effort is normal. No respiratory distress.      Breath sounds: Normal breath sounds. No wheezing or rales.   Abdominal:      General: There is no distension.      Tenderness: There is no abdominal tenderness. There is no guarding.   Musculoskeletal:      Comments: Left nephrostomy tube draining yellow urine with  purulence   Skin:     General: Skin is warm and dry.      Capillary Refill: Capillary refill takes less than 2 seconds.      Findings: No rash.      Comments: The right first and second toe are dressed   Neurological:      General: No focal deficit present.      Mental Status: She is alert and oriented to person, place, and time.       Labs:   Recent Labs     04/29/20  0415 04/30/20 0220 05/01/20 0227   WBC 17.6* 12.7* 11.1*   RBC 2.73* 2.78* 2.72*   HEMOGLOBIN 8.2* 8.3* 8.2*   HEMATOCRIT 24.6* 27.1* 25.5*   MCV 90.1 97.5 93.8   MCH 30.0 29.9 30.1   RDW 52.3* 56.8* 55.6*   PLATELETCT 118* 113* 147*   MPV 11.0 11.1 10.9   NEUTSPOLYS 89.50* 84.10* 78.00*   LYMPHOCYTES 8.70* 10.10* 15.20*   MONOCYTES 0.90 3.50 5.00   EOSINOPHILS 0.90 0.20 0.20   BASOPHILS 0.00 0.40 0.30   RBCMORPHOLO Present  --   --      Recent Labs     04/28/20  1440 04/29/20  0415 04/30/20 0220 05/01/20 0227   SODIUM 126* 131* 129* 128*   POTASSIUM 4.7 4.4 4.2 4.3   CHLORIDE 97 103 102 102   CO2 17* 19* 17* 16*   GLUCOSE 164* 84 100* 104*   BUN 17 15 12 11   CPKTOTAL <7  --   --   --        Imaging:   CT-ABDOMEN-PELVIS WITH   Final Result      Interval improvement in left hydronephrosis with mild hydronephrosis now seen. Left nephrostomy tube is present.      Collection along the posterior right kidney measures 4 x 2.2 cm and is mildly decreased in size compared to prior. Collection in the anterior left kidney with subcapsular extension is significantly decreased in size.      Small hypodensities throughout the kidney worrisome for small abscesses      Ureteral thickening and enhancement may be infectious/inflammatory.      Cortical scarring of the right kidney.      Findings compatible with cirrhosis.      Splenic cystic lesion is again noted. Mild splenomegaly.      Small amount of ascites.      Thickened endometrial stripe if the patient is postmenopausal. Correlation with pelvic ultrasound is recommended.      Small bilateral pleural  effusions with overlying atelectasis.      Third spacing.      Cholelithiasis.      Biliary ductal dilatation is again noted.            * Septic shock due to Escherichia coli (HCC)- (present on admission)  Assessment & Plan  The patient presented with septic shock requiring pressors in the ICU. Source is due to left pyleonephritis with abscesses. She is now s/p drainage and nephrostomy tube placement 4/27. Cultures have all returned as pansensitive E. Coli.     Plan:   -Continue with IV Abx for total of 4 weeks  -Ceftriaxone with end date of 5/25/20  -PICC today for outpatient antibiotic infusions  -Case management helping to set up outpatient antibiotics  -Patient to be discharged with nephrostomy tubes in place, will followup with urology 4 weeks post discharge    Diabetic ulcer of toe (HCC)  Assessment & Plan  Ulcer of the great and second toe found on admission. Culture has grown Group B strep. Wound care evaluated the patient and dressed the wound, recommends outpatient follow-up at this time.     Plan:  -Continue with dressing changes per wound care instructions  -Doxycyline    Acute necrotizing pyelonephritis- (present on admission)  Assessment & Plan  Patient with pyelonephritis with abscess    Plan:  -Continue with IV Abx for total of 4 weeks  -Ceftriaxone with end date of 5/25/20  -PICC today for outpatient antibiotic infusions  -Case management helping to set up outpatient antibiotics  -Patient to be discharged with nephrostomy tubes in place, will followup with urology 4 weeks post discharge    Weakness of both lower extremities- (present on admission)  Assessment & Plan  On presentation. Initial concern was for epidural abscess vs. transverse myelitis, but imaging was negative. Neuro was consulted and suspect that this is more chronic in nature due to diabetic neuropathy vs alcohol related peripheral neuropathy and recommend outpatient followup. Patient declined rehab and wants HH.      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 Continues to drink 1 drink per day, wine.  History of outpatient esophageal banding.   -Stable  -Monitoring inpatient, follow up outpatinet    HOCM (hypertrophic obstructive cardiomyopathy) (HCC)- (present on admission)  Assessment & Plan  History of, seen on MRI and ECHO. Recent Echo 9/2019. EF 75% with left ventricular concentric hypertrophy, hyperdynamic LV, dilated LA, RA enlargement, mild MR.  Aorta slightly dilated but normal.    Plan:  -Goal MAP > 65 - will be cautious given HOCM  -Resumed metoprolol 25mg BID  -Holding aldactone, ACEI  -No plans to resumed ACEI due to history of HOCM    Hypertension, benign- (present on admission)  Assessment & Plan  History of poorly controlled hypertension with multiple home medications including metoprolol 50 mg BID, lisinopril 10 mg, aldactone 25 mg.     Plan:  -Continue metoprolol 50mg BID  -Holding ACEI and aldactone currently  -Likely not a candidate for ACEI due to HOCM    Refeeding syndrome  Assessment & Plan  Resolved. Poor PO intake prior to admission + h/o EtOH   - Phosp, K, Mag repleted, and now normalized    Hypomagnesemia  Assessment & Plan  Monitoring and repletion as needed    Hypophosphatemia  Assessment & Plan  Resolved. replete as needed    Normocytic anemia- (present on admission)  Assessment & Plan  Etiology unclear. Low iron but elevated ferritin 408 in keeping with possible inflammatory anemia, however ferritin can be elevated in acute infections.  - Will need repeat ferritin once infection resolves  - Monitoring inpatient    URI (upper respiratory infection)- (present on admission)  Assessment & Plan  COVID PCR negative in ER      Acute kidney injury (HCC)- (present on admission)  Assessment &  Plan  Resolved. Likely prerenal due to severe dehydration in setting of severe hyperglycemia and septic shock.   - avoid nephrotoxics and monitor    Hyponatremia- (present on admission)  Assessment & Plan  Has been stable, continue to monitor    Thrombocytopenia (HCC)- (present on admission)  Assessment & Plan  Slowly trending down. Likely due to a combination of sepsis + rehydration + cirrhosis.   - no active bleeding  - Monitoring    Elevated troponin- (present on admission)  Assessment & Plan  Slightly elevated troponin 40 likely secondary to demand ischemia, NSTEMI type II and pre-renal RUBEN. Patient is without chest pain / PND prior. Will stop trending.    Tobacco abuse- (present on admission)  Assessment & Plan  Continues to smoke less than 1 pack/day.  - NRT  - smoking cessation counseling

## 2020-05-01 NOTE — DISCHARGE PLANNING
Received Choice form at [phone]  Agency/Facility Name: [facility]   Referral sent per Choice form @ [phone]

## 2020-05-01 NOTE — DISCHARGE PLANNING
Hospital Care Management Discharge Planning       Action:   · This RN CM received VM from Mary at [facility] and returned her call regarding patient expected discharge date of tomorrow.   · Mary currently working on referral and will return this RN CM's call in approximately 20 minutes.

## 2020-05-01 NOTE — CARE PLAN
Problem: Nutritional:  Goal: Achieve adequate nutritional intake  Description: Patient will start a PO diet and consume >50% of meals   Outcome: MET  Goal: Patient to verbalize or demonstrate understanding of diet  Outcome: MET     DM diet education completed with pt via phone. Pt already has a DM booklet. Pt reports good appetite and PO intake (improved from admit). Pt agreeable to continue Boost GC supplement with meals.

## 2020-05-01 NOTE — CARE PLAN
Problem: Mobility  Goal: Risk for activity intolerance will decrease  Outcome: PROGRESSING AS EXPECTED     Problem: Communication  Goal: The ability to communicate needs accurately and effectively will improve  Outcome: PROGRESSING AS EXPECTED  Intervention: Educate patient and significant other/support system about the plan of care, procedures, treatments, medications and allow for questions  Note:   Communication board updated. All pt questions answered. No further questions at this time. Pt encouraged to voice feelings and ask questions as they arise

## 2020-05-01 NOTE — DISCHARGE PLANNING
ATTN: Case Management  RE: Referral for Home Health    Reason for referral denial: Unable to see patient in safe and timely matter. Also the patient does not have a PCP and will not establish until 05/18              Unfortunately, we are not able to accept this referral for the reason listed above. If further clarity is needed, our Transitional Care Specialists are available to discuss any barriers to service at [phone].      We look forward to collaborating with you in the future,  [facility] Team

## 2020-05-01 NOTE — DISCHARGE PLANNING
Hospital Care Management Discharge Planning Note       Anticipated Discharge Disposition:  · Home with Home Infusions and HHC.     Action:  · After multiple attempts at contacting the patient via telephone, this RN CM was able to speak to pt and discuss discharge planning.  · This RN CM spoke with with patient over the phone to obtain HHC and infusion choice.   · Per choice form, pt's preference is as follows:  · Home Health Care  · (1) [facility].  · (2) [facility].  · (3) [facility].  · Home Infusions  · (1) [facility]  · This RN CM faxed choice forms and completed infusion referral form to [facility] ([phone]).  · Fax confirmation received at [phone].     Barriers to Discharge:  · HHC acceptance and infusion authorization.     Plan:  · Follow up with CCA and treatment team.       For further assistance please contact the RN Case Manager at [phone].

## 2020-05-01 NOTE — DISCHARGE PLANNING
Received Choice form at [phone]  Agency/Facility Name: [facility]  Referral sent per Choice form @ [phone]

## 2020-05-01 NOTE — PROGRESS NOTES
"[facility]    Progress Note    Service: [facility]  Patient's Name: Rigoberto Adames  MRN: [id number]  Admit Date:4/26/2020  Today's Date: 4/28/2020   Room #: R113/00      Identification:  55 y.o. female with emphysematous pyelonephritis          Overnight-Interval events:   - none Subjective/ROS:   Patient seen and examined. Pain well controlled. Reports no fevers or chills. L NT in place and draining. WBC trending down. IR unable to drain posterior abscess  No CP/SOB/F/C     Physical Exam:  Current Vitals:   /83   Pulse 85   Temp 36.9 °C (98.5 °F) (Temporal)   Resp 16   Ht 1.702 m (5' 7.01\")   Wt 61.3 kg (135 lb 2.3 oz)   SpO2 96%   BMI 21.16 kg/m²     04/29 1900 - 05/01 0659  In: 210 [P.O.:210]  Out: 825 [Urine:825]   GEN : NAD, A&O X4   RES:  no acute respiratory distress  ABD: Soft ND, L NT in place, yellow cloudy output  :   No coley  EXT:  No edema, SCD's in place     Labs:   Lab Results   Component Value Date/Time    WBC 11.1 (H) 05/01/2020 02:27 AM    HEMATOCRIT 25.5 (L) 05/01/2020 02:27 AM    SODIUM 128 (L) 05/01/2020 02:27 AM    POTASSIUM 4.3 05/01/2020 02:27 AM    CHLORIDE 102 05/01/2020 02:27 AM    CO2 16 (L) 05/01/2020 02:27 AM    BUN 11 05/01/2020 02:27 AM    CREATININE 0.73 05/01/2020 02:27 AM    CREATININE 0.5 05/27/2006 05:10 PM    CALCIUM 7.6 (L) 05/01/2020 02:27 AM    MAGNESIUM 1.8 05/01/2020 02:27 AM        Lab Results   Component Value Date/Time    GLUCOSE 104 (H) 05/01/2020 02:27 AM    GLUCOSE 100 (H) 04/30/2020 02:20 AM    GLUCOSE 84 04/29/2020 04:15 AM    GLUCOSE 164 (H) 04/28/2020 02:40 PM          Assessment/Plan  Rigoberto Adames is a 55 y.o. female with left emphysematous pyelonephritis s/p IR perc neph tube placement    - Follow labs  - monitor I&O  - manage pain  - abx per medicine and culture directed, we recommend minimum 4 weeks IV abx given extent of renal abscess  - will go home with Nephrostomy tube  - anticipate repeat CT scan in 4 weeks time, urology will " arrange  - urology following    Carlos Alberto Gallardo PA-C  [facility] [place]

## 2020-05-02 LAB
ANION GAP SERPL CALC-SCNC: 10 MMOL/L (ref 7–16)
BASOPHILS # BLD AUTO: 0.2 % (ref 0–1.8)
BASOPHILS # BLD: 0.02 K/UL (ref 0–0.12)
BUN SERPL-MCNC: 13 MG/DL (ref 8–22)
CALCIUM SERPL-MCNC: 7.6 MG/DL (ref 8.5–10.5)
CHLORIDE SERPL-SCNC: 100 MMOL/L (ref 96–112)
CO2 SERPL-SCNC: 20 MMOL/L (ref 20–33)
CREAT SERPL-MCNC: 0.72 MG/DL (ref 0.5–1.4)
CRP SERPL HS-MCNC: 6.41 MG/DL (ref 0–0.75)
EOSINOPHIL # BLD AUTO: 0.01 K/UL (ref 0–0.51)
EOSINOPHIL NFR BLD: 0.1 % (ref 0–6.9)
ERYTHROCYTE [DISTWIDTH] IN BLOOD BY AUTOMATED COUNT: 55 FL (ref 35.9–50)
ERYTHROCYTE [SEDIMENTATION RATE] IN BLOOD BY WESTERGREN METHOD: 95 MM/HOUR (ref 0–30)
GLUCOSE BLD-MCNC: 154 MG/DL (ref 65–99)
GLUCOSE BLD-MCNC: 199 MG/DL (ref 65–99)
GLUCOSE BLD-MCNC: 207 MG/DL (ref 65–99)
GLUCOSE BLD-MCNC: 229 MG/DL (ref 65–99)
GLUCOSE BLD-MCNC: 248 MG/DL (ref 65–99)
GLUCOSE BLD-MCNC: 249 MG/DL (ref 65–99)
GLUCOSE SERPL-MCNC: 217 MG/DL (ref 65–99)
HCT VFR BLD AUTO: 23.4 % (ref 37–47)
HGB BLD-MCNC: 7.4 G/DL (ref 12–16)
IMM GRANULOCYTES # BLD AUTO: 0.09 K/UL (ref 0–0.11)
IMM GRANULOCYTES NFR BLD AUTO: 0.7 % (ref 0–0.9)
LYMPHOCYTES # BLD AUTO: 1.7 K/UL (ref 1–4.8)
LYMPHOCYTES NFR BLD: 13.6 % (ref 22–41)
MCH RBC QN AUTO: 29.6 PG (ref 27–33)
MCHC RBC AUTO-ENTMCNC: 31.6 G/DL (ref 33.6–35)
MCV RBC AUTO: 93.6 FL (ref 81.4–97.8)
MONOCYTES # BLD AUTO: 0.62 K/UL (ref 0–0.85)
MONOCYTES NFR BLD AUTO: 4.9 % (ref 0–13.4)
NEUTROPHILS # BLD AUTO: 10.1 K/UL (ref 2–7.15)
NEUTROPHILS NFR BLD: 80.5 % (ref 44–72)
NRBC # BLD AUTO: 0 K/UL
NRBC BLD-RTO: 0 /100 WBC
PLATELET # BLD AUTO: 132 K/UL (ref 164–446)
PMV BLD AUTO: 10.3 FL (ref 9–12.9)
POTASSIUM SERPL-SCNC: 4.2 MMOL/L (ref 3.6–5.5)
RBC # BLD AUTO: 2.5 M/UL (ref 4.2–5.4)
SODIUM SERPL-SCNC: 130 MMOL/L (ref 135–145)
WBC # BLD AUTO: 12.5 K/UL (ref 4.8–10.8)

## 2020-05-02 PROCEDURE — 700102 HCHG RX REV CODE 250 W/ 637 OVERRIDE(OP): Performed by: STUDENT IN AN ORGANIZED HEALTH CARE EDUCATION/TRAINING PROGRAM

## 2020-05-02 PROCEDURE — A9270 NON-COVERED ITEM OR SERVICE: HCPCS | Performed by: STUDENT IN AN ORGANIZED HEALTH CARE EDUCATION/TRAINING PROGRAM

## 2020-05-02 PROCEDURE — 11042 DBRDMT SUBQ TIS 1ST 20SQCM/<: CPT | Performed by: NURSE PRACTITIONER

## 2020-05-02 PROCEDURE — 770006 HCHG ROOM/CARE - MED/SURG/GYN SEMI*

## 2020-05-02 PROCEDURE — 85652 RBC SED RATE AUTOMATED: CPT

## 2020-05-02 PROCEDURE — 36415 COLL VENOUS BLD VENIPUNCTURE: CPT

## 2020-05-02 PROCEDURE — 99232 SBSQ HOSP IP/OBS MODERATE 35: CPT | Mod: GC | Performed by: HOSPITALIST

## 2020-05-02 PROCEDURE — 700102 HCHG RX REV CODE 250 W/ 637 OVERRIDE(OP): Performed by: INTERNAL MEDICINE

## 2020-05-02 PROCEDURE — 82962 GLUCOSE BLOOD TEST: CPT | Mod: 91

## 2020-05-02 PROCEDURE — 86140 C-REACTIVE PROTEIN: CPT

## 2020-05-02 PROCEDURE — 700105 HCHG RX REV CODE 258: Performed by: STUDENT IN AN ORGANIZED HEALTH CARE EDUCATION/TRAINING PROGRAM

## 2020-05-02 PROCEDURE — 700111 HCHG RX REV CODE 636 W/ 250 OVERRIDE (IP): Performed by: STUDENT IN AN ORGANIZED HEALTH CARE EDUCATION/TRAINING PROGRAM

## 2020-05-02 PROCEDURE — 85025 COMPLETE CBC W/AUTO DIFF WBC: CPT

## 2020-05-02 PROCEDURE — A9270 NON-COVERED ITEM OR SERVICE: HCPCS | Performed by: INTERNAL MEDICINE

## 2020-05-02 PROCEDURE — 80048 BASIC METABOLIC PNL TOTAL CA: CPT

## 2020-05-02 RX ORDER — INSULIN GLARGINE 100 [IU]/ML
10 INJECTION, SOLUTION SUBCUTANEOUS ONCE
Status: COMPLETED | OUTPATIENT
Start: 2020-05-02 | End: 2020-05-02

## 2020-05-02 RX ORDER — DEXTROSE MONOHYDRATE 25 G/50ML
50 INJECTION, SOLUTION INTRAVENOUS
Status: DISCONTINUED | OUTPATIENT
Start: 2020-05-02 | End: 2020-05-02

## 2020-05-02 RX ORDER — TRAZODONE HYDROCHLORIDE 50 MG/1
25 TABLET ORAL ONCE
Status: COMPLETED | OUTPATIENT
Start: 2020-05-02 | End: 2020-05-02

## 2020-05-02 RX ORDER — INSULIN GLARGINE 100 [IU]/ML
20 INJECTION, SOLUTION SUBCUTANEOUS EVERY EVENING
Status: DISCONTINUED | OUTPATIENT
Start: 2020-05-03 | End: 2020-05-05 | Stop reason: HOSPADM

## 2020-05-02 RX ORDER — VERAPAMIL HYDROCHLORIDE 80 MG/1
40 TABLET ORAL EVERY 8 HOURS
Status: DISCONTINUED | OUTPATIENT
Start: 2020-05-02 | End: 2020-05-03

## 2020-05-02 RX ORDER — INSULIN GLARGINE 100 [IU]/ML
10 INJECTION, SOLUTION SUBCUTANEOUS EVERY EVENING
Status: COMPLETED | OUTPATIENT
Start: 2020-05-02 | End: 2020-05-02

## 2020-05-02 RX ORDER — DEXTROSE MONOHYDRATE 25 G/50ML
50 INJECTION, SOLUTION INTRAVENOUS
Status: DISCONTINUED | OUTPATIENT
Start: 2020-05-02 | End: 2020-05-05 | Stop reason: HOSPADM

## 2020-05-02 RX ADMIN — DOXYCYCLINE 100 MG: 100 TABLET, FILM COATED ORAL at 17:16

## 2020-05-02 RX ADMIN — SENNOSIDES AND DOCUSATE SODIUM 2 TABLET: 8.6; 5 TABLET ORAL at 05:33

## 2020-05-02 RX ADMIN — THERA TABS 1 TABLET: TAB at 05:33

## 2020-05-02 RX ADMIN — ATORVASTATIN CALCIUM 20 MG: 20 TABLET, FILM COATED ORAL at 17:16

## 2020-05-02 RX ADMIN — INSULIN LISPRO 1 UNITS: 100 INJECTION, SOLUTION INTRAVENOUS; SUBCUTANEOUS at 08:05

## 2020-05-02 RX ADMIN — SODIUM BICARBONATE 650 MG: 650 TABLET ORAL at 17:16

## 2020-05-02 RX ADMIN — METOPROLOL TARTRATE 50 MG: 50 TABLET, FILM COATED ORAL at 17:16

## 2020-05-02 RX ADMIN — VERAPAMIL HYDROCHLORIDE 40 MG: 80 TABLET, FILM COATED ORAL at 15:18

## 2020-05-02 RX ADMIN — DOXYCYCLINE 100 MG: 100 TABLET, FILM COATED ORAL at 05:33

## 2020-05-02 RX ADMIN — METOPROLOL TARTRATE 50 MG: 50 TABLET, FILM COATED ORAL at 05:34

## 2020-05-02 RX ADMIN — OMEPRAZOLE 20 MG: 20 CAPSULE, DELAYED RELEASE ORAL at 05:36

## 2020-05-02 RX ADMIN — INSULIN GLARGINE 10 UNITS: 100 INJECTION, SOLUTION SUBCUTANEOUS at 09:32

## 2020-05-02 RX ADMIN — NICOTINE 7 MG: 7 PATCH TRANSDERMAL at 05:33

## 2020-05-02 RX ADMIN — VERAPAMIL HYDROCHLORIDE 40 MG: 80 TABLET, FILM COATED ORAL at 20:47

## 2020-05-02 RX ADMIN — CEFTRIAXONE SODIUM 2 G: 2 INJECTION, POWDER, FOR SOLUTION INTRAMUSCULAR; INTRAVENOUS at 05:32

## 2020-05-02 RX ADMIN — Medication 100 MG: at 05:33

## 2020-05-02 RX ADMIN — TRAZODONE HYDROCHLORIDE 25 MG: 50 TABLET ORAL at 22:35

## 2020-05-02 RX ADMIN — SODIUM BICARBONATE 650 MG: 650 TABLET ORAL at 05:33

## 2020-05-02 RX ADMIN — INSULIN LISPRO 1 UNITS: 100 INJECTION, SOLUTION INTRAVENOUS; SUBCUTANEOUS at 12:32

## 2020-05-02 RX ADMIN — FOLIC ACID 1 MG: 1 TABLET ORAL at 05:33

## 2020-05-02 RX ADMIN — INSULIN GLARGINE 10 UNITS: 100 INJECTION, SOLUTION SUBCUTANEOUS at 17:20

## 2020-05-02 ASSESSMENT — ENCOUNTER SYMPTOMS
ABDOMINAL PAIN: 0
FEVER: 0
EYE PAIN: 0
HEADACHES: 0

## 2020-05-02 NOTE — PROGRESS NOTES
Pt transferred to [facility] via wheelchair with transport. All belonging with pt, report given to PRUDENCE Duffy.

## 2020-05-02 NOTE — PROGRESS NOTES
"[facility]    Progress Note    Service: [facility] [place]  Patient's Name: Rigoberto Adames  MRN: [id number]  Admit Date:4/26/2020  Today's Date: 4/28/2020   Room #: R113/00      Identification:  55 y.o. female with emphysematous pyelonephritis          Overnight-Interval events:   - none Subjective/ROS:   Patient seen and examined. Pain well controlled. Reports no fevers or chills. L NT in place and draining well  No CP/SOB/F/C     Physical Exam:  Current Vitals:   /84   Pulse 89   Temp 36.7 °C (98 °F) (Temporal)   Resp 17   Ht 1.702 m (5' 7.01\")   Wt 67.2 kg (148 lb 2.4 oz)   SpO2 96%   BMI 23.20 kg/m²     04/30 1900 - 05/02 0659  In: 750 [P.O.:650]  Out: 1245 [Urine:1245]   GEN : NAD, A&O X4   RES:  no acute respiratory distress  ABD: Soft ND, L NT in place, yellow cloudy output  :   No coley  EXT:  No edema, SCD's in place     Labs:   Lab Results   Component Value Date/Time    WBC 12.5 (H) 05/02/2020 01:04 AM    HEMATOCRIT 23.4 (L) 05/02/2020 01:04 AM    SODIUM 130 (L) 05/02/2020 01:04 AM    POTASSIUM 4.2 05/02/2020 01:04 AM    CHLORIDE 100 05/02/2020 01:04 AM    CO2 20 05/02/2020 01:04 AM    BUN 13 05/02/2020 01:04 AM    CREATININE 0.72 05/02/2020 01:04 AM    CREATININE 0.5 05/27/2006 05:10 PM    CALCIUM 7.6 (L) 05/02/2020 01:04 AM    MAGNESIUM 1.8 05/01/2020 02:27 AM        Lab Results   Component Value Date/Time    GLUCOSE 217 (H) 05/02/2020 01:04 AM    GLUCOSE 104 (H) 05/01/2020 02:27 AM    GLUCOSE 100 (H) 04/30/2020 02:20 AM    GLUCOSE 84 04/29/2020 04:15 AM          Assessment/Plan  Rigoberto Adames is a 55 y.o. female with left emphysematous pyelonephritis s/p IR perc neph tube placement    - Follow labs & monitor I&O  - manage pain  - abx per medicine and culture directed, we recommend minimum 4 weeks IV abx given extent of renal abscess  - will go home with Nephrostomy tube  - anticipate repeat CT scan in 4 weeks time, urology will arrange  - urology following, okay with discharge when medically " cleared    Carlos Alberto Gallardo PA-C  [facility]

## 2020-05-02 NOTE — PROGRESS NOTES
Diabetes education: Met with pt this afternoon. Pt states she has used insulin pens before. She states she just stopped taking the insulin. Reviewed insulin pens and had pt practice with saline pens and practice device. Pt struggled with using the pen, partially as she needs her glasses (which she did not have). Once reviewed she did better.  Pt has had her Lantus held both yesterday (BS 95) and today (63). She has also only received insulin x 1 in two days ( lunch time today blood sugar was 181 and she received 3 unit meal bolus and 2 units for blood sugar of 181). No blood sugar ordered for HS so not sure what it was. Blood sugars this am was 133 ( meal bolus held), lunch 181 and dinner was 63. CDE tiger text to Dr. Andrews. No response yet. +No nursing note yet available.    Plan: Pt states she will need prescriptions for all her medications and supplies but has a meter. CDE to call [facility] or [facility] on Monday to confirm it is a True metrix meter she has ( could be one touch). CDE to follow up on Monday.  Pt may benefit in decreasing both long insulin and meal bolus or give specific parameters as to when to be held. She also may benefit in having finger stick done at  but without coverage. Please call [phone] if needs change.

## 2020-05-02 NOTE — PROGRESS NOTES
LIMB PRESERVATION SERVICE         Reason for LPS Consultation: Right foot      History of Present Illness: Patient is a 55 y.o. female with past medical history that includes type 2 diabetes, HTN, cataracts, and HOCM, admitted 4/26/2020 for Septic shock (HCC), Diabetic ketoacidosis (HCC).  An LPS consult has been requested for evaluation of right great toe lateral plantar aspect and right 2nd toe medial plantar aspect.  This wound started 1 year ago around May 2019, as a blister.  She has been treating the wound drying it out.      Patient was diagnosed with diabetes and was not managing it due to lost of primary care physician, hence no diabetic medication supplies since December 2019.  Patient does not check blood sugars. Has had diabetes education before from her primary.  Reports has neuropathy.  Has not had any previous foot surgeries.  Does not have orthotics.  Patient does not currently work.     Past medical history, medicines, allergies, family history, social history,    reviewed      DIAGNOSTICS this admission    Xray:  none   MRI: none             CT: none             Vascular: PEACE                  RIGHT      Waveform            Systolic BPs (mmHg)                              150           Brachial   Triphasic                                Common Femoral   Biphasic                   173           Posterior Tibial   Monophasic                 189           Dorsalis Pedis                                            Peroneal                              1.26          PEACE                                            TBI                           LEFT   Waveform        Systolic BPs (mmHg)                                            Brachial   Triphasic                                Common Femoral   Biphasic                   182           Posterior Tibial   Biphasic                   174           Dorsalis Pedis                                            Peroneal                              1.21           PEACE                                            TBI         Findings   Bilateral.    Doppler waveform of the common femoral artery is of high amplitude and    triphasic.    Ankle-brachial index is normal.    TBI index is normal.   Other: none                 Labs                   ESR:   pending   CRP:   pending     A1c:   Lab Results   Component Value Date/Time    HBA1C 17.6 (H) 04/27/2020 12:11 AM        PHYSICAL EXAMINATION:   General Appearance:  Well developed, well nourished, thin female in no acute distress    Vascular Assessment:  Pedal pulses palpable faint, cool toes  Faint but palpable bilateral DP/PT pulses.  Multiphasic tones to bilateral DP/PT pulses    Sensory Assessment  Monofilament testing  LOPS    Wound Assessment:  Right dorsal foot has full-thickness ulcer with surrounding maceration.  Wound bed appears pink with granular tissue.  No surrounding erythema, edema, or odor.  Right plantar great toe has loose dry scabbing in place.  After debridement, pink epithelial tissue with surrounding callus revealed.  No surrounding erythema, odor, edema, drainage.  Right distal plantar second toe has loose scabbing and peeling dry skin.  After debridement partial-thickness wound noted.  Wound bed pink without drainage, erythema, edema, or odor  Right lateral second toe has attached scabbing with mild surrounding erythema and edema.  Post debridement, full-thickness wound noted with pink wound bed.  Does not probe to bone and no exposed connective tissues. No active drainage, surrounding callus, or odor.      PROCEDURE  Right Dorsal foot Pre-debridement      Right Plantar 1-2 toes Pre-debridement      Right Lateral 2 toe Pre-debridement        Curette wasused to debride wound bed and periwound callus.  Excisional debridement was performed to remove devitalized tissue until healthy, bleeding tissue was visualized.   Entire surface of wounds totalling 7.39cm², debrided.  Tissue debrided into the subcutaneous  layer.  Periwound callus debrided to skin level, excising hyperkeratinized tissue.  -Bleeding controlled with manual pressure  -Wound care completed per orders, by APRN   -Aquacell silver for management of bioburden, nonadhesive foam for absorbency secured with Hypafix tape.  Change every 48 hours.    PLAN     Wound care:    -Aquacell silver for management of bioburden, nonadhesive foam for absorbency secured with Hypafix tape.  Change every 48 hours.    Labs/Imaging: ESR and CRP were not previously ordered, orders placed .     Vascular status:  Palpable DP/PT pulses bilaterally  Serial studies completed.    Surgery: None indicated at this time.    Antibiotics: Continue current ABX, patient on ceftriaxone and doxycycline, managed by hospitalist team    Weight Bearing Status: Weight bearing as tolerated     Offloading: Offloading boot  when ambulating; order placed    PT/OT Consult: involved last seen 4/30/2020    Diabetes Education: involved last seen 4/29/2020  Consultation for registered dietitian and certified diabetic educator placed patient seen by registered dietitian last 4/29/2020, and by certified diabetic educator 5/1/2020    DISCHARGE PLAN:    Disposition: Patient refusing placement to SNF for rehab and is pending discharge home with home health on 5/4/2020.    Follow-up: [facility] referral placed.       Professional collaboration: Clive FOSS and Patient

## 2020-05-02 NOTE — PROGRESS NOTES
Assumed care of pt, received bedside report from PRUDENCE Haro. Pt sitting up in bed, A/Ox4, no complaints of pain, room air. MD at bedside. Discussed POC with pt, pt verbalizes understanding, fall and safety precautions in place, no further needs at this time.

## 2020-05-02 NOTE — DISCHARGE PLANNING
Medicaid Meds-to-Beds: Discharge prescription orders listed below delivered to patient's bedside and placed in central pharmacy as home medications ID [id number] (insulin) and [id number]. RN Mary Lou notified. Patient counseled.       Rigoberto Adames   Home Medication Instructions KRISTOPHER:[id number]    Printed on:05/01/20 3302   Medication Information                      Alcohol Swabs  Wipe site with prep pad prior to injection.             Blood Glucose Meter Kit  Test blood sugar as recommended by provider. True Metrix blood glucose monitoring kit.             Blood Glucose Test Strips  Use one True Metrix strip to test blood sugar three times daily before meals.             doxycycline (MONODOX) 100 MG capsule  Take 1 Cap by mouth 2 times a day for 6 days.             insulin glargine (INSULIN GLARGINE) 100 UNIT/ML Solution Pen-injector injection  Inject 20 Units as instructed every evening for 30 days.             Insulin Pen Needle 32 G x 4 mm  Use one pen needle in pen device to inject insulin once daily.             Lancets  Use one True Metrix lancet to test blood sugar three times daily before meals.             sodium bicarbonate (SODIUM BICARBONATE) 650 MG Tab  Take 1 Tab by mouth 2 times a day for 30 days.               Soledad Genao, PharmD

## 2020-05-02 NOTE — CARE PLAN
Problem: Safety  Goal: Will remain free from injury  Outcome: PROGRESSING AS EXPECTED  Note: Pt up with 1 assist and walker. Bed alarm on and audible. Safety precautions maintained. Pt remains free of falls.     Problem: Skin Integrity  Goal: Risk for impaired skin integrity will decrease  5/2/2020 1022 by Clive Wayne R.N.  Outcome: PROGRESSING AS EXPECTED  Note: Pt independently repositions self in bed to off load pressure. Preventative Mepilex in place over sacrum. Diabetic ulcers present on right foot, wound care already working with pt.

## 2020-05-02 NOTE — PROGRESS NOTES
Assumed cares at 1000. Report received from Michael at 0922. Pt A&O x4. LUE PICC saline locked. Left PIV intact and saline locked. Left nephrostomy tube intact, dressing c/d/i. Pt on RA. Bed in lowest locked position, call light in reach. Safety precautions maintained.

## 2020-05-02 NOTE — PROGRESS NOTES
Report received from Juanis. PICC placed today. BG low today, glucose tabs given. Pt awake in bed A&Ox4; no complaints at this time. POC discussed; all questions answered. Bed locked in lowest position; call light in reach; bed alarm in use.

## 2020-05-02 NOTE — PROGRESS NOTES
· 2 RN skin check complete Mickie FOSS.   · Devices in place: None.  · Skin assessed under devices: N/A.  · Confirmed pressure ulcers found on: None.  · New potential pressure ulcers noted on: N/A. Wound consult placed? No, already working with patient. Photo uploaded? No.   · The following interventions are in place Preventive Mepilex on pt's sacrum, pt independently repositions self in bed to off load pressure.    Pt has diabetic ulcer on right third toe, scabbed, DEMETRIUS. Diabetic ulcer on medial aspect of right foot, Mepilex in place, c/d/i.  Scattered scars on BLE.

## 2020-05-02 NOTE — PROGRESS NOTES
Daily Progress Note:     Date of Service: 5/2/2020  Primary Team: [facility] Purple Team   Attending: MATEO Wayne M.D.   Senior Resident: Dr. Degroot  Intern: Dr. Andrews  Contact:  [phone]    Chief Complaint:   Fever    Subjective  -No acute events overnight, vitals remain stable  -Lantus has been held for two days. Fasting blood sugar was 220 this AM. Discussed with nursing about contacting MD prior to holding lantus. Will discontinue premeal insulin, lower sliding scale and continue Lantus QHS. Patient to get 10u lantus this am and 10u this PM due to missed dose last night.   -Continue on Ceftriaxone, patient will require 4 weeks total of IV antibiotics. Patient had PICC place, awaiting insurance approval so that she can get outpatient infusions. This will not be until Monday as they are closed on the weekends  -Patient still declines rehab, home health is being set up for her    Consultants/Specialty:  -Urology    Review of Systems:   Review of Systems   Constitutional: Negative for fever.   HENT: Negative for ear pain.    Eyes: Negative for pain.   Cardiovascular: Negative for chest pain.   Gastrointestinal: Negative for abdominal pain.   Skin: Negative for rash.   Neurological: Negative for headaches.     Objective Data:   Physical Exam:   Vitals:   Temp:  [36.7 °C (98 °F)-38.9 °C (102.1 °F)] 36.7 °C (98 °F)  Pulse:  [] 89  Resp:  [16-18] 17  BP: (132-178)/() 153/84  SpO2:  [94 %-96 %] 96 %    Physical Exam  Constitutional:       General: She is not in acute distress.     Appearance: She is not toxic-appearing.   HENT:      Head: Normocephalic and atraumatic.      Mouth/Throat:      Mouth: Mucous membranes are moist.   Eyes:      General:         Right eye: No discharge.         Left eye: No discharge.   Cardiovascular:      Rate and Rhythm: Normal rate and regular rhythm.      Heart sounds: No murmur.   Pulmonary:      Effort: Pulmonary effort is normal. No respiratory distress.      Breath  sounds: Normal breath sounds. No wheezing or rales.   Abdominal:      General: There is no distension.      Tenderness: There is no abdominal tenderness. There is no guarding.   Musculoskeletal:      Comments: Left nephrostomy tube draining yellow urine with purulence   Skin:     General: Skin is warm and dry.      Capillary Refill: Capillary refill takes less than 2 seconds.      Findings: No rash.      Comments: The right first and second toe are dressed   Neurological:      General: No focal deficit present.      Mental Status: She is alert and oriented to person, place, and time.       Labs:   Recent Labs     04/30/20 0220 05/01/20 0227 05/02/20  0104   WBC 12.7* 11.1* 12.5*   RBC 2.78* 2.72* 2.50*   HEMOGLOBIN 8.3* 8.2* 7.4*   HEMATOCRIT 27.1* 25.5* 23.4*   MCV 97.5 93.8 93.6   MCH 29.9 30.1 29.6   RDW 56.8* 55.6* 55.0*   PLATELETCT 113* 147* 132*   MPV 11.1 10.9 10.3   NEUTSPOLYS 84.10* 78.00* 80.50*   LYMPHOCYTES 10.10* 15.20* 13.60*   MONOCYTES 3.50 5.00 4.90   EOSINOPHILS 0.20 0.20 0.10   BASOPHILS 0.40 0.30 0.20     Recent Labs     04/30/20 0220 05/01/20 0227 05/02/20  0104   SODIUM 129* 128* 130*   POTASSIUM 4.2 4.3 4.2   CHLORIDE 102 102 100   CO2 17* 16* 20   GLUCOSE 100* 104* 217*   BUN 12 11 13       Imaging:   CT-ABDOMEN-PELVIS WITH   Final Result      Interval improvement in left hydronephrosis with mild hydronephrosis now seen. Left nephrostomy tube is present.      Collection along the posterior right kidney measures 4 x 2.2 cm and is mildly decreased in size compared to prior. Collection in the anterior left kidney with subcapsular extension is significantly decreased in size.      Small hypodensities throughout the kidney worrisome for small abscesses      Ureteral thickening and enhancement may be infectious/inflammatory.      Cortical scarring of the right kidney.      Findings compatible with cirrhosis.      Splenic cystic lesion is again noted. Mild splenomegaly.      Small amount of  ascites.      Thickened endometrial stripe if the patient is postmenopausal. Correlation with pelvic ultrasound is recommended.      Small bilateral pleural effusions with overlying atelectasis.      Third spacing.      Cholelithiasis.      Biliary ductal dilatation is again noted.            * Septic shock due to Escherichia coli (HCC)- (present on admission)  Assessment & Plan  The patient presented with septic shock requiring pressors in the ICU. Source is due to left pyleonephritis with abscesses. She is now s/p drainage and nephrostomy tube placement 4/27. Cultures have all returned as pansensitive E. Coli.     Plan:   -Continue with IV Abx for total of 4 weeks, end date of 5/25/20  -PICC placed 5/1/20  -Patient to be discharged with nephrostomy tubes in place, will followup with urology 4 weeks post discharge  -Awaiting insurance approval for antibiotics discharge    Diabetic ulcer of toe (HCC)  Assessment & Plan  Ulcer of the great and second toe found on admission. Culture has grown Group B strep. Wound care evaluated the patient and dressed the wound, recommends outpatient follow-up at this time.     Plan:  -Continue with dressing changes per wound care instructions  -Doxycyline    Acute necrotizing pyelonephritis- (present on admission)  Assessment & Plan  Patient with pyelonephritis with abscess. Plan as in septic shock due to E. coli    Weakness of both lower extremities- (present on admission)  Assessment & Plan  On presentation. Initial concern was for epidural abscess vs. transverse myelitis, but imaging was negative. Neuro was consulted and suspect that this is more chronic in nature due to diabetic neuropathy vs alcohol related peripheral neuropathy and recommend outpatient followup. Patient declined rehab and wants .     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 Continues to drink 1 drink per day, wine.  History of outpatient esophageal banding.   -Stable  -Monitoring inpatient, follow up outpatinet    HOCM (hypertrophic obstructive cardiomyopathy) (HCC)- (present on admission)  Assessment & Plan  History of, seen on MRI and ECHO. Recent Echo 9/2019. EF 75% with left ventricular concentric hypertrophy, hyperdynamic LV, dilated LA, RA enlargement, mild MR.  Aorta slightly dilated but normal.    Plan:  -Goal MAP > 65 - will be cautious given HOCM  -Resumed metoprolol 50mg BID  -Start verapamil today due to hypertension and because this is the second line drug in HOCM  -Holding aldactone, ACEI  -No plans to resumed ACEI due to history of HOCM    Hypertension, benign- (present on admission)  Assessment & Plan  History of poorly controlled hypertension with multiple home medications including metoprolol 50 mg BID, lisinopril 10 mg, aldactone 25 mg.     Plan:  -Continue metoprolol 50mg BID  -Holding ACEI and aldactone currently  -Likely not a candidate for ACEI due to HOCM    Refeeding syndrome  Assessment & Plan  Resolved. Poor PO intake prior to admission + h/o EtOH   - Phosp, K, Mag repleted, and now normalized    Hypomagnesemia  Assessment & Plan  Monitoring and repletion as needed    Hypophosphatemia  Assessment & Plan  Resolved. replete as needed    Normocytic anemia- (present on admission)  Assessment & Plan  Etiology unclear. Low iron but elevated ferritin 408 in keeping with possible inflammatory anemia, however ferritin can be elevated in acute infections.  - Will need repeat ferritin once infection resolves  - Monitoring inpatient    URI (upper respiratory infection)- (present on admission)  Assessment & Plan  COVID PCR negative in ER      Acute kidney injury (HCC)- (present on admission)  Assessment & Plan  Resolved. Likely prerenal due to severe dehydration in setting of severe  hyperglycemia and septic shock.   - avoid nephrotoxics and monitor    Hyponatremia- (present on admission)  Assessment & Plan  Has been stable, continue to monitor    Thrombocytopenia (HCC)- (present on admission)  Assessment & Plan  Slowly trending down. Likely due to a combination of sepsis + rehydration + cirrhosis.   - no active bleeding  - Monitoring    Elevated troponin- (present on admission)  Assessment & Plan  Slightly elevated troponin 40 likely secondary to demand ischemia, NSTEMI type II and pre-renal RUBEN. Patient is without chest pain / PND prior. Will stop trending.    Tobacco abuse- (present on admission)  Assessment & Plan  Continues to smoke less than 1 pack/day.  - NRT  - smoking cessation counseling

## 2020-05-02 NOTE — CARE PLAN
Problem: Communication  Goal: The ability to communicate needs accurately and effectively will improve  Outcome: MET   Plan of care reviewed with pt. Pt resting in bed no acute changes noted   Problem: Safety  Goal: Will remain free from injury  Outcome: MET   Bed alarm in place. Pt educated on fall precautions

## 2020-05-03 LAB
ANION GAP SERPL CALC-SCNC: 11 MMOL/L (ref 7–16)
BASOPHILS # BLD AUTO: 0.1 % (ref 0–1.8)
BASOPHILS # BLD: 0.02 K/UL (ref 0–0.12)
BUN SERPL-MCNC: 16 MG/DL (ref 8–22)
CALCIUM SERPL-MCNC: 7.8 MG/DL (ref 8.5–10.5)
CHLORIDE SERPL-SCNC: 96 MMOL/L (ref 96–112)
CO2 SERPL-SCNC: 21 MMOL/L (ref 20–33)
CREAT SERPL-MCNC: 0.67 MG/DL (ref 0.5–1.4)
EOSINOPHIL # BLD AUTO: 0.02 K/UL (ref 0–0.51)
EOSINOPHIL NFR BLD: 0.1 % (ref 0–6.9)
ERYTHROCYTE [DISTWIDTH] IN BLOOD BY AUTOMATED COUNT: 54.9 FL (ref 35.9–50)
GLUCOSE BLD-MCNC: 227 MG/DL (ref 65–99)
GLUCOSE BLD-MCNC: 244 MG/DL (ref 65–99)
GLUCOSE BLD-MCNC: 272 MG/DL (ref 65–99)
GLUCOSE SERPL-MCNC: 248 MG/DL (ref 65–99)
HCT VFR BLD AUTO: 22.4 % (ref 37–47)
HGB BLD-MCNC: 7.2 G/DL (ref 12–16)
IMM GRANULOCYTES # BLD AUTO: 0.13 K/UL (ref 0–0.11)
IMM GRANULOCYTES NFR BLD AUTO: 0.8 % (ref 0–0.9)
LYMPHOCYTES # BLD AUTO: 1.86 K/UL (ref 1–4.8)
LYMPHOCYTES NFR BLD: 12.1 % (ref 22–41)
MCH RBC QN AUTO: 30.3 PG (ref 27–33)
MCHC RBC AUTO-ENTMCNC: 32.1 G/DL (ref 33.6–35)
MCV RBC AUTO: 94.1 FL (ref 81.4–97.8)
MONOCYTES # BLD AUTO: 0.85 K/UL (ref 0–0.85)
MONOCYTES NFR BLD AUTO: 5.5 % (ref 0–13.4)
NEUTROPHILS # BLD AUTO: 12.54 K/UL (ref 2–7.15)
NEUTROPHILS NFR BLD: 81.4 % (ref 44–72)
NRBC # BLD AUTO: 0 K/UL
NRBC BLD-RTO: 0 /100 WBC
PLATELET # BLD AUTO: 168 K/UL (ref 164–446)
PMV BLD AUTO: 10.8 FL (ref 9–12.9)
POTASSIUM SERPL-SCNC: 4.3 MMOL/L (ref 3.6–5.5)
RBC # BLD AUTO: 2.38 M/UL (ref 4.2–5.4)
SODIUM SERPL-SCNC: 128 MMOL/L (ref 135–145)
WBC # BLD AUTO: 15.4 K/UL (ref 4.8–10.8)

## 2020-05-03 PROCEDURE — 700111 HCHG RX REV CODE 636 W/ 250 OVERRIDE (IP): Performed by: STUDENT IN AN ORGANIZED HEALTH CARE EDUCATION/TRAINING PROGRAM

## 2020-05-03 PROCEDURE — 700102 HCHG RX REV CODE 250 W/ 637 OVERRIDE(OP): Performed by: STUDENT IN AN ORGANIZED HEALTH CARE EDUCATION/TRAINING PROGRAM

## 2020-05-03 PROCEDURE — 85025 COMPLETE CBC W/AUTO DIFF WBC: CPT

## 2020-05-03 PROCEDURE — A9270 NON-COVERED ITEM OR SERVICE: HCPCS | Performed by: STUDENT IN AN ORGANIZED HEALTH CARE EDUCATION/TRAINING PROGRAM

## 2020-05-03 PROCEDURE — 80048 BASIC METABOLIC PNL TOTAL CA: CPT

## 2020-05-03 PROCEDURE — 82962 GLUCOSE BLOOD TEST: CPT

## 2020-05-03 PROCEDURE — 99232 SBSQ HOSP IP/OBS MODERATE 35: CPT | Mod: GC | Performed by: HOSPITALIST

## 2020-05-03 PROCEDURE — A9270 NON-COVERED ITEM OR SERVICE: HCPCS | Performed by: INTERNAL MEDICINE

## 2020-05-03 PROCEDURE — 700105 HCHG RX REV CODE 258: Performed by: STUDENT IN AN ORGANIZED HEALTH CARE EDUCATION/TRAINING PROGRAM

## 2020-05-03 PROCEDURE — 770006 HCHG ROOM/CARE - MED/SURG/GYN SEMI*

## 2020-05-03 PROCEDURE — 700102 HCHG RX REV CODE 250 W/ 637 OVERRIDE(OP): Performed by: INTERNAL MEDICINE

## 2020-05-03 RX ORDER — METOPROLOL TARTRATE 50 MG/1
100 TABLET, FILM COATED ORAL 2 TIMES DAILY
Status: DISCONTINUED | OUTPATIENT
Start: 2020-05-03 | End: 2020-05-05 | Stop reason: HOSPADM

## 2020-05-03 RX ADMIN — SODIUM BICARBONATE 650 MG: 650 TABLET ORAL at 20:23

## 2020-05-03 RX ADMIN — CEFTRIAXONE SODIUM 1 G: 1 INJECTION, POWDER, FOR SOLUTION INTRAMUSCULAR; INTRAVENOUS at 05:48

## 2020-05-03 RX ADMIN — INSULIN GLARGINE 20 UNITS: 100 INJECTION, SOLUTION SUBCUTANEOUS at 17:33

## 2020-05-03 RX ADMIN — OMEPRAZOLE 20 MG: 20 CAPSULE, DELAYED RELEASE ORAL at 05:52

## 2020-05-03 RX ADMIN — ATORVASTATIN CALCIUM 20 MG: 20 TABLET, FILM COATED ORAL at 17:34

## 2020-05-03 RX ADMIN — METOPROLOL TARTRATE 100 MG: 50 TABLET, FILM COATED ORAL at 17:34

## 2020-05-03 RX ADMIN — FOLIC ACID 1 MG: 1 TABLET ORAL at 05:52

## 2020-05-03 RX ADMIN — INSULIN LISPRO 1 UNITS: 100 INJECTION, SOLUTION INTRAVENOUS; SUBCUTANEOUS at 12:27

## 2020-05-03 RX ADMIN — INSULIN LISPRO 2 UNITS: 100 INJECTION, SOLUTION INTRAVENOUS; SUBCUTANEOUS at 17:32

## 2020-05-03 RX ADMIN — Medication 100 MG: at 05:52

## 2020-05-03 RX ADMIN — THERA TABS 1 TABLET: TAB at 05:52

## 2020-05-03 RX ADMIN — SENNOSIDES AND DOCUSATE SODIUM 2 TABLET: 8.6; 5 TABLET ORAL at 17:34

## 2020-05-03 RX ADMIN — VERAPAMIL HYDROCHLORIDE 40 MG: 80 TABLET, FILM COATED ORAL at 05:51

## 2020-05-03 RX ADMIN — NICOTINE 7 MG: 7 PATCH TRANSDERMAL at 05:55

## 2020-05-03 RX ADMIN — INSULIN LISPRO 1 UNITS: 100 INJECTION, SOLUTION INTRAVENOUS; SUBCUTANEOUS at 08:24

## 2020-05-03 RX ADMIN — SODIUM BICARBONATE 650 MG: 650 TABLET ORAL at 05:51

## 2020-05-03 RX ADMIN — METOPROLOL TARTRATE 50 MG: 50 TABLET, FILM COATED ORAL at 05:51

## 2020-05-03 ASSESSMENT — ENCOUNTER SYMPTOMS
HEADACHES: 0
EYE PAIN: 0
ABDOMINAL PAIN: 0
FEVER: 0

## 2020-05-03 NOTE — PROGRESS NOTES
Daily Progress Note:     Date of Service: 5/3/2020  Primary Team: [facility] Team   Attending: MATEO Wayne M.D.   Senior Resident: Dr. Degroot  Intern: Dr. Andrews  Contact:  [phone]    Chief Complaint:   Fever    Subjective    -No acute events overnight  -WBC 15.4  -fasting blood sugar 248 this monring.  He received 10 + 10 units lantus yesterday.  Will resume 20 units qhs tonight.  -mildly hypertensive yesterday, verapamil was added to regimen.  Will hold off, and max out metoprolol first in setting of HOCM. Avoid ACEI/diuretics.  -Continue on Ceftriaxone, patient will require 4 weeks total of IV antibiotics. Patient had PICC place, awaiting insurance approval so that she can get outpatient infusions. This will not be until Monday as they are closed on the weekends    Consultants/Specialty:  -Urology    Review of Systems:   Review of Systems   Constitutional: Negative for fever.   HENT: Negative for ear pain.    Eyes: Negative for pain.   Cardiovascular: Negative for chest pain.   Gastrointestinal: Negative for abdominal pain.   Skin: Negative for rash.   Neurological: Negative for headaches.     Objective Data:   Physical Exam:   Vitals:   Temp:  [36.6 °C (97.8 °F)-37.3 °C (99.2 °F)] 37.3 °C (99.2 °F)  Pulse:  [87-91] 91  Resp:  [17-18] 17  BP: (141-177)/() 141/80  SpO2:  [92 %-96 %] 96 %    Physical Exam  Constitutional:       General: She is not in acute distress.     Appearance: She is not toxic-appearing.   HENT:      Head: Normocephalic and atraumatic.      Mouth/Throat:      Mouth: Mucous membranes are moist.   Eyes:      General:         Right eye: No discharge.         Left eye: No discharge.   Cardiovascular:      Rate and Rhythm: Normal rate and regular rhythm.      Heart sounds: No murmur.   Pulmonary:      Effort: Pulmonary effort is normal. No respiratory distress.      Breath sounds: Normal breath sounds. No wheezing or rales.   Abdominal:      General: There is no distension.       Tenderness: There is no abdominal tenderness. There is no guarding.   Musculoskeletal:      Comments: Left nephrostomy tube draining yellow urine with purulence   Skin:     General: Skin is warm and dry.      Capillary Refill: Capillary refill takes less than 2 seconds.      Findings: No rash.      Comments: The right first and second toe are dressed   Neurological:      General: No focal deficit present.      Mental Status: She is alert and oriented to person, place, and time.       Labs:   Recent Labs     05/01/20 0227 05/02/20  0104 05/03/20  0545   WBC 11.1* 12.5* 15.4*   RBC 2.72* 2.50* 2.38*   HEMOGLOBIN 8.2* 7.4* 7.2*   HEMATOCRIT 25.5* 23.4* 22.4*   MCV 93.8 93.6 94.1   MCH 30.1 29.6 30.3   RDW 55.6* 55.0* 54.9*   PLATELETCT 147* 132* 168   MPV 10.9 10.3 10.8   NEUTSPOLYS 78.00* 80.50* 81.40*   LYMPHOCYTES 15.20* 13.60* 12.10*   MONOCYTES 5.00 4.90 5.50   EOSINOPHILS 0.20 0.10 0.10   BASOPHILS 0.30 0.20 0.10     Recent Labs     05/01/20 0227 05/02/20  0104 05/03/20  0545   SODIUM 128* 130* 128*   POTASSIUM 4.3 4.2 4.3   CHLORIDE 102 100 96   CO2 16* 20 21   GLUCOSE 104* 217* 248*   BUN 11 13 16       Imaging:   CT-ABDOMEN-PELVIS WITH   Final Result      Interval improvement in left hydronephrosis with mild hydronephrosis now seen. Left nephrostomy tube is present.      Collection along the posterior right kidney measures 4 x 2.2 cm and is mildly decreased in size compared to prior. Collection in the anterior left kidney with subcapsular extension is significantly decreased in size.      Small hypodensities throughout the kidney worrisome for small abscesses      Ureteral thickening and enhancement may be infectious/inflammatory.      Cortical scarring of the right kidney.      Findings compatible with cirrhosis.      Splenic cystic lesion is again noted. Mild splenomegaly.      Small amount of ascites.      Thickened endometrial stripe if the patient is postmenopausal. Correlation with pelvic ultrasound is  recommended.      Small bilateral pleural effusions with overlying atelectasis.      Third spacing.      Cholelithiasis.      Biliary ductal dilatation is again noted.            * Septic shock due to Escherichia coli (HCC)- (present on admission)  Assessment & Plan  The patient presented with septic shock requiring pressors in the ICU. Source is due to left pyleonephritis with abscesses. She is now s/p drainage and nephrostomy tube placement 4/27. Cultures have all returned as pansensitive E. Coli.     Plan:   -Continue with IV Abx for total of 4 weeks, end date of 5/25/20  -PICC placed 5/1/20  -Patient to be discharged with nephrostomy tubes in place, will followup with urology 4 weeks post discharge  -Awaiting insurance approval for antibiotics discharge    Diabetic ulcer of toe (HCC)  Assessment & Plan  Ulcer of the great and second toe found on admission. Culture has grown Group B strep. Wound care evaluated the patient and dressed the wound, recommends outpatient follow-up at this time.     Plan:  -Continue with dressing changes per wound care instructions  -Doxycyline    Acute necrotizing pyelonephritis- (present on admission)  Assessment & Plan  Patient with pyelonephritis with abscess. Plan as in septic shock due to E. coli    Weakness of both lower extremities- (present on admission)  Assessment & Plan  On presentation. Initial concern was for epidural abscess vs. transverse myelitis, but imaging was negative. Neuro was consulted and suspect that this is more chronic in nature due to diabetic neuropathy vs alcohol related peripheral neuropathy and recommend outpatient followup. Patient declined rehab and wants HH.     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 Continues to drink 1 drink per day,  wine.  History of outpatient esophageal banding.   -Stable  -Monitoring inpatient, follow up outpatinet    HOCM (hypertrophic obstructive cardiomyopathy) (HCC)- (present on admission)  Assessment & Plan  History of, seen on MRI and ECHO. Recent Echo 9/2019. EF 75% with left ventricular concentric hypertrophy, hyperdynamic LV, dilated LA, RA enlargement, mild MR.  Aorta slightly dilated but normal.    Plan:  -Goal MAP > 65 - will be cautious given HOCM  -metoprolol 100mg BID  -Start verapamil if still hypertensive  -Holding aldactone, ACEI  -No plans to resumed ACEI due to history of HOCM    Hypertension, benign- (present on admission)  Assessment & Plan  History of poorly controlled hypertension with multiple home medications including metoprolol 50 mg BID, lisinopril 10 mg, aldactone 25 mg.     Plan:  -metoprolol 100mg BID  -Holding ACEI and aldactone currently  -Likely not a candidate for ACEI due to HOCM    Refeeding syndrome  Assessment & Plan  Resolved. Poor PO intake prior to admission + h/o EtOH   - Phosp, K, Mag repleted, and now normalized    Hypomagnesemia  Assessment & Plan  Monitoring and repletion as needed    Hypophosphatemia  Assessment & Plan  Resolved. replete as needed    Normocytic anemia- (present on admission)  Assessment & Plan  Etiology unclear. Low iron but elevated ferritin 408 in keeping with possible inflammatory anemia, however ferritin can be elevated in acute infections.  - Will need repeat ferritin once infection resolves  - Monitoring inpatient    URI (upper respiratory infection)- (present on admission)  Assessment & Plan  COVID PCR negative in ER      Acute kidney injury (HCC)- (present on admission)  Assessment & Plan  Resolved. Likely prerenal due to severe dehydration in setting of severe hyperglycemia and septic shock.   - avoid nephrotoxics and monitor    Hyponatremia- (present on admission)  Assessment & Plan  Has been stable, continue to monitor    Thrombocytopenia (HCC)-  (present on admission)  Assessment & Plan  Slowly trending down. Likely due to a combination of sepsis + rehydration + cirrhosis.   - no active bleeding  - Monitoring    Elevated troponin- (present on admission)  Assessment & Plan  Slightly elevated troponin 40 likely secondary to demand ischemia, NSTEMI type II and pre-renal RUBEN. Patient is without chest pain / PND prior. Will stop trending.    Tobacco abuse- (present on admission)  Assessment & Plan  Continues to smoke less than 1 pack/day.  - NRT  - smoking cessation counseling

## 2020-05-03 NOTE — CARE PLAN
Problem: Safety  Goal: Will remain free from injury  Intervention: Provide assistance with mobility  Note: Pt ambulated to the bathroom with 1 assist and use of offloading boot to right foot.      Problem: Discharge Barriers/Planning  Goal: Patient's continuum of care needs will be met  Reactivated  Intervention: Assess potential discharge barriers on admission and throughout hospital stay  Note: Per SW note pt would be set up for outpatient wound clinic, IV ABX, home health, follow up with Urologist for neph tube.

## 2020-05-03 NOTE — PROGRESS NOTES
"[facility]    Progress Note    Service: [facility]  Patient's Name: Rigoberto Adames  MRN: [id number]  Admit Date:4/26/2020  Today's Date: 4/28/2020   Room #: R113/00      Identification:  55 y.o. female with emphysematous pyelonephritis          Overnight-Interval events:   - none Subjective/ROS:   Patient seen and examined. Pain well controlled. Reports no fevers or chills. L NT in place and draining well. Concerns about ambulation for discharge  No CP/SOB/F/C     Physical Exam:  Current Vitals:   /80   Pulse 91   Temp 37.3 °C (99.2 °F) (Temporal)   Resp 17   Ht 1.702 m (5' 7.01\")   Wt 67.2 kg (148 lb 2.4 oz)   SpO2 96%   BMI 23.20 kg/m²     05/01 1900 - 05/03 0659  In: 1365 [P.O.:1265]  Out: 1515 [Urine:1515]   GEN : NAD, A&O X4   RES:  no acute respiratory distress  ABD: Soft ND, L NT in place, yellow cloudy output  :   No coley  EXT:  No edema, SCD's in place     Labs:   Lab Results   Component Value Date/Time    WBC 15.4 (H) 05/03/2020 05:45 AM    HEMATOCRIT 22.4 (L) 05/03/2020 05:45 AM    SODIUM 128 (L) 05/03/2020 05:45 AM    POTASSIUM 4.3 05/03/2020 05:45 AM    CHLORIDE 96 05/03/2020 05:45 AM    CO2 21 05/03/2020 05:45 AM    BUN 16 05/03/2020 05:45 AM    CREATININE 0.67 05/03/2020 05:45 AM    CREATININE 0.5 05/27/2006 05:10 PM    CALCIUM 7.8 (L) 05/03/2020 05:45 AM    MAGNESIUM 1.8 05/01/2020 02:27 AM        Lab Results   Component Value Date/Time    GLUCOSE 248 (H) 05/03/2020 05:45 AM    GLUCOSE 217 (H) 05/02/2020 01:04 AM    GLUCOSE 104 (H) 05/01/2020 02:27 AM    GLUCOSE 100 (H) 04/30/2020 02:20 AM          Assessment/Plan  Rigoberto Adames is a 55 y.o. female with left emphysematous pyelonephritis s/p IR perc neph tube placement    - Follow labs & monitor I&O  - manage pain  - abx per medicine and culture directed, we recommend minimum 4 weeks IV abx given extent of renal abscess  - will go home with Nephrostomy tube  - anticipate repeat CT scan in 4 weeks time, urology will arrange  - " urology following, okay with discharge when medically cleared    Carlos Alberto Gallardo PA-C  [facility]

## 2020-05-03 NOTE — PROGRESS NOTES
Rec'd report from day shift RN. Assumed pt care. Assessment completed. AA&OX4. Denies pain at this time. No s/s of discomfort or distress. Have not ambulated pt at this time, off loading boot is at bedside. Dressings to right foot are CDI. Bed in lowest position, bed locked, bed alarm on for safety, treaded socks in place, RN and CNA numbers provided, call light within reach.

## 2020-05-03 NOTE — CARE PLAN
Problem: Safety  Goal: Will remain free from falls  Outcome: PROGRESSING AS EXPECTED  Intervention: Implement fall precautions  Flowsheets  Taken 5/3/2020 1135 by Gege Stevenson RJessicaNJessica  Bed Alarm: Yes - Alarm On  Taken 5/2/2020 0735 by Michael Andrews R.N.  Environmental Precautions:   Treaded Slipper Socks on Patient   Personal Belongings, Wastebasket, Call Bell etc. in Easy Reach   Transferred to Stronger Side   Report Given to Other Health Care Providers Regarding Fall Risk   Bed in Low Position   Communication Sign for Patients & Families   Mobility Assessed & Appropriate Sign Placed     Problem: Knowledge Deficit  Goal: Knowledge of disease process/condition, treatment plan, diagnostic tests, and medications will improve  Outcome: PROGRESSING AS EXPECTED  Intervention: Explain information regarding disease process/condition, treatment plan, diagnostic tests, and medications and document in education  Note: Pt updated on POC

## 2020-05-03 NOTE — PROGRESS NOTES
Pt has ambulated several times to the bathroom and has refused to use the off loading boot to right leg. Educated pt on need to use boot to prevent damage to wounds to right foot as pt could risk things like infection, further damage, amputation. Pt verbalized understanding and continued to refuse off loading boot.

## 2020-05-04 ENCOUNTER — APPOINTMENT (OUTPATIENT)
Dept: RADIOLOGY | Facility: MEDICAL CENTER | Age: 55
DRG: 871 | End: 2020-05-04
Attending: NURSE PRACTITIONER
Payer: MEDICAID

## 2020-05-04 LAB
ANION GAP SERPL CALC-SCNC: 10 MMOL/L (ref 7–16)
ANION GAP SERPL CALC-SCNC: 9 MMOL/L (ref 7–16)
BASOPHILS # BLD AUTO: 0.2 % (ref 0–1.8)
BASOPHILS # BLD: 0.03 K/UL (ref 0–0.12)
BUN SERPL-MCNC: 19 MG/DL (ref 8–22)
BUN SERPL-MCNC: 19 MG/DL (ref 8–22)
CALCIUM SERPL-MCNC: 7.9 MG/DL (ref 8.5–10.5)
CALCIUM SERPL-MCNC: 7.9 MG/DL (ref 8.5–10.5)
CHLORIDE SERPL-SCNC: 91 MMOL/L (ref 96–112)
CHLORIDE SERPL-SCNC: 92 MMOL/L (ref 96–112)
CO2 SERPL-SCNC: 21 MMOL/L (ref 20–33)
CO2 SERPL-SCNC: 25 MMOL/L (ref 20–33)
CREAT SERPL-MCNC: 0.69 MG/DL (ref 0.5–1.4)
CREAT SERPL-MCNC: 0.69 MG/DL (ref 0.5–1.4)
CRP SERPL HS-MCNC: 3.25 MG/DL (ref 0–0.75)
EOSINOPHIL # BLD AUTO: 0.02 K/UL (ref 0–0.51)
EOSINOPHIL NFR BLD: 0.1 % (ref 0–6.9)
ERYTHROCYTE [DISTWIDTH] IN BLOOD BY AUTOMATED COUNT: 54.4 FL (ref 35.9–50)
GLUCOSE BLD-MCNC: 191 MG/DL (ref 65–99)
GLUCOSE BLD-MCNC: 272 MG/DL (ref 65–99)
GLUCOSE BLD-MCNC: 273 MG/DL (ref 65–99)
GLUCOSE BLD-MCNC: 276 MG/DL (ref 65–99)
GLUCOSE SERPL-MCNC: 272 MG/DL (ref 65–99)
GLUCOSE SERPL-MCNC: 274 MG/DL (ref 65–99)
HCT VFR BLD AUTO: 24.5 % (ref 37–47)
HGB BLD-MCNC: 7.6 G/DL (ref 12–16)
IMM GRANULOCYTES # BLD AUTO: 0.13 K/UL (ref 0–0.11)
IMM GRANULOCYTES NFR BLD AUTO: 0.8 % (ref 0–0.9)
LYMPHOCYTES # BLD AUTO: 2.21 K/UL (ref 1–4.8)
LYMPHOCYTES NFR BLD: 13.8 % (ref 22–41)
MCH RBC QN AUTO: 29.6 PG (ref 27–33)
MCHC RBC AUTO-ENTMCNC: 31 G/DL (ref 33.6–35)
MCV RBC AUTO: 95.3 FL (ref 81.4–97.8)
MONOCYTES # BLD AUTO: 0.88 K/UL (ref 0–0.85)
MONOCYTES NFR BLD AUTO: 5.5 % (ref 0–13.4)
NEUTROPHILS # BLD AUTO: 12.71 K/UL (ref 2–7.15)
NEUTROPHILS NFR BLD: 79.6 % (ref 44–72)
NRBC # BLD AUTO: 0 K/UL
NRBC BLD-RTO: 0 /100 WBC
OSMOLALITY SERPL: 284 MOSM/KG H2O (ref 278–298)
OSMOLALITY UR: 477 MOSM/KG H2O (ref 300–900)
PLATELET # BLD AUTO: 205 K/UL (ref 164–446)
PMV BLD AUTO: 10.4 FL (ref 9–12.9)
POTASSIUM SERPL-SCNC: 4.4 MMOL/L (ref 3.6–5.5)
POTASSIUM SERPL-SCNC: 4.4 MMOL/L (ref 3.6–5.5)
RBC # BLD AUTO: 2.57 M/UL (ref 4.2–5.4)
SODIUM SERPL-SCNC: 122 MMOL/L (ref 135–145)
SODIUM SERPL-SCNC: 126 MMOL/L (ref 135–145)
SODIUM UR-SCNC: 130 MMOL/L
WBC # BLD AUTO: 16 K/UL (ref 4.8–10.8)

## 2020-05-04 PROCEDURE — 700111 HCHG RX REV CODE 636 W/ 250 OVERRIDE (IP): Performed by: STUDENT IN AN ORGANIZED HEALTH CARE EDUCATION/TRAINING PROGRAM

## 2020-05-04 PROCEDURE — 700102 HCHG RX REV CODE 250 W/ 637 OVERRIDE(OP): Performed by: STUDENT IN AN ORGANIZED HEALTH CARE EDUCATION/TRAINING PROGRAM

## 2020-05-04 PROCEDURE — 83930 ASSAY OF BLOOD OSMOLALITY: CPT

## 2020-05-04 PROCEDURE — A9270 NON-COVERED ITEM OR SERVICE: HCPCS | Performed by: STUDENT IN AN ORGANIZED HEALTH CARE EDUCATION/TRAINING PROGRAM

## 2020-05-04 PROCEDURE — 99233 SBSQ HOSP IP/OBS HIGH 50: CPT | Mod: GC | Performed by: HOSPITALIST

## 2020-05-04 PROCEDURE — 83935 ASSAY OF URINE OSMOLALITY: CPT

## 2020-05-04 PROCEDURE — 700105 HCHG RX REV CODE 258: Performed by: STUDENT IN AN ORGANIZED HEALTH CARE EDUCATION/TRAINING PROGRAM

## 2020-05-04 PROCEDURE — 82962 GLUCOSE BLOOD TEST: CPT | Mod: 91

## 2020-05-04 PROCEDURE — 73630 X-RAY EXAM OF FOOT: CPT | Mod: RT

## 2020-05-04 PROCEDURE — 99232 SBSQ HOSP IP/OBS MODERATE 35: CPT | Performed by: NURSE PRACTITIONER

## 2020-05-04 PROCEDURE — 80048 BASIC METABOLIC PNL TOTAL CA: CPT

## 2020-05-04 PROCEDURE — 700102 HCHG RX REV CODE 250 W/ 637 OVERRIDE(OP): Performed by: INTERNAL MEDICINE

## 2020-05-04 PROCEDURE — A9270 NON-COVERED ITEM OR SERVICE: HCPCS | Performed by: INTERNAL MEDICINE

## 2020-05-04 PROCEDURE — 85025 COMPLETE CBC W/AUTO DIFF WBC: CPT

## 2020-05-04 PROCEDURE — 86140 C-REACTIVE PROTEIN: CPT

## 2020-05-04 PROCEDURE — 84300 ASSAY OF URINE SODIUM: CPT

## 2020-05-04 PROCEDURE — 97530 THERAPEUTIC ACTIVITIES: CPT

## 2020-05-04 PROCEDURE — 770006 HCHG ROOM/CARE - MED/SURG/GYN SEMI*

## 2020-05-04 RX ORDER — DOXYCYCLINE 100 MG/1
100 TABLET ORAL EVERY 12 HOURS
Status: DISCONTINUED | OUTPATIENT
Start: 2020-05-04 | End: 2020-05-05 | Stop reason: HOSPADM

## 2020-05-04 RX ORDER — METOPROLOL TARTRATE 100 MG/1
100 TABLET ORAL 2 TIMES DAILY
Qty: 60 TAB | Refills: 0 | Status: SHIPPED | OUTPATIENT
Start: 2020-05-04 | End: 2020-05-05

## 2020-05-04 RX ADMIN — THERA TABS 1 TABLET: TAB at 05:02

## 2020-05-04 RX ADMIN — CEFTRIAXONE SODIUM 1 G: 1 INJECTION, POWDER, FOR SOLUTION INTRAMUSCULAR; INTRAVENOUS at 05:00

## 2020-05-04 RX ADMIN — SODIUM BICARBONATE 650 MG: 650 TABLET ORAL at 05:01

## 2020-05-04 RX ADMIN — INSULIN LISPRO 2 UNITS: 100 INJECTION, SOLUTION INTRAVENOUS; SUBCUTANEOUS at 12:09

## 2020-05-04 RX ADMIN — ATORVASTATIN CALCIUM 20 MG: 20 TABLET, FILM COATED ORAL at 17:32

## 2020-05-04 RX ADMIN — NICOTINE 7 MG: 7 PATCH TRANSDERMAL at 05:00

## 2020-05-04 RX ADMIN — METOPROLOL TARTRATE 100 MG: 50 TABLET, FILM COATED ORAL at 17:32

## 2020-05-04 RX ADMIN — DOXYCYCLINE 100 MG: 100 TABLET ORAL at 17:32

## 2020-05-04 RX ADMIN — OMEPRAZOLE 20 MG: 20 CAPSULE, DELAYED RELEASE ORAL at 05:02

## 2020-05-04 RX ADMIN — SENNOSIDES AND DOCUSATE SODIUM 2 TABLET: 8.6; 5 TABLET ORAL at 17:32

## 2020-05-04 RX ADMIN — INSULIN LISPRO 2 UNITS: 100 INJECTION, SOLUTION INTRAVENOUS; SUBCUTANEOUS at 17:32

## 2020-05-04 RX ADMIN — FOLIC ACID 1 MG: 1 TABLET ORAL at 05:02

## 2020-05-04 RX ADMIN — INSULIN GLARGINE 20 UNITS: 100 INJECTION, SOLUTION SUBCUTANEOUS at 20:46

## 2020-05-04 RX ADMIN — Medication 100 MG: at 05:02

## 2020-05-04 RX ADMIN — METOPROLOL TARTRATE 100 MG: 50 TABLET, FILM COATED ORAL at 05:01

## 2020-05-04 ASSESSMENT — GAIT ASSESSMENTS
DISTANCE (FEET): 20
ASSISTIVE DEVICE: FRONT WHEEL WALKER
GAIT LEVEL OF ASSIST: SUPERVISED

## 2020-05-04 ASSESSMENT — ENCOUNTER SYMPTOMS
HEADACHES: 0
FEVER: 0
EYE PAIN: 0
ABDOMINAL PAIN: 0

## 2020-05-04 ASSESSMENT — COGNITIVE AND FUNCTIONAL STATUS - GENERAL
SUGGESTED CMS G CODE MODIFIER MOBILITY: CI
CLIMB 3 TO 5 STEPS WITH RAILING: A LITTLE
MOBILITY SCORE: 23

## 2020-05-04 NOTE — PROGRESS NOTES
LIMB PRESERVATION SERVICE         Reason for LPS Consultation: Right foot      History of Present Illness: Patient is a 55 y.o. female with past medical history that includes type 2 diabetes, HTN, cataracts, and HOCM, admitted 4/26/2020 for Septic shock (HCC), Diabetic ketoacidosis (HCC).  An LPS consult has been requested for evaluation of right great toe lateral plantar aspect and right 2nd toe medial plantar aspect.  This wound started 1 year ago around May 2019, as a blister.  She has been treating the wound drying it out.      Patient was diagnosed with diabetes and was not managing it due to lost of primary care physician, hence no diabetic medication supplies since December 2019.  Patient does not check blood sugars. Has had diabetes education before from her primary.  Reports has neuropathy.  Has not had any previous foot surgeries.  Does not have orthotics.  Patient does not currently work.     Past medical history, medicines, allergies, family history, social history,    reviewed      DIAGNOSTICS this admission    Xray: DX-foot complete 3+ right 5/4/2020  IMPRESSION:        1.  No radiographic evidence of acute injury.     2. No evidence of acute osteomyelitis.       5/4/2020 09:33   Stat C-Reactive Protein 3.25 (H)          MRI: none             CT: none             Vascular: PEACE                  RIGHT      Waveform            Systolic BPs (mmHg)                              150           Brachial   Triphasic                                Common Femoral   Biphasic                   173           Posterior Tibial   Monophasic                 189           Dorsalis Pedis                                            Peroneal                              1.26          PEACE                                            TBI                           LEFT   Waveform        Systolic BPs (mmHg)                                            Brachial   Triphasic                                Common Femoral   Biphasic                    182           Posterior Tibial   Biphasic                   174           Dorsalis Pedis                                            Peroneal                              1.21          PEACE                                            TBI         Findings   Bilateral.    Doppler waveform of the common femoral artery is of high amplitude and    triphasic.    Ankle-brachial index is normal.    TBI index is normal.   Other: none                 Labs                   ESR:   Results from last 7 days   Lab Units 05/02/20  1557   SED RATE WESTERGREN 1526 mm/hour 95*      CRP:   Results from last 7 days   Lab Units 05/04/20  0933 05/02/20  1557   C REACTIVE PROTEIN 4596 mg/dL 3.25* 6.41*        A1c:   Lab Results   Component Value Date/Time    HBA1C 17.6 (H) 04/27/2020 12:11 AM        PHYSICAL EXAMINATION:   General Appearance:  Well developed,  thin female in no acute distress    Vascular Assessment:  Palpable bilateral DP/PT pulses.    Left foot warm.    Sensory Assessment  Monofilament testing  LOPS    Wound Assessment:  Right dorsal foot has full-thickness ulcer, maceration resolved.  Wound bed appears pink with no surrounding erythema, edema or odor.  Small amount of serous drainage on dressing.  Right plantar great toe has loose, dry flaky skin.  Thin but fragile skin is noted over previous ulcer site.  No surrounding erythema, odor, edema, or active drainage.  Right distal plantar toe has loose, dry flaky skin with thin but fragile skin over the previous ulcer site.  No surrounding erythema, odor, edema or active drainage  Right lateral second toe has full-thickness wound with exposed adipose tissue.  No exposed bone or connective tissue seen.  The right second toe has generalized erythema.  There is small amount of serosanguineous drainage on dressing.  No active drainage, surrounding callus or odor.    -Wound care completed per orders, by APRN   -Aquacell silver for management of bioburden, nonadhesive  foam for absorbency secured with Hypafix tape.  Change every 48 hours.    PLAN   The erythema to the patient's right second toe in addition to the exposed adipose tissue on the lateral wound raise concern for possible early osteomyelitis.  The patient had upward trending WBC count over the last 4 days.  Repeat CRP and right foot x-ray were obtained and completed.  CRP is decreased to 3.25 from 6.14 on 5/2/2020.  An x-ray is negative for osteomyelitis.  I discussed the findings with [facility] resident Dr. Andrews spoke who also stated that the patient's ceftriaxone was underdosed and that was likely contributed to elevated white count.  She is agreeable to have patient continue wound care at home with home health and have patient follow-up in outpatient wound care clinic.  Referral was placed to outpatient wound care clinic 5/2/2020.    Wound care:    -Aquacell silver for management of bioburden, nonadhesive foam for absorbency secured with Hypafix tape.  Change every 48 hours.    Labs/Imaging: Repeat CRP and x-ray were ordered and completed.  CRP is downtrending and x-rays negative for osteomyelitis.    Vascular status:  Palpable DP/PT pulses bilaterally  Serial studies completed.    Surgery: None indicated at this time.    Antibiotics: Continue current ABX managed by hospitalist team, patient to be discharged with outpatient infusion.    Weight Bearing Status: Weight bearing as tolerated     Offloading: Offloading boot  when ambulating; at bedside.   Patient noted by nursing staff to be refusing to wear boot.  APRN discussed the importance of wearing offloading boot to heal her wounds.  Patient states she is agreeable to wear the boot.    PT/OT Consult: involved last seen 4/30/2020    Diabetes Education:  -involved, last seen by registered dietitian 4/29/2020, last seen by certified diabetic educator 5/4/2020.  - Implications of loss of protective sensation (LOPS) discussed with patient- including increased risk for  amputation.  Advised to check feet at least daily, moisturize feet, and to always wear protective foot wear.   -avoid trimming own nails. See podiatrist or certified foot and nail RN  -keep blood sugars <150 for improved wound healing      DISCHARGE PLAN:    Disposition: Patient refusing placement to SNF for rehab and is pending discharge home with home health on 5/4/2020.  Patient clear for discharge from LPS standpoint.    Follow-up: [facility] Wound Clinic referral placed.       Professional collaboration: Gege FOSS, patient, Dr. Andrews

## 2020-05-04 NOTE — DISCHARGE PLANNING
Anticipated Discharge Disposition: Home w/ HH and Home Infusion     Action: This RN CM spoke to Mary at [facility], [phone]. Medicaid auth completed. Mary has been provided with prescription for home infusion for Ceftriaxone 2 gms daily. Reviewed above with Dr Andrews, resident.  Dr. Chang reports that the following physician for labs will be Dr. Hakeem Wayne.     [facility] unable to accept pt with Dr Wayne following. Pt scheduled for new PCP visit with Dr Ancelmo Moss May 6th. HH acceptance is pending. Call placed to Seng with [facility] to see if now that pt has a sooner PCP appt if they can accept pt. Seng to submit case for RN Review. Await callback.      TT DR Andrews to correct hh order to include, labs, wound care, PICC lince care, PT/OT, and nursing. Corrected HH order faxed to [facility]. Await their acceptance.      Barriers to Discharge: Await home health acceptance.      Plan: Continue to provide support services and assistance with discharge planning as needed.    UPDATE at 16:05-have left multiple messages for Seng from [facility] to see if pt has been accepted. Await callback. MARV De Guzman made aware.

## 2020-05-04 NOTE — THERAPY
"Physical Therapy Treatment completed.   Bed Mobility:  Supine to Sit: Supervised  Transfers: Sit to Stand: Supervised  Gait: Level Of Assist: Supervised with Front-Wheel Walker       Plan of Care: DC needs only.   Discharge Recommendations: Equipment: No Equipment Needed.     Pt improving her independence during mobility but is somewhat self-limiting with distance. She initially performed x10' of gait without device or boot. Encouraged pt to wear boot to R LE to protect foot, however, pt reporting boot to be unweildy which appears to be true. Pt may be better served with an offloading shoe instead given the location of her wounds. Pt was able to perform x20' of gait with FWW and boot; pt limited distance. At this time, pt does not require further acute PT intervention and appears functionally capable of return home with HHPT.      See \"Rehab Therapy-Acute\" Patient Summary Report for complete documentation.       "

## 2020-05-04 NOTE — DISCHARGE PLANNING
Anticipated Discharge Disposition:   · Home with HHC and home infusions    Action:   · Per report from [facility] referrals for HHC and home infusion ([facility]) initiated. [facility] initially declined due to lack of PCP but it was communicated that if pt was able to be scheduled with PCP at an earlier date, agency may reconsider referral. Per chart review pt is scheduled for earlier PCP appt on 05/06/2020 with Dr. Moss at 1:20 pm with [facility].   · Outreach call placed to [facility], VM left to provide updated information and requested call back.     Barriers to Discharge:   · Home Health care acceptance     Plan:   · Care coordination will continue to follow up and provide assistance with discharge plans/barriers as needed.

## 2020-05-04 NOTE — PROGRESS NOTES
Received report from Leonardo De Guzman lying in bed,no distress noted,no complaints voiced by patient,dressings intact,tubing intact and draining,she refuses to wear boot when ambulating stating it is too heavy,will continue to monitor.

## 2020-05-04 NOTE — PROGRESS NOTES
[id number]: Aox4. VSS on RA. No c/o pain. Urge and frequency incontinence at times. Continent bowel, up to the toilet SBA RW. LBM this shift. Skin per flowsheet. Safety maintained, bed alarm set. WCTM.

## 2020-05-04 NOTE — PROGRESS NOTES
Daily Progress Note:     Date of Service: 5/4/2020  Primary Team: [facility] Purple Team   Attending: MATEO Wayne M.D.   Senior Resident: Dr. Degroot  Intern: Dr. Andrews  Contact:  [phone]    Chief Complaint:   Fever    Subjective  -No acute events overnight, vitals remain stable  -Continue on Ceftriaxone, patient will require 4 weeks total of IV antibiotics. Patient had PICC place, awaiting insurance approval so that she can get outpatient infusions  -Patient still declines rehab, home health is being set up for her  -Medically stable for discharge once insurance has approved her antibiotics    Consultants/Specialty:  -Urology    Review of Systems:   Review of Systems   Constitutional: Negative for fever.   HENT: Negative for ear pain.    Eyes: Negative for pain.   Cardiovascular: Negative for chest pain.   Gastrointestinal: Negative for abdominal pain.   Skin: Negative for rash.   Neurological: Negative for headaches.     Objective Data:   Physical Exam:   Vitals:   Temp:  [37.3 °C (99.2 °F)-37.7 °C (99.9 °F)] 37.6 °C (99.6 °F)  Pulse:  [90-99] 99  Resp:  [17-18] 18  BP: (141-168)/(76-86) 158/76  SpO2:  [93 %-96 %] 93 %    Physical Exam  Constitutional:       General: She is not in acute distress.     Appearance: She is not toxic-appearing.   HENT:      Head: Normocephalic and atraumatic.      Mouth/Throat:      Mouth: Mucous membranes are moist.   Eyes:      General:         Right eye: No discharge.         Left eye: No discharge.   Cardiovascular:      Rate and Rhythm: Normal rate and regular rhythm.      Heart sounds: No murmur.   Pulmonary:      Effort: Pulmonary effort is normal. No respiratory distress.      Breath sounds: Normal breath sounds. No wheezing or rales.   Abdominal:      General: There is no distension.      Tenderness: There is no abdominal tenderness. There is no guarding.   Musculoskeletal:      Comments: Left nephrostomy tube draining yellow urine with purulence   Skin:     General: Skin  is warm and dry.      Capillary Refill: Capillary refill takes less than 2 seconds.      Findings: No rash.      Comments: The right first and second toe are dressed   Neurological:      General: No focal deficit present.      Mental Status: She is alert and oriented to person, place, and time.       Labs:   Recent Labs     05/02/20  0104 05/03/20  0545   WBC 12.5* 15.4*   RBC 2.50* 2.38*   HEMOGLOBIN 7.4* 7.2*   HEMATOCRIT 23.4* 22.4*   MCV 93.6 94.1   MCH 29.6 30.3   RDW 55.0* 54.9*   PLATELETCT 132* 168   MPV 10.3 10.8   NEUTSPOLYS 80.50* 81.40*   LYMPHOCYTES 13.60* 12.10*   MONOCYTES 4.90 5.50   EOSINOPHILS 0.10 0.10   BASOPHILS 0.20 0.10     Recent Labs     05/02/20  0104 05/03/20  0545   SODIUM 130* 128*   POTASSIUM 4.2 4.3   CHLORIDE 100 96   CO2 20 21   GLUCOSE 217* 248*   BUN 13 16       Imaging:   CT-ABDOMEN-PELVIS WITH   Final Result      Interval improvement in left hydronephrosis with mild hydronephrosis now seen. Left nephrostomy tube is present.      Collection along the posterior right kidney measures 4 x 2.2 cm and is mildly decreased in size compared to prior. Collection in the anterior left kidney with subcapsular extension is significantly decreased in size.      Small hypodensities throughout the kidney worrisome for small abscesses      Ureteral thickening and enhancement may be infectious/inflammatory.      Cortical scarring of the right kidney.      Findings compatible with cirrhosis.      Splenic cystic lesion is again noted. Mild splenomegaly.      Small amount of ascites.      Thickened endometrial stripe if the patient is postmenopausal. Correlation with pelvic ultrasound is recommended.      Small bilateral pleural effusions with overlying atelectasis.      Third spacing.      Cholelithiasis.      Biliary ductal dilatation is again noted.            * Septic shock due to Escherichia coli (HCC)- (present on admission)  Assessment & Plan  The patient presented with septic shock requiring  pressors in the ICU. Source is due to left pyleonephritis with abscesses. She is now s/p drainage and nephrostomy tube placement 4/27. Cultures have all returned as pansensitive E. Coli.     Plan:   -Continue with IV Abx for total of 4 weeks, end date of 5/25/20  -PICC placed 5/1/20  -Patient to be discharged with nephrostomy tubes in place, will followup with urology 4 weeks post discharge  -Awaiting insurance approval for antibiotics discharge    Diabetic ulcer of toe (HCC)  Assessment & Plan  Ulcer of the great and second toe found on admission. Culture has grown Group B strep. Wound care evaluated the patient and dressed the wound, recommends outpatient follow-up at this time.     Plan:  -Continue with dressing changes per wound care instructions  -Doxycyline    Acute necrotizing pyelonephritis- (present on admission)  Assessment & Plan  Patient with pyelonephritis with abscess. Plan as in septic shock due to E. coli    Weakness of both lower extremities- (present on admission)  Assessment & Plan  On presentation. Initial concern was for epidural abscess vs. transverse myelitis, but imaging was negative. Neuro was consulted and suspect that this is more chronic in nature due to diabetic neuropathy vs alcohol related peripheral neuropathy and recommend outpatient followup. Patient declined rehab and wants HH.     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 Continues to drink 1 drink per day, wine.  History of outpatient esophageal banding.   -Stable  -Monitoring inpatient, follow up outpatinet    HOCM (hypertrophic obstructive cardiomyopathy) (HCC)- (present on admission)  Assessment & Plan  History of, seen on MRI and ECHO. Recent Echo 9/2019. EF 75% with left ventricular concentric hypertrophy,  hyperdynamic LV, dilated LA, RA enlargement, mild MR.  Aorta slightly dilated but normal.    Plan:  -Goal MAP > 65 - will be cautious given HOCM  -metoprolol 100mg BID  -Start verapamil if still hypertensive  -Holding aldactone, ACEI  -No plans to resumed ACEI due to history of HOCM    Hypertension, benign- (present on admission)  Assessment & Plan  History of poorly controlled hypertension with multiple home medications including metoprolol 50 mg BID, lisinopril 10 mg, aldactone 25 mg.     Plan:  -metoprolol 100mg BID  -Holding ACEI and aldactone currently  -Likely not a candidate for ACEI due to HOCM    Refeeding syndrome  Assessment & Plan  Resolved. Poor PO intake prior to admission + h/o EtOH   - Phosp, K, Mag repleted, and now normalized    Hypomagnesemia  Assessment & Plan  Monitoring and repletion as needed    Hypophosphatemia  Assessment & Plan  Resolved. replete as needed    Normocytic anemia- (present on admission)  Assessment & Plan  Etiology unclear. Low iron but elevated ferritin 408 in keeping with possible inflammatory anemia, however ferritin can be elevated in acute infections.  - Will need repeat ferritin once infection resolves  - Monitoring inpatient    URI (upper respiratory infection)- (present on admission)  Assessment & Plan  COVID PCR negative in ER      Acute kidney injury (HCC)- (present on admission)  Assessment & Plan  Resolved. Likely prerenal due to severe dehydration in setting of severe hyperglycemia and septic shock.   - avoid nephrotoxics and monitor    Hyponatremia- (present on admission)  Assessment & Plan  Has been stable, continue to monitor    Thrombocytopenia (HCC)- (present on admission)  Assessment & Plan  Slowly trending down. Likely due to a combination of sepsis + rehydration + cirrhosis.   - no active bleeding  - Monitoring    Elevated troponin- (present on admission)  Assessment & Plan  Slightly elevated troponin 40 likely secondary to demand ischemia, NSTEMI type II  and pre-renal RUBEN. Patient is without chest pain / PND prior. Will stop trending.    Tobacco abuse- (present on admission)  Assessment & Plan  Continues to smoke less than 1 pack/day.  - NRT  - smoking cessation counseling

## 2020-05-04 NOTE — CARE PLAN
Problem: Safety  Goal: Will remain free from falls  Outcome: PROGRESSING AS EXPECTED  Intervention: Implement fall precautions  Flowsheets  Taken 5/4/2020 0929 by Gege Stevenson RJessicaNJessica  Bed Alarm: Yes - Alarm On  Taken 5/2/2020 0735 by Michael Andrews R.N.  Environmental Precautions:   Treaded Slipper Socks on Patient   Personal Belongings, Wastebasket, Call Bell etc. in Easy Reach   Transferred to Stronger Side   Report Given to Other Health Care Providers Regarding Fall Risk   Bed in Low Position   Communication Sign for Patients & Families   Mobility Assessed & Appropriate Sign Placed     Problem: Knowledge Deficit  Goal: Knowledge of disease process/condition, treatment plan, diagnostic tests, and medications will improve  Outcome: PROGRESSING AS EXPECTED  Intervention: Explain information regarding disease process/condition, treatment plan, diagnostic tests, and medications and document in education  Note: Pt updated on POC

## 2020-05-04 NOTE — PROGRESS NOTES
Stable,no adverse events during the night,no complaints voiced by patient this morning,drainage tube intact and functioning,slept thru most of the night.

## 2020-05-04 NOTE — PROGRESS NOTES
Diabetes education: Pt was seen last week please see progress note.  Plan: Pt states she will need prescriptions for all her medications and supplies but has a meter. CDE to call [facility] or [facility] on Monday to confirm it is a True metrix meter she has ( could be one touch). CDE to follow up on Monday.  Pt may benefit in decreasing both long insulin and meal bolus or give specific parameters as to when to be held. She also may benefit in having finger stick done at  but without coverage. Please call [phone] if needs change.

## 2020-05-04 NOTE — FACE TO FACE
Face to Face Supporting Documentation - Home Health    The encounter with this patient was in whole or in part the primary reason for home health admission.    Date of encounter:   Patient:                    MRN:                       YOB: 2019  Rigoberto Adames  [id number]  1965     Home health to see patient for:  Wound Care, Physical Therapy evaluation and treatment and Occupational therapy evaluation and treatment    Skilled need for:  Exacerbation of Chronic Disease State diabetic neuropathy and New Onset Medical Diagnosis Weakness of both lower extremities, diabetic foot ulcer    Skilled nursing interventions to include:  Wound Care    Homebound status evidenced by:  Needs the assistance of another person in order to leave the home. Leaving home requires a considerable and taxing effort. There is a normal inability to leave the home.    Community Physician to provide follow up care: Ancelmo Moss M.D.     Optional Interventions? No      I certify the face to face encounter for this home health care referral meets the CMS requirements and the encounter/clinical assessment with the patient was, in whole, or in part, for the medical condition(s) listed above, which is the primary reason for home health care. Based on my clinical findings: the service(s) are medically necessary, support the need for home health care, and the homebound criteria are met.  I certify that this patient has had a face to face encounter by myself.  Jane Andrews M.D. - NPI: [id number]

## 2020-05-04 NOTE — PROGRESS NOTES
[id number]: Aox4. VSS on RA. No c/o pain. Continent b/b, up to the toilet. Ix BM. Pt received education on home infusions, a phone interpretor was used to ensure teach back and understanding. Skin per flowsheet. Safety maintained, bed alarm set. WCTM.

## 2020-05-04 NOTE — PROGRESS NOTES
"[facility] [place] Progress Note    Service: [facility] [place]  Patient's Name: Rigoberto Adames  MRN: [id number]  Admit Date:4/26/2020  Today's Date: 5/4/2020   Room #: S535/01      Subjective/ROS:   Rigoberto is a 55 year old female with emphysematous pyelonephritis. She is s/p IR placement of 12f nephrostomy tube. Her neph tube is draining clear yellow urine with an output of 1075. She had a PICC line placed. She reports that she is feeling well and without pain. She states she has been tolerating an oral diet without issue. She denies nausea, vomiting, chest pain, SOB, fever, chills. Her Cr is stable at 0.69. She WBCs has trended up to 16 (15.4, 12.5). I spoke with her RN and there is concern from wound care regarding an ulcer on her foot.     Physical Exam:  Current Vitals:   BP (!) 163/81 Comment: RN notified  Pulse 89   Temp 36.9 °C (98.4 °F) (Temporal)   Resp 18   Ht 1.702 m (5' 7.01\")   Wt 67.2 kg (148 lb 2.4 oz)   SpO2 90%   BMI 23.20 kg/m²     05/02 1900 - 05/04 0659  In: 940 [P.O.:940]  Out: 1805 [Urine:1805]    GEN : NAD, A&O X4   RES:  no acute respiratory distress  ABD: Soft ND, no TTP  :   Neph tube in place draining clear yellow urine.       Labs:   Recent Labs     05/02/20 0104 05/03/20  0545 05/04/20  0933   SODIUM 130* 128* 122*   POTASSIUM 4.2 4.3 4.4   CHLORIDE 100 96 91*   CO2 20 21 21   GLUCOSE 217* 248* 272*   BUN 13 16 19   CREATININE 0.72 0.67 0.69   CALCIUM 7.6* 7.8* 7.9*     Recent Labs     05/02/20 0104 05/03/20  0545 05/04/20  0933   WBC 12.5* 15.4* 16.0*   RBC 2.50* 2.38* 2.57*   HEMOGLOBIN 7.4* 7.2* 7.6*   HEMATOCRIT 23.4* 22.4* 24.5*   MCV 93.6 94.1 95.3   MCH 29.6 30.3 29.6   MCHC 31.6* 32.1* 31.0*   RDW 55.0* 54.9* 54.4*   PLATELETCT 132* 168 205   MPV 10.3 10.8 10.4     Lab Results   Component Value Date/Time    GLUCOSE 272 (H) 05/04/2020 09:33 AM    GLUCOSE 248 (H) 05/03/2020 05:45 AM    GLUCOSE 217 (H) 05/02/2020 01:04 AM    GLUCOSE 104 (H) 05/01/2020 02:27 AM "       Assessment/Plan  Rigoberto Adames is a 55 y.o. female withy emphysematous pyelonephritis. She is s/p IR placement of 12f nephrostomy tub  -Follow labs and monitor I&O  -Continue with pain control  -ABX per medicine x4 weeks. PICC line in place  -Will be discharged with Nephrostomy tube  -Will plan for CT scan in 4 weeks time and outpatient follow up  -Urology to continue following, can discharge from urology perspective when cleared by medicine.        Lino Rushing, P.A.-C.   [address] [place] [address]   [phone]

## 2020-05-04 NOTE — DISCHARGE PLANNING
Thank you for sending updated order for HH services. After calling to verify PCP. Сергей with [facility] patient does not have any appointments with MD Isa. Please let us know who the Following PCP will be for HH services.     Thanks,   [facility]

## 2020-05-05 ENCOUNTER — PATIENT OUTREACH (OUTPATIENT)
Dept: HEALTH INFORMATION MANAGEMENT | Facility: OTHER | Age: 55
End: 2020-05-05

## 2020-05-05 VITALS
TEMPERATURE: 97.8 F | RESPIRATION RATE: 16 BRPM | WEIGHT: 148.15 LBS | OXYGEN SATURATION: 98 % | BODY MASS INDEX: 23.25 KG/M2 | DIASTOLIC BLOOD PRESSURE: 81 MMHG | HEART RATE: 75 BPM | SYSTOLIC BLOOD PRESSURE: 154 MMHG | HEIGHT: 67 IN

## 2020-05-05 LAB
ANION GAP SERPL CALC-SCNC: 7 MMOL/L (ref 7–16)
BASOPHILS # BLD AUTO: 0.1 % (ref 0–1.8)
BASOPHILS # BLD: 0.01 K/UL (ref 0–0.12)
BUN SERPL-MCNC: 21 MG/DL (ref 8–22)
CALCIUM SERPL-MCNC: 8 MG/DL (ref 8.5–10.5)
CHLORIDE SERPL-SCNC: 92 MMOL/L (ref 96–112)
CO2 SERPL-SCNC: 27 MMOL/L (ref 20–33)
CORTIS SERPL-MCNC: 14.7 UG/DL (ref 0–23)
CREAT SERPL-MCNC: 0.63 MG/DL (ref 0.5–1.4)
EOSINOPHIL # BLD AUTO: 0.03 K/UL (ref 0–0.51)
EOSINOPHIL NFR BLD: 0.3 % (ref 0–6.9)
ERYTHROCYTE [DISTWIDTH] IN BLOOD BY AUTOMATED COUNT: 54.1 FL (ref 35.9–50)
GLUCOSE BLD-MCNC: 124 MG/DL (ref 65–99)
GLUCOSE BLD-MCNC: 173 MG/DL (ref 65–99)
GLUCOSE SERPL-MCNC: 154 MG/DL (ref 65–99)
HCT VFR BLD AUTO: 22 % (ref 37–47)
HGB BLD-MCNC: 7.2 G/DL (ref 12–16)
IMM GRANULOCYTES # BLD AUTO: 0.08 K/UL (ref 0–0.11)
IMM GRANULOCYTES NFR BLD AUTO: 0.7 % (ref 0–0.9)
LYMPHOCYTES # BLD AUTO: 1.97 K/UL (ref 1–4.8)
LYMPHOCYTES NFR BLD: 17.6 % (ref 22–41)
MCH RBC QN AUTO: 30.4 PG (ref 27–33)
MCHC RBC AUTO-ENTMCNC: 32.7 G/DL (ref 33.6–35)
MCV RBC AUTO: 92.8 FL (ref 81.4–97.8)
MONOCYTES # BLD AUTO: 0.8 K/UL (ref 0–0.85)
MONOCYTES NFR BLD AUTO: 7.1 % (ref 0–13.4)
NEUTROPHILS # BLD AUTO: 8.32 K/UL (ref 2–7.15)
NEUTROPHILS NFR BLD: 74.2 % (ref 44–72)
NRBC # BLD AUTO: 0 K/UL
NRBC BLD-RTO: 0 /100 WBC
PLATELET # BLD AUTO: 220 K/UL (ref 164–446)
PMV BLD AUTO: 10.1 FL (ref 9–12.9)
POTASSIUM SERPL-SCNC: 4.2 MMOL/L (ref 3.6–5.5)
RBC # BLD AUTO: 2.37 M/UL (ref 4.2–5.4)
SODIUM SERPL-SCNC: 126 MMOL/L (ref 135–145)
WBC # BLD AUTO: 11.2 K/UL (ref 4.8–10.8)

## 2020-05-05 PROCEDURE — A9270 NON-COVERED ITEM OR SERVICE: HCPCS | Performed by: STUDENT IN AN ORGANIZED HEALTH CARE EDUCATION/TRAINING PROGRAM

## 2020-05-05 PROCEDURE — 700102 HCHG RX REV CODE 250 W/ 637 OVERRIDE(OP): Performed by: STUDENT IN AN ORGANIZED HEALTH CARE EDUCATION/TRAINING PROGRAM

## 2020-05-05 PROCEDURE — 80048 BASIC METABOLIC PNL TOTAL CA: CPT

## 2020-05-05 PROCEDURE — 82962 GLUCOSE BLOOD TEST: CPT

## 2020-05-05 PROCEDURE — 99239 HOSP IP/OBS DSCHRG MGMT >30: CPT | Mod: GC | Performed by: HOSPITALIST

## 2020-05-05 PROCEDURE — 85025 COMPLETE CBC W/AUTO DIFF WBC: CPT

## 2020-05-05 PROCEDURE — 700111 HCHG RX REV CODE 636 W/ 250 OVERRIDE (IP): Performed by: STUDENT IN AN ORGANIZED HEALTH CARE EDUCATION/TRAINING PROGRAM

## 2020-05-05 PROCEDURE — 700105 HCHG RX REV CODE 258: Performed by: STUDENT IN AN ORGANIZED HEALTH CARE EDUCATION/TRAINING PROGRAM

## 2020-05-05 PROCEDURE — 82533 TOTAL CORTISOL: CPT

## 2020-05-05 RX ORDER — OXYBUTYNIN CHLORIDE 10 MG/1
10 TABLET, EXTENDED RELEASE ORAL DAILY
Qty: 30 TAB | Refills: 0 | Status: SHIPPED | OUTPATIENT
Start: 2020-05-05 | End: 2020-06-04

## 2020-05-05 RX ORDER — METOPROLOL TARTRATE 100 MG/1
100 TABLET ORAL 2 TIMES DAILY
Qty: 60 TAB | Refills: 0 | Status: SHIPPED | OUTPATIENT
Start: 2020-05-05 | End: 2020-06-04

## 2020-05-05 RX ADMIN — DOXYCYCLINE 100 MG: 100 TABLET ORAL at 05:53

## 2020-05-05 RX ADMIN — FOLIC ACID 1 MG: 1 TABLET ORAL at 05:53

## 2020-05-05 RX ADMIN — Medication 100 MG: at 05:53

## 2020-05-05 RX ADMIN — THERA TABS 1 TABLET: TAB at 05:52

## 2020-05-05 RX ADMIN — NICOTINE 7 MG: 7 PATCH TRANSDERMAL at 05:52

## 2020-05-05 RX ADMIN — METOPROLOL TARTRATE 100 MG: 50 TABLET, FILM COATED ORAL at 05:52

## 2020-05-05 RX ADMIN — CEFTRIAXONE SODIUM 2 G: 2 INJECTION, POWDER, FOR SOLUTION INTRAMUSCULAR; INTRAVENOUS at 05:57

## 2020-05-05 ASSESSMENT — COGNITIVE AND FUNCTIONAL STATUS - GENERAL
DAILY ACTIVITIY SCORE: 22
SUGGESTED CMS G CODE MODIFIER MOBILITY: CJ
MOBILITY SCORE: 21
CLIMB 3 TO 5 STEPS WITH RAILING: A LOT
DRESSING REGULAR LOWER BODY CLOTHING: A LITTLE
WALKING IN HOSPITAL ROOM: A LITTLE
TOILETING: A LITTLE
SUGGESTED CMS G CODE MODIFIER DAILY ACTIVITY: CJ

## 2020-05-05 NOTE — DISCHARGE PLANNING
We cannot see patient until after she establishes with PCP Dr Lars Vaz at 3pm tomorrow. This appointment was confirmed with Renae at [facility]. We could see her Thursday. Will patient be able to do IV ABX herself until then? If not,  we cannot accept per my supervisor.

## 2020-05-05 NOTE — PROGRESS NOTES
Pt being discharged at this time. Instructions discussed, pt denies questions or concerns. PICC left in for abx. LUE nephrostomy tube in place, dressing changed, line flushed. Medications delivered to bed. Pt escorted off unit by staff. Pt reminded of importance of virtual doctors appointment tomorrow, pt verbalized understanding.

## 2020-05-05 NOTE — PROGRESS NOTES
"[facility] [place] Progress Note    Service: [facility] [place]  Patient's Name: Rigoberto Adames  MRN: [id number]  Admit Date:4/26/2020  Today's Date: 5/5/2020   Room #: S535/01      Subjective/ROS:   Rigoberto is a 55 year old female with emphysematous pyelonephritis. She is s/p IR placement of 12f nephrostomy tube. She reports that she is doing well today with pain very well controlled. She has a npeh tube in place draining clear yellow urine with good output. Her WBCs have decreased to 11.2 (16.0). Her Cr has reduced to 0.63 (0.69). She states she has been out of bed without issue. She is tolerating an oral diet. She denies fever, chills, chest pain, SOB, nausea, vomiting.     Physical Exam:  Current Vitals:   /81   Pulse 75   Temp 36.6 °C (97.8 °F) (Temporal)   Resp 16   Ht 1.702 m (5' 7.01\")   Wt 67.2 kg (148 lb 2.4 oz)   SpO2 98%   BMI 23.20 kg/m²     05/03 1900 - 05/05 0659  In: 590 [P.O.:490]  Out: 925 [Urine:925]    GEN : NAD, A&O X4   RES:  no acute respiratory distress  ABD: Soft, non-distended, no TTP.    :   Neph tube in place draining clear yellow urine.     Labs:   Recent Labs     05/04/20  0933 05/04/20  1255 05/05/20  0405   SODIUM 122* 126* 126*   POTASSIUM 4.4 4.4 4.2   CHLORIDE 91* 92* 92*   CO2 21 25 27   GLUCOSE 272* 274* 154*   BUN 19 19 21   CREATININE 0.69 0.69 0.63   CALCIUM 7.9* 7.9* 8.0*     Recent Labs     05/03/20  0545 05/04/20  0933 05/05/20  0405   WBC 15.4* 16.0* 11.2*   RBC 2.38* 2.57* 2.37*   HEMOGLOBIN 7.2* 7.6* 7.2*   HEMATOCRIT 22.4* 24.5* 22.0*   MCV 94.1 95.3 92.8   MCH 30.3 29.6 30.4   MCHC 32.1* 31.0* 32.7*   RDW 54.9* 54.4* 54.1*   PLATELETCT 168 205 220   MPV 10.8 10.4 10.1     Lab Results   Component Value Date/Time    GLUCOSE 154 (H) 05/05/2020 04:05 AM    GLUCOSE 274 (H) 05/04/2020 12:55 PM    GLUCOSE 272 (H) 05/04/2020 09:33 AM    GLUCOSE 248 (H) 05/03/2020 05:45 AM       Assessment/Plan  Rigoberto Adames is a 55 y.o. female withy emphysematous pyelonephritis. She is s/p IR " placement of 12f nephrostomy tube.    -ABX per medicine x4 weeks. PICC line in place.  -Continue with pain control.  -Monitor I&O  -Will plan fro CT scan in 4 weeks time and outpatient follow up.  -Patient can be discharged from Urology perspective when cleared by medicine.   -No further acute urologic care needed at this time, Urology signing off.        SYLVESTER العراقي.-C.   [address] [place] [address]   [phone]

## 2020-05-05 NOTE — DISCHARGE PLANNING
Referral is accepted as long as patient establishes with Dr Lars Vaz at 3pm tomorrow.     Thank you

## 2020-05-05 NOTE — PROGRESS NOTES
Assumed cares at 0700. Pt awake and alert during bedside shift report, no signs of distress. PICC c/d/i, saline locked. Pt on RA. A&O x4. Left nephrostomy tube in place. Bed in lowest locked position, call light in reach. Safety precautions maintained, bed alarm on and audible.    1257: MD to discontinue pt's Metformin (pt's home medication), Gabapentin (pt's home medication), and Sodium Bicarbonate (new prescription). Medications brought to pharmacy by writer, given to Hima, to have medications discarded. Pt made aware and agreeable to have medications discarded by pharmacy.

## 2020-05-05 NOTE — DISCHARGE INSTRUCTIONS
Discharge Instructions    Discharged to home by car with relative. Discharged via wheelchair, hospital escort: Yes.  Special equipment needed: Not Applicable    Be sure to schedule a follow-up appointment with your primary care doctor or any specialists as instructed.     Discharge Plan:   Diet Plan: Discussed  Activity Level: Discussed  Confirmed Follow up Appointment: Appointment Scheduled  Medication Reconciliation Updated: Yes  Influenza Vaccine Indication: Not indicated: Previously immunized this influenza season and > 8 years of age    I understand that a diet low in cholesterol, fat, and sodium is recommended for good health. Unless I have been given specific instructions below for another diet, I accept this instruction as my diet prescription.   Other diet: Diabetic    Special Instructions: None    · Is patient discharged on Warfarin / Coumadin?   No     Depression / Suicide Risk    As you are discharged from this [facility] facility, it is important to learn how to keep safe from harming yourself.    Recognize the warning signs:  · Abrupt changes in personality, positive or negative- including increase in energy   · Giving away possessions  · Change in eating patterns- significant weight changes-  positive or negative  · Change in sleeping patterns- unable to sleep or sleeping all the time   · Unwillingness or inability to communicate  · Depression  · Unusual sadness, discouragement and loneliness  · Talk of wanting to die  · Neglect of personal appearance   · Rebelliousness- reckless behavior  · Withdrawal from people/activities they love  · Confusion- inability to concentrate     If you or a loved one observes any of these behaviors or has concerns about self-harm, here's what you can do:  · Talk about it- your feelings and reasons for harming yourself  · Remove any means that you might use to hurt yourself (examples: pills, rope, extension cords, firearm)  · Get professional help from the community  (Mental Health, Substance Abuse, psychological counseling)  · Do not be alone:Call your Safe Contact- someone whom you trust who will be there for you.  · Call your local CRISIS HOTLINE 859-9791 or 677-992-5253  · Call your local Children's Mobile Crisis Response Team [place] (473) 723-1010 or www.[employer]  · Call the toll free National Suicide Prevention Hotlines   · National Suicide Prevention Lifeline 910-858-BCSK (0130)  · [employer] Hope Line Network 800-SUICIDE (235-5208)      Diabetic Ketoacidosis  Diabetic ketoacidosis is a life-threatening complication of diabetes. If it is not treated, it can cause severe dehydration and organ damage and can lead to a coma or death.  What are the causes?  This condition develops when there is not enough of the hormone insulin in the body. Insulin helps the body to break down sugar for energy. Without insulin, the body cannot break down sugar, so it breaks down fats instead. This leads to the production of acids that are called ketones. Ketones are poisonous at high levels.  This condition can be triggered by:  · Stress on the body that is brought on by an illness.  · Medicines that raise blood glucose levels.  · Not taking diabetes medicine.  What are the signs or symptoms?  Symptoms of this condition include:  · Fatigue.  · Weight loss.  · Excessive thirst.  · Light-headedness.  · Fruity or sweet-smelling breath.  · Excessive urination.  · Vision changes.  · Confusion or irritability.  · Nausea.  · Vomiting.  · Rapid breathing.  · Abdominal pain.  · Feeling flushed.  How is this diagnosed?  This condition is diagnosed based on a medical history, a physical exam, and blood tests. You may also have a urine test that checks for ketones.  How is this treated?  This condition may be treated with:  · Fluid replacement. This may be done to correct dehydration.  · Insulin injections. These may be given through the skin or through an IV tube.  · Electrolyte  replacement. Electrolytes, such as potassium and sodium, may be given in pill form or through an IV tube.  · Antibiotic medicines. These may be prescribed if your condition was caused by an infection.  Follow these instructions at home:  Eating and drinking  · Drink enough fluids to keep your urine clear or pale yellow.  · If you cannot eat, alternate between drinking fluids with sugar (such as juice) and salty fluids (such as broth or bouillon).  · If you can eat, follow your usual diet and drink sugar-free liquids, such as water.  Other Instructions   · Take insulin as directed by your health care provider. Do not skip insulin injections. Do not use  insulin.  · If your blood sugar is over 240 mg/dL, monitor your urine ketones every 4-6 hours.  · If you were prescribed an antibiotic medicine, finish all of it even if you start to feel better.  · Rest and exercise only as directed by your health care provider.  · If you get sick, call your health care provider and begin treatment quickly. Your body often needs extra insulin to fight an illness.  · Check your blood glucose levels regularly. If your blood glucose is high, drink plenty of fluids. This helps to flush out ketones.  Contact a health care provider if:  · Your blood glucose level is too high or too low.  · You have ketones in your urine.  · You have a fever.  · You cannot eat.  · You cannot tolerate fluids.  · You have been vomiting for more than 2 hours.  · You continue to have symptoms of this condition.  · You develop new symptoms.  Get help right away if:  · Your blood glucose levels continue to be high (elevated).  · Your monitor reads “high” even when you are taking insulin.  · You faint.  · You have chest pain.  · You have trouble breathing.  · You have a sudden, severe headache.  · You have sudden weakness in one arm or one leg.  · You have sudden trouble speaking or swallowing.  · You have vomiting or diarrhea that gets worse after 3  hours.  · You feel severely fatigued.  · You have trouble thinking.  · You have abdominal pain.  · You are severely dehydrated. Symptoms of severe dehydration include:  ¨ Extreme thirst.  ¨ Dry mouth.  ¨ Blue lips.  ¨ Cold hands and feet.  ¨ Rapid breathing.  This information is not intended to replace advice given to you by your health care provider. Make sure you discuss any questions you have with your health care provider.  Document Released: 12/15/2001 Document Revised: 05/25/2017 Document Reviewed: 11/25/2015  [email or web address] Interactive Patient Education © 2017 [employer]    Septic Shock  Septic shock is the final, most serious stage of the body's inflammatory response to an infection (sepsis). Sepsis happens when the chemicals that are produced by your body to fight infection cause inflammation through your entire body (systemic). This can lead to problems with your blood pressure that prevent your organs from getting the oxygen they need. Septic shock results when your organs begin to fail from the drop in blood pressure.  Infections that lead to sepsis often begin in the lungs, abdomen, urinary tract, reproductive system, or digestive system.  What are the causes?  Septic shock can result from any bacterial, fungal, or viral infection in the body. Septic shock from a viral infection is rare.  What increases the risk?  You may be more likely to develop septic shock from an infection if you:  · Are very young or very old.  · Have AIDS or another disease that weakens your body's defense system (immune system).  · Have diabetes.  · Have lymphoma or leukemia.  · Have a disease of the lungs, intestines, reproductive system, or urinary tract.  · Have been hospitalized for a long time, especially if you are using a catheter.  · Had a recent surgery or medical procedure.  · Have been taking antibiotic medicines or steroid medicines for a long time.  What are the signs or symptoms?  Signs and symptoms of septic shock  may include:  · Low blood pressure.  · A rapid heart rate or heart palpitations.  · Shortness of breath.  · Rash or discolored skin.  · A very high or very low body temperature with chills.  · Reduced urine output.  · Feeling lightheaded, weak, or confused.  · Agitation or restlessness.  How is this diagnosed?  Your health care provider can diagnose septic shock based on your symptoms and your medical history. Your health care provider will also perform a physical exam. Tests that will be done to confirm the diagnosis may include:  · Tests to check for infection by trying to grow (culture) bacteria from samples of:  ¨ Blood.  ¨ Urine.  ¨ Brain and spinal fluid (cerebrospinal fluid or CSF).  ¨ Mucus.  ¨ Wound secretions.  · Imaging tests, such as:  ¨ X-rays.  ¨ Ultrasound.  ¨ CT scans.  ¨ MRI.  How is this treated?  Septic shock is a medical emergency. Treatment takes place in a hospital, usually in the intensive care unit (ICU). You may be given antibiotics through an IV tube immediately. Other possible treatments include:  · Medicine to increase blood pressure (vasopressor medicines).  · Steroids to reduce inflammation.  · IV fluids to help with symptoms of dehydration.  · Respirators or breathing machines to support breathing.  · Insulin to control blood sugar.  · Surgery to clean wounds or remove infected or dead tissue. This is needed in some cases.  · Dialysis.  Get help right away if:  Septic shock is a serious problem that is a medical emergency. Do not wait to see if the symptoms will go away. Get medical help right away. Call your local emergency services (911 in the [place] Do not drive yourself to the hospital.   This information is not intended to replace advice given to you by your health care provider. Make sure you discuss any questions you have with your health care provider.  Document Released: 08/21/2015 Document Revised: 05/25/2017 Document Reviewed: 07/21/2015  [employer] Interactive Patient  Education © 2017 Elsevier Inc.    Diabetes, Frequently Asked Questions  WHAT IS DIABETES?  Most of the food we eat is turned into glucose (sugar). Our bodies use it for energy. The pancreas makes a hormone called insulin. It helps glucose get into the cells of our bodies. When you have diabetes, your body either does not make enough insulin or cannot use its own insulin as well as it should. This causes sugars to build up in your blood.  WHAT ARE THE SYMPTOMS OF DIABETES?  · Frequent urination.   · Excessive thirst.   · Unexplained weight loss.   · Extreme hunger.   · Blurred vision.   · Tingling or numbness in hands or feet.   · Feeling very tired much of the time.   · Dry, itchy skin.   · Sores that are slow to heal.   · Yeast infections.   WHAT ARE THE TYPES OF DIABETES?  Type 1 Diabetes   · About 10% of affected people have this type.   · Usually occurs before the age of 30.   · Usually occurs in thin to normal weight people.   Type 2 Diabetes  · About 90% of affected people have this type.   · Usually occurs after the age of 40.   · Usually occurs in overweight people.   · More likely to have:   · A family history of diabetes.   · A history of diabetes during pregnancy (gestational diabetes).   · High blood pressure.   · High cholesterol and triglycerides.   Gestational Diabetes  · Occurs in about 4% of pregnancies.   · Usually goes away after the baby is born.   · More likely to occur in women with:   · Family history of diabetes.   · Previous gestational diabetes.   · Obese.   · Over 25 years old.   WHAT IS PRE-DIABETES?  Pre-diabetes means your blood glucose is higher than normal, but lower than the diabetes range. It also means you are at risk of getting type 2 diabetes and heart disease. If you are told you have pre-diabetes, have your blood glucose checked again in 1 to 2 years.  WHAT IS THE TREATMENT FOR DIABETES?  Treatment is aimed at keeping blood glucose near normal levels at all times. Learning how  to manage this yourself is important in treating diabetes. Depending on the type of diabetes you have, your treatment will include one or more of the following:  · Monitoring your blood glucose.   · Meal planning.   · Exercise.   · Oral medicine (pills) or insulin.   CAN DIABETES BE PREVENTED?  With type 1 diabetes, prevention is more difficult, because the triggers that cause it are not yet known.  With type 2 diabetes, prevention is more likely, with lifestyle changes:  · Maintain a healthy weight.   · Eat healthy.   · Exercise.   IS THERE A CURE FOR DIABETES?  No, there is no cure for diabetes. There is a lot of research going on that is looking for a cure, and progress is being made. Diabetes can be treated and controlled. People with diabetes can manage their diabetes and lead normal, active lives.  SHOULD I BE TESTED FOR DIABETES?  If you are at least 45 years old, you should be tested for diabetes. You should be tested again every 3 years. If you are 45 or older and overweight, you may want to get tested more often. If you are younger than 45, overweight, and have one or more of the following risk factors, you should be tested:  · Family history of diabetes.   · Inactive lifestyle.   · High blood pressure.   WHAT ARE SOME OTHER SOURCES FOR INFORMATION ON DIABETES?  The following organizations may help in your search for more information on diabetes:  National Diabetes Education Program (NDEP)  Internet: http://www.ndep.nih.gov/resources  American Diabetes Association  Internet: http://www.diabetes.org   Juvenile Diabetes Foundation International  Internet: http://www.jdf.org  Document Released: 12/20/2004 Document Revised: 03/11/2013 Document Reviewed: 10/15/2010  [employer]Care® Patient Information ©2013 [employer] LLC.    Diabetes Mellitus and Food  It is important for you to manage your blood sugar (glucose) level. Your blood glucose level can be greatly affected by what you eat. Eating healthier foods in the  appropriate amounts throughout the day at about the same time each day will help you control your blood glucose level. It can also help slow or prevent worsening of your diabetes mellitus. Healthy eating may even help you improve the level of your blood pressure and reach or maintain a healthy weight.  General recommendations for healthful eating and cooking habits include:  · Eating meals and snacks regularly. Avoid going long periods of time without eating to lose weight.  · Eating a diet that consists mainly of plant-based foods, such as fruits, vegetables, nuts, legumes, and whole grains.  · Using low-heat cooking methods, such as baking, instead of high-heat cooking methods, such as deep frying.  Work with your dietitian to make sure you understand how to use the Nutrition Facts information on food labels.  How can food affect me?  Carbohydrates   Carbohydrates affect your blood glucose level more than any other type of food. Your dietitian will help you determine how many carbohydrates to eat at each meal and teach you how to count carbohydrates. Counting carbohydrates is important to keep your blood glucose at a healthy level, especially if you are using insulin or taking certain medicines for diabetes mellitus.  Alcohol   Alcohol can cause sudden decreases in blood glucose (hypoglycemia), especially if you use insulin or take certain medicines for diabetes mellitus. Hypoglycemia can be a life-threatening condition. Symptoms of hypoglycemia (sleepiness, dizziness, and disorientation) are similar to symptoms of having too much alcohol.  If your health care provider has given you approval to drink alcohol, do so in moderation and use the following guidelines:  · Women should not have more than one drink per day, and men should not have more than two drinks per day. One drink is equal to:  ¨ 12 oz of beer.  ¨ 5 oz of wine.  ¨ 1½ oz of hard liquor.  · Do not drink on an empty stomach.  · Keep yourself hydrated.  Have water, diet soda, or unsweetened iced tea.  · Regular soda, juice, and other mixers might contain a lot of carbohydrates and should be counted.  What foods are not recommended?  As you make food choices, it is important to remember that all foods are not the same. Some foods have fewer nutrients per serving than other foods, even though they might have the same number of calories or carbohydrates. It is difficult to get your body what it needs when you eat foods with fewer nutrients. Examples of foods that you should avoid that are high in calories and carbohydrates but low in nutrients include:  · Trans fats (most processed foods list trans fats on the Nutrition Facts label).  · Regular soda.  · Juice.  · Candy.  · Sweets, such as cake, pie, doughnuts, and cookies.  · Fried foods.  What foods can I eat?  Eat nutrient-rich foods, which will nourish your body and keep you healthy. The food you should eat also will depend on several factors, including:  · The calories you need.  · The medicines you take.  · Your weight.  · Your blood glucose level.  · Your blood pressure level.  · Your cholesterol level.  You should eat a variety of foods, including:  · Protein.  ¨ Lean cuts of meat.  ¨ Proteins low in saturated fats, such as fish, egg whites, and beans. Avoid processed meats.  · Fruits and vegetables.  ¨ Fruits and vegetables that may help control blood glucose levels, such as apples, mangoes, and yams.  · Dairy products.  ¨ Choose fat-free or low-fat dairy products, such as milk, yogurt, and cheese.  · Grains, bread, pasta, and rice.  ¨ Choose whole grain products, such as multigrain bread, whole oats, and brown rice. These foods may help control blood pressure.  · Fats.  ¨ Foods containing healthful fats, such as nuts, avocado, olive oil, canola oil, and fish.  Does everyone with diabetes mellitus have the same meal plan?  Because every person with diabetes mellitus is different, there is not one meal plan  that works for everyone. It is very important that you meet with a dietitian who will help you create a meal plan that is just right for you.  This information is not intended to replace advice given to you by your health care provider. Make sure you discuss any questions you have with your health care provider.  Document Released: 09/14/2006 Document Revised: 05/25/2017 Document Reviewed: 11/14/2014  [employer] Interactive Patient Education © 2017 [employer]      Hyperglycemia  Hyperglycemia is when the sugar (glucose) level in your blood is too high. It may not cause symptoms. If you do have symptoms, they may include warning signs, such as:  · Feeling more thirsty than normal.  · Hunger.  · Feeling tired.  · Needing to pee (urinate) more than normal.  · Blurry eyesight (vision).  You may get other symptoms as it gets worse, such as:  · Dry mouth.  · Not being hungry (loss of appetite).  · Fruity-smelling breath.  · Weakness.  · Weight gain or loss that is not planned. Weight loss may be fast.  · A tingling or numb feeling in your hands or feet.  · Headache.  · Skin that does not bounce back quickly when it is lightly pinched and released (poor skin turgor).  · Pain in your belly (abdomen).  · Cuts or bruises that heal slowly.  High blood sugar can happen to people who do or do not have diabetes. High blood sugar can happen slowly or quickly, and it can be an emergency.  Follow these instructions at home:  General instructions  · Take over-the-counter and prescription medicines only as told by your doctor.  · Do not use products that contain nicotine or tobacco, such as cigarettes and e-cigarettes. If you need help quitting, ask your doctor.  · Limit alcohol intake to no more than 1 drink per day for nonpregnant women and 2 drinks per day for men. One drink equals 12 oz of beer, 5 oz of wine, or 1½ oz of hard liquor.  · Manage stress. If you need help with this, ask your doctor.  · Keep all follow-up visits as  told by your doctor. This is important.  Eating and drinking  · Stay at a healthy weight.  · Exercise regularly, as told by your doctor.  · Drink enough fluid, especially when you:  ¨ Exercise.  ¨ Get sick.  ¨ Are in hot temperatures.  · Eat healthy foods, such as:  ¨ Low-fat (lean) proteins.  ¨ Complex carbs (complex carbohydrates), such as whole wheat bread or brown rice.  ¨ Fresh fruits and vegetables.  ¨ Low-fat dairy products.  ¨ Healthy fats.  · Drink enough fluid to keep your pee (urine) clear or pale yellow.  If you have diabetes:  · Make sure you know the symptoms of hyperglycemia.  · Follow your diabetes management plan, as told by your doctor. Make sure you:  ¨ Take insulin and medicines as told.  ¨ Follow your exercise plan.  ¨ Follow your meal plan. Eat on time. Do not skip meals.  ¨ Check your blood sugar as often as told. Make sure to check before and after exercise. If you exercise longer or in a different way than you normally do, check your blood sugar more often.  ¨ Follow your sick day plan whenever you cannot eat or drink normally. Make this plan ahead of time with your doctor.  · Share your diabetes management plan with people in your workplace, school, and household.  · Check your urine for ketones when you are ill and as told by your doctor.  · Carry a card or wear jewelry that says that you have diabetes.  Contact a doctor if:  · Your blood sugar level is higher than 240 mg/dL (13.3 mmol/L) for 2 days in a row.  · You have problems keeping your blood sugar in your target range.  · High blood sugar happens often for you.  Get help right away if:  · You have trouble breathing.  · You have a change in how you think, feel, or act (mental status).  · You feel sick to your stomach (nauseous), and that feeling does not go away.  · You cannot stop throwing up (vomiting).  These symptoms may be an emergency. Do not wait to see if the symptoms will go away. Get medical help right away. Call your local  emergency services (911 in the [place] Do not drive yourself to the hospital.   Summary  · Hyperglycemia is when the sugar (glucose) level in your blood is too high.  · High blood sugar can happen to people who do or do not have diabetes.  · Make sure you drink enough fluids, eat healthy foods, and exercise regularly.  · Contact your doctor if you have problems keeping your blood sugar in your target range.  This information is not intended to replace advice given to you by your health care provider. Make sure you discuss any questions you have with your health care provider.  Document Released: 10/15/2010 Document Revised: 09/04/2017 Document Reviewed: 09/04/2017  [employer] Interactive Patient Education © 2017 [employer]      Hypoglycemia   Hypoglycemia is when the sugar (glucose) level in the blood is too low. Symptoms of low blood sugar may include:  · Feeling:  ¨ Hungry.  ¨ Worried or nervous (anxious).  ¨ Sweaty and clammy.  ¨ Confused.  ¨ Dizzy.  ¨ Sleepy.  ¨ Sick to your stomach (nauseous).  · Having:  ¨ A fast heartbeat.  ¨ A headache.  ¨ A change in your vision.  ¨ Jerky movements that you cannot control (seizure).  ¨ Nightmares.  ¨ Tingling or no feeling (numbness) around the mouth, lips, or tongue.  · Having trouble with:  ¨ Talking.  ¨ Paying attention (concentrating).  ¨ Moving (coordination).  ¨ Sleeping.  · Shaking.  · Passing out (fainting).  · Getting upset easily (irritability).  Low blood sugar can happen to people who have diabetes and people who do not have diabetes. Low blood sugar can happen quickly, and it can be an emergency.  Treating Low Blood Sugar   Low blood sugar is often treated by eating or drinking something sugary right away. If you can think clearly and swallow safely, follow the 15:15 rule:  · Take 15 grams of a fast-acting carb (carbohydrate). Some fast-acting carbs are:  ¨ 1 tube of glucose gel.  ¨ 3 sugar tablets (glucose pills).  ¨ 6-8 pieces of hard candy.  ¨ 4 oz (120 mL)  of fruit juice.  ¨ 4 oz (120 mL) of regular (not diet) soda.  · Check your blood sugar 15 minutes after you take the carb.  · If your blood sugar is still at or below 70 mg/dL (3.9 mmol/L), take 15 grams of a carb again.  · If your blood sugar does not go above 70 mg/dL (3.9 mmol/L) after 3 tries, get help right away.  · After your blood sugar goes back to normal, eat a meal or a snack within 1 hour.  Treating Very Low Blood Sugar   If your blood sugar is at or below 54 mg/dL (3 mmol/L), you have very low blood sugar (severe hypoglycemia). This is an emergency. Do not wait to see if the symptoms will go away. Get medical help right away. Call your local emergency services (911 in the [place] Do not drive yourself to the hospital.  If you have very low blood sugar and you cannot eat or drink, you may need a glucagon shot (injection). A family member or friend should learn how to check your blood sugar and how to give you a glucagon shot. Ask your doctor if you need to have a glucagon shot kit at home.  Follow these instructions at home:  General instructions  · Avoid any diets that cause you to not eat enough food. Talk with your doctor before you start any new diet.  · Take over-the-counter and prescription medicines only as told by your doctor.  · Limit alcohol to no more than 1 drink per day for nonpregnant women and 2 drinks per day for men. One drink equals 12 oz of beer, 5 oz of wine, or 1½ oz of hard liquor.  · Keep all follow-up visits as told by your doctor. This is important.  If You Have Diabetes:   · Make sure you know the symptoms of low blood sugar.  · Always keep a source of sugar with you, such as:  ¨ Sugar.  ¨ Sugar tablets.  ¨ Glucose gel.  ¨ Fruit juice.  ¨ Regular soda (not diet soda).  ¨ Milk.  ¨ Hard candy.  ¨ Honey.  · Take your medicines as told.  · Follow your exercise and meal plan.  ¨ Eat on time. Do not skip meals.  ¨ Follow your sick day plan when you cannot eat or drink normally. Make  this plan ahead of time with your doctor.  · Check your blood sugar as often as told by your doctor. Always check before and after exercise.  · Share your diabetes care plan with:  ¨ Your work or school.  ¨ People you live with.  · Check your pee (urine) for ketones:  ¨ When you are sick.  ¨ As told by your doctor.  · Carry a card or wear jewelry that says you have diabetes.  If You Have Low Blood Sugar From Other Causes:   · Check your blood sugar as often as told by your doctor.  · Follow instructions from your doctor about what you cannot eat or drink.  Contact a doctor if:  · You have trouble keeping your blood sugar in your target range.  · You have low blood sugar often.  Get help right away if:  · You still have symptoms after you eat or drink something sugary.  · Your blood sugar is at or below 54 mg/dL (3 mmol/L).  · You have jerky movements that you cannot control.  · You pass out.  These symptoms may be an emergency. Do not wait to see if the symptoms will go away. Get medical help right away. Call your local emergency services (911 in the [place] Do not drive yourself to the hospital.   This information is not intended to replace advice given to you by your health care provider. Make sure you discuss any questions you have with your health care provider.  Document Released: 03/14/2011 Document Revised: 05/25/2017 Document Reviewed: 01/20/2017  [employer] Interactive Patient Education © 2017 [employer]    Insulin Glargine injection  What is this medicine?  INSULIN GLARGINE (IN mccullough ramy GLAR milindn) is a human-made form of insulin. This drug lowers the amount of sugar in your blood. It is a long-acting insulin that is usually given once a day.  This medicine may be used for other purposes; ask your health care provider or pharmacist if you have questions.  COMMON BRAND NAME(S): BASAGLAR, Lantus, Lantus SoloStar, Toujeo SoloStar  What should I tell my health care provider before I take this medicine?  They need  to know if you have any of these conditions:  -episodes of hypoglycemia  -kidney disease  -liver disease  -an unusual or allergic reaction to insulin, metacresol, other medicines, foods, dyes, or preservatives  -pregnant or trying to get pregnant  -breast-feeding  How should I use this medicine?  This medicine is for injection under the skin. Use this medicine at the same time each day. Use exactly as directed. This insulin should never be mixed in the same syringe with other insulins before injection. Do not vigorously shake before use. You will be taught how to use this medicine and how to adjust doses for activities and illness. Do not use more insulin than prescribed.  Always check the appearance of your insulin before using it. This medicine should be clear and colorless like water. Do not use it if it is cloudy, thickened, colored, or has solid particles in it.  It is important that you put your used needles and syringes in a special sharps container. Do not put them in a trash can. If you do not have a sharps container, call your pharmacist or healthcare provider to get one.  Talk to your pediatrician regarding the use of this medicine in children. Special care may be needed.  Overdosage: If you think you have taken too much of this medicine contact a poison control center or emergency room at once.  NOTE: This medicine is only for you. Do not share this medicine with others.  What if I miss a dose?  It is important not to miss a dose. Your health care professional or doctor should discuss a plan for missed doses with you. If you do miss a dose, follow their plan. Do not take double doses.  What may interact with this medicine?  -other medicines for diabetes  Many medications may cause an increase or decrease in blood sugar, these include:  -alcohol containing beverages  -aspirin and aspirin-like drugs  -chloramphenicol  -chromium  -diuretics  -female hormones, like estrogens or progestins and birth control  pills  -heart medicines  -isoniazid  -male hormones or anabolic steroids  -medicines for weight loss  -medicines for allergies, asthma, cold, or cough  -medicines for mental problems  -medicines called MAO Inhibitors like Nardil, Parnate, Marplan, Eldepryl  -niacin  -NSAIDs, medicines for pain and inflammation, like ibuprofen or naproxen  -pentamidine  -phenytoin  -probenecid  -quinolone antibiotics like ciprofloxacin, levofloxacin, ofloxacin  -some herbal dietary supplements  -steroid medicines like prednisone or cortisone  -thyroid medicine  Some medications can hide the warning symptoms of low blood sugar. You may need to monitor your blood sugar more closely if you are taking one of these medications. These include:  -beta-blockers such as atenolol, metoprolol, propranolol  -clonidine  -guanethidine  -reserpine  This list may not describe all possible interactions. Give your health care provider a list of all the medicines, herbs, non-prescription drugs, or dietary supplements you use. Also tell them if you smoke, drink alcohol, or use illegal drugs. Some items may interact with your medicine.  What should I watch for while using this medicine?  Visit your health care professional or doctor for regular checks on your progress.  Do not drive, use machinery, or do anything that needs mental alertness until you know how this medicine affects you. Alcohol may interfere with the effect of this medicine. Avoid alcoholic drinks.  A test called the HbA1C (A1C) will be monitored. This is a simple blood test. It measures your blood sugar control over the last 2 to 3 months. You will receive this test every 3 to 6 months.  Learn how to check your blood sugar. Learn the symptoms of low and high blood sugar and how to manage them.  Always carry a quick-source of sugar with you in case you have symptoms of low blood sugar. Examples include hard sugar candy or glucose tablets. Make sure others know that you can choke if you  eat or drink when you develop serious symptoms of low blood sugar, such as seizures or unconsciousness. They must get medical help at once.  Tell your doctor or health care professional if you have high blood sugar. You might need to change the dose of your medicine. If you are sick or exercising more than usual, you might need to change the dose of your medicine.  Do not skip meals. Ask your doctor or health care professional if you should avoid alcohol. Many nonprescription cough and cold products contain sugar or alcohol. These can affect blood sugar.  Make sure that you have the right kind of syringe for the type of insulin you use. Try not to change the brand and type of insulin or syringe unless your health care professional or doctor tells you to. Switching insulin brand or type can cause dangerously high or low blood sugar. Always keep an extra supply of insulin, syringes, and needles on hand. Use a syringe one time only. Throw away syringe and needle in a closed container to prevent accidental needle sticks.  Insulin pens and cartridges should never be shared. Even if the needle is changed, sharing may result in passing of viruses like hepatitis or HIV.  Wear a medical ID bracelet or chain, and carry a card that describes your disease and details of your medicine and dosage times.  What side effects may I notice from receiving this medicine?  Side effects that you should report to your doctor or health care professional as soon as possible:  -allergic reactions like skin rash, itching or hives, swelling of the face, lips, or tongue  -breathing problems  -signs and symptoms of high blood sugar such as dizziness, dry mouth, dry skin, fruity breath, nausea, stomach pain, increased hunger or thirst, increased urination  -signs and symptoms of low blood sugar such as feeling anxious, confusion, dizziness, increased hunger, unusually weak or tired, sweating, shakiness, cold, irritable, headache, blurred vision,  fast heartbeat, loss of consciousness  Side effects that usually do not require medical attention (report to your doctor or health care professional if they continue or are bothersome):  -increase or decrease in fatty tissue under the skin due to overuse of a particular injection site  -itching, burning, swelling, or rash at site where injected  This list may not describe all possible side effects. Call your doctor for medical advice about side effects. You may report side effects to FDA at 0-867-FDA-9369.  Where should I keep my medicine?  Keep out of the reach of children.  Store unopened vials in a refrigerator between 2 and 8 degrees C (36 and 46 degrees F). Do not freeze or use if the insulin has been frozen. Opened vials (vials currently in use) may be stored in the refrigerator or at room temperature, at approximately 25 degrees C (77 degrees F) or cooler. Keeping your insulin at room temperature decreases the amount of pain during injection. Once opened, your insulin can be used for 28 days. After 28 days, the vial should be thrown away.  Store Lantus Solostar Pens or Basaglar KwikPens in a refrigerator between 2 and 8 degrees C (36 and 46 degrees F) or at room temperature below 30 degrees C (86 degrees F). Do not freeze or use if the insulin has been frozen. Once opened, the pens should be kept at room temperature. Do not store in the refrigerator once opened. Once opened, the insulin can be used for 28 days. After 28 days, the Lantus Solostar Pen or Basaglar KwikPen should be thrown away.  Store Toujeo Solostar Pens in a refrigerator between 2 and 8 degrees C (36 and 46 degrees F). Do not freeze or use if the insulin has been frozen. Once opened, the pens should be kept at room temperature below 30 degrees C (86 degrees F). Do not store in the refrigerator once opened. Once opened, the insulin can be used for 42 days. After 42 days, the Toujeo Solostar Pen should be thrown away.  Protect all insulin vials  and pens from light and excessive heat. Throw away any unused medicine after the expiration date or after the specified time for room temperature storage has passed.  NOTE: This sheet is a summary. It may not cover all possible information. If you have questions about this medicine, talk to your doctor, pharmacist, or health care provider.  © 2018 [employer] (2017-01-19 10:19:14)      How and Where to Give Subcutaneous Insulin Injections, Adult  People with type 1 diabetes must take insulin since their bodies do not make it. People with type 2 diabetes may require insulin. There are many different types of insulin as well as other injectable diabetes medicines that are meant to be injected into the fat layer under your skin. The type of insulin or injectable diabetes medicine you take may determine how many injections you give yourself and when to take the injections.  Choosing a site for injection  Insulin absorption varies from site to site. As with any injectable medication it is best for the insulin to be injected within the same body region. However, do not inject the insulin in the same spot each time. Rotating the spots you give your injections will prevent inflammation or tissue breakdown. There are four main regions that can be used for injections. The regions include the:  · Abdomen (preferred region, especially for non-insulin injectable diabetes medicine).  · Front and upper outer sides of thighs.  · Back of upper arm.  · Buttocks.  Using a syringe and vial  Drawing up insulin: single insulin dose   2. Wash your hands with soap and water.  3. Gently roll the insulin bottle (vial) between your hands to mix it. Do not shake the vial.  4. Clean the top rubber part of the vial with an alcohol wipe. Be sure that the plastic pop-top has been removed on newer vials.  5. Remove the plastic cover from the needle on the syringe. Do not let the needle touch anything.  6. Pull the plunger back to draw  "air into the syringe. The air should be the same amount as the insulin dose.  7. Push the needle through the rubber on the top of the vial. Do not turn the vial over.  8. Push the plunger in all the way to put the air into the vial.  9. Leave the needle in the vial and turn the vial and syringe upside down.  10. Pull down slowly on the plunger, drawing the amount of insulin you need into the syringe.  11. Look for air bubbles in the syringe. You may need to push the plunger up and down 2 to 3 times to slowly get rid of any air bubbles in the syringe.  12. Pull back the plunger to get your correct dose.  13. Remove the needle from the vial.  14. Use an alcohol wipe to clean the area of the body to be injected.  15. Pinch up 1 inch of skin and hold it.  16. Put the needle straight into the skin (90-degree angle). Put the needle in as far as it will go (to the hub). The needle may need to be injected at a 45-degree angle in small adults with little fat.  17. When the needle is in, you can let go of your skin.  18. Push the plunger down all the way to inject the insulin.  19. Pull the needle straight out of the skin.  20. Press the alcohol wipe over the spot where you gave your injection. Keep it there for a few seconds. Do not rub the area.  21. Do not put the plastic cover back on the needle.  Drawing up insulin: mixing 2 insulins  2. Wash your hands with soap and water.  3. Gently roll the vial of \"cloudy\" insulin between your hands or rotate the vial from top to bottom to mix.  4. Clean the top of both vials with an alcohol wipe. Be sure that the plastic pop-top lid has been removed on newer vials.  5. Pull air into the syringe to equal the dose of \"cloudy\" insulin.  6. Stick the needle into the \"cloudy\" insulin vial and inject the air. Be sure to keep the vial upright.  7. Remove the needle from the \"cloudy\" insulin vial.  8. Pull air into the syringe to equal the dose of \"clear\" insulin.  9. Stick the needle into " "the \"clear\" insulin vial and inject the air.  10. Leave the needle in the \"clear\" insulin vial and turn the vial upside down.  11. Pull down on the plunger and slowly draw into the syringe the number of units of \"clear\" insulin desired.  12. Look for air bubbles in the syringe. You may need to push the plunger up and down 2 to 3 times to slowly get rid of any air bubbles in the syringe.  13. Remove the needle from the \"clear\" insulin vial.  14. Stick the needle into the \"cloudy\" insulin vial. Do not inject any of the \"clear\" insulin into the \"cloudy\" vial.  15. Turn the \"cloudy\" vial upside down and pull the plunger down to the number of units that equals the total number of units of \"clear\" and \"cloudy\" insulins.  16. Remove the needle from the \"cloudy\" insulin vial.  17. Use an alcohol wipe to clean the area of the body to be injected.  18. Put the needle straight into the skin (90-degree angle). Put the needle in as far as it will go (to the hub). The needle may need to be injected at a 45-degree angle in small adults with little fat.  19. When the needle is in, you can let go of your skin.  20. Push the plunger down all the way to inject the insulin.  21. Pull the needle straight out of the skin.  22. Press the alcohol wipe over the spot where you gave your injection. Keep it there for a few seconds. Do not rub the area.  23. Do not put the plastic cover back on the needle.  Using an insulin pen    2. Wash your hands with soap and water.  3. If you are using the \"cloudy\" insulin, roll the pen between your palms several times or rotate the pen top to bottom several times.  4. Remove the insulin pen cap.  5. Clean the rubber stopper of the cartridge with an alcohol wipe.  6. Remove the protective paper tab from the disposable needle.  7. Screw the needle onto the pen.  8. Remove the outer plastic needle cover.  9. Remove the inner plastic needle cover.  10. Prime the insulin pen by turning the button (dial) to 2 " units. Hold the pen with the needle pointing up, and push the dial on the opposite end until a drop of insulin appears at the needle tip. If no insulin appears, repeat this step.  11. Dial the number of units of insulin you will inject.  12. Use an alcohol wipe to clean the area of the body to be injected.  13. Pinch up 1 inch of skin and hold it.  14. Put the needle straight into the skin (90-degree angle).  15. Push the dial down to push the insulin into the fat tissue.  16. Count to 10 slowly. Then, remove the needle from the fat tissue.  17. Carefully replace the larger outer plastic needle cover over the needle and unscrew the capped needle.  Throwing away supplies  · Discard used needles in a puncture proof sharps disposal container. Follow disposal regulations for the area where you live.  · Vials and empty disposable pens may be thrown away in the regular trash.  This information is not intended to replace advice given to you by your health care provider. Make sure you discuss any questions you have with your health care provider.  Document Released: 03/09/2005 Document Revised: 05/25/2017 Document Reviewed: 05/27/2014  [employer] Interactive Patient Education © 2017 [employer]

## 2020-05-05 NOTE — PROGRESS NOTES
Assumed care of patient at 1900 from Gege FOSS. Pt resting in bed with no signs of distress. Assessment competed, VSS, pt is A/Ox4. Pt denies any further needs. Bed in the lowest locked position, bed alarm active, call light in reach.

## 2020-05-05 NOTE — DISCHARGE PLANNING
Anticipated Discharge Disposition:   · Home with HHC and home infusions  Action:   · New patient paperwork completed by pt for [facility]. Pt provided contact number and her email for virtual visit [email or web address]   · LSW returned paperwork back to [facility] via secure email.   · [facility] Scheduling updated that requested paperwork was sent back.  will follow up with [facility] to confirm receipt of paperwork and appt for tomorrow at 1500.   Barriers to Discharge:   · HHC acceptance     Plan:   · Care coordination will continue to follow up and provide assistance with discharge plans/barriers as needed.

## 2020-05-05 NOTE — CARE PLAN
Problem: Safety  Goal: Will remain free from injury  Outcome: PROGRESSING AS EXPECTED  Note: Pt up with SBA. Fall precautions maintained. Pt re educated on use of call light. Pt remains free of falls.     Problem: Discharge Barriers/Planning  Goal: Patient's continuum of care needs will be met  Outcome: PROGRESSING AS EXPECTED  Note: Pt anticipating discharge today pending insurance authorization.     Problem: Urinary Elimination:  Goal: Ability to reestablish a normal urinary elimination pattern will improve  Outcome: PROGRESSING AS EXPECTED  Note: Pt still experiencing urgency. Voiding without difficulty.

## 2020-05-05 NOTE — DISCHARGE PLANNING
Agency/Facility Name: [facility]   Spoke To: Marina  Outcome: Referral declined due to unable to meet pt care needs.

## 2020-05-05 NOTE — CARE PLAN
Problem: Pain Management  Goal: Pain level will decrease to patient's comfort goal  Outcome: PROGRESSING AS EXPECTED     Problem: Urinary Elimination:  Goal: Ability to reestablish a normal urinary elimination pattern will improve  Outcome: PROGRESSING SLOWER THAN EXPECTED

## 2020-05-05 NOTE — DISCHARGE PLANNING
Medication reconcilliation completed. Medications delivered to RN at bedside. Patient counseled.       Zachary Adamesng Lesvia   Home Medication Instructions KRISTOPHER:[id number]    Printed on:05/05/20 1446   Medication Information                      metoprolol (LOPRESSOR) 100 MG Tab  Take 1 Tab by mouth 2 Times a Day for 30 days.             oxybutynin SR (DITROPAN-XL) 10 MG CR tablet  Take 1 Tab by mouth every day for 30 days.

## 2020-05-05 NOTE — PROGRESS NOTES
Diabetes education: If not sending home on fast acting insulin, pt may need Lantus dose increased as her blood sugars are consistently above 200. Please call [phone] if needs change.

## 2020-05-05 NOTE — DISCHARGE PLANNING
Anticipated Discharge Disposition:   · Home with HHC and home infusions    Action:   · Follow up call to [facility] scheduling related to concerns regarding pt's appt that was previously scheduled for May 6, 2020. Per scheduling appt is scheduled on this date at 5/06/2020 at 3:00 pm for virtual visit. Office needs new pt paperwork to be completed and sent back prior to confirming. [facility] office emailed paperwork to this LSW. Paperwork has been provided to pt to complete and will be sent back upon completion.     Barriers to Discharge:   · HHC acceptance     Plan:   · Care coordination will continue to follow up and provide assistance with discharge plans/barriers as needed.

## 2020-05-07 ENCOUNTER — HOME CARE VISIT (OUTPATIENT)
Dept: HOME HEALTH SERVICES | Facility: HOME HEALTHCARE | Age: 55
End: 2020-05-07
Payer: MEDICAID

## 2020-05-07 DIAGNOSIS — Z78.9 DECREASED ACTIVITIES OF DAILY LIVING (ADL): ICD-10-CM

## 2020-05-07 DIAGNOSIS — I10 HYPERTENSION, BENIGN: ICD-10-CM

## 2020-05-07 DIAGNOSIS — R32 INCONTINENCE IN FEMALE: ICD-10-CM

## 2020-05-07 DIAGNOSIS — E11.8 DM (DIABETES MELLITUS) WITH COMPLICATIONS (HCC): ICD-10-CM

## 2020-05-07 DIAGNOSIS — E78.5 HYPERLIPIDEMIA ASSOCIATED WITH TYPE 2 DIABETES MELLITUS (HCC): ICD-10-CM

## 2020-05-07 DIAGNOSIS — K74.60 CIRRHOSIS OF LIVER WITH ASCITES, UNSPECIFIED HEPATIC CIRRHOSIS TYPE (HCC): ICD-10-CM

## 2020-05-07 DIAGNOSIS — R18.8 CIRRHOSIS OF LIVER WITH ASCITES, UNSPECIFIED HEPATIC CIRRHOSIS TYPE (HCC): ICD-10-CM

## 2020-05-07 DIAGNOSIS — R29.898 LEG WEAKNESS, BILATERAL: ICD-10-CM

## 2020-05-07 DIAGNOSIS — Z23 INFLUENZA VACCINE NEEDED: ICD-10-CM

## 2020-05-07 DIAGNOSIS — E11.69 HYPERLIPIDEMIA ASSOCIATED WITH TYPE 2 DIABETES MELLITUS (HCC): ICD-10-CM

## 2020-05-07 DIAGNOSIS — I42.1 HOCM (HYPERTROPHIC OBSTRUCTIVE CARDIOMYOPATHY) (HCC): ICD-10-CM

## 2020-05-07 PROCEDURE — A6216 NON-STERILE GAUZE<=16 SQ IN: HCPCS

## 2020-05-07 PROCEDURE — A6250 SKIN SEAL PROTECT MOISTURIZR: HCPCS

## 2020-05-07 PROCEDURE — G0493 RN CARE EA 15 MIN HH/HOSPICE: HCPCS

## 2020-05-07 PROCEDURE — A6209 FOAM DRSG <=16 SQ IN W/O BDR: HCPCS

## 2020-05-07 PROCEDURE — 665001 SOC-HOME HEALTH

## 2020-05-07 RX ORDER — ATORVASTATIN CALCIUM 20 MG/1
20 TABLET, FILM COATED ORAL
Qty: 90 TAB | Refills: 0 | Status: CANCELLED | OUTPATIENT
Start: 2020-05-07

## 2020-05-07 ASSESSMENT — FIBROSIS 4 INDEX: FIB4 SCORE: 1.09

## 2020-05-08 ENCOUNTER — HOME CARE VISIT (OUTPATIENT)
Dept: HOME HEALTH SERVICES | Facility: HOME HEALTHCARE | Age: 55
End: 2020-05-08
Payer: MEDICAID

## 2020-05-08 PROCEDURE — G0299 HHS/HOSPICE OF RN EA 15 MIN: HCPCS

## 2020-05-08 PROCEDURE — A6210 FOAM DRG >16<=48 SQ IN W/O B: HCPCS

## 2020-05-08 SDOH — ECONOMIC STABILITY: INCOME INSECURITY: HOW HARD IS IT FOR YOU TO PAY FOR THE VERY BASICS LIKE FOOD, HOUSING, MEDICAL CARE, AND HEATING?: NOT HARD AT ALL

## 2020-05-08 SDOH — ECONOMIC STABILITY: FOOD INSECURITY: WITHIN THE PAST 12 MONTHS, THE FOOD YOU BOUGHT JUST DIDN'T LAST AND YOU DIDN'T HAVE MONEY TO GET MORE.: NEVER TRUE

## 2020-05-08 SDOH — ECONOMIC STABILITY: FOOD INSECURITY: WITHIN THE PAST 12 MONTHS, YOU WORRIED THAT YOUR FOOD WOULD RUN OUT BEFORE YOU GOT MONEY TO BUY MORE.: NEVER TRUE

## 2020-05-08 SDOH — ECONOMIC STABILITY: TRANSPORTATION INSECURITY
IN THE PAST 12 MONTHS, HAS LACK OF TRANSPORTATION KEPT YOU FROM MEETINGS, WORK, OR FROM GETTING THINGS NEEDED FOR DAILY LIVING?: NO

## 2020-05-08 SDOH — ECONOMIC STABILITY: TRANSPORTATION INSECURITY
IN THE PAST 12 MONTHS, HAS THE LACK OF TRANSPORTATION KEPT YOU FROM MEDICAL APPOINTMENTS OR FROM GETTING MEDICATIONS?: NO

## 2020-05-08 NOTE — PROGRESS NOTES
RANI Block spoke with pt via mobile phone after d/c. We completed outpatient assessment and SDOH screening. Pt sees PCP Lars Vaz and has a f/u appt scheduled for Thursday 5/14. Pt was approved for [facility] and did not have any concerns/needs from [facility].     Community Health Worker Intake  • Social determinates of health intake completed  • Identified barriers to none  • Contact information provided to Rigoberto   • PCP Lars Vaz    Plan:  Pt will be d/c from [facility] services at this time due to be followed and working with [facility] .

## 2020-05-09 ENCOUNTER — HOME CARE VISIT (OUTPATIENT)
Dept: HOME HEALTH SERVICES | Facility: HOME HEALTHCARE | Age: 55
End: 2020-05-09
Payer: MEDICAID

## 2020-05-09 VITALS
BODY MASS INDEX: 18.83 KG/M2 | TEMPERATURE: 97.4 F | DIASTOLIC BLOOD PRESSURE: 78 MMHG | RESPIRATION RATE: 16 BRPM | HEART RATE: 63 BPM | HEIGHT: 67 IN | OXYGEN SATURATION: 97 % | SYSTOLIC BLOOD PRESSURE: 140 MMHG | WEIGHT: 120 LBS

## 2020-05-09 VITALS
DIASTOLIC BLOOD PRESSURE: 68 MMHG | TEMPERATURE: 97.6 F | HEART RATE: 81 BPM | SYSTOLIC BLOOD PRESSURE: 130 MMHG | RESPIRATION RATE: 16 BRPM | OXYGEN SATURATION: 98 %

## 2020-05-09 VITALS
HEART RATE: 70 BPM | SYSTOLIC BLOOD PRESSURE: 135 MMHG | TEMPERATURE: 98 F | OXYGEN SATURATION: 98 % | RESPIRATION RATE: 14 BRPM | DIASTOLIC BLOOD PRESSURE: 80 MMHG

## 2020-05-09 PROCEDURE — G0299 HHS/HOSPICE OF RN EA 15 MIN: HCPCS

## 2020-05-09 PROCEDURE — G0151 HHCP-SERV OF PT,EA 15 MIN: HCPCS

## 2020-05-09 ASSESSMENT — ENCOUNTER SYMPTOMS
VOMITING: DENIES
NAUSEA: DENIES
MUSCLE WEAKNESS: 1
MUSCLE WEAKNESS: 1
NAUSEA: DENIES
VOMITING: DENIES
ARTHRALGIAS: 1
LIMITED RANGE OF MOTION: 1
NAUSEA: DENIES
VOMITING: DENIES

## 2020-05-09 ASSESSMENT — PATIENT HEALTH QUESTIONNAIRE - PHQ9
CLINICAL INTERPRETATION OF PHQ2 SCORE: 0
2. FEELING DOWN, DEPRESSED, IRRITABLE, OR HOPELESS: 00
1. LITTLE INTEREST OR PLEASURE IN DOING THINGS: 00

## 2020-05-09 ASSESSMENT — ACTIVITIES OF DAILY LIVING (ADL)
AMBULATION ASSISTANCE ON FLAT SURFACES: 1
AMBULATION ASSISTANCE: STAND BY ASSIST
OASIS_M1830: 03
AMBULATION_DISTANCE/DURATION_TOLERATED: 30'
CURRENT_FUNCTION: ONE PERSON

## 2020-05-09 NOTE — PROGRESS NOTES
Opened patient to [facility] medication reconciliation process done. Medication list left in home care folder. See MAR for drug allergy interactions. There are no major drug to drug interactions.                    The best contact phone number is [phone] and [employer] manages the medications.

## 2020-05-09 NOTE — PROGRESS NOTES
Primary dx/Skilled need: Acute pyelonephritis and septic shock, PICC care, wound care. Other diagnoses--Left renal abscess, Hydronephrosis due to acute infection, Type II diabetes, Diabetic foot ulcer, Bilateral lower extremity weakness, Hyponatremia, Refeeding syndrome, hypertrophic obstructive cardiomyopathy and hepatic cirrhosis, alcohol and tobacco use           SN frequency: 3w2, 2w7, 3 prn            Zip code: [address]           Disc iplines ordered: SN, PT, OT, MSW            Insurance & authorization: Medicaid            Certification period: 5/7/20 to 7/5/20    Special considerations: Continue on IV ceftriaxone 2 grams IV per day x 4 weeks. Pt will need a follow up CT scan and follow up with urology to reassess her nephrostomy tube after hospital DC

## 2020-05-11 ENCOUNTER — HOME CARE VISIT (OUTPATIENT)
Dept: HOME HEALTH SERVICES | Facility: HOME HEALTHCARE | Age: 55
End: 2020-05-11
Payer: MEDICAID

## 2020-05-11 ENCOUNTER — ANTICOAGULATION MONITORING (OUTPATIENT)
Dept: MEDICAL GROUP | Facility: PHYSICIAN GROUP | Age: 55
End: 2020-05-11

## 2020-05-11 ENCOUNTER — HOSPITAL ENCOUNTER (OUTPATIENT)
Facility: MEDICAL CENTER | Age: 55
End: 2020-05-11
Attending: INTERNAL MEDICINE
Payer: MEDICAID

## 2020-05-11 VITALS
SYSTOLIC BLOOD PRESSURE: 158 MMHG | RESPIRATION RATE: 16 BRPM | TEMPERATURE: 98 F | HEART RATE: 67 BPM | OXYGEN SATURATION: 100 % | DIASTOLIC BLOOD PRESSURE: 78 MMHG

## 2020-05-11 LAB
ANION GAP SERPL CALC-SCNC: 11 MMOL/L (ref 7–16)
BASOPHILS # BLD AUTO: 0.3 % (ref 0–1.8)
BASOPHILS # BLD: 0.03 K/UL (ref 0–0.12)
BUN SERPL-MCNC: 28 MG/DL (ref 8–22)
CALCIUM SERPL-MCNC: 8.5 MG/DL (ref 8.5–10.5)
CHLORIDE SERPL-SCNC: 101 MMOL/L (ref 96–112)
CO2 SERPL-SCNC: 20 MMOL/L (ref 20–33)
CREAT SERPL-MCNC: 0.54 MG/DL (ref 0.5–1.4)
EOSINOPHIL # BLD AUTO: 0.1 K/UL (ref 0–0.51)
EOSINOPHIL NFR BLD: 1.1 % (ref 0–6.9)
ERYTHROCYTE [DISTWIDTH] IN BLOOD BY AUTOMATED COUNT: 61.1 FL (ref 35.9–50)
FASTING STATUS PATIENT QL REPORTED: NORMAL
GLUCOSE SERPL-MCNC: 216 MG/DL (ref 65–99)
HCT VFR BLD AUTO: 24.7 % (ref 37–47)
HGB BLD-MCNC: 7.5 G/DL (ref 12–16)
IMM GRANULOCYTES # BLD AUTO: 0.02 K/UL (ref 0–0.11)
IMM GRANULOCYTES NFR BLD AUTO: 0.2 % (ref 0–0.9)
LYMPHOCYTES # BLD AUTO: 2.38 K/UL (ref 1–4.8)
LYMPHOCYTES NFR BLD: 26.6 % (ref 22–41)
MCH RBC QN AUTO: 30.4 PG (ref 27–33)
MCHC RBC AUTO-ENTMCNC: 30.4 G/DL (ref 33.6–35)
MCV RBC AUTO: 100 FL (ref 81.4–97.8)
MONOCYTES # BLD AUTO: 0.59 K/UL (ref 0–0.85)
MONOCYTES NFR BLD AUTO: 6.6 % (ref 0–13.4)
NEUTROPHILS # BLD AUTO: 5.82 K/UL (ref 2–7.15)
NEUTROPHILS NFR BLD: 65.2 % (ref 44–72)
NRBC # BLD AUTO: 0 K/UL
NRBC BLD-RTO: 0 /100 WBC
PLATELET # BLD AUTO: 299 K/UL (ref 164–446)
PMV BLD AUTO: 10.5 FL (ref 9–12.9)
POTASSIUM SERPL-SCNC: 4.5 MMOL/L (ref 3.6–5.5)
RBC # BLD AUTO: 2.47 M/UL (ref 4.2–5.4)
SODIUM SERPL-SCNC: 132 MMOL/L (ref 135–145)
WBC # BLD AUTO: 8.9 K/UL (ref 4.8–10.8)

## 2020-05-11 PROCEDURE — 6650329 HCR  KIT CENTRAL LINE

## 2020-05-11 PROCEDURE — G0299 HHS/HOSPICE OF RN EA 15 MIN: HCPCS

## 2020-05-11 PROCEDURE — 85025 COMPLETE CBC W/AUTO DIFF WBC: CPT

## 2020-05-11 PROCEDURE — 80048 BASIC METABOLIC PNL TOTAL CA: CPT

## 2020-05-11 ASSESSMENT — ENCOUNTER SYMPTOMS
VOMITING: DENIES
NAUSEA: DENIES

## 2020-05-11 NOTE — PROGRESS NOTES
Received referral from [facility]. Medications reviewed. No clinically significant interactions noted.     In the additional notes it was noted that she is to continue IV ceftriaxone 2 g IV per day x4 weeks.,      Ney Nascimento, PharmD, MS, [facility], [facility]    This note was created using voice recognition software (Dragon). The accuracy of the dictation is limited by the abilities of the software. I have reviewed the note prior to signing, however some errors in grammar and context are still possible. If you have any questions related to this note please do not hesitate to contact our office.

## 2020-05-12 ENCOUNTER — HOME CARE VISIT (OUTPATIENT)
Dept: HOME HEALTH SERVICES | Facility: HOME HEALTHCARE | Age: 55
End: 2020-05-12
Payer: MEDICAID

## 2020-05-12 PROCEDURE — G0155 HHCP-SVS OF CSW,EA 15 MIN: HCPCS

## 2020-05-12 NOTE — PROGRESS NOTES
FYI  Patient Identifier: Patient identified by name and :[id number]    Reason for MSW Home Health Services: Financial community resources    Orientation: Pt was A+Ox3    Physical Status: Pt is frail and ambulatory. Must use walkers more often. Pt wants a W/c. Pt has shower bench    Pain:5    Medication Check: Patient has updated medication list and reports no medication changes    Advance Directive: On file but Pt claims not to have o ne    Depression: Pt denies depression    Falls: Pt denies falls    Oxygen: NA       Services in Place: Medicaid and some SSI plus [facility]    Patient Goal/Intended Outcome: Pt and daughter shall gain knowledge of and access to Community resources    Interventions: MSW assisted Pt and daughter with describing situation for use of [facility]. Pt has no [facility] on file despite records stating such.. Pt seemed interested and MSW to assist P t and daughter on  to go over and fill out [facility]. MSW to suggest copies to Dr, daughter and to obtain a mobile notary.  MSW wanted to complete Care Chest application for a transfer wheelchair but Pt has no current 2020 Social Security statement. MSW suggested that Daughter assist Mom online in getting this new document. Otherwise, Care Chest cannot help, unless she already has a 2020 case file. MSW assisted Pt and daughter in [place] [employer] karina for services. Filled out form and MSW to turn it in for Pt.     Progression Towards Goal: partially met    Response to Visit: Patient stated understanding and agreement, patient plans to discuss with family/friends, patient declined services, resistant to options    Discipline Discharge Planning: , 2w1. MSW to return on  to assist with possible Care Chest application and with [facility].

## 2020-05-13 ENCOUNTER — HOME CARE VISIT (OUTPATIENT)
Dept: HOME HEALTH SERVICES | Facility: HOME HEALTHCARE | Age: 55
End: 2020-05-13
Payer: MEDICAID

## 2020-05-13 VITALS
SYSTOLIC BLOOD PRESSURE: 150 MMHG | HEART RATE: 69 BPM | RESPIRATION RATE: 16 BRPM | OXYGEN SATURATION: 99 % | DIASTOLIC BLOOD PRESSURE: 84 MMHG | TEMPERATURE: 98.1 F

## 2020-05-13 PROCEDURE — G0299 HHS/HOSPICE OF RN EA 15 MIN: HCPCS

## 2020-05-13 ASSESSMENT — ENCOUNTER SYMPTOMS
NAUSEA: DENIES
LIMITED RANGE OF MOTION: 1
VOMITING: DENIES
MUSCLE WEAKNESS: 1

## 2020-05-13 ASSESSMENT — ACTIVITIES OF DAILY LIVING (ADL)
AMBULATION ASSISTANCE: STAND BY ASSIST
CURRENT_FUNCTION: STAND BY ASSIST

## 2020-05-14 ENCOUNTER — HOME CARE VISIT (OUTPATIENT)
Dept: HOME HEALTH SERVICES | Facility: HOME HEALTHCARE | Age: 55
End: 2020-05-14
Payer: MEDICAID

## 2020-05-14 VITALS
SYSTOLIC BLOOD PRESSURE: 142 MMHG | OXYGEN SATURATION: 99 % | RESPIRATION RATE: 16 BRPM | HEART RATE: 78 BPM | DIASTOLIC BLOOD PRESSURE: 84 MMHG | TEMPERATURE: 98.2 F

## 2020-05-14 PROCEDURE — G0152 HHCP-SERV OF OT,EA 15 MIN: HCPCS

## 2020-05-14 ASSESSMENT — ACTIVITIES OF DAILY LIVING (ADL)
GROOMING_CURRENT_FUNCTION: STAND BY ASSIST
DRESSING_LB_CURRENT_FUNCTION: MINIMUM ASSIST
PHYSICAL TRANSFERS ASSESSED: 1
BATHING ASSESSED: 1
AMBULATION ASSISTANCE: SUPERVISION
TOILETING: 1
DRESSING_UB_CURRENT_FUNCTION: SUPERVISION
CURRENT_FUNCTION: STAND BY ASSIST
TOILETING: STAND BY ASSIST
AMBULATION ASSISTANCE: 1
BATHING_CURRENT_FUNCTION: MODERATE ASSIST
GROOMING ASSESSED: 1

## 2020-05-15 ENCOUNTER — HOME CARE VISIT (OUTPATIENT)
Dept: HOME HEALTH SERVICES | Facility: HOME HEALTHCARE | Age: 55
End: 2020-05-15
Payer: MEDICAID

## 2020-05-15 PROCEDURE — G0151 HHCP-SERV OF PT,EA 15 MIN: HCPCS

## 2020-05-15 PROCEDURE — G0299 HHS/HOSPICE OF RN EA 15 MIN: HCPCS

## 2020-05-15 ASSESSMENT — ENCOUNTER SYMPTOMS
MUSCLE WEAKNESS: 1
LIMITED RANGE OF MOTION: 1

## 2020-05-16 ASSESSMENT — ACTIVITIES OF DAILY LIVING (ADL)
GROOMING_WITHIN_DEFINED_LIMITS: 1
FEEDING_WITHIN_DEFINED_LIMITS: 1
PREPARING MEALS: DEPENDENT
TOILETING EQUIPMENT USED: OVER TOILET COMMODE

## 2020-05-17 VITALS
SYSTOLIC BLOOD PRESSURE: 144 MMHG | OXYGEN SATURATION: 98 % | HEART RATE: 79 BPM | TEMPERATURE: 98.2 F | DIASTOLIC BLOOD PRESSURE: 90 MMHG | RESPIRATION RATE: 16 BRPM

## 2020-05-17 ASSESSMENT — ENCOUNTER SYMPTOMS
VOMITING: DENIES
NAUSEA: DENIES

## 2020-05-18 ENCOUNTER — HOME CARE VISIT (OUTPATIENT)
Dept: HOME HEALTH SERVICES | Facility: HOME HEALTHCARE | Age: 55
End: 2020-05-18
Payer: MEDICAID

## 2020-05-18 ENCOUNTER — HOSPITAL ENCOUNTER (OUTPATIENT)
Facility: MEDICAL CENTER | Age: 55
End: 2020-05-18
Attending: INTERNAL MEDICINE
Payer: MEDICAID

## 2020-05-18 LAB
ANION GAP SERPL CALC-SCNC: 10 MMOL/L (ref 7–16)
ANISOCYTOSIS BLD QL SMEAR: ABNORMAL
BASOPHILS # BLD AUTO: 0 % (ref 0–1.8)
BASOPHILS # BLD: 0 K/UL (ref 0–0.12)
BUN SERPL-MCNC: 16 MG/DL (ref 8–22)
CALCIUM SERPL-MCNC: 9.1 MG/DL (ref 8.5–10.5)
CHLORIDE SERPL-SCNC: 97 MMOL/L (ref 96–112)
CO2 SERPL-SCNC: 24 MMOL/L (ref 20–33)
CREAT SERPL-MCNC: 0.66 MG/DL (ref 0.5–1.4)
EOSINOPHIL # BLD AUTO: 0.27 K/UL (ref 0–0.51)
EOSINOPHIL NFR BLD: 4.3 % (ref 0–6.9)
ERYTHROCYTE [DISTWIDTH] IN BLOOD BY AUTOMATED COUNT: 64.7 FL (ref 35.9–50)
GLUCOSE SERPL-MCNC: 148 MG/DL (ref 65–99)
HCT VFR BLD AUTO: 26.8 % (ref 37–47)
HGB BLD-MCNC: 8 G/DL (ref 12–16)
LYMPHOCYTES # BLD AUTO: 2.1 K/UL (ref 1–4.8)
LYMPHOCYTES NFR BLD: 33.9 % (ref 22–41)
MACROCYTES BLD QL SMEAR: ABNORMAL
MANUAL DIFF BLD: NORMAL
MCH RBC QN AUTO: 30.5 PG (ref 27–33)
MCHC RBC AUTO-ENTMCNC: 29.9 G/DL (ref 33.6–35)
MCV RBC AUTO: 102.3 FL (ref 81.4–97.8)
MONOCYTES # BLD AUTO: 0.54 K/UL (ref 0–0.85)
MONOCYTES NFR BLD AUTO: 8.7 % (ref 0–13.4)
MORPHOLOGY BLD-IMP: NORMAL
NEUTROPHILS # BLD AUTO: 3.29 K/UL (ref 2–7.15)
NEUTROPHILS NFR BLD: 53 % (ref 44–72)
NRBC # BLD AUTO: 0 K/UL
NRBC BLD-RTO: 0 /100 WBC
PLATELET # BLD AUTO: 304 K/UL (ref 164–446)
PLATELET BLD QL SMEAR: NORMAL
PMV BLD AUTO: 10.5 FL (ref 9–12.9)
POTASSIUM SERPL-SCNC: 4.6 MMOL/L (ref 3.6–5.5)
RBC # BLD AUTO: 2.62 M/UL (ref 4.2–5.4)
RBC BLD AUTO: PRESENT
SODIUM SERPL-SCNC: 131 MMOL/L (ref 135–145)
WBC # BLD AUTO: 6.2 K/UL (ref 4.8–10.8)

## 2020-05-18 PROCEDURE — G0155 HHCP-SVS OF CSW,EA 15 MIN: HCPCS

## 2020-05-18 PROCEDURE — A4216 STERILE WATER/SALINE, 10 ML: HCPCS

## 2020-05-18 PROCEDURE — A6219 GAUZE <= 16 SQ IN W/BORDER: HCPCS

## 2020-05-18 PROCEDURE — A4930 STERILE, GLOVES PER PAIR: HCPCS

## 2020-05-18 PROCEDURE — 80048 BASIC METABOLIC PNL TOTAL CA: CPT

## 2020-05-18 PROCEDURE — A6234 HYDROCOLLD DRG <=16 W/O BDR: HCPCS

## 2020-05-18 PROCEDURE — 85027 COMPLETE CBC AUTOMATED: CPT

## 2020-05-18 PROCEDURE — G0299 HHS/HOSPICE OF RN EA 15 MIN: HCPCS

## 2020-05-18 PROCEDURE — 85007 BL SMEAR W/DIFF WBC COUNT: CPT

## 2020-05-18 ASSESSMENT — ENCOUNTER SYMPTOMS
NAUSEA: NO
VOMITING: NO

## 2020-05-18 NOTE — PROGRESS NOTES
FYI-  Patient Identifier: Patient identified by name and : 67 F/U visit    Reason for MSW Home Health Services:F/U visit for Care Chest and [facility]    Orientation:Pt was A+Ox3.    Physical Status: Pt is frail. Pt's R foot bandages - one has come off. Pt states that RN is coming to change bandages today.    Pain: Pt denies pain    Medication Check: Patient has updated medication list and reports no medication changes    Advance Dire ctive: F/u visit today is to explain ADHC and give Pt and daughter options and time to discuss.    Depression: Pt denies depression    Falls: Pt denies falls    Oxygen: NA        Services in Place: [facility], [facility]    Patient Goal/Intended Outcome: Pt has prepared for long term goal in preparing to execute a [facility] for Dr and Daughter.    Interventions: MSW assisted Pt and daughter in explaining AD[facility]. Pt and daughter in agreement th at they would discuss options and make a decision. MSW suggested a mobile notary and provided a list when Pt is ready to sign. MSW suggested that a copy be provided to a clinician from [facility] and that Daughter keep a copy handy. No Care CHest documentation available, but Pt stated that PT may have a way to get a W/C.    Progression Towards Goal:  met    Response to Visit: Patient stated understanding and agreement    Discipline Discharg e Planninw1

## 2020-05-19 VITALS
TEMPERATURE: 98.3 F | HEART RATE: 75 BPM | SYSTOLIC BLOOD PRESSURE: 150 MMHG | DIASTOLIC BLOOD PRESSURE: 96 MMHG | OXYGEN SATURATION: 99 % | RESPIRATION RATE: 18 BRPM

## 2020-05-20 ENCOUNTER — HOME CARE VISIT (OUTPATIENT)
Dept: HOME HEALTH SERVICES | Facility: HOME HEALTHCARE | Age: 55
End: 2020-05-20
Payer: MEDICAID

## 2020-05-20 VITALS
OXYGEN SATURATION: 95 % | RESPIRATION RATE: 18 BRPM | TEMPERATURE: 98.3 F | SYSTOLIC BLOOD PRESSURE: 158 MMHG | HEART RATE: 76 BPM | DIASTOLIC BLOOD PRESSURE: 90 MMHG

## 2020-05-20 PROCEDURE — G0151 HHCP-SERV OF PT,EA 15 MIN: HCPCS

## 2020-05-21 ENCOUNTER — HOME CARE VISIT (OUTPATIENT)
Dept: HOME HEALTH SERVICES | Facility: HOME HEALTHCARE | Age: 55
End: 2020-05-21
Payer: MEDICAID

## 2020-05-21 VITALS
HEART RATE: 79 BPM | OXYGEN SATURATION: 96 % | SYSTOLIC BLOOD PRESSURE: 154 MMHG | TEMPERATURE: 98.6 F | RESPIRATION RATE: 16 BRPM | DIASTOLIC BLOOD PRESSURE: 82 MMHG

## 2020-05-21 PROCEDURE — G0299 HHS/HOSPICE OF RN EA 15 MIN: HCPCS

## 2020-05-21 PROCEDURE — 6650336 HCR  CONTAINER SHARPS 1 QT

## 2020-05-21 PROCEDURE — A4334 URINARY CATH LEG STRAP: HCPCS

## 2020-05-21 ASSESSMENT — ENCOUNTER SYMPTOMS
VOMITING: DENIES
NAUSEA: DENIES

## 2020-05-25 ENCOUNTER — HOSPITAL ENCOUNTER (OUTPATIENT)
Dept: LAB | Facility: MEDICAL CENTER | Age: 55
End: 2020-05-25
Attending: INTERNAL MEDICINE
Payer: MEDICAID

## 2020-05-25 ENCOUNTER — HOME CARE VISIT (OUTPATIENT)
Dept: HOME HEALTH SERVICES | Facility: HOME HEALTHCARE | Age: 55
End: 2020-05-25
Payer: MEDICAID

## 2020-05-25 VITALS
TEMPERATURE: 98.5 F | SYSTOLIC BLOOD PRESSURE: 142 MMHG | OXYGEN SATURATION: 98 % | HEART RATE: 67 BPM | RESPIRATION RATE: 16 BRPM | DIASTOLIC BLOOD PRESSURE: 80 MMHG

## 2020-05-25 LAB
ANION GAP SERPL CALC-SCNC: 10 MMOL/L (ref 7–16)
ANISOCYTOSIS BLD QL SMEAR: ABNORMAL
BASOPHILS # BLD AUTO: 0 % (ref 0–1.8)
BASOPHILS # BLD: 0 K/UL (ref 0–0.12)
BUN SERPL-MCNC: 20 MG/DL (ref 8–22)
CALCIUM SERPL-MCNC: 9.1 MG/DL (ref 8.5–10.5)
CHLORIDE SERPL-SCNC: 101 MMOL/L (ref 96–112)
CO2 SERPL-SCNC: 22 MMOL/L (ref 20–33)
CREAT SERPL-MCNC: 0.69 MG/DL (ref 0.5–1.4)
EOSINOPHIL # BLD AUTO: 0.24 K/UL (ref 0–0.51)
EOSINOPHIL NFR BLD: 3.4 % (ref 0–6.9)
ERYTHROCYTE [DISTWIDTH] IN BLOOD BY AUTOMATED COUNT: 56.2 FL (ref 35.9–50)
GLUCOSE SERPL-MCNC: 86 MG/DL (ref 65–99)
HCT VFR BLD AUTO: 28.9 % (ref 37–47)
HGB BLD-MCNC: 8.8 G/DL (ref 12–16)
LYMPHOCYTES # BLD AUTO: 2.86 K/UL (ref 1–4.8)
LYMPHOCYTES NFR BLD: 39.7 % (ref 22–41)
MACROCYTES BLD QL SMEAR: ABNORMAL
MANUAL DIFF BLD: NORMAL
MCH RBC QN AUTO: 30.6 PG (ref 27–33)
MCHC RBC AUTO-ENTMCNC: 30.4 G/DL (ref 33.6–35)
MCV RBC AUTO: 100.3 FL (ref 81.4–97.8)
MONOCYTES # BLD AUTO: 0.68 K/UL (ref 0–0.85)
MONOCYTES NFR BLD AUTO: 9.5 % (ref 0–13.4)
MORPHOLOGY BLD-IMP: NORMAL
NEUTROPHILS # BLD AUTO: 3.41 K/UL (ref 2–7.15)
NEUTROPHILS NFR BLD: 47.4 % (ref 44–72)
NRBC # BLD AUTO: 0 K/UL
NRBC BLD-RTO: 0 /100 WBC
PLATELET # BLD AUTO: 314 K/UL (ref 164–446)
PLATELET BLD QL SMEAR: NORMAL
PMV BLD AUTO: 10.1 FL (ref 9–12.9)
POLYCHROMASIA BLD QL SMEAR: NORMAL
POTASSIUM SERPL-SCNC: 4.8 MMOL/L (ref 3.6–5.5)
RBC # BLD AUTO: 2.88 M/UL (ref 4.2–5.4)
RBC BLD AUTO: PRESENT
SODIUM SERPL-SCNC: 133 MMOL/L (ref 135–145)
VARIANT LYMPHS BLD QL SMEAR: NORMAL
WBC # BLD AUTO: 7.2 K/UL (ref 4.8–10.8)

## 2020-05-25 PROCEDURE — 85007 BL SMEAR W/DIFF WBC COUNT: CPT

## 2020-05-25 PROCEDURE — 80048 BASIC METABOLIC PNL TOTAL CA: CPT

## 2020-05-25 PROCEDURE — G0299 HHS/HOSPICE OF RN EA 15 MIN: HCPCS

## 2020-05-25 PROCEDURE — 85027 COMPLETE CBC AUTOMATED: CPT

## 2020-05-25 ASSESSMENT — ENCOUNTER SYMPTOMS
VOMITING: DENIES
NAUSEA: DENIES
MUSCLE WEAKNESS: 1
LIMITED RANGE OF MOTION: 1

## 2020-05-27 ENCOUNTER — HOME CARE VISIT (OUTPATIENT)
Dept: HOME HEALTH SERVICES | Facility: HOME HEALTHCARE | Age: 55
End: 2020-05-27
Payer: MEDICAID

## 2020-05-27 VITALS
OXYGEN SATURATION: 99 % | SYSTOLIC BLOOD PRESSURE: 158 MMHG | RESPIRATION RATE: 16 BRPM | HEART RATE: 81 BPM | TEMPERATURE: 98 F | DIASTOLIC BLOOD PRESSURE: 60 MMHG

## 2020-05-27 PROCEDURE — G0151 HHCP-SERV OF PT,EA 15 MIN: HCPCS

## 2020-05-27 ASSESSMENT — ACTIVITIES OF DAILY LIVING (ADL): AMBULATION ASSISTANCE ON FLAT SURFACES: 1

## 2020-05-28 ENCOUNTER — HOME CARE VISIT (OUTPATIENT)
Dept: HOME HEALTH SERVICES | Facility: HOME HEALTHCARE | Age: 55
End: 2020-05-28
Payer: MEDICAID

## 2020-05-28 VITALS
TEMPERATURE: 98.8 F | SYSTOLIC BLOOD PRESSURE: 128 MMHG | DIASTOLIC BLOOD PRESSURE: 70 MMHG | RESPIRATION RATE: 16 BRPM | OXYGEN SATURATION: 97 % | HEART RATE: 80 BPM

## 2020-05-28 PROCEDURE — G0299 HHS/HOSPICE OF RN EA 15 MIN: HCPCS

## 2020-05-28 ASSESSMENT — ENCOUNTER SYMPTOMS
VOMITING: DENIES
LIMITED RANGE OF MOTION: 1
NAUSEA: DENIES
MUSCLE WEAKNESS: 1

## 2020-05-29 ENCOUNTER — HOSPITAL ENCOUNTER (OUTPATIENT)
Dept: RADIOLOGY | Facility: MEDICAL CENTER | Age: 55
End: 2020-05-29
Attending: UROLOGY
Payer: MEDICAID

## 2020-05-29 ENCOUNTER — HOME CARE VISIT (OUTPATIENT)
Dept: HOME HEALTH SERVICES | Facility: HOME HEALTHCARE | Age: 55
End: 2020-05-29
Payer: MEDICAID

## 2020-05-29 ENCOUNTER — HOSPITAL ENCOUNTER (OUTPATIENT)
Dept: LAB | Facility: MEDICAL CENTER | Age: 55
End: 2020-05-29
Attending: UROLOGY
Payer: MEDICAID

## 2020-05-29 DIAGNOSIS — N28.89 URETERAL FISTULA: ICD-10-CM

## 2020-05-29 PROCEDURE — 700117 HCHG RX CONTRAST REV CODE 255: Performed by: UROLOGY

## 2020-05-29 PROCEDURE — 74170 CT ABD WO CNTRST FLWD CNTRST: CPT

## 2020-05-29 PROCEDURE — 87086 URINE CULTURE/COLONY COUNT: CPT

## 2020-05-29 RX ADMIN — IOHEXOL 100 ML: 350 INJECTION, SOLUTION INTRAVENOUS at 11:30

## 2020-06-01 ENCOUNTER — HOME CARE VISIT (OUTPATIENT)
Dept: HOME HEALTH SERVICES | Facility: HOME HEALTHCARE | Age: 55
End: 2020-06-01
Payer: MEDICAID

## 2020-06-01 VITALS
HEART RATE: 73 BPM | TEMPERATURE: 98.4 F | OXYGEN SATURATION: 98 % | SYSTOLIC BLOOD PRESSURE: 152 MMHG | DIASTOLIC BLOOD PRESSURE: 80 MMHG | RESPIRATION RATE: 16 BRPM

## 2020-06-01 PROCEDURE — G0299 HHS/HOSPICE OF RN EA 15 MIN: HCPCS

## 2020-06-01 PROCEDURE — G0155 HHCP-SVS OF CSW,EA 15 MIN: HCPCS

## 2020-06-01 ASSESSMENT — ENCOUNTER SYMPTOMS
VOMITING: DENIES
NAUSEA: DENIES

## 2020-06-02 ENCOUNTER — OFFICE VISIT (OUTPATIENT)
Dept: WOUND CARE | Facility: MEDICAL CENTER | Age: 55
End: 2020-06-02
Attending: NURSE PRACTITIONER
Payer: MEDICAID

## 2020-06-02 VITALS
TEMPERATURE: 98.4 F | OXYGEN SATURATION: 99 % | DIASTOLIC BLOOD PRESSURE: 87 MMHG | HEART RATE: 72 BPM | RESPIRATION RATE: 16 BRPM | SYSTOLIC BLOOD PRESSURE: 138 MMHG

## 2020-06-02 DIAGNOSIS — E11.621 DIABETIC ULCER OF TOE OF RIGHT FOOT ASSOCIATED WITH TYPE 2 DIABETES MELLITUS, WITH FAT LAYER EXPOSED (HCC): Primary | ICD-10-CM

## 2020-06-02 DIAGNOSIS — L97.512 DIABETIC ULCER OF TOE OF RIGHT FOOT ASSOCIATED WITH TYPE 2 DIABETES MELLITUS, WITH FAT LAYER EXPOSED (HCC): Primary | ICD-10-CM

## 2020-06-02 DIAGNOSIS — E11.42 DIABETIC POLYNEUROPATHY ASSOCIATED WITH TYPE 2 DIABETES MELLITUS (HCC): ICD-10-CM

## 2020-06-02 DIAGNOSIS — Z72.0 TOBACCO ABUSE: ICD-10-CM

## 2020-06-02 PROCEDURE — 11042 DBRDMT SUBQ TIS 1ST 20SQCM/<: CPT

## 2020-06-02 PROCEDURE — 11042 DBRDMT SUBQ TIS 1ST 20SQCM/<: CPT | Performed by: NURSE PRACTITIONER

## 2020-06-02 PROCEDURE — 99214 OFFICE O/P EST MOD 30 MIN: CPT | Mod: 25 | Performed by: NURSE PRACTITIONER

## 2020-06-02 PROCEDURE — 99214 OFFICE O/P EST MOD 30 MIN: CPT

## 2020-06-02 RX ORDER — CIPROFLOXACIN 500 MG/1
TABLET, FILM COATED ORAL
COMMUNITY
End: 2021-02-12

## 2020-06-02 ASSESSMENT — ENCOUNTER SYMPTOMS
DIARRHEA: 1
DEPRESSION: 0
NERVOUS/ANXIOUS: 0
CONSTIPATION: 0
FEVER: 0
BACK PAIN: 0
NAUSEA: 0
CLAUDICATION: 0
CHILLS: 0
COUGH: 0
SHORTNESS OF BREATH: 0
VOMITING: 0

## 2020-06-02 NOTE — PROGRESS NOTES
Patient given prescription for Diabetic shoes and inserts from [facility]. Patient instructed to call to make an appointment prior to presenting to [facility].    Attempted to do med rec with patient with daughter on the phone, but daughter unable to find list of current medications. Requested that patient bring these in with her at next appointment.     Wound care instructions faxed to [facility] #[phone]

## 2020-06-02 NOTE — LETTER
June 2, 2020        Patient Name: Rigoberto Adames  YOB: 1965    Wound Care Instructions:    -Remove previous dressing from right lateral 2nd toe wound (Right medial foot wound has resolved).  -Cleanse area with saline and pat dry  -Protect candido wounds with skin prep  -Cover wound bed with hydrofiber silver.   -Cover this dressing with non adhesive foam and secure with hypafix tape  -Patient to present to [facility] weekly for debridement and assessment, home health to change dressings weekly and PRN.              _________________________  Brian Sales, APRN
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
## 2020-06-03 ENCOUNTER — HOME CARE VISIT (OUTPATIENT)
Dept: HOME HEALTH SERVICES | Facility: HOME HEALTHCARE | Age: 55
End: 2020-06-03
Payer: MEDICAID

## 2020-06-03 VITALS
SYSTOLIC BLOOD PRESSURE: 158 MMHG | RESPIRATION RATE: 16 BRPM | DIASTOLIC BLOOD PRESSURE: 88 MMHG | HEART RATE: 86 BPM | OXYGEN SATURATION: 98 % | TEMPERATURE: 98.3 F

## 2020-06-03 PROCEDURE — G0151 HHCP-SERV OF PT,EA 15 MIN: HCPCS

## 2020-06-04 ENCOUNTER — HOME CARE VISIT (OUTPATIENT)
Dept: HOME HEALTH SERVICES | Facility: HOME HEALTHCARE | Age: 55
End: 2020-06-04
Payer: MEDICAID

## 2020-06-04 VITALS
RESPIRATION RATE: 16 BRPM | HEART RATE: 70 BPM | TEMPERATURE: 98 F | OXYGEN SATURATION: 98 % | SYSTOLIC BLOOD PRESSURE: 160 MMHG | DIASTOLIC BLOOD PRESSURE: 80 MMHG

## 2020-06-04 LAB
BACTERIA UR CULT: ABNORMAL
BACTERIA UR CULT: ABNORMAL
SIGNIFICANT IND 70042: ABNORMAL
SITE SITE: ABNORMAL
SOURCE SOURCE: ABNORMAL

## 2020-06-04 PROCEDURE — G0299 HHS/HOSPICE OF RN EA 15 MIN: HCPCS

## 2020-06-04 ASSESSMENT — ENCOUNTER SYMPTOMS
VOMITING: DENIES
NAUSEA: DENIES

## 2020-06-04 NOTE — PROGRESS NOTES
ACTION REQUIRED:    Patient needs refills on the following medications:   * Metoprolol   * Vitamin D2  * Oxybutynin  * Murali    Advised patient to contact her pharmacy first so new orders can be sent.     * Patient slightly hypertensive today 160/80, denies headache, dizziness or s/s of stroke.    * Patient would like Nicotine patch to help with smoking cessation. Admits to 4 cigarettes per day.

## 2020-06-09 ENCOUNTER — HOME CARE VISIT (OUTPATIENT)
Dept: HOME HEALTH SERVICES | Facility: HOME HEALTHCARE | Age: 55
End: 2020-06-09
Payer: MEDICAID

## 2020-06-09 VITALS
TEMPERATURE: 97.8 F | DIASTOLIC BLOOD PRESSURE: 80 MMHG | SYSTOLIC BLOOD PRESSURE: 162 MMHG | HEART RATE: 86 BPM | OXYGEN SATURATION: 100 % | RESPIRATION RATE: 17 BRPM

## 2020-06-09 PROCEDURE — A6209 FOAM DRSG <=16 SQ IN W/O BDR: HCPCS

## 2020-06-09 PROCEDURE — A6250 SKIN SEAL PROTECT MOISTURIZR: HCPCS

## 2020-06-09 PROCEDURE — A4452 WATERPROOF TAPE: HCPCS

## 2020-06-09 PROCEDURE — G0299 HHS/HOSPICE OF RN EA 15 MIN: HCPCS

## 2020-06-09 PROCEDURE — 665001 SOC-HOME HEALTH

## 2020-06-09 ASSESSMENT — ENCOUNTER SYMPTOMS
VOMITING: DENIES
NAUSEA: DENIES

## 2020-06-10 ENCOUNTER — HOME CARE VISIT (OUTPATIENT)
Dept: HOME HEALTH SERVICES | Facility: HOME HEALTHCARE | Age: 55
End: 2020-06-10
Payer: MEDICAID

## 2020-06-10 VITALS
HEART RATE: 78 BPM | OXYGEN SATURATION: 100 % | TEMPERATURE: 97.8 F | DIASTOLIC BLOOD PRESSURE: 80 MMHG | RESPIRATION RATE: 17 BRPM | SYSTOLIC BLOOD PRESSURE: 160 MMHG

## 2020-06-10 PROCEDURE — G0151 HHCP-SERV OF PT,EA 15 MIN: HCPCS

## 2020-06-11 ENCOUNTER — HOSPITAL ENCOUNTER (OUTPATIENT)
Dept: LAB | Facility: MEDICAL CENTER | Age: 55
End: 2020-06-11
Attending: UROLOGY
Payer: MEDICAID

## 2020-06-11 PROCEDURE — 87086 URINE CULTURE/COLONY COUNT: CPT

## 2020-06-12 ENCOUNTER — OFFICE VISIT (OUTPATIENT)
Dept: WOUND CARE | Facility: MEDICAL CENTER | Age: 55
End: 2020-06-12
Attending: NURSE PRACTITIONER
Payer: MEDICAID

## 2020-06-12 VITALS
HEART RATE: 85 BPM | DIASTOLIC BLOOD PRESSURE: 107 MMHG | SYSTOLIC BLOOD PRESSURE: 174 MMHG | TEMPERATURE: 99.7 F | OXYGEN SATURATION: 99 % | RESPIRATION RATE: 16 BRPM

## 2020-06-12 DIAGNOSIS — E11.621 DIABETIC ULCER OF TOE OF RIGHT FOOT ASSOCIATED WITH TYPE 2 DIABETES MELLITUS, WITH FAT LAYER EXPOSED (HCC): Primary | ICD-10-CM

## 2020-06-12 DIAGNOSIS — Z72.0 TOBACCO ABUSE: ICD-10-CM

## 2020-06-12 DIAGNOSIS — R03.0 ELEVATED BLOOD PRESSURE READING: ICD-10-CM

## 2020-06-12 DIAGNOSIS — E11.65 UNCONTROLLED TYPE 2 DIABETES MELLITUS WITH HYPERGLYCEMIA (HCC): ICD-10-CM

## 2020-06-12 DIAGNOSIS — E11.42 DIABETIC POLYNEUROPATHY ASSOCIATED WITH TYPE 2 DIABETES MELLITUS (HCC): ICD-10-CM

## 2020-06-12 DIAGNOSIS — L97.512 DIABETIC ULCER OF TOE OF RIGHT FOOT ASSOCIATED WITH TYPE 2 DIABETES MELLITUS, WITH FAT LAYER EXPOSED (HCC): Primary | ICD-10-CM

## 2020-06-12 PROCEDURE — 99213 OFFICE O/P EST LOW 20 MIN: CPT | Performed by: NURSE PRACTITIONER

## 2020-06-12 PROCEDURE — 99213 OFFICE O/P EST LOW 20 MIN: CPT

## 2020-06-12 RX ORDER — NICOTINE 21 MG/24HR
PATCH, TRANSDERMAL 24 HOURS TRANSDERMAL
COMMUNITY
Start: 2020-06-08 | End: 2021-02-12

## 2020-06-12 RX ORDER — CALCIUM CITRATE/VITAMIN D3 200MG-6.25
TABLET ORAL
COMMUNITY
Start: 2020-05-01 | End: 2021-12-03

## 2020-06-12 ASSESSMENT — ENCOUNTER SYMPTOMS
BACK PAIN: 0
DIARRHEA: 1
VOMITING: 0
CONSTIPATION: 0
CHILLS: 0
FEVER: 0
DEPRESSION: 0
CLAUDICATION: 0
COUGH: 0
NAUSEA: 0
NERVOUS/ANXIOUS: 0
SHORTNESS OF BREATH: 0
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
## 2020-06-12 NOTE — LETTER
June 12, 2020        Patient Name: Rigoberto Adames  YOB: 1965    Wound Care Instructions: Right lateral 2nd toe wound resolved.    -Patient discharged from [facility]. Patient to be discharged from Home Health for wound care.          _________________________  Brian Sales, APRN

## 2020-06-14 LAB
BACTERIA UR CULT: NORMAL
SIGNIFICANT IND 70042: NORMAL
SITE SITE: NORMAL
SOURCE SOURCE: NORMAL

## 2020-06-17 ENCOUNTER — HOME CARE VISIT (OUTPATIENT)
Dept: HOME HEALTH SERVICES | Facility: HOME HEALTHCARE | Age: 55
End: 2020-06-17
Payer: MEDICAID

## 2020-06-17 VITALS
HEART RATE: 68 BPM | RESPIRATION RATE: 17 BRPM | DIASTOLIC BLOOD PRESSURE: 68 MMHG | SYSTOLIC BLOOD PRESSURE: 148 MMHG | OXYGEN SATURATION: 96 % | TEMPERATURE: 97.8 F

## 2020-06-17 PROCEDURE — G0151 HHCP-SERV OF PT,EA 15 MIN: HCPCS

## 2020-06-18 ENCOUNTER — HOSPITAL ENCOUNTER (OUTPATIENT)
Dept: RADIOLOGY | Facility: MEDICAL CENTER | Age: 55
End: 2020-06-18
Attending: UROLOGY
Payer: MEDICAID

## 2020-06-18 DIAGNOSIS — N10 PYELONEPHRITIS, ACUTE: ICD-10-CM

## 2020-06-18 PROCEDURE — 700117 HCHG RX CONTRAST REV CODE 255: Performed by: RADIOLOGY

## 2020-06-18 PROCEDURE — 50389 REMOVE RENAL TUBE W/FLUORO: CPT

## 2020-06-18 RX ADMIN — IOHEXOL 20 ML: 300 INJECTION, SOLUTION INTRAVENOUS at 08:29

## 2020-06-18 NOTE — PROGRESS NOTES
IR Nursing Note:    Patient consented for left nephrostogram with possible tube removal with imaging guidance by Dr. Nicole, all questions answered.     Nephrostomy tube removed. Bordered gauze applied to left flank.    Pt escorted to lobby. Pt left with daughter, all personal belongings taken.

## 2020-06-19 ENCOUNTER — HOME CARE VISIT (OUTPATIENT)
Dept: HOME HEALTH SERVICES | Facility: HOME HEALTHCARE | Age: 55
End: 2020-06-19
Payer: MEDICAID

## 2020-06-19 ENCOUNTER — APPOINTMENT (OUTPATIENT)
Dept: WOUND CARE | Facility: MEDICAL CENTER | Age: 55
End: 2020-06-19
Attending: NURSE PRACTITIONER
Payer: MEDICAID

## 2020-06-19 PROCEDURE — G0493 RN CARE EA 15 MIN HH/HOSPICE: HCPCS

## 2020-06-21 VITALS
TEMPERATURE: 97.7 F | RESPIRATION RATE: 16 BRPM | HEART RATE: 77 BPM | SYSTOLIC BLOOD PRESSURE: 140 MMHG | OXYGEN SATURATION: 97 % | DIASTOLIC BLOOD PRESSURE: 84 MMHG

## 2020-06-21 ASSESSMENT — PATIENT HEALTH QUESTIONNAIRE - PHQ9: CLINICAL INTERPRETATION OF PHQ2 SCORE: 0

## 2020-06-21 ASSESSMENT — ACTIVITIES OF DAILY LIVING (ADL)
OASIS_M1830: 02
HOME_HEALTH_OASIS: 01

## 2020-06-26 ENCOUNTER — APPOINTMENT (OUTPATIENT)
Dept: WOUND CARE | Facility: MEDICAL CENTER | Age: 55
End: 2020-06-26
Attending: NURSE PRACTITIONER
Payer: MEDICAID

## 2020-07-01 ENCOUNTER — APPOINTMENT (OUTPATIENT)
Dept: WOUND CARE | Facility: MEDICAL CENTER | Age: 55
End: 2020-07-01
Attending: NURSE PRACTITIONER
Payer: MEDICAID

## 2020-08-24 ENCOUNTER — HOSPITAL ENCOUNTER (OUTPATIENT)
Facility: MEDICAL CENTER | Age: 55
End: 2020-08-24
Attending: UROLOGY
Payer: MEDICAID

## 2020-08-24 PROCEDURE — 87086 URINE CULTURE/COLONY COUNT: CPT

## 2020-08-27 LAB
BACTERIA UR CULT: NORMAL
SIGNIFICANT IND 70042: NORMAL
SITE SITE: NORMAL
SOURCE SOURCE: NORMAL

## 2020-08-31 ENCOUNTER — HOSPITAL ENCOUNTER (OUTPATIENT)
Dept: LAB | Facility: MEDICAL CENTER | Age: 55
End: 2020-08-31
Attending: PHYSICIAN ASSISTANT
Payer: MEDICAID

## 2020-08-31 ENCOUNTER — HOSPITAL ENCOUNTER (OUTPATIENT)
Dept: LAB | Facility: MEDICAL CENTER | Age: 55
End: 2020-08-31
Attending: STUDENT IN AN ORGANIZED HEALTH CARE EDUCATION/TRAINING PROGRAM
Payer: MEDICAID

## 2020-08-31 LAB
25(OH)D3 SERPL-MCNC: 31 NG/ML (ref 30–100)
ALBUMIN SERPL BCP-MCNC: 3.7 G/DL (ref 3.2–4.9)
ALBUMIN SERPL BCP-MCNC: 3.9 G/DL (ref 3.2–4.9)
ALBUMIN/GLOB SERPL: 0.8 G/DL
ALBUMIN/GLOB SERPL: 0.8 G/DL
ALP SERPL-CCNC: 119 U/L (ref 30–99)
ALP SERPL-CCNC: 121 U/L (ref 30–99)
ALT SERPL-CCNC: 34 U/L (ref 2–50)
ALT SERPL-CCNC: 36 U/L (ref 2–50)
ANION GAP SERPL CALC-SCNC: 11 MMOL/L (ref 7–16)
ANION GAP SERPL CALC-SCNC: 11 MMOL/L (ref 7–16)
AST SERPL-CCNC: 20 U/L (ref 12–45)
AST SERPL-CCNC: 23 U/L (ref 12–45)
BASOPHILS # BLD AUTO: 0.4 % (ref 0–1.8)
BASOPHILS # BLD AUTO: 0.5 % (ref 0–1.8)
BASOPHILS # BLD: 0.03 K/UL (ref 0–0.12)
BASOPHILS # BLD: 0.04 K/UL (ref 0–0.12)
BILIRUB SERPL-MCNC: 0.4 MG/DL (ref 0.1–1.5)
BILIRUB SERPL-MCNC: 0.4 MG/DL (ref 0.1–1.5)
BUN SERPL-MCNC: 19 MG/DL (ref 8–22)
BUN SERPL-MCNC: 19 MG/DL (ref 8–22)
CALCIUM SERPL-MCNC: 9.8 MG/DL (ref 8.5–10.5)
CALCIUM SERPL-MCNC: 9.8 MG/DL (ref 8.5–10.5)
CHLORIDE SERPL-SCNC: 105 MMOL/L (ref 96–112)
CHLORIDE SERPL-SCNC: 106 MMOL/L (ref 96–112)
CO2 SERPL-SCNC: 20 MMOL/L (ref 20–33)
CO2 SERPL-SCNC: 21 MMOL/L (ref 20–33)
CREAT SERPL-MCNC: 0.74 MG/DL (ref 0.5–1.4)
CREAT SERPL-MCNC: 0.85 MG/DL (ref 0.5–1.4)
CREAT UR-MCNC: 86.79 MG/DL
EOSINOPHIL # BLD AUTO: 0.46 K/UL (ref 0–0.51)
EOSINOPHIL # BLD AUTO: 0.5 K/UL (ref 0–0.51)
EOSINOPHIL NFR BLD: 6.2 % (ref 0–6.9)
EOSINOPHIL NFR BLD: 6.6 % (ref 0–6.9)
ERYTHROCYTE [DISTWIDTH] IN BLOOD BY AUTOMATED COUNT: 44.6 FL (ref 35.9–50)
ERYTHROCYTE [DISTWIDTH] IN BLOOD BY AUTOMATED COUNT: 45.5 FL (ref 35.9–50)
EST. AVERAGE GLUCOSE BLD GHB EST-MCNC: 171 MG/DL
FASTING STATUS PATIENT QL REPORTED: NORMAL
FASTING STATUS PATIENT QL REPORTED: NORMAL
FERRITIN SERPL-MCNC: 100 NG/ML (ref 10–291)
FOLATE SERPL-MCNC: 24.3 NG/ML
GLOBULIN SER CALC-MCNC: 4.7 G/DL (ref 1.9–3.5)
GLOBULIN SER CALC-MCNC: 4.8 G/DL (ref 1.9–3.5)
GLUCOSE SERPL-MCNC: 116 MG/DL (ref 65–99)
GLUCOSE SERPL-MCNC: 118 MG/DL (ref 65–99)
HBA1C MFR BLD: 7.6 % (ref 0–5.6)
HCT VFR BLD AUTO: 35.3 % (ref 37–47)
HCT VFR BLD AUTO: 36 % (ref 37–47)
HGB BLD-MCNC: 11.2 G/DL (ref 12–16)
HGB BLD-MCNC: 11.3 G/DL (ref 12–16)
IMM GRANULOCYTES # BLD AUTO: 0.01 K/UL (ref 0–0.11)
IMM GRANULOCYTES # BLD AUTO: 0.01 K/UL (ref 0–0.11)
IMM GRANULOCYTES NFR BLD AUTO: 0.1 % (ref 0–0.9)
IMM GRANULOCYTES NFR BLD AUTO: 0.1 % (ref 0–0.9)
IRON SATN MFR SERPL: 18 % (ref 15–55)
IRON SERPL-MCNC: 46 UG/DL (ref 40–170)
LYMPHOCYTES # BLD AUTO: 3.08 K/UL (ref 1–4.8)
LYMPHOCYTES # BLD AUTO: 3.15 K/UL (ref 1–4.8)
LYMPHOCYTES NFR BLD: 41.3 % (ref 22–41)
LYMPHOCYTES NFR BLD: 41.7 % (ref 22–41)
MCH RBC QN AUTO: 28.8 PG (ref 27–33)
MCH RBC QN AUTO: 29 PG (ref 27–33)
MCHC RBC AUTO-ENTMCNC: 31.4 G/DL (ref 33.6–35)
MCHC RBC AUTO-ENTMCNC: 31.7 G/DL (ref 33.6–35)
MCV RBC AUTO: 91.5 FL (ref 81.4–97.8)
MCV RBC AUTO: 91.6 FL (ref 81.4–97.8)
MICROALBUMIN UR-MCNC: 32.5 MG/DL
MICROALBUMIN/CREAT UR: 374 MG/G (ref 0–30)
MONOCYTES # BLD AUTO: 0.39 K/UL (ref 0–0.85)
MONOCYTES # BLD AUTO: 0.48 K/UL (ref 0–0.85)
MONOCYTES NFR BLD AUTO: 5.3 % (ref 0–13.4)
MONOCYTES NFR BLD AUTO: 6.3 % (ref 0–13.4)
NEUTROPHILS # BLD AUTO: 3.41 K/UL (ref 2–7.15)
NEUTROPHILS # BLD AUTO: 3.46 K/UL (ref 2–7.15)
NEUTROPHILS NFR BLD: 45.3 % (ref 44–72)
NEUTROPHILS NFR BLD: 46.2 % (ref 44–72)
NRBC # BLD AUTO: 0 K/UL
NRBC # BLD AUTO: 0 K/UL
NRBC BLD-RTO: 0 /100 WBC
NRBC BLD-RTO: 0 /100 WBC
PLATELET # BLD AUTO: 190 K/UL (ref 164–446)
PLATELET # BLD AUTO: 201 K/UL (ref 164–446)
PMV BLD AUTO: 13.3 FL (ref 9–12.9)
PMV BLD AUTO: 13.4 FL (ref 9–12.9)
POTASSIUM SERPL-SCNC: 4.4 MMOL/L (ref 3.6–5.5)
POTASSIUM SERPL-SCNC: 4.4 MMOL/L (ref 3.6–5.5)
PROT SERPL-MCNC: 8.5 G/DL (ref 6–8.2)
PROT SERPL-MCNC: 8.6 G/DL (ref 6–8.2)
RBC # BLD AUTO: 3.86 M/UL (ref 4.2–5.4)
RBC # BLD AUTO: 3.93 M/UL (ref 4.2–5.4)
SODIUM SERPL-SCNC: 136 MMOL/L (ref 135–145)
SODIUM SERPL-SCNC: 138 MMOL/L (ref 135–145)
TIBC SERPL-MCNC: 260 UG/DL (ref 250–450)
TRANSFERRIN SERPL-MCNC: 230 MG/DL (ref 200–370)
UIBC SERPL-MCNC: 214 UG/DL (ref 110–370)
VIT B12 SERPL-MCNC: 626 PG/ML (ref 211–911)
WBC # BLD AUTO: 7.4 K/UL (ref 4.8–10.8)
WBC # BLD AUTO: 7.6 K/UL (ref 4.8–10.8)

## 2020-08-31 PROCEDURE — 80053 COMPREHEN METABOLIC PANEL: CPT | Mod: 91

## 2020-08-31 PROCEDURE — 83550 IRON BINDING TEST: CPT

## 2020-08-31 PROCEDURE — 85025 COMPLETE CBC W/AUTO DIFF WBC: CPT

## 2020-08-31 PROCEDURE — 82570 ASSAY OF URINE CREATININE: CPT

## 2020-08-31 PROCEDURE — 80053 COMPREHEN METABOLIC PANEL: CPT

## 2020-08-31 PROCEDURE — 36415 COLL VENOUS BLD VENIPUNCTURE: CPT

## 2020-08-31 PROCEDURE — 83036 HEMOGLOBIN GLYCOSYLATED A1C: CPT

## 2020-08-31 PROCEDURE — 82728 ASSAY OF FERRITIN: CPT

## 2020-08-31 PROCEDURE — 82746 ASSAY OF FOLIC ACID SERUM: CPT

## 2020-08-31 PROCEDURE — 82607 VITAMIN B-12: CPT

## 2020-08-31 PROCEDURE — 83540 ASSAY OF IRON: CPT

## 2020-08-31 PROCEDURE — 85610 PROTHROMBIN TIME: CPT

## 2020-08-31 PROCEDURE — 82105 ALPHA-FETOPROTEIN SERUM: CPT

## 2020-08-31 PROCEDURE — 82043 UR ALBUMIN QUANTITATIVE: CPT

## 2020-08-31 PROCEDURE — 84466 ASSAY OF TRANSFERRIN: CPT

## 2020-08-31 PROCEDURE — 82306 VITAMIN D 25 HYDROXY: CPT

## 2020-08-31 PROCEDURE — 85025 COMPLETE CBC W/AUTO DIFF WBC: CPT | Mod: 91

## 2020-09-01 LAB
INR PPP: 1.08 (ref 0.87–1.13)
PROTHROMBIN TIME: 14.3 SEC (ref 12–14.6)

## 2020-09-02 LAB — AFP-TM SERPL-MCNC: 4 NG/ML (ref 0–9)

## 2021-01-04 ENCOUNTER — OFFICE VISIT (OUTPATIENT)
Dept: CARDIOLOGY | Facility: MEDICAL CENTER | Age: 56
End: 2021-01-04
Payer: MEDICAID

## 2021-01-04 VITALS
OXYGEN SATURATION: 96 % | DIASTOLIC BLOOD PRESSURE: 72 MMHG | WEIGHT: 122 LBS | BODY MASS INDEX: 19.15 KG/M2 | HEIGHT: 67 IN | HEART RATE: 98 BPM | SYSTOLIC BLOOD PRESSURE: 148 MMHG

## 2021-01-04 DIAGNOSIS — E11.9 TYPE 2 DIABETES MELLITUS WITHOUT COMPLICATION, UNSPECIFIED WHETHER LONG TERM INSULIN USE (HCC): ICD-10-CM

## 2021-01-04 DIAGNOSIS — Z79.899 HIGH RISK MEDICATION USE: ICD-10-CM

## 2021-01-04 DIAGNOSIS — K70.9 LIVER DISEASE DUE TO ALCOHOL (HCC): ICD-10-CM

## 2021-01-04 DIAGNOSIS — I10 HTN (HYPERTENSION), MALIGNANT: ICD-10-CM

## 2021-01-04 DIAGNOSIS — I42.1 HOCM (HYPERTROPHIC OBSTRUCTIVE CARDIOMYOPATHY) (HCC): ICD-10-CM

## 2021-01-04 PROCEDURE — 99214 OFFICE O/P EST MOD 30 MIN: CPT | Performed by: INTERNAL MEDICINE

## 2021-01-04 RX ORDER — DILTIAZEM HYDROCHLORIDE 120 MG/1
120 CAPSULE, COATED, EXTENDED RELEASE ORAL DAILY
Qty: 30 CAP | Refills: 11 | Status: SHIPPED | OUTPATIENT
Start: 2021-01-04 | End: 2021-02-12 | Stop reason: SDUPTHER

## 2021-01-04 ASSESSMENT — ENCOUNTER SYMPTOMS
DIZZINESS: 0
DEPRESSION: 0
LOSS OF CONSCIOUSNESS: 0
EYE DISCHARGE: 0
VOMITING: 0
BLURRED VISION: 0
PALPITATIONS: 0
CHILLS: 0
HEADACHES: 0
PND: 0
COUGH: 0
EYE PAIN: 0
DOUBLE VISION: 0
ABDOMINAL PAIN: 0
FALLS: 0
WEIGHT LOSS: 0
MYALGIAS: 0
HALLUCINATIONS: 0
CLAUDICATION: 0
FEVER: 0
SPEECH CHANGE: 0
ORTHOPNEA: 0
SENSORY CHANGE: 0
BRUISES/BLEEDS EASILY: 0
SHORTNESS OF BREATH: 0
BLOOD IN STOOL: 0
NAUSEA: 0

## 2021-01-04 ASSESSMENT — FIBROSIS 4 INDEX: FIB4 SCORE: 1.03

## 2021-01-05 NOTE — PROGRESS NOTES
Chief Complaint   Patient presents with   • Hypertrophic Cardiomyopathy (HOCM)   • HTN (Controlled)       Subjective:   Rigoberto Adaems is a 55 y.o. female who presents today for cardiac care and management for HOCM in setting of hypertension, liver cirrhosis secondary to alcohol abuse.    Patient has stopped drinking alcohol via her report.    She feels well overall.    She used to see Dr. Knowles.    Past Medical History:   Diagnosis Date   • Cataract     Bilateral   • Dental disorder     Upper   • Diabetes mellitus, type 2 (HCC)     Oral medications   • diarrhea 2019    been going on for about a year   • Heart murmur    • HOCM (hypertrophic obstructive cardiomyopathy) (HCC)    • Hypertension      Past Surgical History:   Procedure Laterality Date   • PB UPPER GI ENDOSCOPY,LIGAT VARIX  9/25/2019    Procedure: GASTROSCOPY, WITH VARICEAL BANDING - WITH GASTRIC MAPPING;  Surgeon: Sandi Espinoza M.D.;  Location: [facility];  Service: Gastroenterology   • GASTROSCOPY-ENDO  9/25/2019    Procedure: GASTROSCOPY;  Surgeon: Sandi Espinoza M.D.;  Location: SURGERY [facility];  Service: Gastroenterology   • PRIMARY C SECTION  2006   • OTHER      c- section     Family History   Problem Relation Age of Onset   • Cancer Father         stomach   • Hypertension Mother      Social History     Socioeconomic History   • Marital status:      Spouse name: Not on file   • Number of children: Not on file   • Years of education: Not on file   • Highest education level: Not on file   Occupational History   • Occupation: restaurant owner   Social Needs   • Financial resource strain: Not hard at all   • Food insecurity     Worry: Never true     Inability: Never true   • Transportation needs     Medical: No     Non-medical: No   Tobacco Use   • Smoking status: Former Smoker     Packs/day: 1.00     Years: 10.00     Pack years: 10.00     Types: Cigarettes     Start date: 8/12/2015     Quit date: 06/2020      Years since quittin.5   • Smokeless tobacco: Never Used   Substance and Sexual Activity   • Alcohol use: Yes     Alcohol/week: 0.6 oz     Types: 1 Glasses of wine per week     Drinks per session: 1 or 2   • Drug use: No   • Sexual activity: Not on file   Lifestyle   • Physical activity     Days per week: Not on file     Minutes per session: Not on file   • Stress: Not on file   Relationships   • Social connections     Talks on phone: Not on file     Gets together: Not on file     Attends Rest[employer] service: Not on file     Active member of club or organization: Not on file     Attends meetings of clubs or organizations: Not on file     Relationship status: Not on file   • Intimate partner violence     Fear of current or ex partner: Not on file     Emotionally abused: Not on file     Physically abused: Not on file     Forced sexual activity: Not on file   Other Topics Concern   • Not on file   Social History Narrative    Single- 2 children.     No Known Allergies  Outpatient Encounter Medications as of 2021   Medication Sig Dispense Refill   • DILTIAZem CD (CARDIZEM CD) 120 MG CAPSULE SR 24 HR Take 1 Cap by mouth every day. 30 Cap 11   • oxybutynin SR (DITROPAN-XL) 10 MG CR tablet      • TRUE METRIX BLOOD GLUCOSE TEST strip      • metoprolol (LOPRESSOR) 100 MG Tab Take 100 mg by mouth 2 times a day.     • insulin glargine (LANTUS) 100 UNIT/ML Solution Inject 5 Units under the skin every day.     • bismuth subsalicylate (PEPTO-BISMOL) 262 MG/15ML Suspension Take 30 mL by mouth every 6 hours as needed. Indications: Diarrhea     • Blood Glucose Meter Kit Test blood sugar as recommended by provider. True Metrix blood glucose monitoring kit. 1 Kit 0   • Blood Glucose Test Strips Use one True Metrix strip to test blood sugar three times daily before meals. 100 Strip 0   • Lancets Use one True Metrix lancet to test blood sugar three times daily before meals. 100 Each 0   • Alcohol Swabs Wipe site with prep pad  "prior to injection. 100 Each 0   • Insulin Pen Needle 32 G x 4 mm Use one pen needle in pen device to inject insulin once daily. 100 Each 0   • atorvastatin (LIPITOR) 20 MG Tab TAKE 1 TABLET BY MOUTH AT BEDTIME 90 Tab 0   • vitamin D, Ergocalciferol, (DRISDOL) 32348 units Cap capsule Take 1 Cap by mouth every 28 days. 3 Cap 3   • nicotine ([employer] NICOTINE TRANSDERMAL SYS) 14 MG/24HR PATCH 24 HR [employer] NICOTINE 14 MG/24HR PT24     • ciprofloxacin (CIPRO) 500 MG Tab Cipro 500 mg tablet   Take 1 tablet every 12 hours by oral route for 14 days.     • esomeprazole (NEXIUM) 20 MG capsule Take 20 mg by mouth every morning before breakfast.       No facility-administered encounter medications on file as of 1/4/2021.      Review of Systems   Constitutional: Negative for chills, fever, malaise/fatigue and weight loss.   HENT: Negative for ear discharge, ear pain, hearing loss and nosebleeds.    Eyes: Negative for blurred vision, double vision, pain and discharge.   Respiratory: Negative for cough and shortness of breath.    Cardiovascular: Negative for chest pain, palpitations, orthopnea, claudication, leg swelling and PND.   Gastrointestinal: Negative for abdominal pain, blood in stool, melena, nausea and vomiting.   Genitourinary: Negative for dysuria and hematuria.   Musculoskeletal: Negative for falls, joint pain and myalgias.   Skin: Negative for itching and rash.   Neurological: Negative for dizziness, sensory change, speech change, loss of consciousness and headaches.   Endo/Heme/Allergies: Negative for environmental allergies. Does not bruise/bleed easily.   Psychiatric/Behavioral: Negative for depression, hallucinations and suicidal ideas.        Objective:   /72 (BP Location: Left arm, Patient Position: Sitting, BP Cuff Size: Adult)   Pulse 98   Ht 1.702 m (5' 7\")   Wt 55.3 kg (122 lb)   SpO2 96%   BMI 19.11 kg/m²     Physical Exam   Constitutional: She is oriented to person, place, and time. No distress. "   HENT:   Head: Normocephalic and atraumatic.   Right Ear: External ear normal.   Left Ear: External ear normal.   Eyes: Right eye exhibits no discharge. Left eye exhibits no discharge.   Neck: No JVD present. No thyromegaly present.   Cardiovascular: Normal rate, regular rhythm and intact distal pulses.   Ausculation was not performed to minimize the risk of COVID spread during current pandemic. This complies with Medicare policies and guidelines.     Pulmonary/Chest: No respiratory distress.   Abdominal: She exhibits no distension. There is no abdominal tenderness.   Musculoskeletal:         General: No edema.   Neurological: She is alert and oriented to person, place, and time. No cranial nerve deficit.   Skin: Skin is warm and dry. She is not diaphoretic.   Jaundiced.   Psychiatric: She has a normal mood and affect. Her behavior is normal.   Nursing note and vitals reviewed.      Assessment:     1. HOCM (hypertrophic obstructive cardiomyopathy) (HCC)     2. HTN (hypertension), malignant     3. 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 Type 2 diabetes mellitus without complication, unspecified whether long term insulin use (HCC)     5. High risk medication use         Medical Decision Making:  Today's Assessment / Status / Plan:   Blood pressure is high and heart rate is not at goal.  We will add Cardizem 120 mg p.o. once a day.  Continue with metoprolol 100 mg p.o. twice a day.  Heart rate goal of less than 70.

## 2021-01-22 ENCOUNTER — HOSPITAL ENCOUNTER (OUTPATIENT)
Dept: LAB | Facility: MEDICAL CENTER | Age: 56
End: 2021-01-22
Attending: PHYSICIAN ASSISTANT
Payer: MEDICAID

## 2021-01-22 LAB
25(OH)D3 SERPL-MCNC: 32 NG/ML (ref 30–100)
ALBUMIN SERPL BCP-MCNC: 4 G/DL (ref 3.2–4.9)
ALBUMIN/GLOB SERPL: 0.9 G/DL
ALP SERPL-CCNC: 135 U/L (ref 30–99)
ALT SERPL-CCNC: 20 U/L (ref 2–50)
ANION GAP SERPL CALC-SCNC: 7 MMOL/L (ref 7–16)
AST SERPL-CCNC: 21 U/L (ref 12–45)
BASOPHILS # BLD AUTO: 0.4 % (ref 0–1.8)
BASOPHILS # BLD: 0.03 K/UL (ref 0–0.12)
BILIRUB SERPL-MCNC: 0.2 MG/DL (ref 0.1–1.5)
BUN SERPL-MCNC: 17 MG/DL (ref 8–22)
CALCIUM SERPL-MCNC: 9.6 MG/DL (ref 8.5–10.5)
CHLORIDE SERPL-SCNC: 104 MMOL/L (ref 96–112)
CO2 SERPL-SCNC: 24 MMOL/L (ref 20–33)
CREAT SERPL-MCNC: 1.16 MG/DL (ref 0.5–1.4)
EOSINOPHIL # BLD AUTO: 0.39 K/UL (ref 0–0.51)
EOSINOPHIL NFR BLD: 5.4 % (ref 0–6.9)
ERYTHROCYTE [DISTWIDTH] IN BLOOD BY AUTOMATED COUNT: 47.6 FL (ref 35.9–50)
FASTING STATUS PATIENT QL REPORTED: NORMAL
FERRITIN SERPL-MCNC: 102 NG/ML (ref 10–291)
FOLATE SERPL-MCNC: 17.2 NG/ML
GLOBULIN SER CALC-MCNC: 4.7 G/DL (ref 1.9–3.5)
GLUCOSE SERPL-MCNC: 134 MG/DL (ref 65–99)
HCT VFR BLD AUTO: 34.3 % (ref 37–47)
HGB BLD-MCNC: 10.3 G/DL (ref 12–16)
IMM GRANULOCYTES # BLD AUTO: 0.01 K/UL (ref 0–0.11)
IMM GRANULOCYTES NFR BLD AUTO: 0.1 % (ref 0–0.9)
INR PPP: 1.02 (ref 0.87–1.13)
IRON SATN MFR SERPL: 15 % (ref 15–55)
IRON SERPL-MCNC: 39 UG/DL (ref 40–170)
LYMPHOCYTES # BLD AUTO: 2.49 K/UL (ref 1–4.8)
LYMPHOCYTES NFR BLD: 34.2 % (ref 22–41)
MCH RBC QN AUTO: 27.8 PG (ref 27–33)
MCHC RBC AUTO-ENTMCNC: 30 G/DL (ref 33.6–35)
MCV RBC AUTO: 92.7 FL (ref 81.4–97.8)
MONOCYTES # BLD AUTO: 0.47 K/UL (ref 0–0.85)
MONOCYTES NFR BLD AUTO: 6.5 % (ref 0–13.4)
NEUTROPHILS # BLD AUTO: 3.89 K/UL (ref 2–7.15)
NEUTROPHILS NFR BLD: 53.4 % (ref 44–72)
NRBC # BLD AUTO: 0 K/UL
NRBC BLD-RTO: 0 /100 WBC
PLATELET # BLD AUTO: 198 K/UL (ref 164–446)
PMV BLD AUTO: 13.1 FL (ref 9–12.9)
POTASSIUM SERPL-SCNC: 5.1 MMOL/L (ref 3.6–5.5)
PROT SERPL-MCNC: 8.7 G/DL (ref 6–8.2)
PROTHROMBIN TIME: 13.7 SEC (ref 12–14.6)
RBC # BLD AUTO: 3.7 M/UL (ref 4.2–5.4)
SODIUM SERPL-SCNC: 135 MMOL/L (ref 135–145)
TIBC SERPL-MCNC: 253 UG/DL (ref 250–450)
UIBC SERPL-MCNC: 214 UG/DL (ref 110–370)
VIT B12 SERPL-MCNC: 491 PG/ML (ref 211–911)
WBC # BLD AUTO: 7.3 K/UL (ref 4.8–10.8)

## 2021-01-22 PROCEDURE — 83550 IRON BINDING TEST: CPT

## 2021-01-22 PROCEDURE — 82607 VITAMIN B-12: CPT

## 2021-01-22 PROCEDURE — 84630 ASSAY OF ZINC: CPT

## 2021-01-22 PROCEDURE — 82306 VITAMIN D 25 HYDROXY: CPT

## 2021-01-22 PROCEDURE — 85025 COMPLETE CBC W/AUTO DIFF WBC: CPT

## 2021-01-22 PROCEDURE — 80053 COMPREHEN METABOLIC PANEL: CPT

## 2021-01-22 PROCEDURE — 82728 ASSAY OF FERRITIN: CPT

## 2021-01-22 PROCEDURE — 84590 ASSAY OF VITAMIN A: CPT

## 2021-01-22 PROCEDURE — 82107 ALPHA-FETOPROTEIN L3: CPT

## 2021-01-22 PROCEDURE — 82746 ASSAY OF FOLIC ACID SERUM: CPT

## 2021-01-22 PROCEDURE — 36415 COLL VENOUS BLD VENIPUNCTURE: CPT

## 2021-01-22 PROCEDURE — 83540 ASSAY OF IRON: CPT

## 2021-01-22 PROCEDURE — 85610 PROTHROMBIN TIME: CPT

## 2021-01-25 LAB
AFP L3 MFR SERPL: <0.5 % (ref 0–9.9)
AFP SERPL-MCNC: 4 NG/ML (ref 0–15)
ZINC SERPL-MCNC: 63.8 UG/DL (ref 60–120)

## 2021-01-26 LAB
ANNOTATION COMMENT IMP: NORMAL
RETINYL PALMITATE SERPL-MCNC: <0.02 MG/L (ref 0–0.1)
VIT A SERPL-MCNC: 0.71 MG/L (ref 0.3–1.2)

## 2021-02-12 ENCOUNTER — OFFICE VISIT (OUTPATIENT)
Dept: CARDIOLOGY | Facility: MEDICAL CENTER | Age: 56
End: 2021-02-12
Payer: MEDICAID

## 2021-02-12 VITALS
RESPIRATION RATE: 16 BRPM | DIASTOLIC BLOOD PRESSURE: 70 MMHG | SYSTOLIC BLOOD PRESSURE: 118 MMHG | OXYGEN SATURATION: 97 % | HEART RATE: 77 BPM | BODY MASS INDEX: 18.43 KG/M2 | HEIGHT: 67 IN | WEIGHT: 117.4 LBS

## 2021-02-12 DIAGNOSIS — I42.1 HOCM (HYPERTROPHIC OBSTRUCTIVE CARDIOMYOPATHY) (HCC): ICD-10-CM

## 2021-02-12 DIAGNOSIS — Z79.899 HIGH RISK MEDICATION USE: ICD-10-CM

## 2021-02-12 DIAGNOSIS — K70.9 LIVER DISEASE DUE TO ALCOHOL (HCC): ICD-10-CM

## 2021-02-12 DIAGNOSIS — E11.9 TYPE 2 DIABETES MELLITUS WITHOUT COMPLICATION, UNSPECIFIED WHETHER LONG TERM INSULIN USE (HCC): ICD-10-CM

## 2021-02-12 DIAGNOSIS — I10 HTN (HYPERTENSION), MALIGNANT: ICD-10-CM

## 2021-02-12 PROCEDURE — 99214 OFFICE O/P EST MOD 30 MIN: CPT | Performed by: INTERNAL MEDICINE

## 2021-02-12 RX ORDER — METOPROLOL TARTRATE 100 MG/1
TABLET ORAL
COMMUNITY
Start: 2020-09-14 | End: 2021-02-12

## 2021-02-12 RX ORDER — ASCORBIC ACID 500 MG
500 TABLET ORAL
COMMUNITY
Start: 2021-02-04 | End: 2022-02-03

## 2021-02-12 RX ORDER — LISINOPRIL 5 MG/1
5 TABLET ORAL DAILY
Qty: 90 TABLET | Refills: 4 | Status: SHIPPED | OUTPATIENT
Start: 2021-02-12 | End: 2022-03-18

## 2021-02-12 RX ORDER — METOPROLOL TARTRATE 100 MG/1
100 TABLET ORAL 2 TIMES DAILY
Qty: 180 TABLET | Refills: 4 | Status: SHIPPED | OUTPATIENT
Start: 2021-02-12 | End: 2022-03-18 | Stop reason: SDUPTHER

## 2021-02-12 RX ORDER — LISINOPRIL 5 MG/1
5 TABLET ORAL
COMMUNITY
Start: 2021-02-04 | End: 2021-02-12 | Stop reason: SDUPTHER

## 2021-02-12 RX ORDER — DILTIAZEM HYDROCHLORIDE 120 MG/1
120 CAPSULE, COATED, EXTENDED RELEASE ORAL DAILY
Qty: 90 CAPSULE | Refills: 4 | Status: SHIPPED | OUTPATIENT
Start: 2021-02-12 | End: 2021-08-09 | Stop reason: SDUPTHER

## 2021-02-12 ASSESSMENT — ENCOUNTER SYMPTOMS
DOUBLE VISION: 0
HEADACHES: 0
PALPITATIONS: 0
FALLS: 0
WEIGHT LOSS: 0
PND: 0
BLURRED VISION: 0
COUGH: 0
SENSORY CHANGE: 0
EYE DISCHARGE: 0
SHORTNESS OF BREATH: 0
ORTHOPNEA: 0
LOSS OF CONSCIOUSNESS: 0
CHILLS: 0
HALLUCINATIONS: 0
NAUSEA: 0
BLOOD IN STOOL: 0
VOMITING: 0
DIZZINESS: 0
CLAUDICATION: 0
MYALGIAS: 0
EYE PAIN: 0
SPEECH CHANGE: 0
ABDOMINAL PAIN: 0
DEPRESSION: 0
BRUISES/BLEEDS EASILY: 0
FEVER: 0

## 2021-02-12 ASSESSMENT — FIBROSIS 4 INDEX: FIB4 SCORE: 1.304372986874877323

## 2021-02-12 NOTE — PROGRESS NOTES
Chief Complaint   Patient presents with   • Hypertension       Subjective:   Rigoberto Adames is a 56 y.o. female who presents today for cardiac care and management for HOCM in setting of hypertension, liver cirrhosis secondary to alcohol abuse.    Patient has stopped drinking alcohol via her report.    She feels well overall.    She used to see Dr. Knowles.    Patient stopped taking Cardizem a few days ago as she thought that there was no refills even though she was given refills.    Past Medical History:   Diagnosis Date   • Cataract     Bilateral   • Dental disorder     Upper   • Diabetes mellitus, type 2 (HCC)     Oral medications   • diarrhea 2019    been going on for about a year   • Heart murmur    • HOCM (hypertrophic obstructive cardiomyopathy) (HCC)    • Hypertension      Past Surgical History:   Procedure Laterality Date   • PB UPPER GI ENDOSCOPY,LIGAT VARIX  2019    Procedure: GASTROSCOPY, WITH VARICEAL BANDING - WITH GASTRIC MAPPING;  Surgeon: Sandi Espinoza M.D.;  Location: [facility];  Service: Gastroenterology   • GASTROSCOPY-ENDO  2019    Procedure: GASTROSCOPY;  Surgeon: Sandi Espinoza M.D.;  Location: SURGERY [facility];  Service: Gastroenterology   • PRIMARY C SECTION  2006   • OTHER      c- section     Family History   Problem Relation Age of Onset   • Cancer Father         stomach   • Hypertension Mother      Social History     Socioeconomic History   • Marital status:      Spouse name: Not on file   • Number of children: Not on file   • Years of education: Not on file   • Highest education level: Not on file   Occupational History   • Occupation: restaurant owner   Tobacco Use   • Smoking status: Former Smoker     Packs/day: 1.00     Years: 10.00     Pack years: 10.00     Types: Cigarettes     Start date: 2015     Quit date: 2020     Years since quittin.7   • Smokeless tobacco: Never Used   Substance and Sexual Activity   • Alcohol  use: Yes     Alcohol/week: 0.6 oz     Types: 1 Glasses of wine per week   • Drug use: No   • Sexual activity: Not on file   Other Topics Concern   • Not on file   Social History Narrative    Single- 2 children.     Social Determinants of Health     Financial Resource Strain: Low Risk    • Difficulty of Paying Living Expenses: Not hard at all   Food Insecurity: No Food Insecurity   • Worried About Running Out of Food in the Last Year: Never true   • Ran Out of Food in the Last Year: Never true   Transportation Needs: No Transportation Needs   • Lack of Transportation (Medical): No   • Lack of Transportation (Non-Medical): No   Physical Activity:    • Days of Exercise per Week:    • Minutes of Exercise per Session:    Stress:    • Feeling of Stress :    Social Connections:    • Frequency of Communication with Friends and Family:    • Frequency of Social Gatherings with Friends and Family:    • Attends Hindu Services:    • Active Member of Clubs or Organizations:    • Attends Club or Organization Meetings:    • Marital Status:    Intimate Partner Violence:    • Fear of Current or Ex-Partner:    • Emotionally Abused:    • Physically Abused:    • Sexually Abused:      No Known Allergies  Outpatient Encounter Medications as of 2/12/2021   Medication Sig Dispense Refill   • ascorbic acid (VITAMIN C) 500 MG tablet Take 500 mg by mouth.     • lisinopril (PRINIVIL) 5 MG Tab Take 5 mg by mouth.     • metformin (GLUCOPHAGE) 1000 MG tablet Take 1,000 mg by mouth 2 times a day, with meals. Twice a day     • oxybutynin SR (DITROPAN-XL) 10 MG CR tablet      • TRUE METRIX BLOOD GLUCOSE TEST strip      • metoprolol (LOPRESSOR) 100 MG Tab Take 100 mg by mouth 2 times a day.     • insulin glargine (LANTUS) 100 UNIT/ML Solution Inject 5 Units under the skin every day.     • Blood Glucose Meter Kit Test blood sugar as recommended by provider. True Metrix blood glucose monitoring kit. 1 Kit 0   • Blood Glucose Test Strips Use one True  Metrix strip to test blood sugar three times daily before meals. 100 Strip 0   • Lancets Use one True Metrix lancet to test blood sugar three times daily before meals. 100 Each 0   • Alcohol Swabs Wipe site with prep pad prior to injection. 100 Each 0   • Insulin Pen Needle 32 G x 4 mm Use one pen needle in pen device to inject insulin once daily. 100 Each 0   • atorvastatin (LIPITOR) 20 MG Tab TAKE 1 TABLET BY MOUTH AT BEDTIME 90 Tab 0   • vitamin D, Ergocalciferol, (DRISDOL) 38115 units Cap capsule Take 1 Cap by mouth every 28 days. 3 Cap 3   • metoprolol tartrate (LOPRESSOR) 100 MG Tab METOPROLOL TARTRATE 100 MG TABS     • DILTIAZem CD (CARDIZEM CD) 120 MG CAPSULE SR 24 HR Take 1 Cap by mouth every day. (Patient not taking: Reported on 2/12/2021) 30 Cap 11   • nicotine ([employer] NICOTINE TRANSDERMAL SYS) 14 MG/24HR PATCH 24 HR [employer] NICOTINE 14 MG/24HR PT24     • ciprofloxacin (CIPRO) 500 MG Tab Cipro 500 mg tablet   Take 1 tablet every 12 hours by oral route for 14 days.     • bismuth subsalicylate (PEPTO-BISMOL) 262 MG/15ML Suspension Take 30 mL by mouth every 6 hours as needed. Indications: Diarrhea     • esomeprazole (NEXIUM) 20 MG capsule Take 20 mg by mouth every morning before breakfast.       No facility-administered encounter medications on file as of 2/12/2021.     Review of Systems   Constitutional: Negative for chills, fever, malaise/fatigue and weight loss.   HENT: Negative for ear discharge, ear pain, hearing loss and nosebleeds.    Eyes: Negative for blurred vision, double vision, pain and discharge.   Respiratory: Negative for cough and shortness of breath.    Cardiovascular: Negative for chest pain, palpitations, orthopnea, claudication, leg swelling and PND.   Gastrointestinal: Negative for abdominal pain, blood in stool, melena, nausea and vomiting.   Genitourinary: Negative for dysuria and hematuria.   Musculoskeletal: Negative for falls, joint pain and myalgias.   Skin: Negative for itching and  "rash.   Neurological: Negative for dizziness, sensory change, speech change, loss of consciousness and headaches.   Endo/Heme/Allergies: Negative for environmental allergies. Does not bruise/bleed easily.   Psychiatric/Behavioral: Negative for depression, hallucinations and suicidal ideas.        Objective:   /70 (BP Location: Left arm, Patient Position: Sitting, BP Cuff Size: Adult)   Pulse 77   Resp 16   Ht 1.702 m (5' 7\")   Wt 53.3 kg (117 lb 6.4 oz)   SpO2 97%   BMI 18.39 kg/m²     Physical Exam   Constitutional: She is oriented to person, place, and time. No distress.   HENT:   Head: Normocephalic and atraumatic.   Right Ear: External ear normal.   Left Ear: External ear normal.   Eyes: Right eye exhibits no discharge. Left eye exhibits no discharge.   Neck: No JVD present. No thyromegaly present.   Cardiovascular: Normal rate, regular rhythm and intact distal pulses.   Ausculation was not performed to minimize the risk of COVID spread during current pandemic. This complies with Medicare policies and guidelines.     Pulmonary/Chest: No respiratory distress.   Abdominal: She exhibits no distension. There is no abdominal tenderness.   Musculoskeletal:         General: No edema.   Neurological: She is alert and oriented to person, place, and time. No cranial nerve deficit.   Skin: Skin is warm and dry. She is not diaphoretic.   Jaundiced.   Psychiatric: She has a normal mood and affect. Her behavior is normal.   Nursing note and vitals reviewed.      Assessment:     1. HOCM (hypertrophic obstructive cardiomyopathy) (HCC)     2. HTN (hypertension), malignant     3. 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 Type 2 diabetes mellitus without complication, unspecified whether long term insulin use (HCC)     5. High risk medication use         Medical Decision Making:  Today's Assessment / Status / Plan:   Blood pressure is high and heart rate is not at goal.  We will restart Cardizem 120 mg p.o. once a " day.  Continue with metoprolol 100 mg p.o. twice a day.  Heart rate goal of less than 70.

## 2021-03-12 ENCOUNTER — HOSPITAL ENCOUNTER (OUTPATIENT)
Dept: RADIOLOGY | Facility: MEDICAL CENTER | Age: 56
End: 2021-03-12
Attending: UROLOGY
Payer: MEDICAID

## 2021-03-12 DIAGNOSIS — N10 ACUTE PYELONEPHRITIS WITHOUT LESION OF RENAL MEDULLARY NECROSIS: ICD-10-CM

## 2021-03-12 PROCEDURE — 76775 US EXAM ABDO BACK WALL LIM: CPT

## 2021-04-09 ENCOUNTER — HOSPITAL ENCOUNTER (OUTPATIENT)
Dept: RADIOLOGY | Facility: MEDICAL CENTER | Age: 56
End: 2021-04-09
Attending: UROLOGY
Payer: MEDICAID

## 2021-04-09 ENCOUNTER — HOSPITAL ENCOUNTER (OUTPATIENT)
Dept: LAB | Facility: MEDICAL CENTER | Age: 56
End: 2021-04-09
Attending: STUDENT IN AN ORGANIZED HEALTH CARE EDUCATION/TRAINING PROGRAM
Payer: MEDICAID

## 2021-04-09 DIAGNOSIS — K43.9 VENTRAL HERNIA WITHOUT OBSTRUCTION OR GANGRENE: ICD-10-CM

## 2021-04-09 LAB
ALBUMIN SERPL BCP-MCNC: 3.8 G/DL (ref 3.2–4.9)
ALBUMIN/GLOB SERPL: 0.8 G/DL
ALP SERPL-CCNC: 137 U/L (ref 30–99)
ALT SERPL-CCNC: 39 U/L (ref 2–50)
ANION GAP SERPL CALC-SCNC: 9 MMOL/L (ref 7–16)
AST SERPL-CCNC: 33 U/L (ref 12–45)
BASOPHILS # BLD AUTO: 0.3 % (ref 0–1.8)
BASOPHILS # BLD: 0.02 K/UL (ref 0–0.12)
BILIRUB SERPL-MCNC: 0.3 MG/DL (ref 0.1–1.5)
BUN SERPL-MCNC: 20 MG/DL (ref 8–22)
CALCIUM SERPL-MCNC: 9.7 MG/DL (ref 8.5–10.5)
CHLORIDE SERPL-SCNC: 105 MMOL/L (ref 96–112)
CO2 SERPL-SCNC: 24 MMOL/L (ref 20–33)
CREAT SERPL-MCNC: 1.11 MG/DL (ref 0.5–1.4)
EOSINOPHIL # BLD AUTO: 0.57 K/UL (ref 0–0.51)
EOSINOPHIL NFR BLD: 8 % (ref 0–6.9)
ERYTHROCYTE [DISTWIDTH] IN BLOOD BY AUTOMATED COUNT: 51.2 FL (ref 35.9–50)
GLOBULIN SER CALC-MCNC: 5 G/DL (ref 1.9–3.5)
GLUCOSE SERPL-MCNC: 138 MG/DL (ref 65–99)
HCT VFR BLD AUTO: 37.4 % (ref 37–47)
HGB BLD-MCNC: 11.3 G/DL (ref 12–16)
IMM GRANULOCYTES # BLD AUTO: 0.01 K/UL (ref 0–0.11)
IMM GRANULOCYTES NFR BLD AUTO: 0.1 % (ref 0–0.9)
LIPASE SERPL-CCNC: 26 U/L (ref 11–82)
LYMPHOCYTES # BLD AUTO: 2.34 K/UL (ref 1–4.8)
LYMPHOCYTES NFR BLD: 32.9 % (ref 22–41)
MCH RBC QN AUTO: 27.6 PG (ref 27–33)
MCHC RBC AUTO-ENTMCNC: 30.2 G/DL (ref 33.6–35)
MCV RBC AUTO: 91.4 FL (ref 81.4–97.8)
MONOCYTES # BLD AUTO: 0.46 K/UL (ref 0–0.85)
MONOCYTES NFR BLD AUTO: 6.5 % (ref 0–13.4)
NEUTROPHILS # BLD AUTO: 3.72 K/UL (ref 2–7.15)
NEUTROPHILS NFR BLD: 52.2 % (ref 44–72)
NRBC # BLD AUTO: 0 K/UL
NRBC BLD-RTO: 0 /100 WBC
PLATELET # BLD AUTO: 178 K/UL (ref 164–446)
PMV BLD AUTO: 12.1 FL (ref 9–12.9)
POTASSIUM SERPL-SCNC: 4.7 MMOL/L (ref 3.6–5.5)
PROT SERPL-MCNC: 8.8 G/DL (ref 6–8.2)
RBC # BLD AUTO: 4.09 M/UL (ref 4.2–5.4)
SODIUM SERPL-SCNC: 138 MMOL/L (ref 135–145)
WBC # BLD AUTO: 7.1 K/UL (ref 4.8–10.8)

## 2021-04-09 PROCEDURE — 36415 COLL VENOUS BLD VENIPUNCTURE: CPT

## 2021-04-09 PROCEDURE — 76705 ECHO EXAM OF ABDOMEN: CPT

## 2021-04-09 PROCEDURE — 85025 COMPLETE CBC W/AUTO DIFF WBC: CPT

## 2021-04-09 PROCEDURE — 83690 ASSAY OF LIPASE: CPT

## 2021-04-09 PROCEDURE — 80053 COMPREHEN METABOLIC PANEL: CPT

## 2021-04-14 ENCOUNTER — HOSPITAL ENCOUNTER (OUTPATIENT)
Facility: MEDICAL CENTER | Age: 56
End: 2021-04-14
Attending: STUDENT IN AN ORGANIZED HEALTH CARE EDUCATION/TRAINING PROGRAM
Payer: MEDICAID

## 2021-04-14 PROCEDURE — 83520 IMMUNOASSAY QUANT NOS NONAB: CPT

## 2021-04-15 LAB — ELASTASE PANC STL-MCNT: >800 UG/G

## 2021-04-30 ENCOUNTER — HOSPITAL ENCOUNTER (OUTPATIENT)
Dept: RADIOLOGY | Facility: MEDICAL CENTER | Age: 56
End: 2021-04-30
Attending: STUDENT IN AN ORGANIZED HEALTH CARE EDUCATION/TRAINING PROGRAM
Payer: MEDICAID

## 2021-04-30 DIAGNOSIS — R10.11 ABDOMINAL PAIN, RIGHT UPPER QUADRANT: ICD-10-CM

## 2021-04-30 PROCEDURE — 74176 CT ABD & PELVIS W/O CONTRAST: CPT

## 2021-04-30 PROCEDURE — 700117 HCHG RX CONTRAST REV CODE 255: Performed by: STUDENT IN AN ORGANIZED HEALTH CARE EDUCATION/TRAINING PROGRAM

## 2021-04-30 RX ADMIN — IOHEXOL 25 ML: 240 INJECTION, SOLUTION INTRATHECAL; INTRAVASCULAR; INTRAVENOUS; ORAL at 17:15

## 2021-05-14 ENCOUNTER — HOSPITAL ENCOUNTER (OUTPATIENT)
Dept: RADIOLOGY | Facility: MEDICAL CENTER | Age: 56
End: 2021-05-14
Attending: STUDENT IN AN ORGANIZED HEALTH CARE EDUCATION/TRAINING PROGRAM
Payer: MEDICAID

## 2021-05-14 DIAGNOSIS — Z12.31 VISIT FOR SCREENING MAMMOGRAM: ICD-10-CM

## 2021-05-14 PROCEDURE — 77063 BREAST TOMOSYNTHESIS BI: CPT

## 2021-07-16 ENCOUNTER — HOSPITAL ENCOUNTER (OUTPATIENT)
Dept: LAB | Facility: MEDICAL CENTER | Age: 56
End: 2021-07-16
Attending: STUDENT IN AN ORGANIZED HEALTH CARE EDUCATION/TRAINING PROGRAM
Payer: MEDICAID

## 2021-07-16 LAB
CREAT UR-MCNC: 51.91 MG/DL
EST. AVERAGE GLUCOSE BLD GHB EST-MCNC: 163 MG/DL
HBA1C MFR BLD: 7.3 % (ref 4–5.6)
MICROALBUMIN UR-MCNC: 155.2 MG/DL
MICROALBUMIN/CREAT UR: 2990 MG/G (ref 0–30)

## 2021-07-16 PROCEDURE — 83036 HEMOGLOBIN GLYCOSYLATED A1C: CPT

## 2021-07-16 PROCEDURE — 82043 UR ALBUMIN QUANTITATIVE: CPT

## 2021-07-16 PROCEDURE — 82570 ASSAY OF URINE CREATININE: CPT

## 2021-07-16 PROCEDURE — 84155 ASSAY OF PROTEIN SERUM: CPT

## 2021-07-16 PROCEDURE — 36415 COLL VENOUS BLD VENIPUNCTURE: CPT

## 2021-07-16 PROCEDURE — 84165 PROTEIN E-PHORESIS SERUM: CPT

## 2021-07-20 LAB
ALBUMIN SERPL ELPH-MCNC: 3.81 G/DL (ref 3.75–5.01)
ALPHA1 GLOB SERPL ELPH-MCNC: 0.33 G/DL (ref 0.19–0.46)
ALPHA2 GLOB SERPL ELPH-MCNC: 0.96 G/DL (ref 0.48–1.05)
B-GLOBULIN SERPL ELPH-MCNC: 1.03 G/DL (ref 0.48–1.1)
EER PROT ELECT SER Q1092: ABNORMAL
GAMMA GLOB SERPL ELPH-MCNC: 1.98 G/DL (ref 0.62–1.51)
INTERPRETATION SERPL IFE-IMP: ABNORMAL
PROT SERPL-MCNC: 8.1 G/DL (ref 6.3–8.2)

## 2021-07-21 NOTE — ED NOTES
Bedside report given to Receiving RN   Xeljanz Counseling: I discussed with the patient the risks of Xeljanz therapy including increased risk of infection, liver issues, headache, diarrhea, or cold symptoms. Live vaccines should be avoided. They were instructed to call if they have any problems.

## 2021-08-09 ENCOUNTER — OFFICE VISIT (OUTPATIENT)
Dept: CARDIOLOGY | Facility: MEDICAL CENTER | Age: 56
End: 2021-08-09
Payer: MEDICAID

## 2021-08-09 VITALS
HEIGHT: 67 IN | WEIGHT: 124 LBS | DIASTOLIC BLOOD PRESSURE: 90 MMHG | SYSTOLIC BLOOD PRESSURE: 176 MMHG | HEART RATE: 73 BPM | RESPIRATION RATE: 12 BRPM | OXYGEN SATURATION: 96 % | BODY MASS INDEX: 19.46 KG/M2

## 2021-08-09 DIAGNOSIS — I10 HTN (HYPERTENSION), MALIGNANT: ICD-10-CM

## 2021-08-09 DIAGNOSIS — R00.2 HEART PALPITATIONS: ICD-10-CM

## 2021-08-09 DIAGNOSIS — I42.1 HOCM (HYPERTROPHIC OBSTRUCTIVE CARDIOMYOPATHY) (HCC): ICD-10-CM

## 2021-08-09 DIAGNOSIS — R18.8 CIRRHOSIS OF LIVER WITH ASCITES, UNSPECIFIED HEPATIC CIRRHOSIS TYPE (HCC): ICD-10-CM

## 2021-08-09 DIAGNOSIS — E11.9 TYPE 2 DIABETES MELLITUS WITHOUT COMPLICATION, UNSPECIFIED WHETHER LONG TERM INSULIN USE (HCC): ICD-10-CM

## 2021-08-09 DIAGNOSIS — Z79.899 HIGH RISK MEDICATION USE: ICD-10-CM

## 2021-08-09 DIAGNOSIS — K70.9 LIVER DISEASE DUE TO ALCOHOL (HCC): ICD-10-CM

## 2021-08-09 DIAGNOSIS — K74.60 CIRRHOSIS OF LIVER WITH ASCITES, UNSPECIFIED HEPATIC CIRRHOSIS TYPE (HCC): ICD-10-CM

## 2021-08-09 DIAGNOSIS — I10 HYPERTENSION, BENIGN: ICD-10-CM

## 2021-08-09 PROCEDURE — 99214 OFFICE O/P EST MOD 30 MIN: CPT | Performed by: INTERNAL MEDICINE

## 2021-08-09 RX ORDER — DILTIAZEM HYDROCHLORIDE 120 MG/1
120 CAPSULE, COATED, EXTENDED RELEASE ORAL DAILY
Qty: 90 CAPSULE | Refills: 4 | Status: SHIPPED | OUTPATIENT
Start: 2021-08-09 | End: 2021-12-03 | Stop reason: SDUPTHER

## 2021-08-09 ASSESSMENT — ENCOUNTER SYMPTOMS
EYE PAIN: 0
NAUSEA: 0
SPEECH CHANGE: 0
DEPRESSION: 0
ORTHOPNEA: 0
DIZZINESS: 0
DOUBLE VISION: 0
FALLS: 0
BLOOD IN STOOL: 0
PND: 0
BRUISES/BLEEDS EASILY: 0
EYE DISCHARGE: 0
HEADACHES: 0
MYALGIAS: 0
WEIGHT LOSS: 0
ABDOMINAL PAIN: 0
CHILLS: 0
COUGH: 0
PALPITATIONS: 0
SENSORY CHANGE: 0
SHORTNESS OF BREATH: 0
CLAUDICATION: 0
VOMITING: 0
LOSS OF CONSCIOUSNESS: 0
BLURRED VISION: 0
HALLUCINATIONS: 0
FEVER: 0

## 2021-08-09 ASSESSMENT — FIBROSIS 4 INDEX: FIB4 SCORE: 1.63

## 2021-08-09 NOTE — PROGRESS NOTES
Chief Complaint   Patient presents with   • Hypertrophic Cardiomyopathy (HOCM)       Subjective:   Rigoberto Aadmes is a 56 y.o. female who presents today for cardiac care and management for HOCM in setting of hypertension, liver cirrhosis secondary to alcohol abuse.    Patient has stopped drinking alcohol via her report.    She feels well overall.    She used to see Dr. Knowles.    Patient did not  Cardizem since last visit for unknown reason.    Past Medical History:   Diagnosis Date   • Cataract     Bilateral   • Dental disorder     Upper   • Diabetes mellitus, type 2 (HCC)     Oral medications   • diarrhea 2019    been going on for about a year   • Heart murmur    • HOCM (hypertrophic obstructive cardiomyopathy) (HCC)    • Hypertension      Past Surgical History:   Procedure Laterality Date   • PB UPPER GI ENDOSCOPY,LIGAT VARIX  2019    Procedure: GASTROSCOPY, WITH VARICEAL BANDING - WITH GASTRIC MAPPING;  Surgeon: Sandi Espinoza M.D.;  Location: [facility];  Service: Gastroenterology   • GASTROSCOPY-ENDO  2019    Procedure: GASTROSCOPY;  Surgeon: Sandi Espinoza M.D.;  Location: SURGERY [facility];  Service: Gastroenterology   • PRIMARY C SECTION  2006   • OTHER      c- section     Family History   Problem Relation Age of Onset   • Cancer Father         stomach   • Hypertension Mother      Social History     Socioeconomic History   • Marital status:      Spouse name: Not on file   • Number of children: Not on file   • Years of education: Not on file   • Highest education level: Not on file   Occupational History   • Occupation: restaurant owner   Tobacco Use   • Smoking status: Former Smoker     Packs/day: 1.00     Years: 10.00     Pack years: 10.00     Types: Cigarettes     Start date: 2015     Quit date: 2020     Years since quittin.1   • Smokeless tobacco: Never Used   Vaping Use   • Vaping Use: Never used   Substance and Sexual Activity   •  Alcohol use: Not Currently     Alcohol/week: 0.6 oz     Types: 1 Glasses of wine per week   • Drug use: No   • Sexual activity: Not on file   Other Topics Concern   • Not on file   Social History Narrative    Single- 2 children.     Social Determinants of Health     Financial Resource Strain:    • Difficulty of Paying Living Expenses:    Food Insecurity:    • Worried About Running Out of Food in the Last Year:    • Ran Out of Food in the Last Year:    Transportation Needs:    • Lack of Transportation (Medical):    • Lack of Transportation (Non-Medical):    Physical Activity:    • Days of Exercise per Week:    • Minutes of Exercise per Session:    Stress:    • Feeling of Stress :    Social Connections:    • Frequency of Communication with Friends and Family:    • Frequency of Social Gatherings with Friends and Family:    • Attends Yazidi Services:    • Active Member of Clubs or Organizations:    • Attends Club or Organization Meetings:    • Marital Status:    Intimate Partner Violence:    • Fear of Current or Ex-Partner:    • Emotionally Abused:    • Physically Abused:    • Sexually Abused:      No Known Allergies  Outpatient Encounter Medications as of 8/9/2021   Medication Sig Dispense Refill   • DILTIAZem CD (CARDIZEM CD) 120 MG CAPSULE SR 24 HR Take 1 capsule by mouth every day. 90 capsule 4   • ascorbic acid (VITAMIN C) 500 MG tablet Take 500 mg by mouth.     • metformin (GLUCOPHAGE) 1000 MG tablet Take 1,000 mg by mouth 2 times a day, with meals. Twice a day     • metoprolol tartrate (LOPRESSOR) 100 MG Tab Take 1 tablet by mouth 2 times a day. 180 tablet 4   • lisinopril (PRINIVIL) 5 MG Tab Take 1 tablet by mouth every day. 90 tablet 4   • oxybutynin SR (DITROPAN-XL) 10 MG CR tablet      • TRUE METRIX BLOOD GLUCOSE TEST strip      • Blood Glucose Meter Kit Test blood sugar as recommended by provider. True Metrix blood glucose monitoring kit. 1 Kit 0   • Blood Glucose Test Strips Use one True Metrix strip to  test blood sugar three times daily before meals. 100 Strip 0   • atorvastatin (LIPITOR) 20 MG Tab TAKE 1 TABLET BY MOUTH AT BEDTIME 90 Tab 0   • vitamin D, Ergocalciferol, (DRISDOL) 15712 units Cap capsule Take 1 Cap by mouth every 28 days. 3 Cap 3   • [DISCONTINUED] DILTIAZem CD (CARDIZEM CD) 120 MG CAPSULE SR 24 HR Take 1 capsule by mouth every day. 90 capsule 4   • [DISCONTINUED] insulin glargine (LANTUS) 100 UNIT/ML Solution Inject 5 Units under the skin every day. (Patient not taking: Reported on 8/9/2021)     • [DISCONTINUED] Lancets Use one True Metrix lancet to test blood sugar three times daily before meals. (Patient not taking: Reported on 8/9/2021) 100 Each 0   • [DISCONTINUED] Alcohol Swabs Wipe site with prep pad prior to injection. (Patient not taking: Reported on 8/9/2021) 100 Each 0   • [DISCONTINUED] Insulin Pen Needle 32 G x 4 mm Use one pen needle in pen device to inject insulin once daily. (Patient not taking: Reported on 8/9/2021) 100 Each 0     No facility-administered encounter medications on file as of 8/9/2021.     Review of Systems   Constitutional: Negative for chills, fever, malaise/fatigue and weight loss.   HENT: Negative for ear discharge, ear pain, hearing loss and nosebleeds.    Eyes: Negative for blurred vision, double vision, pain and discharge.   Respiratory: Negative for cough and shortness of breath.    Cardiovascular: Negative for chest pain, palpitations, orthopnea, claudication, leg swelling and PND.   Gastrointestinal: Negative for abdominal pain, blood in stool, melena, nausea and vomiting.   Genitourinary: Negative for dysuria and hematuria.   Musculoskeletal: Negative for falls, joint pain and myalgias.   Skin: Negative for itching and rash.   Neurological: Negative for dizziness, sensory change, speech change, loss of consciousness and headaches.   Endo/Heme/Allergies: Negative for environmental allergies. Does not bruise/bleed easily.   Psychiatric/Behavioral: Negative  "for depression, hallucinations and suicidal ideas.        Objective:   BP (!) 176/90 (BP Location: Left arm, Patient Position: Sitting, BP Cuff Size: Adult)   Pulse 73   Resp 12   Ht 1.702 m (5' 7\")   Wt 56.2 kg (124 lb)   SpO2 96%   BMI 19.42 kg/m²     Physical Exam   Constitutional: She is oriented to person, place, and time. No distress.   HENT:   Head: Normocephalic and atraumatic.   Right Ear: External ear normal.   Left Ear: External ear normal.   Eyes: Right eye exhibits no discharge. Left eye exhibits no discharge.   Neck: No JVD present. No thyromegaly present.   Cardiovascular: Normal rate and regular rhythm.   Pulmonary/Chest: No respiratory distress.   Abdominal: She exhibits no distension. There is no abdominal tenderness.   Neurological: She is alert and oriented to person, place, and time. No cranial nerve deficit.   Skin: Skin is warm and dry. She is not diaphoretic.   Jaundiced.   Psychiatric: Her behavior is normal.   Nursing note and vitals reviewed.      Assessment:     1. HOCM (hypertrophic obstructive cardiomyopathy) (HCC)     2. HTN (hypertension), malignant     3. 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 Type 2 diabetes mellitus without complication, unspecified whether long term insulin use (HCC)     5. High risk medication use     6. Cirrhosis of liver with ascites, unspecified hepatic cirrhosis type (HCC)     7. Hypertension, benign     8. Heart palpitations         Medical Decision Making:  Today's Assessment / Status / Plan:   Blood pressure is high and heart rate is not at goal.  We will restart Cardizem 120 mg p.o. once a day.  Continue with metoprolol 100 mg p.o. twice a day.  Heart rate goal of less than 70.  I also asked patient to bring bottles of medications to clinic in future visits so we know exactly which ones she is taking.  "

## 2021-09-25 ENCOUNTER — HOSPITAL ENCOUNTER (OUTPATIENT)
Dept: RADIOLOGY | Facility: MEDICAL CENTER | Age: 56
End: 2021-09-25
Attending: PHYSICIAN ASSISTANT
Payer: MEDICAID

## 2021-09-25 ENCOUNTER — HOSPITAL ENCOUNTER (OUTPATIENT)
Dept: LAB | Facility: MEDICAL CENTER | Age: 56
End: 2021-09-25
Attending: PHYSICIAN ASSISTANT
Payer: MEDICAID

## 2021-09-25 DIAGNOSIS — I85.00 ESOPHAGEAL VARICES WITHOUT BLEEDING, UNSPECIFIED ESOPHAGEAL VARICES TYPE (HCC): ICD-10-CM

## 2021-09-25 DIAGNOSIS — D53.9 DEFICIENCY ANEMIA: ICD-10-CM

## 2021-09-25 DIAGNOSIS — K70.30 ALCOHOLIC CIRRHOSIS, UNSPECIFIED WHETHER ASCITES PRESENT (HCC): ICD-10-CM

## 2021-09-25 DIAGNOSIS — K31.A0 INTESTINAL METAPLASIA OF GASTRIC MUCOSA: ICD-10-CM

## 2021-09-25 DIAGNOSIS — R60.9 SWOLLEN: ICD-10-CM

## 2021-09-25 LAB
ALBUMIN SERPL BCP-MCNC: 4.2 G/DL (ref 3.2–4.9)
ALBUMIN/GLOB SERPL: 0.9 G/DL
ALP SERPL-CCNC: 140 U/L (ref 30–99)
ALT SERPL-CCNC: 30 U/L (ref 2–50)
ANION GAP SERPL CALC-SCNC: 11 MMOL/L (ref 7–16)
AST SERPL-CCNC: 24 U/L (ref 12–45)
BASOPHILS # BLD AUTO: 0.4 % (ref 0–1.8)
BASOPHILS # BLD: 0.03 K/UL (ref 0–0.12)
BILIRUB SERPL-MCNC: 0.3 MG/DL (ref 0.1–1.5)
BUN SERPL-MCNC: 22 MG/DL (ref 8–22)
CALCIUM SERPL-MCNC: 9.7 MG/DL (ref 8.5–10.5)
CHLORIDE SERPL-SCNC: 106 MMOL/L (ref 96–112)
CO2 SERPL-SCNC: 23 MMOL/L (ref 20–33)
CREAT SERPL-MCNC: 1.26 MG/DL (ref 0.5–1.4)
EOSINOPHIL # BLD AUTO: 0.34 K/UL (ref 0–0.51)
EOSINOPHIL NFR BLD: 4.6 % (ref 0–6.9)
ERYTHROCYTE [DISTWIDTH] IN BLOOD BY AUTOMATED COUNT: 51 FL (ref 35.9–50)
GGT SERPL-CCNC: 63 U/L (ref 7–34)
GLOBULIN SER CALC-MCNC: 4.7 G/DL (ref 1.9–3.5)
GLUCOSE SERPL-MCNC: 129 MG/DL (ref 65–99)
HCT VFR BLD AUTO: 36 % (ref 37–47)
HGB BLD-MCNC: 11 G/DL (ref 12–16)
IMM GRANULOCYTES # BLD AUTO: 0.03 K/UL (ref 0–0.11)
IMM GRANULOCYTES NFR BLD AUTO: 0.4 % (ref 0–0.9)
INR PPP: 1.01 (ref 0.87–1.13)
IRON SATN MFR SERPL: 15 % (ref 15–55)
IRON SERPL-MCNC: 45 UG/DL (ref 40–170)
LYMPHOCYTES # BLD AUTO: 2.5 K/UL (ref 1–4.8)
LYMPHOCYTES NFR BLD: 34 % (ref 22–41)
MCH RBC QN AUTO: 28.3 PG (ref 27–33)
MCHC RBC AUTO-ENTMCNC: 30.6 G/DL (ref 33.6–35)
MCV RBC AUTO: 92.5 FL (ref 81.4–97.8)
MONOCYTES # BLD AUTO: 0.37 K/UL (ref 0–0.85)
MONOCYTES NFR BLD AUTO: 5 % (ref 0–13.4)
NEUTROPHILS # BLD AUTO: 4.09 K/UL (ref 2–7.15)
NEUTROPHILS NFR BLD: 55.6 % (ref 44–72)
NRBC # BLD AUTO: 0 K/UL
NRBC BLD-RTO: 0 /100 WBC
PLATELET # BLD AUTO: 231 K/UL (ref 164–446)
PMV BLD AUTO: 12 FL (ref 9–12.9)
POTASSIUM SERPL-SCNC: 4.9 MMOL/L (ref 3.6–5.5)
PROT SERPL-MCNC: 8.9 G/DL (ref 6–8.2)
PROTHROMBIN TIME: 13 SEC (ref 12–14.6)
RBC # BLD AUTO: 3.89 M/UL (ref 4.2–5.4)
SODIUM SERPL-SCNC: 140 MMOL/L (ref 135–145)
TIBC SERPL-MCNC: 296 UG/DL (ref 250–450)
UIBC SERPL-MCNC: 251 UG/DL (ref 110–370)
WBC # BLD AUTO: 7.4 K/UL (ref 4.8–10.8)

## 2021-09-25 PROCEDURE — 85610 PROTHROMBIN TIME: CPT

## 2021-09-25 PROCEDURE — 83516 IMMUNOASSAY NONANTIBODY: CPT

## 2021-09-25 PROCEDURE — 82977 ASSAY OF GGT: CPT

## 2021-09-25 PROCEDURE — 85025 COMPLETE CBC W/AUTO DIFF WBC: CPT

## 2021-09-25 PROCEDURE — 84075 ASSAY ALKALINE PHOSPHATASE: CPT

## 2021-09-25 PROCEDURE — 84080 ASSAY ALKALINE PHOSPHATASES: CPT

## 2021-09-25 PROCEDURE — 80053 COMPREHEN METABOLIC PANEL: CPT

## 2021-09-25 PROCEDURE — 36415 COLL VENOUS BLD VENIPUNCTURE: CPT

## 2021-09-25 PROCEDURE — 76700 US EXAM ABDOM COMPLETE: CPT

## 2021-09-25 PROCEDURE — 82107 ALPHA-FETOPROTEIN L3: CPT

## 2021-09-25 PROCEDURE — 83550 IRON BINDING TEST: CPT

## 2021-09-25 PROCEDURE — 83540 ASSAY OF IRON: CPT

## 2021-09-27 LAB — MITOCHONDRIA M2 IGG SER-ACNC: 124.2 UNITS (ref 0–24.9)

## 2021-09-29 LAB
AFP L3 MFR SERPL: <0.5 % (ref 0–9.9)
AFP SERPL-MCNC: 3 NG/ML (ref 0–15)
ALP BONE SERPL-CCNC: 50 U/L (ref 0–55)
ALP ISOS SERPL HS-CCNC: 0 U/L
ALP LIVER SERPL-CCNC: 90 U/L (ref 0–94)
ALP SERPL-CCNC: 140 U/L (ref 40–120)

## 2021-10-10 ENCOUNTER — HOSPITAL ENCOUNTER (OUTPATIENT)
Dept: RADIOLOGY | Facility: MEDICAL CENTER | Age: 56
End: 2021-10-10
Payer: MEDICAID

## 2021-10-10 DIAGNOSIS — N10 ACUTE PN (PYELONEPHRITIS): ICD-10-CM

## 2021-10-10 PROCEDURE — 76775 US EXAM ABDO BACK WALL LIM: CPT

## 2021-10-15 ENCOUNTER — PRE-ADMISSION TESTING (OUTPATIENT)
Dept: ADMISSIONS | Facility: MEDICAL CENTER | Age: 56
End: 2021-10-15
Attending: INTERNAL MEDICINE
Payer: MEDICAID

## 2021-10-15 NOTE — PREPROCEDURE INSTRUCTIONS
"Preadmit Phone appointment: \" Preparing for your Procedure information\" Instructions discussed with Patient.  Could not go over medications with patient as she is a poor historian and her daughter will have to contact this office next week to goover medications. Also not able to make appointment for labs and EKG.     Pt states, \"no issues with anesthesia\".   Emailed all instructions and video.  COVID test and labs yet to be done as this was a telephone appointment and patient does not know when she will be able to get to Pre Admit lab.     email to daughter of patient:    Maximiliano Sales,    Please contact out Pre-Admit Dept next week so we can discuss your mothers’ medications and the possibility of making an appointment to bring her in for labs / EKG before her procedure with Dr. Castro on   October 29, 2021. Also Please find the video link we discussed at the link below. Enclosed are some additional documents that may be helpful.     Thank you,      Response from Dr Mendoza:    Not having enough information regarding this patient’s overall health status makes it very difficult for me to make any comment. In general, however, I can say that this case should be delayed until the work up for hypertrophic cardiomyopathy is complete by the cardiologist and therefore this case should be delayed until we see those results. Thank you.  Kat Mendoza M.D.  [facility]      On Oct 15, 2021, at 17:21, Kat Mendoza [email or web address] wrote:  ?   Kat Mendoza M.D.   [facility]      On Oct 15, 2021, at 15:58, [email or web address] <[email or web address] wrote:  ?   Maximiliano Mendoza,   Patient Rigoberto Adames mrn [id number] is having a gastroscopy on 10/29/21. Just got off the phone with an  as patient speaks Polish with no English. Patient was uncertain of her medications and not clear about her health history (poor historian). Daughter will contact " this office next week to help with medication regiment. Patient has various comorbidities including ETOH liver disease, DMII w/ history of DKA, acute pyelonephritis, and cardiac hx incluing MI… She has an appointment with  To November 15, 2021  at [facility]- Diagnosis: obstructive hypertrophic cardiomyopathy. Unfortunately this is for after her procedure with Dr. Castro. Also we do not have any labs done at this point or EKG since patient did not come in person. What are your recommendations?       Thank you,

## 2021-11-17 ENCOUNTER — TELEPHONE (OUTPATIENT)
Dept: CARDIOLOGY | Facility: MEDICAL CENTER | Age: 56
End: 2021-11-17

## 2021-11-17 DIAGNOSIS — K74.60 CIRRHOSIS OF LIVER WITHOUT ASCITES, UNSPECIFIED HEPATIC CIRRHOSIS TYPE (HCC): ICD-10-CM

## 2021-11-17 DIAGNOSIS — R76.8 ANTIMITOCHONDRIAL ANTIBODY POSITIVE: ICD-10-CM

## 2021-11-17 DIAGNOSIS — K76.0 FATTY (CHANGE OF) LIVER, NOT ELSEWHERE CLASSIFIED: ICD-10-CM

## 2021-11-17 NOTE — PROGRESS NOTES
Pt seen in [facility]  For cirrhosis w hx of AUD  onging RUQ discomfort and fullness  +AMA  CT triphasic ordered, hepatoma screening  Elza Rodriguez M.D.

## 2021-11-17 NOTE — TELEPHONE ENCOUNTER
Clearance request received from [facility] for pt's colonoscopy w/ propofol. Fax clearance response to [phone].    To TT, ok for pt to proceed?

## 2021-11-19 NOTE — TELEPHONE ENCOUNTER
Sung Dowell M.D.  You 50 minutes ago (2:42 PM)     yes    Message text      Clearance response faxed, receipt confirmed.

## 2021-12-03 ENCOUNTER — APPOINTMENT (OUTPATIENT)
Dept: RADIOLOGY | Facility: MEDICAL CENTER | Age: 56
End: 2021-12-03
Attending: INTERNAL MEDICINE
Payer: MEDICAID

## 2021-12-03 ENCOUNTER — OFFICE VISIT (OUTPATIENT)
Dept: CARDIOLOGY | Facility: MEDICAL CENTER | Age: 56
End: 2021-12-03
Payer: MEDICAID

## 2021-12-03 VITALS
WEIGHT: 124 LBS | OXYGEN SATURATION: 99 % | DIASTOLIC BLOOD PRESSURE: 124 MMHG | RESPIRATION RATE: 14 BRPM | BODY MASS INDEX: 19.46 KG/M2 | HEART RATE: 82 BPM | SYSTOLIC BLOOD PRESSURE: 184 MMHG | HEIGHT: 67 IN

## 2021-12-03 DIAGNOSIS — I42.1 HOCM (HYPERTROPHIC OBSTRUCTIVE CARDIOMYOPATHY) (HCC): ICD-10-CM

## 2021-12-03 DIAGNOSIS — K70.9 LIVER DISEASE DUE TO ALCOHOL (HCC): ICD-10-CM

## 2021-12-03 DIAGNOSIS — E08.00 DIABETES MELLITUS DUE TO UNDERLYING CONDITION WITH HYPEROSMOLARITY WITHOUT COMA, WITHOUT LONG-TERM CURRENT USE OF INSULIN (HCC): ICD-10-CM

## 2021-12-03 DIAGNOSIS — Z79.899 HIGH RISK MEDICATION USE: ICD-10-CM

## 2021-12-03 DIAGNOSIS — I10 HYPERTENSION, BENIGN: ICD-10-CM

## 2021-12-03 PROCEDURE — 99214 OFFICE O/P EST MOD 30 MIN: CPT | Performed by: NURSE PRACTITIONER

## 2021-12-03 RX ORDER — OMEPRAZOLE 40 MG/1
40 CAPSULE, DELAYED RELEASE ORAL DAILY
COMMUNITY
End: 2022-08-05

## 2021-12-03 RX ORDER — GABAPENTIN 300 MG/1
CAPSULE ORAL
COMMUNITY
Start: 2021-08-16 | End: 2022-03-18

## 2021-12-03 RX ORDER — DILTIAZEM HYDROCHLORIDE 120 MG/1
120 CAPSULE, COATED, EXTENDED RELEASE ORAL DAILY
Qty: 90 CAPSULE | Refills: 4 | Status: SHIPPED | OUTPATIENT
Start: 2021-12-03 | End: 2022-03-18

## 2021-12-03 ASSESSMENT — FIBROSIS 4 INDEX: FIB4 SCORE: 1.04

## 2021-12-03 NOTE — PROGRESS NOTES
Cardiology Clinic Follow-up Note    Date of note:    12/3/2021  Primary Care Provider: Lars Vaz M.D.    Name:             Rigoberto Adames  YOB: 1965  MRN:               [id number]    CC: 3 month F/U HOCM and HTN    Primary Cardiologist: Dr. Dowell     Patient HPI:   Rigoberto Adames is a 56 y.o. 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 Adames was last seen in this cardiology office by Dr Dowell on 8/9/2021 .  At that time she had not been taking her Cardizem and her BP was extremely elevated 176/90 heart rate was not at goal of less than 70.    Pt has not taken her BP medications yesterday or today b/c she is moving. Says she haven't taken her diltiazem in over 2 weeks.     She follows with a liver Doctor.     Patient endorses medication compliance    Cardiovascular Risk Factors:  1. Smoking status: former  2. Type II Diabetes Mellitus: yes   Lab Results   Component Value Date/Time    HBA1C 7.3 (H) 07/16/2021 10:38 AM    HBA1C 7.6 (H) 08/31/2020 02:29 PM     3. Hypertension: yes  4. Dyslipidemia: yes  Cholesterol,Tot   Date Value Ref Range Status   02/19/2019 126 100 - 199 mg/dL Final     LDL   Date Value Ref Range Status   02/19/2019 68 <100 mg/dL Final     HDL   Date Value Ref Range Status   02/19/2019 36 (A) >=40 mg/dL Final     Triglycerides   Date Value Ref Range Status   02/19/2019 109 0 - 149 mg/dL Final     5. Family history of early Coronary Artery Disease in a first degree relative (Male less than 55 years of age; Female less than 65 years of age): No  6.  Obesity and/or Metabolic Syndrome: no  7. Sedentary lifestyle: no    Review of systems:  All others systems reviewed and negative except for what is outlined in the above HPI    Past Medical History:   Diagnosis Date   • Cataract     Bilateral   • Dental disorder     Upper   • Diabetes mellitus, type  2 (HCC)     Oral medications   • diarrhea 2019    been going on for about a year   • Heart murmur    • HOCM (hypertrophic obstructive cardiomyopathy) (HCC)    • Hypertension      Past Surgical History:   Procedure Laterality Date   • PB UPPER GI ENDOSCOPY,LIGAT VARIX  2019    Procedure: GASTROSCOPY, WITH VARICEAL BANDING - WITH GASTRIC MAPPING;  Surgeon: Sandi Espinoza M.D.;  Location: SURGERY [facility];  Service: Gastroenterology   • GASTROSCOPY-ENDO  2019    Procedure: GASTROSCOPY;  Surgeon: Sandi Espinoza M.D.;  Location: SURGERY [facility];  Service: Gastroenterology   • PRIMARY C SECTION  2006   • OTHER      c- section     Family History   Problem Relation Age of Onset   • Cancer Father         stomach   • Hypertension Mother      Social History     Socioeconomic History   • Marital status:      Spouse name: Not on file   • Number of children: Not on file   • Years of education: Not on file   • Highest education level: Not on file   Occupational History   • Occupation: restaurant owner   Tobacco Use   • Smoking status: Former Smoker     Packs/day: 1.00     Years: 10.00     Pack years: 10.00     Types: Cigarettes     Start date: 2015     Quit date: 2020     Years since quittin.5   • Smokeless tobacco: Never Used   Vaping Use   • Vaping Use: Never used   Substance and Sexual Activity   • Alcohol use: Not Currently     Alcohol/week: 0.6 oz     Types: 1 Glasses of wine per week   • Drug use: No   • Sexual activity: Not on file   Other Topics Concern   • Not on file   Social History Narrative    Single- 2 children.     Social Determinants of Health     Financial Resource Strain:    • Difficulty of Paying Living Expenses: Not on file   Food Insecurity:    • Worried About Running Out of Food in the Last Year: Not on file   • Ran Out of Food in the Last Year: Not on file   Transportation Needs:    • Lack of Transportation (Medical): Not on file   • Lack of  Transportation (Non-Medical): Not on file   Physical Activity:    • Days of Exercise per Week: Not on file   • Minutes of Exercise per Session: Not on file   Stress:    • Feeling of Stress : Not on file   Social Connections:    • Frequency of Communication with Friends and Family: Not on file   • Frequency of Social Gatherings with Friends and Family: Not on file   • Attends Nondenominational Services: Not on file   • Active Member of Clubs or Organizations: Not on file   • Attends Club or Organization Meetings: Not on file   • Marital Status: Not on file   Intimate Partner Violence:    • Fear of Current or Ex-Partner: Not on file   • Emotionally Abused: Not on file   • Physically Abused: Not on file   • Sexually Abused: Not on file   Housing Stability:    • Unable to Pay for Housing in the Last Year: Not on file   • Number of Places Lived in the Last Year: Not on file   • Unstable Housing in the Last Year: Not on file     No Known Allergies  Current Outpatient Medications   Medication Sig Dispense Refill   • omeprazole (PRILOSEC) 40 MG delayed-release capsule Take 40 mg by mouth every day.     • ascorbic acid (VITAMIN C) 500 MG tablet Take 500 mg by mouth.     • metformin (GLUCOPHAGE) 1000 MG tablet Take 1,000 mg by mouth every day. Twice a day      • metoprolol tartrate (LOPRESSOR) 100 MG Tab Take 1 tablet by mouth 2 times a day. (Patient taking differently: Take 100 mg by mouth every day.) 180 tablet 4   • lisinopril (PRINIVIL) 5 MG Tab Take 1 tablet by mouth every day. 90 tablet 4   • oxybutynin SR (DITROPAN-XL) 10 MG CR tablet Take 10 mg by mouth every day.     • atorvastatin (LIPITOR) 20 MG Tab TAKE 1 TABLET BY MOUTH AT BEDTIME 90 Tab 0   • vitamin D, Ergocalciferol, (DRISDOL) 16920 units Cap capsule Take 1 Cap by mouth every 28 days. 3 Cap 3   • DILTIAZem CD (CARDIZEM CD) 120 MG CAPSULE SR 24 HR Take 1 capsule by mouth every day. (Patient not taking: Reported on 12/3/2021) 90 capsule 4   • TRUE METRIX BLOOD GLUCOSE  "TEST strip  (Patient not taking: Reported on 12/3/2021)     • Blood Glucose Meter Kit Test blood sugar as recommended by provider. True Metrix blood glucose monitoring kit. (Patient not taking: Reported on 12/3/2021) 1 Kit 0   • Blood Glucose Test Strips Use one True Metrix strip to test blood sugar three times daily before meals. (Patient not taking: Reported on 12/3/2021) 100 Strip 0     No current facility-administered medications for this visit.       Physical Exam:  Ambulatory Vitals  BP (!) 184/124 (BP Location: Left arm, Patient Position: Sitting, BP Cuff Size: Adult)   Pulse 82   Resp 14   Ht 1.702 m (5' 7\")   Wt 56.2 kg (124 lb)   SpO2 99%    BP Readings from Last 4 Encounters:   12/03/21 (!) 184/124   08/09/21 (!) 176/90   02/12/21 118/70   01/04/21 148/72       Weight/BMI: Body mass index is 19.42 kg/m².  Wt Readings from Last 4 Encounters:   12/03/21 56.2 kg (124 lb)   08/09/21 56.2 kg (124 lb)   02/12/21 53.3 kg (117 lb 6.4 oz)   01/04/21 55.3 kg (122 lb)     General: No apparent distress. Well nourished. Thin  Neck: No JVD. No caroid bruits, trachea midline  Lungs: CTAB. Normal effort, without crackles/rhonchi, no wheezing  Heart: RRR. left upper sternal border high-pitched murmur, no rub. no lower extremity edema. 2+ radial pulses, 2+ DT pulses  Ext: No clubbing or cyanosis.  Abdomen: soft, non tender, non distended, no al hepatomegaly.  Neurological: No focal deficits, no facial asymmetry.  Normal speech.  Psychiatric: Appropriate affect, alert and oriented x 4.   Skin: Warm and dry, no rash.    Lab Data Review:  Lab Results   Component Value Date/Time    CHOLSTRLTOT 126 02/19/2019 11:53 AM    LDL 68 02/19/2019 11:53 AM    HDL 36 (A) 02/19/2019 11:53 AM    TRIGLYCERIDE 109 02/19/2019 11:53 AM       Lab Results   Component Value Date/Time    SODIUM 140 09/25/2021 10:18 AM    POTASSIUM 4.9 09/25/2021 10:18 AM    CHLORIDE 106 09/25/2021 10:18 AM    CO2 23 09/25/2021 10:18 AM    GLUCOSE 129 (H) " 2021 10:18 AM    BUN 22 2021 10:18 AM    CREATININE 1.26 2021 10:18 AM    CREATININE 0.5 2006 05:10 PM     Lab Results   Component Value Date/Time    ALKPHOSPHAT 140 (H) 2021 10:18 AM    ASTSGOT 24 2021 10:18 AM    ALTSGPT 30 2021 10:18 AM    TBILIRUBIN 0.3 2021 10:18 AM      Lab Results   Component Value Date/Time    WBC 7.4 2021 10:18 AM     Cardiac Imaging and Procedures Review:      Echo 2020:  Compared to the report of the study done 2019  there has been no   significant change. LVOT ibstruction also noted on prior echo.  Severe concentric left ventricular hypertrophy.  Hyperdynamic left ventricular systolic function.  Left ventricular ejection fraction is visually 75%.  Evidence of LVOT obstruction with 23 mm gradient without valsalva  Mitral annular calcification.  Mild mitral regurgitation.  Mitral annular calcification.  Structurally normal aortic valve.  Mild tricuspid regurgitation.  Estimated right ventricular systolic pressure  is 55 mmHg.  Trivial pericardial effusion.    Stress Test 2016:  No evidence of significant jeopardized viable myocardium or prior myocardial    infarction.   Normal left ventricular size, ejection fraction, and wall motion.    Cardiac MRI 2014  1.  Narrowing of the aortic outflow tract secondary to mid and basal asymmetric septal hypertrophy and systolic anterior motion of the anterior leaflet of the mitral valve.     2.  Mild concentric left ventricular hypertrophy.     3.  No myocardial scarring is identified.     4.  Hyperdynamic left ventricle with an ejection fraction of 74%.     5.  Moderate mitral regurgitation.     6.  Mild tricuspid regurgitation.    Assessment and Clinical Decision Makin. Hypertension, benign     2. HOCM (hypertrophic obstructive cardiomyopathy) (HCC)     3. 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 High risk medication use     5. Diabetes mellitus due to underlying condition with  hyperosmolarity without coma, without long-term current use of insulin (HCC)       The following treatment plan was discussed    HTN  -Uncontrolled BP today 184/124  -States she has not taken her medications in 2 days, history of noncompliance  -Has not taken her diltiazem in 2 weeks  -Discussed extreme importance of medication adherence to maintain her blood pressure given her HOCM  -Goal BP < 130/80    HOCM  -Genetic cardiomyopathy  -Avoid afterload reduction  -Adherence to Lopressor 100mg BID, heart rate goal < 65.  Not at goal today   -Continue liberal hydration, avoid dehydration  -Can discussed the importance of medication adherence for her very serious cardiac condition    High risk medication use  -This includes BP medications  -Will continue to closely monitor for side effects of patient's high risk medication(s) including liver, renal function and electrolytes  -Close monitoring discussed with patient.  Lab work ordered.    DM2  -HgA1c much better control at 7.3, last year peaked at 17.6  -Blood sugar control per PCP    Plan reviewed in detail with the patient, verbalizes understanding and is in agreement.  Pt is to follow up with Dr Dowell in 3 months  Encouraged Pt to follow up with us over the phone or electronically using my Lydiahart as cardiac issues/concerns arise.      PLEASE NOTE: This dictation was created using voice recognition software. I have made every reasonable attempt to correct obvious errors, but I expect that there are errors of grammar and possibly content that I did not discover before finalizing the note.       BRAD Santoro.   [facility]  [phone]    Collaborating Physician: Dr. Shepherd

## 2021-12-10 ENCOUNTER — HOSPITAL ENCOUNTER (OUTPATIENT)
Dept: LAB | Facility: MEDICAL CENTER | Age: 56
End: 2021-12-10
Attending: INTERNAL MEDICINE
Payer: MEDICAID

## 2021-12-10 DIAGNOSIS — K76.0 FATTY (CHANGE OF) LIVER, NOT ELSEWHERE CLASSIFIED: ICD-10-CM

## 2021-12-10 DIAGNOSIS — K74.60 CIRRHOSIS OF LIVER WITHOUT ASCITES, UNSPECIFIED HEPATIC CIRRHOSIS TYPE (HCC): ICD-10-CM

## 2021-12-10 DIAGNOSIS — R76.8 ANTIMITOCHONDRIAL ANTIBODY POSITIVE: ICD-10-CM

## 2021-12-10 LAB — ERYTHROCYTE [SEDIMENTATION RATE] IN BLOOD BY WESTERGREN METHOD: 70 MM/HOUR (ref 0–25)

## 2021-12-10 PROCEDURE — 82784 ASSAY IGA/IGD/IGG/IGM EACH: CPT

## 2021-12-10 PROCEDURE — 86038 ANTINUCLEAR ANTIBODIES: CPT

## 2021-12-10 PROCEDURE — 83516 IMMUNOASSAY NONANTIBODY: CPT

## 2021-12-10 PROCEDURE — 85652 RBC SED RATE AUTOMATED: CPT

## 2021-12-10 PROCEDURE — 80053 COMPREHEN METABOLIC PANEL: CPT

## 2021-12-10 PROCEDURE — 36415 COLL VENOUS BLD VENIPUNCTURE: CPT

## 2021-12-11 ENCOUNTER — HOSPITAL ENCOUNTER (OUTPATIENT)
Dept: RADIOLOGY | Facility: MEDICAL CENTER | Age: 56
End: 2021-12-11
Attending: INTERNAL MEDICINE
Payer: MEDICAID

## 2021-12-11 DIAGNOSIS — K76.0 FATTY (CHANGE OF) LIVER, NOT ELSEWHERE CLASSIFIED: ICD-10-CM

## 2021-12-11 LAB
ALBUMIN SERPL BCP-MCNC: 3.7 G/DL (ref 3.2–4.9)
ALBUMIN/GLOB SERPL: 1.1 G/DL
ALP SERPL-CCNC: 139 U/L (ref 30–99)
ALT SERPL-CCNC: 45 U/L (ref 2–50)
ANION GAP SERPL CALC-SCNC: 12 MMOL/L (ref 7–16)
AST SERPL-CCNC: 40 U/L (ref 12–45)
BILIRUB SERPL-MCNC: 0.2 MG/DL (ref 0.1–1.5)
BUN SERPL-MCNC: 36 MG/DL (ref 8–22)
CALCIUM SERPL-MCNC: 8.8 MG/DL (ref 8.5–10.5)
CHLORIDE SERPL-SCNC: 108 MMOL/L (ref 96–112)
CO2 SERPL-SCNC: 20 MMOL/L (ref 20–33)
CREAT SERPL-MCNC: 1.25 MG/DL (ref 0.5–1.4)
FASTING STATUS PATIENT QL REPORTED: NORMAL
GLOBULIN SER CALC-MCNC: 3.5 G/DL (ref 1.9–3.5)
GLUCOSE SERPL-MCNC: 158 MG/DL (ref 65–99)
POTASSIUM SERPL-SCNC: 4.6 MMOL/L (ref 3.6–5.5)
PROT SERPL-MCNC: 7.2 G/DL (ref 6–8.2)
SODIUM SERPL-SCNC: 140 MMOL/L (ref 135–145)

## 2021-12-11 PROCEDURE — 700117 HCHG RX CONTRAST REV CODE 255: Performed by: INTERNAL MEDICINE

## 2021-12-11 PROCEDURE — 74160 CT ABDOMEN W/CONTRAST: CPT

## 2021-12-11 RX ADMIN — IOHEXOL 80 ML: 350 INJECTION, SOLUTION INTRAVENOUS at 15:22

## 2021-12-12 LAB
IGG SERPL-MCNC: 1559 MG/DL (ref 768–1632)
IGM SERPL-MCNC: 504 MG/DL (ref 35–263)

## 2021-12-13 LAB
NUCLEAR IGG SER QL IA: NORMAL
SMA IGG SER-ACNC: 15 UNITS (ref 0–19)

## 2021-12-22 DIAGNOSIS — R76.8 ANTIMITOCHONDRIAL ANTIBODY POSITIVE: ICD-10-CM

## 2021-12-22 DIAGNOSIS — K74.3 PRIMARY BILIARY CHOLANGITIS (HCC): ICD-10-CM

## 2022-02-03 RX ORDER — ASCORBIC ACID 500 MG
TABLET ORAL
Qty: 30 TABLET | Refills: 0 | Status: SHIPPED | OUTPATIENT
Start: 2022-02-03 | End: 2022-03-18

## 2022-03-01 ENCOUNTER — PRE-ADMISSION TESTING (OUTPATIENT)
Dept: ADMISSIONS | Facility: MEDICAL CENTER | Age: 57
End: 2022-03-01
Attending: INTERNAL MEDICINE
Payer: MEDICAID

## 2022-03-01 DIAGNOSIS — Z01.812 PRE-OPERATIVE LABORATORY EXAMINATION: ICD-10-CM

## 2022-03-01 DIAGNOSIS — Z01.810 PRE-OPERATIVE CARDIOVASCULAR EXAMINATION: ICD-10-CM

## 2022-03-01 LAB
ALBUMIN SERPL BCP-MCNC: 3.4 G/DL (ref 3.2–4.9)
ALBUMIN/GLOB SERPL: 0.8 G/DL
ALP SERPL-CCNC: 94 U/L (ref 30–99)
ALT SERPL-CCNC: 10 U/L (ref 2–50)
ANION GAP SERPL CALC-SCNC: 11 MMOL/L (ref 7–16)
AST SERPL-CCNC: 23 U/L (ref 12–45)
BASOPHILS # BLD AUTO: 0.5 % (ref 0–1.8)
BASOPHILS # BLD: 0.03 K/UL (ref 0–0.12)
BILIRUB SERPL-MCNC: 0.3 MG/DL (ref 0.1–1.5)
BUN SERPL-MCNC: 32 MG/DL (ref 8–22)
CALCIUM SERPL-MCNC: 8.7 MG/DL (ref 8.4–10.2)
CHLORIDE SERPL-SCNC: 108 MMOL/L (ref 96–112)
CO2 SERPL-SCNC: 20 MMOL/L (ref 20–33)
CREAT SERPL-MCNC: 1.47 MG/DL (ref 0.5–1.4)
EKG IMPRESSION: NORMAL
EOSINOPHIL # BLD AUTO: 0.37 K/UL (ref 0–0.51)
EOSINOPHIL NFR BLD: 5.7 % (ref 0–6.9)
ERYTHROCYTE [DISTWIDTH] IN BLOOD BY AUTOMATED COUNT: 49.3 FL (ref 35.9–50)
GLOBULIN SER CALC-MCNC: 4.2 G/DL (ref 1.9–3.5)
GLUCOSE SERPL-MCNC: 106 MG/DL (ref 65–99)
HCT VFR BLD AUTO: 35.8 % (ref 37–47)
HGB BLD-MCNC: 11.2 G/DL (ref 12–16)
IMM GRANULOCYTES # BLD AUTO: 0.01 K/UL (ref 0–0.11)
IMM GRANULOCYTES NFR BLD AUTO: 0.2 % (ref 0–0.9)
LYMPHOCYTES # BLD AUTO: 2.35 K/UL (ref 1–4.8)
LYMPHOCYTES NFR BLD: 35.9 % (ref 22–41)
MCH RBC QN AUTO: 30.1 PG (ref 27–33)
MCHC RBC AUTO-ENTMCNC: 31.3 G/DL (ref 33.6–35)
MCV RBC AUTO: 96.2 FL (ref 81.4–97.8)
MONOCYTES # BLD AUTO: 0.41 K/UL (ref 0–0.85)
MONOCYTES NFR BLD AUTO: 6.3 % (ref 0–13.4)
NEUTROPHILS # BLD AUTO: 3.37 K/UL (ref 2–7.15)
NEUTROPHILS NFR BLD: 51.4 % (ref 44–72)
NRBC # BLD AUTO: 0 K/UL
NRBC BLD-RTO: 0 /100 WBC
PLATELET # BLD AUTO: 249 K/UL (ref 164–446)
PMV BLD AUTO: 11.1 FL (ref 9–12.9)
POTASSIUM SERPL-SCNC: 4.4 MMOL/L (ref 3.6–5.5)
PROT SERPL-MCNC: 7.6 G/DL (ref 6–8.2)
RBC # BLD AUTO: 3.72 M/UL (ref 4.2–5.4)
SODIUM SERPL-SCNC: 139 MMOL/L (ref 135–145)
WBC # BLD AUTO: 6.5 K/UL (ref 4.8–10.8)

## 2022-03-01 PROCEDURE — U0003 INFECTIOUS AGENT DETECTION BY NUCLEIC ACID (DNA OR RNA); SEVERE ACUTE RESPIRATORY SYNDROME CORONAVIRUS 2 (SARS-COV-2) (CORONAVIRUS DISEASE [COVID-19]), AMPLIFIED PROBE TECHNIQUE, MAKING USE OF HIGH THROUGHPUT TECHNOLOGIES AS DESCRIBED BY CMS-2020-01-R: HCPCS

## 2022-03-01 PROCEDURE — 93010 ELECTROCARDIOGRAM REPORT: CPT | Performed by: INTERNAL MEDICINE

## 2022-03-01 PROCEDURE — C9803 HOPD COVID-19 SPEC COLLECT: HCPCS

## 2022-03-01 PROCEDURE — U0005 INFEC AGEN DETEC AMPLI PROBE: HCPCS

## 2022-03-01 PROCEDURE — 93005 ELECTROCARDIOGRAM TRACING: CPT

## 2022-03-01 PROCEDURE — 85025 COMPLETE CBC W/AUTO DIFF WBC: CPT

## 2022-03-01 PROCEDURE — 36415 COLL VENOUS BLD VENIPUNCTURE: CPT

## 2022-03-01 PROCEDURE — 80053 COMPREHEN METABOLIC PANEL: CPT

## 2022-03-01 RX ORDER — URSODIOL 500 MG/1
TABLET, FILM COATED ORAL DAILY
COMMUNITY
End: 2023-05-03

## 2022-03-01 ASSESSMENT — FIBROSIS 4 INDEX: FIB4 SCORE: 1.45

## 2022-03-01 NOTE — PREPROCEDURE INSTRUCTIONS
Pre admit apt: Pt. Instructed to continue regularly prescribed medications through day before surgery.  Instructed to take the following medications, the day of surgery, with a sip of water per anesthesia protocol:diltiazem, gabapentin, metoprolol, omeprazole, ditropan    Pt states no previous issues with anesthesia  METS-4

## 2022-03-01 NOTE — OR NURSING
Called Dr. Rhodes's office and left message with Jeane.  Pt kept asking, during PAT apt if Dr. Hensley was going to do a procedure, to assess her liver.  Pt saw her liver physician in [place] in 12/21.  At that time, pt was told by Dr. Elza Mayes, she was putting in a referral for Dr. Hensley to assess her liver, possible ultrasound.  I explained to pt, I would call the office and relay this information.

## 2022-03-02 LAB
SARS-COV-2 RNA RESP QL NAA+PROBE: NOTDETECTED
SPECIMEN SOURCE: NORMAL

## 2022-03-04 ENCOUNTER — ANESTHESIA (OUTPATIENT)
Dept: SURGERY | Facility: MEDICAL CENTER | Age: 57
End: 2022-03-04
Payer: MEDICAID

## 2022-03-04 ENCOUNTER — HOSPITAL ENCOUNTER (OUTPATIENT)
Facility: MEDICAL CENTER | Age: 57
End: 2022-03-04
Attending: INTERNAL MEDICINE | Admitting: INTERNAL MEDICINE
Payer: MEDICAID

## 2022-03-04 ENCOUNTER — ANESTHESIA EVENT (OUTPATIENT)
Dept: SURGERY | Facility: MEDICAL CENTER | Age: 57
End: 2022-03-04
Payer: MEDICAID

## 2022-03-04 VITALS
DIASTOLIC BLOOD PRESSURE: 86 MMHG | SYSTOLIC BLOOD PRESSURE: 148 MMHG | RESPIRATION RATE: 16 BRPM | WEIGHT: 126.98 LBS | TEMPERATURE: 97.5 F | HEIGHT: 67 IN | OXYGEN SATURATION: 95 % | BODY MASS INDEX: 19.93 KG/M2 | HEART RATE: 81 BPM

## 2022-03-04 LAB
GLUCOSE BLD STRIP.AUTO-MCNC: 96 MG/DL (ref 65–99)
PATHOLOGY CONSULT NOTE: NORMAL

## 2022-03-04 PROCEDURE — 160009 HCHG ANES TIME/MIN: Performed by: INTERNAL MEDICINE

## 2022-03-04 PROCEDURE — 160002 HCHG RECOVERY MINUTES (STAT): Performed by: INTERNAL MEDICINE

## 2022-03-04 PROCEDURE — 160025 RECOVERY II MINUTES (STATS): Performed by: INTERNAL MEDICINE

## 2022-03-04 PROCEDURE — 700111 HCHG RX REV CODE 636 W/ 250 OVERRIDE (IP): Performed by: ANESTHESIOLOGY

## 2022-03-04 PROCEDURE — 160048 HCHG OR STATISTICAL LEVEL 1-5: Performed by: INTERNAL MEDICINE

## 2022-03-04 PROCEDURE — 88305 TISSUE EXAM BY PATHOLOGIST: CPT | Mod: 59

## 2022-03-04 PROCEDURE — 88312 SPECIAL STAINS GROUP 1: CPT

## 2022-03-04 PROCEDURE — 160036 HCHG PACU - EA ADDL 30 MINS PHASE I: Performed by: INTERNAL MEDICINE

## 2022-03-04 PROCEDURE — 160046 HCHG PACU - 1ST 60 MINS PHASE II: Performed by: INTERNAL MEDICINE

## 2022-03-04 PROCEDURE — 160035 HCHG PACU - 1ST 60 MINS PHASE I: Performed by: INTERNAL MEDICINE

## 2022-03-04 PROCEDURE — 82962 GLUCOSE BLOOD TEST: CPT

## 2022-03-04 PROCEDURE — 700105 HCHG RX REV CODE 258: Performed by: INTERNAL MEDICINE

## 2022-03-04 PROCEDURE — 160202 HCHG ENDO MINUTES - 1ST 30 MINS LEVEL 3: Performed by: INTERNAL MEDICINE

## 2022-03-04 PROCEDURE — 700101 HCHG RX REV CODE 250: Performed by: ANESTHESIOLOGY

## 2022-03-04 RX ORDER — ONDANSETRON 2 MG/ML
INJECTION INTRAMUSCULAR; INTRAVENOUS PRN
Status: DISCONTINUED | OUTPATIENT
Start: 2022-03-04 | End: 2022-03-04 | Stop reason: SURG

## 2022-03-04 RX ORDER — LABETALOL HYDROCHLORIDE 5 MG/ML
10 INJECTION, SOLUTION INTRAVENOUS
Status: DISCONTINUED | OUTPATIENT
Start: 2022-03-04 | End: 2022-03-04 | Stop reason: HOSPADM

## 2022-03-04 RX ORDER — DEXAMETHASONE SODIUM PHOSPHATE 4 MG/ML
INJECTION, SOLUTION INTRA-ARTICULAR; INTRALESIONAL; INTRAMUSCULAR; INTRAVENOUS; SOFT TISSUE PRN
Status: DISCONTINUED | OUTPATIENT
Start: 2022-03-04 | End: 2022-03-04 | Stop reason: SURG

## 2022-03-04 RX ORDER — HYDROMORPHONE HYDROCHLORIDE 1 MG/ML
0.2 INJECTION, SOLUTION INTRAMUSCULAR; INTRAVENOUS; SUBCUTANEOUS
Status: DISCONTINUED | OUTPATIENT
Start: 2022-03-04 | End: 2022-03-04 | Stop reason: HOSPADM

## 2022-03-04 RX ORDER — HYDROMORPHONE HYDROCHLORIDE 1 MG/ML
0.1 INJECTION, SOLUTION INTRAMUSCULAR; INTRAVENOUS; SUBCUTANEOUS
Status: DISCONTINUED | OUTPATIENT
Start: 2022-03-04 | End: 2022-03-04 | Stop reason: HOSPADM

## 2022-03-04 RX ORDER — MIDAZOLAM HYDROCHLORIDE 1 MG/ML
INJECTION INTRAMUSCULAR; INTRAVENOUS PRN
Status: DISCONTINUED | OUTPATIENT
Start: 2022-03-04 | End: 2022-03-04 | Stop reason: SURG

## 2022-03-04 RX ORDER — HALOPERIDOL 5 MG/ML
1 INJECTION INTRAMUSCULAR
Status: DISCONTINUED | OUTPATIENT
Start: 2022-03-04 | End: 2022-03-04 | Stop reason: HOSPADM

## 2022-03-04 RX ORDER — LABETALOL HYDROCHLORIDE 5 MG/ML
5 INJECTION, SOLUTION INTRAVENOUS
Status: DISCONTINUED | OUTPATIENT
Start: 2022-03-04 | End: 2022-03-04 | Stop reason: HOSPADM

## 2022-03-04 RX ORDER — DIPHENHYDRAMINE HYDROCHLORIDE 50 MG/ML
12.5 INJECTION INTRAMUSCULAR; INTRAVENOUS
Status: DISCONTINUED | OUTPATIENT
Start: 2022-03-04 | End: 2022-03-04 | Stop reason: HOSPADM

## 2022-03-04 RX ORDER — MEPERIDINE HYDROCHLORIDE 25 MG/ML
12.5 INJECTION INTRAMUSCULAR; INTRAVENOUS; SUBCUTANEOUS
Status: DISCONTINUED | OUTPATIENT
Start: 2022-03-04 | End: 2022-03-04 | Stop reason: HOSPADM

## 2022-03-04 RX ORDER — LABETALOL HYDROCHLORIDE 5 MG/ML
INJECTION, SOLUTION INTRAVENOUS
Status: DISCONTINUED
Start: 2022-03-04 | End: 2022-03-04 | Stop reason: HOSPADM

## 2022-03-04 RX ORDER — OXYCODONE HCL 5 MG/5 ML
10 SOLUTION, ORAL ORAL
Status: DISCONTINUED | OUTPATIENT
Start: 2022-03-04 | End: 2022-03-04 | Stop reason: HOSPADM

## 2022-03-04 RX ORDER — MIDAZOLAM HYDROCHLORIDE 1 MG/ML
1 INJECTION INTRAMUSCULAR; INTRAVENOUS
Status: DISCONTINUED | OUTPATIENT
Start: 2022-03-04 | End: 2022-03-04 | Stop reason: HOSPADM

## 2022-03-04 RX ORDER — LIDOCAINE HYDROCHLORIDE 20 MG/ML
INJECTION, SOLUTION EPIDURAL; INFILTRATION; INTRACAUDAL; PERINEURAL PRN
Status: DISCONTINUED | OUTPATIENT
Start: 2022-03-04 | End: 2022-03-04 | Stop reason: SURG

## 2022-03-04 RX ORDER — HYDRALAZINE HYDROCHLORIDE 20 MG/ML
5 INJECTION INTRAMUSCULAR; INTRAVENOUS
Status: DISCONTINUED | OUTPATIENT
Start: 2022-03-04 | End: 2022-03-04 | Stop reason: HOSPADM

## 2022-03-04 RX ORDER — ROCURONIUM BROMIDE 10 MG/ML
INJECTION, SOLUTION INTRAVENOUS PRN
Status: DISCONTINUED | OUTPATIENT
Start: 2022-03-04 | End: 2022-03-04 | Stop reason: SURG

## 2022-03-04 RX ORDER — ONDANSETRON 2 MG/ML
4 INJECTION INTRAMUSCULAR; INTRAVENOUS
Status: DISCONTINUED | OUTPATIENT
Start: 2022-03-04 | End: 2022-03-04 | Stop reason: HOSPADM

## 2022-03-04 RX ORDER — SODIUM CHLORIDE, SODIUM LACTATE, POTASSIUM CHLORIDE, CALCIUM CHLORIDE 600; 310; 30; 20 MG/100ML; MG/100ML; MG/100ML; MG/100ML
INJECTION, SOLUTION INTRAVENOUS CONTINUOUS
Status: DISCONTINUED | OUTPATIENT
Start: 2022-03-04 | End: 2022-03-04 | Stop reason: HOSPADM

## 2022-03-04 RX ORDER — METOPROLOL TARTRATE 1 MG/ML
1 INJECTION, SOLUTION INTRAVENOUS
Status: DISCONTINUED | OUTPATIENT
Start: 2022-03-04 | End: 2022-03-04 | Stop reason: HOSPADM

## 2022-03-04 RX ORDER — SODIUM CHLORIDE, SODIUM LACTATE, POTASSIUM CHLORIDE, CALCIUM CHLORIDE 600; 310; 30; 20 MG/100ML; MG/100ML; MG/100ML; MG/100ML
INJECTION, SOLUTION INTRAVENOUS CONTINUOUS
Status: DISCONTINUED | OUTPATIENT
Start: 2022-03-04 | End: 2022-03-04

## 2022-03-04 RX ORDER — OXYCODONE HCL 5 MG/5 ML
5 SOLUTION, ORAL ORAL
Status: DISCONTINUED | OUTPATIENT
Start: 2022-03-04 | End: 2022-03-04 | Stop reason: HOSPADM

## 2022-03-04 RX ORDER — HYDROMORPHONE HYDROCHLORIDE 1 MG/ML
0.4 INJECTION, SOLUTION INTRAMUSCULAR; INTRAVENOUS; SUBCUTANEOUS
Status: DISCONTINUED | OUTPATIENT
Start: 2022-03-04 | End: 2022-03-04 | Stop reason: HOSPADM

## 2022-03-04 RX ADMIN — PROPOFOL 120 MG: 10 INJECTION, EMULSION INTRAVENOUS at 10:53

## 2022-03-04 RX ADMIN — LABETALOL HYDROCHLORIDE 10 MG: 5 INJECTION, SOLUTION INTRAVENOUS at 10:16

## 2022-03-04 RX ADMIN — HYDRALAZINE HYDROCHLORIDE 5 MG: 20 INJECTION INTRAMUSCULAR; INTRAVENOUS at 11:54

## 2022-03-04 RX ADMIN — SODIUM CHLORIDE, POTASSIUM CHLORIDE, SODIUM LACTATE AND CALCIUM CHLORIDE: 600; 310; 30; 20 INJECTION, SOLUTION INTRAVENOUS at 10:39

## 2022-03-04 RX ADMIN — LIDOCAINE HYDROCHLORIDE 100 MG: 20 INJECTION, SOLUTION EPIDURAL; INFILTRATION; INTRACAUDAL; PERINEURAL at 10:53

## 2022-03-04 RX ADMIN — ROCURONIUM BROMIDE 30 MG: 10 INJECTION, SOLUTION INTRAVENOUS at 10:53

## 2022-03-04 RX ADMIN — LABETALOL HYDROCHLORIDE 10 MG: 5 INJECTION, SOLUTION INTRAVENOUS at 10:28

## 2022-03-04 RX ADMIN — FENTANYL CITRATE 50 MCG: 50 INJECTION, SOLUTION INTRAMUSCULAR; INTRAVENOUS at 10:53

## 2022-03-04 RX ADMIN — SUGAMMADEX 200 MG: 100 INJECTION, SOLUTION INTRAVENOUS at 11:07

## 2022-03-04 RX ADMIN — ONDANSETRON 4 MG: 2 INJECTION INTRAMUSCULAR; INTRAVENOUS at 11:07

## 2022-03-04 RX ADMIN — MIDAZOLAM HYDROCHLORIDE 2 MG: 1 INJECTION, SOLUTION INTRAMUSCULAR; INTRAVENOUS at 10:46

## 2022-03-04 RX ADMIN — DEXAMETHASONE SODIUM PHOSPHATE 4 MG: 4 INJECTION, SOLUTION INTRAMUSCULAR; INTRAVENOUS at 10:53

## 2022-03-04 RX ADMIN — LABETALOL HYDROCHLORIDE 5 MG: 5 INJECTION, SOLUTION INTRAVENOUS at 12:14

## 2022-03-04 ASSESSMENT — FIBROSIS 4 INDEX: FIB4 SCORE: 1.64

## 2022-03-04 ASSESSMENT — PAIN SCALES - GENERAL: PAIN_LEVEL: 0

## 2022-03-04 NOTE — ANESTHESIA PROCEDURE NOTES
Airway    Date/Time: 3/4/2022 10:54 AM  Performed by: Chiki López M.D.  Authorized by: Chiki López M.D.     Location:  OR  Urgency:  Elective  Indications for Airway Management:  Anesthesia      Spontaneous Ventilation: absent    Sedation Level:  Deep  Preoxygenated: Yes    Patient Position:  Sniffing  Mask Difficulty Assessment:  1 - vent by mask  Final Airway Type:  Endotracheal airway  Final Endotracheal Airway:  ETT  Cuffed: Yes    Technique Used for Successful ETT Placement:  Direct laryngoscopy    Insertion Site:  Oral  Blade Type:  Hay  Laryngoscope Blade/Videolaryngoscope Blade Size:  3  ETT Size (mm):  7.0  Measured from:  Teeth  ETT to Teeth (cm):  21  Placement Verified by: auscultation and capnometry    Cormack-Lehane Classification:  Grade IIa - partial view of glottis  Number of Attempts at Approach:  1

## 2022-03-04 NOTE — ANESTHESIA PREPROCEDURE EVALUATION
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          Relevant Problems   CARDIAC   (positive) Hypertension, benign         (positive) Acute kidney injury (HCC)   (positive) Acute necrotizing pyelonephritis   (positive) Cirrhosis (HCC)   (positive) Cirrhosis of liver with ascites (HCC)   (positive) 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)       Physical Exam    Airway   Mallampati: II  TM distance: >3 FB  Neck ROM: full       Cardiovascular - normal exam  Rhythm: regular  Rate: normal  (-) murmur     Dental - normal exam           Pulmonary - normal exam  Breath sounds clear to auscultation     Abdominal    Neurological - normal exam                 Anesthesia Plan    ASA 3   ASA physical status 3 criteria: alcohol and/or substance dependence or abuse and hypertension - poorly controlled    Plan - general       Airway plan will be ETT          Induction: intravenous    Postoperative Plan: Postoperative administration of opioids is intended.    Pertinent diagnostic labs and testing reviewed    Informed Consent:    Anesthetic plan and risks discussed with patient.    Use of blood products discussed with: patient whom consented to blood products.

## 2022-03-04 NOTE — DISCHARGE INSTRUCTIONS
ENDOSCOPY HOME CARE INSTRUCTIONS    GASTROSCOPY OR ERCP  1. Don't eat or drink anything for about an hour after the test. You can then resume your regular diet.  2. Don't drive or drink alcohol for 24 hours. The medication you received will make you too drowsy.  3. Don't take any coffee, tea, or aspirin products until after you see your doctor. These can harm the lining of your stomach.  4. If you begin to vomit bloody material, or develop black or bloody stools, call your doctor as soon as possible.  5. If you have any neck, chest, abdominal pain or temp of 100 degrees, call your doctor.  6. See your doctor see below  7. Additional instructions: see report below  8. Prescriptions: none    Dr. Rhodes  [facility]  [place]  [phone]     EGD with:        Biopsies     Indication:        History of cirrhosis rule out varices                          History of gastric intestinal metaplasia     Sedation:         GA (Dr. López)     Findings:                          Esophagus                                      Unremarkable mucosa                                      No esophageal varices appreciated                          Stomach                                      Normal gastric distensibility                                      Small to moderate amount of retained solid food                                      No gastric varices                                      Gastric antrum                                                  Mild erythema and edema throughout                                                  Lesser curvature randomly biopsied                                                  Greater curvature randomly biopsied                                      Gastric body                                                  Lesser curvature erythema edema and patchy pattern                                                              Randomly biopsied                                                   Greater curvature mild erythema and edema                                                              Randomly biopsied                          Duodenum                                      Unremarkable mucosa     Plan:                Resume home medications                          Follow-up pathology results                          Repeat EGD in 1 to 2 years to evaluate for formation of varices    Patient not to drive today. Resume pre procedural diet.       You should call 911 if you develop problems with breathing or chest pain.  If any questions arise, call your doctor. If your doctor is not available, please feel free to call [phone]. You can also call the HEALTH HOTLINE open 24 hours/day, 7 days/week and speak to a nurse at [phone], or toll free [phone].    Depression / Suicide Risk    As you are discharged from this [facility] facility, it is important to learn how to keep safe from harming yourself.    Recognize the warning signs:  · Abrupt changes in personality, positive or negative- including increase in energy   · Giving away possessions  · Change in eating patterns- significant weight changes-  positive or negative  · Change in sleeping patterns- unable to sleep or sleeping all the time   · Unwillingness or inability to communicate  · Depression  · Unusual sadness, discouragement and loneliness  · Talk of wanting to die  · Neglect of personal appearance   · Rebelliousness- reckless behavior  · Withdrawal from people/activities they love  · Confusion- inability to concentrate     If you or a loved one observes any of these behaviors or has concerns about self-harm, here's what you can do:  · Talk about it- your feelings and reasons for harming yourself  · Remove any means that you might use to hurt yourself (examples: pills, rope, extension cords, firearm)  · Get professional help from the community (Mental Health, Substance Abuse, psychological counseling)  · Do not be  alone:Call your Safe Contact- someone whom you trust who will be there for you.  · Call your local CRISIS HOTLINE 493-8327 or 400-162-6837  · Call your local Children's Mobile Crisis Response Team [place] (485) 005-3958 or www.Dokkankom  · Call the toll free National Suicide Prevention Hotlines   · National Suicide Prevention Lifeline 819-255-LOYR (2563)  · National IGIGI Line Network 800-SUICIDE (198-7796)    I acknowledge receipt and understanding of these Home Care Instructions.

## 2022-03-04 NOTE — ANESTHESIA POSTPROCEDURE EVALUATION
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 Responsible Provider: Chiki López M.D.    Anesthesia Type: general ASA Status: 3          Final Anesthesia Type: general  Last vitals  BP   Blood Pressure: (!) 190/96    Temp   36.2 °C (97.1 °F)    Pulse   77   Resp   14    SpO2   97 %      Anesthesia Post Evaluation    Patient location during evaluation: PACU  Patient participation: complete - patient participated  Level of consciousness: awake and alert  Pain score: 0    Airway patency: patent  Anesthetic complications: no  Cardiovascular status: hemodynamically stable and hypertensive  Respiratory status: acceptable  Hydration status: euvolemic    PONV: none          No complications documented.     Nurse Pain Score: 0 (NPRS)

## 2022-03-04 NOTE — OR NURSING
0910: Brought patient back to pre-op and assumed care.  1000: Pt's BP remains high after recheck. Dr López notified, he ordered 10 mg of Labetalol to be given IV.  1025: BP came down some, but order for another 10 mg given.   1042: Patient allergies and NPO status verified, home medication reconciliation completed and belongings secured. Patient verbalizes understanding of pain scale, expected course of stay and plan of care. Surgical site verified with patient. IV access established.

## 2022-03-04 NOTE — ANESTHESIA TIME REPORT
Anesthesia Start and Stop Event Times     Date Time Event    3/4/2022 1033 Ready for Procedure     1048 Anesthesia Start     1115 Anesthesia Stop        Responsible Staff  03/04/22    Name Role Begin End    Chiki López M.D. 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

## 2022-03-04 NOTE — OR NURSING
"1112 To PACU from OR via gurney, side rails up x 2 for safety, lungs clear bilaterally, pt breathing easy and unlabored with OPA in place. Does not arouse to voice or touch. IVF to TKO due to hypertension hx.   1125 Pt reports slight soreness to LUQ but reports \"it's ok\". Denies nausea; given sips of water.   1140 Pt remains awake and reports slight soreness to LUQ but \"its okay\". Abdomen soft and non tender. Pt reports SBP \"usually 180 but can run 140-180 as normal\". Pt appears anxious and asking a lot of questions and asking for reassurance that there were \"no problems during procedure\".   1150 VORB from Dr Hernandez that SBP goal to be <180 for discharge.   1205 Pt asking how soon she can go home.  with goat <180; informed patient of goal.   1220 No changes  1235 Pt remains SBP >180s. Denies nausea or pain. Pt reports wanting to go home. Will assist patient to get dressed and reassess BP.   1250 Pt  after up and dressed with assist by RN.   1256 D/[place] to care of family post uneventful stay in PACU.  "

## 2022-03-04 NOTE — OR SURGEON
EGD - Post OP Note    PreOp Diagnosis: Cirrhosis, gastric intestinal metaplasia      PostOp Diagnosis: Same      Procedure(s):  GASTROSCOPY - Wound Class: None    Surgeon(s):  Kashif Rhodes M.D.    Anesthesiologist/Type of Anesthesia:  Anesthesiologist: Chiki López M.D./General    Surgical Staff:  Circulator: Papito Arboleda R.N.; Gui Tavares R.N.  Endoscopy Technician: Martha Majano; Nia Brewer    Specimens removed if any:  ID Type Source Tests Collected by Time Destination   A : Biopsy- Gastric antrum lesser curve Other Other PATHOLOGY SPECIMEN Kashif Rhodes M.D. 3/4/2022 10:59 AM    B : Biopsy- Gastric antrum greater curve Other Other PATHOLOGY SPECIMEN Kashif Rhodes M.D. 3/4/2022 11:00 AM    C : Biopsy- Gastric body lesser curve Other Other PATHOLOGY SPECIMEN Kashif Rhodes M.D. 3/4/2022 11:03 AM    D : Biopsy- gastric body greater curve Other Other PATHOLOGY SPECIMEN Kashif Rhodes M.D. 3/4/2022 11:04 AM        Dr. Rhodes  [facility]  [place]  [phone]    EGD with: Biopsies    Indication: History of cirrhosis rule out varices    History of gastric intestinal metaplasia    Sedation: GA (Dr. López)    Findings:    Esophagus     Unremarkable mucosa     No esophageal varices appreciated    Stomach     Normal gastric distensibility     Small to moderate amount of retained solid food     No gastric varices     Gastric antrum      Mild erythema and edema throughout      Lesser curvature randomly biopsied      Greater curvature randomly biopsied     Gastric body      Lesser curvature erythema edema and patchy pattern       Randomly biopsied      Greater curvature mild erythema and edema       Randomly biopsied    Duodenum     Unremarkable mucosa    Plan:  Resume home medications    Follow-up pathology results    Repeat EGD in 1 to 2 years to evaluate for formation of varices      Procedure detail:    Prior to procedure, informed consent was  obtained.  Risks, benefits, alternatives, including but not limited to risk of bleeding, infection, perforation, adverse reaction to sedating medicine, failure to identify pathology and death were explained to the patient who accepted all risks.    Patient was prepped in the left lateral position after intubation and sedation was provided by anesthesia.    Scope tip of the Olympus flexible gastroscope passed into the proximal esophagus.  Proximal middle and distal thirds of the esophagus were well visualized.  No esophageal varices were appreciated.  The mucosa was unremarkable.  The stomach was entered and gastric liquid pool was suctioned.  There was small to moderate amount of undigested food present in the stomach this was irrigated to view the underlying mucosa.  Air was insufflated.  The stomach had normal distensibility.  Scope was retroflexed to view the body fundus and cardia.  There was no evidence of gastric varices.  Scope was straightened to view the distal body and antrum.  In the distal body along the lesser curvature aspect there was patchy erythema and edema and inflammatory changes.  Along the greater curvature aspect of the gastric body and throughout the entire antrum there was mild erythema and edema.  The pylorus was patent, duodenal bulb sweep second and third portion were unremarkable.  Once again there was a small amount of undigested food in the duodenum.  The scope was withdrawn back into the antrum and mapping biopsies were taken randomly through the lesser curvature aspect of the antrum, the greater curvature aspect of the antrum, the lesser curvature aspect of the gastric body and the greater curvature of the gastric body.  Of note areas of erythema and edema were used as a focus of biopsies.  Once biopsies were complete the procedure was deemed complete.  The scope was withdrawn.  Air and liquid were suctioned.  Patient tolerated the procedure well and was sent to recovery without  immediate complications.      3/4/2022 11:07 AM Kashif Rhodes M.D.

## 2022-03-07 RX ORDER — METFORMIN HYDROCHLORIDE 500 MG/1
TABLET, EXTENDED RELEASE ORAL
Qty: 90 TABLET | Refills: 0 | Status: SHIPPED | OUTPATIENT
Start: 2022-03-07 | End: 2022-07-05 | Stop reason: SDUPTHER

## 2022-03-18 ENCOUNTER — OFFICE VISIT (OUTPATIENT)
Dept: CARDIOLOGY | Facility: MEDICAL CENTER | Age: 57
End: 2022-03-18
Payer: MEDICAID

## 2022-03-18 ENCOUNTER — TELEPHONE (OUTPATIENT)
Dept: CARDIOLOGY | Facility: MEDICAL CENTER | Age: 57
End: 2022-03-18

## 2022-03-18 VITALS
HEART RATE: 80 BPM | BODY MASS INDEX: 20.25 KG/M2 | DIASTOLIC BLOOD PRESSURE: 100 MMHG | OXYGEN SATURATION: 98 % | SYSTOLIC BLOOD PRESSURE: 183 MMHG | WEIGHT: 129 LBS | HEIGHT: 67 IN | RESPIRATION RATE: 14 BRPM

## 2022-03-18 DIAGNOSIS — E08.00 DIABETES MELLITUS DUE TO UNDERLYING CONDITION WITH HYPEROSMOLARITY WITHOUT COMA, WITHOUT LONG-TERM CURRENT USE OF INSULIN (HCC): ICD-10-CM

## 2022-03-18 DIAGNOSIS — I42.1 HOCM (HYPERTROPHIC OBSTRUCTIVE CARDIOMYOPATHY) (HCC): ICD-10-CM

## 2022-03-18 DIAGNOSIS — K74.60 CIRRHOSIS OF LIVER WITH ASCITES, UNSPECIFIED HEPATIC CIRRHOSIS TYPE (HCC): ICD-10-CM

## 2022-03-18 DIAGNOSIS — I10 HYPERTENSION, BENIGN: ICD-10-CM

## 2022-03-18 DIAGNOSIS — Z79.899 HIGH RISK MEDICATION USE: ICD-10-CM

## 2022-03-18 DIAGNOSIS — R00.2 HEART PALPITATIONS: ICD-10-CM

## 2022-03-18 DIAGNOSIS — I10 HTN (HYPERTENSION), MALIGNANT: ICD-10-CM

## 2022-03-18 DIAGNOSIS — K70.9 LIVER DISEASE DUE TO ALCOHOL (HCC): ICD-10-CM

## 2022-03-18 DIAGNOSIS — R18.8 CIRRHOSIS OF LIVER WITH ASCITES, UNSPECIFIED HEPATIC CIRRHOSIS TYPE (HCC): ICD-10-CM

## 2022-03-18 DIAGNOSIS — E11.9 TYPE 2 DIABETES MELLITUS WITHOUT COMPLICATION, UNSPECIFIED WHETHER LONG TERM INSULIN USE (HCC): ICD-10-CM

## 2022-03-18 PROCEDURE — 99214 OFFICE O/P EST MOD 30 MIN: CPT | Performed by: INTERNAL MEDICINE

## 2022-03-18 RX ORDER — METOPROLOL TARTRATE 100 MG/1
100 TABLET ORAL 2 TIMES DAILY
Qty: 180 TABLET | Refills: 4 | Status: SHIPPED | OUTPATIENT
Start: 2022-03-18 | End: 2023-03-10 | Stop reason: SDUPTHER

## 2022-03-18 RX ORDER — OMEPRAZOLE 20 MG/1
CAPSULE, DELAYED RELEASE ORAL
COMMUNITY
Start: 2022-03-08 | End: 2022-03-18

## 2022-03-18 RX ORDER — SPIRONOLACTONE 25 MG/1
25 TABLET ORAL DAILY
Qty: 30 TABLET | Refills: 3 | Status: SHIPPED | OUTPATIENT
Start: 2022-03-18 | End: 2022-07-19 | Stop reason: SDUPTHER

## 2022-03-18 RX ORDER — DILTIAZEM HYDROCHLORIDE 240 MG/1
240 CAPSULE, COATED, EXTENDED RELEASE ORAL DAILY
Qty: 90 CAPSULE | Refills: 4 | Status: SHIPPED | OUTPATIENT
Start: 2022-03-18 | End: 2022-11-11

## 2022-03-18 ASSESSMENT — ENCOUNTER SYMPTOMS
SPEECH CHANGE: 0
FEVER: 0
CLAUDICATION: 0
NAUSEA: 0
SHORTNESS OF BREATH: 0
PND: 0
FALLS: 0
LOSS OF CONSCIOUSNESS: 0
ABDOMINAL PAIN: 0
PALPITATIONS: 0
HALLUCINATIONS: 0
EYE DISCHARGE: 0
CHILLS: 0
DIZZINESS: 0
SENSORY CHANGE: 0
DOUBLE VISION: 0
ORTHOPNEA: 0
BRUISES/BLEEDS EASILY: 0
DEPRESSION: 0
BLOOD IN STOOL: 0
COUGH: 0
MYALGIAS: 0
VOMITING: 0
BLURRED VISION: 0
EYE PAIN: 0
WEIGHT LOSS: 0
HEADACHES: 0

## 2022-03-18 ASSESSMENT — FIBROSIS 4 INDEX: FIB4 SCORE: 1.64

## 2022-03-18 NOTE — PROGRESS NOTES
Chief Complaint   Patient presents with   • Hypertension     F/V Dx: Hypertension, benign        Subjective:   Rigoberto Adames is a 56 y.o. female who presents today for cardiac care and management for HOCM in setting of hypertension, liver cirrhosis secondary to alcohol abuse.    Patient has stopped drinking alcohol via her report.    She feels well overall.    She used to see Dr. Knowles.    In the interim, patient has been doing well without having any symptoms. Patient denies having chest pain, dyspnea, palpitation, presyncope, syncope episodes. Able to climb up at least 2 flights of stairs.      Past Medical History:   Diagnosis Date   • Cataract     Bilateral   • Dental disorder     Upper   • Diabetes mellitus, type 2 (HCC)     Oral medications   • diarrhea 2019    been going on for about a year   • Heart murmur    • High cholesterol    • HOCM (hypertrophic obstructive cardiomyopathy) (HCC)    • Hypertension      Past Surgical History:   Procedure Laterality Date   • NH UPPER GI ENDOSCOPY,DIAGNOSIS N/A 3/4/2022    Procedure: GASTROSCOPY;  Surgeon: Kashif Rhodes M.D.;  Location: [facility];  Service: Gastroenterology   • NH UPPER GI ENDOSCOPY,LIGAT VARIX  9/25/2019    Procedure: GASTROSCOPY, WITH VARICEAL BANDING - WITH GASTRIC MAPPING;  Surgeon: Sandi Espinoza M.D.;  Location: [facility];  Service: Gastroenterology   • GASTROSCOPY-ENDO  9/25/2019    Procedure: GASTROSCOPY;  Surgeon: Sandi Espinoza M.D.;  Location: [facility];  Service: Gastroenterology   • PRIMARY C SECTION  2006   • OTHER      c- section     Family History   Problem Relation Age of Onset   • Cancer Father         stomach   • Hypertension Mother      Social History     Socioeconomic History   • Marital status: Single     Spouse name: Not on file   • Number of children: Not on file   • Years of education: Not on file   • Highest education level: Not on file   Occupational History   •  Occupation: restaurant owner   Tobacco Use   • Smoking status: Former Smoker     Packs/day: 1.00     Years: 10.00     Pack years: 10.00     Types: Cigarettes     Start date: 2015     Quit date: 2020     Years since quittin.7   • Smokeless tobacco: Never Used   Vaping Use   • Vaping Use: Never used   Substance and Sexual Activity   • Alcohol use: Not Currently     Alcohol/week: 0.6 oz     Types: 1 Glasses of wine per week   • Drug use: No   • Sexual activity: Not on file   Other Topics Concern   • Not on file   Social History Narrative    Single- 2 children.     Social Determinants of Health     Financial Resource Strain: Not on file   Food Insecurity: Not on file   Transportation Needs: Not on file   Physical Activity: Not on file   Stress: Not on file   Social Connections: Not on file   Intimate Partner Violence: Not on file   Housing Stability: Not on file     No Known Allergies  Outpatient Encounter Medications as of 3/18/2022   Medication Sig Dispense Refill   • metoprolol tartrate (LOPRESSOR) 100 MG Tab Take 1 Tablet by mouth 2 times a day. 180 Tablet 4   • DILTIAZem CD (CARDIZEM CD) 240 MG CAPSULE SR 24 HR Take 1 Capsule by mouth every day. 90 Capsule 4   • spironolactone (ALDACTONE) 25 MG Tab Take 1 Tablet by mouth every day. 30 Tablet 3   • metFORMIN ER (GLUCOPHAGE XR) 500 MG TABLET SR 24 HR Take 1 tablet by mouth once daily 90 Tablet 0   • Ursodiol 500 MG Tab Take  by mouth every day.     • omeprazole (PRILOSEC) 40 MG delayed-release capsule Take 40 mg by mouth every day.     • oxybutynin SR (DITROPAN-XL) 10 MG CR tablet Take 10 mg by mouth every day.     • atorvastatin (LIPITOR) 20 MG Tab TAKE 1 TABLET BY MOUTH AT BEDTIME 90 Tab 0   • [DISCONTINUED] omeprazole (PRILOSEC) 20 MG delayed-release capsule TAKE 1 CAPSULE BY MOUTH ONCE DAILY 30 MINUTES BEFORE BREAKFAST MEAL (Patient not taking: Reported on 3/18/2022)     • [DISCONTINUED] ascorbic acid (ASCORBIC ACID) 500 MG Tab Take 1 tablet by mouth  "once daily 30 Tablet 0   • [DISCONTINUED] DILTIAZem CD (CARDIZEM CD) 120 MG CAPSULE SR 24 HR Take 1 Capsule by mouth every day. 90 Capsule 4   • [DISCONTINUED] gabapentin (NEURONTIN) 300 MG Cap Take  by mouth 1 time a day as needed.     • [DISCONTINUED] metoprolol tartrate (LOPRESSOR) 100 MG Tab Take 1 tablet by mouth 2 times a day. (Patient taking differently: Take 100 mg by mouth every day.) 180 tablet 4   • [DISCONTINUED] lisinopril (PRINIVIL) 5 MG Tab Take 1 tablet by mouth every day. 90 tablet 4   • [DISCONTINUED] vitamin D, Ergocalciferol, (DRISDOL) 98742 units Cap capsule Take 1 Cap by mouth every 28 days. 3 Cap 3     No facility-administered encounter medications on file as of 3/18/2022.     Review of Systems   Constitutional: Negative for chills, fever, malaise/fatigue and weight loss.   HENT: Negative for ear discharge, ear pain, hearing loss and nosebleeds.    Eyes: Negative for blurred vision, double vision, pain and discharge.   Respiratory: Negative for cough and shortness of breath.    Cardiovascular: Negative for chest pain, palpitations, orthopnea, claudication, leg swelling and PND.   Gastrointestinal: Negative for abdominal pain, blood in stool, melena, nausea and vomiting.   Genitourinary: Negative for dysuria and hematuria.   Musculoskeletal: Negative for falls, joint pain and myalgias.   Skin: Negative for itching and rash.   Neurological: Negative for dizziness, sensory change, speech change, loss of consciousness and headaches.   Endo/Heme/Allergies: Negative for environmental allergies. Does not bruise/bleed easily.   Psychiatric/Behavioral: Negative for depression, hallucinations and suicidal ideas.        Objective:   BP (!) 183/100 (BP Location: Left arm, Patient Position: Sitting, BP Cuff Size: Adult)   Pulse 80   Resp 14   Ht 1.702 m (5' 7\")   Wt 58.5 kg (129 lb)   SpO2 98%   BMI 20.20 kg/m²     Physical Exam  Vitals and nursing note reviewed.   Constitutional:       General: She " is not in acute distress.     Appearance: She is not diaphoretic.   HENT:      Head: Normocephalic and atraumatic.      Right Ear: External ear normal.      Left Ear: External ear normal.   Eyes:      General:         Right eye: No discharge.         Left eye: No discharge.   Neck:      Thyroid: No thyromegaly.      Vascular: No JVD.   Cardiovascular:      Rate and Rhythm: Normal rate and regular rhythm.   Pulmonary:      Effort: No respiratory distress.   Abdominal:      General: There is no distension.      Tenderness: There is no abdominal tenderness.   Skin:     General: Skin is warm and dry.      Comments: Jaundiced.   Neurological:      Mental Status: She is alert and oriented to person, place, and time.      Cranial Nerves: No cranial nerve deficit.   Psychiatric:         Behavior: Behavior normal.         Assessment:     1. Hypertension, benign     2. HOCM (hypertrophic obstructive cardiomyopathy) (HCC)  Basic Metabolic Panel    proBrain Natriuretic Peptide, NT   3. 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)  Basic Metabolic Panel    proBrain Natriuretic Peptide, NT   4. Diabetes mellitus due to underlying condition with hyperosmolarity without coma, without long-term current use of insulin (HCC)     5. HTN (hypertension), malignant     6. Type 2 diabetes mellitus without complication, unspecified whether long term insulin use (HCC)     7. Cirrhosis of liver with ascites, unspecified hepatic cirrhosis type (HCC)     8. High risk medication use  Basic Metabolic Panel    proBrain Natriuretic Peptide, NT   9. Heart palpitations         Medical Decision Making:  Today's Assessment / Status / Plan:   Blood pressure is high as patient did not take medications via her report.  We will increase Cardizem to 240 mg p.o. once a day.  Continue with metoprolol 100 mg p.o. twice a day.  Will stop Lisinopril and start Spironolactone 25 mg daily (this might be helpful for volume control in setting of cirrhosis).  Heart rate goal of  less than 70.  I also asked patient to bring bottles of medications to clinic in future visits so we know exactly which ones she is taking.

## 2022-03-18 NOTE — TELEPHONE ENCOUNTER
Nicol from [facility] called, needs info for:     spironolactone (ALDACTONE) 25 MG Tab (Order #[id number]) on 3/18/22    Nicol - [phone]    Thank you,   Mihaela DINH

## 2022-03-18 NOTE — TELEPHONE ENCOUNTER
PT RX Problem  Received: Today  Virginia Saini R.N.  [facility] called about a potential drug interaction with two recently prescribed meds (lisinopril and spiroactnalone (sp?) and wanted to verify the prescriptions before filling.   Their direct # is [phone]     Thanks =) =)   -------------------------------------------------------------------------------------------------------------    Per TT's note today 3/18, pt to stop Lisinopril and start Spironolactone. Called [facility] and notified them.

## 2022-03-18 NOTE — PATIENT INSTRUCTIONS
Will increase Cardizem to 240 mg once a day.    Will start start Spironolactone 25 mg daily.    Will stop Lisinopril.

## 2022-03-18 NOTE — TELEPHONE ENCOUNTER
Returned call. Nicol states the pt picked up his Lisinopril and wanted to know if the pt should bring it back. Informed her that pt is to stop Lisinopril so if he is able to bring it back, he can do that. She states she left a voicemail for the pt and will wait for him to call back.

## 2022-04-20 ENCOUNTER — TELEPHONE (OUTPATIENT)
Dept: CARDIOLOGY | Facility: MEDICAL CENTER | Age: 57
End: 2022-04-20
Payer: MEDICAID

## 2022-04-20 NOTE — TELEPHONE ENCOUNTER
Chart Prep      Tried to reach out to pt regarding standing lab work but no answer. LVM stating pts appt date and time and call back number.

## 2022-05-23 ENCOUNTER — HOSPITAL ENCOUNTER (OUTPATIENT)
Dept: LAB | Facility: MEDICAL CENTER | Age: 57
End: 2022-05-23
Attending: PHYSICIAN ASSISTANT
Payer: MEDICAID

## 2022-05-23 ENCOUNTER — HOSPITAL ENCOUNTER (OUTPATIENT)
Dept: LAB | Facility: MEDICAL CENTER | Age: 57
End: 2022-05-23
Attending: INTERNAL MEDICINE
Payer: MEDICAID

## 2022-05-23 LAB
ANION GAP SERPL CALC-SCNC: 13 MMOL/L (ref 7–16)
BUN SERPL-MCNC: 37 MG/DL (ref 8–22)
CALCIUM SERPL-MCNC: 8.7 MG/DL (ref 8.5–10.5)
CHLORIDE SERPL-SCNC: 108 MMOL/L (ref 96–112)
CO2 SERPL-SCNC: 19 MMOL/L (ref 20–33)
CREAT SERPL-MCNC: 2.33 MG/DL (ref 0.5–1.4)
FERRITIN SERPL-MCNC: 137 NG/ML (ref 10–291)
GFR SERPLBLD CREATININE-BSD FMLA CKD-EPI: 24 ML/MIN/1.73 M 2
GLUCOSE SERPL-MCNC: 105 MG/DL (ref 65–99)
INR PPP: 1.12 (ref 0.87–1.13)
IRON SATN MFR SERPL: 46 % (ref 15–55)
IRON SERPL-MCNC: 118 UG/DL (ref 40–170)
NT-PROBNP SERPL IA-MCNC: 9102 PG/ML (ref 0–125)
POTASSIUM SERPL-SCNC: 4.6 MMOL/L (ref 3.6–5.5)
PROTHROMBIN TIME: 14.1 SEC (ref 12–14.6)
SODIUM SERPL-SCNC: 140 MMOL/L (ref 135–145)
TIBC SERPL-MCNC: 258 UG/DL (ref 250–450)
UIBC SERPL-MCNC: 140 UG/DL (ref 110–370)

## 2022-05-23 PROCEDURE — 84165 PROTEIN E-PHORESIS SERUM: CPT

## 2022-05-23 PROCEDURE — 86334 IMMUNOFIX E-PHORESIS SERUM: CPT

## 2022-05-23 PROCEDURE — 85610 PROTHROMBIN TIME: CPT

## 2022-05-23 PROCEDURE — 80048 BASIC METABOLIC PNL TOTAL CA: CPT

## 2022-05-23 PROCEDURE — 83550 IRON BINDING TEST: CPT

## 2022-05-23 PROCEDURE — 83540 ASSAY OF IRON: CPT

## 2022-05-23 PROCEDURE — 36415 COLL VENOUS BLD VENIPUNCTURE: CPT

## 2022-05-23 PROCEDURE — 84155 ASSAY OF PROTEIN SERUM: CPT

## 2022-05-23 PROCEDURE — 82728 ASSAY OF FERRITIN: CPT

## 2022-05-23 PROCEDURE — 83880 ASSAY OF NATRIURETIC PEPTIDE: CPT

## 2022-05-23 PROCEDURE — 82105 ALPHA-FETOPROTEIN SERUM: CPT

## 2022-05-23 PROCEDURE — 83521 IG LIGHT CHAINS FREE EACH: CPT | Mod: 91

## 2022-05-26 LAB
AFP-TM SERPL-MCNC: 6 NG/ML (ref 0–9)
KAPPA LC FREE SER-MCNC: 131.3 MG/L (ref 3.3–19.4)
KAPPA LC FREE/LAMBDA FREE SER NEPH: 1.09 {RATIO} (ref 0.26–1.65)
LAMBDA LC FREE SERPL-MCNC: 120.76 MG/L (ref 5.71–26.3)

## 2022-05-27 LAB
ALBUMIN SERPL ELPH-MCNC: 3.53 G/DL (ref 3.75–5.01)
ALPHA1 GLOB SERPL ELPH-MCNC: 0.34 G/DL (ref 0.19–0.46)
ALPHA2 GLOB SERPL ELPH-MCNC: 0.9 G/DL (ref 0.48–1.05)
B-GLOBULIN SERPL ELPH-MCNC: 1.01 G/DL (ref 0.48–1.1)
GAMMA GLOB SERPL ELPH-MCNC: 1.62 G/DL (ref 0.62–1.51)
INTERPRETATION SERPL IFE-IMP: ABNORMAL
INTERPRETATION SERPL IFE-IMP: ABNORMAL
PATHOLOGY STUDY: ABNORMAL
PROT SERPL-MCNC: 7.4 G/DL (ref 6.3–8.2)

## 2022-06-16 ENCOUNTER — HOSPITAL ENCOUNTER (OUTPATIENT)
Facility: MEDICAL CENTER | Age: 57
End: 2022-06-16
Attending: INTERNAL MEDICINE | Admitting: INTERNAL MEDICINE
Payer: MEDICAID

## 2022-07-05 ENCOUNTER — TELEPHONE (OUTPATIENT)
Dept: INTERNAL MEDICINE | Facility: OTHER | Age: 57
End: 2022-07-05
Payer: MEDICAID

## 2022-07-05 RX ORDER — METFORMIN HYDROCHLORIDE 500 MG/1
500 TABLET, EXTENDED RELEASE ORAL DAILY
Qty: 90 TABLET | Refills: 0 | Status: SHIPPED | OUTPATIENT
Start: 2022-07-05 | End: 2022-10-03

## 2022-07-05 NOTE — TELEPHONE ENCOUNTER
Last seen: 7/23/2021 by Dr. Vaz  Next appt: 7/22/22 with Dr. Pierre     Was the patient seen in the last year in this department? Yes   Does patient have an active prescription for medications requested? No   Received Request Via: Pharmacy

## 2022-07-19 DIAGNOSIS — I10 HYPERTENSION, BENIGN: ICD-10-CM

## 2022-07-19 NOTE — TELEPHONE ENCOUNTER
Is the patient due for a refill? Yes    Was the patient seen the past year? Yes    Date of last office visit: 3/18/2022    Does the patient have an upcoming appointment?  Yes   If yes, When? 9/15/22    Provider to refill: TT    Does the patients insurance require a 100 day supply?  No

## 2022-07-20 RX ORDER — SPIRONOLACTONE 25 MG/1
25 TABLET ORAL DAILY
Qty: 90 TABLET | Refills: 2 | Status: SHIPPED | OUTPATIENT
Start: 2022-07-20 | End: 2022-07-22

## 2022-07-22 ENCOUNTER — OFFICE VISIT (OUTPATIENT)
Dept: INTERNAL MEDICINE | Facility: OTHER | Age: 57
End: 2022-07-22
Payer: MEDICAID

## 2022-07-22 VITALS
SYSTOLIC BLOOD PRESSURE: 112 MMHG | TEMPERATURE: 97.3 F | HEART RATE: 71 BPM | BODY MASS INDEX: 19.93 KG/M2 | WEIGHT: 124 LBS | DIASTOLIC BLOOD PRESSURE: 60 MMHG | OXYGEN SATURATION: 97 % | HEIGHT: 66 IN

## 2022-07-22 DIAGNOSIS — E11.621 TYPE 2 DIABETES MELLITUS WITH FOOT ULCER, WITHOUT LONG-TERM CURRENT USE OF INSULIN (HCC): ICD-10-CM

## 2022-07-22 DIAGNOSIS — L29.9 ITCHING: ICD-10-CM

## 2022-07-22 DIAGNOSIS — Z12.31 ENCOUNTER FOR SCREENING MAMMOGRAM FOR BREAST CANCER: ICD-10-CM

## 2022-07-22 DIAGNOSIS — L97.509 TYPE 2 DIABETES MELLITUS WITH FOOT ULCER, WITHOUT LONG-TERM CURRENT USE OF INSULIN (HCC): ICD-10-CM

## 2022-07-22 DIAGNOSIS — Z76.89 ENCOUNTER TO ESTABLISH CARE WITH NEW DOCTOR: ICD-10-CM

## 2022-07-22 PROBLEM — J06.9 URI (UPPER RESPIRATORY INFECTION): Status: RESOLVED | Noted: 2020-04-26 | Resolved: 2022-07-22

## 2022-07-22 PROBLEM — E11.9 TYPE 2 DIABETES MELLITUS, WITHOUT LONG-TERM CURRENT USE OF INSULIN (HCC): Status: ACTIVE | Noted: 2022-07-22

## 2022-07-22 PROBLEM — E87.8 REFEEDING SYNDROME: Status: RESOLVED | Noted: 2020-04-28 | Resolved: 2022-07-22

## 2022-07-22 PROBLEM — A41.51 SEPTIC SHOCK DUE TO ESCHERICHIA COLI (HCC): Status: RESOLVED | Noted: 2020-04-26 | Resolved: 2022-07-22

## 2022-07-22 PROBLEM — E11.10 DKA (DIABETIC KETOACIDOSES): Status: RESOLVED | Noted: 2020-04-27 | Resolved: 2022-07-22

## 2022-07-22 PROBLEM — E87.1 HYPONATREMIA: Status: RESOLVED | Noted: 2020-04-26 | Resolved: 2022-07-22

## 2022-07-22 PROBLEM — R65.21 SEPTIC SHOCK DUE TO ESCHERICHIA COLI (HCC): Status: RESOLVED | Noted: 2020-04-26 | Resolved: 2022-07-22

## 2022-07-22 PROBLEM — E83.42 HYPOMAGNESEMIA: Status: RESOLVED | Noted: 2020-04-27 | Resolved: 2022-07-22

## 2022-07-22 PROBLEM — E83.39 HYPOPHOSPHATEMIA: Status: RESOLVED | Noted: 2020-04-27 | Resolved: 2022-07-22

## 2022-07-22 PROBLEM — N10: Status: RESOLVED | Noted: 2020-04-26 | Resolved: 2022-07-22

## 2022-07-22 PROCEDURE — 99214 OFFICE O/P EST MOD 30 MIN: CPT | Mod: GC

## 2022-07-22 RX ORDER — BENZOCAINE/MENTHOL 6 MG-10 MG
1 LOZENGE MUCOUS MEMBRANE 2 TIMES DAILY
Qty: 1 EACH | Refills: 3 | Status: SHIPPED | OUTPATIENT
Start: 2022-07-22 | End: 2023-05-11

## 2022-07-22 RX ORDER — CETIRIZINE HYDROCHLORIDE 10 MG/1
10 TABLET ORAL DAILY
Qty: 30 TABLET | Refills: 6 | Status: SHIPPED | OUTPATIENT
Start: 2022-07-22 | End: 2023-05-03

## 2022-07-22 ASSESSMENT — PATIENT HEALTH QUESTIONNAIRE - PHQ9: CLINICAL INTERPRETATION OF PHQ2 SCORE: 0

## 2022-07-22 ASSESSMENT — FIBROSIS 4 INDEX: FIB4 SCORE: 1.64

## 2022-07-22 NOTE — PATIENT INSTRUCTIONS
-Follow-up in 3 month  -You do not have to check blood sugars daily  -Continue medications  -Schedule Pap Smear for future visit  -Get labs prior to next appointment, fasting  -Referral to podiatry (foot doctor) for foot ulcer. Someone from our office should call you in 1-2 weeks. If you don't hear from us, call our office.  -Order placed for mammogram  -Get COVID booster  -Next visit bring vaccination records  -Can use over the counter shampoos for itchy scalp (Selsun blue, head and shoulders for itchy scalp, neutrogena, etc)  -Avoid scratching, use soap on face, arm pits, groin and feet. Apply lotion immediately after shower. Also apply lotion at night  -Can use ice pack to help with itching  -Cetirizine daily for itching and hydrocortisone cream  -At  next heart doctor appointment, to ask about restarting low dose gabapentin for neuropathy   -It was nice visiting with you today!

## 2022-07-23 NOTE — PROGRESS NOTES
Established Patient    Patient Care Team:  Kamala Pierre M.D. as PCP - General (Internal Medicine)  Duc Casrto M.D. (Neurology)  Julian Dixon M.D. (Neurology)  Liam Galicia M.D. (Cardiovascular Disease (Cardiology))  [facility] as Home Health Provider  ALY Flores (Inactive) as     Rigoberto Adames is a 57 y.o. female who presents today with the following Chief Complaint(s): Follow up for Diagnoses of Itching, Type 2 diabetes mellitus with foot ulcer, without long-term current use of insulin (HCC), Encounter to establish care with new doctor, and Encounter for screening mammogram for breast cancer were pertinent to this visit.    HPI:  Patient is a pleasant 57-year-old female with a past medical history of HOCM, type 2 diabetes, hyperlipidemia, hypertension and liver disease due to alcohol presents to the clinic today for a complaint of itching.    Patient states that the rash has been ongoing for the past 4 to 5 months.  It started on her right side of her back, then appeared on both sides of her upper chest and her scalp itches.  She denies any new exposures.  Patient was seen by her GI doctor who prescribed hydroxyzine and hydrocortisone cream.  Patient states the hydrocortisone cream helps as well as the hydroxyzine, however she states the hydroxyzine only helps for 3 hours.    Patient also has wound on the bottom of the left toe. States it hasn't healed  Has history of type 2 diabetes with polyneuropathy and on metformin. Was on gabapentin, but it was recently discontinued likely to side effect of HTN. Patient denies any hypoglycemic symptoms. Requesting refills on blood glucose test strips.     ROS:     General: No fevers, chills, night sweats, weight loss or gain  HEENT: No hearing changes, vision changes, eye pain, ear pain, nasal discharge, sore throat  Neck: No swelling in neck  Pulmonary: No shortness of breath, cough, sputum, or  hemoptysis  Cardiovascular: No chest pain, palpitations, or LE swelling  GI: No nausea, vomiting, diarrhea, constipation, abdominal pain, hematochezia or melena  : No dysuria or frequency  Neuro: No focal weakness, no general weakness, no headaches, no lightheadedness, no dizziness  Psych: No anxiety or depression    Past Medical History:   Diagnosis Date   • Cataract     Bilateral   • Dental disorder     Upper   • Diabetes mellitus, type 2 (HCC)     Oral medications   • diarrhea 2019    been going on for about a year   • Heart murmur    • High cholesterol    • HOCM (hypertrophic obstructive cardiomyopathy) ([facility])    • Hypertension      Social History     Tobacco Use   • Smoking status: Former Smoker     Packs/day: 1.00     Years: 10.00     Pack years: 10.00     Types: Cigarettes     Start date: 2015     Quit date: 2020     Years since quittin.1   • Smokeless tobacco: Never Used   Vaping Use   • Vaping Use: Never used   Substance Use Topics   • Alcohol use: Not Currently     Alcohol/week: 0.6 oz     Types: 1 Glasses of wine per week   • Drug use: No     Current Outpatient Medications   Medication Sig Dispense Refill   • hydrocortisone 1 % Cream Apply 1 Application topically 2 times a day. 1 Each 3   • cetirizine (ZYRTEC) 10 MG Tab Take 1 Tablet by mouth every day. 30 Tablet 6   • metFORMIN ER (GLUCOPHAGE XR) 500 MG TABLET SR 24 HR Take 1 Tablet by mouth every day. 90 Tablet 0   • metoprolol tartrate (LOPRESSOR) 100 MG Tab Take 1 Tablet by mouth 2 times a day. 180 Tablet 4   • DILTIAZem CD (CARDIZEM CD) 240 MG CAPSULE SR 24 HR Take 1 Capsule by mouth every day. (Patient taking differently: Take 120 mg by mouth every day.) 90 Capsule 4   • Ursodiol 500 MG Tab Take  by mouth every day.     • omeprazole (PRILOSEC) 40 MG delayed-release capsule Take 40 mg by mouth every day.     • oxybutynin SR (DITROPAN-XL) 10 MG CR tablet Take 10 mg by mouth every day.     • atorvastatin (LIPITOR) 20 MG Tab TAKE 1 TABLET  "BY MOUTH AT BEDTIME 90 Tab 0     No current facility-administered medications for this visit.       Physical Exam:  /60 (BP Location: Right arm, Patient Position: Sitting, BP Cuff Size: Small adult)   Pulse 71   Temp 36.3 °C (97.3 °F) (Temporal)   Ht 1.676 m (5' 6\")   Wt 56.2 kg (124 lb)   SpO2 97%   BMI 20.01 kg/m²   General: Well developed, well nourished female, in no distress.  HEENT: NC/AT, PERRL, EOMI, no scleral icterus or conjunctival pallor  Neck: Supple, No cervical or supraclavicular LAD  CV:RRR, no murmurs gallops or Rubs  Pulm: LCAB, no crackles, rales, rhonchi, or wheezing  GI: Normal bowel sounds, abdomen soft, nontender, nondistended to deep or light palpation in all 4 quadrants, no HSM.  MSK: Radial and dorsalis pedis pulses 2+ and equal bilaterally, respectively.  Strength 5 out of 5 in upper and lower extremities.  No lower extremity edema  Neuro: Patient is alert and oriented x3, no focal deficits  Skin: Healing lesions on right side of back down to buttock and upper chest. Healing lesions in scalp.  Psych: Appropriate mood and affect     Monofilament test: positive for both feet     Assessment and Plan:   1. Itching  Likely related to liver disease vs new exposure vs dry skin  -Continue hydrocortisone cream  -Start cetirizine daily  -Will stop hydroxyzine  -Discussed measures to avoid itching such as avoid scratching, can use ice pack, to use soap in face, arm pits, groin and feet, apply lotion to avoid dry skin  -Follow-up in 3 months    2. Type 2 diabetes mellitus with foot ulcer, without long-term current use of insulin (HCC)  Monofilament test positive  Unhealing wound on bottom of toe  - Comp Metabolic Panel; Future  - Hemoglobin A1c; Future  - Lipid Profile; Future  - Microalbumin Creat Ratio Urine - Lab Collect; Future  - Diabetic Monofilament Lower Extremity Exam  - Referral to Podiatry  -Follow-up in 3 months    3. Encounter to establish care with new doctor  -Will address " health maintenance at next visit  -Recommend patient get Covid booster  -Patient to bring in vaccination records next visit  -Patient requesting order for mammogram  -Patient to follow-up with GI for colonoscopy. Scheduled for liver biopsy with them 8/2/2022.  -Due for pap smear  -Follow-up in 3 months.     4. Encounter for screening mammogram for breast cancer  No family history of breast cancer  Patient due for mammogram  - MA-SCREENING MAMMO BILAT W/TOMOSYNTHESIS W/CAD; Future      Return in about 3 months (around 10/22/2022).    Patient Instructions   -Follow-up in 3 month  -You do not have to check blood sugars daily  -Continue medications  -Schedule Pap Smear for future visit  -Get labs prior to next appointment, fasting  -Referral to podiatry (foot doctor) for foot ulcer. Someone from our office should call you in 1-2 weeks. If you don't hear from us, call our office.  -Order placed for mammogram  -Get COVID booster  -Next visit bring vaccination records  -Can use over the counter shampoos for itchy scalp (Selsun blue, head and shoulders for itchy scalp, neutrogena, etc)  -Avoid scratching, use soap on face, arm pits, groin and feet. Apply lotion immediately after shower. Also apply lotion at night  -Can use ice pack to help with itching  -Cetirizine daily for itching and hydrocortisone cream  -At  next heart doctor appointment, to ask about restarting low dose gabapentin for neuropathy   -It was nice visiting with you today!      Kamala Pierre M.D. PGY-1  [facility]    This note was created using voice recognition software.  While every attempt is made to ensure accuracy of transcription, occasionally errors occur.

## 2022-08-02 ENCOUNTER — HOSPITAL ENCOUNTER (OUTPATIENT)
Facility: MEDICAL CENTER | Age: 57
End: 2022-08-02
Attending: INTERNAL MEDICINE | Admitting: RADIOLOGY
Payer: MEDICAID

## 2022-08-02 ENCOUNTER — APPOINTMENT (OUTPATIENT)
Dept: RADIOLOGY | Facility: MEDICAL CENTER | Age: 57
End: 2022-08-02
Attending: PHYSICIAN ASSISTANT
Payer: MEDICAID

## 2022-08-02 VITALS
WEIGHT: 120 LBS | RESPIRATION RATE: 15 BRPM | DIASTOLIC BLOOD PRESSURE: 92 MMHG | BODY MASS INDEX: 19.29 KG/M2 | SYSTOLIC BLOOD PRESSURE: 184 MMHG | OXYGEN SATURATION: 97 % | HEART RATE: 58 BPM | HEIGHT: 66 IN

## 2022-08-02 DIAGNOSIS — R74.01 ELEVATED LIVER TRANSAMINASE LEVEL: ICD-10-CM

## 2022-08-02 DIAGNOSIS — K70.30 ALCOHOLIC CIRRHOSIS OF LIVER WITHOUT ASCITES (HCC): ICD-10-CM

## 2022-08-02 LAB
ERYTHROCYTE [DISTWIDTH] IN BLOOD BY AUTOMATED COUNT: 46 FL (ref 35.9–50)
HCT VFR BLD AUTO: 34.1 % (ref 37–47)
HGB BLD-MCNC: 10.9 G/DL (ref 12–16)
INR PPP: 1.1 (ref 0.87–1.13)
MCH RBC QN AUTO: 30.7 PG (ref 27–33)
MCHC RBC AUTO-ENTMCNC: 32 G/DL (ref 33.6–35)
MCV RBC AUTO: 96.1 FL (ref 81.4–97.8)
PLATELET # BLD AUTO: 215 K/UL (ref 164–446)
PMV BLD AUTO: 11 FL (ref 9–12.9)
PROTHROMBIN TIME: 14.1 SEC (ref 12–14.6)
RBC # BLD AUTO: 3.55 M/UL (ref 4.2–5.4)
WBC # BLD AUTO: 8 K/UL (ref 4.8–10.8)

## 2022-08-02 PROCEDURE — 700105 HCHG RX REV CODE 258: Performed by: RADIOLOGY

## 2022-08-02 PROCEDURE — 85610 PROTHROMBIN TIME: CPT

## 2022-08-02 PROCEDURE — 85027 COMPLETE CBC AUTOMATED: CPT

## 2022-08-02 RX ORDER — LIDOCAINE HYDROCHLORIDE 10 MG/ML
INJECTION, SOLUTION EPIDURAL; INFILTRATION; INTRACAUDAL; PERINEURAL
Status: DISCONTINUED
Start: 2022-08-02 | End: 2022-08-02 | Stop reason: HOSPADM

## 2022-08-02 RX ORDER — MIDAZOLAM HYDROCHLORIDE 1 MG/ML
INJECTION INTRAMUSCULAR; INTRAVENOUS
Status: COMPLETED
Start: 2022-08-02 | End: 2022-08-02

## 2022-08-02 RX ORDER — MIDAZOLAM HYDROCHLORIDE 1 MG/ML
.5-2 INJECTION INTRAMUSCULAR; INTRAVENOUS PRN
Status: ACTIVE | OUTPATIENT
Start: 2022-08-02 | End: 2022-08-02

## 2022-08-02 RX ORDER — ONDANSETRON 2 MG/ML
4 INJECTION INTRAMUSCULAR; INTRAVENOUS EVERY 6 HOURS PRN
Status: ACTIVE | OUTPATIENT
Start: 2022-08-02 | End: 2022-08-02

## 2022-08-02 RX ORDER — SODIUM CHLORIDE 9 MG/ML
500 INJECTION, SOLUTION INTRAVENOUS
Status: DISPENSED | OUTPATIENT
Start: 2022-08-02 | End: 2022-08-02

## 2022-08-02 ASSESSMENT — FIBROSIS 4 INDEX: FIB4 SCORE: 1.64

## 2022-08-02 NOTE — CONSULTS
Pt hypertensive pre-procedure with SBP consistently above 200.  Did not take her metoprolol this AM.  This creates unfavorable safety profile for non-emergency liver biopsy.  Will get her rescheduled with clarified instructions regarding continuing her BP medications.

## 2022-08-02 NOTE — PROGRESS NOTES
Pt presents to [facility]. Pt was consented by MD at bedside, confirmed by this RN and consent at bedside. Pt did not take her blood pressure medication this morning, starting BP was 180s - Dr. Pride aware. While preparing for procedure, pt's BP soo to 200s and stayed elevated in 200s for a couple readings - Dr. Pride notified and deemed it was too risky to proceed and that we need to reschedule and ensure pt takes her BP meds that morning. Called scheduling to reschedule patient to 8/11 10am with 8am check in. Pt agreed that this will work and states she understands to take her blood pressure medication that morning.     Pt was back to her arrival baseline of SBP 180s upon discharge home with daughter with a rescheduled appointment for next week

## 2022-08-02 NOTE — Clinical Note
Dr. Pride decided we need to reschedule d/t BP >200 in the presence of pt not taking her BP medication this AM

## 2022-08-05 ENCOUNTER — PRE-ADMISSION TESTING (OUTPATIENT)
Dept: ADMISSIONS | Facility: MEDICAL CENTER | Age: 57
End: 2022-08-05
Attending: INTERNAL MEDICINE
Payer: MEDICAID

## 2022-08-11 ENCOUNTER — APPOINTMENT (OUTPATIENT)
Dept: RADIOLOGY | Facility: MEDICAL CENTER | Age: 57
End: 2022-08-11
Attending: PHYSICIAN ASSISTANT
Payer: MEDICAID

## 2022-08-11 ENCOUNTER — HOSPITAL ENCOUNTER (OUTPATIENT)
Facility: MEDICAL CENTER | Age: 57
End: 2022-08-11
Attending: INTERNAL MEDICINE | Admitting: INTERNAL MEDICINE
Payer: MEDICAID

## 2022-08-11 VITALS
HEIGHT: 67 IN | HEART RATE: 60 BPM | RESPIRATION RATE: 16 BRPM | BODY MASS INDEX: 19.31 KG/M2 | OXYGEN SATURATION: 96 % | TEMPERATURE: 97.7 F | SYSTOLIC BLOOD PRESSURE: 108 MMHG | DIASTOLIC BLOOD PRESSURE: 53 MMHG | WEIGHT: 123.02 LBS

## 2022-08-11 DIAGNOSIS — K70.30 ALCOHOLIC CIRRHOSIS OF LIVER WITHOUT ASCITES (HCC): ICD-10-CM

## 2022-08-11 DIAGNOSIS — R74.01 ELEVATED LIVER TRANSAMINASE LEVEL: ICD-10-CM

## 2022-08-11 LAB
GLUCOSE BLD STRIP.AUTO-MCNC: 83 MG/DL (ref 65–99)
PATHOLOGY CONSULT NOTE: NORMAL

## 2022-08-11 PROCEDURE — 160035 HCHG PACU - 1ST 60 MINS PHASE I

## 2022-08-11 PROCEDURE — 160036 HCHG PACU - EA ADDL 30 MINS PHASE I

## 2022-08-11 PROCEDURE — 82962 GLUCOSE BLOOD TEST: CPT

## 2022-08-11 PROCEDURE — 160002 HCHG RECOVERY MINUTES (STAT)

## 2022-08-11 PROCEDURE — 700101 HCHG RX REV CODE 250: Performed by: RADIOLOGY

## 2022-08-11 PROCEDURE — 47000 NEEDLE BIOPSY OF LIVER PERQ: CPT

## 2022-08-11 PROCEDURE — 160046 HCHG PACU - 1ST 60 MINS PHASE II

## 2022-08-11 PROCEDURE — 88307 TISSUE EXAM BY PATHOLOGIST: CPT

## 2022-08-11 PROCEDURE — 700111 HCHG RX REV CODE 636 W/ 250 OVERRIDE (IP)

## 2022-08-11 PROCEDURE — 700111 HCHG RX REV CODE 636 W/ 250 OVERRIDE (IP): Performed by: RADIOLOGY

## 2022-08-11 PROCEDURE — 700105 HCHG RX REV CODE 258: Performed by: RADIOLOGY

## 2022-08-11 PROCEDURE — 88313 SPECIAL STAINS GROUP 2: CPT

## 2022-08-11 RX ORDER — SODIUM CHLORIDE 9 MG/ML
500 INJECTION, SOLUTION INTRAVENOUS
Status: DISCONTINUED | OUTPATIENT
Start: 2022-08-11 | End: 2022-08-11 | Stop reason: HOSPADM

## 2022-08-11 RX ORDER — HYDRALAZINE HYDROCHLORIDE 20 MG/ML
20 INJECTION INTRAMUSCULAR; INTRAVENOUS EVERY 6 HOURS PRN
Status: DISCONTINUED | OUTPATIENT
Start: 2022-08-11 | End: 2022-08-11 | Stop reason: HOSPADM

## 2022-08-11 RX ORDER — MIDAZOLAM HYDROCHLORIDE 1 MG/ML
.5-2 INJECTION INTRAMUSCULAR; INTRAVENOUS PRN
Status: DISCONTINUED | OUTPATIENT
Start: 2022-08-11 | End: 2022-08-11 | Stop reason: HOSPADM

## 2022-08-11 RX ORDER — SODIUM CHLORIDE 9 MG/ML
INJECTION, SOLUTION INTRAVENOUS ONCE
Status: COMPLETED | OUTPATIENT
Start: 2022-08-11 | End: 2022-08-11

## 2022-08-11 RX ORDER — LIDOCAINE HYDROCHLORIDE 10 MG/ML
INJECTION, SOLUTION EPIDURAL; INFILTRATION; INTRACAUDAL; PERINEURAL
Status: DISCONTINUED
Start: 2022-08-11 | End: 2022-08-11 | Stop reason: HOSPADM

## 2022-08-11 RX ORDER — MIDAZOLAM HYDROCHLORIDE 1 MG/ML
INJECTION INTRAMUSCULAR; INTRAVENOUS
Status: COMPLETED
Start: 2022-08-11 | End: 2022-08-11

## 2022-08-11 RX ORDER — OXYCODONE HYDROCHLORIDE 5 MG/1
2.5 TABLET ORAL
Status: DISCONTINUED | OUTPATIENT
Start: 2022-08-11 | End: 2022-08-11 | Stop reason: HOSPADM

## 2022-08-11 RX ORDER — ONDANSETRON 2 MG/ML
4 INJECTION INTRAMUSCULAR; INTRAVENOUS EVERY 8 HOURS PRN
Status: DISCONTINUED | OUTPATIENT
Start: 2022-08-11 | End: 2022-08-11 | Stop reason: HOSPADM

## 2022-08-11 RX ORDER — HYDROMORPHONE HYDROCHLORIDE 1 MG/ML
0.25 INJECTION, SOLUTION INTRAMUSCULAR; INTRAVENOUS; SUBCUTANEOUS
Status: DISCONTINUED | OUTPATIENT
Start: 2022-08-11 | End: 2022-08-11 | Stop reason: HOSPADM

## 2022-08-11 RX ORDER — ONDANSETRON 2 MG/ML
4 INJECTION INTRAMUSCULAR; INTRAVENOUS PRN
Status: DISCONTINUED | OUTPATIENT
Start: 2022-08-11 | End: 2022-08-11 | Stop reason: HOSPADM

## 2022-08-11 RX ADMIN — LIDOCAINE HYDROCHLORIDE 5 ML: 10 INJECTION, SOLUTION EPIDURAL; INFILTRATION; INTRACAUDAL; PERINEURAL at 10:38

## 2022-08-11 RX ADMIN — MIDAZOLAM HYDROCHLORIDE 1 MG: 1 INJECTION INTRAMUSCULAR; INTRAVENOUS at 10:37

## 2022-08-11 RX ADMIN — SODIUM CHLORIDE: 9 INJECTION, SOLUTION INTRAVENOUS at 09:29

## 2022-08-11 RX ADMIN — MIDAZOLAM HYDROCHLORIDE 1 MG: 1 INJECTION, SOLUTION INTRAMUSCULAR; INTRAVENOUS at 10:37

## 2022-08-11 RX ADMIN — HYDRALAZINE HYDROCHLORIDE 20 MG: 20 INJECTION INTRAMUSCULAR; INTRAVENOUS at 11:44

## 2022-08-11 RX ADMIN — FENTANYL CITRATE 50 MCG: 50 INJECTION, SOLUTION INTRAMUSCULAR; INTRAVENOUS at 10:37

## 2022-08-11 ASSESSMENT — FIBROSIS 4 INDEX
FIB4 SCORE: 1.89
FIB4 SCORE: 1.89

## 2022-08-11 NOTE — DISCHARGE INSTRUCTIONS
If any questions arise, call your provider.  If your provider is not available, please feel free to call the Surgical Center at [phone].    MEDICATIONS: Resume taking daily medication.  Take prescribed pain medication with food.  If no medication is prescribed, you may take non-aspirin pain medication if needed.  PAIN MEDICATION CAN BE VERY CONSTIPATING.  Take a stool softener or laxative such as senokot, pericolace, or milk of magnesia if needed.          Liver Biopsy, Care After  These instructions give you information about how to care for yourself after your procedure. Your health care provider may also give you more specific instructions. If you have problems or questions, contact your health care provider.  What can I expect after the procedure?  After your procedure, it is common to have:  Pain and soreness in the area where the biopsy was done.  Bruising around the area where the biopsy was done.  Sleepiness and fatigue for 1-2 days.  Follow these instructions at home:  Medicines  Take over-the-counter and prescription medicines only as told by your health care provider.  If you were prescribed an antibiotic medicine, take it as told by your health care provider. Do not stop taking the antibiotic even if you start to feel better.  Do not take medicines such as aspirin and ibuprofen unless your health care provider tells you to take them. These medicines thin your blood and can increase the risk of bleeding.  If you are taking prescription pain medicine, take actions to prevent or treat constipation. Your health care provider may recommend that you:  Drink enough fluid to keep your urine pale yellow.  Eat foods that are high in fiber, such as fresh fruits and vegetables, whole grains, and beans.  Limit foods that are high in fat and processed sugars, such as fried or sweet foods.  Take an over-the-counter or prescription medicine for constipation.  Incision care  Follow instructions from your health  care provider about how to take care of your incision. Make sure you:  Wash your hands with soap and water before you change your bandage (dressing). If soap and water are not available, use hand .  Change your dressing as told by your health care provider.  Leave stitches (sutures), skin glue, or adhesive strips in place. These skin closures may need to stay in place for 2 weeks or longer. If adhesive strip edges start to loosen and curl up, you may trim the loose edges. Do not remove adhesive strips completely unless your health care provider tells you to do that.  Check your incision area every day for signs of infection. Check for:  Redness, swelling, or pain.  Fluid or blood.  Warmth.  Pus or a bad smell.  Do not take baths, swim, or use a hot tub until your health care provider says it is okay to do so.  Activity    Rest at home for 1-2 days, or as directed by your health care provider.  Avoid sitting for a long time without moving. Get up to take short walks every 1-2 hours. This is important to improve blood flow and breathing. Ask for help if you feel weak or unsteady.  Return to your normal activities as told by your health care provider. Ask your health care provider what activities are safe for you.  Do not drive or use heavy machinery while taking prescription pain medicine.  Do not lift anything that is heavier than 10 lb (4.5 kg), or the limit that your health care provider tells you, until he or she says that it is safe.  Do not play contact sports for 2 weeks after the procedure.  General instructions    Do not drink alcohol in the first week after the procedure.  Have someone stay with you for at least 24 hours after the procedure.  It is your responsibility to obtain your test results. Ask your health care provider, or the department that is doing the test:  When will my results be ready?  How will I get my results?  What are my treatment options?  What other tests do I need?  What are my  next steps?  Keep all follow-up visits as told by your health care provider. This is important.  Contact a health care provider if:  You have increased bleeding from an incision, resulting in more than a small spot of blood.  You have redness, swelling, or increasing pain in any incisions.  You notice a discharge or a bad smell coming from any of your incisions.  You have a fever or chills.  Get help right away if:  You develop swelling, bloating, or pain in your abdomen.  You become dizzy or faint.  You develop a rash.  You have nausea or you vomit.  You faint, or you have shortness of breath or difficulty breathing.  You develop chest pain.  You have problems with your speech or vision.  You have trouble with your balance or moving your arms or legs.  Summary  After the liver biopsy, it is common to have pain, soreness, and bruising in the area, as well as sleepiness and fatigue.  Take over-the-counter and prescription medicines only as told by your health care provider.  Follow instructions from your health care provider about how to care for your incision. Check the incision area daily for signs of infection.  This information is not intended to replace advice given to you by your health care provider. Make sure you discuss any questions you have with your health care provider.  Document Released: 07/07/2006 Document Revised: 02/10/2020 Document Reviewed: 12/28/2018  Elsevier Patient Education © 2020 [employer]

## 2022-08-11 NOTE — OR NURSING
1218 Report received from Sri FOSS and care of patient assumed at this time. Pt resting comfortably, no needs at this time.     1225 Phase II called for report; awaiting call back.     1231 Daughter Pamela called, gave update. Instructed to come to [facility] and check in with  upon arrival.     1248 Report given to Corin FOSS for phase II. Pt transported via gurney with chart, all personal belongings, and discharge instructions.

## 2022-08-11 NOTE — PROGRESS NOTES
Patient brought to IR for Random Liver Biopsy. Patient AAOx4, respirations even and unlabored. Dr. Sumner at bedside with consent. Patient assisted to bed and attached to NiBP, O2, SpO2, Co2, and EKG.     1037 1 mg of Versed and 50 mcg of Fentanyl given IVP     1037 Time out done for procedure to begin    1039 Sample taken    1040 Procedure End    [facility] with RN at bedside.     1 Cores in formalin

## 2022-08-11 NOTE — OR NURSING
1247: Report received from PRUDENCE Fierro. Patient to come to [facility] from PACU.   1248: Arrived to [facility], on gurney, awake and alert. No complaints of pain or nausea. Bedrest over at 1300.   1257: Patient's family arrived;  1301: Discharge instructions reviewed with patient and family by PRUDENCE Pa.   1308: IV removed, patient getting dressed.   1330: Patient discharged from PACU.

## 2022-08-11 NOTE — OR NURSING
1102  RECEIVED PATIENT FROM RADIOLOGY.  REPORT FROM STEPHANIE FOSS.  RESPIRATIONS ARE EVEN AND UNLABORED.  MID EPIGASTRIC GAUZE AND TEGADERM ARE CDI.      1115  MID EPIGASTRIC GAUZE AND TEGADERM ARE CDI.  NO HEMATOMA.    1130  MID EPIGASTRIC GAUZE AND TEGADERM ARE CDI.  NO HEMA LYDIA NOTED.       1144  MEDICATED WITH IV HYDRALAZINE FOR ELEVATED BP.    1145  MID EPIGASTRIC GAUZE AND TEGADERM ARE CDI.  NO HEMATOMA NOTED.    1200  MID EPIGASTRIC GAUZE AND TEGADERM ARE CDI.  NO HEMATOMA NOTED.    1218 Report to Sri FOSS for break.

## 2022-08-11 NOTE — OR SURGEON
Immediate Post- Operative Note    PostOp Diagnosis: ABNORMAL LFT'S, HX ALCOHOLIC LIVER DZ, DIABETES TYPE 2      Procedure(s): LEFT LOBE LIVER BIOPSY U/S GUIDANCE    14G CORE X 1 IN FORMALIN      Estimated Blood Loss: <5CC      FINDINGS: NEEDLE CONFIRMED IN LEFT HEPATIC LOBE        Complications: NONE          8/11/2022     10:41 AM     Duc Sumner M.D.

## 2022-10-03 RX ORDER — METFORMIN HYDROCHLORIDE 500 MG/1
TABLET, EXTENDED RELEASE ORAL
Qty: 90 TABLET | Refills: 11 | Status: SHIPPED | OUTPATIENT
Start: 2022-10-03 | End: 2023-05-03

## 2022-10-03 NOTE — TELEPHONE ENCOUNTER
Metformin Refill    Last seen: 7/22/22 by Dr. Pierre  Next appt: 11/4/22 with Dr. Ware     Was the patient seen in the last year in this department? Yes   Does patient have an active prescription for medications requested? No   Received Request Via: Pharmacy

## 2022-11-04 ENCOUNTER — APPOINTMENT (OUTPATIENT)
Dept: INTERNAL MEDICINE | Facility: OTHER | Age: 57
End: 2022-11-04
Payer: MEDICAID

## 2022-11-11 ENCOUNTER — OFFICE VISIT (OUTPATIENT)
Dept: CARDIOLOGY | Facility: MEDICAL CENTER | Age: 57
End: 2022-11-11
Payer: MEDICAID

## 2022-11-11 VITALS
OXYGEN SATURATION: 98 % | BODY MASS INDEX: 20.88 KG/M2 | RESPIRATION RATE: 16 BRPM | DIASTOLIC BLOOD PRESSURE: 98 MMHG | HEIGHT: 67 IN | WEIGHT: 133 LBS | HEART RATE: 74 BPM | SYSTOLIC BLOOD PRESSURE: 160 MMHG

## 2022-11-11 DIAGNOSIS — K74.60 CIRRHOSIS OF LIVER WITH ASCITES, UNSPECIFIED HEPATIC CIRRHOSIS TYPE (HCC): ICD-10-CM

## 2022-11-11 DIAGNOSIS — I42.1 HOCM (HYPERTROPHIC OBSTRUCTIVE CARDIOMYOPATHY) (HCC): ICD-10-CM

## 2022-11-11 DIAGNOSIS — R18.8 CIRRHOSIS OF LIVER WITH ASCITES, UNSPECIFIED HEPATIC CIRRHOSIS TYPE (HCC): ICD-10-CM

## 2022-11-11 DIAGNOSIS — K70.9 LIVER DISEASE DUE TO ALCOHOL (HCC): ICD-10-CM

## 2022-11-11 DIAGNOSIS — Z79.899 HIGH RISK MEDICATION USE: ICD-10-CM

## 2022-11-11 DIAGNOSIS — E11.9 TYPE 2 DIABETES MELLITUS WITHOUT COMPLICATION, UNSPECIFIED WHETHER LONG TERM INSULIN USE (HCC): ICD-10-CM

## 2022-11-11 DIAGNOSIS — I10 HTN (HYPERTENSION), MALIGNANT: ICD-10-CM

## 2022-11-11 PROCEDURE — 99215 OFFICE O/P EST HI 40 MIN: CPT | Performed by: INTERNAL MEDICINE

## 2022-11-11 RX ORDER — DILTIAZEM HYDROCHLORIDE 360 MG/1
360 CAPSULE, EXTENDED RELEASE ORAL DAILY
Qty: 90 CAPSULE | Refills: 4 | Status: SHIPPED | OUTPATIENT
Start: 2022-11-11 | End: 2023-03-10 | Stop reason: SDUPTHER

## 2022-11-11 ASSESSMENT — ENCOUNTER SYMPTOMS
DOUBLE VISION: 0
SENSORY CHANGE: 0
EYE PAIN: 0
ABDOMINAL PAIN: 0
HEADACHES: 0
CHILLS: 0
PND: 0
VOMITING: 0
SPEECH CHANGE: 0
ORTHOPNEA: 0
WEIGHT LOSS: 0
MYALGIAS: 0
PALPITATIONS: 0
COUGH: 0
LOSS OF CONSCIOUSNESS: 0
EYE DISCHARGE: 0
BRUISES/BLEEDS EASILY: 0
FALLS: 0
FEVER: 0
BLOOD IN STOOL: 0
SHORTNESS OF BREATH: 0
NAUSEA: 0
CLAUDICATION: 0
HALLUCINATIONS: 0
DIZZINESS: 0
DEPRESSION: 0
BLURRED VISION: 0

## 2022-11-11 ASSESSMENT — FIBROSIS 4 INDEX: FIB4 SCORE: 1.89

## 2022-11-12 NOTE — PROGRESS NOTES
Chief Complaint   Patient presents with    Hypertension       Subjective:   Rigoberto Adames is a 57 y.o. female who presents today for cardiac care and management for HOCM in setting of hypertension, liver cirrhosis secondary to alcohol abuse.    Patient has stopped drinking alcohol via her report.    She feels well overall.    She used to see Dr. Knowles.    In the interim, patient has been doing well without having any symptoms. Patient denies having chest pain, dyspnea, palpitation, presyncope, syncope episodes.       Past Medical History:   Diagnosis Date    Arthritis     Bilateral legs; no formal diagnosis.    Cataract     Bilateral IOL    Dental disorder     Upper and lower dentures    Diabetes mellitus, type 2 (HCC)     Oral medications    diarrhea 2019    been going on for about a year    Heart murmur     High cholesterol     HOCM (hypertrophic obstructive cardiomyopathy) (HCC)     Hypertension      Past Surgical History:   Procedure Laterality Date    PA UPPER GI ENDOSCOPY,DIAGNOSIS N/A 3/4/2022    Procedure: GASTROSCOPY;  Surgeon: Kashif Rhodes M.D.;  Location: [facility];  Service: Gastroenterology    PA UPPER GI ENDOSCOPY,LIGAT VARIX  9/25/2019    Procedure: GASTROSCOPY, WITH VARICEAL BANDING - WITH GASTRIC MAPPING;  Surgeon: Sandi Espinoza M.D.;  Location: [facility];  Service: Gastroenterology    GASTROSCOPY-ENDO  9/25/2019    Procedure: GASTROSCOPY;  Surgeon: Sandi Espinoza M.D.;  Location: [facility];  Service: Gastroenterology    PRIMARY C SECTION  2006    OTHER      c- section     Family History   Problem Relation Age of Onset    Cancer Father         stomach    Hypertension Mother      Social History     Socioeconomic History    Marital status: Single     Spouse name: Not on file    Number of children: Not on file    Years of education: Not on file    Highest education level: Not on file   Occupational History    Occupation: restaurant  owner   Tobacco Use    Smoking status: Former     Packs/day: 1.00     Years: 10.00     Pack years: 10.00     Types: Cigarettes     Start date: 2015     Quit date: 2020     Years since quittin.4    Smokeless tobacco: Never   Vaping Use    Vaping Use: Never used   Substance and Sexual Activity    Alcohol use: Not Currently     Alcohol/week: 0.6 oz     Types: 1 Glasses of wine per week    Drug use: No    Sexual activity: Not on file   Other Topics Concern    Not on file   Social History Narrative    Single- 2 children.     Social Determinants of Health     Financial Resource Strain: Not on file   Food Insecurity: Not on file   Transportation Needs: Not on file   Physical Activity: Not on file   Stress: Not on file   Social Connections: Not on file   Intimate Partner Violence: Not on file   Housing Stability: Not on file     No Known Allergies  Outpatient Encounter Medications as of 2022   Medication Sig Dispense Refill    metFORMIN ER (GLUCOPHAGE XR) 500 MG TABLET SR 24 HR Take 1 tablet by mouth once daily 90 Tablet 11    Ascorbic Acid (VITAMIN C PO) Take 1 Tablet by mouth every day.      B Complex Vitamins (VITAMIN B-COMPLEX PO) Take 1 Tablet by mouth every day.      hydrocortisone 1 % Cream Apply 1 Application topically 2 times a day. 1 Each 3    cetirizine (ZYRTEC) 10 MG Tab Take 1 Tablet by mouth every day. 30 Tablet 6    metoprolol tartrate (LOPRESSOR) 100 MG Tab Take 1 Tablet by mouth 2 times a day. 180 Tablet 4    DILTIAZem CD (CARDIZEM CD) 240 MG CAPSULE SR 24 HR Take 1 Capsule by mouth every day. (Patient taking differently: Take 120 mg by mouth every day.) 90 Capsule 4    Ursodiol 500 MG Tab Take  by mouth every day.      OXYBUTYNIN CHLORIDE ER PO Take 25 mg by mouth every day.      atorvastatin (LIPITOR) 20 MG Tab TAKE 1 TABLET BY MOUTH AT BEDTIME 90 Tab 0     No facility-administered encounter medications on file as of 2022.     Review of Systems   Constitutional:  Negative for  "chills, fever, malaise/fatigue and weight loss.   HENT:  Negative for ear discharge, ear pain, hearing loss and nosebleeds.    Eyes:  Negative for blurred vision, double vision, pain and discharge.   Respiratory:  Negative for cough and shortness of breath.    Cardiovascular:  Negative for chest pain, palpitations, orthopnea, claudication, leg swelling and PND.   Gastrointestinal:  Negative for abdominal pain, blood in stool, melena, nausea and vomiting.   Genitourinary:  Negative for dysuria and hematuria.   Musculoskeletal:  Negative for falls, joint pain and myalgias.   Skin:  Negative for itching and rash.   Neurological:  Negative for dizziness, sensory change, speech change, loss of consciousness and headaches.   Endo/Heme/Allergies:  Negative for environmental allergies. Does not bruise/bleed easily.   Psychiatric/Behavioral:  Negative for depression, hallucinations and suicidal ideas.       Objective:   BP (!) 160/98 (BP Location: Left arm, Patient Position: Sitting, BP Cuff Size: Adult)   Pulse 74   Resp 16   Ht 1.702 m (5' 7\")   Wt 60.3 kg (133 lb)   SpO2 98%   BMI 20.83 kg/m²     Physical Exam  Vitals and nursing note reviewed.   Constitutional:       General: She is not in acute distress.     Appearance: She is not diaphoretic.   HENT:      Head: Normocephalic and atraumatic.      Right Ear: External ear normal.      Left Ear: External ear normal.   Eyes:      General:         Right eye: No discharge.         Left eye: No discharge.   Neck:      Thyroid: No thyromegaly.      Vascular: No JVD.   Cardiovascular:      Rate and Rhythm: Normal rate and regular rhythm.   Pulmonary:      Effort: No respiratory distress.   Abdominal:      General: There is no distension.      Tenderness: There is no abdominal tenderness.   Skin:     General: Skin is warm and dry.      Comments: Jaundiced.   Neurological:      Mental Status: She is alert and oriented to person, place, and time.      Cranial Nerves: No " cranial nerve deficit.   Psychiatric:         Behavior: Behavior normal.       Assessment:     1. HOCM (hypertrophic obstructive cardiomyopathy) (HCC)        2. HTN (hypertension), malignant        3. Cirrhosis of liver with ascites, unspecified hepatic cirrhosis type (HCC)        4. 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)        5. Type 2 diabetes mellitus without complication, unspecified whether long term insulin use (HCC)        6. High risk medication use            Medical Decision Making:  Today's Assessment / Status / Plan:   Blood pressure is high as patient did not take medications via her report.  We will increase Cardizem to 360 mg p.o. once a day.  Continue with metoprolol 100 mg p.o. twice a day.  Heart rate goal of less than 70.  I also asked patient to bring bottles of medications to clinic in future visits so we know exactly which ones she is taking.

## 2022-12-09 ENCOUNTER — APPOINTMENT (OUTPATIENT)
Dept: INTERNAL MEDICINE | Facility: OTHER | Age: 57
End: 2022-12-09
Payer: MEDICAID

## 2022-12-09 ENCOUNTER — HOSPITAL ENCOUNTER (OUTPATIENT)
Dept: LAB | Facility: MEDICAL CENTER | Age: 57
End: 2022-12-09
Payer: MEDICAID

## 2022-12-09 ENCOUNTER — HOSPITAL ENCOUNTER (OUTPATIENT)
Dept: LAB | Facility: MEDICAL CENTER | Age: 57
End: 2022-12-09
Attending: INTERNAL MEDICINE
Payer: MEDICAID

## 2022-12-09 DIAGNOSIS — K74.60 CIRRHOSIS OF LIVER WITH ASCITES, UNSPECIFIED HEPATIC CIRRHOSIS TYPE (HCC): ICD-10-CM

## 2022-12-09 DIAGNOSIS — R18.8 CIRRHOSIS OF LIVER WITH ASCITES, UNSPECIFIED HEPATIC CIRRHOSIS TYPE (HCC): ICD-10-CM

## 2022-12-09 DIAGNOSIS — I42.1 HOCM (HYPERTROPHIC OBSTRUCTIVE CARDIOMYOPATHY) (HCC): ICD-10-CM

## 2022-12-09 DIAGNOSIS — L97.509 TYPE 2 DIABETES MELLITUS WITH FOOT ULCER, WITHOUT LONG-TERM CURRENT USE OF INSULIN (HCC): ICD-10-CM

## 2022-12-09 DIAGNOSIS — I10 HTN (HYPERTENSION), MALIGNANT: ICD-10-CM

## 2022-12-09 DIAGNOSIS — Z79.899 HIGH RISK MEDICATION USE: ICD-10-CM

## 2022-12-09 DIAGNOSIS — E11.9 TYPE 2 DIABETES MELLITUS WITHOUT COMPLICATION, UNSPECIFIED WHETHER LONG TERM INSULIN USE (HCC): ICD-10-CM

## 2022-12-09 DIAGNOSIS — E11.621 TYPE 2 DIABETES MELLITUS WITH FOOT ULCER, WITHOUT LONG-TERM CURRENT USE OF INSULIN (HCC): ICD-10-CM

## 2022-12-09 LAB
ALBUMIN SERPL BCP-MCNC: 4 G/DL (ref 3.2–4.9)
ALBUMIN SERPL BCP-MCNC: 4 G/DL (ref 3.2–4.9)
ALBUMIN/GLOB SERPL: 0.9 G/DL
ALBUMIN/GLOB SERPL: 1 G/DL
ALP SERPL-CCNC: 103 U/L (ref 30–99)
ALP SERPL-CCNC: 103 U/L (ref 30–99)
ALT SERPL-CCNC: 8 U/L (ref 2–50)
ALT SERPL-CCNC: 9 U/L (ref 2–50)
ANION GAP SERPL CALC-SCNC: 11 MMOL/L (ref 7–16)
ANION GAP SERPL CALC-SCNC: 13 MMOL/L (ref 7–16)
ANION GAP SERPL CALC-SCNC: 13 MMOL/L (ref 7–16)
AST SERPL-CCNC: 18 U/L (ref 12–45)
AST SERPL-CCNC: 18 U/L (ref 12–45)
BASOPHILS # BLD AUTO: 0.2 % (ref 0–1.8)
BASOPHILS # BLD: 0.02 K/UL (ref 0–0.12)
BILIRUB SERPL-MCNC: 0.2 MG/DL (ref 0.1–1.5)
BILIRUB SERPL-MCNC: 0.2 MG/DL (ref 0.1–1.5)
BUN SERPL-MCNC: 33 MG/DL (ref 8–22)
BUN SERPL-MCNC: 34 MG/DL (ref 8–22)
BUN SERPL-MCNC: 34 MG/DL (ref 8–22)
CALCIUM SERPL-MCNC: 9.2 MG/DL (ref 8.5–10.5)
CALCIUM SERPL-MCNC: 9.3 MG/DL (ref 8.5–10.5)
CALCIUM SERPL-MCNC: 9.3 MG/DL (ref 8.5–10.5)
CHLORIDE SERPL-SCNC: 112 MMOL/L (ref 96–112)
CHLORIDE SERPL-SCNC: 113 MMOL/L (ref 96–112)
CHLORIDE SERPL-SCNC: 114 MMOL/L (ref 96–112)
CHOLEST SERPL-MCNC: 136 MG/DL (ref 100–199)
CO2 SERPL-SCNC: 20 MMOL/L (ref 20–33)
CO2 SERPL-SCNC: 20 MMOL/L (ref 20–33)
CO2 SERPL-SCNC: 21 MMOL/L (ref 20–33)
CREAT SERPL-MCNC: 2.56 MG/DL (ref 0.5–1.4)
CREAT SERPL-MCNC: 2.56 MG/DL (ref 0.5–1.4)
CREAT SERPL-MCNC: 2.58 MG/DL (ref 0.5–1.4)
CREAT UR-MCNC: 115.37 MG/DL
EOSINOPHIL # BLD AUTO: 0.31 K/UL (ref 0–0.51)
EOSINOPHIL NFR BLD: 3.6 % (ref 0–6.9)
ERYTHROCYTE [DISTWIDTH] IN BLOOD BY AUTOMATED COUNT: 49.5 FL (ref 35.9–50)
EST. AVERAGE GLUCOSE BLD GHB EST-MCNC: 148 MG/DL
FASTING STATUS PATIENT QL REPORTED: NORMAL
GFR SERPLBLD CREATININE-BSD FMLA CKD-EPI: 21 ML/MIN/1.73 M 2
GLOBULIN SER CALC-MCNC: 4.2 G/DL (ref 1.9–3.5)
GLOBULIN SER CALC-MCNC: 4.3 G/DL (ref 1.9–3.5)
GLUCOSE SERPL-MCNC: 115 MG/DL (ref 65–99)
GLUCOSE SERPL-MCNC: 117 MG/DL (ref 65–99)
GLUCOSE SERPL-MCNC: 119 MG/DL (ref 65–99)
HBA1C MFR BLD: 6.8 % (ref 4–5.6)
HCT VFR BLD AUTO: 32.4 % (ref 37–47)
HDLC SERPL-MCNC: 53 MG/DL
HGB BLD-MCNC: 9.9 G/DL (ref 12–16)
IMM GRANULOCYTES # BLD AUTO: 0.02 K/UL (ref 0–0.11)
IMM GRANULOCYTES NFR BLD AUTO: 0.2 % (ref 0–0.9)
LDLC SERPL CALC-MCNC: 60 MG/DL
LYMPHOCYTES # BLD AUTO: 2.41 K/UL (ref 1–4.8)
LYMPHOCYTES NFR BLD: 28 % (ref 22–41)
MCH RBC QN AUTO: 29.5 PG (ref 27–33)
MCHC RBC AUTO-ENTMCNC: 30.6 G/DL (ref 33.6–35)
MCV RBC AUTO: 96.4 FL (ref 81.4–97.8)
MICROALBUMIN UR-MCNC: >440 MG/DL
MICROALBUMIN/CREAT UR: NORMAL MG/G (ref 0–30)
MONOCYTES # BLD AUTO: 0.46 K/UL (ref 0–0.85)
MONOCYTES NFR BLD AUTO: 5.3 % (ref 0–13.4)
NEUTROPHILS # BLD AUTO: 5.4 K/UL (ref 2–7.15)
NEUTROPHILS NFR BLD: 62.7 % (ref 44–72)
NRBC # BLD AUTO: 0 K/UL
NRBC BLD-RTO: 0 /100 WBC
NT-PROBNP SERPL IA-MCNC: ABNORMAL PG/ML (ref 0–125)
PLATELET # BLD AUTO: 231 K/UL (ref 164–446)
PMV BLD AUTO: 11.8 FL (ref 9–12.9)
POTASSIUM SERPL-SCNC: 4.6 MMOL/L (ref 3.6–5.5)
POTASSIUM SERPL-SCNC: 4.6 MMOL/L (ref 3.6–5.5)
POTASSIUM SERPL-SCNC: 4.7 MMOL/L (ref 3.6–5.5)
PROT SERPL-MCNC: 8.2 G/DL (ref 6–8.2)
PROT SERPL-MCNC: 8.3 G/DL (ref 6–8.2)
RBC # BLD AUTO: 3.36 M/UL (ref 4.2–5.4)
SODIUM SERPL-SCNC: 145 MMOL/L (ref 135–145)
SODIUM SERPL-SCNC: 146 MMOL/L (ref 135–145)
SODIUM SERPL-SCNC: 146 MMOL/L (ref 135–145)
TRIGL SERPL-MCNC: 117 MG/DL (ref 0–149)
WBC # BLD AUTO: 8.6 K/UL (ref 4.8–10.8)

## 2022-12-09 PROCEDURE — 83036 HEMOGLOBIN GLYCOSYLATED A1C: CPT

## 2022-12-09 PROCEDURE — 83880 ASSAY OF NATRIURETIC PEPTIDE: CPT

## 2022-12-09 PROCEDURE — 82043 UR ALBUMIN QUANTITATIVE: CPT

## 2022-12-09 PROCEDURE — 80053 COMPREHEN METABOLIC PANEL: CPT

## 2022-12-09 PROCEDURE — 80061 LIPID PANEL: CPT

## 2022-12-09 PROCEDURE — 80048 BASIC METABOLIC PNL TOTAL CA: CPT

## 2022-12-09 PROCEDURE — 82570 ASSAY OF URINE CREATININE: CPT

## 2022-12-09 PROCEDURE — 80053 COMPREHEN METABOLIC PANEL: CPT | Mod: 91

## 2022-12-09 PROCEDURE — 82105 ALPHA-FETOPROTEIN SERUM: CPT

## 2022-12-09 PROCEDURE — 85025 COMPLETE CBC W/AUTO DIFF WBC: CPT

## 2022-12-09 PROCEDURE — 36415 COLL VENOUS BLD VENIPUNCTURE: CPT

## 2022-12-10 ENCOUNTER — HOSPITAL ENCOUNTER (OUTPATIENT)
Dept: RADIOLOGY | Facility: MEDICAL CENTER | Age: 57
End: 2022-12-10
Attending: PHYSICIAN ASSISTANT
Payer: MEDICAID

## 2022-12-10 DIAGNOSIS — R74.02 NONSPECIFIC ELEVATION OF LEVELS OF TRANSAMINASE OR LACTIC ACID DEHYDROGENASE (LDH): ICD-10-CM

## 2022-12-10 DIAGNOSIS — K70.30 ALCOHOLIC CIRRHOSIS, UNSPECIFIED WHETHER ASCITES PRESENT (HCC): ICD-10-CM

## 2022-12-10 DIAGNOSIS — D53.9 MACROCYTIC ANEMIA: ICD-10-CM

## 2022-12-10 DIAGNOSIS — R74.01 NONSPECIFIC ELEVATION OF LEVELS OF TRANSAMINASE OR LACTIC ACID DEHYDROGENASE (LDH): ICD-10-CM

## 2022-12-10 DIAGNOSIS — K21.9 GASTROESOPHAGEAL REFLUX DISEASE, UNSPECIFIED WHETHER ESOPHAGITIS PRESENT: ICD-10-CM

## 2022-12-11 LAB — AFP-TM SERPL-MCNC: 6 NG/ML (ref 0–9)

## 2022-12-23 NOTE — TELEPHONE ENCOUNTER
Phone Number Called: [phone] (home)     Message: called patient regarding results. Phone gave busy signal. Called 3 more times received busy signal.    Left Message for patient to call back: N\A         Detail Level: Generalized Detail Level: Simple Detail Level: Detailed Patient Specific Counseling (Will Not Stick From Patient To Patient): Patient has tried t sal and states it helps but symptoms persist. Patient has been using clobetasol oint as well for elbows and heels.

## 2023-01-05 ENCOUNTER — APPOINTMENT (OUTPATIENT)
Dept: INTERNAL MEDICINE | Facility: OTHER | Age: 58
End: 2023-01-05
Payer: MEDICAID

## 2023-01-13 ENCOUNTER — OFFICE VISIT (OUTPATIENT)
Dept: INTERNAL MEDICINE | Facility: OTHER | Age: 58
End: 2023-01-13
Payer: MEDICAID

## 2023-01-13 VITALS
SYSTOLIC BLOOD PRESSURE: 171 MMHG | DIASTOLIC BLOOD PRESSURE: 74 MMHG | TEMPERATURE: 97.6 F | HEART RATE: 58 BPM | WEIGHT: 132.4 LBS | OXYGEN SATURATION: 99 % | BODY MASS INDEX: 20.78 KG/M2 | HEIGHT: 67 IN

## 2023-01-13 DIAGNOSIS — E34.9 NEUROPATHY ASSOCIATED WITH ENDOCRINE DISORDER (HCC): ICD-10-CM

## 2023-01-13 DIAGNOSIS — E11.42 TYPE 2 DIABETES MELLITUS WITH DIABETIC POLYNEUROPATHY, WITHOUT LONG-TERM CURRENT USE OF INSULIN (HCC): ICD-10-CM

## 2023-01-13 DIAGNOSIS — R80.9 MICROALBUMINURIA: ICD-10-CM

## 2023-01-13 DIAGNOSIS — G63 NEUROPATHY ASSOCIATED WITH ENDOCRINE DISORDER (HCC): ICD-10-CM

## 2023-01-13 DIAGNOSIS — I10 HYPERTENSION, UNSPECIFIED TYPE: ICD-10-CM

## 2023-01-13 DIAGNOSIS — N18.4 STAGE 4 CHRONIC KIDNEY DISEASE (HCC): ICD-10-CM

## 2023-01-13 PROCEDURE — 99213 OFFICE O/P EST LOW 20 MIN: CPT | Mod: GE

## 2023-01-13 RX ORDER — GABAPENTIN 100 MG/1
100 CAPSULE ORAL 3 TIMES DAILY
Qty: 90 CAPSULE | Refills: 1 | Status: SHIPPED | OUTPATIENT
Start: 2023-01-13 | End: 2023-05-03

## 2023-01-13 ASSESSMENT — PATIENT HEALTH QUESTIONNAIRE - PHQ9: CLINICAL INTERPRETATION OF PHQ2 SCORE: 0

## 2023-01-13 ASSESSMENT — FIBROSIS 4 INDEX: FIB4 SCORE: 1.454545454545454545

## 2023-01-13 NOTE — PATIENT INSTRUCTIONS
-Schedule appointment for health maintenance  -Schedule pap smear in 6 weeks  -Get hemoglobin a1c (blood sugar test March 9, 2023)  -Buy blood pressure cuff and record blood pressures 2 times a day and bring blood pressure log next appointment.  -Continue medications as directed  -It was nice visiting with you today!

## 2023-01-14 PROBLEM — E11.621 DIABETIC ULCER OF TOE (HCC): Status: RESOLVED | Noted: 2020-04-30 | Resolved: 2023-01-14

## 2023-01-14 PROBLEM — K74.60 CIRRHOSIS OF LIVER WITH ASCITES (HCC): Status: RESOLVED | Noted: 2017-03-06 | Resolved: 2023-01-14

## 2023-01-14 PROBLEM — R80.9 MICROALBUMINURIA: Status: ACTIVE | Noted: 2023-01-14

## 2023-01-14 PROBLEM — L97.509 DIABETIC ULCER OF TOE (HCC): Status: RESOLVED | Noted: 2020-04-30 | Resolved: 2023-01-14

## 2023-01-14 PROBLEM — N18.4 STAGE 4 CHRONIC KIDNEY DISEASE (HCC): Status: ACTIVE | Noted: 2023-01-14

## 2023-01-14 PROBLEM — R19.7 DIARRHEA: Status: RESOLVED | Noted: 2019-07-18 | Resolved: 2023-01-14

## 2023-01-14 PROBLEM — R18.8 CIRRHOSIS OF LIVER WITH ASCITES (HCC): Status: RESOLVED | Noted: 2017-03-06 | Resolved: 2023-01-14

## 2023-01-14 PROBLEM — N17.9 ACUTE KIDNEY INJURY (HCC): Status: RESOLVED | Noted: 2020-04-26 | Resolved: 2023-01-14

## 2023-01-15 PROBLEM — G62.9 NEUROPATHY: Status: RESOLVED | Noted: 2019-07-18 | Resolved: 2023-01-15

## 2023-01-17 NOTE — PROGRESS NOTES
Established Patient    Patient Care Team:  Kamala Pierre M.D. as PCP - General (Internal Medicine)  Duc Castro M.D. (Neurology)  Julian Dixon M.D. (Neurology)  Liam Galicia M.D. (Cardiovascular Disease (Cardiology))  [facility] as Home Health Provider  ALY Flores (Inactive) as     Rigoberto Adames is a 57 y.o. female who presents today with the following Chief Complaint(s): Follow up for Diagnoses of Type 2 diabetes mellitus with diabetic polyneuropathy, without long-term current use of insulin (HCC), Neuropathy associated with endocrine disorder (HCC), Hypertension, unspecified type, Stage 4 chronic kidney disease (HCC), and Microalbuminuria were pertinent to this visit.    HPI:  Patient is a pleasant 57-year-old female with a past medical history of hypertension, cirrhosis due to alcohol, HOCM followed by cardiology and type 2 diabetes who presents to the clinic for follow-up on labs and lower extremity weakness.    Patient's hemoglobin A1c 6.8.   HDL 53 LDL 60. Urine creatinine 115.37 Urine microalbumin >440 microalbumin to creatinine ratio unable to calculate. CMP showed BUN 33 creatinine 2.56 eGFR 21. Patient currently on metformin  mg and takes atorvastatin 20 mg. Feels she hasn't made any changes to her diet. Does not exercise. Denies any polyuria, polydipsia and polyphagia.     Does report having numbness in both feet that feel like pins and needles and sometimes burns that has been bothering her for a while. Monofilament exam last visit was positive.     BP in clinic 176/81. Repeat 171/74. Reports she did not take her medications this morning. Currently on Cardizem 360 mg daily and metoprolol 100 mg BID. Patient denies any blurred vision, dizziness, headaches or chest pain.    ROS:     General: No fevers, chills, night sweats, weight loss or gain  HEENT: No hearing changes, vision changes, eye pain, ear pain, nasal discharge, sore  throat  Neck: No swelling in neck  Pulmonary: No shortness of breath, cough, sputum, or hemoptysis  Cardiovascular: No chest pain, palpitations, or LE swelling  GI: No nausea, vomiting, diarrhea, constipation, abdominal pain, hematochezia or melena  : No dysuria or frequency  Neuro: Lower extremity numbness. No general weakness, no headaches, no lightheadedness, no dizziness  Psych: No anxiety or depression    Past Medical History:   Diagnosis Date    Acute kidney injury (HCC)     Acute necrotizing pyelonephritis     Alcohol withdrawal (HCC)     Arthritis     Bilateral legs; no formal diagnosis.    Cataract     Bilateral IOL    Cirrhosis of liver with ascites (HCC) 3/6/2017    Dental disorder     Upper and lower dentures    Diabetes mellitus, type 2 (HCC)     Oral medications    Diabetic ulcer of toe (HCC) 2020    diarrhea 2019    been going on for about a year    Foot pain, bilateral     Heart murmur     High cholesterol     HOCM (hypertrophic obstructive cardiomyopathy) (HCC)     Hyperammonemia (HCC) 2016    Hypertension     STEMI (ST elevation myocardial infarction) (HCC)     Thrombocytopenia (HCC)     Tobacco abuse 2014     Social History     Tobacco Use    Smoking status: Former     Packs/day: 1.00     Years: 10.00     Pack years: 10.00     Types: Cigarettes     Start date: 2015     Quit date: 2020     Years since quittin.6    Smokeless tobacco: Never   Vaping Use    Vaping Use: Never used   Substance Use Topics    Alcohol use: Not Currently     Alcohol/week: 0.6 oz     Types: 1 Glasses of wine per week    Drug use: No     Current Outpatient Medications   Medication Sig Dispense Refill    gabapentin (NEURONTIN) 100 MG Cap Take 1 Capsule by mouth 3 times a day. 90 Capsule 1    diltiazem CD (CARDIZEM CD) 360 MG ER capsule Take 1 Capsule by mouth every day. 90 Capsule 4    metFORMIN ER (GLUCOPHAGE XR) 500 MG TABLET SR 24 HR Take 1 tablet by mouth once daily 90 Tablet 11    Ascorbic  "Acid (VITAMIN C PO) Take 1 Tablet by mouth every day.      hydrocortisone 1 % Cream Apply 1 Application topically 2 times a day. 1 Each 3    cetirizine (ZYRTEC) 10 MG Tab Take 1 Tablet by mouth every day. 30 Tablet 6    metoprolol tartrate (LOPRESSOR) 100 MG Tab Take 1 Tablet by mouth 2 times a day. 180 Tablet 4    Ursodiol 500 MG Tab Take  by mouth every day.      atorvastatin (LIPITOR) 20 MG Tab TAKE 1 TABLET BY MOUTH AT BEDTIME 90 Tab 0     No current facility-administered medications for this visit.       Physical Exam:  BP (!) 171/74 (BP Location: Left arm, Patient Position: Sitting, BP Cuff Size: Adult)   Pulse (!) 58   Temp 36.4 °C (97.6 °F) (Temporal)   Ht 1.702 m (5' 7\")   Wt 60.1 kg (132 lb 6.4 oz)   SpO2 99%   BMI 20.74 kg/m²   General: Well developed, well nourished female, in no distress.  HEENT: NC/AT, PERRL, EOMI, no scleral icterus or conjunctival pallor  CV:RRR, no murmurs gallops or Rubs, no JVD  Pulm: LCAB, no crackles, rales, rhonchi, or wheezing  GI: Normal bowel sounds, abdomen soft, nontender, nondistended to deep or light palpation in all 4 quadrants, no HSM.  MSK: Radial and dorsalis pedis pulses 2+ and equal bilaterally, respectively.  Strength 5 out of 5 in upper and lower extremities.  No lower extremity edema  Skin: Jaundice  Neuro: Patient is alert and oriented x3, no focal deficits  Psych: Appropriate mood and affect       Assessment and Plan:   1. Type 2 diabetes mellitus with diabetic polyneuropathy, without long-term current use of insulin ([facility])  A1c 6.8    HDL 53 LDL 60  On metformin  mg.   On atorvastatin 20 mg  No polyuria, polydipsia or polyphagia.  Has numbness of both feet.   -Discussed diet and exercise with patient in controlling blood sugar.   -Continue medications  -Will recheck A1c in 3 months  -Follow-up in 3 months     2. Neuropathy associated with endocrine disorder  Likely neuropathy secondary to Type 2 DM  -Will trial gabapentin 100 mg TID for " diabetic neuropathy  -Will check labs to rule out vitamin deficiency and check TSH   - VITAMIN B12; Future  - TSH+FREE T4  - FOLATE  - VITAMIN B1  - VITAMIN B6; Future  -Follow-up in 6 weeks.    3. Hypertension, unspecified type  Bp 176/81, repeat 171/74  Patient reports not taking her BP medications  On cardizem 360 mg daily and metoprolol 100 mg BID  -Discussed with patient importance of medication adherence.  -Instructed patient to buy blood pressure cuff and monitor BP 2 times a day at home and bring log with her next appointment. Handout provided.   -Discussed lifestyle modifications which include low sodium diet and exercise. Handout provided.   -Follow-up in 6 weeks    4. Stage 4 chronic kidney disease (HCC)  BUN 33 Cr 2.56 eGFR 21  Has hypertension and Type 2 DM  -Discussed with patient adherence to blood pressure medications  -Renally dose medications  -Referral to nephrology    5. Microalbuminuria  Urine creatinine 115.37   Urine microalbumin >440   Microalbumin to creatinine ratio unable to calculate  -Will recheck labs  - Microalbumin Creat Ratio Urine - Lab Collect; Future  -See plan above       Return in about 6 weeks (around 2/24/2023).    Patient Instructions   -Schedule appointment for health maintenance  -Schedule pap smear in 6 weeks  -Get hemoglobin a1c (blood sugar test March 9, 2023)  -Buy blood pressure cuff and record blood pressures 2 times a day and bring blood pressure log next appointment.  -Continue medications as directed  -It was nice visiting with you today!      Kamala Pierre M.D. PGY-2  [facility] [employer]    This note was created using voice recognition software.  While every attempt is made to ensure accuracy of transcription, occasionally errors occur.

## 2023-01-28 ENCOUNTER — HOSPITAL ENCOUNTER (OUTPATIENT)
Dept: LAB | Facility: MEDICAL CENTER | Age: 58
End: 2023-01-28
Attending: PHYSICIAN ASSISTANT
Payer: MEDICAID

## 2023-01-28 ENCOUNTER — HOSPITAL ENCOUNTER (OUTPATIENT)
Dept: RADIOLOGY | Facility: MEDICAL CENTER | Age: 58
End: 2023-01-28
Attending: PHYSICIAN ASSISTANT
Payer: MEDICAID

## 2023-01-28 ENCOUNTER — HOSPITAL ENCOUNTER (OUTPATIENT)
Dept: LAB | Facility: MEDICAL CENTER | Age: 58
End: 2023-01-28
Payer: MEDICAID

## 2023-01-28 DIAGNOSIS — R74.01 NONSPECIFIC ELEVATION OF LEVELS OF TRANSAMINASE OR LACTIC ACID DEHYDROGENASE (LDH): ICD-10-CM

## 2023-01-28 DIAGNOSIS — G63 NEUROPATHY ASSOCIATED WITH ENDOCRINE DISORDER (HCC): ICD-10-CM

## 2023-01-28 DIAGNOSIS — E11.9 DIABETES MELLITUS WITHOUT COMPLICATION (HCC): ICD-10-CM

## 2023-01-28 DIAGNOSIS — K70.30 ALCOHOLIC CIRRHOSIS, UNSPECIFIED WHETHER ASCITES PRESENT (HCC): ICD-10-CM

## 2023-01-28 DIAGNOSIS — R74.8 ELEVATED SERUM ALKALINE PHOSPHATASE LEVEL: ICD-10-CM

## 2023-01-28 DIAGNOSIS — I10 HYPERTENSION, UNSPECIFIED TYPE: ICD-10-CM

## 2023-01-28 DIAGNOSIS — E11.42 TYPE 2 DIABETES MELLITUS WITH DIABETIC POLYNEUROPATHY, WITHOUT LONG-TERM CURRENT USE OF INSULIN (HCC): ICD-10-CM

## 2023-01-28 DIAGNOSIS — D53.9 MACROCYTIC ANEMIA: ICD-10-CM

## 2023-01-28 DIAGNOSIS — R74.02 NONSPECIFIC ELEVATION OF LEVELS OF TRANSAMINASE OR LACTIC ACID DEHYDROGENASE (LDH): ICD-10-CM

## 2023-01-28 DIAGNOSIS — E34.9 NEUROPATHY ASSOCIATED WITH ENDOCRINE DISORDER (HCC): ICD-10-CM

## 2023-01-28 DIAGNOSIS — K21.9 GASTROESOPHAGEAL REFLUX DISEASE, UNSPECIFIED WHETHER ESOPHAGITIS PRESENT: ICD-10-CM

## 2023-01-28 DIAGNOSIS — R77.1 ELEVATED SERUM GLOBULIN LEVEL: ICD-10-CM

## 2023-01-28 DIAGNOSIS — N18.9 CHRONIC KIDNEY DISEASE, UNSPECIFIED CKD STAGE: ICD-10-CM

## 2023-01-28 DIAGNOSIS — I42.9 CARDIOMYOPATHY, UNSPECIFIED TYPE (HCC): ICD-10-CM

## 2023-01-28 LAB
ANION GAP SERPL CALC-SCNC: 13 MMOL/L (ref 7–16)
BASOPHILS # BLD AUTO: 0.5 % (ref 0–1.8)
BASOPHILS # BLD: 0.03 K/UL (ref 0–0.12)
BUN SERPL-MCNC: 59 MG/DL (ref 8–22)
CALCIUM SERPL-MCNC: 9.1 MG/DL (ref 8.5–10.5)
CHLORIDE SERPL-SCNC: 111 MMOL/L (ref 96–112)
CO2 SERPL-SCNC: 19 MMOL/L (ref 20–33)
CREAT SERPL-MCNC: 3.55 MG/DL (ref 0.5–1.4)
CREAT UR-MCNC: 42.76 MG/DL
EOSINOPHIL # BLD AUTO: 0.25 K/UL (ref 0–0.51)
EOSINOPHIL NFR BLD: 3.8 % (ref 0–6.9)
ERYTHROCYTE [DISTWIDTH] IN BLOOD BY AUTOMATED COUNT: 50.7 FL (ref 35.9–50)
FERRITIN SERPL-MCNC: 103 NG/ML (ref 10–291)
FOLATE SERPL-MCNC: 38.1 NG/ML
GFR SERPLBLD CREATININE-BSD FMLA CKD-EPI: 14 ML/MIN/1.73 M 2
GLUCOSE SERPL-MCNC: 125 MG/DL (ref 65–99)
HCT VFR BLD AUTO: 30.3 % (ref 37–47)
HGB BLD-MCNC: 9.4 G/DL (ref 12–16)
IMM GRANULOCYTES # BLD AUTO: 0.02 K/UL (ref 0–0.11)
IMM GRANULOCYTES NFR BLD AUTO: 0.3 % (ref 0–0.9)
INR PPP: 1 (ref 0.87–1.13)
IRON SATN MFR SERPL: 19 % (ref 15–55)
IRON SERPL-MCNC: 52 UG/DL (ref 40–170)
LYMPHOCYTES # BLD AUTO: 1.9 K/UL (ref 1–4.8)
LYMPHOCYTES NFR BLD: 29 % (ref 22–41)
MCH RBC QN AUTO: 29.8 PG (ref 27–33)
MCHC RBC AUTO-ENTMCNC: 31 G/DL (ref 33.6–35)
MCV RBC AUTO: 96.2 FL (ref 81.4–97.8)
MICROALBUMIN UR-MCNC: 300.8 MG/DL
MICROALBUMIN/CREAT UR: 7035 MG/G (ref 0–30)
MONOCYTES # BLD AUTO: 0.37 K/UL (ref 0–0.85)
MONOCYTES NFR BLD AUTO: 5.6 % (ref 0–13.4)
NEUTROPHILS # BLD AUTO: 3.98 K/UL (ref 2–7.15)
NEUTROPHILS NFR BLD: 60.8 % (ref 44–72)
NRBC # BLD AUTO: 0 K/UL
NRBC BLD-RTO: 0 /100 WBC
PLATELET # BLD AUTO: 240 K/UL (ref 164–446)
PMV BLD AUTO: 11.7 FL (ref 9–12.9)
POTASSIUM SERPL-SCNC: 5.5 MMOL/L (ref 3.6–5.5)
PROTHROMBIN TIME: 13.1 SEC (ref 12–14.6)
RBC # BLD AUTO: 3.15 M/UL (ref 4.2–5.4)
SODIUM SERPL-SCNC: 143 MMOL/L (ref 135–145)
TIBC SERPL-MCNC: 275 UG/DL (ref 250–450)
UIBC SERPL-MCNC: 223 UG/DL (ref 110–370)
VIT B12 SERPL-MCNC: 1861 PG/ML (ref 211–911)
WBC # BLD AUTO: 6.6 K/UL (ref 4.8–10.8)

## 2023-01-28 PROCEDURE — 36415 COLL VENOUS BLD VENIPUNCTURE: CPT

## 2023-01-28 PROCEDURE — 76700 US EXAM ABDOM COMPLETE: CPT

## 2023-01-28 PROCEDURE — 85610 PROTHROMBIN TIME: CPT

## 2023-01-28 PROCEDURE — 83540 ASSAY OF IRON: CPT

## 2023-01-28 PROCEDURE — 80048 BASIC METABOLIC PNL TOTAL CA: CPT

## 2023-01-28 PROCEDURE — 82746 ASSAY OF FOLIC ACID SERUM: CPT

## 2023-01-28 PROCEDURE — 82043 UR ALBUMIN QUANTITATIVE: CPT

## 2023-01-28 PROCEDURE — 84207 ASSAY OF VITAMIN B-6: CPT

## 2023-01-28 PROCEDURE — 85025 COMPLETE CBC W/AUTO DIFF WBC: CPT

## 2023-01-28 PROCEDURE — 83550 IRON BINDING TEST: CPT

## 2023-01-28 PROCEDURE — 82728 ASSAY OF FERRITIN: CPT

## 2023-01-28 PROCEDURE — 82607 VITAMIN B-12: CPT

## 2023-01-28 PROCEDURE — 84425 ASSAY OF VITAMIN B-1: CPT

## 2023-01-28 PROCEDURE — 82570 ASSAY OF URINE CREATININE: CPT

## 2023-02-02 LAB — VIT B6 SERPL-MCNC: 93.6 NMOL/L (ref 20–125)

## 2023-02-03 LAB — VIT B1 BLD-MCNC: 199 NMOL/L (ref 70–180)

## 2023-03-10 ENCOUNTER — OFFICE VISIT (OUTPATIENT)
Dept: CARDIOLOGY | Facility: MEDICAL CENTER | Age: 58
End: 2023-03-10
Payer: MEDICAID

## 2023-03-10 VITALS
BODY MASS INDEX: 20.67 KG/M2 | HEART RATE: 60 BPM | DIASTOLIC BLOOD PRESSURE: 80 MMHG | RESPIRATION RATE: 16 BRPM | OXYGEN SATURATION: 96 % | SYSTOLIC BLOOD PRESSURE: 124 MMHG | WEIGHT: 132 LBS

## 2023-03-10 DIAGNOSIS — E11.9 TYPE 2 DIABETES MELLITUS WITHOUT COMPLICATION, UNSPECIFIED WHETHER LONG TERM INSULIN USE (HCC): ICD-10-CM

## 2023-03-10 DIAGNOSIS — K70.9 LIVER DISEASE DUE TO ALCOHOL (HCC): ICD-10-CM

## 2023-03-10 DIAGNOSIS — R18.8 CIRRHOSIS OF LIVER WITH ASCITES, UNSPECIFIED HEPATIC CIRRHOSIS TYPE (HCC): ICD-10-CM

## 2023-03-10 DIAGNOSIS — K74.60 CIRRHOSIS OF LIVER WITH ASCITES, UNSPECIFIED HEPATIC CIRRHOSIS TYPE (HCC): ICD-10-CM

## 2023-03-10 DIAGNOSIS — I10 HTN (HYPERTENSION), MALIGNANT: ICD-10-CM

## 2023-03-10 DIAGNOSIS — Z79.899 HIGH RISK MEDICATION USE: ICD-10-CM

## 2023-03-10 DIAGNOSIS — I42.1 HOCM (HYPERTROPHIC OBSTRUCTIVE CARDIOMYOPATHY) (HCC): ICD-10-CM

## 2023-03-10 PROCEDURE — 99214 OFFICE O/P EST MOD 30 MIN: CPT | Performed by: INTERNAL MEDICINE

## 2023-03-10 RX ORDER — HYDROXYZINE HYDROCHLORIDE 25 MG/1
TABLET, FILM COATED ORAL
COMMUNITY
Start: 2023-01-22 | End: 2023-03-10

## 2023-03-10 RX ORDER — METOPROLOL TARTRATE 100 MG/1
100 TABLET ORAL 2 TIMES DAILY
Qty: 180 TABLET | Refills: 4 | Status: SHIPPED | OUTPATIENT
Start: 2023-03-10 | End: 2023-05-03

## 2023-03-10 RX ORDER — OMEPRAZOLE 20 MG/1
20 CAPSULE, DELAYED RELEASE ORAL DAILY
Status: ON HOLD | COMMUNITY
Start: 2023-01-20 | End: 2023-11-29

## 2023-03-10 RX ORDER — DILTIAZEM HYDROCHLORIDE 360 MG/1
360 CAPSULE, EXTENDED RELEASE ORAL DAILY
Qty: 90 CAPSULE | Refills: 4 | Status: SHIPPED | OUTPATIENT
Start: 2023-03-10 | End: 2023-05-03

## 2023-03-10 ASSESSMENT — ENCOUNTER SYMPTOMS
LOSS OF CONSCIOUSNESS: 0
EYE PAIN: 0
SENSORY CHANGE: 0
WEIGHT LOSS: 0
COUGH: 0
CLAUDICATION: 0
HEADACHES: 0
VOMITING: 0
SPEECH CHANGE: 0
SHORTNESS OF BREATH: 0
CHILLS: 0
DIZZINESS: 0
DEPRESSION: 0
MYALGIAS: 0
FEVER: 0
DOUBLE VISION: 0
EYE DISCHARGE: 0
NAUSEA: 0
PND: 0
HALLUCINATIONS: 0
PALPITATIONS: 0
BLOOD IN STOOL: 0
ORTHOPNEA: 0
FALLS: 0
BRUISES/BLEEDS EASILY: 0
ABDOMINAL PAIN: 0
BLURRED VISION: 0

## 2023-03-10 ASSESSMENT — FIBROSIS 4 INDEX: FIB4 SCORE: 1.425

## 2023-03-31 ENCOUNTER — APPOINTMENT (OUTPATIENT)
Dept: INTERNAL MEDICINE | Facility: OTHER | Age: 58
End: 2023-03-31
Payer: MEDICAID

## 2023-04-22 ENCOUNTER — HOSPITAL ENCOUNTER (INPATIENT)
Facility: MEDICAL CENTER | Age: 58
LOS: 4 days | DRG: 673 | End: 2023-04-27
Attending: EMERGENCY MEDICINE | Admitting: INTERNAL MEDICINE
Payer: MEDICAID

## 2023-04-22 DIAGNOSIS — I10 PRIMARY HYPERTENSION: ICD-10-CM

## 2023-04-22 PROCEDURE — 99291 CRITICAL CARE FIRST HOUR: CPT

## 2023-04-22 PROCEDURE — 96375 TX/PRO/DX INJ NEW DRUG ADDON: CPT

## 2023-04-22 PROCEDURE — C1751 CATH, INF, PER/CENT/MIDLINE: HCPCS

## 2023-04-22 PROCEDURE — 5A1935Z RESPIRATORY VENTILATION, LESS THAN 24 CONSECUTIVE HOURS: ICD-10-PCS | Performed by: EMERGENCY MEDICINE

## 2023-04-22 PROCEDURE — 51702 INSERT TEMP BLADDER CATH: CPT

## 2023-04-22 PROCEDURE — 83735 ASSAY OF MAGNESIUM: CPT

## 2023-04-22 PROCEDURE — 84484 ASSAY OF TROPONIN QUANT: CPT

## 2023-04-22 PROCEDURE — 85025 COMPLETE CBC W/AUTO DIFF WBC: CPT

## 2023-04-22 PROCEDURE — 0BH17EZ INSERTION OF ENDOTRACHEAL AIRWAY INTO TRACHEA, VIA NATURAL OR ARTIFICIAL OPENING: ICD-10-PCS | Performed by: EMERGENCY MEDICINE

## 2023-04-22 PROCEDURE — 5A1223Z PERFORMANCE OF CARDIAC PACING, CONTINUOUS: ICD-10-PCS | Performed by: EMERGENCY MEDICINE

## 2023-04-22 PROCEDURE — 96366 THER/PROPH/DIAG IV INF ADDON: CPT

## 2023-04-22 PROCEDURE — 85610 PROTHROMBIN TIME: CPT

## 2023-04-22 PROCEDURE — 700111 HCHG RX REV CODE 636 W/ 250 OVERRIDE (IP): Performed by: EMERGENCY MEDICINE

## 2023-04-22 PROCEDURE — 303105 HCHG CATHETER EXTRA

## 2023-04-22 PROCEDURE — 85730 THROMBOPLASTIN TIME PARTIAL: CPT

## 2023-04-22 PROCEDURE — 83605 ASSAY OF LACTIC ACID: CPT

## 2023-04-22 PROCEDURE — 96368 THER/DIAG CONCURRENT INF: CPT

## 2023-04-22 PROCEDURE — 96365 THER/PROPH/DIAG IV INF INIT: CPT

## 2023-04-22 PROCEDURE — 31500 INSERT EMERGENCY AIRWAY: CPT

## 2023-04-22 PROCEDURE — 80053 COMPREHEN METABOLIC PANEL: CPT

## 2023-04-22 PROCEDURE — 84100 ASSAY OF PHOSPHORUS: CPT

## 2023-04-22 PROCEDURE — 36415 COLL VENOUS BLD VENIPUNCTURE: CPT

## 2023-04-22 RX ORDER — DOPAMINE HYDROCHLORIDE 160 MG/100ML
0-20 INJECTION, SOLUTION INTRAVENOUS CONTINUOUS
Status: DISCONTINUED | OUTPATIENT
Start: 2023-04-23 | End: 2023-04-23

## 2023-04-22 RX ORDER — MIDAZOLAM HYDROCHLORIDE 1 MG/ML
INJECTION INTRAMUSCULAR; INTRAVENOUS
Status: DISCONTINUED
Start: 2023-04-22 | End: 2023-04-23

## 2023-04-22 RX ORDER — DOPAMINE HYDROCHLORIDE 160 MG/100ML
0-20 INJECTION, SOLUTION INTRAVENOUS CONTINUOUS
Status: DISCONTINUED | OUTPATIENT
Start: 2023-04-23 | End: 2023-04-22

## 2023-04-22 RX ADMIN — DOPAMINE HYDROCHLORIDE 5 MCG/KG/MIN: 160 INJECTION, SOLUTION INTRAVENOUS at 23:52

## 2023-04-23 ENCOUNTER — APPOINTMENT (OUTPATIENT)
Dept: RADIOLOGY | Facility: MEDICAL CENTER | Age: 58
DRG: 673 | End: 2023-04-23
Attending: EMERGENCY MEDICINE
Payer: MEDICAID

## 2023-04-23 ENCOUNTER — APPOINTMENT (OUTPATIENT)
Dept: RADIOLOGY | Facility: MEDICAL CENTER | Age: 58
DRG: 673 | End: 2023-04-23
Attending: INTERNAL MEDICINE
Payer: MEDICAID

## 2023-04-23 ENCOUNTER — APPOINTMENT (OUTPATIENT)
Dept: CARDIOLOGY | Facility: MEDICAL CENTER | Age: 58
DRG: 673 | End: 2023-04-23
Attending: INTERNAL MEDICINE
Payer: MEDICAID

## 2023-04-23 PROBLEM — R57.9 SHOCK (HCC): Status: ACTIVE | Noted: 2023-04-23

## 2023-04-23 PROBLEM — I10 PRIMARY HYPERTENSION: Status: ACTIVE | Noted: 2023-04-23

## 2023-04-23 PROBLEM — R00.1 BRADYCARDIA: Status: ACTIVE | Noted: 2023-04-23

## 2023-04-23 PROBLEM — E11.9 TYPE 2 DIABETES MELLITUS (HCC): Status: ACTIVE | Noted: 2023-04-23

## 2023-04-23 PROBLEM — J96.01 ACUTE RESPIRATORY FAILURE WITH HYPOXIA (HCC): Status: ACTIVE | Noted: 2023-04-23

## 2023-04-23 PROBLEM — N18.9 ACUTE-ON-CHRONIC KIDNEY INJURY (HCC): Status: ACTIVE | Noted: 2023-04-23

## 2023-04-23 PROBLEM — E78.5 DYSLIPIDEMIA: Status: ACTIVE | Noted: 2023-04-23

## 2023-04-23 PROBLEM — K70.30 ALCOHOLIC CIRRHOSIS (HCC): Status: ACTIVE | Noted: 2023-04-23

## 2023-04-23 PROBLEM — E87.5 HYPERKALEMIA: Status: ACTIVE | Noted: 2023-04-23

## 2023-04-23 PROBLEM — N17.9 ACUTE-ON-CHRONIC KIDNEY INJURY (HCC): Status: ACTIVE | Noted: 2023-04-23

## 2023-04-23 PROBLEM — F10.11 HISTORY OF ALCOHOL ABUSE: Status: ACTIVE | Noted: 2023-04-23

## 2023-04-23 PROBLEM — I42.1 HOCM (HYPERTROPHIC OBSTRUCTIVE CARDIOMYOPATHY) (HCC): Status: ACTIVE | Noted: 2023-04-23

## 2023-04-23 PROBLEM — D64.9 ANEMIA: Status: ACTIVE | Noted: 2023-04-23

## 2023-04-23 LAB
ABO GROUP BLD: NORMAL
ALBUMIN SERPL BCP-MCNC: 2.6 G/DL (ref 3.2–4.9)
ALBUMIN SERPL BCP-MCNC: 2.9 G/DL (ref 3.2–4.9)
ALBUMIN SERPL BCP-MCNC: 3 G/DL (ref 3.2–4.9)
ALBUMIN SERPL BCP-MCNC: 3.2 G/DL (ref 3.2–4.9)
ALBUMIN/GLOB SERPL: 0.9 G/DL
ALP SERPL-CCNC: 100 U/L (ref 30–99)
ALP SERPL-CCNC: 81 U/L (ref 30–99)
ALP SERPL-CCNC: 98 U/L (ref 30–99)
ALT SERPL-CCNC: 14 U/L (ref 2–50)
ALT SERPL-CCNC: 28 U/L (ref 2–50)
ALT SERPL-CCNC: 28 U/L (ref 2–50)
AMMONIA PLAS-SCNC: 21 UMOL/L (ref 11–45)
AMPHET UR QL SCN: NEGATIVE
ANION GAP SERPL CALC-SCNC: 14 MMOL/L (ref 7–16)
ANION GAP SERPL CALC-SCNC: 14 MMOL/L (ref 7–16)
ANION GAP SERPL CALC-SCNC: 15 MMOL/L (ref 7–16)
ANION GAP SERPL CALC-SCNC: 16 MMOL/L (ref 7–16)
APTT PPP: 33.8 SEC (ref 24.7–36)
AST SERPL-CCNC: 23 U/L (ref 12–45)
AST SERPL-CCNC: 48 U/L (ref 12–45)
AST SERPL-CCNC: 76 U/L (ref 12–45)
BARBITURATES UR QL SCN: NEGATIVE
BARCODED ABORH UBTYP: 9500
BARCODED PRD CODE UBPRD: NORMAL
BARCODED UNIT NUM UBUNT: NORMAL
BASE EXCESS BLDA CALC-SCNC: -13 MMOL/L (ref -4–3)
BASE EXCESS BLDA CALC-SCNC: -7 MMOL/L (ref -4–3)
BASOPHILS # BLD AUTO: 0.1 % (ref 0–1.8)
BASOPHILS # BLD AUTO: 0.2 % (ref 0–1.8)
BASOPHILS # BLD: 0.01 K/UL (ref 0–0.12)
BASOPHILS # BLD: 0.02 K/UL (ref 0–0.12)
BENZODIAZ UR QL SCN: POSITIVE
BILIRUB SERPL-MCNC: 0.2 MG/DL (ref 0.1–1.5)
BILIRUB SERPL-MCNC: 0.3 MG/DL (ref 0.1–1.5)
BILIRUB SERPL-MCNC: 0.6 MG/DL (ref 0.1–1.5)
BLD GP AB SCN SERPL QL: NORMAL
BODY TEMPERATURE: ABNORMAL DEGREES
BODY TEMPERATURE: ABNORMAL DEGREES
BREATHS SETTING VENT: 28
BUN SERPL-MCNC: 31 MG/DL (ref 8–22)
BUN SERPL-MCNC: 32 MG/DL (ref 8–22)
BUN SERPL-MCNC: 55 MG/DL (ref 8–22)
BUN SERPL-MCNC: 56 MG/DL (ref 8–22)
BZE UR QL SCN: NEGATIVE
CA-I BLD ISE-SCNC: 1.28 MMOL/L (ref 1.1–1.3)
CALCIUM ALBUM COR SERPL-MCNC: 7.7 MG/DL (ref 8.5–10.5)
CALCIUM ALBUM COR SERPL-MCNC: 9.1 MG/DL (ref 8.5–10.5)
CALCIUM ALBUM COR SERPL-MCNC: 9.3 MG/DL (ref 8.5–10.5)
CALCIUM ALBUM COR SERPL-MCNC: 9.3 MG/DL (ref 8.5–10.5)
CALCIUM SERPL-MCNC: 6.6 MG/DL (ref 8.5–10.5)
CALCIUM SERPL-MCNC: 8.3 MG/DL (ref 8.5–10.5)
CALCIUM SERPL-MCNC: 8.4 MG/DL (ref 8.5–10.5)
CALCIUM SERPL-MCNC: 8.7 MG/DL (ref 8.5–10.5)
CANNABINOIDS UR QL SCN: NEGATIVE
CFT BLD TEG: 5.3 MIN (ref 4.6–9.1)
CFT P HPASE BLD TEG: 4.3 MIN (ref 4.3–8.3)
CHLORIDE SERPL-SCNC: 101 MMOL/L (ref 96–112)
CHLORIDE SERPL-SCNC: 102 MMOL/L (ref 96–112)
CHLORIDE SERPL-SCNC: 109 MMOL/L (ref 96–112)
CHLORIDE SERPL-SCNC: 110 MMOL/L (ref 96–112)
CLOT ANGLE BLD TEG: 79.6 DEGREES (ref 63–78)
CLOT LYSIS 30M P MA LENFR BLD TEG: 0.1 % (ref 0–2.6)
CO2 BLDA-SCNC: 16 MMOL/L (ref 20–33)
CO2 BLDA-SCNC: 18 MMOL/L (ref 20–33)
CO2 SERPL-SCNC: 11 MMOL/L (ref 20–33)
CO2 SERPL-SCNC: 15 MMOL/L (ref 20–33)
CO2 SERPL-SCNC: 23 MMOL/L (ref 20–33)
CO2 SERPL-SCNC: 23 MMOL/L (ref 20–33)
COMPONENT R 8504R: NORMAL
CREAT SERPL-MCNC: 3.17 MG/DL (ref 0.5–1.4)
CREAT SERPL-MCNC: 3.48 MG/DL (ref 0.5–1.4)
CREAT SERPL-MCNC: 5.21 MG/DL (ref 0.5–1.4)
CREAT SERPL-MCNC: 5.3 MG/DL (ref 0.5–1.4)
CT.EXTRINSIC BLD ROTEM: 0.8 MIN (ref 0.8–2.1)
DELSYS IDSYS: ABNORMAL
EKG IMPRESSION: NORMAL
EKG IMPRESSION: NORMAL
END TIDAL CARBON DIOXIDE IECO2: 19 MMHG
EOSINOPHIL # BLD AUTO: 0.11 K/UL (ref 0–0.51)
EOSINOPHIL # BLD AUTO: 0.12 K/UL (ref 0–0.51)
EOSINOPHIL NFR BLD: 1.4 % (ref 0–6.9)
EOSINOPHIL NFR BLD: 1.6 % (ref 0–6.9)
ERYTHROCYTE [DISTWIDTH] IN BLOOD BY AUTOMATED COUNT: 48.7 FL (ref 35.9–50)
ERYTHROCYTE [DISTWIDTH] IN BLOOD BY AUTOMATED COUNT: 52.7 FL (ref 35.9–50)
EST. AVERAGE GLUCOSE BLD GHB EST-MCNC: 123 MG/DL
ETHANOL BLD-MCNC: <10.1 MG/DL
FERRITIN SERPL-MCNC: 3153 NG/ML (ref 10–291)
GFR SERPLBLD CREATININE-BSD FMLA CKD-EPI: 15 ML/MIN/1.73 M 2
GFR SERPLBLD CREATININE-BSD FMLA CKD-EPI: 16 ML/MIN/1.73 M 2
GFR SERPLBLD CREATININE-BSD FMLA CKD-EPI: 9 ML/MIN/1.73 M 2
GFR SERPLBLD CREATININE-BSD FMLA CKD-EPI: 9 ML/MIN/1.73 M 2
GLOBULIN SER CALC-MCNC: 2.9 G/DL (ref 1.9–3.5)
GLOBULIN SER CALC-MCNC: 3.3 G/DL (ref 1.9–3.5)
GLOBULIN SER CALC-MCNC: 3.4 G/DL (ref 1.9–3.5)
GLUCOSE BLD STRIP.AUTO-MCNC: 102 MG/DL (ref 65–99)
GLUCOSE BLD STRIP.AUTO-MCNC: 179 MG/DL (ref 65–99)
GLUCOSE BLD STRIP.AUTO-MCNC: 217 MG/DL (ref 65–99)
GLUCOSE BLD STRIP.AUTO-MCNC: 80 MG/DL (ref 65–99)
GLUCOSE BLD STRIP.AUTO-MCNC: 87 MG/DL (ref 65–99)
GLUCOSE BLD STRIP.AUTO-MCNC: 90 MG/DL (ref 65–99)
GLUCOSE SERPL-MCNC: 107 MG/DL (ref 65–99)
GLUCOSE SERPL-MCNC: 243 MG/DL (ref 65–99)
GLUCOSE SERPL-MCNC: 246 MG/DL (ref 65–99)
GLUCOSE SERPL-MCNC: 93 MG/DL (ref 65–99)
HAV IGM SERPL QL IA: NORMAL
HBA1C MFR BLD: 5.9 % (ref 4–5.6)
HBV CORE IGM SER QL: NORMAL
HBV SURFACE AB SERPL IA-ACNC: 103 MIU/ML (ref 0–10)
HBV SURFACE AG SER QL: NORMAL
HCO3 BLDA-SCNC: 15.7 MMOL/L (ref 17–25)
HCO3 BLDA-SCNC: 16.1 MMOL/L (ref 17–25)
HCT VFR BLD AUTO: 23.7 % (ref 37–47)
HCT VFR BLD AUTO: 29.7 % (ref 37–47)
HCV AB SER QL: NORMAL
HGB BLD-MCNC: 7.2 G/DL (ref 12–16)
HGB BLD-MCNC: 9.7 G/DL (ref 12–16)
HGB BLD-MCNC: 9.8 G/DL (ref 12–16)
HOROWITZ INDEX BLDA+IHG-RTO: 150 MM[HG]
HOROWITZ INDEX BLDA+IHG-RTO: 367 MM[HG]
IMM GRANULOCYTES # BLD AUTO: 0.09 K/UL (ref 0–0.11)
IMM GRANULOCYTES # BLD AUTO: 0.14 K/UL (ref 0–0.11)
IMM GRANULOCYTES NFR BLD AUTO: 1.1 % (ref 0–0.9)
IMM GRANULOCYTES NFR BLD AUTO: 1.8 % (ref 0–0.9)
INR PPP: 1.4 (ref 0.87–1.13)
IRON SATN MFR SERPL: 50 % (ref 15–55)
IRON SERPL-MCNC: 118 UG/DL (ref 40–170)
LACTATE SERPL-SCNC: 5.3 MMOL/L (ref 0.5–2)
LV EJECT FRACT  99904: 75
LV EJECT FRACT MOD 2C 99903: 72.94
LV EJECT FRACT MOD 4C 99902: 69.54
LV EJECT FRACT MOD BP 99901: 71.61
LYMPHOCYTES # BLD AUTO: 1.32 K/UL (ref 1–4.8)
LYMPHOCYTES # BLD AUTO: 2.69 K/UL (ref 1–4.8)
LYMPHOCYTES NFR BLD: 16.3 % (ref 22–41)
LYMPHOCYTES NFR BLD: 35.3 % (ref 22–41)
MAGNESIUM SERPL-MCNC: 2 MG/DL (ref 1.5–2.5)
MAGNESIUM SERPL-MCNC: 2.2 MG/DL (ref 1.5–2.5)
MCF BLD TEG: 70.1 MM (ref 52–69)
MCF.PLATELET INHIB BLD ROTEM: 49.7 MM (ref 15–32)
MCH RBC QN AUTO: 29.9 PG (ref 27–33)
MCH RBC QN AUTO: 29.9 PG (ref 27–33)
MCHC RBC AUTO-ENTMCNC: 30.4 G/DL (ref 33.6–35)
MCHC RBC AUTO-ENTMCNC: 32.7 G/DL (ref 33.6–35)
MCV RBC AUTO: 91.7 FL (ref 81.4–97.8)
MCV RBC AUTO: 98.3 FL (ref 81.4–97.8)
METHADONE UR QL SCN: NEGATIVE
MODE IMODE: ABNORMAL
MONOCYTES # BLD AUTO: 0.3 K/UL (ref 0–0.85)
MONOCYTES # BLD AUTO: 0.37 K/UL (ref 0–0.85)
MONOCYTES NFR BLD AUTO: 3.9 % (ref 0–13.4)
MONOCYTES NFR BLD AUTO: 4.6 % (ref 0–13.4)
NEUTROPHILS # BLD AUTO: 4.36 K/UL (ref 2–7.15)
NEUTROPHILS # BLD AUTO: 6.19 K/UL (ref 2–7.15)
NEUTROPHILS NFR BLD: 57.3 % (ref 44–72)
NEUTROPHILS NFR BLD: 76.4 % (ref 44–72)
NRBC # BLD AUTO: 0 K/UL
NRBC # BLD AUTO: 0 K/UL
NRBC BLD-RTO: 0 /100 WBC
NRBC BLD-RTO: 0 /100 WBC
O2/TOTAL GAS SETTING VFR VENT: 100 %
O2/TOTAL GAS SETTING VFR VENT: 40 %
OPIATES UR QL SCN: NEGATIVE
OXYCODONE UR QL SCN: NEGATIVE
PA AA BLD-ACNC: ABNORMAL % (ref 0–11)
PA ADP BLD-ACNC: ABNORMAL % (ref 0–17)
PCO2 BLDA: 23.5 MMHG (ref 26–37)
PCO2 BLDA: 52.1 MMHG (ref 26–37)
PCP UR QL SCN: NEGATIVE
PEEP END EXPIRATORY PRESSURE IPEEP: 8 CMH20
PH BLDA: 7.1 [PH] (ref 7.4–7.5)
PH BLDA: 7.43 [PH] (ref 7.4–7.5)
PHOSPHATE SERPL-MCNC: 4 MG/DL (ref 2.5–4.5)
PHOSPHATE SERPL-MCNC: 4.3 MG/DL (ref 2.5–4.5)
PHOSPHATE SERPL-MCNC: 6.1 MG/DL (ref 2.5–4.5)
PHOSPHATE SERPL-MCNC: 7.4 MG/DL (ref 2.5–4.5)
PLATELET # BLD AUTO: 190 K/UL (ref 164–446)
PLATELET # BLD AUTO: 195 K/UL (ref 164–446)
PMV BLD AUTO: 11 FL (ref 9–12.9)
PMV BLD AUTO: 11 FL (ref 9–12.9)
PO2 BLDA: 367 MMHG (ref 64–87)
PO2 BLDA: 60 MMHG (ref 64–87)
POTASSIUM BLD-SCNC: 7.8 MMOL/L (ref 3.6–5.5)
POTASSIUM SERPL-SCNC: 4.1 MMOL/L (ref 3.6–5.5)
POTASSIUM SERPL-SCNC: 4.2 MMOL/L (ref 3.6–5.5)
POTASSIUM SERPL-SCNC: 6.8 MMOL/L (ref 3.6–5.5)
POTASSIUM SERPL-SCNC: 8.1 MMOL/L (ref 3.6–5.5)
PRODUCT TYPE UPROD: NORMAL
PROPOXYPH UR QL SCN: NEGATIVE
PROT SERPL-MCNC: 5.5 G/DL (ref 6–8.2)
PROT SERPL-MCNC: 6.2 G/DL (ref 6–8.2)
PROT SERPL-MCNC: 6.4 G/DL (ref 6–8.2)
PROTHROMBIN TIME: 16.9 SEC (ref 12–14.6)
RBC # BLD AUTO: 2.41 M/UL (ref 4.2–5.4)
RBC # BLD AUTO: 3.24 M/UL (ref 4.2–5.4)
RH BLD: NORMAL
SAO2 % BLDA: 100 % (ref 93–99)
SAO2 % BLDA: 92 % (ref 93–99)
SODIUM BLD-SCNC: 136 MMOL/L (ref 135–145)
SODIUM SERPL-SCNC: 136 MMOL/L (ref 135–145)
SODIUM SERPL-SCNC: 139 MMOL/L (ref 135–145)
SPECIMEN DRAWN FROM PATIENT: ABNORMAL
SPECIMEN DRAWN FROM PATIENT: ABNORMAL
TEG ALGORITHM TGALG: ABNORMAL
TIBC SERPL-MCNC: 234 UG/DL (ref 250–450)
TIDAL VOLUME IVT: 400 ML
TROPONIN T SERPL-MCNC: 43 NG/L (ref 6–19)
UIBC SERPL-MCNC: 116 UG/DL (ref 110–370)
UNIT STATUS USTAT: NORMAL
WBC # BLD AUTO: 7.6 K/UL (ref 4.8–10.8)
WBC # BLD AUTO: 8.1 K/UL (ref 4.8–10.8)

## 2023-04-23 PROCEDURE — 700102 HCHG RX REV CODE 250 W/ 637 OVERRIDE(OP): Performed by: INTERNAL MEDICINE

## 2023-04-23 PROCEDURE — 99152 MOD SED SAME PHYS/QHP 5/>YRS: CPT

## 2023-04-23 PROCEDURE — 93306 TTE W/DOPPLER COMPLETE: CPT

## 2023-04-23 PROCEDURE — 700111 HCHG RX REV CODE 636 W/ 250 OVERRIDE (IP): Performed by: INTERNAL MEDICINE

## 2023-04-23 PROCEDURE — A9270 NON-COVERED ITEM OR SERVICE: HCPCS | Performed by: HOSPITALIST

## 2023-04-23 PROCEDURE — 51702 INSERT TEMP BLADDER CATH: CPT

## 2023-04-23 PROCEDURE — 94003 VENT MGMT INPAT SUBQ DAY: CPT

## 2023-04-23 PROCEDURE — 93010 ELECTROCARDIOGRAM REPORT: CPT | Performed by: INTERNAL MEDICINE

## 2023-04-23 PROCEDURE — 99232 SBSQ HOSP IP/OBS MODERATE 35: CPT | Performed by: INTERNAL MEDICINE

## 2023-04-23 PROCEDURE — 94150 VITAL CAPACITY TEST: CPT

## 2023-04-23 PROCEDURE — 36600 WITHDRAWAL OF ARTERIAL BLOOD: CPT

## 2023-04-23 PROCEDURE — 76775 US EXAM ABDO BACK WALL LIM: CPT

## 2023-04-23 PROCEDURE — 84132 ASSAY OF SERUM POTASSIUM: CPT

## 2023-04-23 PROCEDURE — 05HY33Z INSERTION OF INFUSION DEVICE INTO UPPER VEIN, PERCUTANEOUS APPROACH: ICD-10-PCS | Performed by: INTERNAL MEDICINE

## 2023-04-23 PROCEDURE — 02HV33Z INSERTION OF INFUSION DEVICE INTO SUPERIOR VENA CAVA, PERCUTANEOUS APPROACH: ICD-10-PCS | Performed by: EMERGENCY MEDICINE

## 2023-04-23 PROCEDURE — 93005 ELECTROCARDIOGRAM TRACING: CPT | Performed by: INTERNAL MEDICINE

## 2023-04-23 PROCEDURE — 82728 ASSAY OF FERRITIN: CPT

## 2023-04-23 PROCEDURE — 70450 CT HEAD/BRAIN W/O DYE: CPT

## 2023-04-23 PROCEDURE — 99291 CRITICAL CARE FIRST HOUR: CPT | Mod: 25 | Performed by: INTERNAL MEDICINE

## 2023-04-23 PROCEDURE — 90935 HEMODIALYSIS ONE EVALUATION: CPT

## 2023-04-23 PROCEDURE — 700101 HCHG RX REV CODE 250: Performed by: EMERGENCY MEDICINE

## 2023-04-23 PROCEDURE — 99292 CRITICAL CARE ADDL 30 MIN: CPT | Mod: 25 | Performed by: INTERNAL MEDICINE

## 2023-04-23 PROCEDURE — 94799 UNLISTED PULMONARY SVC/PX: CPT

## 2023-04-23 PROCEDURE — P9016 RBC LEUKOCYTES REDUCED: HCPCS

## 2023-04-23 PROCEDURE — 36556 INSERT NON-TUNNEL CV CATH: CPT

## 2023-04-23 PROCEDURE — 83036 HEMOGLOBIN GLYCOSYLATED A1C: CPT

## 2023-04-23 PROCEDURE — 71045 X-RAY EXAM CHEST 1 VIEW: CPT

## 2023-04-23 PROCEDURE — 83550 IRON BINDING TEST: CPT

## 2023-04-23 PROCEDURE — 83540 ASSAY OF IRON: CPT

## 2023-04-23 PROCEDURE — 93010 ELECTROCARDIOGRAM REPORT: CPT | Mod: 59 | Performed by: INTERNAL MEDICINE

## 2023-04-23 PROCEDURE — 96368 THER/DIAG CONCURRENT INF: CPT

## 2023-04-23 PROCEDURE — 82803 BLOOD GASES ANY COMBINATION: CPT | Mod: 91

## 2023-04-23 PROCEDURE — 74174 CTA ABD&PLVS W/CONTRAST: CPT

## 2023-04-23 PROCEDURE — 85025 COMPLETE CBC W/AUTO DIFF WBC: CPT

## 2023-04-23 PROCEDURE — 700102 HCHG RX REV CODE 250 W/ 637 OVERRIDE(OP): Performed by: HOSPITALIST

## 2023-04-23 PROCEDURE — C1751 CATH, INF, PER/CENT/MIDLINE: HCPCS

## 2023-04-23 PROCEDURE — 86901 BLOOD TYPING SEROLOGIC RH(D): CPT

## 2023-04-23 PROCEDURE — 770000 HCHG ROOM/CARE - INTERMEDIATE ICU *

## 2023-04-23 PROCEDURE — 86706 HEP B SURFACE ANTIBODY: CPT

## 2023-04-23 PROCEDURE — 700111 HCHG RX REV CODE 636 W/ 250 OVERRIDE (IP): Performed by: EMERGENCY MEDICINE

## 2023-04-23 PROCEDURE — 700111 HCHG RX REV CODE 636 W/ 250 OVERRIDE (IP)

## 2023-04-23 PROCEDURE — 700105 HCHG RX REV CODE 258: Performed by: INTERNAL MEDICINE

## 2023-04-23 PROCEDURE — 82330 ASSAY OF CALCIUM: CPT

## 2023-04-23 PROCEDURE — 83735 ASSAY OF MAGNESIUM: CPT

## 2023-04-23 PROCEDURE — 31500 INSERT EMERGENCY AIRWAY: CPT

## 2023-04-23 PROCEDURE — 82962 GLUCOSE BLOOD TEST: CPT

## 2023-04-23 PROCEDURE — 36415 COLL VENOUS BLD VENIPUNCTURE: CPT

## 2023-04-23 PROCEDURE — 99255 IP/OBS CONSLTJ NEW/EST HI 80: CPT | Performed by: HOSPITALIST

## 2023-04-23 PROCEDURE — 85347 COAGULATION TIME ACTIVATED: CPT

## 2023-04-23 PROCEDURE — 36430 TRANSFUSION BLD/BLD COMPNT: CPT

## 2023-04-23 PROCEDURE — 82140 ASSAY OF AMMONIA: CPT

## 2023-04-23 PROCEDURE — 302214 INTUBATION BOX: Performed by: EMERGENCY MEDICINE

## 2023-04-23 PROCEDURE — C9113 INJ PANTOPRAZOLE SODIUM, VIA: HCPCS | Performed by: INTERNAL MEDICINE

## 2023-04-23 PROCEDURE — 36556 INSERT NON-TUNNEL CV CATH: CPT | Mod: LT | Performed by: INTERNAL MEDICINE

## 2023-04-23 PROCEDURE — 84295 ASSAY OF SERUM SODIUM: CPT

## 2023-04-23 PROCEDURE — 86900 BLOOD TYPING SEROLOGIC ABO: CPT

## 2023-04-23 PROCEDURE — 96375 TX/PRO/DX INJ NEW DRUG ADDON: CPT

## 2023-04-23 PROCEDURE — 303105 HCHG CATHETER EXTRA

## 2023-04-23 PROCEDURE — 80307 DRUG TEST PRSMV CHEM ANLYZR: CPT

## 2023-04-23 PROCEDURE — 94002 VENT MGMT INPAT INIT DAY: CPT

## 2023-04-23 PROCEDURE — 700117 HCHG RX CONTRAST REV CODE 255: Performed by: EMERGENCY MEDICINE

## 2023-04-23 PROCEDURE — 80074 ACUTE HEPATITIS PANEL: CPT

## 2023-04-23 PROCEDURE — 96366 THER/PROPH/DIAG IV INF ADDON: CPT

## 2023-04-23 PROCEDURE — 71275 CT ANGIOGRAPHY CHEST: CPT

## 2023-04-23 PROCEDURE — 5A1D70Z PERFORMANCE OF URINARY FILTRATION, INTERMITTENT, LESS THAN 6 HOURS PER DAY: ICD-10-PCS | Performed by: INTERNAL MEDICINE

## 2023-04-23 PROCEDURE — 85576 BLOOD PLATELET AGGREGATION: CPT

## 2023-04-23 PROCEDURE — 93306 TTE W/DOPPLER COMPLETE: CPT | Mod: 26 | Performed by: INTERNAL MEDICINE

## 2023-04-23 PROCEDURE — 86850 RBC ANTIBODY SCREEN: CPT

## 2023-04-23 PROCEDURE — 84100 ASSAY OF PHOSPHORUS: CPT | Mod: 91

## 2023-04-23 PROCEDURE — 80069 RENAL FUNCTION PANEL: CPT

## 2023-04-23 PROCEDURE — 80053 COMPREHEN METABOLIC PANEL: CPT | Mod: 91

## 2023-04-23 PROCEDURE — C1752 CATH,HEMODIALYSIS,SHORT-TERM: HCPCS

## 2023-04-23 PROCEDURE — 85018 HEMOGLOBIN: CPT

## 2023-04-23 PROCEDURE — 82077 ASSAY SPEC XCP UR&BREATH IA: CPT

## 2023-04-23 PROCEDURE — 85384 FIBRINOGEN ACTIVITY: CPT

## 2023-04-23 RX ORDER — DEXMEDETOMIDINE HYDROCHLORIDE 4 UG/ML
0-1.5 INJECTION, SOLUTION INTRAVENOUS CONTINUOUS
Status: DISCONTINUED | OUTPATIENT
Start: 2023-04-23 | End: 2023-04-25

## 2023-04-23 RX ORDER — ROCURONIUM BROMIDE 10 MG/ML
70 INJECTION, SOLUTION INTRAVENOUS ONCE
Status: COMPLETED | OUTPATIENT
Start: 2023-04-23 | End: 2023-04-23

## 2023-04-23 RX ORDER — METOPROLOL TARTRATE 50 MG/1
100 TABLET, FILM COATED ORAL TWICE DAILY
Status: DISCONTINUED | OUTPATIENT
Start: 2023-04-23 | End: 2023-04-24

## 2023-04-23 RX ORDER — CETIRIZINE HYDROCHLORIDE 10 MG/1
10 TABLET ORAL DAILY
Status: ON HOLD | COMMUNITY
End: 2023-04-27

## 2023-04-23 RX ORDER — HEPARIN SODIUM 1000 [USP'U]/ML
2800 INJECTION, SOLUTION INTRAVENOUS; SUBCUTANEOUS
Status: DISCONTINUED | OUTPATIENT
Start: 2023-04-23 | End: 2023-04-27

## 2023-04-23 RX ORDER — KETAMINE HYDROCHLORIDE 50 MG/ML
100 INJECTION, SOLUTION INTRAMUSCULAR; INTRAVENOUS ONCE
Status: COMPLETED | OUTPATIENT
Start: 2023-04-23 | End: 2023-04-23

## 2023-04-23 RX ORDER — CALCIUM GLUCONATE 20 MG/ML
2 INJECTION, SOLUTION INTRAVENOUS ONCE
Status: COMPLETED | OUTPATIENT
Start: 2023-04-23 | End: 2023-04-23

## 2023-04-23 RX ORDER — METOPROLOL TARTRATE 100 MG/1
100 TABLET ORAL 2 TIMES DAILY
Status: ON HOLD | COMMUNITY
End: 2023-04-27

## 2023-04-23 RX ORDER — DOPAMINE HYDROCHLORIDE 160 MG/100ML
0-20 INJECTION, SOLUTION INTRAVENOUS CONTINUOUS
Status: DISCONTINUED | OUTPATIENT
Start: 2023-04-23 | End: 2023-04-25

## 2023-04-23 RX ORDER — OMEPRAZOLE 20 MG/1
20 CAPSULE, DELAYED RELEASE ORAL DAILY
COMMUNITY
End: 2023-05-11

## 2023-04-23 RX ORDER — METFORMIN HYDROCHLORIDE 500 MG/1
500 TABLET, EXTENDED RELEASE ORAL DAILY
Status: ON HOLD | COMMUNITY
End: 2023-04-27

## 2023-04-23 RX ORDER — HYDROMORPHONE HYDROCHLORIDE 1 MG/ML
.5-2 INJECTION, SOLUTION INTRAMUSCULAR; INTRAVENOUS; SUBCUTANEOUS
Status: DISCONTINUED | OUTPATIENT
Start: 2023-04-23 | End: 2023-04-25

## 2023-04-23 RX ORDER — MIDAZOLAM HYDROCHLORIDE 1 MG/ML
INJECTION INTRAMUSCULAR; INTRAVENOUS
Status: DISCONTINUED
Start: 2023-04-23 | End: 2023-04-23

## 2023-04-23 RX ORDER — DILTIAZEM HYDROCHLORIDE EXTENDED-RELEASE TABLETS 360 MG/1
360 TABLET, EXTENDED RELEASE ORAL DAILY
Status: ON HOLD | COMMUNITY
End: 2023-04-27

## 2023-04-23 RX ORDER — CALCIUM GLUCONATE 20 MG/ML
INJECTION, SOLUTION INTRAVENOUS
Status: DISPENSED
Start: 2023-04-23 | End: 2023-04-23

## 2023-04-23 RX ORDER — PANTOPRAZOLE SODIUM 40 MG/10ML
40 INJECTION, POWDER, LYOPHILIZED, FOR SOLUTION INTRAVENOUS 2 TIMES DAILY
Status: DISCONTINUED | OUTPATIENT
Start: 2023-04-23 | End: 2023-04-23

## 2023-04-23 RX ORDER — OMEPRAZOLE 20 MG/1
20 CAPSULE, DELAYED RELEASE ORAL 2 TIMES DAILY
Status: DISCONTINUED | OUTPATIENT
Start: 2023-04-23 | End: 2023-04-27 | Stop reason: HOSPADM

## 2023-04-23 RX ORDER — HYDRALAZINE HYDROCHLORIDE 20 MG/ML
10-20 INJECTION INTRAMUSCULAR; INTRAVENOUS EVERY 4 HOURS PRN
Status: DISCONTINUED | OUTPATIENT
Start: 2023-04-23 | End: 2023-04-27 | Stop reason: HOSPADM

## 2023-04-23 RX ORDER — IPRATROPIUM BROMIDE AND ALBUTEROL SULFATE 2.5; .5 MG/3ML; MG/3ML
3 SOLUTION RESPIRATORY (INHALATION)
Status: DISCONTINUED | OUTPATIENT
Start: 2023-04-23 | End: 2023-04-27 | Stop reason: HOSPADM

## 2023-04-23 RX ORDER — MIDAZOLAM HYDROCHLORIDE 1 MG/ML
2 INJECTION INTRAMUSCULAR; INTRAVENOUS ONCE
Status: COMPLETED | OUTPATIENT
Start: 2023-04-23 | End: 2023-04-23

## 2023-04-23 RX ORDER — ATORVASTATIN CALCIUM 20 MG/1
20 TABLET, FILM COATED ORAL NIGHTLY
COMMUNITY
End: 2023-05-11 | Stop reason: SDUPTHER

## 2023-04-23 RX ORDER — URSODIOL 500 MG/1
500 TABLET, FILM COATED ORAL 2 TIMES DAILY
Status: ON HOLD | COMMUNITY
End: 2023-04-27

## 2023-04-23 RX ORDER — CALCIUM CHLORIDE 100 MG/ML
1 INJECTION INTRAVENOUS; INTRAVENTRICULAR ONCE
Status: COMPLETED | OUTPATIENT
Start: 2023-04-23 | End: 2023-04-23

## 2023-04-23 RX ADMIN — HYDRALAZINE HYDROCHLORIDE 10 MG: 20 INJECTION INTRAMUSCULAR; INTRAVENOUS at 13:32

## 2023-04-23 RX ADMIN — ROCURONIUM BROMIDE 70 MG: 10 INJECTION, SOLUTION INTRAVENOUS at 00:06

## 2023-04-23 RX ADMIN — INSULIN HUMAN 2 UNITS: 100 INJECTION, SOLUTION PARENTERAL at 04:07

## 2023-04-23 RX ADMIN — HYDROMORPHONE HYDROCHLORIDE 1 MG: 1 INJECTION, SOLUTION INTRAMUSCULAR; INTRAVENOUS; SUBCUTANEOUS at 04:50

## 2023-04-23 RX ADMIN — DEXTROSE MONOHYDRATE 25 G: 100 INJECTION, SOLUTION INTRAVENOUS at 01:30

## 2023-04-23 RX ADMIN — HYDRALAZINE HYDROCHLORIDE 10 MG: 20 INJECTION INTRAMUSCULAR; INTRAVENOUS at 06:46

## 2023-04-23 RX ADMIN — MIDAZOLAM HYDROCHLORIDE 2 MG: 1 INJECTION INTRAMUSCULAR; INTRAVENOUS at 00:45

## 2023-04-23 RX ADMIN — SODIUM BICARBONATE 100 MEQ: 84 INJECTION, SOLUTION INTRAVENOUS at 01:18

## 2023-04-23 RX ADMIN — CALCIUM GLUCONATE 2 G: 20 INJECTION, SOLUTION INTRAVENOUS at 01:22

## 2023-04-23 RX ADMIN — MIDAZOLAM HYDROCHLORIDE 2 MG: 1 INJECTION, SOLUTION INTRAMUSCULAR; INTRAVENOUS at 00:45

## 2023-04-23 RX ADMIN — HYDRALAZINE HYDROCHLORIDE 20 MG: 20 INJECTION INTRAMUSCULAR; INTRAVENOUS at 22:18

## 2023-04-23 RX ADMIN — HYDRALAZINE HYDROCHLORIDE 20 MG: 20 INJECTION INTRAMUSCULAR; INTRAVENOUS at 18:12

## 2023-04-23 RX ADMIN — IOHEXOL 100 ML: 350 INJECTION, SOLUTION INTRAVENOUS at 02:17

## 2023-04-23 RX ADMIN — HEPARIN SODIUM 2800 UNITS: 1000 INJECTION, SOLUTION INTRAVENOUS; SUBCUTANEOUS at 06:15

## 2023-04-23 RX ADMIN — CALCIUM CHLORIDE 1 G: 100 INJECTION INTRAVENOUS; INTRAVENTRICULAR at 00:05

## 2023-04-23 RX ADMIN — HYDROMORPHONE HYDROCHLORIDE 0.5 MG: 1 INJECTION, SOLUTION INTRAMUSCULAR; INTRAVENOUS; SUBCUTANEOUS at 23:30

## 2023-04-23 RX ADMIN — OMEPRAZOLE 20 MG: 20 CAPSULE, DELAYED RELEASE ORAL at 18:03

## 2023-04-23 RX ADMIN — METOPROLOL TARTRATE 100 MG: 50 TABLET, FILM COATED ORAL at 15:52

## 2023-04-23 RX ADMIN — INSULIN HUMAN 6 UNITS: 100 INJECTION, SOLUTION PARENTERAL at 00:16

## 2023-04-23 RX ADMIN — PANTOPRAZOLE SODIUM 40 MG: 40 INJECTION, POWDER, LYOPHILIZED, FOR SOLUTION INTRAVENOUS at 06:00

## 2023-04-23 RX ADMIN — HYDROMORPHONE HYDROCHLORIDE 0.5 MG: 1 INJECTION, SOLUTION INTRAMUSCULAR; INTRAVENOUS; SUBCUTANEOUS at 14:28

## 2023-04-23 RX ADMIN — KETAMINE HYDROCHLORIDE 100 MG: 100 INJECTION INTRAMUSCULAR; INTRAVENOUS at 00:05

## 2023-04-23 ASSESSMENT — ENCOUNTER SYMPTOMS
BACK PAIN: 0
VOMITING: 1
CLAUDICATION: 0
ABDOMINAL PAIN: 0
COUGH: 0
TINGLING: 0
SPEECH CHANGE: 0
HEADACHES: 0
PALPITATIONS: 0
BLURRED VISION: 0
STRIDOR: 0
CONSTIPATION: 0
DIZZINESS: 1
NAUSEA: 0
NECK PAIN: 0
DIZZINESS: 0
BRUISES/BLEEDS EASILY: 0
SENSORY CHANGE: 0
SORE THROAT: 0
FEVER: 0
ORTHOPNEA: 0
WEAKNESS: 1
DOUBLE VISION: 0
TREMORS: 0
FOCAL WEAKNESS: 0
NAUSEA: 1
SHORTNESS OF BREATH: 0
DEPRESSION: 0
DIARRHEA: 0
ABDOMINAL PAIN: 1
DIARRHEA: 1
EYE DISCHARGE: 0
CHILLS: 0
VOMITING: 0
NERVOUS/ANXIOUS: 0

## 2023-04-23 ASSESSMENT — PAIN DESCRIPTION - PAIN TYPE
TYPE: ACUTE PAIN

## 2023-04-23 ASSESSMENT — PULMONARY FUNCTION TESTS: FVC: 7.1

## 2023-04-23 ASSESSMENT — FIBROSIS 4 INDEX
FIB4 SCORE: 2.72
FIB4 SCORE: 1.8

## 2023-04-23 ASSESSMENT — LIFESTYLE VARIABLES: SUBSTANCE_ABUSE: 0

## 2023-04-23 NOTE — ASSESSMENT & PLAN NOTE
Significant drop since admission, question dilution, check occult blood  Monitor, transfuse as indicated

## 2023-04-23 NOTE — PROGRESS NOTES
4 Eyes Skin Assessment Completed by PRUDENCE Coles and PRUDENCE Puga.    Head WDL  Ears WDL  Nose WDL  Mouth WDL  Neck WDL  Breast/Chest WDL  Shoulder Blades WDL  Spine WDL  (R) Arm/Elbow/Hand Scab Small scabs   (L) Arm/Elbow/Hand Scab Small scabs   Abdomen WDL  Groin WDL  Scrotum/Coccyx/Buttocks Discoloration  (R) Leg Bruising, Brown discoloration multiple in round shapes.   (L) Leg Bruising, Brown discoloration multiple in round shapes  (R) Heel/Foot/Toe Brown abrasions  (L) Heel/Foot/Toe WDL          Devices In Places ECG, Blood Pressure Cuff, Pulse Ox, Vela, SCD's, ET Tube, OG/NG, Central Line, and Pacer      Interventions In Place Pillows, Q2 Turns, and Low Air Loss Mattress    Possible Skin Injury No    Pictures Uploaded Into Epic Yes  Wound Consult Placed N/A  RN Wound Prevention Protocol Ordered No

## 2023-04-23 NOTE — ED NOTES
Lab called with critical result of K -8.1 and Ca-6.6 at 0107. Critical lab result read back to lab staff .   Dr. Penny notified of critical lab result at 0108.  Critical lab result read back by Dr. Penny.

## 2023-04-23 NOTE — PROGRESS NOTES
[facility] ICU Progress Note      Admit Date: 4/22/2023    Resident(s): Walker Montgomery M.D.   Attending:  NEHEMIAH YEE/ Dr. Alicea    Patient ID:    Name:  Rigoberto Adames   YOB: 1965  Age:  58 y.o.  female   MRN:  [id number]    Hospital Course (carried forward and updated):  The entire history is obtained from healthcare providers, the daughter, the son and the medical record as this lady cannot provide me with any history.  This lady has a history of hypertensive obstructive cardiomyopathy, primary hypertension, DM type II, stage IV CKD, alcohol abuse, alcoholic cirrhosis and dyslipidemia.  Her daughter tells me that she stopped drinking and smoking 2 years ago.  She apparently was in her usual state of health until approximately 6 PM this past evening when she began to complain of some chest pain.  She became weak, dizzy and complained of abdominal pain.  She then had some vomiting and diarrhea.  EMS was activated.  She was found to be profoundly bradycardic with a heart rate in the 30s and she was hypotensive with a blood pressure of 49/33.  She was given atropine and intravenous fluids.  She presented to the ED in extremis and was emergently intubated.  A temporary transvenous pacemaker was placed.  She has had no known bleeding and the daughter specifically did not notice any hematemesis, coffee-ground emesis, melena or hematochezia.-- Dr. Quinonez     Consultants:  Critical Care  Nephrology  Cardiology      Interval Events:    4/23: Patient was admitted earlier this morning. She was originally intubated and sedated. Transvenous pacers placed. She underwent emergent hemodialysis. Patient currently extubated. She is resting comfortably in bed. Making good urine output.  Cardiology and Nephrology consulted. Patient to be transferred to [facility] today       NEURO:   Alert and oriented; moving all extremities  CARDIOVASC:  NSR: 50-70's, -200's; hydralazine given   RESPIRATORY:  Extubated.  Saturating appropriately on room air   GI/NUTRITION:  Currently NPO  RENAL/FLUID/LYTES: Currently status post emergent HD  HEME/ONC:   None  INFECTIOUS D:  None  ENDOCRINE:   None     Vitals Range last 24h:  Temp:  [35.8 °C (96.4 °F)] 35.8 °C (96.4 °F)  Pulse:  [] 57  Resp:  [14-41] 41  BP: ()/() 205/93  SpO2:  [78 %-100 %] 98 %      Intake/Output Summary (Last 24 hours) at 4/23/2023 0731  Last data filed at 4/23/2023 0615  Gross per 24 hour   Intake --   Output 900 ml   Net -900 ml        Review of Systems   Constitutional:  Negative for chills and fever.   HENT:  Negative for hearing loss and tinnitus.    Eyes:  Negative for blurred vision and double vision.   Cardiovascular:  Negative for chest pain, palpitations, orthopnea and claudication.   Gastrointestinal:  Negative for abdominal pain, constipation, diarrhea, nausea and vomiting.   Musculoskeletal:  Negative for back pain, joint pain and neck pain.   Neurological:  Negative for dizziness, tingling, tremors, sensory change, speech change, focal weakness and headaches.   Psychiatric/Behavioral:  Negative for depression, substance abuse and suicidal ideas.       PHYSICAL EXAM:  Vitals:    04/23/23 0323 04/23/23 0400 04/23/23 0500 04/23/23 0600   BP:  (!) 180/89 (!) 196/103 (!) 205/93   Pulse:  (!) 51 (!) 55 (!) 57   Resp: 20 20 (!) 24 (!) 41   Temp:       TempSrc:       SpO2:  99% 99% 98%   Weight:       Height:        Body mass index is 21.35 kg/m².    O2 therapy: Pulse Oximetry: 98 %, O2 Delivery Device: Ventilator         Physical Exam  Constitutional:       General: She is not in acute distress.     Appearance: Normal appearance.   HENT:      Head: Normocephalic and atraumatic.      Mouth/Throat:      Mouth: Mucous membranes are moist.   Eyes:      Extraocular Movements: Extraocular movements intact.      Pupils: Pupils are equal, round, and reactive to light.   Cardiovascular:      Rate and Rhythm: Normal rate and regular rhythm.       Pulses: Normal pulses.      Heart sounds: Normal heart sounds. No murmur heard.    No friction rub.   Pulmonary:      Effort: Pulmonary effort is normal. No respiratory distress.      Breath sounds: Normal breath sounds. No stridor. No wheezing or rhonchi.   Abdominal:      General: Abdomen is flat. There is no distension.      Palpations: Abdomen is soft. There is no mass.      Tenderness: There is no abdominal tenderness. There is no guarding or rebound.      Hernia: No hernia is present.   Musculoskeletal:         General: No swelling, tenderness, deformity or signs of injury.      Right lower leg: No edema.      Left lower leg: No edema.   Skin:     General: Skin is warm.      Capillary Refill: Capillary refill takes less than 2 seconds.      Coloration: Skin is pale.      Findings: No bruising, erythema, lesion or rash.   Neurological:      General: No focal deficit present.      Mental Status: She is alert and oriented to person, place, and time.      Cranial Nerves: No cranial nerve deficit.      Sensory: No sensory deficit.      Motor: No weakness.   Psychiatric:         Mood and Affect: Mood normal.       Recent Labs     04/23/23 0048 04/23/23  0313   ISTATAPH 7.097* 7.431   ISTATAPCO2 52.1* 23.5*   ISTATAPO2 367* 60*   ISTATATCO2 18* 16*   [id number] 100* 92*   ISTATARTHCO3 16.1* 15.7*   ISTATARTBE -13* -7*   ISTATTEMP see below see below   ISTATFIO2 100 40   ISTATSPEC Arterial Arterial     Recent Labs     04/22/23 2355 04/23/23  0244   SODIUM 136 139   POTASSIUM 8.1* 6.8*   CHLORIDE 109 110   CO2 11* 15*   BUN 56* 55*   CREATININE 5.30* 5.21*   MAGNESIUM 2.2 2.0   PHOSPHORUS 7.4* 6.1*   CALCIUM 6.6* 8.4*     Recent Labs     04/22/23 2355 04/23/23  0244   ALTSGPT 14 28   ASTSGOT 23 76*   ALKPHOSPHAT 81 100*   TBILIRUBIN 0.2 0.3   GLUCOSE 243* 246*     Recent Labs     04/22/23 2355 04/23/23  0244   RBC 2.41* 3.24*   HEMOGLOBIN 7.2* 9.7*   HEMATOCRIT 23.7* 29.7*   PLATELETCT 195 190   PROTHROMBTM 16.9*   --    APTT 33.8  --    INR 1.40*  --      Recent Labs     04/22/23  2355 04/23/23  0244   WBC 7.6 8.1   NEUTSPOLYS 57.30 76.40*   LYMPHOCYTES 35.30 16.30*   MONOCYTES 3.90 4.60   EOSINOPHILS 1.60 1.40   BASOPHILS 0.10 0.20   ASTSGOT 23 76*   ALTSGPT 14 28   ALKPHOSPHAT 81 100*   TBILIRUBIN 0.2 0.3       Meds:   calcium GLUConate-NaCl        Respiratory Therapy Consult        MD Alert...Adult ICU Electrolyte Replacement per Pharmacy        lidocaine  2 mL      dexmedetomidine (Precedex) infusion  0-1.5 mcg/kg/hr (Ideal) Stopped (04/23/23 0145)    ipratropium-albuterol  3 mL      pantoprazole  40 mg      HYDROmorphone   Stopped (04/23/23 0145)    HYDROmorphone  0.5-2 mg      DOPamine 1600 mcg/mL in D5W  0-20 mcg/kg/min (Ideal) Stopped (04/23/23 0500)    insulin regular  2-9 Units      And    dextrose bolus  25 g      heparin  2,800 Units      hydrALAZINE  10-20 mg          Procedures:  Temporary HD catheter placement     Imaging:  DX-CHEST-LIMITED (1 VIEW)   Final Result         1.  Ill-defined opacifications in each lung have increased minimally compared to the prior radiograph. This could indicate worsening of pulmonary edema or inflammation.      CTA ABDOMEN PELVIS W & W/O POST PROCESS   Final Result      1.  No evidence of aortic aneurysm or dissection.      2.  No arterial extravasation into the bowel is identified.      3.  There is edema around the pancreas and dilatation of the common bile duct. Consider pancreatitis.      4.  Liver surface is nodular consistent with cirrhosis.      5.  Small amount of ascites.      6.  Gallbladder wall edema. Portal hypertension is a possible cause.      CT-CTA CHEST PULMONARY ARTERY W/ RECONS   Final Result      1.  No CT evidence of pulmonary embolism.      2.  Consolidation is present in the lower lobes bilaterally. Pneumonia is a possibility.            CT-HEAD W/O   Final Result         NO ACUTE ABNORMALITIES ARE NOTED ON CT SCAN OF THE HEAD.                EC-ECHOCARDIOGRAM COMPLETE W/O CONT   Final Result      DX-CHEST-LIMITED (1 VIEW)   Final Result         1.  No consolidations identified.      2.  Mild perihilar opacification is noted which could be due to congestion.      US-RENAL    (Results Pending)       ASSESSEMENT and PLAN:    #RUBEN  #Progression of CKD:   Nephrology consulted  Currently has temporary hemodyalysis catheter; patient underwent emergent HD on 4/23  CKD stage 4 likely secondary to DM/HTN  Follow up renal ultrasound and urinalysis.   Follow up renal function post HD  Daily evaluation for RRT needs  Renally dose meds per eGFR <15  Renal diet once taking PO  No ACE/ARBs/Aldactone     #Hyperkalemia:   Initially was given dextrose, insulin, bicarb and calcium  Currently managed per above with HD   Follow up chem panel  Daily evaluation for RRT     #Bradycardia:   Cardiology consulted  Likely secondary to critical hyperkalemia. She does take metoprolol and Diltiazem for her hypertension  Patient currently with temporary transvenous pacers  Continue with Telemetry monitoring  Hold off on AV pino blockers for now unless hypertensive and heart rate is stable in the setting of HOCM. If stable, consider resuming diltiazem  Echo 4/23: LV small in size, severe concentric LV hypertrophy. Hyperdynamic left ventricular systolic function.The left ventricular ejection fraction is visually estimated to be greater than 75%. Normal regional wall motion.    #Acute hypoxemic respiratory failure:   Intubated on 4/23 and subsequently extubated the same day   Currently on room air     #Anemia:   Transfuse to keep hemoglobin >7  Follow up TEG with platelet mapping  IV PPI BID  Holding DVT ppx    #Alcoholic liver cirrhosis  Trend LFTs  Alcohol cessation conusleling  Avoid hepatotoxins    #DM2  Insulin sliding scale  A1c: 5.9         DISPO: IMCU    CODE STATUS: Full Code    Quality Measures:  Feeding: NPO  Analgesia: None  Sedation: None   Thromboprophylaxis: None   Head  of bed: >30 degrees  Ulcer prophylaxis: Pantoprazole  Glycemic control: Insulin sliding scale  Bowel care: bowel regimen  Indwelling lines: HD catheter  Deescalation of antibiotics: None      Walker Montgomery M.D.

## 2023-04-23 NOTE — PROGRESS NOTES
Called to ED for transvenous pacer insertion.   TVP inserted by Dr. Coyle at 0045 via R IJ sheathe. Pacer secured at 39 cm. Settings: 80/10/2.5

## 2023-04-23 NOTE — CONSULTS
Hospital Medicine Consultation    Date of Service  4/23/2023    Note Signed  4/24/2023    Referring Physician  Kaylene Alicea M.D.    Consulting Physician  Ortega Khan D.O.    Reason for Consultation  Transfer of care out of ICU    Chief Complaint:  Weakness and abdominal pain    History of Presenting Illness  Rigoberto Adames is a 57yo F with pmhx of hypertensive obstructive cardiomyopathy, hypertension, DMII, CKDIV, EtOH use (sober x 2 years), cirrhosis, DLD. She is here for abdominal pain and generalized weakness.  She was found in ED to have bradycardia with HR in 30's and hypotensive.  She was intubated, given fluids and atropine.  A temporary pacemaker was inserted. Initial potassium was 8.  She had hemodialysis.    Review of Systems  Review of Systems   Constitutional:  Negative for chills and fever.   Eyes:  Negative for discharge.   Respiratory:  Negative for cough, shortness of breath and stridor.    Cardiovascular:  Negative for chest pain, palpitations and leg swelling.   Gastrointestinal:  Negative for abdominal pain, diarrhea, nausea and vomiting.   Genitourinary:  Negative for dysuria and hematuria.   Musculoskeletal:  Negative for back pain and joint pain.   Skin:  Negative for rash.   Neurological:  Negative for dizziness, sensory change, speech change and headaches.   Psychiatric/Behavioral:  Negative for depression. The patient is not nervous/anxious.      Past Medical History  EtOH use  Alcohol cirrhosis  Dyslipidemia  Hypertrophic cardiomyopathy  Diabetes type II  Chronic kidney disease type II  hypertension    Surgical History   has no past surgical history on file.    Family History      Social History       Medications  Current Facility-Administered Medications   Medication Dose Route Frequency Provider Last Rate Last Admin    disopyramide (NORPACE) capsule 100 mg  100 mg Oral DAILY Denton Lomeli M.D.   100 mg at 04/24/23 1554    amLODIPine (NORVASC) tablet 10 mg  10 mg Oral Q DAY Ortega MCKEON  LINCOLN KhanO.   10 mg at 04/24/23 1554    carvedilol (COREG) tablet 12.5 mg  12.5 mg Oral BID WITH MEALS LINCOLN NuñezOJessica   12.5 mg at 04/24/23 1707    melatonin tablet 5 mg  5 mg Oral QHS PRN Papito Adan M.D.        atorvastatin (LIPITOR) tablet 40 mg  40 mg Oral Nightly Papito Adan M.D.        Respiratory Therapy Consult   Nebulization Continuous RT Ortega Quinonez M.D. MD Alert...ICU Electrolyte Replacement per Pharmacy   Other PHARMACY TO DOSE Ortega Quinonez M.D.        lidocaine (XYLOCAINE) 1 % injection 2 mL  2 mL Tracheal Tube Q30 MIN PRN Ortega Quinonez M.D.        dexmedetomidine (PRECEDEX) 400 mcg/100mL NS premix infusion  0-1.5 mcg/kg/hr (Ideal) Intravenous Continuous Ortega Quinonez M.D.   Dose not Required at 04/23/23 0145    ipratropium-albuterol (DUONEB) nebulizer solution  3 mL Nebulization Q2HRS PRN (RT) Ortega Quinonez M.D.        HYDROmorphone (DILAUDID) 0.2 mg/mL in 50mL NS (Continuous Infusion)   Intravenous Continuous Ortega Quinonez M.D.   Dose not Required at 04/23/23 0145    HYDROmorphone (Dilaudid) injection 0.5-2 mg  0.5-2 mg Intravenous Q HOUR PRN Ortega Quinonez M.D.   0.5 mg at 04/23/23 2330    DOPamine 1600 mcg/mL in D5W premix  0-20 mcg/kg/min (Ideal) Intravenous Continuous Ortega Quinonez M.D.   Stopped at 04/23/23 0500    insulin regular (HumuLIN R,NovoLIN R) injection  2-9 Units Subcutaneous Q6HRS Otrega Quinonez M.D.   2 Units at 04/23/23 0407    And    dextrose 10 % BOLUS 25 g  25 g Intravenous Q15 MIN PRN Ortega Quinonez M.D.        heparin injection 2,800 Units  2,800 Units Intracatheter ACUTE DIALYSIS PRN Juanis Magana D.O.   2,800 Units at 04/23/23 0615    hydrALAZINE (APRESOLINE) injection 10-20 mg  10-20 mg Intravenous Q4HRS PRN Ortega Quinonez M.D.   20 mg at 04/24/23 0609    omeprazole (PRILOSEC) capsule 20 mg  20 mg Oral BID Ortega Khan D.O.   20 mg at 04/24/23 7959        Allergies  No Known Allergies    Physical Exam  Temp:  [36.3 °C (97.3 °F)-36.6 °C (97.9 °F)] 36.6 °C (97.9 °F)  Pulse:  [78-92] 92  Resp:  [10-29] 19  BP: (157-229)/() 197/96  SpO2:  [92 %-97 %] 96 %    Physical Exam  Vitals reviewed.   Constitutional:       Appearance: Normal appearance. She is not diaphoretic.   HENT:      Head: Normocephalic and atraumatic.      Nose: Nose normal.      Mouth/Throat:      Mouth: Mucous membranes are moist.      Pharynx: No oropharyngeal exudate.   Eyes:      General: No scleral icterus.        Right eye: No discharge.         Left eye: No discharge.      Extraocular Movements: Extraocular movements intact.      Conjunctiva/sclera: Conjunctivae normal.      Pupils: Pupils are equal, round, and reactive to light.   Neck:      Vascular: No carotid bruit.   Cardiovascular:      Rate and Rhythm: Normal rate and regular rhythm.      Pulses:           Radial pulses are 2+ on the right side and 2+ on the left side.        Dorsalis pedis pulses are 2+ on the right side and 2+ on the left side.      Heart sounds: No murmur heard.  Pulmonary:      Effort: Pulmonary effort is normal. No respiratory distress.      Breath sounds: Normal breath sounds. No wheezing or rales.   Abdominal:      General: Bowel sounds are normal. There is no distension.      Palpations: Abdomen is soft.   Musculoskeletal:         General: No swelling or tenderness.      Cervical back: No muscular tenderness.   Lymphadenopathy:      Cervical: No cervical adenopathy.   Skin:     Coloration: Skin is not jaundiced or pale.   Neurological:      General: No focal deficit present.      Mental Status: She is alert and oriented to person, place, and time. Mental status is at baseline.      Cranial Nerves: No cranial nerve deficit.   Psychiatric:         Mood and Affect: Mood normal.         Behavior: Behavior normal.       Fluids  Date 04/23/23 0700 - 04/24/23 0659   Shift 2271-2447 9518-5330 7568-9404 24 Hour  Total   INTAKE   Shift Total       OUTPUT   Urine 375   375   Shift Total 375   375   Weight (kg) 60 60 60 60       Laboratory  Recent Labs     04/22/23  2355 04/23/23  0244 04/23/23  0750 04/24/23 0227   WBC 7.6 8.1  --  8.8   RBC 2.41* 3.24*  --  2.81*   HEMOGLOBIN 7.2* 9.7* 9.8* 8.4*   HEMATOCRIT 23.7* 29.7*  --  25.6*   MCV 98.3* 91.7  --  91.1   MCH 29.9 29.9  --  29.9   MCHC 30.4* 32.7*  --  32.8*   RDW 52.7* 48.7  --  51.3*   PLATELETCT 195 190  --  169   MPV 11.0 11.0  --  11.1     Recent Labs     04/23/23  0750 04/23/23  1221 04/24/23 0227   SODIUM 139 139 143   POTASSIUM 4.2 4.1 4.5   CHLORIDE 101 102 106   CO2 23 23 23   GLUCOSE 107* 93 102*   BUN 31* 32* 34*   CREATININE 3.17* 3.48* 4.18*   CALCIUM 8.7 8.3* 7.9*     Recent Labs     04/22/23 2355 04/24/23 0227   APTT 33.8  --    INR 1.40* 1.30*                 Imaging  DX-CHEST-PORTABLE (1 VIEW)   Final Result         1.  Pulmonary edema and/or infiltrates are identified, which are somewhat decreased since the prior exam.   2.  Trace bilateral pleural effusions   3.  Cardiomegaly   4.  Atherosclerosis      US-RENAL   Final Result      1.  Echogenic bilateral kidneys, in keeping with medical renal disease.   2.  No hydronephrosis. No renal calculus.      DX-CHEST-LIMITED (1 VIEW)   Final Result         1.  Ill-defined opacifications in each lung have increased minimally compared to the prior radiograph. This could indicate worsening of pulmonary edema or inflammation.      CTA ABDOMEN PELVIS W & W/O POST PROCESS   Final Result      1.  No evidence of aortic aneurysm or dissection.      2.  No arterial extravasation into the bowel is identified.      3.  There is edema around the pancreas and dilatation of the common bile duct. Consider pancreatitis.      4.  Liver surface is nodular consistent with cirrhosis.      5.  Small amount of ascites.      6.  Gallbladder wall edema. Portal hypertension is a possible cause.      CT-CTA CHEST PULMONARY ARTERY W/  RECONS   Final Result      1.  No CT evidence of pulmonary embolism.      2.  Consolidation is present in the lower lobes bilaterally. Pneumonia is a possibility.            CT-HEAD W/O   Final Result         NO ACUTE ABNORMALITIES ARE NOTED ON CT SCAN OF THE HEAD.               EC-ECHOCARDIOGRAM COMPLETE W/O CONT   Final Result      DX-CHEST-LIMITED (1 VIEW)   Final Result         1.  No consolidations identified.      2.  Mild perihilar opacification is noted which could be due to congestion.      DX-CHEST-PORTABLE (1 VIEW)    (Results Pending)       Assessment/Plan  * Bradycardia- (present on admission)  Assessment & Plan  Improved  Monitor on telemetry  Monitor and correct electrolytes    Alcoholic cirrhosis (HCC)  Assessment & Plan  Maintain sobriety  F/u with outpatient GI    Type 2 diabetes mellitus (HCC)  Assessment & Plan  Monitor accuchecks and cover with SSI  A1c:5.9    Primary hypertension  Assessment & Plan  Was hypotensive on admit and was on dopamine  Monitor vitals.    HOCM (hypertrophic obstructive cardiomyopathy) ([facility])  Assessment & Plan  Watch outflow obstruction     Dyslipidemia  Assessment & Plan  No results found for: CHOLSTRLTOT, LDL, HDL, TRIGLYCERIDE             Anemia  Assessment & Plan  4/23 Hgb:9.8, <7.2    Acute-on-chronic kidney injury (HCC)  Assessment & Plan  4/23 BUN: 31 <55  4/23 creatinine: 3.17 <5.21  Monitor labs  IV fluids      Hyperkalemia  Assessment & Plan  Resolved with hemodialysis  4/23 4.2 <6.8 <8.1  Monitor labs    Acute respiratory failure with hypoxia (HCC)  Assessment & Plan  Supplemental oxygen as needed wean to keep oxygen saturations greater than 92%  RT per protocol  Aspiration precautions  Mobilize patient and encourage deep inspiratory efforts

## 2023-04-23 NOTE — PROGRESS NOTES
Stat HD treatment for hyperkalemia ordered by Dr Magana using newly placed LIJ temp CVC from 0345-0615.Patient hypertensive throughout otherwise treatment tolerated well.Net UF removed 500 ml.CVC locked with heparin.Report given to primary Rn. Incurred 2 H of wait time from ER transfer to [facility],CVC placement with xray verification prior to initiation of HD.

## 2023-04-23 NOTE — CARE PLAN
The patient is Watcher - Medium risk of patient condition declining or worsening    Shift Goals  Clinical Goals: Decrease K+, Monitor BP for extremes  Patient Goals: Go home  Family Goals: Go Home      Problem: Knowledge Deficit - Standard  Goal: Patient and family/care givers will demonstrate understanding of plan of care, disease process/condition, diagnostic tests and medications  Outcome: Progressing     Problem: Pain - Standard  Goal: Alleviation of pain or a reduction in pain to the patient’s comfort goal  Outcome: Progressing     Problem: Risk for Aspiration  Goal: Patient's risk for aspiration will be absent or decrease  Outcome: Progressing     Problem: Urinary - Renal Perfusion  Goal: Ability to achieve and maintain adequate renal perfusion and functioning will improve  Outcome: Progressing

## 2023-04-23 NOTE — ASSESSMENT & PLAN NOTE
Supplemental oxygen as needed wean to keep oxygen saturations greater than 92%  RT per protocol  Aspiration precautions  Mobilize patient and encourage deep inspiratory efforts

## 2023-04-23 NOTE — PROGRESS NOTES
Monitor Summary:    SB-SR 40s-70, intermitentenly paced via TVP.     Rate: 45, Output: 10, Snesitivity: 1     QRS 0.17

## 2023-04-23 NOTE — H&P
CRITICAL CARE MEDICINE ATTENDING HISTORY AND PHYSICAL    Date of admission  4/22/2023    Chief Complaint  58 y.o. female admitted 4/22/2023 with bradycardia, shock, hyperkalemia and acute on chronic kidney injury.    History of Present Illness      The entire history is obtained from healthcare providers, the daughter, the son and the medical record as this lady cannot provide me with any history.  This lady has a history of hypertensive obstructive cardiomyopathy, primary hypertension, DM type II, stage IV CKD, alcohol abuse, alcoholic cirrhosis and dyslipidemia.  Her daughter tells me that she stopped drinking and smoking 2 years ago.  She apparently was in her usual state of health until approximately 6 PM this past evening when she began to complain of some chest pain.  She became weak, dizzy and complained of abdominal pain.  She then had some vomiting and diarrhea.  EMS was activated.  She was found to be profoundly bradycardic with a heart rate in the 30s and she was hypotensive with a blood pressure of 49/33.  She was given atropine and intravenous fluids.  She presented to the ED in extremis and was emergently intubated.  A temporary transvenous pacemaker was placed.  She has had no known bleeding and the daughter specifically did not notice any hematemesis, coffee-ground emesis, melena or hematochezia.      Review of Systems  Review of Systems   Unable to perform ROS: Acuity of condition     Vital Signs for the last 24 hours  Temp:  [35.8 °C (96.4 °F)] 35.8 °C (96.4 °F)  Pulse:  [] 80  Resp:  [14-30] 26  BP: ()/(42-73) 144/73  SpO2:  [78 %-100 %] 100 %    Hemodynamic parameters for the last 24 hours       Vent Settings for the last 24 hours  Vent Mode: APVCMV  Rate (breaths/min): 28  Vt Target (mL): 400  PEEP/CPAP: 8  MAP: 11  Control VTE (exp VT): 365    Physical Exam  Physical Exam  Constitutional:       Appearance: She is ill-appearing.      Comments: On ventilator   HENT:      Head:  Normocephalic.      Mouth/Throat:      Mouth: Mucous membranes are dry.   Cardiovascular:      Comments: Paced rhythm  Pulmonary:      Breath sounds: No wheezing or rales.   Abdominal:      General: There is no distension.      Tenderness: There is no abdominal tenderness.   Musculoskeletal:      Cervical back: Normal range of motion.      Right lower leg: No edema.      Left lower leg: No edema.   Neurological:      Comments: Sedated       Medications  Current Facility-Administered Medications   Medication Dose Route Frequency Provider Last Rate Last Admin    CALCIUM GLUCONATE-NACL 1-0.675 GM/50ML-% IV SOLN             Respiratory Therapy Consult   Nebulization Continuous RT Ortega Quinonez M.D. MD Alert...ICU Electrolyte Replacement per Pharmacy   Other PHARMACY TO DOSE Ortega Quinonez M.D.        lidocaine (XYLOCAINE) 1 % injection 2 mL  2 mL Tracheal Tube Q30 MIN PRN Ortega Quinonez M.D.        dexmedetomidine (PRECEDEX) 400 mcg/100mL NS premix infusion  0-1.5 mcg/kg/hr (Ideal) Intravenous Continuous Ortega Quinonez M.D.        ipratropium-albuterol (DUONEB) nebulizer solution  3 mL Nebulization Q2HRS PRN (RT) Ortega Quinonez M.D.        pantoprazole (Protonix) injection 40 mg  40 mg Intravenous BID Ortega Quinonez M.D.        HYDROmorphone (DILAUDID) 0.2 mg/mL in 50mL NS (Continuous Infusion)   Intravenous Continuous Ortega Quinonez M.D.        HYDROmorphone (Dilaudid) injection 0.5-2 mg  0.5-2 mg Intravenous Q HOUR PRN Ortega Quinonez M.D.        calcium GLUConate 2 g in NaCl IVPB premix  2 g Intravenous Once Ortega Quinonez M.D. 100 mL/hr at 04/23/23 0122 2 g at 04/23/23 0122    DOPamine 1600 mcg/mL in D5W premix  0-20 mcg/kg/min (Ideal) Intravenous Continuous Ortega Quinonez M.D.         No current outpatient medications on file.       Fluids  No intake or output data in the 24 hours ending 04/23/23 0203    Laboratory  Recent Labs      04/23/23  0048   ISTATAPH 7.097*   ISTATAPCO2 52.1*   ISTATAPO2 367*   ISTATATCO2 18*   HXECTXT9ORC 100*   ISTATARTHCO3 16.1*   ISTATARTBE -13*   ISTATTEMP see below   ISTATFIO2 100   ISTATSPEC Arterial     Recent Labs     04/22/23  2355   SODIUM 136   POTASSIUM 8.1*   CHLORIDE 109   CO2 11*   BUN 56*   CREATININE 5.30*   MAGNESIUM 2.2   PHOSPHORUS 7.4*   CALCIUM 6.6*     Recent Labs     04/22/23  2355   ALTSGPT 14   ASTSGOT 23   ALKPHOSPHAT 81   TBILIRUBIN 0.2   GLUCOSE 243*     Recent Labs     04/22/23  2355   WBC 7.6   NEUTSPOLYS 57.30   LYMPHOCYTES 35.30   MONOCYTES 3.90   EOSINOPHILS 1.60   BASOPHILS 0.10   ASTSGOT 23   ALTSGPT 14   ALKPHOSPHAT 81   TBILIRUBIN 0.2     Recent Labs     04/22/23  2355   RBC 2.41*   HEMOGLOBIN 7.2*   HEMATOCRIT 23.7*   PLATELETCT 195   PROTHROMBTM 16.9*   APTT 33.8   INR 1.40*       Imaging  X-Ray:  I have personally reviewed the images and compared with prior images. and My impression is: Interstitial edema    Assessment/Plan      Profound bradycardia   Suspect due to critical, life-threatening hyperkalemia   This lady takes metoprolol and diltiazem for her hypertension   Temporary transvenous pacemaker has been placed   Emergent hemodialysis    Critical, life-threatening hyperkalemia   Emergently treated with IV dextrose, IV insulin, IV bicarbonate solution, IV calcium   Emergent hemodialysis    Acute hypoxemic respiratory failure   Intubated 4/23   ABCDEF bundle   SAT/SBT as appropriate   Mobility level 1    Acute on chronic kidney disease   Emergent hemodialysis   Monitor renal function and urine output   Avoid nephrotoxins and renal dose medications    Anemia   Transfuse PRBCs to keep hemoglobin greater than 7   Check thromboelastogram with platelet mapping   Hold pharmacologic DVT prophylaxis for now   IV PPI twice daily    Alcoholic cirrhosis   Trend liver enzymes and synthetic function   Avoid hepatotoxins    Hypertensive obstructive cardiomyopathy   Avoid  hypovolemia    History of primary hypertension   Currently in shock due to bradycardia and hyperkalemia    Diabetes mellitus type 2   Check glycohemoglobin   Sliding scale insulin    Dyslipidemia   Continue statin    History of alcohol abuse   She has been reportedly sober for 2 years      VTE:  Contraindicated  Ulcer: PPI  I have performed a physical exam and reviewed and updated ROS and Plan today (4/23/2023). In review of yesterday's note (4/22/2023), there are no changes except as documented above.     I have assessed and reassessed her respiratory status with ventilator adjustments, airway mechanics, ventilator waveforms, blood pressure, hemodynamics, cardiovascular status with titration of dopamine, urine output, serial determinations of acid-base status and electrolytes and her neurologic status.  She is at high risk for worsening respiratory, cardiovascular, renal and CNS system dysfunction.    Discussed patient condition and risk of morbidity and/or mortality with Family, RN, cardiology, and ER physician    The patient remains critically ill.  Critical care time = 165 minutes in directly providing and coordinating critical care and extensive data review.  No time overlap and excludes procedures.    A Critical Care Medicine progress note may have been authored by a resident physician or advanced practitioner of nursing under my direct supervision on this date of service.  As the supervising and attending physician, I have either attested to or cosigned that document.  IN THE EVENT THAT DISCREPANCIES EXIST BETWEEN THIS DOCUMENT AND ANY DOCUMENT THAT I HAVE ATTESTED TO OR COSIGNED ON THIS DATE OF SERVICE, THEN THIS DOCUMENT REMAINS THE FINAL AUTHORITY AS TO MY ASSESSMENT AND PLAN REGARDING THE CARE OF THIS PATIENT.    Ortega Quinonez MD  Pulmonary and Critical Care Medicine

## 2023-04-23 NOTE — CARE PLAN
Problem: Knowledge Deficit - Standard  Goal: Patient and family/care givers will demonstrate understanding of plan of care, disease process/condition, diagnostic tests and medications  Outcome: Progressing     Problem: Pain - Standard  Goal: Alleviation of pain or a reduction in pain to the patient’s comfort goal  Outcome: Progressing     The patient is Watcher - Medium risk of patient condition declining or worsening    Shift Goals  Clinical Goals: Admit patient, Decrease potassium  Patient Goals: ALONDRA  Family Goals: ALONDRA    Progress made toward(s) clinical / shift goals:  started HD, extubated     Patient is not progressing towards the following goals: n/a

## 2023-04-23 NOTE — PROGRESS NOTES
"      CARDIOLOGY PROGRESS NOTE      Date: 4/23/2023      Patient Name: Rigoberto Adames  YOB: 1965  MRN: [id number]    Assessment/Recommendations:   # Cardiogenic shock, bradycardia: Most likely due to metabolic derangements from end-stage renal disease.  She is now in normal sinus rhythm and not requiring pacemaker support.  -Continue dialysis per Nephrology service  -We will remove transvenous pacemaker, but keep sheath in place for easy reinsertion if needed  -If no pacemaker needed tomorrow, sheath can be removed    # Left ventricular hypertrophy with LVOT obstruction (reportedly hypertensive), HTN:  -Can trial gently restarting AV pino blockers after adequate dialysis      Disposition:   Cardiology will continue to follow.    Please contact us if there are any questions or concerns.    Events/Subjective:   Severe bradycardia has resolved following dialysis.  She is currently normal sinus rhythm in the 80s with intermittent inappropriate pacing by her transvenous pacemaker.    Medications:     Scheduled Medications   Medication Dose Frequency    MD Alert...Adult ICU Electrolyte Replacement per Pharmacy   PHARMACY TO DOSE    insulin regular  2-9 Units Q6HRS    omeprazole  20 mg BID     No current outpatient medications    Vitals:   BP (!) 118/93   Pulse 83   Temp 36.6 °C (97.8 °F) (Temporal)   Resp (!) 34   Ht 1.676 m (5' 6\")   Wt 60 kg (132 lb 4.4 oz)   SpO2 92%    BP Readings from Last 4 Encounters:   04/23/23 (!) 118/93     Wt Readings from Last 4 Encounters:   04/23/23 60 kg (132 lb 4.4 oz)     Body mass index is 21.35 kg/m².    Physical Examination:   General: Chronically ill-appearing, but in no acute distress  Neck: Full range of motion, unable to assess JVP due to lines  Pulmonary: Normal respiratory effort, no distress  Cardiovascular: Regular rate, regular rhythm  Extremities: Warm and well perfused, no lower extremity edema  Neurological: Alert and oriented, no gross focal motor " deficits  Psychiatric: Normal affect, normal judgment    Laboratories:   Estimated Creatinine Clearance: 16.7 mL/min (A) (by C-G formula based on SCr of 3.48 mg/dL (H)).  Recent Labs     04/22/23 2355 04/23/23 0244 04/23/23  0750 04/23/23  1221   CREATININE 5.30* 5.21* 3.17* 3.48*   BUN 56* 55* 31* 32*   POTASSIUM 8.1* 6.8* 4.2 4.1   SODIUM 136 139 139 139   CALCIUM 6.6* 8.4* 8.7 8.3*   MAGNESIUM 2.2 2.0  --   --    CO2 11* 15* 23 23   ALBUMIN 2.6* 2.9* 3.2 3.0*     Recent Labs     04/23/23 0244 04/23/23  0750 04/23/23  1221   GLUCOSE 246* 107* 93     Recent Labs     04/22/23 2355 04/23/23  0244 04/23/23  1221   ASTSGOT 23 76* 48*   ALTSGPT 14 28 28   ALKPHOSPHAT 81 100* 98   INR 1.40*  --   --      Recent Labs     04/22/23 2355 04/23/23  0244 04/23/23  0750   WBC 7.6 8.1  --    HEMOGLOBIN 7.2* 9.7* 9.8*   PLATELETCT 195 190  --      Recent Labs     04/22/23 2355 04/23/23  0244   TROPONINT 43*  --    HBA1C  --  5.9*       Telemetry:   Normal sinus rhythm with intermittent inappropriate pacing    Cardiac Studies and Procedures:   Echocardiography  Severe concentric left ventricular hypertrophy. Hyperdynamic left   ventricular systolic function. The left ventricular ejection fraction is visually estimated to be greater than 75%. Evidence of increased   intracavity gradient; peak 27 mmHg without valsalva.  The right ventricle is normal in size and systolic function.  Mild, posteriorly directed mitral regurgitation.  Estimated right ventricular systolic pressure is 45 mmHg.  Right atrial pressure is estimated to be 15 mmHg.  Trivial pericardial effusion.       Scott Agudelo MD, FACC, [facility]  Interventional Cardiology  [facility], [address]  [address] [place] [address], [address] [place] [address]  Phone: [phone]  Fax: [phone]

## 2023-04-23 NOTE — CONSULTS
[facility] -  CONSULTATION NOTE               Author: Juanis Magana D.O. Date & Time: 4/23/2023  5:17 AM       REASON FOR CONSULTATION:   - RUBEN, hyperkalemia, bradycardia    CHIEF COMPLAINT:   -  CP, AMS    HISTORY OF PRESENT ILLNESS:    57yo female with h/o CKD, DM2, HTN BIB EMS after c/o CP and family noted increasing altered mentation. Patient is able to provide history only limited history, much history obtained from chart review and discussion with ICU/ED staff. Patient reports experiencing chest pain and nausea, denies abdominal pain and diarrhea, unable to provide more details. Patient was found by EMS to be hypotensive with BP 49/33 and bradycardia with HR in the 30s. Patient was provided with atropine and trancutaneous pacing was initiated. She was found to be hyperkalemia with K of 8.1. She was provided with initial medication management for hyperkalemia, and HD catheter placed by the intensivist, and HD initiated with a 2K/3Ca bath. She was intubated, subsequently extubated this AM. Patient had CTA chest/abd/pelvis at presentation.     Patient was to [facility] with [facility] for nephrology care 2/17/23 but did not show for her appointment. SCr 3.55 1/2023, with K of 5.5 and CO2 of 19.    SBP 80s-200s since admission    Patient seen/examined on HD, tolerating well      REVIEW OF SYSTEMS:    Limited to above due to mental status    PAST MEDICAL HISTORY:   DM  HTN  CKD  DLD    PAST SURGICAL HISTORY:   Unable to obtain    FAMILY HISTORY:   No reported renal disease, though history is limited    SOCIAL HISTORY:   - no active tobacco, quit 2 years ago  - No active EtOH, quit 2 years ago, prior excessive use  - No illicits    HOME MEDICATIONS:   - Reviewed and documented in chart    LABORATORY STUDIES:   - Reviewed and documented in chart    ALLERGIES:  Patient has no allergy information on record.    VS:  BP (!) 180/89   Pulse (!) 51   Temp (!) 35.8 °C (96.4 °F) (Temporal)   Resp 20  "  Ht 1.676 m (5' 6\")   Wt 60 kg (132 lb 4.4 oz)   SpO2 99%   BMI 21.35 kg/m²   Physical Exam  Vitals and nursing note reviewed.   Constitutional:       General: She is not in acute distress.  HENT:      Head: Normocephalic and atraumatic.      Right Ear: External ear normal.      Left Ear: External ear normal.      Nose: Nose normal.      Mouth/Throat:      Mouth: Mucous membranes are dry.      Pharynx: Oropharynx is clear.   Eyes:      General:         Right eye: No discharge.         Left eye: No discharge.      Extraocular Movements: Extraocular movements intact.   Cardiovascular:      Rate and Rhythm: Regular rhythm. Bradycardia present.      Comments: Left temp HD CVC  Pulmonary:      Effort: Pulmonary effort is normal.      Breath sounds: No wheezing.   Abdominal:      General: There is no distension.      Palpations: Abdomen is soft.   Musculoskeletal:      Right lower leg: No edema.      Left lower leg: No edema.   Skin:     General: Skin is warm and dry.      Findings: No bruising.   Neurological:      Mental Status: She is alert. She is disoriented.      Cranial Nerves: No cranial nerve deficit.   Psychiatric:         Mood and Affect: Mood normal.       FLUID BALANCE:  No intake/output data recorded.    LABS:  Recent Labs     04/22/23  2355 04/23/23  0244   SODIUM 136 139   POTASSIUM 8.1* 6.8*   CHLORIDE 109 110   CO2 11* 15*   GLUCOSE 243* 246*   BUN 56* 55*   CREATININE 5.30* 5.21*   CALCIUM 6.6* 8.4*        IMAGING:  - Imaging studies reviewed by me    IMPRESSION:  RUBEN vs progression of CKD  - UA and renal US pending  - unclear PO intake prior to admission  - suspect this is progression of CKD  - seen on HD in [facility] 4/23/23  CKD 4  - likely associated with DM/HTN  - renal US, UA, PCR, ACR pending  Hyperkalemia  - medically managed at presentation  - s/p HD x 1  Acidosis  - s/p HD  HTN  - labile  - goal<140/90  DM  - A1c 5.9  Anemia  - iron studies pending  CKD MBD  - phos elevated, Ca to goal  - PTH " pending  Hypoalbuminemia  AMS      PLAN:  - check renal function panel ~4 hours post HD (ordered)  - Daily evaluation for RRT needs  - Dose all meds per eGFR < 15  - f/u UA, renal US, PCR, ACR  - will consider further evaluation pending results  - f/u iron studies  - f/u MBD labs  - renal diet once taking PO  - no protein restrictions  - continue telemetry  - no ACEi/ARB/spironolactone    Further recs to follow    Other management per primary service    I have personally reviewed all laboratory values, microbiology studies, radiographic images, and current medications.  The patient is critically ill with one or more organ system(s) failing and without intervention, survival is jeopardized.  To prevent further life threatening deterioration and/or organ failure, I have spent Critical Care time in direct patient care/floor time, of HIGH Medical decision making, exclusive of procredures, and without overlapping physician care.    Brief Description of CC services performed:  Evaluation of notes from various team members for RRT needs and arrangement of it if needed.  Discussion with staff regarding renal care and plan moving forward.    Total CC Time = 37 minutes       Thank you for the consultation!

## 2023-04-23 NOTE — PROGRESS NOTES
Critical Care Progress Note    Date of admission  4/22/2023    Chief Complaint  58 y.o. female admitted 4/22/2023 with RUBEN and hyperkalemia    Hospital Course  Ms. Adames is a 58 year old lady with the past medical history significant for hypertensive obstructive cardiomyopathy, hypertensive, type-II DM, stage IV CKD, history of alcohol abuse now sober for 2 years, alcoholic cirrhosis and dyslipidemia who developed chest and abdominal pain with generalized weakness, dizziness, and nausea and vomiting.  EMS was activated and the patient was found to be profoundly bradycardic with a heart rate in the 30s and hypotensive with a blood pressures in the 40s.  She was given IVF and atropine and on arrival to the ER intubated due to being in extremis.  A temporary transvenous pacemaker was placed and laboratories returned showing acute on chronic RBUEN with a potassium level of 8.  A temporary HD catheter was placed and the patient was emergently dialyzed.  She was admitted to the ICU for ongoing critical care management.    Interval Problem Update  Reviewed last 24 hour events:   - s/p HD this morning with 500cc removed   - pt was following all commands this morning and extubated    - a/ox4   - SB/SR 40-80s   - no longer paced   - -210s   - Tmax 97.8   - tolerating sips of water   - UOP of 400cc overnight, 375cc this shift, Vela   - up to chair   - CXR(reviewed): cephalization noted, ETT, TVP, and left IJ HD catheter   - SCDs   - PPI   - K 6.8-->4.2   - creat 5.21-->3.16   - Hb 9.8    Review of Systems  Review of Systems   Constitutional:  Positive for malaise/fatigue. Negative for chills and fever.   HENT:  Negative for congestion and sore throat.    Eyes:  Negative for blurred vision and double vision.   Respiratory:  Negative for cough and shortness of breath.    Cardiovascular:  Positive for chest pain. Negative for leg swelling.   Gastrointestinal:  Positive for abdominal pain, diarrhea, nausea and vomiting.    Genitourinary:  Negative for frequency and urgency.   Musculoskeletal:  Negative for back pain and neck pain.   Skin:  Negative for rash.   Neurological:  Positive for dizziness and weakness. Negative for sensory change and speech change.   Endo/Heme/Allergies:  Does not bruise/bleed easily.   Psychiatric/Behavioral:  Negative for depression. The patient is not nervous/anxious.       Vital Signs for last 24 hours   Temp:  [35.8 °C (96.4 °F)] 35.8 °C (96.4 °F)  Pulse:  [] 57  Resp:  [14-41] 41  BP: ()/() 205/93  SpO2:  [78 %-100 %] 98 %    Hemodynamic parameters for last 24 hours       Respiratory Information for the last 24 hours  Vent Mode: Spont  Rate (breaths/min): 26  Vt Target (mL): 400  PEEP/CPAP: 8  MAP: 11  Length of Weaning Trial (Hours): 30  Control VTE (exp VT): 419    Physical Exam   Physical Exam  Vitals and nursing note reviewed.   Constitutional:       General: She is not in acute distress.     Appearance: She is ill-appearing. She is not toxic-appearing.   HENT:      Head: Normocephalic and atraumatic.      Right Ear: External ear normal.      Left Ear: External ear normal.      Nose: Nose normal. No rhinorrhea.      Mouth/Throat:      Mouth: Mucous membranes are moist.      Pharynx: Oropharynx is clear. No oropharyngeal exudate.   Eyes:      General: No scleral icterus.     Conjunctiva/sclera: Conjunctivae normal.      Pupils: Pupils are equal, round, and reactive to light.   Neck:      Comments: Right IJ TVP, left IJ trialysis catheter  Cardiovascular:      Rate and Rhythm: Normal rate and regular rhythm.      Pulses: Normal pulses.      Heart sounds: Normal heart sounds. No murmur heard.  Pulmonary:      Breath sounds: Normal breath sounds. No wheezing.   Chest:      Chest wall: No tenderness.   Abdominal:      Palpations: Abdomen is soft.      Tenderness: There is no abdominal tenderness. There is no guarding or rebound.   Genitourinary:     Comments: Dane in  place  Musculoskeletal:         General: Normal range of motion.      Cervical back: Normal range of motion and neck supple.      Left lower leg: No edema.   Lymphadenopathy:      Cervical: No cervical adenopathy.   Skin:     General: Skin is warm and dry.      Capillary Refill: Capillary refill takes less than 2 seconds.   Neurological:      Mental Status: She is alert and oriented to person, place, and time.      Cranial Nerves: No cranial nerve deficit.      Sensory: No sensory deficit.      Motor: No weakness.   Psychiatric:         Mood and Affect: Mood normal.         Behavior: Behavior normal.         Thought Content: Thought content normal.       Medications  Current Facility-Administered Medications   Medication Dose Route Frequency Provider Last Rate Last Admin    CALCIUM GLUCONATE-NACL 1-0.675 GM/50ML-% IV SOLN             Respiratory Therapy Consult   Nebulization Continuous RT Ortega Quinonez M.D. MD Alert...ICU Electrolyte Replacement per Pharmacy   Other PHARMACY TO DOSE Ortega Quinonez M.D.        lidocaine (XYLOCAINE) 1 % injection 2 mL  2 mL Tracheal Tube Q30 MIN PRN Ortega Quinonez M.D.        dexmedetomidine (PRECEDEX) 400 mcg/100mL NS premix infusion  0-1.5 mcg/kg/hr (Ideal) Intravenous Continuous Ortega Quinonez M.D.   Dose not Required at 04/23/23 0145    ipratropium-albuterol (DUONEB) nebulizer solution  3 mL Nebulization Q2HRS PRN (RT) Ortega Quinonez M.D.        pantoprazole (Protonix) injection 40 mg  40 mg Intravenous BID Ortega Quinonez M.D.   40 mg at 04/23/23 0600    HYDROmorphone (DILAUDID) 0.2 mg/mL in 50mL NS (Continuous Infusion)   Intravenous Continuous Ortega Quinonez M.D.   Dose not Required at 04/23/23 0145    HYDROmorphone (Dilaudid) injection 0.5-2 mg  0.5-2 mg Intravenous Q HOUR PRN Ortega Quinonez M.D.   1 mg at 04/23/23 0450    DOPamine 1600 mcg/mL in D5W premix  0-20 mcg/kg/min (Ideal) Intravenous Continuous  Ortega Quinonez M.D.   Stopped at 04/23/23 0500    insulin regular (HumuLIN R,NovoLIN R) injection  2-9 Units Subcutaneous Q6HRS Ortega Quinonez M.D.   2 Units at 04/23/23 0407    And    dextrose 10 % BOLUS 25 g  25 g Intravenous Q15 MIN PRN Ortega Quinonez M.D.        heparin injection 2,800 Units  2,800 Units Intracatheter ACUTE DIALYSIS PRN Juanis Magana D.O.   2,800 Units at 04/23/23 0615    hydrALAZINE (APRESOLINE) injection 10-20 mg  10-20 mg Intravenous Q4HRS PRN Ortega Quinonez M.D.           Fluids    Intake/Output Summary (Last 24 hours) at 4/23/2023 0638  Last data filed at 4/23/2023 0615  Gross per 24 hour   Intake --   Output 900 ml   Net -900 ml       Laboratory  Recent Labs     04/23/23  0048 04/23/23  0313   ISTATAPH 7.097* 7.431   ISTATAPCO2 52.1* 23.5*   ISTATAPO2 367* 60*   ISTATATCO2 18* 16*   PMUVDJS6VQM 100* 92*   ISTATARTHCO3 16.1* 15.7*   ISTATARTBE -13* -7*   ISTATTEMP see below see below   ISTATFIO2 100 40   ISTATSPEC Arterial Arterial         Recent Labs     04/22/23 2355 04/23/23  0244   SODIUM 136 139   POTASSIUM 8.1* 6.8*   CHLORIDE 109 110   CO2 11* 15*   BUN 56* 55*   CREATININE 5.30* 5.21*   MAGNESIUM 2.2 2.0   PHOSPHORUS 7.4* 6.1*   CALCIUM 6.6* 8.4*     Recent Labs     04/22/23 2355 04/23/23  0244   ALTSGPT 14 28   ASTSGOT 23 76*   ALKPHOSPHAT 81 100*   TBILIRUBIN 0.2 0.3   GLUCOSE 243* 246*     Recent Labs     04/22/23 2355 04/23/23  0244   WBC 7.6 8.1   NEUTSPOLYS 57.30 76.40*   LYMPHOCYTES 35.30 16.30*   MONOCYTES 3.90 4.60   EOSINOPHILS 1.60 1.40   BASOPHILS 0.10 0.20   ASTSGOT 23 76*   ALTSGPT 14 28   ALKPHOSPHAT 81 100*   TBILIRUBIN 0.2 0.3     Recent Labs     04/22/23  2355 04/23/23  0244   RBC 2.41* 3.24*   HEMOGLOBIN 7.2* 9.7*   HEMATOCRIT 23.7* 29.7*   PLATELETCT 195 190   PROTHROMBTM 16.9*  --    APTT 33.8  --    INR 1.40*  --        Imaging  Reviewed     Assessment/Plan    Profound bradycardia             Suspect due to critical,  life-threatening hyperkalemia        S/p Emergent hemodialysis   No longer pacing   Holding home metoprolol and diltiazem for now       Critical, life-threatening hyperkalemia   S/p IV dextrose, IV insulin, IV bicarbonate solution, IV calcium             s/p Emergent hemodialysis   K is now 4.2     Acute hypoxemic respiratory failure              Intubated 4/23             Extubated shortly after HD   IS/early mobility   Weaning O2 as tolerated     Acute on chronic kidney disease             temp HD catheter placed 4/23  S/p Emergent hemodialysis             Monitor renal function and urine output             Avoid nephrotoxins and renal dose medications     Anemia             Transfuse PRBCs to keep hemoglobin greater than 7             Check thromboelastogram with platelet mapping             Hold pharmacologic DVT prophylaxis for now             IV PPI twice daily     Alcoholic cirrhosis              Trend liver enzymes and synthetic function              Avoid hepatotoxins     Hypertensive obstructive cardiomyopathy              Avoid hypovolemia     History of primary hypertension              holding home metoprolol and diltiazem   Prn low dose hydralazine as needed for SBP > 160     Diabetes mellitus type 2              Check glycohemoglobin              Sliding scale insulin     Dyslipidemia              Continue statin     History of alcohol abuse              She has been reportedly sober for 2 years       VTE:   SCDs  Ulcer: PPI  Lines: Vela Catheter  Ongoing indication addressed and right IJ TVP and left HD catheter    I have performed a physical exam and reviewed and updated ROS and Plan today (4/23/2023). In review of yesterday's note (4/22/2023), there are no changes except as documented above.     Discussed patient condition and risk of morbidity and/or mortality with Hospitalist, Family, RN, RT, Pharmacy, [facility] resident, Charge nurse / hot rounds, Patient, and nephrology    The patient's  bradycardia, shock, and hyperkalemia have all resolved after undergoing emergent dialysis.  She is no longer requiring pacing and hopefully, cardiology will removed her TVP.  In the meantime, the patient will transfer to the [facility] under the care of the hospitalist team to monitor her closely.  The critical care team will sign off at this time.

## 2023-04-23 NOTE — PROGRESS NOTES
Pt arrived to unit via hospital bed at 1105. Pt is A&OX4. Pt has right IJ temporary transvenous pacemaker in place. Pacer setting VVI,  Rate 45, output 10, sensitivity 1 (Battery changed this morning). Tele-monitor placed and monitor room notified. All questions answered at this time. Call light within reach; fall precautions in place.

## 2023-04-23 NOTE — PROGRESS NOTES
4 Eyes Skin Assessment Completed by PRUDENCE Suh and PRUDENCE Fung.    Head WDL  Ears WDL  Nose WDL  Mouth WDL  Neck WDL  Breast/Chest WDL  Shoulder Blades WDL  Spine WDL  (R) Arm/Elbow/Hand Bruising and Scab  (L) Arm/Elbow/Hand Bruising and Scab  Abdomen WDL  Groin WDL  Scrotum/Coccyx/Buttocks Non-Blanching, Discoloration, and Scab  (R) Leg Scab  (L) Leg Scab  (R) Heel/Foot/Toe Scab  (L) Heel/Foot/Toe Scab          Devices In Places Tele Box, Blood Pressure Cuff, Pulse Ox, Vela, and Pacer      Interventions In Place Gray Ear Foams and Low Air Loss Mattress    Possible Skin Injury No    Pictures Uploaded Into Epic Yes  Wound Consult Placed Yes  RN Wound Prevention Protocol Ordered Yes

## 2023-04-23 NOTE — ED TRIAGE NOTES
Chief Complaint   Patient presents with    Hypotension     Pt having bradycardia today 30-40 bpm. dizziness     Pt brought by EMS via stretcher from home, lethargic GCS 13/15 breathing spontaneously on room air, Bp- 49/33mmHg and HR 44 bpm. Chest pain today in morning.     Given Atropine IV stat. Started on IVF NS given 200mls. Pt known to have DM and liver problems.     /49   Pulse 89   Resp (!) 25   SpO2 96%

## 2023-04-23 NOTE — PROCEDURES
Date of service:  4/23/2023    Title:  Hemodialysis central venous catheter placement - internal jugular vein    Indication:  Acute on chronic kidney injury    Narrative:    A time out was performed identifying the correct patient, correct procedure and correct location prior to this procedure.    The left neck was prepped with chlorhexidine and draped in the usual sterile fashion.  1% Xylocaine solution was used for topical anesthesia.  A hemodialysis central venous catheter was placed into the left internal jugular vein under ultrasound guidance using the technique described by Victorina without difficulty or apparent complication.  The guidewire was removed intact.  The line was sutured into place and a sterile dressing was placed over the line.  All ports flush and return venous blood easily.  The patient tolerated the procedure quite nicely.  No complications are apparent.  A STAT CXR is ordered to confirm placement.  The catheter may be safely used for infusion, medication administration and hemodialysis.      Ortega Quinonez MD  Pulmonary and Critical Care Medicine

## 2023-04-23 NOTE — ED PROVIDER NOTES
ED Provider Note    CHIEF COMPLAINT  Chief Complaint   Patient presents with    Hypotension     Pt having bradycardia today 30-40 bpm. dizziness       EXTERNAL RECORDS REVIEWED  Inpatient Notes      HPI/ROS  LIMITATION TO HISTORY   Altered mental status critical illness  OUTSIDE HISTORIAN(S):  Family daughter Mónica for the phone and at bedside upon arrival    Jesus Gibbs is a 58 y.o. female who presents to the emergency department by EMS with profound bradycardia and hypotension.  Patient reportedly was complaining of chest pain earlier in the day.  Became progressively altered and EMS was called.  Upon EMS arrival she was found to be altered hypotensive with systolic around 50 and bradycardic into the 30s.  IV was established she was given a dose of atropine IV fluids were initiated transcutaneous pacing was initiated up to 70 mA without capture in transport.    Further history limited by altered mental status and critical presentation        Past medical surgical family social history meds and allergies obtained by chart review as patient is critically ill and altered    PAST MEDICAL HISTORY   hypertensive obstructive cardiomyopathy, primary hypertension, DM type II, stage IV CKD, alcohol abuse, alcoholic cirrhosis and dyslipidemia    SURGICAL HISTORY  Unknown    FAMILY HISTORY  Noncontributory      SOCIAL HISTORY  Previous alcohol and tobacco abuse      CURRENT MEDICATIONS  Home Medications    **Home medications have not yet been reviewed for this encounter**         ALLERGIES  Not on File    PHYSICAL EXAM  VITAL SIGNS: Native pulse approximately 20-30 with hypotension and systolic in the 70s  VITAL SIGNS:   Pulse ox interpretation: Hypoxic on room air  Constitutional: Obtunded severe distress  HEENT: Pulls are 3 mm and sluggishly reactive bilaterally membranes are dry  Neck: no tracheal deviation positive JVD  Cardiovascular: Profound bradycardia thready pulses in the periphery x4  Thorax & Lungs:  Minimal respiratory effort  GI: Nondistended positive bowel sounds  Skin: Pale diaphoretic  Musculoskeletal: No traumatic deformities  Neuro: GCS13  Psych: Somnolent but arouses to noxious stimuli    DIAGNOSTIC STUDIES / PROCEDURES  CBC WITH DIFFERENTIAL   Result Value Ref Range    WBC 7.6 4.8 - 10.8 K/uL    RBC 2.41 (L) 4.20 - 5.40 M/uL    Hemoglobin 7.2 (L) 12.0 - 16.0 g/dL    Hematocrit 23.7 (L) 37.0 - 47.0 %    MCV 98.3 (H) 81.4 - 97.8 fL    MCH 29.9 27.0 - 33.0 pg    MCHC 30.4 (L) 33.6 - 35.0 g/dL    RDW 52.7 (H) 35.9 - 50.0 fL    Platelet Count 195 164 - 446 K/uL    MPV 11.0 9.0 - 12.9 fL    Neutrophils-Polys 57.30 44.00 - 72.00 %    Lymphocytes 35.30 22.00 - 41.00 %    Monocytes 3.90 0.00 - 13.40 %    Eosinophils 1.60 0.00 - 6.90 %    Basophils 0.10 0.00 - 1.80 %    Immature Granulocytes 1.80 (H) 0.00 - 0.90 %    Nucleated RBC 0.00 /100 WBC    Neutrophils (Absolute) 4.36 2.00 - 7.15 K/uL    Lymphs (Absolute) 2.69 1.00 - 4.80 K/uL    Monos (Absolute) 0.30 0.00 - 0.85 K/uL    Eos (Absolute) 0.12 0.00 - 0.51 K/uL    Baso (Absolute) 0.01 0.00 - 0.12 K/uL    Immature Granulocytes (abs) 0.14 (H) 0.00 - 0.11 K/uL    NRBC (Absolute) 0.00 K/uL   Comp Metabolic Panel   Result Value Ref Range    Sodium 136 135 - 145 mmol/L    Potassium 8.1 (HH) 3.6 - 5.5 mmol/L    Chloride 109 96 - 112 mmol/L    Co2 11 (L) 20 - 33 mmol/L    Anion Gap 16.0 7.0 - 16.0    Glucose 243 (H) 65 - 99 mg/dL    Bun 56 (H) 8 - 22 mg/dL    Creatinine 5.30 (HH) 0.50 - 1.40 mg/dL    Calcium 6.6 (LL) 8.5 - 10.5 mg/dL    AST(SGOT) 23 12 - 45 U/L    ALT(SGPT) 14 2 - 50 U/L    Alkaline Phosphatase 81 30 - 99 U/L    Total Bilirubin 0.2 0.1 - 1.5 mg/dL    Albumin 2.6 (L) 3.2 - 4.9 g/dL    Total Protein 5.5 (L) 6.0 - 8.2 g/dL    Globulin 2.9 1.9 - 3.5 g/dL    A-G Ratio 0.9 g/dL   MAGNESIUM   Result Value Ref Range    Magnesium 2.2 1.5 - 2.5 mg/dL   PHOSPHORUS   Result Value Ref Range    Phosphorus 7.4 (H) 2.5 - 4.5 mg/dL   LACTIC ACID   Result Value Ref  Range    Lactic Acid 5.3 (HH) 0.5 - 2.0 mmol/L   TROPONIN   Result Value Ref Range    Troponin T 43 (H) 6 - 19 ng/L   Prothrombin Time   Result Value Ref Range    PT 16.9 (H) 12.0 - 14.6 sec    INR 1.40 (H) 0.87 - 1.13   APTT   Result Value Ref Range    APTT 33.8 24.7 - 36.0 sec   UN-XM'D RBC   Result Value Ref Range    Component R       R66                 Red Blood Cells6    [id number]   issued       04/23/23   01:01      Product Type Red Blood Cells  LR Pheresis     Dispense Status issued     Unit Number (Barcoded) [id number]     Product Code (Barcoded) N4972M73     Blood Type (Barcoded) 9500    AMMONIA   Result Value Ref Range    Ammonia 21 11 - 45 umol/L   URINE DRUG SCREEN   Result Value Ref Range    Amphetamines Urine Negative Negative    Barbiturates Negative Negative    Benzodiazepines Positive (A) Negative    Cocaine Metabolite Negative Negative    Methadone Negative Negative    Opiates Negative Negative    Oxycodone Negative Negative    Phencyclidine -Pcp Negative Negative    Propoxyphene Negative Negative    Cannabinoid Metab Negative Negative   CORRECTED CALCIUM   Result Value Ref Range    Correct Calcium 7.7 (L) 8.5 - 10.5 mg/dL   ESTIMATED GFR   Result Value Ref Range    GFR (CKD-EPI) 9 (A) >60 mL/min/1.73 m 2   CBC with Differential       RADIOLOGY  Bedside ultrasound at time of arrival demonstrates profound bradycardia with heart rate in the 20s no capture from transcutaneous pacing ejection fraction with native beats estimated to be 20% by visual estimate only.    DX-CHEST-LIMITED (1 VIEW)   Final Result         1.  Ill-defined opacifications in each lung have increased minimally compared to the prior radiograph. This could indicate worsening of pulmonary edema or inflammation.      CTA ABDOMEN PELVIS W & W/O POST PROCESS   Final Result      1.  No evidence of aortic aneurysm or dissection.      2.  No arterial extravasation into the bowel is identified.      3.  There is edema around the  pancreas and dilatation of the common bile duct. Consider pancreatitis.      4.  Liver surface is nodular consistent with cirrhosis.      5.  Small amount of ascites.      6.  Gallbladder wall edema. Portal hypertension is a possible cause.      CT-CTA CHEST PULMONARY ARTERY W/ RECONS   Final Result      1.  No CT evidence of pulmonary embolism.      2.  Consolidation is present in the lower lobes bilaterally. Pneumonia is a possibility.            CT-HEAD W/O   Final Result         NO ACUTE ABNORMALITIES ARE NOTED ON CT SCAN OF THE HEAD.               EC-ECHOCARDIOGRAM COMPLETE W/O CONT   Final Result      DX-CHEST-LIMITED (1 VIEW)   Final Result         1.  No consolidations identified.      2.  Mild perihilar opacification is noted which could be due to congestion.            COURSE & MEDICAL DECISION MAKING    ED Observation Status? No; Patient does not meet criteria for ED Observation.     INITIAL ASSESSMENT, COURSE AND PLAN      Care Narrative:   [id number] female arrives by EMS and profound bradycardia and hypotension report of chest pain earlier in the day.  Transcutaneous pacing had been initiated after fluids and atropine by EMS unsuccessful capture of 70 mA.  Bedside ultrasound was utilized to observe for capture capture obtained approximately 95 mA maintained at 100 mA.  Patient was given 1 amp of calcium chloride upon arrival without any change in rhythm.  Transcutaneous pacing was maintained patient was not protecting airway profound altered mental status intubated with ketamine and rocuronium 18-gauge IV catheter was initiated in the left EJ and dopamine infusion was initiated at 5 mcg.  Due to concern of intermittent failed capture as well as high milliamp requirement decision was made to convert to transvenous pacing.  I placed 6 Indian introducer in the right internal jugular vein and transvenous pacer was introduced into the right ventricle with appropriate capture improved vital signs  improved blood pressure.  Patient was discussed with cardiologist Dr. Guillermo intermittently throughout evaluation of also had extensive conversations with intensivist Dr. Quinonez throughout resuscitation.  Patient was maintained on trans venous pacing labs resulted in patient having potassium of 8.1 calcium was 6.6 was given an additional amp of calcium chloride she was given IV insulin IV dextrose 2 A of IV bicarb.  I discussed the case with nephrologist Dr. Magana.  Patient will be admitted to the ICU will continue transvenous pacing well patient's electrolytes are corrected in hopes of weaning off transvenous pacing her dopamine is also being weaned down at this time.  Patient remains in critical condition she is admitted to the ICU for ongoing evaluation and treatment.          Critical care:  46 minutes of critical care time was spent with this patient.  Critical care time included the discussion with  EMS, nursing staff, respiratory care, the patient, the patient's family members, and multiple consulting physicians.  This also includes time spent based on the initial evaluation and interventions based on such plus re-evaluation of the results of intervention.  Critical care time is based on presentation with critical emergency that still chance of deterioration or death if not for these interventions.  Does not include separately billable procedures.      Intubation Procedure Note    Indication: Respiratory failure and airway compromise    Consent: Unable to be obtained due to the emergent nature of this procedure.    Medications Used: ketamine intravenously and rocuronium intravenously    Procedure: The patient was placed in the appropriate position.  Cricoid pressure was not required.  Intubation was performed by direct laryngoscopy using a laryngoscope and a 7.5 cuffed endotracheal tube.  The cuff was then inflated and the tube was secured appropriately at a distance of 23 cm to the dental ridge.   Initial confirmation of placement included bilateral breath sounds, an end tidal CO2 detector, absence of sounds over the stomach, tube fogging, adequate chest rise, adequate pulse oximetry reading, improved skin color, and direct visualization.  A chest x-ray to verify correct placement of the tube showed appropriate tube position.    The patient tolerated the procedure well.     Complications: None      Central Line Placement Procedure Note  Indication: centrally administered medications and need for transvenous pacing    Consent: Unable to be obtained due to the emergent nature of this procedure.    Procedure: The patient was positioned appropriately and the skin over the right internal jugular vein was prepped with chlorhexidine and draped in a sterile fashion. Local anesthesia was not performed due to the emergent nature of this procedure.  A large bore needle was used to identify the vein under dynamic ultrasound guidance.  A guide wire was then inserted into the vein through the needle.  Single-lumen 6 Nauruan introducer was then inserted into the vessel over the guide wire using the Seldinger technique.  All ports showed good, free flowing blood return and were flushed with saline solution.  The catheter was then securely fastened to the skin with sutures and covered with a sterile dressing.  A post procedure X-ray was ordered and showed good line position.    The patient tolerated the procedure well.    Complications: None    Transvenous Cardiac Pacing Procedure Note    Indication: Severe bradycardia and third degree heart block, inability to obtain transcutaneous capture    Consent: Unable to be obtained due to the emergent nature of this procedure.    Pre-Medication: midazolam (Versed) intravenously    Procedure: Patient had previously been transcutaneously paced by EMS.  Patient was requiring high voltage for capture up to 110 mA.  Decision was made to proceed with transvenous pacing.  Patient had a 6  Fijian introducer placed as above.  Following introducer placement 5 Fijian transvenous wire was passed sterilely to me leads and balloon were passed to Assistant.  Catheter was introduced through the sheath upon introduction into the vena cava the balloon was insufflated the catheter was advanced until appropriate capture was witnessed on monitor and by palpation of pulse by assistance.  Once appropriate capture was achieved with the balloon was let down capture was maintained and the transvenous wire was secured at approximately 37 cm.      The patient tolerated the procedure well.    Complications: None      FINAL IMPRESSION  1.  Altered mental status  2.  Symptomatic bradycardia  3.  Complete heart block  4.   Anemia  5.  Cardiogenic shock  6.  Acute renal failure  7.  Acute hyperkalemia  8.  Lactic acidosis  9.  Respiratory failure  10.  Intubation by ERP  11.  Central line, 6 Fijian introducer by ERP  12.  Transvenous pacing by ERP  13.  Critical care 46 minutes exclusive of above-mentioned procedures          -ADMIT-

## 2023-04-23 NOTE — CONSULTS
Reason for Consult:  Asked by Dr Kaylene Alicea M.D. to see this patient with arrest    CC:   Chief Complaint   Patient presents with    Hypotension     Pt having bradycardia today 30-40 bpm. dizziness       HPI:      58 year old woman with PMH alcoholic cirrhosis sober two years, HOCM, HTN, DM2, CKD presents with out of hospital arrest. Per daughter Mónica Morrell patient complaining of abdominal and chest pain progressing to dizziness and nausea / vomitting with diarrhea worsening around 6 pm. EMS called. Complete details not available. Found cardiogenic shock due to bradycardia requiring transcutaneous pacing changed to transvenous, intubation for airway protection. Found RUBEN on CKD and hyperkalemia. Intensivist Dr Quinonez planning emergent dialysis. Also found acute on chronic anemia currently undergoing PRBC transfusion.     Medications / Drug list prior to admission:  No current facility-administered medications on file prior to encounter.     No current outpatient medications on file prior to encounter.       Current list of administered Medications:    Current Facility-Administered Medications:     CALCIUM GLUCONATE-NACL 1-0.675 GM/50ML-% IV SOLN, , , ,     Respiratory Therapy Consult, , Nebulization, Continuous RT, Ortega Quinonez M.D. MD Alert...ICU Electrolyte Replacement per Pharmacy, , Other, PHARMACY TO DOSE, Ortega Quinonez M.D.    lidocaine (XYLOCAINE) 1 % injection 2 mL, 2 mL, Tracheal Tube, Q30 MIN PRN, Ortega Quinonez M.D.    dexmedetomidine (PRECEDEX) 400 mcg/100mL NS premix infusion, 0-1.5 mcg/kg/hr (Ideal), Intravenous, Continuous, Ortega Quinonez M.D., Dose not Required at 04/23/23 0145    ipratropium-albuterol (DUONEB) nebulizer solution, 3 mL, Nebulization, Q2HRS PRN (RT), Ortega Quinonez M.D.    pantoprazole (Protonix) injection 40 mg, 40 mg, Intravenous, BID, Ortega Quinonez M.D., 40 mg at 04/23/23 0600    HYDROmorphone (DILAUDID) 0.2  mg/mL in 50mL NS (Continuous Infusion), , Intravenous, Continuous, Ortega Quinonez M.D., Dose not Required at 04/23/23 0145    HYDROmorphone (Dilaudid) injection 0.5-2 mg, 0.5-2 mg, Intravenous, Q HOUR PRN, Ortega Quinonez M.D., 1 mg at 04/23/23 0450    DOPamine 1600 mcg/mL in D5W premix, 0-20 mcg/kg/min (Ideal), Intravenous, Continuous, Ortega Quinonez M.D., Stopped at 04/23/23 0500    insulin regular (HumuLIN R,NovoLIN R) injection, 2-9 Units, Subcutaneous, Q6HRS, 2 Units at 04/23/23 0407 **AND** POC blood glucose manual result, , , Q6H **AND** NOTIFY MD and PharmD, , , Once **AND** Administer 20 grams of glucose (approximately 8 ounces of fruit juice) every 15 minutes PRN FSBG less than 70 mg/dL, , , PRN **AND** dextrose 10 % BOLUS 25 g, 25 g, Intravenous, Q15 MIN PRN, Ortega Quinonez M.D.    heparin injection 2,800 Units, 2,800 Units, Intracatheter, ACUTE DIALYSIS PRN, Juanis Magana D.JOSÉ LUIS, 2,800 Units at 04/23/23 0615    hydrALAZINE (APRESOLINE) injection 10-20 mg, 10-20 mg, Intravenous, Q4HRS PRN, Ortega Quinonez M.D., 10 mg at 04/23/23 0646    No past medical history on file.    No past surgical history on file.    No family history on file.  Patient family history was personally reviewed, no pertinent family history to current presentation    Social History     Socioeconomic History    Marital status: Single     Spouse name: Not on file    Number of children: Not on file    Years of education: Not on file    Highest education level: Not on file   Occupational History    Not on file   Tobacco Use    Smoking status: Not on file    Smokeless tobacco: Not on file   Substance and Sexual Activity    Alcohol use: Not on file    Drug use: Not on file    Sexual activity: Not on file   Other Topics Concern    Not on file   Social History Narrative    Not on file     Social Determinants of Health     Financial Resource Strain: Not on file   Food Insecurity: Not on file  "  Transportation Needs: Not on file   Physical Activity: Not on file   Stress: Not on file   Social Connections: Not on file   Intimate Partner Violence: Not on file   Housing Stability: Not on file       ALLERGIES:  Not on File    Review of systems:  Intubated / sedated    Physical exam:  Patient Vitals for the past 24 hrs:   BP Temp Temp src Pulse Resp SpO2 Height Weight   04/23/23 0600 (!) 205/93 -- -- (!) 57 (!) 41 98 % -- --   04/23/23 0500 (!) 196/103 -- -- (!) 55 (!) 24 99 % -- --   04/23/23 0400 (!) 180/89 -- -- (!) 51 20 99 % -- --   04/23/23 0323 -- -- -- -- 20 -- -- --   04/23/23 0300 (!) 161/93 -- -- (!) 49 18 94 % -- --   04/23/23 0200 -- -- -- -- 17 -- -- --   04/23/23 0130 (!) 144/73 -- -- 80 (!) 26 100 % -- --   04/23/23 0115 138/66 -- -- 80 (!) 28 99 % -- --   04/23/23 0110 137/67 -- -- 80 (!) 28 100 % -- --   04/23/23 0100 -- -- -- -- -- -- 1.676 m (5' 6\") 60 kg (132 lb 4.4 oz)   04/23/23 0058 131/65 -- -- (!) 102 (!) 30 99 % -- --   04/23/23 0044 131/66 -- -- (!) 110 17 100 % -- --   04/23/23 0041 114/63 -- -- 80 18 100 % -- --   04/23/23 0039 116/68 -- -- (!) 147 18 100 % -- --   04/23/23 0038 106/64 -- -- (!) 43 18 100 % -- --   04/23/23 0036 107/59 -- -- (!) 45 18 100 % -- --   04/23/23 0033 119/69 -- -- 70 18 97 % -- --   04/23/23 0031 120/68 -- -- 70 18 97 % -- --   04/23/23 0029 116/67 -- -- 70 18 97 % -- --   04/23/23 0008 112/63 -- -- 73 14 98 % -- --   04/23/23 0007 -- -- -- 70 15 100 % -- --   04/23/23 0006 (!) 84/60 -- -- 74 17 98 % -- --   04/23/23 0004 (!) 80/58 (!) 35.8 °C (96.4 °F) Temporal 71 17 (!) 78 % -- --   04/22/23 2357 (!) 84/42 -- -- 90 18 (!) 85 % -- --   04/22/23 2350 113/49 -- -- 89 (!) 25 96 % -- --     General: intubated/sedated  EYES: no jaundice  HEENT: OP clear   Neck: No bruits No JVD.   CVS:  aleksey. S1 + S2. No M/R/G. No edema.  Resp: CTAB. No wheezing or crackles/rhonchi.  Abdomen: Soft, NT, ND,  Skin: Grossly nothing acute no obvious rashes  Neurological: " sedated  Extremities: Pulse 2+ in b/l LE. No cyanosis.     Data:  Laboratory studies personally reviewed by me:  Recent Results (from the past 24 hour(s))   CBC WITH DIFFERENTIAL    Collection Time: 04/22/23 11:55 PM   Result Value Ref Range    WBC 7.6 4.8 - 10.8 K/uL    RBC 2.41 (L) 4.20 - 5.40 M/uL    Hemoglobin 7.2 (L) 12.0 - 16.0 g/dL    Hematocrit 23.7 (L) 37.0 - 47.0 %    MCV 98.3 (H) 81.4 - 97.8 fL    MCH 29.9 27.0 - 33.0 pg    MCHC 30.4 (L) 33.6 - 35.0 g/dL    RDW 52.7 (H) 35.9 - 50.0 fL    Platelet Count 195 164 - 446 K/uL    MPV 11.0 9.0 - 12.9 fL    Neutrophils-Polys 57.30 44.00 - 72.00 %    Lymphocytes 35.30 22.00 - 41.00 %    Monocytes 3.90 0.00 - 13.40 %    Eosinophils 1.60 0.00 - 6.90 %    Basophils 0.10 0.00 - 1.80 %    Immature Granulocytes 1.80 (H) 0.00 - 0.90 %    Nucleated RBC 0.00 /100 WBC    Neutrophils (Absolute) 4.36 2.00 - 7.15 K/uL    Lymphs (Absolute) 2.69 1.00 - 4.80 K/uL    Monos (Absolute) 0.30 0.00 - 0.85 K/uL    Eos (Absolute) 0.12 0.00 - 0.51 K/uL    Baso (Absolute) 0.01 0.00 - 0.12 K/uL    Immature Granulocytes (abs) 0.14 (H) 0.00 - 0.11 K/uL    NRBC (Absolute) 0.00 K/uL   Comp Metabolic Panel    Collection Time: 04/22/23 11:55 PM   Result Value Ref Range    Sodium 136 135 - 145 mmol/L    Potassium 8.1 (HH) 3.6 - 5.5 mmol/L    Chloride 109 96 - 112 mmol/L    Co2 11 (L) 20 - 33 mmol/L    Anion Gap 16.0 7.0 - 16.0    Glucose 243 (H) 65 - 99 mg/dL    Bun 56 (H) 8 - 22 mg/dL    Creatinine 5.30 (HH) 0.50 - 1.40 mg/dL    Calcium 6.6 (LL) 8.5 - 10.5 mg/dL    AST(SGOT) 23 12 - 45 U/L    ALT(SGPT) 14 2 - 50 U/L    Alkaline Phosphatase 81 30 - 99 U/L    Total Bilirubin 0.2 0.1 - 1.5 mg/dL    Albumin 2.6 (L) 3.2 - 4.9 g/dL    Total Protein 5.5 (L) 6.0 - 8.2 g/dL    Globulin 2.9 1.9 - 3.5 g/dL    A-G Ratio 0.9 g/dL   MAGNESIUM    Collection Time: 04/22/23 11:55 PM   Result Value Ref Range    Magnesium 2.2 1.5 - 2.5 mg/dL   PHOSPHORUS    Collection Time: 04/22/23 11:55 PM   Result Value Ref  Range    Phosphorus 7.4 (H) 2.5 - 4.5 mg/dL   LACTIC ACID    Collection Time: 04/22/23 11:55 PM   Result Value Ref Range    Lactic Acid 5.3 (HH) 0.5 - 2.0 mmol/L   TROPONIN    Collection Time: 04/22/23 11:55 PM   Result Value Ref Range    Troponin T 43 (H) 6 - 19 ng/L   Prothrombin Time    Collection Time: 04/22/23 11:55 PM   Result Value Ref Range    PT 16.9 (H) 12.0 - 14.6 sec    INR 1.40 (H) 0.87 - 1.13   APTT    Collection Time: 04/22/23 11:55 PM   Result Value Ref Range    APTT 33.8 24.7 - 36.0 sec   CORRECTED CALCIUM    Collection Time: 04/22/23 11:55 PM   Result Value Ref Range    Correct Calcium 7.7 (L) 8.5 - 10.5 mg/dL   ESTIMATED GFR    Collection Time: 04/22/23 11:55 PM   Result Value Ref Range    GFR (CKD-EPI) 9 (A) >60 mL/min/1.73 m 2   UN-XM'D RBC    Collection Time: 04/23/23 12:43 AM   Result Value Ref Range    Component R       R66                 Red Blood Cells6    [id number]   issued       04/23/23   01:01      Product Type Red Blood Cells  LR Pheresis     Dispense Status issued     Unit Number (Barcoded) [id number]     Product Code (Barcoded) U3249O88     Blood Type (Barcoded) 9500    POCT arterial blood gas device results    Collection Time: 04/23/23 12:48 AM   Result Value Ref Range    Ph 7.097 (LL) 7.400 - 7.500    Pco2 52.1 (HH) 26.0 - 37.0 mmHg    Po2 367 (H) 64 - 87 mmHg    Tco2 18 (L) 20 - 33 mmol/L    S02 100 (H) 93 - 99 %    Hco3 16.1 (L) 17.0 - 25.0 mmol/L    BE -13 (L) -4 - 3 mmol/L    Body Temp see below degrees    O2 Therapy 100 %    iPF Ratio 367     Specimen Arterial    POCT sodium device results    Collection Time: 04/23/23 12:48 AM   Result Value Ref Range    Istat Sodium 136 135 - 145 mmol/L   POCT potassium device results    Collection Time: 04/23/23 12:48 AM   Result Value Ref Range    Istat Potassium 7.8 (HH) 3.6 - 5.5 mmol/L   POCT ionized CA device results    Collection Time: 04/23/23 12:48 AM   Result Value Ref Range    Istat Ionized Calcium 1.28 1.10 - 1.30 mmol/L    EC-ECHOCARDIOGRAM COMPLETE W/O CONT    Collection Time: 04/23/23  1:49 AM   Result Value Ref Range    Eject.Frac. MOD BP 71.61     Eject.Frac. MOD 4C 69.54     Eject.Frac. MOD 2C 72.94     Left Ventrical Ejection Fraction 75    COD - Adult (Type and Screen)    Collection Time: 04/23/23  2:44 AM   Result Value Ref Range    ABO Grouping Only O     Rh Grouping Only POS     Antibody Screen-Cod NEG    AMMONIA    Collection Time: 04/23/23  2:44 AM   Result Value Ref Range    Ammonia 21 11 - 45 umol/L   PLATELET MAPPING WITH BASIC TEG    Collection Time: 04/23/23  2:44 AM   Result Value Ref Range    Reaction Time Initial-R 5.3 4.6 - 9.1 min    React Time Initial Hep 4.3 4.3 - 8.3 min    Clot Kinetics-K 0.8 0.8 - 2.1 min    Clot Angle-Angle 79.6 (H) 63.0 - 78.0 degrees    Maximum Clot Strength-MA 70.1 (H) 52.0 - 69.0 mm    TEG Functional Fibrinogen(MA) 49.7 (H) 15.0 - 32.0 mm    Lysis 30 minutes-LY30 0.1 0.0 - 2.6 %    % Inhibition ADP See Comment 0.0 - 17.0 %    % Inhibition AA See Comment 0.0 - 11.0 %    TEG Algorithm Link Algorithm    CBC with Differential    Collection Time: 04/23/23  2:44 AM   Result Value Ref Range    WBC 8.1 4.8 - 10.8 K/uL    RBC 3.24 (L) 4.20 - 5.40 M/uL    Hemoglobin 9.7 (L) 12.0 - 16.0 g/dL    Hematocrit 29.7 (L) 37.0 - 47.0 %    MCV 91.7 81.4 - 97.8 fL    MCH 29.9 27.0 - 33.0 pg    MCHC 32.7 (L) 33.6 - 35.0 g/dL    RDW 48.7 35.9 - 50.0 fL    Platelet Count 190 164 - 446 K/uL    MPV 11.0 9.0 - 12.9 fL    Neutrophils-Polys 76.40 (H) 44.00 - 72.00 %    Lymphocytes 16.30 (L) 22.00 - 41.00 %    Monocytes 4.60 0.00 - 13.40 %    Eosinophils 1.40 0.00 - 6.90 %    Basophils 0.20 0.00 - 1.80 %    Immature Granulocytes 1.10 (H) 0.00 - 0.90 %    Nucleated RBC 0.00 /100 WBC    Neutrophils (Absolute) 6.19 2.00 - 7.15 K/uL    Lymphs (Absolute) 1.32 1.00 - 4.80 K/uL    Monos (Absolute) 0.37 0.00 - 0.85 K/uL    Eos (Absolute) 0.11 0.00 - 0.51 K/uL    Baso (Absolute) 0.02 0.00 - 0.12 K/uL    Immature  Granulocytes (abs) 0.09 0.00 - 0.11 K/uL    NRBC (Absolute) 0.00 K/uL   Magnesium    Collection Time: 04/23/23  2:44 AM   Result Value Ref Range    Magnesium 2.0 1.5 - 2.5 mg/dL   Phosphorus    Collection Time: 04/23/23  2:44 AM   Result Value Ref Range    Phosphorus 6.1 (H) 2.5 - 4.5 mg/dL   HEP B SURFACE AB    Collection Time: 04/23/23  2:44 AM   Result Value Ref Range    Hep B Surface Antibody Quant 103.00 (H) 0.00 - 10.00 mIU/mL   Comp Metabolic Panel    Collection Time: 04/23/23  2:44 AM   Result Value Ref Range    Sodium 139 135 - 145 mmol/L    Potassium 6.8 (HH) 3.6 - 5.5 mmol/L    Chloride 110 96 - 112 mmol/L    Co2 15 (L) 20 - 33 mmol/L    Anion Gap 14.0 7.0 - 16.0    Glucose 246 (H) 65 - 99 mg/dL    Bun 55 (H) 8 - 22 mg/dL    Creatinine 5.21 (HH) 0.50 - 1.40 mg/dL    Calcium 8.4 (L) 8.5 - 10.5 mg/dL    AST(SGOT) 76 (H) 12 - 45 U/L    ALT(SGPT) 28 2 - 50 U/L    Alkaline Phosphatase 100 (H) 30 - 99 U/L    Total Bilirubin 0.3 0.1 - 1.5 mg/dL    Albumin 2.9 (L) 3.2 - 4.9 g/dL    Total Protein 6.2 6.0 - 8.2 g/dL    Globulin 3.3 1.9 - 3.5 g/dL    A-G Ratio 0.9 g/dL   HEMOGLOBIN A1C    Collection Time: 04/23/23  2:44 AM   Result Value Ref Range    Glycohemoglobin 5.9 (H) 4.0 - 5.6 %    Est Avg Glucose 123 mg/dL   HEPATITIS PANEL ACUTE(4 COMPONENTS)    Collection Time: 04/23/23  2:44 AM   Result Value Ref Range    Hepatitis B Surface Antigen Non-Reactive Non-Reactive    Hepatitis B Cors Ab,IgM Non-Reactive Non-Reactive    Hepatitis A Virus Ab, IgM Non-Reactive Non-Reactive    Hepatitis C Antibody Non-Reactive Non-Reactive   DIAGNOSTIC ALCOHOL    Collection Time: 04/23/23  2:44 AM   Result Value Ref Range    Diagnostic Alcohol <10.1 <10.1 mg/dL   CORRECTED CALCIUM    Collection Time: 04/23/23  2:44 AM   Result Value Ref Range    Correct Calcium 9.3 8.5 - 10.5 mg/dL   ESTIMATED GFR    Collection Time: 04/23/23  2:44 AM   Result Value Ref Range    GFR (CKD-EPI) 9 (A) >60 mL/min/1.73 m 2   URINE DRUG SCREEN     Collection Time: 23  2:50 AM   Result Value Ref Range    Amphetamines Urine Negative Negative    Barbiturates Negative Negative    Benzodiazepines Positive (A) Negative    Cocaine Metabolite Negative Negative    Methadone Negative Negative    Opiates Negative Negative    Oxycodone Negative Negative    Phencyclidine -Pcp Negative Negative    Propoxyphene Negative Negative    Cannabinoid Metab Negative Negative   POCT arterial blood gas device results    Collection Time: 23  3:13 AM   Result Value Ref Range    Ph 7.431 7.400 - 7.500    Pco2 23.5 (L) 26.0 - 37.0 mmHg    Po2 60 (L) 64 - 87 mmHg    Tco2 16 (L) 20 - 33 mmol/L    S02 92 (L) 93 - 99 %    Hco3 15.7 (L) 17.0 - 25.0 mmol/L    BE -7 (L) -4 - 3 mmol/L    Body Temp see below degrees    O2 Therapy 40 %    iPF Ratio 150     Specimen Arterial     DelSys Vent     End Tidal Carbon Dioxide 19 mmhg    Tidal Volume 400 mL    Peep End Expiratory Pressure 8 cmh20    Set Rate 28     Mode APV-CMV    EKG    Collection Time: 23  5:22 AM   Result Value Ref Range    Report       [facility]    Test Date:  2023  Pt Name:    PO TORRES                    Department: ER  MRN:        [id number]                      Room:       T626  Gender:     Female                       Technician: ANDREWS  :        [id number]                   Requested By:AMY SANDHU  Order #:    [id number]                    Reading MD:    Measurements  Intervals                                Axis  Rate:       56                           P:          63  AK:         198                          QRS:        72  QRSD:       107                          T:          97  QT:         557  QTc:        538    Interpretive Statements  Sinus bradycardia  Incomplete left bundle branch block  LVH with secondary repolarization abnormality  Anterior ST elevation, probably due to LVH  Prolonged QT interval  No previous ECG available for comparison     EKG    Collection Time: 23  5:22 AM    Result Value Ref Range    Report       [facility]    Test Date:  2023  Pt Name:    PO TORRES                    Department: 161  MRN:        [id number]                      Room:       T626  Gender:     Female                       Technician: TXM  :        [id number]                   Requested By:ALVAREZ DE LA PAZOS  Order #:    [id number]                    Reading MD: Darinel Guillermo MD    Measurements  Intervals                                Axis  Rate:       56                           P:          63  DC:         198                          QRS:        72  QRSD:       107                          T:          97  QT:         545  QTc:        526    Interpretive Statements  Sinus bradycardia  Incomplete left bundle branch block  LVH with secondary repolarization abnormality  Anterior ST elevation, probably due to LVH  Prolonged QT interval  No previous ECG available for comparison  Electronically Signed On 2023 7:15:46 PDT by Darinel Guillermo MD         EKG 23 sins aleksey, LVH w repol    All pertinent features of laboratory and imaging reviewed including primary images where applicable      Principal Problem:    Bradycardia POA: Yes  Active Problems:    Shock (HCC) POA: Unknown    Acute respiratory failure with hypoxia (HCC) POA: Unknown    Hyperkalemia POA: Unknown    Acute-on-chronic kidney injury (HCC) POA: Unknown    Anemia POA: Unknown    Dyslipidemia POA: Unknown    HOCM (hypertrophic obstructive cardiomyopathy) (HCC) POA: Unknown    Primary hypertension POA: Unknown    Type 2 diabetes mellitus (HCC) POA: Unknown    Alcoholic cirrhosis (HCC) POA: Unknown    History of alcohol abuse POA: Unknown  Resolved Problems:    * No resolved hospital problems. *      Assessment / Plan:  58 year old woman with PMH alcoholic cirrhosis sober two years, HOCM, HTN, DM2, CKD presents with out of hospital arrest. Found RUBEN on CKD and hyperkalemia s/p resolution with emergent dialysis. Also  found acute on chronic anemia with good response to PRBC.     -trend lactic acid to resolution  -remove temporary venous pacer when no longer pacing  -HD to resolution of hyperkalemia. Monitor on telemetry thereafter  -hold off avn blockers for now unless hypertensive and heart rate stable setting of hocm. If stable consider resuming diltiazem  -vent management per pulmonary/intensive care    I personally discussed her case with Dr Quinonez    It is my pleasure to participate in the care of Ms. Adames.  Please do not hesitate to contact me with questions or concerns.    Darinel Guillermo MD  Cardiologist [facility]    Rigoberto Adames is critically ill and she is at high risk for clinical deterioration, worsening vital organ dysfunction, and death without the above critical care interventions.  Critical care time 50 minutes. This time was spent at the bedside evaluating the patient's chart, their labs,   imaging, physical exam and discussion with Dr Penny, Dr Quinonez as well as the bedside nurse, family and formulating an assessment and plan.

## 2023-04-23 NOTE — HOSPITAL COURSE
Ms. Adames is a 58 year old lady with the past medical history significant for hypertensive obstructive cardiomyopathy, hypertensive, type-II DM, stage IV CKD, history of alcohol abuse now sober for 2 years, alcoholic cirrhosis and dyslipidemia who developed chest and abdominal pain with generalized weakness, dizziness, and nausea and vomiting.  EMS was activated and the patient was found to be profoundly bradycardic with a heart rate in the 30s and hypotensive with a blood pressures in the 40s.  She was given IVF and atropine and on arrival to the ER intubated due to being in extremis.  A temporary transvenous pacemaker was placed and laboratories returned showing acute on chronic RUBEN with a potassium level of 8.  A temporary HD catheter was placed and the patient was emergently dialyzed.  She was admitted to the ICU for ongoing critical care management.

## 2023-04-23 NOTE — ED NOTES
Blood transfusion started by PRUDENCE Mcdowell; Uncrossmatched blood Exp 04/28/2023 with Unit # [id number]

## 2023-04-23 NOTE — ASSESSMENT & PLAN NOTE
Cardiology assisting  Blood pressure control, push beta-blockade  Norpace  Careful with too much afterload reduction

## 2023-04-23 NOTE — PROGRESS NOTES
Pulmonary and Critical Care Medicine Progress Note    I reassessed this lady.  She has been on emergent HD for over an hour.  She is now awake, alert, following all commands and nodding appropriately.  SAT/SBT performed.  She is no longer being paced.  She is in sinus bradycardia with a heart rate of 55-57.  I turned off her dopamine.    Rigoberto looks super!  I liberated her from the mechanical ventilator.    Ortega Quinonez MD  Pulmonary and Critical Care Medicine

## 2023-04-23 NOTE — DISCHARGE PLANNING
Medical Social Work    MSW received a call from nursing staff to locate family for pt per ERP.  Only pt's  was in chart.  MSW contacted [facility] dispatch to obtain reporters number: [phone].  MSW contacted pt's daughter, Mónica who states that she called [phone] for pt.  MSW provided daughter's number to ERP.    Daughter: Mónica [phone]

## 2023-04-23 NOTE — PROGRESS NOTES
58 year old woman with PMH alcoholic cirrhosis, HOCM, HTN, DM2, CKD presents with out of hospital arrest. Per daughter Mónica Morrell patient complaining of abdominal and chest pain progressing to dizziness and nausea / vomitting with diarrhea worsening around 6 pm. EMS called. Complete details not available. Found cardiogenic shock due to bradycardia requiring transcutaneous pacing changed to transvenous, intubation for airway protection. Found RUBEN on CKD and hyperkalemia. Intensivist Dr Quinonez planning emergent dialysis. Also found acute on chronic anemia currently undergoing PRBC transfusion. Please also check EKG with interruption in pacing. Stat echocardiogram. IVF bolus. Full consult to follow.    TTE 4/2020  CONCLUSIONS  Compared to the report of the study done 09/02/2019  there has been no   significant change. LVOT ibstruction also noted on prior echo.  Severe concentric left ventricular hypertrophy.  Hyperdynamic left ventricular systolic function.  Left ventricular ejection fraction is visually 75%.  Evidence of LVOT obstruction with 23 mm gradient without valsalva  Mitral annular calcification.  Mild mitral regurgitation.  Mitral annular calcification.  Structurally normal aortic valve.  Mild tricuspid regurgitation.  Estimated right ventricular systolic pressure  is 55 mmHg.  Trivial pericardial effusion.    [facility] 12/2014  FINDINGS:  I.  Hemodynamics.  1.  Aortic pressure 99/60 with mean arterial pressure 73.  2.  LV pressure 189/2, LVEDP 25.  3.  Heart rate 98.  4.  There was a significant gradient on LV pullback, which appeared to be   subaortic in origin; however, it was difficult to delineate the exact level of   the gradient.  The valve, however, was crossed in an atraumatic fashion   without significant difficulty.     II.  Left ventriculography.  The LVEF in the BOYD projection is greater than   75% by visualized ____.  There appears to be an exaggerated left ventricle   outflow tract.  No  mitral regurgitation is noted on the study.  The visualized   portion of the ascending aorta is not dilated.     III.  Selective coronary angiography to the native vessels.  1.  Left main:  The left main coronary artery is a large vessel, which gives   rise to the LAD and left circumflex systems and a small ramus intermedius   branch.  It is free from angiographically apparent disease.  2.  Left circumflex:  Left circumflex coronary artery is a large branching   vessel, which is free from angiographically apparent coronary artery disease.  3.  Ramus intermedius:  Ramus intermedius branch is a small vessel, which is   free from angiographically apparent coronary artery disease.  4.  LAD:  LAD is a large vessel, which gives rise to a small first and   moderate to large sized second diagonal and a tiny third diagonal.  It is free   from angiographically apparent coronary artery disease, and it is notable on   its mid portion, just after the takeoff of the diagonal, for a ____ section of   intramyocardial bridging and it has a wraparound configuration across the   apex.  5.  Right coronary artery:  Right coronary artery is a large dominant vessel,   which gives rise to the posterior descending artery and it is free from   angiographically apparent coronary artery disease throughout its course.     CONCLUSIONS:  1.  No epicardial coronary artery disease.  2.  Mid left anterior descending myocardial bridge.  3.  Significant gradient along the left ventricular outflow tract and aortic   valve, concerning for subaortic membrane or dynamic left ventricular outflow   tract obstruction.  4.  Preserved left ventricular ejection fraction without wall motion   abnormality.

## 2023-04-23 NOTE — PROGRESS NOTES
0200: Report given from RN in ER. Pt. In CT.     0221: Pt. Arrived to [facility] from [place].     0227: Dr. Quinonez at bedside. Time out for HD cath placement.     0245: guidewire out     0315: placement verified by intensivist at bedside during Xray. Okay to use pigtail for infusion

## 2023-04-23 NOTE — DISCHARGE PLANNING
Medical Social Work    Pt's son and daughter escorted to bedside per ERP.  Emotional support provided.

## 2023-04-23 NOTE — ASSESSMENT & PLAN NOTE
Patient with a history of longstanding hypertension, previous CKD, now RUBEN with resultant hyperkalemia  Nephrology consulting  Monitor labs  IV fluids  Ongoing RRT efforts

## 2023-04-24 ENCOUNTER — HOSPITAL ENCOUNTER (OUTPATIENT)
Dept: RADIOLOGY | Facility: MEDICAL CENTER | Age: 58
End: 2023-04-24
Attending: INTERNAL MEDICINE
Payer: MEDICAID

## 2023-04-24 LAB
ALBUMIN SERPL BCP-MCNC: 2.9 G/DL (ref 3.2–4.9)
ALBUMIN/GLOB SERPL: 0.9 G/DL
ALP SERPL-CCNC: 95 U/L (ref 30–99)
ALT SERPL-CCNC: 24 U/L (ref 2–50)
ANION GAP SERPL CALC-SCNC: 14 MMOL/L (ref 7–16)
APPEARANCE UR: CLEAR
AST SERPL-CCNC: 33 U/L (ref 12–45)
BACTERIA #/AREA URNS HPF: ABNORMAL /HPF
BASOPHILS # BLD AUTO: 0.1 % (ref 0–1.8)
BASOPHILS # BLD: 0.01 K/UL (ref 0–0.12)
BILIRUB SERPL-MCNC: 0.2 MG/DL (ref 0.1–1.5)
BILIRUB UR QL STRIP.AUTO: NEGATIVE
BUN SERPL-MCNC: 34 MG/DL (ref 8–22)
CALCIUM ALBUM COR SERPL-MCNC: 8.8 MG/DL (ref 8.5–10.5)
CALCIUM SERPL-MCNC: 7.9 MG/DL (ref 8.5–10.5)
CHLORIDE SERPL-SCNC: 106 MMOL/L (ref 96–112)
CO2 SERPL-SCNC: 23 MMOL/L (ref 20–33)
COLOR UR: YELLOW
CREAT SERPL-MCNC: 4.18 MG/DL (ref 0.5–1.4)
CREAT UR-MCNC: 42.67 MG/DL
CREAT UR-MCNC: 44.83 MG/DL
EOSINOPHIL # BLD AUTO: 0.19 K/UL (ref 0–0.51)
EOSINOPHIL NFR BLD: 2.2 % (ref 0–6.9)
EPI CELLS #/AREA URNS HPF: ABNORMAL /HPF
ERYTHROCYTE [DISTWIDTH] IN BLOOD BY AUTOMATED COUNT: 51.3 FL (ref 35.9–50)
GFR SERPLBLD CREATININE-BSD FMLA CKD-EPI: 12 ML/MIN/1.73 M 2
GLOBULIN SER CALC-MCNC: 3.4 G/DL (ref 1.9–3.5)
GLUCOSE BLD STRIP.AUTO-MCNC: 105 MG/DL (ref 65–99)
GLUCOSE BLD STRIP.AUTO-MCNC: 106 MG/DL (ref 65–99)
GLUCOSE BLD STRIP.AUTO-MCNC: 108 MG/DL (ref 65–99)
GLUCOSE SERPL-MCNC: 102 MG/DL (ref 65–99)
GLUCOSE UR STRIP.AUTO-MCNC: NEGATIVE MG/DL
HCT VFR BLD AUTO: 25.6 % (ref 37–47)
HGB BLD-MCNC: 8.4 G/DL (ref 12–16)
HYALINE CASTS #/AREA URNS LPF: ABNORMAL /LPF
IMM GRANULOCYTES # BLD AUTO: 0.04 K/UL (ref 0–0.11)
IMM GRANULOCYTES NFR BLD AUTO: 0.5 % (ref 0–0.9)
INR PPP: 1.3 (ref 0.87–1.13)
KETONES UR STRIP.AUTO-MCNC: NEGATIVE MG/DL
LEUKOCYTE ESTERASE UR QL STRIP.AUTO: ABNORMAL
LYMPHOCYTES # BLD AUTO: 2.13 K/UL (ref 1–4.8)
LYMPHOCYTES NFR BLD: 24.3 % (ref 22–41)
MCH RBC QN AUTO: 29.9 PG (ref 27–33)
MCHC RBC AUTO-ENTMCNC: 32.8 G/DL (ref 33.6–35)
MCV RBC AUTO: 91.1 FL (ref 81.4–97.8)
MICRO URNS: ABNORMAL
MICROALBUMIN UR-MCNC: 371.8 MG/DL
MICROALBUMIN/CREAT UR: 8294 MG/G (ref 0–30)
MONOCYTES # BLD AUTO: 0.57 K/UL (ref 0–0.85)
MONOCYTES NFR BLD AUTO: 6.5 % (ref 0–13.4)
NEUTROPHILS # BLD AUTO: 5.82 K/UL (ref 2–7.15)
NEUTROPHILS NFR BLD: 66.4 % (ref 44–72)
NITRITE UR QL STRIP.AUTO: NEGATIVE
NRBC # BLD AUTO: 0 K/UL
NRBC BLD-RTO: 0 /100 WBC
PH UR STRIP.AUTO: 8 [PH] (ref 5–8)
PHOSPHATE SERPL-MCNC: 3.9 MG/DL (ref 2.5–4.5)
PLATELET # BLD AUTO: 169 K/UL (ref 164–446)
PMV BLD AUTO: 11.1 FL (ref 9–12.9)
POTASSIUM SERPL-SCNC: 4.5 MMOL/L (ref 3.6–5.5)
PROT SERPL-MCNC: 6.3 G/DL (ref 6–8.2)
PROT UR QL STRIP: 300 MG/DL
PROT UR-MCNC: 576 MG/DL (ref 0–15)
PROT/CREAT UR: ABNORMAL MG/G (ref 10–107)
PROTHROMBIN TIME: 16 SEC (ref 12–14.6)
PTH-INTACT SERPL-MCNC: 313 PG/ML (ref 14–72)
RBC # BLD AUTO: 2.81 M/UL (ref 4.2–5.4)
RBC # URNS HPF: ABNORMAL /HPF
RBC UR QL AUTO: ABNORMAL
SODIUM SERPL-SCNC: 143 MMOL/L (ref 135–145)
SP GR UR STRIP.AUTO: 1.01
UROBILINOGEN UR STRIP.AUTO-MCNC: 0.2 MG/DL
WBC # BLD AUTO: 8.8 K/UL (ref 4.8–10.8)
WBC #/AREA URNS HPF: ABNORMAL /HPF

## 2023-04-24 PROCEDURE — 82962 GLUCOSE BLOOD TEST: CPT

## 2023-04-24 PROCEDURE — 770000 HCHG ROOM/CARE - INTERMEDIATE ICU *

## 2023-04-24 PROCEDURE — 80053 COMPREHEN METABOLIC PANEL: CPT

## 2023-04-24 PROCEDURE — 700111 HCHG RX REV CODE 636 W/ 250 OVERRIDE (IP): Performed by: INTERNAL MEDICINE

## 2023-04-24 PROCEDURE — A9270 NON-COVERED ITEM OR SERVICE: HCPCS | Performed by: HOSPITALIST

## 2023-04-24 PROCEDURE — 85610 PROTHROMBIN TIME: CPT

## 2023-04-24 PROCEDURE — 700102 HCHG RX REV CODE 250 W/ 637 OVERRIDE(OP): Performed by: STUDENT IN AN ORGANIZED HEALTH CARE EDUCATION/TRAINING PROGRAM

## 2023-04-24 PROCEDURE — 81001 URINALYSIS AUTO W/SCOPE: CPT

## 2023-04-24 PROCEDURE — 71045 X-RAY EXAM CHEST 1 VIEW: CPT

## 2023-04-24 PROCEDURE — 99233 SBSQ HOSP IP/OBS HIGH 50: CPT | Performed by: INTERNAL MEDICINE

## 2023-04-24 PROCEDURE — 83970 ASSAY OF PARATHORMONE: CPT

## 2023-04-24 PROCEDURE — 700102 HCHG RX REV CODE 250 W/ 637 OVERRIDE(OP): Performed by: HOSPITALIST

## 2023-04-24 PROCEDURE — 82043 UR ALBUMIN QUANTITATIVE: CPT

## 2023-04-24 PROCEDURE — 85025 COMPLETE CBC W/AUTO DIFF WBC: CPT

## 2023-04-24 PROCEDURE — 99233 SBSQ HOSP IP/OBS HIGH 50: CPT | Performed by: HOSPITALIST

## 2023-04-24 PROCEDURE — 84100 ASSAY OF PHOSPHORUS: CPT

## 2023-04-24 PROCEDURE — A9270 NON-COVERED ITEM OR SERVICE: HCPCS | Performed by: STUDENT IN AN ORGANIZED HEALTH CARE EDUCATION/TRAINING PROGRAM

## 2023-04-24 PROCEDURE — A9270 NON-COVERED ITEM OR SERVICE: HCPCS | Performed by: INTERNAL MEDICINE

## 2023-04-24 PROCEDURE — 82570 ASSAY OF URINE CREATININE: CPT

## 2023-04-24 PROCEDURE — 700102 HCHG RX REV CODE 250 W/ 637 OVERRIDE(OP): Performed by: INTERNAL MEDICINE

## 2023-04-24 PROCEDURE — 84156 ASSAY OF PROTEIN URINE: CPT

## 2023-04-24 RX ORDER — CHOLECALCIFEROL (VITAMIN D3) 125 MCG
5 CAPSULE ORAL
Status: DISCONTINUED | OUTPATIENT
Start: 2023-04-24 | End: 2023-04-27 | Stop reason: HOSPADM

## 2023-04-24 RX ORDER — ENALAPRILAT 1.25 MG/ML
1.25 INJECTION INTRAVENOUS EVERY 6 HOURS PRN
Status: DISCONTINUED | OUTPATIENT
Start: 2023-04-24 | End: 2023-04-24

## 2023-04-24 RX ORDER — HYDRALAZINE HYDROCHLORIDE 25 MG/1
25 TABLET, FILM COATED ORAL EVERY 8 HOURS
Status: DISCONTINUED | OUTPATIENT
Start: 2023-04-24 | End: 2023-04-24

## 2023-04-24 RX ORDER — HEPARIN SODIUM 5000 [USP'U]/ML
5000 INJECTION, SOLUTION INTRAVENOUS; SUBCUTANEOUS EVERY 8 HOURS
Status: DISCONTINUED | OUTPATIENT
Start: 2023-04-24 | End: 2023-04-27 | Stop reason: HOSPADM

## 2023-04-24 RX ORDER — DISOPYRAMIDE PHOSPHATE 100 MG/1
100 CAPSULE ORAL DAILY
Status: DISCONTINUED | OUTPATIENT
Start: 2023-04-24 | End: 2023-04-27 | Stop reason: HOSPADM

## 2023-04-24 RX ORDER — CARVEDILOL 12.5 MG/1
12.5 TABLET ORAL 2 TIMES DAILY WITH MEALS
Status: DISCONTINUED | OUTPATIENT
Start: 2023-04-24 | End: 2023-04-26

## 2023-04-24 RX ORDER — DILTIAZEM HYDROCHLORIDE 5 MG/ML
5 INJECTION INTRAVENOUS ONCE
Status: COMPLETED | OUTPATIENT
Start: 2023-04-24 | End: 2023-04-24

## 2023-04-24 RX ORDER — ATORVASTATIN CALCIUM 40 MG/1
40 TABLET, FILM COATED ORAL NIGHTLY
Status: DISCONTINUED | OUTPATIENT
Start: 2023-04-24 | End: 2023-04-27 | Stop reason: HOSPADM

## 2023-04-24 RX ORDER — AMLODIPINE BESYLATE 10 MG/1
10 TABLET ORAL
Status: DISCONTINUED | OUTPATIENT
Start: 2023-04-24 | End: 2023-04-27 | Stop reason: HOSPADM

## 2023-04-24 RX ADMIN — CARVEDILOL 12.5 MG: 12.5 TABLET, FILM COATED ORAL at 17:07

## 2023-04-24 RX ADMIN — DISOPYRAMIDE PHOSPHATE 100 MG: 100 CAPSULE ORAL at 15:54

## 2023-04-24 RX ADMIN — METOPROLOL TARTRATE 100 MG: 50 TABLET, FILM COATED ORAL at 05:03

## 2023-04-24 RX ADMIN — OMEPRAZOLE 20 MG: 20 CAPSULE, DELAYED RELEASE ORAL at 17:07

## 2023-04-24 RX ADMIN — DILTIAZEM HYDROCHLORIDE 5 MG: 5 INJECTION INTRAVENOUS at 05:03

## 2023-04-24 RX ADMIN — HYDRALAZINE HYDROCHLORIDE 20 MG: 20 INJECTION INTRAMUSCULAR; INTRAVENOUS at 23:22

## 2023-04-24 RX ADMIN — ATORVASTATIN CALCIUM 40 MG: 40 TABLET, FILM COATED ORAL at 20:46

## 2023-04-24 RX ADMIN — HYDRALAZINE HYDROCHLORIDE 20 MG: 20 INJECTION INTRAMUSCULAR; INTRAVENOUS at 06:09

## 2023-04-24 RX ADMIN — AMLODIPINE BESYLATE 10 MG: 10 TABLET ORAL at 15:54

## 2023-04-24 RX ADMIN — Medication 5 MG: at 20:46

## 2023-04-24 RX ADMIN — HYDRALAZINE HYDROCHLORIDE 20 MG: 20 INJECTION INTRAMUSCULAR; INTRAVENOUS at 02:23

## 2023-04-24 RX ADMIN — OMEPRAZOLE 20 MG: 20 CAPSULE, DELAYED RELEASE ORAL at 05:03

## 2023-04-24 ASSESSMENT — PAIN DESCRIPTION - PAIN TYPE
TYPE: ACUTE PAIN

## 2023-04-24 ASSESSMENT — COPD QUESTIONNAIRES
HAVE YOU SMOKED AT LEAST 100 CIGARETTES IN YOUR ENTIRE LIFE: NO/DON'T KNOW
COPD SCREENING SCORE: 1
DURING THE PAST 4 WEEKS HOW MUCH DID YOU FEEL SHORT OF BREATH: NONE/LITTLE OF THE TIME
DO YOU EVER COUGH UP ANY MUCUS OR PHLEGM?: NO/ONLY WITH OCCASIONAL COLDS OR INFECTIONS

## 2023-04-24 ASSESSMENT — FIBROSIS 4 INDEX: FIB4 SCORE: 2.27

## 2023-04-24 NOTE — PROGRESS NOTES
Pt remained HTN with BP in the 200's systolic after Cardizem push and PO metoprolol. Dr. Marinelli was updated and gave orders to give PRN hydralazine early.

## 2023-04-24 NOTE — PROGRESS NOTES
"Cardiology Follow-up Consult Note    Date of Service:    4/24/2023      Consulting Physician: Ortega Khan D.O.    Name:   Rigoberto Adames   YOB: 1965  Age:   58 y.o.  female   MRN:   [id number]      Subjective:  No acute events overnight.  Patient wishes to go home.  Reports being uncomfortable with multiple IVs and Vela catheter in place.  Denies any active chest pain, pressure, lightheadedness or dizziness.  Does not remember events surrounding her hospitalization.    All other review of systems reviewed and negative.      Past medical, surgical, social, and family history reviewed and unchanged from admission except as noted in assessment and plan.    Medications: Reviewed in MAR      No Known Allergies      Intake/Output Summary (Last 24 hours) at 4/24/2023 0816  Last data filed at 4/24/2023 0430  Gross per 24 hour   Intake 240 ml   Output 2450 ml   Net -2210 ml        Physical Exam  Body mass index is 21.78 kg/m².  BP (!) 197/93   Pulse 87   Temp 36.5 °C (97.7 °F) (Temporal)   Resp 16   Ht 1.676 m (5' 6\")   Wt 61.2 kg (134 lb 14.7 oz)   SpO2 96%   Vitals:    04/24/23 0615 04/24/23 0635 04/24/23 0800 04/24/23 0804   BP: (!) 202/93 (!) 176/82 (!) 186/88 (!) 197/93   Pulse: 90 90 86 87   Resp: 20 18 13 16   Temp:       TempSrc:       SpO2:   95% 96%   Weight:       Height:         Oxygen Therapy:  Pulse Oximetry: 96 %, O2 (LPM): 1, O2 Delivery Device: Silicone Nasal Cannula    General: In no apparent distress  Eyes: nl conjunctiva  ENT: OP clear  Neck: JVP difficult to assess, no carotid bruits  Lungs: normal respiratory effort, CTAB  Heart: RRR, no murmurs, no rubs or gallops, no edema bilateral lower extremities. No LV/RV heave on cardiac palpatation.   Abdomen: soft, non tender, non distended  Extremities/MSK: no clubbing, no cyanosis  Neurological: No focal sensory deficits  Psychiatric: Appropriate affect, A/O x 3  Skin: Warm extremities    Labs (personally reviewed and notable for): "   Lab Results   Component Value Date/Time    SODIUM 143 04/24/2023 02:27 AM    POTASSIUM 4.5 04/24/2023 02:27 AM    CHLORIDE 106 04/24/2023 02:27 AM    CO2 23 04/24/2023 02:27 AM    GLUCOSE 102 (H) 04/24/2023 02:27 AM    BUN 34 (H) 04/24/2023 02:27 AM    CREATININE 4.18 (H) 04/24/2023 02:27 AM      Lab Results   Component Value Date/Time    WBC 8.8 04/24/2023 02:27 AM    RBC 2.81 (L) 04/24/2023 02:27 AM    HEMOGLOBIN 8.4 (L) 04/24/2023 02:27 AM    HEMATOCRIT 25.6 (L) 04/24/2023 02:27 AM    MCV 91.1 04/24/2023 02:27 AM    MCH 29.9 04/24/2023 02:27 AM    MCHC 32.8 (L) 04/24/2023 02:27 AM    MPV 11.1 04/24/2023 02:27 AM    NEUTSPOLYS 66.40 04/24/2023 02:27 AM    LYMPHOCYTES 24.30 04/24/2023 02:27 AM    MONOCYTES 6.50 04/24/2023 02:27 AM    EOSINOPHILS 2.20 04/24/2023 02:27 AM    BASOPHILS 0.10 04/24/2023 02:27 AM      No results found for: CHOLSTRLTOT, LDL, HDL, TRIGLYCERIDE    Lab Results   Component Value Date/Time    TROPONINT 43 (H) 04/22/2023 2355     No results found for: NTPROBNP    Cardiac Imaging and Procedures Review:    ECG: ECG performed 4/23/2023 was interpreted by me which shows sinus bradycardia, incomplete LBBB    Echo: Echocardiogram performed on 4/23/2023 was personally interpreted by me which shows severe concentric LVH, evidence of increased intracavity gradient with peak 27 mmHg without Valsalva.    Radiology test Review:  DX-CHEST-PORTABLE (1 VIEW)   Final Result         1.  Pulmonary edema and/or infiltrates are identified, which are somewhat decreased since the prior exam.   2.  Trace bilateral pleural effusions   3.  Cardiomegaly   4.  Atherosclerosis      US-RENAL   Final Result      1.  Echogenic bilateral kidneys, in keeping with medical renal disease.   2.  No hydronephrosis. No renal calculus.      DX-CHEST-LIMITED (1 VIEW)   Final Result         1.  Ill-defined opacifications in each lung have increased minimally compared to the prior radiograph. This could indicate worsening of pulmonary  edema or inflammation.      CTA ABDOMEN PELVIS W & W/O POST PROCESS   Final Result      1.  No evidence of aortic aneurysm or dissection.      2.  No arterial extravasation into the bowel is identified.      3.  There is edema around the pancreas and dilatation of the common bile duct. Consider pancreatitis.      4.  Liver surface is nodular consistent with cirrhosis.      5.  Small amount of ascites.      6.  Gallbladder wall edema. Portal hypertension is a possible cause.      CT-CTA CHEST PULMONARY ARTERY W/ RECONS   Final Result      1.  No CT evidence of pulmonary embolism.      2.  Consolidation is present in the lower lobes bilaterally. Pneumonia is a possibility.            CT-HEAD W/O   Final Result         NO ACUTE ABNORMALITIES ARE NOTED ON CT SCAN OF THE HEAD.               EC-ECHOCARDIOGRAM COMPLETE W/O CONT   Final Result      DX-CHEST-LIMITED (1 VIEW)   Final Result         1.  No consolidations identified.      2.  Mild perihilar opacification is noted which could be due to congestion.          Problem list:  Principal Problem:    Bradycardia POA: Yes  Active Problems:    Shock (HCC) POA: Unknown    Acute respiratory failure with hypoxia (HCC) POA: Unknown    Hyperkalemia POA: Unknown    Acute-on-chronic kidney injury (HCC) POA: Unknown    Anemia POA: Unknown    Dyslipidemia POA: Unknown    HOCM (hypertrophic obstructive cardiomyopathy) (HCC) POA: Unknown    Primary hypertension POA: Unknown    Type 2 diabetes mellitus (HCC) POA: Unknown    Alcoholic cirrhosis (HCC) POA: Unknown    History of alcohol abuse POA: Unknown  Resolved Problems:    * No resolved hospital problems. *      Recommendations:  -- Complex patient case.  Remains in guarded condition.  -- Patient with hypertrophic obstructive cardiomyopathy who presented with out of hospital cardiac arrest, profound bradycardia, acute anemia and RUBEN on CKD.  Underwent urgent hemodialysis.  -- Blood pressure continues to remain significantly  elevated.  She is currently on metoprolol 100 mg twice daily.  Recommend changing metoprolol to Coreg 25 mg twice daily.  -- Unable to initiate ACE or ARB due to acute kidney injury.  Recommend initiating clonidine 0.1 mg 3 times daily and amlodipine 5 mg daily.  -- Patient's history extensively reviewed under different MRN.  As an outpatient, she was on metoprolol 100 mg twice daily and diltiazem 360 mg daily.  This was likely the cause for profound bradycardia and cardiac arrest.  Recommend against using dual AV pino modifying agents at this time.  -- For HOCM, recommend initiating disopyramide 100 mg daily given CrCl<15.      Recommendations discussed with Dr. Khan.    A total of 53 minutes of time was spent on day of encounter reviewing medical records, performing history and examination, counseling, ordering medication/test/consults and documentation.    Thank you for allowing me to participate in the care of this patient, cardiology will continue to follow.  Please contact me with any questions.    Denton Lomeli M.D.  Cardiologist, [facility]   [phone]    Please note that this dictation was created using voice recognition software. I have made every reasonable attempt to correct obvious errors, but it is possible there are errors of grammar and possibly content that I did not discover before finalizing the note.

## 2023-04-24 NOTE — CARE PLAN
The patient is Watcher - Medium risk of patient condition declining or worsening    Shift Goals  Clinical Goals: Hemodynamic stability, monitor K+  Patient Goals: Go home  Family Goals: ALONDRA    Progress made toward(s) clinical / shift goals:  BP stabilized, VS taken Q2 hours, pt on continuous cardiac monitoring. Daily labs drawn.          Problem: Pain - Standard  Goal: Alleviation of pain or a reduction in pain to the patient’s comfort goal  Outcome: Progressing  Note: Pt assessed for pain regularly and medicated PRN per MAR.       Problem: Knowledge Deficit - Standard  Goal: Patient and family/care givers will demonstrate understanding of plan of care, disease process/condition, diagnostic tests and medications  Outcome: Progressing  Note: Pt educated regarding plan of care and medications. All questions answered.

## 2023-04-24 NOTE — WOUND TEAM
"[facility]  Inpatient Services  Initial Wound and Skin Care Evaluation    Admission Date: 4/22/2023     Last order of IP CONSULT TO WOUND CARE was found on 4/23/2023 from Hospital Encounter on 4/22/2023     HPI, PMH, SH: Reviewed    No past surgical history on file.  Social History     Tobacco Use    Smoking status: Not on file    Smokeless tobacco: Not on file   Substance Use Topics    Alcohol use: Not on file     Chief Complaint   Patient presents with    Hypotension     Pt having bradycardia today 30-40 bpm. dizziness     Diagnosis: Bradycardia [R00.1]    Unit where seen by Wound Team: [id number]     WOUND CONSULT/FOLLOW UP RELATED TO:  Sacrococcygeal area     WOUND HISTORY:  Pt is a 58yr old female admitted with bradycardia. Pt was noted to have some discoloration to the skin as well as acne with \"Ice pick\" scars. Pt states she has a cream she uses at home.    WOUND ASSESSMENT/LDA  Wound 04/23/23 Other (comment) Coccyx Mid Acne and normal discoloration (Active)   Wound Image     04/24/23 0900   Site Assessment Clean;Intact;Dry    Periwound Assessment Clean;Dry;Intact    Margins Attached edges    Closure Adhesive bandage    Drainage Amount None    Dressing Options Mepilex    Number of days: 1      Vascular:    PEACE:   No results found.    Lab Values:    Lab Results   Component Value Date/Time    WBC 8.8 04/24/2023 02:27 AM    RBC 2.81 (L) 04/24/2023 02:27 AM    HEMOGLOBIN 8.4 (L) 04/24/2023 02:27 AM    HEMATOCRIT 25.6 (L) 04/24/2023 02:27 AM    HBA1C 5.9 (H) 04/23/2023 02:44 AM        Culture Results show:  No results found for this or any previous visit (from the past 720 hour(s)).    Pain Level/Medicated:  Denies pain       INTERVENTIONS BY WOUND TEAM:  Chart and images reviewed. Discussed with bedside RN. All areas of concern (based on picture review, LDA review and discussion with bedside RN) have been thoroughly assessed. Documentation of areas based on significant findings. This RN in to " assess patient. Performed standard wound care which includes appropriate positioning, dressing removal and non-selective debridement. Pictures and measurements obtained weekly if/when required.    Sacrum Pt with discoloration to the left lower aspect close to the rectum which appears to be normal discoloration. Pt also with acne scars to the right buttock area. Pt has a cream she uses at home.     Bilateral heels: assessed and are intact. Offloaded with pillows, recommend heel offloading dressings.    Advanced Wound Care Discharge Planning  Number of Clinicians necessary to complete wound care: 1  Is patient requiring IV pain medications for dressing changes: no  Length of time for dressing change 15 min. (This does not include chart review, pre-medication time, set up, clean up or time spent charting.)    Interdisciplinary consultation: Patient, Bedside RN,     EVALUATION / RATIONALE FOR TREATMENT:  Most Recent Date:  04/24/23: Pt with acne to buttocks, recommend offloading dressings     Goals: Steady decrease in wound area and depth weekly.    WOUND TEAM PLAN OF CARE ([X] for frequency of wound follow up,):   Nursing to follow dressing orders written for wound care. Contact wound team if area fails to progress, deteriorates or with any questions/concerns if something comes up before next scheduled follow up (See below as to whether wound is following and frequency of wound follow up)  Dressing changes by wound team:                   Follow up 3 times weekly:                NPWT change 3 times weekly:     Follow up 1-2 times weekly:      Follow up Bi-Monthly:           Follow up Monthly (High Risk):                        Follow up as needed:   X  Other (explain):     NURSING PLAN OF CARE ORDERS (X):  Dressing changes: See Dressing Care orders:   Skin care: See Skin Care orders:   RN Prevention Protocol: X  Rectal tube care: See Rectal Tube Care orders:   Other orders:    RSKIN:   CURRENTLY IN PLACE (X), APPLIED  THIS VISIT (A), ORDERED (O):   Q shift Moshe:  X  Q shift pressure point assessments:  X    Surface/Positioning   Standard Mattress/Trauma Bed          Low Airloss          ICU Low Airloss X  Bariatric MARIVEL     Waffle cushion        Waffle Overlay          Reposition q 2 hours      TAPs Turning system     Z Ankush Pillow     Offloading/Redistribution   Sacral Offloading Dressing (Silicone dressing)   O  Heel Offloading Dressing (Silicone dressing)       O  Heel float boots (Prevalon boot)             Float Heels off Bed with Pillows           Respiratory Room Air  Silicone O2 tubing         Gray Foam Ear protectors     Cannula fixation Device (Tender )          High flow offloading Clip    Elastic head band offloading device      Anchorfast                                                         Trach with Optifoam split foam             Containment/Moisture Prevention Continent    Rectal tube or BMS    Purwick/Condom Cath        Vela Catheter    Barrier wipes           Barrier paste       Antifungal tx      Interdry        Mobilization Up self      Up to chair        Ambulate      PT/OT      Nutrition       Dietician        Diabetes Education      PO   X  TF     TPN     NPO   # days     Other        Anticipated discharge plans:   LTACH:        SNF/Rehab:                  Home Health Care:           Outpatient Wound Center:            Self/Family Care:      X  Other:                  Vac Discharge Needs:   Vac Discharge plan is purely a recommendation from wound team and not a requirement for discharge unless otherwise stated by physician.  Not Applicable Pt not on a wound vac:    X   Regular Vac while inpatient, alternative dressing at DC:        Regular Vac in use and continued at DC:            Reg. Vac w/ Skin Sub/Biologic in use. Will need to be changed 2x wkly:      Veraflo Vac while inpatient, ok to transition to Regular Vac on Discharge (Bedside RN to Clamp small instillation tubing at time of DC):            Veraflo Vac while inpatient, would benefit from remaining on Veraflo Vac upon discharge:

## 2023-04-24 NOTE — CARE PLAN
The patient is Watcher - Medium risk of patient condition declining or worsening    Shift Goals  Clinical Goals: Hemodynamic stability  Patient Goals: Go home  Family Goals: ALONDRA    Progress made toward(s) clinical / shift goals:    Problem: Pain - Standard  Goal: Alleviation of pain or a reduction in pain to the patient’s comfort goal  Outcome: Progressing     Problem: Hemodynamics  Goal: Patient's hemodynamics, fluid balance and neurologic status will be stable or improve  Outcome: Progressing       Patient is not progressing towards the following goals:

## 2023-04-24 NOTE — PROGRESS NOTES
"[facility] -  PROGRESS NOTE               Author: Gui Rendon M.D. Date & Time: 4/24/2023  10:11 AM     HPI:  59yo female with h/o CKD, DM2, HTN BIB EMS after c/o CP and family noted increasing altered mentation. Patient is able to provide history only limited history, much history obtained from chart review and discussion with ICU/ED staff. Patient reports experiencing chest pain and nausea, denies abdominal pain and diarrhea, unable to provide more details. Patient was found by EMS to be hypotensive with BP 49/33 and bradycardia with HR in the 30s. Patient was provided with atropine and trancutaneous pacing was initiated. She was found to be hyperkalemia with K of 8.1. She was provided with initial medication management for hyperkalemia, and HD catheter placed by the intensivist, and HD initiated with a 2K/3Ca bath. She was intubated, subsequently extubated this AM. Patient had CTA chest/abd/pelvis at presentation.      Patient was to establish with [facility] for nephrology care 2/17/23 but did not show for her appointment. SCr 3.55 1/2023, with K of 5.5 and CO2 of 19.    DAILY NEPHROLOGY SUMMARY:  4/23: consult done  4/24: tolerated HD, hypertensive-BP meds adjusted, transferred to [facility], feeling well    PAST FAMILY HISTORY: Reviewed and Unchanged  SOCIAL HISTORY: Reviewed and Unchanged  CURRENT MEDICATIONS: Reviewed  IMAGING STUDIES: Reviewed    ROS  10 point ROS performed, negative other than stated above    PHYSICAL EXAM  VS:  BP (!) 193/91   Pulse 86   Temp 36.3 °C (97.3 °F) (Temporal)   Resp 15   Ht 1.676 m (5' 6\")   Wt 61.2 kg (134 lb 14.7 oz)   SpO2 94%   BMI 21.78 kg/m²   GENERAL: no acute distress  CV: RRR, trace edema  RESP: non-labored  GI: Soft  MSK: No joint deformities   SKIN: No concerning rashes  NEURO: AOx3  PSYCH: Cooperative      Fluids:  In: 240 [P.O.:240]  Out: 2825     LABS:  Recent Results (from the past 24 hour(s))   Comp Metabolic Panel    Collection Time: " 04/23/23 12:21 PM   Result Value Ref Range    Sodium 139 135 - 145 mmol/L    Potassium 4.1 3.6 - 5.5 mmol/L    Chloride 102 96 - 112 mmol/L    Co2 23 20 - 33 mmol/L    Anion Gap 14.0 7.0 - 16.0    Glucose 93 65 - 99 mg/dL    Bun 32 (H) 8 - 22 mg/dL    Creatinine 3.48 (H) 0.50 - 1.40 mg/dL    Calcium 8.3 (L) 8.5 - 10.5 mg/dL    AST(SGOT) 48 (H) 12 - 45 U/L    ALT(SGPT) 28 2 - 50 U/L    Alkaline Phosphatase 98 30 - 99 U/L    Total Bilirubin 0.6 0.1 - 1.5 mg/dL    Albumin 3.0 (L) 3.2 - 4.9 g/dL    Total Protein 6.4 6.0 - 8.2 g/dL    Globulin 3.4 1.9 - 3.5 g/dL    A-G Ratio 0.9 g/dL   Renal Function Panel    Collection Time: 04/23/23 12:21 PM   Result Value Ref Range    Phosphorus 4.0 2.5 - 4.5 mg/dL   CORRECTED CALCIUM    Collection Time: 04/23/23 12:21 PM   Result Value Ref Range    Correct Calcium 9.1 8.5 - 10.5 mg/dL   ESTIMATED GFR    Collection Time: 04/23/23 12:21 PM   Result Value Ref Range    GFR (CKD-EPI) 15 (A) >60 mL/min/1.73 m 2   POCT glucose device results    Collection Time: 04/23/23 12:27 PM   Result Value Ref Range    POC Glucose, Blood 87 65 - 99 mg/dL   POCT glucose device results    Collection Time: 04/23/23  4:58 PM   Result Value Ref Range    POC Glucose, Blood 80 65 - 99 mg/dL   POCT glucose device results    Collection Time: 04/23/23 11:24 PM   Result Value Ref Range    POC Glucose, Blood 90 65 - 99 mg/dL   Comp Metabolic Panel (CMP) - Every Monday    Collection Time: 04/24/23  2:27 AM   Result Value Ref Range    Sodium 143 135 - 145 mmol/L    Potassium 4.5 3.6 - 5.5 mmol/L    Chloride 106 96 - 112 mmol/L    Co2 23 20 - 33 mmol/L    Anion Gap 14.0 7.0 - 16.0    Glucose 102 (H) 65 - 99 mg/dL    Bun 34 (H) 8 - 22 mg/dL    Creatinine 4.18 (H) 0.50 - 1.40 mg/dL    Calcium 7.9 (L) 8.5 - 10.5 mg/dL    AST(SGOT) 33 12 - 45 U/L    ALT(SGPT) 24 2 - 50 U/L    Alkaline Phosphatase 95 30 - 99 U/L    Total Bilirubin 0.2 0.1 - 1.5 mg/dL    Albumin 2.9 (L) 3.2 - 4.9 g/dL    Total Protein 6.3 6.0 - 8.2 g/dL     Globulin 3.4 1.9 - 3.5 g/dL    A-G Ratio 0.9 g/dL   Renal Function Panel    Collection Time: 04/24/23  2:27 AM   Result Value Ref Range    Phosphorus 3.9 2.5 - 4.5 mg/dL   PTH INTACT (PTH ONLY)    Collection Time: 04/24/23  2:27 AM   Result Value Ref Range    Pth, Intact 313.0 (H) 14.0 - 72.0 pg/mL   CBC with Differential    Collection Time: 04/24/23  2:27 AM   Result Value Ref Range    WBC 8.8 4.8 - 10.8 K/uL    RBC 2.81 (L) 4.20 - 5.40 M/uL    Hemoglobin 8.4 (L) 12.0 - 16.0 g/dL    Hematocrit 25.6 (L) 37.0 - 47.0 %    MCV 91.1 81.4 - 97.8 fL    MCH 29.9 27.0 - 33.0 pg    MCHC 32.8 (L) 33.6 - 35.0 g/dL    RDW 51.3 (H) 35.9 - 50.0 fL    Platelet Count 169 164 - 446 K/uL    MPV 11.1 9.0 - 12.9 fL    Neutrophils-Polys 66.40 44.00 - 72.00 %    Lymphocytes 24.30 22.00 - 41.00 %    Monocytes 6.50 0.00 - 13.40 %    Eosinophils 2.20 0.00 - 6.90 %    Basophils 0.10 0.00 - 1.80 %    Immature Granulocytes 0.50 0.00 - 0.90 %    Nucleated RBC 0.00 /100 WBC    Neutrophils (Absolute) 5.82 2.00 - 7.15 K/uL    Lymphs (Absolute) 2.13 1.00 - 4.80 K/uL    Monos (Absolute) 0.57 0.00 - 0.85 K/uL    Eos (Absolute) 0.19 0.00 - 0.51 K/uL    Baso (Absolute) 0.01 0.00 - 0.12 K/uL    Immature Granulocytes (abs) 0.04 0.00 - 0.11 K/uL    NRBC (Absolute) 0.00 K/uL   Prothrombin Time    Collection Time: 04/24/23  2:27 AM   Result Value Ref Range    PT 16.0 (H) 12.0 - 14.6 sec    INR 1.30 (H) 0.87 - 1.13   CORRECTED CALCIUM    Collection Time: 04/24/23  2:27 AM   Result Value Ref Range    Correct Calcium 8.8 8.5 - 10.5 mg/dL   ESTIMATED GFR    Collection Time: 04/24/23  2:27 AM   Result Value Ref Range    GFR (CKD-EPI) 12 (A) >60 mL/min/1.73 m 2   POCT glucose device results    Collection Time: 04/24/23  4:47 AM   Result Value Ref Range    POC Glucose, Blood 108 (H) 65 - 99 mg/dL       (click the triangle to expand results)      ASSESSMENT:  # RUBEN vs progression of CKD  - echogenic kidneys  - suspect this is progression of CKD  # CKD 4  - likely  associated with DM/HTN  # Hyperkalemia  - resolved s/p HD x 1  # Acidosis  - resolved s/p HD  # HTN  # DM  - A1c 5.9  # Anemia  - iron stores adequate   # CKD MBD  - phos elevated, Ca to goal  - PTH pending  # Hypoalbuminemia  # AMS  # HOCM   # ETOH Cirrhosis      PLAN:  -Holding HD today. Daily evaluation for RRT needs.  -Needs to remain inpatient for now  -UA  -Deferring further work-up for advanced CKD based on imaging demonstrating advanced/irreversible disease  -Renal diet once taking PO  -No ACEi/ARB/spironolactone  -will start JARRET once Bps improved     Discussed with hospitalist    Gui Rendon MD

## 2023-04-25 ENCOUNTER — APPOINTMENT (OUTPATIENT)
Dept: RADIOLOGY | Facility: MEDICAL CENTER | Age: 58
DRG: 673 | End: 2023-04-25
Attending: INTERNAL MEDICINE
Payer: MEDICAID

## 2023-04-25 LAB
ALBUMIN SERPL BCP-MCNC: 2.9 G/DL (ref 3.2–4.9)
ALBUMIN/GLOB SERPL: 0.9 G/DL
ALP SERPL-CCNC: 87 U/L (ref 30–99)
ALT SERPL-CCNC: 18 U/L (ref 2–50)
ANION GAP SERPL CALC-SCNC: 13 MMOL/L (ref 7–16)
AST SERPL-CCNC: 21 U/L (ref 12–45)
BASOPHILS # BLD AUTO: 0.3 % (ref 0–1.8)
BASOPHILS # BLD: 0.02 K/UL (ref 0–0.12)
BILIRUB SERPL-MCNC: 0.2 MG/DL (ref 0.1–1.5)
BUN SERPL-MCNC: 40 MG/DL (ref 8–22)
CALCIUM ALBUM COR SERPL-MCNC: 8.4 MG/DL (ref 8.5–10.5)
CALCIUM SERPL-MCNC: 7.5 MG/DL (ref 8.5–10.5)
CHLORIDE SERPL-SCNC: 104 MMOL/L (ref 96–112)
CO2 SERPL-SCNC: 23 MMOL/L (ref 20–33)
CREAT SERPL-MCNC: 5.24 MG/DL (ref 0.5–1.4)
EKG IMPRESSION: NORMAL
EOSINOPHIL # BLD AUTO: 0.22 K/UL (ref 0–0.51)
EOSINOPHIL NFR BLD: 2.9 % (ref 0–6.9)
ERYTHROCYTE [DISTWIDTH] IN BLOOD BY AUTOMATED COUNT: 52.3 FL (ref 35.9–50)
GFR SERPLBLD CREATININE-BSD FMLA CKD-EPI: 9 ML/MIN/1.73 M 2
GLOBULIN SER CALC-MCNC: 3.3 G/DL (ref 1.9–3.5)
GLUCOSE BLD STRIP.AUTO-MCNC: 89 MG/DL (ref 65–99)
GLUCOSE BLD STRIP.AUTO-MCNC: 96 MG/DL (ref 65–99)
GLUCOSE SERPL-MCNC: 88 MG/DL (ref 65–99)
HCT VFR BLD AUTO: 24.8 % (ref 37–47)
HGB BLD-MCNC: 7.7 G/DL (ref 12–16)
IMM GRANULOCYTES # BLD AUTO: 0.03 K/UL (ref 0–0.11)
IMM GRANULOCYTES NFR BLD AUTO: 0.4 % (ref 0–0.9)
INR PPP: 1.17 (ref 0.87–1.13)
LYMPHOCYTES # BLD AUTO: 2.11 K/UL (ref 1–4.8)
LYMPHOCYTES NFR BLD: 28.1 % (ref 22–41)
MAGNESIUM SERPL-MCNC: 1.8 MG/DL (ref 1.5–2.5)
MCH RBC QN AUTO: 29.6 PG (ref 27–33)
MCHC RBC AUTO-ENTMCNC: 31 G/DL (ref 33.6–35)
MCV RBC AUTO: 95.4 FL (ref 81.4–97.8)
MONOCYTES # BLD AUTO: 0.54 K/UL (ref 0–0.85)
MONOCYTES NFR BLD AUTO: 7.2 % (ref 0–13.4)
NEUTROPHILS # BLD AUTO: 4.59 K/UL (ref 2–7.15)
NEUTROPHILS NFR BLD: 61.1 % (ref 44–72)
NRBC # BLD AUTO: 0 K/UL
NRBC BLD-RTO: 0 /100 WBC
PHOSPHATE SERPL-MCNC: 4.5 MG/DL (ref 2.5–4.5)
PLATELET # BLD AUTO: 145 K/UL (ref 164–446)
PMV BLD AUTO: 11.3 FL (ref 9–12.9)
POTASSIUM SERPL-SCNC: 4.3 MMOL/L (ref 3.6–5.5)
PROT SERPL-MCNC: 6.2 G/DL (ref 6–8.2)
PROTHROMBIN TIME: 14.7 SEC (ref 12–14.6)
RBC # BLD AUTO: 2.6 M/UL (ref 4.2–5.4)
SODIUM SERPL-SCNC: 140 MMOL/L (ref 135–145)
WBC # BLD AUTO: 7.5 K/UL (ref 4.8–10.8)

## 2023-04-25 PROCEDURE — 85025 COMPLETE CBC W/AUTO DIFF WBC: CPT

## 2023-04-25 PROCEDURE — 80053 COMPREHEN METABOLIC PANEL: CPT

## 2023-04-25 PROCEDURE — 93005 ELECTROCARDIOGRAM TRACING: CPT | Performed by: HOSPITALIST

## 2023-04-25 PROCEDURE — A9270 NON-COVERED ITEM OR SERVICE: HCPCS | Performed by: STUDENT IN AN ORGANIZED HEALTH CARE EDUCATION/TRAINING PROGRAM

## 2023-04-25 PROCEDURE — 82962 GLUCOSE BLOOD TEST: CPT

## 2023-04-25 PROCEDURE — 71045 X-RAY EXAM CHEST 1 VIEW: CPT

## 2023-04-25 PROCEDURE — 85610 PROTHROMBIN TIME: CPT

## 2023-04-25 PROCEDURE — A9270 NON-COVERED ITEM OR SERVICE: HCPCS | Performed by: HOSPITALIST

## 2023-04-25 PROCEDURE — 700111 HCHG RX REV CODE 636 W/ 250 OVERRIDE (IP): Performed by: HOSPITALIST

## 2023-04-25 PROCEDURE — 90935 HEMODIALYSIS ONE EVALUATION: CPT

## 2023-04-25 PROCEDURE — A9270 NON-COVERED ITEM OR SERVICE: HCPCS | Performed by: INTERNAL MEDICINE

## 2023-04-25 PROCEDURE — 700102 HCHG RX REV CODE 250 W/ 637 OVERRIDE(OP): Performed by: STUDENT IN AN ORGANIZED HEALTH CARE EDUCATION/TRAINING PROGRAM

## 2023-04-25 PROCEDURE — 700102 HCHG RX REV CODE 250 W/ 637 OVERRIDE(OP)

## 2023-04-25 PROCEDURE — 700102 HCHG RX REV CODE 250 W/ 637 OVERRIDE(OP): Performed by: INTERNAL MEDICINE

## 2023-04-25 PROCEDURE — 84100 ASSAY OF PHOSPHORUS: CPT

## 2023-04-25 PROCEDURE — 770020 HCHG ROOM/CARE - TELE (206)

## 2023-04-25 PROCEDURE — 93010 ELECTROCARDIOGRAM REPORT: CPT | Performed by: INTERNAL MEDICINE

## 2023-04-25 PROCEDURE — 700102 HCHG RX REV CODE 250 W/ 637 OVERRIDE(OP): Performed by: HOSPITALIST

## 2023-04-25 PROCEDURE — 700111 HCHG RX REV CODE 636 W/ 250 OVERRIDE (IP)

## 2023-04-25 PROCEDURE — 83735 ASSAY OF MAGNESIUM: CPT

## 2023-04-25 PROCEDURE — 82270 OCCULT BLOOD FECES: CPT

## 2023-04-25 PROCEDURE — A9270 NON-COVERED ITEM OR SERVICE: HCPCS

## 2023-04-25 PROCEDURE — 700111 HCHG RX REV CODE 636 W/ 250 OVERRIDE (IP): Performed by: INTERNAL MEDICINE

## 2023-04-25 PROCEDURE — 99232 SBSQ HOSP IP/OBS MODERATE 35: CPT | Performed by: INTERNAL MEDICINE

## 2023-04-25 PROCEDURE — 99233 SBSQ HOSP IP/OBS HIGH 50: CPT | Performed by: HOSPITALIST

## 2023-04-25 RX ORDER — DIPHENHYDRAMINE HCL 25 MG
25 TABLET ORAL ONCE
Status: COMPLETED | OUTPATIENT
Start: 2023-04-26 | End: 2023-04-25

## 2023-04-25 RX ORDER — HEPARIN SODIUM 1000 [USP'U]/ML
INJECTION, SOLUTION INTRAVENOUS; SUBCUTANEOUS
Status: COMPLETED
Start: 2023-04-25 | End: 2023-04-25

## 2023-04-25 RX ORDER — CLONIDINE HYDROCHLORIDE 0.1 MG/1
0.1 TABLET ORAL 3 TIMES DAILY
Status: DISCONTINUED | OUTPATIENT
Start: 2023-04-25 | End: 2023-04-27 | Stop reason: HOSPADM

## 2023-04-25 RX ADMIN — AMLODIPINE BESYLATE 10 MG: 10 TABLET ORAL at 04:51

## 2023-04-25 RX ADMIN — HEPARIN SODIUM 5000 UNITS: 5000 INJECTION, SOLUTION INTRAVENOUS; SUBCUTANEOUS at 04:50

## 2023-04-25 RX ADMIN — DISOPYRAMIDE PHOSPHATE 100 MG: 100 CAPSULE ORAL at 04:51

## 2023-04-25 RX ADMIN — CARVEDILOL 12.5 MG: 12.5 TABLET, FILM COATED ORAL at 07:18

## 2023-04-25 RX ADMIN — CLONIDINE HYDROCHLORIDE 0.1 MG: 0.1 TABLET ORAL at 17:44

## 2023-04-25 RX ADMIN — Medication 5 MG: at 21:28

## 2023-04-25 RX ADMIN — CARVEDILOL 12.5 MG: 12.5 TABLET, FILM COATED ORAL at 17:44

## 2023-04-25 RX ADMIN — CLONIDINE HYDROCHLORIDE 0.1 MG: 0.1 TABLET ORAL at 14:55

## 2023-04-25 RX ADMIN — HEPARIN SODIUM 5000 UNITS: 5000 INJECTION, SOLUTION INTRAVENOUS; SUBCUTANEOUS at 14:55

## 2023-04-25 RX ADMIN — OMEPRAZOLE 20 MG: 20 CAPSULE, DELAYED RELEASE ORAL at 17:44

## 2023-04-25 RX ADMIN — HYDRALAZINE HYDROCHLORIDE 20 MG: 20 INJECTION INTRAMUSCULAR; INTRAVENOUS at 04:10

## 2023-04-25 RX ADMIN — HEPARIN SODIUM 5000 UNITS: 5000 INJECTION, SOLUTION INTRAVENOUS; SUBCUTANEOUS at 00:07

## 2023-04-25 RX ADMIN — HEPARIN SODIUM 5000 UNITS: 5000 INJECTION, SOLUTION INTRAVENOUS; SUBCUTANEOUS at 21:29

## 2023-04-25 RX ADMIN — OMEPRAZOLE 20 MG: 20 CAPSULE, DELAYED RELEASE ORAL at 04:51

## 2023-04-25 RX ADMIN — ATORVASTATIN CALCIUM 40 MG: 40 TABLET, FILM COATED ORAL at 21:28

## 2023-04-25 RX ADMIN — HEPARIN SODIUM 2800 UNITS: 1000 INJECTION, SOLUTION INTRAVENOUS; SUBCUTANEOUS at 14:40

## 2023-04-25 RX ADMIN — DIPHENHYDRAMINE HYDROCHLORIDE 25 MG: 25 TABLET ORAL at 23:39

## 2023-04-25 ASSESSMENT — ENCOUNTER SYMPTOMS
FEVER: 0
CONSTITUTIONAL NEGATIVE: 1
NERVOUS/ANXIOUS: 1
POLYDIPSIA: 0
EYES NEGATIVE: 1
DEPRESSION: 0
NAUSEA: 0
BRUISES/BLEEDS EASILY: 0
SHORTNESS OF BREATH: 1
MUSCULOSKELETAL NEGATIVE: 1
BACK PAIN: 0
VOMITING: 0
FOCAL WEAKNESS: 0
NECK PAIN: 0
GASTROINTESTINAL NEGATIVE: 1
COUGH: 0
HEARTBURN: 0
PALPITATIONS: 0
CHILLS: 0
DIZZINESS: 0
WEAKNESS: 0
CARDIOVASCULAR NEGATIVE: 1
NEUROLOGICAL NEGATIVE: 1
HEADACHES: 0

## 2023-04-25 ASSESSMENT — COGNITIVE AND FUNCTIONAL STATUS - GENERAL
SUGGESTED CMS G CODE MODIFIER DAILY ACTIVITY: CJ
CLIMB 3 TO 5 STEPS WITH RAILING: A LITTLE
DAILY ACTIVITIY SCORE: 22
MOBILITY SCORE: 23
SUGGESTED CMS G CODE MODIFIER MOBILITY: CI
HELP NEEDED FOR BATHING: A LITTLE
DRESSING REGULAR LOWER BODY CLOTHING: A LITTLE

## 2023-04-25 ASSESSMENT — PAIN DESCRIPTION - PAIN TYPE
TYPE: ACUTE PAIN

## 2023-04-25 ASSESSMENT — PATIENT HEALTH QUESTIONNAIRE - PHQ9
2. FEELING DOWN, DEPRESSED, IRRITABLE, OR HOPELESS: NOT AT ALL
SUM OF ALL RESPONSES TO PHQ9 QUESTIONS 1 AND 2: 0
1. LITTLE INTEREST OR PLEASURE IN DOING THINGS: NOT AT ALL

## 2023-04-25 ASSESSMENT — FIBROSIS 4 INDEX: FIB4 SCORE: 1.95

## 2023-04-25 NOTE — CARE PLAN
The patient is Stable - Low risk of patient condition declining or worsening    Shift Goals  Clinical Goals: Decrease blood pressure  Patient Goals: Feel better  Family Goals: ALONDRA    Progress made toward(s) clinical / shift goals: Pt aware of plan of care, pt had no complaints of pain this shift    Patient is not progressing towards the following goals:      Problem: Knowledge Deficit - Standard  Goal: Patient and family/care givers will demonstrate understanding of plan of care, disease process/condition, diagnostic tests and medications  Outcome: Progressing     Problem: Pain - Standard  Goal: Alleviation of pain or a reduction in pain to the patient’s comfort goal  Outcome: Progressing

## 2023-04-25 NOTE — PROGRESS NOTES
Hospital Medicine Daily Progress Note    Date of Service  4/25/2023    Chief Complaint  Weakness and abdominal pain    Hospital Course  Rigoberto Adames is a 59yo F with pmhx of hypertensive obstructive cardiomyopathy, hypertension, DMII, CKDIV, EtOH use (sober x 2 years), cirrhosis, DLD. She is here for abdominal pain and generalized weakness.  She was found in ED to have bradycardia with HR in 30's and hypotensive.  She was intubated, given fluids and atropine.  A temporary pacemaker was inserted. Initial potassium was 8.  She had hemodialysis.    Interval Problem Update  Patient seen and examined today.  Data, Medication data reviewed.  Case discussed with nursing as available.  Plan of Care reviewed with patient and notified of changes.   4/24 Remains hypertensive. Improved HR despite restarting metoprolol yesterday.  Eager to go home.  Nephrology holding dialysis one more day.  4/25 the patient states she feels better, she remains hypertensive, though some improvement, currently afebrile, heart rate in the 70s, the patient is on 2 L nasal cannula oxygen and saturating well, the patient states she would like to be discharged, reported to the patient her current concerns with her dialysis needs, renal function, presentation with severe hyperkalemia.   Discussed with nephrology, will need to temporary tunneled hemodialysis catheter and ongoing RRT.  Discussed with cardiology for ongoing blood pressure control, medication adjustment.    I have discussed this patient's plan of care and discharge plan at IDT rounds today with Case Management, Nursing, Nursing leadership, and other members of the IDT team.    Consultants/Specialty  cardiology and nephrology  Critical care  Code Status  Full Code    Disposition  Patient is not medically cleared for discharge.   Anticipate discharge to to home with close outpatient follow-up.  I have placed the appropriate orders for post-discharge needs.    Review of Systems  Review of  Systems   Constitutional: Negative.  Negative for chills and fever.   HENT: Negative.     Eyes: Negative.    Respiratory:  Positive for shortness of breath. Negative for cough.    Cardiovascular: Negative.  Negative for chest pain and palpitations.   Gastrointestinal: Negative.  Negative for heartburn, nausea and vomiting.   Genitourinary: Negative.  Negative for dysuria and frequency.   Musculoskeletal: Negative.  Negative for back pain and neck pain.   Skin: Negative.  Negative for itching and rash.   Neurological: Negative.  Negative for dizziness, focal weakness, weakness and headaches.   Endo/Heme/Allergies: Negative.  Negative for polydipsia. Does not bruise/bleed easily.   Psychiatric/Behavioral:  Negative for depression. The patient is nervous/anxious.       Physical Exam  Temp:  [36.5 °C (97.7 °F)-36.7 °C (98 °F)] 36.6 °C (97.9 °F)  Pulse:  [77-92] 78  Resp:  [15-24] 19  BP: (162-229)/() 165/80  SpO2:  [88 %-98 %] 97 %    Physical Exam  Vitals and nursing note reviewed.   Constitutional:       Appearance: She is well-developed. She is not diaphoretic.   HENT:      Head: Normocephalic and atraumatic.      Nose: Nose normal.   Eyes:      Conjunctiva/sclera: Conjunctivae normal.      Pupils: Pupils are equal, round, and reactive to light.   Neck:      Thyroid: No thyromegaly.      Vascular: No JVD.      Comments: Left IJ dialysis catheter  Cardiovascular:      Rate and Rhythm: Normal rate and regular rhythm.      Heart sounds: Normal heart sounds.     No friction rub. No gallop.   Pulmonary:      Effort: Pulmonary effort is normal.      Breath sounds: Rhonchi and rales present. No wheezing.   Abdominal:      General: Bowel sounds are normal. There is no distension.      Palpations: Abdomen is soft. There is no mass.      Tenderness: There is no abdominal tenderness. There is no guarding or rebound.   Musculoskeletal:         General: No tenderness. Normal range of motion.      Cervical back: Normal range  of motion and neck supple.   Lymphadenopathy:      Cervical: No cervical adenopathy.   Skin:     General: Skin is warm and dry.   Neurological:      Mental Status: She is alert and oriented to person, place, and time.      Cranial Nerves: No cranial nerve deficit.   Psychiatric:         Behavior: Behavior normal.       Fluids    Intake/Output Summary (Last 24 hours) at 4/25/2023 0901  Last data filed at 4/25/2023 0800  Gross per 24 hour   Intake 480 ml   Output 400 ml   Net 80 ml         Laboratory  Recent Labs     04/23/23  0244 04/23/23  0750 04/24/23  0227 04/25/23  0338   WBC 8.1  --  8.8 7.5   RBC 3.24*  --  2.81* 2.60*   HEMOGLOBIN 9.7* 9.8* 8.4* 7.7*   HEMATOCRIT 29.7*  --  25.6* 24.8*   MCV 91.7  --  91.1 95.4   MCH 29.9  --  29.9 29.6   MCHC 32.7*  --  32.8* 31.0*   RDW 48.7  --  51.3* 52.3*   PLATELETCT 190  --  169 145*   MPV 11.0  --  11.1 11.3       Recent Labs     04/23/23  1221 04/24/23 0227 04/25/23  0338   SODIUM 139 143 140   POTASSIUM 4.1 4.5 4.3   CHLORIDE 102 106 104   CO2 23 23 23   GLUCOSE 93 102* 88   BUN 32* 34* 40*   CREATININE 3.48* 4.18* 5.24*   CALCIUM 8.3* 7.9* 7.5*       Recent Labs     04/22/23  2355 04/24/23 0227 04/25/23  0338   APTT 33.8  --   --    INR 1.40* 1.30* 1.17*                 Imaging  DX-CHEST-PORTABLE (1 VIEW)   Final Result         1.  Pulmonary edema and/or infiltrates are identified, which are stable since the prior exam.   2.  Trace bilateral pleural effusions, stable since prior study   3.  Cardiomegaly   4.  Atherosclerosis      DX-CHEST-PORTABLE (1 VIEW)   Final Result         1.  Pulmonary edema and/or infiltrates are identified, which are somewhat decreased since the prior exam.   2.  Trace bilateral pleural effusions   3.  Cardiomegaly   4.  Atherosclerosis      US-RENAL   Final Result      1.  Echogenic bilateral kidneys, in keeping with medical renal disease.   2.  No hydronephrosis. No renal calculus.      DX-CHEST-LIMITED (1 VIEW)   Final Result          1.  Ill-defined opacifications in each lung have increased minimally compared to the prior radiograph. This could indicate worsening of pulmonary edema or inflammation.      CTA ABDOMEN PELVIS W & W/O POST PROCESS   Final Result      1.  No evidence of aortic aneurysm or dissection.      2.  No arterial extravasation into the bowel is identified.      3.  There is edema around the pancreas and dilatation of the common bile duct. Consider pancreatitis.      4.  Liver surface is nodular consistent with cirrhosis.      5.  Small amount of ascites.      6.  Gallbladder wall edema. Portal hypertension is a possible cause.      CT-CTA CHEST PULMONARY ARTERY W/ RECONS   Final Result      1.  No CT evidence of pulmonary embolism.      2.  Consolidation is present in the lower lobes bilaterally. Pneumonia is a possibility.            CT-HEAD W/O   Final Result         NO ACUTE ABNORMALITIES ARE NOTED ON CT SCAN OF THE HEAD.               EC-ECHOCARDIOGRAM COMPLETE W/O CONT   Final Result      DX-CHEST-LIMITED (1 VIEW)   Final Result         1.  No consolidations identified.      2.  Mild perihilar opacification is noted which could be due to congestion.             Assessment/Plan  * Bradycardia- (present on admission)  Assessment & Plan  Improved  Monitor on telemetry  Monitor and correct electrolytes    History of alcohol abuse  Assessment & Plan  History of, monitor,    Alcoholic cirrhosis (HCC)- (present on admission)  Assessment & Plan  Maintain sobriety  F/u with outpatient GI    Type 2 diabetes mellitus (HCC)- (present on admission)  Assessment & Plan  Monitor accuchecks and cover with SSI  A1c:5.9    Primary hypertension  Assessment & Plan  Was hypotensive on admit and was on dopamine  Multimodality treatment, adjust  Monitor vitals.    HOCM (hypertrophic obstructive cardiomyopathy) (HCC)  Assessment & Plan  Cardiology assisting  Blood pressure control, push beta-blockade  Norpace  Careful with too much afterload  reduction    Dyslipidemia  Assessment & Plan  History of    Anemia- (present on admission)  Assessment & Plan  Significant drop since admission, question dilution, check occult blood  Monitor, transfuse as indicated    Acute-on-chronic kidney injury (HCC)  Assessment & Plan  Patient with a history of longstanding hypertension, previous CKD, now RUBEN with resultant hyperkalemia  Nephrology consulting  Monitor labs  IV fluids  Ongoing RRT efforts      Hyperkalemia  Assessment & Plan  Resolved with hemodialysis  4/23 4.2 <6.8 <8.1  Monitor labs    Acute respiratory failure with hypoxia (HCC)  Assessment & Plan  Supplemental oxygen as needed wean to keep oxygen saturations greater than 92%  RT per protocol  Aspiration precautions  Mobilize patient and encourage deep inspiratory efforts    Shock (HCC)  Assessment & Plan  Secondary to bradycardia, related to pino agents with severe hyperkalemia  Recovered    Plan  Additional dialysis as per nephrology,  Plans for permacath placement  Additional blood pressure medication, adjust as indicated  Continue with oxygen weaning and close observation  Close observation in terms of the patient's electrolytes, adjust as indicated  See orders  Medically complex high-risk patient  Valuate for developing anemia      VTE prophylaxis: heparin ppx    I have performed a physical exam and reviewed and updated ROS and Plan today (4/25/2023). In review of yesterday's note (4/24/2023), there are no changes except as documented above.      Please note that this dictation was created using voice recognition software. I have made every reasonable attempt to correct obvious errors, but I expect that there are errors of grammar and possibly context that I did not discover before finalizing the note.

## 2023-04-25 NOTE — PROGRESS NOTES
Hospital Medicine Daily Progress Note    Date of Service  4/24/2023    Chief Complaint  Weakness and abdominal pain    Hospital Course  Rigoberto Adames is a 57yo F with pmhx of hypertensive obstructive cardiomyopathy, hypertension, DMII, CKDIV, EtOH use (sober x 2 years), cirrhosis, DLD. She is here for abdominal pain and generalized weakness.  She was found in ED to have bradycardia with HR in 30's and hypotensive.  She was intubated, given fluids and atropine.  A temporary pacemaker was inserted. Initial potassium was 8.  She had hemodialysis.    Interval Problem Update  4/24 Remains hypertensive. Improved HR despite restarting metoprolol yesterday.  Eager to go home.  Nephrology holding dialysis one more day.    I have discussed this patient's plan of care and discharge plan at IDT rounds today with Case Management, Nursing, Nursing leadership, and other members of the IDT team.    Consultants/Specialty  cardiology and nephrology    Code Status  Full Code    Disposition  Patient is not medically cleared for discharge.   Anticipate discharge to to home with close outpatient follow-up.  I have placed the appropriate orders for post-discharge needs.    Review of Systems  ROS     Physical Exam  Temp:  [36.3 °C (97.3 °F)-36.6 °C (97.9 °F)] 36.6 °C (97.9 °F)  Pulse:  [78-92] 92  Resp:  [10-29] 19  BP: (157-229)/() 197/96  SpO2:  [92 %-97 %] 96 %    Physical Exam    Fluids    Intake/Output Summary (Last 24 hours) at 4/24/2023 2023  Last data filed at 4/24/2023 1600  Gross per 24 hour   Intake 480 ml   Output 1250 ml   Net -770 ml       Laboratory  Recent Labs     04/22/23  2355 04/23/23  0244 04/23/23  0750 04/24/23  0227   WBC 7.6 8.1  --  8.8   RBC 2.41* 3.24*  --  2.81*   HEMOGLOBIN 7.2* 9.7* 9.8* 8.4*   HEMATOCRIT 23.7* 29.7*  --  25.6*   MCV 98.3* 91.7  --  91.1   MCH 29.9 29.9  --  29.9   MCHC 30.4* 32.7*  --  32.8*   RDW 52.7* 48.7  --  51.3*   PLATELETCT 195 190  --  169   MPV 11.0 11.0  --  11.1     Recent Labs      04/23/23  0750 04/23/23  1221 04/24/23  0227   SODIUM 139 139 143   POTASSIUM 4.2 4.1 4.5   CHLORIDE 101 102 106   CO2 23 23 23   GLUCOSE 107* 93 102*   BUN 31* 32* 34*   CREATININE 3.17* 3.48* 4.18*   CALCIUM 8.7 8.3* 7.9*     Recent Labs     04/22/23  2355 04/24/23  0227   APTT 33.8  --    INR 1.40* 1.30*               Imaging  DX-CHEST-PORTABLE (1 VIEW)   Final Result         1.  Pulmonary edema and/or infiltrates are identified, which are somewhat decreased since the prior exam.   2.  Trace bilateral pleural effusions   3.  Cardiomegaly   4.  Atherosclerosis      US-RENAL   Final Result      1.  Echogenic bilateral kidneys, in keeping with medical renal disease.   2.  No hydronephrosis. No renal calculus.      DX-CHEST-LIMITED (1 VIEW)   Final Result         1.  Ill-defined opacifications in each lung have increased minimally compared to the prior radiograph. This could indicate worsening of pulmonary edema or inflammation.      CTA ABDOMEN PELVIS W & W/O POST PROCESS   Final Result      1.  No evidence of aortic aneurysm or dissection.      2.  No arterial extravasation into the bowel is identified.      3.  There is edema around the pancreas and dilatation of the common bile duct. Consider pancreatitis.      4.  Liver surface is nodular consistent with cirrhosis.      5.  Small amount of ascites.      6.  Gallbladder wall edema. Portal hypertension is a possible cause.      CT-CTA CHEST PULMONARY ARTERY W/ RECONS   Final Result      1.  No CT evidence of pulmonary embolism.      2.  Consolidation is present in the lower lobes bilaterally. Pneumonia is a possibility.            CT-HEAD W/O   Final Result         NO ACUTE ABNORMALITIES ARE NOTED ON CT SCAN OF THE HEAD.               EC-ECHOCARDIOGRAM COMPLETE W/O CONT   Final Result      DX-CHEST-LIMITED (1 VIEW)   Final Result         1.  No consolidations identified.      2.  Mild perihilar opacification is noted which could be due to congestion.       DX-CHEST-PORTABLE (1 VIEW)    (Results Pending)        Assessment/Plan  * Bradycardia- (present on admission)  Assessment & Plan  Improved  Monitor on telemetry  Monitor and correct electrolytes    Alcoholic cirrhosis (HCC)  Assessment & Plan  Maintain sobriety  F/u with outpatient GI    Type 2 diabetes mellitus (HCC)  Assessment & Plan  Monitor accuchecks and cover with SSI  A1c:5.9    Primary hypertension  Assessment & Plan  Was hypotensive on admit and was on dopamine  4/23 metoprolol restarted  4/24 start amlodipine and change metoprolol to coreg 12.5mg BID.  Also on disopyramide 100mg daily  Monitor vitals.    HOCM (hypertrophic obstructive cardiomyopathy) ([facility])  Assessment & Plan  Watch outflow obstruction   Watch after load reduction.    Dyslipidemia  Assessment & Plan  No results found for: CHOLSTRLTOT, LDL, HDL, TRIGLYCERIDE      Anemia  Assessment & Plan  4/23 Hgb:8.4 <9.8, <7.2    Acute-on-chronic kidney injury (HCC)  Assessment & Plan  4/23 BUN:34 <31 <55  4/23 creatinine: 4.18 <3.17 <5.21  Nephrology consulting  Monitor labs  IV fluids      Hyperkalemia  Assessment & Plan  Resolved with hemodialysis  4/23 4.2 <6.8 <8.1  Monitor labs    Acute respiratory failure with hypoxia (HCC)  Assessment & Plan  Supplemental oxygen as needed wean to keep oxygen saturations greater than 92%  RT per protocol  Aspiration precautions  Mobilize patient and encourage deep inspiratory efforts         VTE prophylaxis: heparin ppx    I have performed a physical exam and reviewed and updated ROS and Plan today (4/24/2023). In review of yesterday's note (4/23/2023), there are no changes except as documented above.

## 2023-04-25 NOTE — PROGRESS NOTES
Pt transferred to 4. S. A&Ox4.    C/o 3/10 L sided chest pain, MD notified, order for EKG        18:30 MD reviewed EKG results, no changes, no new orders at this time. Will continue to monitor patient.

## 2023-04-25 NOTE — CARE PLAN
The patient is Watcher - Medium risk of patient condition declining or worsening    Shift Goals  Clinical Goals: decrease blood pressure  Patient Goals: go home  Family Goals: ALONDRA    Progress made toward(s) clinical / shift goals:    Problem: Knowledge Deficit - Standard  Goal: Patient and family/care givers will demonstrate understanding of plan of care, disease process/condition, diagnostic tests and medications  Outcome: Progressing     Problem: Pain - Standard  Goal: Alleviation of pain or a reduction in pain to the patient’s comfort goal  Outcome: Progressing     Problem: Hemodynamics  Goal: Patient's hemodynamics, fluid balance and neurologic status will be stable or improve  Outcome: Progressing     Problem: Respiratory  Goal: Patient will achieve/maintain optimum respiratory ventilation and gas exchange  Outcome: Progressing     Problem: Bowel Elimination  Goal: Establish and maintain regular bowel function  Outcome: Progressing       Patient is not progressing towards the following goals:

## 2023-04-25 NOTE — DISCHARGE PLANNING
Outpatient Dialysis Notification of Placement      Received notification for outpatient dialysis placement from .      Met with patient, confirmed demographics and insurance information.  Provided patient with dialysis education materials and list of HD centers, referral initiated to:    [facility]   [address] [place] [address]     Pending review of records and insurance verification.      Xitlaly Reyes   Dialysis Coordinator / [facility]   Ph: [phone]

## 2023-04-25 NOTE — PROGRESS NOTES
"[facility] -  PROGRESS NOTE               Author: Gui Rendon M.D. Date & Time: 4/25/2023  8:34 AM     HPI:  59yo female with h/o CKD, DM2, HTN BIB EMS after c/o CP and family noted increasing altered mentation. Patient is able to provide history only limited history, much history obtained from chart review and discussion with ICU/ED staff. Patient reports experiencing chest pain and nausea, denies abdominal pain and diarrhea, unable to provide more details. Patient was found by EMS to be hypotensive with BP 49/33 and bradycardia with HR in the 30s. Patient was provided with atropine and trancutaneous pacing was initiated. She was found to be hyperkalemia with K of 8.1. She was provided with initial medication management for hyperkalemia, and HD catheter placed by the intensivist, and HD initiated with a 2K/3Ca bath. She was intubated, subsequently extubated this AM. Patient had CTA chest/abd/pelvis at presentation.      Patient was to establish with [facility] for nephrology care 2/17/23 but did not show for her appointment. SCr 3.55 1/2023, with K of 5.5 and CO2 of 19.    DAILY NEPHROLOGY SUMMARY:  4/23: consult done  4/24: tolerated HD, hypertensive-BP meds adjusted, transferred to IM, feeling well  4/25: No acute events, BP improved, 400cc UOP, cr climbing, denies chest pain or SOB    PAST FAMILY HISTORY: Reviewed and Unchanged  SOCIAL HISTORY: Reviewed and Unchanged  CURRENT MEDICATIONS: Reviewed  IMAGING STUDIES: Reviewed    ROS  10 point ROS performed, negative other than stated above    PHYSICAL EXAM  VS:  BP (!) 164/79   Pulse 80   Temp 36.6 °C (97.9 °F) (Temporal)   Resp 19   Ht 1.676 m (5' 6\")   Wt 56.8 kg (125 lb 3.5 oz)   SpO2 98%   BMI 20.21 kg/m²   GENERAL: no acute distress  CV: RRR, trace edema  RESP: non-labored  GI: Soft  MSK: No joint deformities   SKIN: No concerning rashes  NEURO: AOx3  PSYCH: Cooperative    Fluids:  In: 480 [P.O.:480]  Out: 400 "     LABS:  Recent Results (from the past 24 hour(s))   POCT glucose device results    Collection Time: 04/24/23 11:46 AM   Result Value Ref Range    POC Glucose, Blood 106 (H) 65 - 99 mg/dL   PROTEIN/CREAT RATIO URINE    Collection Time: 04/24/23  1:19 PM   Result Value Ref Range    Total Protein, Urine 576.0 (H) 0.0 - 15.0 mg/dL    Creatinine, Random Urine 42.67 mg/dL    Protein Creatinine Ratio 32918 (H) 10 - 107 mg/g   MICROALBUMIN CREAT RATIO URINE    Collection Time: 04/24/23  1:19 PM   Result Value Ref Range    Creatinine, Urine 44.83 mg/dL    Microalbumin, Urine Random 371.8 mg/dL    Micro Alb Creat Ratio 8294 (H) 0 - 30 mg/g   URINALYSIS    Collection Time: 04/24/23  1:19 PM    Specimen: Urine, Clean Catch   Result Value Ref Range    Color Yellow     Character Clear     Specific Gravity 1.015 <1.035    Ph 8.0 5.0 - 8.0    Glucose Negative Negative mg/dL    Ketones Negative Negative mg/dL    Protein 300 (A) Negative mg/dL    Bilirubin Negative Negative    Urobilinogen, Urine 0.2 Negative    Nitrite Negative Negative    Leukocyte Esterase Trace (A) Negative    Occult Blood Small (A) Negative    Micro Urine Req Microscopic    URINE MICROSCOPIC (W/UA)    Collection Time: 04/24/23  1:19 PM   Result Value Ref Range    WBC 5-10 (A) /hpf    RBC 20-50 (A) /hpf    Bacteria Few (A) None /hpf    Epithelial Cells Few /hpf    Hyaline Cast 3-5 (A) /lpf   POCT glucose device results    Collection Time: 04/24/23  5:06 PM   Result Value Ref Range    POC Glucose, Blood 105 (H) 65 - 99 mg/dL   POCT glucose device results    Collection Time: 04/25/23 12:05 AM   Result Value Ref Range    POC Glucose, Blood 89 65 - 99 mg/dL   CBC with Differential    Collection Time: 04/25/23  3:38 AM   Result Value Ref Range    WBC 7.5 4.8 - 10.8 K/uL    RBC 2.60 (L) 4.20 - 5.40 M/uL    Hemoglobin 7.7 (L) 12.0 - 16.0 g/dL    Hematocrit 24.8 (L) 37.0 - 47.0 %    MCV 95.4 81.4 - 97.8 fL    MCH 29.6 27.0 - 33.0 pg    MCHC 31.0 (L) 33.6 - 35.0 g/dL     RDW 52.3 (H) 35.9 - 50.0 fL    Platelet Count 145 (L) 164 - 446 K/uL    MPV 11.3 9.0 - 12.9 fL    Neutrophils-Polys 61.10 44.00 - 72.00 %    Lymphocytes 28.10 22.00 - 41.00 %    Monocytes 7.20 0.00 - 13.40 %    Eosinophils 2.90 0.00 - 6.90 %    Basophils 0.30 0.00 - 1.80 %    Immature Granulocytes 0.40 0.00 - 0.90 %    Nucleated RBC 0.00 /100 WBC    Neutrophils (Absolute) 4.59 2.00 - 7.15 K/uL    Lymphs (Absolute) 2.11 1.00 - 4.80 K/uL    Monos (Absolute) 0.54 0.00 - 0.85 K/uL    Eos (Absolute) 0.22 0.00 - 0.51 K/uL    Baso (Absolute) 0.02 0.00 - 0.12 K/uL    Immature Granulocytes (abs) 0.03 0.00 - 0.11 K/uL    NRBC (Absolute) 0.00 K/uL   Magnesium    Collection Time: 04/25/23  3:38 AM   Result Value Ref Range    Magnesium 1.8 1.5 - 2.5 mg/dL   Phosphorus    Collection Time: 04/25/23  3:38 AM   Result Value Ref Range    Phosphorus 4.5 2.5 - 4.5 mg/dL   Comp Metabolic Panel    Collection Time: 04/25/23  3:38 AM   Result Value Ref Range    Sodium 140 135 - 145 mmol/L    Potassium 4.3 3.6 - 5.5 mmol/L    Chloride 104 96 - 112 mmol/L    Co2 23 20 - 33 mmol/L    Anion Gap 13.0 7.0 - 16.0    Glucose 88 65 - 99 mg/dL    Bun 40 (H) 8 - 22 mg/dL    Creatinine 5.24 (HH) 0.50 - 1.40 mg/dL    Calcium 7.5 (L) 8.5 - 10.5 mg/dL    AST(SGOT) 21 12 - 45 U/L    ALT(SGPT) 18 2 - 50 U/L    Alkaline Phosphatase 87 30 - 99 U/L    Total Bilirubin 0.2 0.1 - 1.5 mg/dL    Albumin 2.9 (L) 3.2 - 4.9 g/dL    Total Protein 6.2 6.0 - 8.2 g/dL    Globulin 3.3 1.9 - 3.5 g/dL    A-G Ratio 0.9 g/dL   Prothrombin Time    Collection Time: 04/25/23  3:38 AM   Result Value Ref Range    PT 14.7 (H) 12.0 - 14.6 sec    INR 1.17 (H) 0.87 - 1.13   CORRECTED CALCIUM    Collection Time: 04/25/23  3:38 AM   Result Value Ref Range    Correct Calcium 8.4 (L) 8.5 - 10.5 mg/dL   ESTIMATED GFR    Collection Time: 04/25/23  3:38 AM   Result Value Ref Range    GFR (CKD-EPI) 9 (A) >60 mL/min/1.73 m 2   POCT glucose device results    Collection Time: 04/25/23  4:55 AM    Result Value Ref Range    POC Glucose, Blood 96 65 - 99 mg/dL       (click the triangle to expand results)      ASSESSMENT:  # RUBEN vs progression of CKD  - echogenic kidneys  - suspect this is progression of CKD  # CKD 4  - likely associated with DM/HTN  # Hyperkalemia  - resolved s/p HD x 1  # Acidosis  - resolved s/p HD  # HTN  # DM  - A1c 5.9  # Anemia  - iron stores adequate   # CKD MBD  - phos elevated, Ca to goal  - PTH pending  # Hypoalbuminemia  # AMS  # HOCM   # ETOH Cirrhosis      PLAN:  -HD again today, discussed that dialysis will likely be lifelong  -conversion to permacath  -Deferring further work-up for advanced CKD based on imaging demonstrating advanced/irreversible disease  -Appreciate CM assistance in finding outp HD unit  -Renal diet once taking PO  -No ACEi/ARB/spironolactone for now  -will start JARRET once Bps improved     Discussed with hospitalist    Gui Rendon MD

## 2023-04-25 NOTE — PROGRESS NOTES
[facility] Nursing Notes     HD today x 3hrs per Dr Foreman  Initiated at 1135 and ended 1435     Pre HD assessment     Received patient alert and oriented. No Sob.  Lungs Clear. Vital Signs stable  No complains pre HD.     Edema - no edema     Access: -Left IJ catheter, Non tunneled     No s/s of infection: no redness, no tenderness, no pus, no oozing of blood, warm to touch.     Intra HD    1330 - paused HD, patient went to restroom/toilet  1335 - resume back HD, inc UF goal to 3L, UFR 1150       Post HD     UF goal  achieved: 2500mL  - 500 mL (200mls prime + 300mls rinseback)       Target Net UF : 2000mls     Completed HD tolerated well  Post vital signs stable  Nil complaints,   Patient went toilet once during HD  Dressing: dry and intact     Documented by  JUVENAL FIERRO RN    Report given to MARK Oconnell

## 2023-04-25 NOTE — PROGRESS NOTES
"Cardiology Follow-up Consult Note    Date of Service:    4/25/2023      Consulting Physician: Ortega Khan D.O.    Name:   Rigoberto Adames   YOB: 1965  Age:   58 y.o.  female   MRN:   [id number]      Interim Events:  4/25: No acute events overnight.  Labs reveal drop in hemoglobin and increase in creatinine.  Denies any chest pain, pressure.  Still making urine.  4/24: No acute events overnight.  Patient wishes to go home.  Reports being uncomfortable with multiple IVs and Vela catheter in place.  Denies any active chest pain, pressure, lightheadedness or dizziness.  Does not remember events surrounding her hospitalization.    All other review of systems reviewed and negative.      Past medical, surgical, social, and family history reviewed and unchanged from admission except as noted in assessment and plan.    Medications: Reviewed in MAR      No Known Allergies      Intake/Output Summary (Last 24 hours) at 4/25/2023 0836  Last data filed at 4/25/2023 0800  Gross per 24 hour   Intake 480 ml   Output 400 ml   Net 80 ml          Physical Exam  Body mass index is 20.21 kg/m².  BP (!) 164/79   Pulse 80   Temp 36.6 °C (97.9 °F) (Temporal)   Resp 19   Ht 1.676 m (5' 6\")   Wt 56.8 kg (125 lb 3.5 oz)   SpO2 98%   Vitals:    04/25/23 0700 04/25/23 0730 04/25/23 0800 04/25/23 0830   BP: (!) 168/79 (!) 172/82 (!) 169/78 (!) 164/79   Pulse: 78 78 77 80   Resp:       Temp:  36.6 °C (97.9 °F)     TempSrc:  Temporal     SpO2: 96% 95% 97% 98%   Weight:       Height:         Oxygen Therapy:  Pulse Oximetry: 98 %, O2 (LPM): 2, O2 Delivery Device: Silicone Nasal Cannula    General: In no apparent distress  Eyes: nl conjunctiva  ENT: OP clear  Neck: JVP difficult to assess, no carotid bruits  Lungs: normal respiratory effort, CTAB  Heart: RRR, no murmurs, no rubs or gallops, no edema bilateral lower extremities. No LV/RV heave on cardiac palpatation.   Abdomen: soft, non tender, non distended  Extremities/MSK: no " clubbing, no cyanosis  Neurological: No focal sensory deficits  Psychiatric: Appropriate affect, A/O x 3  Skin: Warm extremities    Labs (personally reviewed and notable for):   Lab Results   Component Value Date/Time    SODIUM 140 04/25/2023 03:38 AM    POTASSIUM 4.3 04/25/2023 03:38 AM    CHLORIDE 104 04/25/2023 03:38 AM    CO2 23 04/25/2023 03:38 AM    GLUCOSE 88 04/25/2023 03:38 AM    BUN 40 (H) 04/25/2023 03:38 AM    CREATININE 5.24 (HH) 04/25/2023 03:38 AM      Lab Results   Component Value Date/Time    WBC 7.5 04/25/2023 03:38 AM    RBC 2.60 (L) 04/25/2023 03:38 AM    HEMOGLOBIN 7.7 (L) 04/25/2023 03:38 AM    HEMATOCRIT 24.8 (L) 04/25/2023 03:38 AM    MCV 95.4 04/25/2023 03:38 AM    MCH 29.6 04/25/2023 03:38 AM    MCHC 31.0 (L) 04/25/2023 03:38 AM    MPV 11.3 04/25/2023 03:38 AM    NEUTSPOLYS 61.10 04/25/2023 03:38 AM    LYMPHOCYTES 28.10 04/25/2023 03:38 AM    MONOCYTES 7.20 04/25/2023 03:38 AM    EOSINOPHILS 2.90 04/25/2023 03:38 AM    BASOPHILS 0.30 04/25/2023 03:38 AM      No results found for: CHOLSTRLTOT, LDL, HDL, TRIGLYCERIDE    Lab Results   Component Value Date/Time    TROPONINT 43 (H) 04/22/2023 2355     No results found for: NTPROBNP    Cardiac Imaging and Procedures Review:    ECG: ECG performed 4/23/2023 was interpreted by me which shows sinus bradycardia, incomplete LBBB    Echo: Echocardiogram performed on 4/23/2023 was personally interpreted by me which shows severe concentric LVH, evidence of increased intracavity gradient with peak 27 mmHg without Valsalva.    24-hour telemetry personally reviewed which shows sinus rhythm    Radiology test Review:  DX-CHEST-PORTABLE (1 VIEW)   Final Result         1.  Pulmonary edema and/or infiltrates are identified, which are stable since the prior exam.   2.  Trace bilateral pleural effusions, stable since prior study   3.  Cardiomegaly   4.  Atherosclerosis      DX-CHEST-PORTABLE (1 VIEW)   Final Result         1.  Pulmonary edema and/or infiltrates are  identified, which are somewhat decreased since the prior exam.   2.  Trace bilateral pleural effusions   3.  Cardiomegaly   4.  Atherosclerosis      US-RENAL   Final Result      1.  Echogenic bilateral kidneys, in keeping with medical renal disease.   2.  No hydronephrosis. No renal calculus.      DX-CHEST-LIMITED (1 VIEW)   Final Result         1.  Ill-defined opacifications in each lung have increased minimally compared to the prior radiograph. This could indicate worsening of pulmonary edema or inflammation.      CTA ABDOMEN PELVIS W & W/O POST PROCESS   Final Result      1.  No evidence of aortic aneurysm or dissection.      2.  No arterial extravasation into the bowel is identified.      3.  There is edema around the pancreas and dilatation of the common bile duct. Consider pancreatitis.      4.  Liver surface is nodular consistent with cirrhosis.      5.  Small amount of ascites.      6.  Gallbladder wall edema. Portal hypertension is a possible cause.      CT-CTA CHEST PULMONARY ARTERY W/ RECONS   Final Result      1.  No CT evidence of pulmonary embolism.      2.  Consolidation is present in the lower lobes bilaterally. Pneumonia is a possibility.            CT-HEAD W/O   Final Result         NO ACUTE ABNORMALITIES ARE NOTED ON CT SCAN OF THE HEAD.               EC-ECHOCARDIOGRAM COMPLETE W/O CONT   Final Result      DX-CHEST-LIMITED (1 VIEW)   Final Result         1.  No consolidations identified.      2.  Mild perihilar opacification is noted which could be due to congestion.          Problem list:  Principal Problem:    Bradycardia POA: Yes  Active Problems:    Shock (HCC) POA: Unknown    Acute respiratory failure with hypoxia (HCC) POA: Unknown    Hyperkalemia POA: Unknown    Acute-on-chronic kidney injury (HCC) POA: Unknown    Anemia POA: Unknown    Dyslipidemia POA: Unknown    HOCM (hypertrophic obstructive cardiomyopathy) ([facility]) POA: Unknown    Primary hypertension POA: Unknown    Type 2 diabetes  mellitus (HCC) POA: Unknown    Alcoholic cirrhosis (HCC) POA: Unknown    History of alcohol abuse POA: Unknown  Resolved Problems:    * No resolved hospital problems. *      Recommendations:  --Complex patient case.  Remains in guarded condition.  Labs show acute anemia and worsening renal function.  No plan for HD today since electrolytes are WNL.  --Blood pressure improved with now systolic in 160s after initiating amlodipine and changing metoprolol to Coreg.  We discussed goal blood pressure around 130 given hypertrophic obstructive cardiomyopathy.  Recommend initiating clonidine 0.1 mg 3 times daily and monitoring blood pressure.  --Continue Coreg 12.5 mg twice daily and disopyramide 100 mg daily.  Reduce dose of disopyramide with CrCl<15    Recommendations discussed with Dr. Huff.    Thank you for allowing me to participate in the care of this patient, cardiology will continue to follow.  Please contact me with any questions.    Denton Lomeli M.D.  Cardiologist, [facility]   [phone]    Please note that this dictation was created using voice recognition software. I have made every reasonable attempt to correct obvious errors, but it is possible there are errors of grammar and possibly content that I did not discover before finalizing the note.

## 2023-04-26 ENCOUNTER — APPOINTMENT (OUTPATIENT)
Dept: RADIOLOGY | Facility: MEDICAL CENTER | Age: 58
DRG: 673 | End: 2023-04-26
Attending: INTERNAL MEDICINE
Payer: MEDICAID

## 2023-04-26 LAB
ALBUMIN SERPL BCP-MCNC: 3 G/DL (ref 3.2–4.9)
ALBUMIN/GLOB SERPL: 0.8 G/DL
ALP SERPL-CCNC: 95 U/L (ref 30–99)
ALT SERPL-CCNC: 18 U/L (ref 2–50)
ANION GAP SERPL CALC-SCNC: 11 MMOL/L (ref 7–16)
AST SERPL-CCNC: 30 U/L (ref 12–45)
BASOPHILS # BLD AUTO: 0.3 % (ref 0–1.8)
BASOPHILS # BLD: 0.02 K/UL (ref 0–0.12)
BILIRUB SERPL-MCNC: 0.3 MG/DL (ref 0.1–1.5)
BUN SERPL-MCNC: 21 MG/DL (ref 8–22)
CALCIUM ALBUM COR SERPL-MCNC: 8.5 MG/DL (ref 8.5–10.5)
CALCIUM SERPL-MCNC: 7.7 MG/DL (ref 8.5–10.5)
CHLORIDE SERPL-SCNC: 98 MMOL/L (ref 96–112)
CO2 SERPL-SCNC: 28 MMOL/L (ref 20–33)
CREAT SERPL-MCNC: 3.81 MG/DL (ref 0.5–1.4)
EOSINOPHIL # BLD AUTO: 0.22 K/UL (ref 0–0.51)
EOSINOPHIL NFR BLD: 3.2 % (ref 0–6.9)
ERYTHROCYTE [DISTWIDTH] IN BLOOD BY AUTOMATED COUNT: 49.1 FL (ref 35.9–50)
GFR SERPLBLD CREATININE-BSD FMLA CKD-EPI: 13 ML/MIN/1.73 M 2
GLOBULIN SER CALC-MCNC: 3.7 G/DL (ref 1.9–3.5)
GLUCOSE SERPL-MCNC: 148 MG/DL (ref 65–99)
HCT VFR BLD AUTO: 27.2 % (ref 37–47)
HEMOCCULT SP1 STL QL: NEGATIVE
HGB BLD-MCNC: 8.5 G/DL (ref 12–16)
IMM GRANULOCYTES # BLD AUTO: 0.03 K/UL (ref 0–0.11)
IMM GRANULOCYTES NFR BLD AUTO: 0.4 % (ref 0–0.9)
INR PPP: 1.03 (ref 0.87–1.13)
LYMPHOCYTES # BLD AUTO: 1.81 K/UL (ref 1–4.8)
LYMPHOCYTES NFR BLD: 26.3 % (ref 22–41)
MAGNESIUM SERPL-MCNC: 1.7 MG/DL (ref 1.5–2.5)
MCH RBC QN AUTO: 29.5 PG (ref 27–33)
MCHC RBC AUTO-ENTMCNC: 31.3 G/DL (ref 33.6–35)
MCV RBC AUTO: 94.4 FL (ref 81.4–97.8)
MONOCYTES # BLD AUTO: 0.54 K/UL (ref 0–0.85)
MONOCYTES NFR BLD AUTO: 7.8 % (ref 0–13.4)
NEUTROPHILS # BLD AUTO: 4.26 K/UL (ref 2–7.15)
NEUTROPHILS NFR BLD: 62 % (ref 44–72)
NRBC # BLD AUTO: 0 K/UL
NRBC BLD-RTO: 0 /100 WBC
PHOSPHATE SERPL-MCNC: 3.9 MG/DL (ref 2.5–4.5)
PLATELET # BLD AUTO: 147 K/UL (ref 164–446)
PMV BLD AUTO: 12 FL (ref 9–12.9)
POTASSIUM SERPL-SCNC: 4.2 MMOL/L (ref 3.6–5.5)
PROT SERPL-MCNC: 6.7 G/DL (ref 6–8.2)
PROTHROMBIN TIME: 13.4 SEC (ref 12–14.6)
RBC # BLD AUTO: 2.88 M/UL (ref 4.2–5.4)
SODIUM SERPL-SCNC: 137 MMOL/L (ref 135–145)
WBC # BLD AUTO: 6.9 K/UL (ref 4.8–10.8)

## 2023-04-26 PROCEDURE — 80053 COMPREHEN METABOLIC PANEL: CPT

## 2023-04-26 PROCEDURE — 700102 HCHG RX REV CODE 250 W/ 637 OVERRIDE(OP): Performed by: INTERNAL MEDICINE

## 2023-04-26 PROCEDURE — 99233 SBSQ HOSP IP/OBS HIGH 50: CPT | Mod: FS | Performed by: INTERNAL MEDICINE

## 2023-04-26 PROCEDURE — 700111 HCHG RX REV CODE 636 W/ 250 OVERRIDE (IP): Performed by: HOSPITALIST

## 2023-04-26 PROCEDURE — 700102 HCHG RX REV CODE 250 W/ 637 OVERRIDE(OP): Performed by: HOSPITALIST

## 2023-04-26 PROCEDURE — 0JH63XZ INSERTION OF TUNNELED VASCULAR ACCESS DEVICE INTO CHEST SUBCUTANEOUS TISSUE AND FASCIA, PERCUTANEOUS APPROACH: ICD-10-PCS | Performed by: RADIOLOGY

## 2023-04-26 PROCEDURE — A9270 NON-COVERED ITEM OR SERVICE: HCPCS | Performed by: HOSPITALIST

## 2023-04-26 PROCEDURE — 700111 HCHG RX REV CODE 636 W/ 250 OVERRIDE (IP)

## 2023-04-26 PROCEDURE — A9270 NON-COVERED ITEM OR SERVICE: HCPCS | Performed by: INTERNAL MEDICINE

## 2023-04-26 PROCEDURE — 99152 MOD SED SAME PHYS/QHP 5/>YRS: CPT

## 2023-04-26 PROCEDURE — A9270 NON-COVERED ITEM OR SERVICE: HCPCS | Performed by: STUDENT IN AN ORGANIZED HEALTH CARE EDUCATION/TRAINING PROGRAM

## 2023-04-26 PROCEDURE — 86480 TB TEST CELL IMMUN MEASURE: CPT

## 2023-04-26 PROCEDURE — 700102 HCHG RX REV CODE 250 W/ 637 OVERRIDE(OP): Performed by: PHYSICIAN ASSISTANT

## 2023-04-26 PROCEDURE — 700111 HCHG RX REV CODE 636 W/ 250 OVERRIDE (IP): Performed by: INTERNAL MEDICINE

## 2023-04-26 PROCEDURE — 770020 HCHG ROOM/CARE - TELE (206)

## 2023-04-26 PROCEDURE — 84100 ASSAY OF PHOSPHORUS: CPT

## 2023-04-26 PROCEDURE — A9270 NON-COVERED ITEM OR SERVICE: HCPCS | Performed by: PHYSICIAN ASSISTANT

## 2023-04-26 PROCEDURE — 02H633Z INSERTION OF INFUSION DEVICE INTO RIGHT ATRIUM, PERCUTANEOUS APPROACH: ICD-10-PCS | Performed by: RADIOLOGY

## 2023-04-26 PROCEDURE — 700111 HCHG RX REV CODE 636 W/ 250 OVERRIDE (IP): Performed by: RADIOLOGY

## 2023-04-26 PROCEDURE — 85610 PROTHROMBIN TIME: CPT

## 2023-04-26 PROCEDURE — 85025 COMPLETE CBC W/AUTO DIFF WBC: CPT

## 2023-04-26 PROCEDURE — 83735 ASSAY OF MAGNESIUM: CPT

## 2023-04-26 PROCEDURE — 99233 SBSQ HOSP IP/OBS HIGH 50: CPT | Performed by: STUDENT IN AN ORGANIZED HEALTH CARE EDUCATION/TRAINING PROGRAM

## 2023-04-26 PROCEDURE — 700101 HCHG RX REV CODE 250

## 2023-04-26 PROCEDURE — 700102 HCHG RX REV CODE 250 W/ 637 OVERRIDE(OP): Performed by: STUDENT IN AN ORGANIZED HEALTH CARE EDUCATION/TRAINING PROGRAM

## 2023-04-26 RX ORDER — SODIUM CHLORIDE 9 MG/ML
500 INJECTION, SOLUTION INTRAVENOUS
Status: ACTIVE | OUTPATIENT
Start: 2023-04-26 | End: 2023-04-26

## 2023-04-26 RX ORDER — MIDAZOLAM HYDROCHLORIDE 1 MG/ML
.5-2 INJECTION INTRAMUSCULAR; INTRAVENOUS PRN
Status: ACTIVE | OUTPATIENT
Start: 2023-04-26 | End: 2023-04-26

## 2023-04-26 RX ORDER — ONDANSETRON 2 MG/ML
4 INJECTION INTRAMUSCULAR; INTRAVENOUS PRN
Status: ACTIVE | OUTPATIENT
Start: 2023-04-26 | End: 2023-04-26

## 2023-04-26 RX ORDER — CARVEDILOL 12.5 MG/1
12.5 TABLET ORAL ONCE
Status: COMPLETED | OUTPATIENT
Start: 2023-04-26 | End: 2023-04-26

## 2023-04-26 RX ORDER — HEPARIN SODIUM 1000 [USP'U]/ML
INJECTION, SOLUTION INTRAVENOUS; SUBCUTANEOUS
Status: COMPLETED
Start: 2023-04-26 | End: 2023-04-26

## 2023-04-26 RX ORDER — CHOLECALCIFEROL (VITAMIN D3) 125 MCG
5 CAPSULE ORAL ONCE
Status: DISCONTINUED | OUTPATIENT
Start: 2023-04-26 | End: 2023-04-27 | Stop reason: HOSPADM

## 2023-04-26 RX ORDER — LIDOCAINE HYDROCHLORIDE AND EPINEPHRINE BITARTRATE 20; .01 MG/ML; MG/ML
INJECTION, SOLUTION SUBCUTANEOUS
Status: COMPLETED
Start: 2023-04-26 | End: 2023-04-26

## 2023-04-26 RX ORDER — CARVEDILOL 25 MG/1
25 TABLET ORAL 2 TIMES DAILY WITH MEALS
Status: DISCONTINUED | OUTPATIENT
Start: 2023-04-26 | End: 2023-04-27 | Stop reason: HOSPADM

## 2023-04-26 RX ORDER — CEFAZOLIN SODIUM 1 G/3ML
INJECTION, POWDER, FOR SOLUTION INTRAMUSCULAR; INTRAVENOUS
Status: COMPLETED
Start: 2023-04-26 | End: 2023-04-26

## 2023-04-26 RX ORDER — MIDAZOLAM HYDROCHLORIDE 1 MG/ML
INJECTION INTRAMUSCULAR; INTRAVENOUS
Status: COMPLETED
Start: 2023-04-26 | End: 2023-04-26

## 2023-04-26 RX ADMIN — CEFAZOLIN 2 G: 330 INJECTION, POWDER, FOR SOLUTION INTRAMUSCULAR; INTRAVENOUS at 16:05

## 2023-04-26 RX ADMIN — MIDAZOLAM HYDROCHLORIDE 1 MG: 1 INJECTION, SOLUTION INTRAMUSCULAR; INTRAVENOUS at 16:04

## 2023-04-26 RX ADMIN — OMEPRAZOLE 20 MG: 20 CAPSULE, DELAYED RELEASE ORAL at 17:11

## 2023-04-26 RX ADMIN — LIDOCAINE HYDROCHLORIDE AND EPINEPHRINE: 20; 10 INJECTION, SOLUTION INFILTRATION; PERINEURAL at 15:30

## 2023-04-26 RX ADMIN — CARVEDILOL 12.5 MG: 12.5 TABLET, FILM COATED ORAL at 06:28

## 2023-04-26 RX ADMIN — CLONIDINE HYDROCHLORIDE 0.1 MG: 0.1 TABLET ORAL at 12:42

## 2023-04-26 RX ADMIN — Medication 5 MG: at 20:58

## 2023-04-26 RX ADMIN — MIDAZOLAM HYDROCHLORIDE 1 MG: 1 INJECTION, SOLUTION INTRAMUSCULAR; INTRAVENOUS at 16:11

## 2023-04-26 RX ADMIN — HEPARIN SODIUM 5000 UNITS: 5000 INJECTION, SOLUTION INTRAVENOUS; SUBCUTANEOUS at 04:40

## 2023-04-26 RX ADMIN — HYDRALAZINE HYDROCHLORIDE 20 MG: 20 INJECTION INTRAMUSCULAR; INTRAVENOUS at 21:02

## 2023-04-26 RX ADMIN — AMLODIPINE BESYLATE 10 MG: 10 TABLET ORAL at 04:40

## 2023-04-26 RX ADMIN — CLONIDINE HYDROCHLORIDE 0.1 MG: 0.1 TABLET ORAL at 04:40

## 2023-04-26 RX ADMIN — HEPARIN SODIUM 5000 UNITS: 5000 INJECTION, SOLUTION INTRAVENOUS; SUBCUTANEOUS at 20:58

## 2023-04-26 RX ADMIN — DISOPYRAMIDE PHOSPHATE 100 MG: 100 CAPSULE ORAL at 04:42

## 2023-04-26 RX ADMIN — ATORVASTATIN CALCIUM 40 MG: 40 TABLET, FILM COATED ORAL at 20:58

## 2023-04-26 RX ADMIN — CARVEDILOL 25 MG: 25 TABLET, FILM COATED ORAL at 17:11

## 2023-04-26 RX ADMIN — CLONIDINE HYDROCHLORIDE 0.1 MG: 0.1 TABLET ORAL at 17:12

## 2023-04-26 RX ADMIN — CARVEDILOL 12.5 MG: 12.5 TABLET, FILM COATED ORAL at 10:42

## 2023-04-26 RX ADMIN — OMEPRAZOLE 20 MG: 20 CAPSULE, DELAYED RELEASE ORAL at 04:40

## 2023-04-26 RX ADMIN — HYDRALAZINE HYDROCHLORIDE 10 MG: 20 INJECTION INTRAMUSCULAR; INTRAVENOUS at 06:28

## 2023-04-26 RX ADMIN — FENTANYL CITRATE 50 MCG: 0.05 INJECTION, SOLUTION INTRAMUSCULAR; INTRAVENOUS at 16:05

## 2023-04-26 RX ADMIN — HEPARIN SODIUM: 1000 INJECTION, SOLUTION INTRAVENOUS; SUBCUTANEOUS at 15:30

## 2023-04-26 ASSESSMENT — ENCOUNTER SYMPTOMS
NEUROLOGICAL NEGATIVE: 1
MUSCULOSKELETAL NEGATIVE: 1
CHEST TIGHTNESS: 0
NECK PAIN: 0
GASTROINTESTINAL NEGATIVE: 1
LIGHT-HEADEDNESS: 0
HEMATOLOGIC/LYMPHATIC NEGATIVE: 1
EYES NEGATIVE: 1
DIAPHORESIS: 0
CHILLS: 0
FATIGUE: 0
WOUND: 1
FOCAL WEAKNESS: 0
PSYCHIATRIC NEGATIVE: 1
COUGH: 0
CONSTIPATION: 0
HEARTBURN: 0
DIARRHEA: 0
NAUSEA: 0
VOMITING: 0
ENDOCRINE NEGATIVE: 1
BRUISES/BLEEDS EASILY: 0
PALPITATIONS: 0
BACK PAIN: 0
POLYDIPSIA: 0
WEAKNESS: 0
ALLERGIC/IMMUNOLOGIC NEGATIVE: 1
SHORTNESS OF BREATH: 0
DIZZINESS: 0
HEADACHES: 0
SHORTNESS OF BREATH: 1
DEPRESSION: 0
RESPIRATORY NEGATIVE: 1
NERVOUS/ANXIOUS: 1
FEVER: 0
CONSTITUTIONAL NEGATIVE: 1
CARDIOVASCULAR NEGATIVE: 1

## 2023-04-26 ASSESSMENT — FIBROSIS 4 INDEX: FIB4 SCORE: 2.74

## 2023-04-26 ASSESSMENT — PATIENT HEALTH QUESTIONNAIRE - PHQ9
SUM OF ALL RESPONSES TO PHQ9 QUESTIONS 1 AND 2: 0
2. FEELING DOWN, DEPRESSED, IRRITABLE, OR HOPELESS: NOT AT ALL

## 2023-04-26 NOTE — PROGRESS NOTES
Hospital Medicine Daily Progress Note    Date of Service  4/26/2023    Chief Complaint  Weakness and abdominal pain    Hospital Course  Rigoberto Adames is a 59yo F with pmhx of hypertensive obstructive cardiomyopathy, hypertension, DMII, CKDIV, EtOH use (sober x 2 years), cirrhosis, DLD. She is here for abdominal pain and generalized weakness.  She was found in ED to have bradycardia with HR in 30's and hypotensive.  She was intubated, given fluids and atropine.  A temporary pacemaker was inserted. Initial potassium was 8.  She had hemodialysis.    Interval Problem Update  Patient seen and examined today.  Data, Medication data reviewed.  Case discussed with nursing as available.  Plan of Care reviewed with patient and notified of changes.   4/24 Remains hypertensive. Improved HR despite restarting metoprolol yesterday.  Eager to go home.  Nephrology holding dialysis one more day.  4/25 the patient states she feels better, she remains hypertensive, though some improvement, currently afebrile, heart rate in the 70s, the patient is on 2 L nasal cannula oxygen and saturating well, the patient states she would like to be discharged, reported to the patient her current concerns with her dialysis needs, renal function, presentation with severe hyperkalemia.   Discussed with nephrology, will need to temporary tunneled hemodialysis catheter and ongoing RRT.  Discussed with cardiology for ongoing blood pressure control, medication adjustment.  4/26/2023:  Patient seen and evaluated at bedside no apparent events overnight.  Patient resting comfortably.  Patient no apparent distress.  Patient denies fevers or chills.  Patient to undergo implantation of Pelletier/Groshong catheter today with interventional radiology.  Patient is nearly complete with coordination of outpatient dialysis chair.  We will follow-up with this case management tomorrow appreciate nephrology/cardiology recommendations.  Repeat labs in the morning.  Otherwise  continue present medical management.    I have discussed this patient's plan of care and discharge plan at IDT rounds today with Case Management, Nursing, Nursing leadership, and other members of the IDT team.    Consultants/Specialty  cardiology and nephrology  Critical care  Code Status  Full Code    Disposition  Patient is not medically cleared for discharge.   Anticipate discharge to to home with close outpatient follow-up.  I have placed the appropriate orders for post-discharge needs.    Review of Systems  Review of Systems   Constitutional: Negative.  Negative for chills and fever.   HENT: Negative.     Eyes: Negative.    Respiratory:  Positive for shortness of breath. Negative for cough.    Cardiovascular: Negative.  Negative for chest pain and palpitations.   Gastrointestinal: Negative.  Negative for heartburn, nausea and vomiting.   Genitourinary: Negative.  Negative for dysuria and frequency.   Musculoskeletal: Negative.  Negative for back pain and neck pain.   Skin: Negative.  Negative for itching and rash.   Neurological: Negative.  Negative for dizziness, focal weakness, weakness and headaches.   Endo/Heme/Allergies: Negative.  Negative for polydipsia. Does not bruise/bleed easily.   Psychiatric/Behavioral:  Negative for depression. The patient is nervous/anxious.       Physical Exam  Temp:  [36.3 °C (97.3 °F)-37.8 °C (100 °F)] 37.1 °C (98.8 °F)  Pulse:  [73-88] 73  Resp:  [16-18] 18  BP: (125-179)/(76-95) 140/87  SpO2:  [91 %-99 %] 95 %    Physical Exam  Vitals and nursing note reviewed.   Constitutional:       Appearance: She is well-developed. She is not diaphoretic.   HENT:      Head: Normocephalic and atraumatic.      Nose: Nose normal.   Eyes:      Conjunctiva/sclera: Conjunctivae normal.      Pupils: Pupils are equal, round, and reactive to light.   Neck:      Thyroid: No thyromegaly.      Vascular: No JVD.      Comments: Left IJ dialysis catheter  Cardiovascular:      Rate and Rhythm: Normal  rate and regular rhythm.      Heart sounds: Normal heart sounds.     No friction rub. No gallop.   Pulmonary:      Effort: Pulmonary effort is normal.      Breath sounds: Rhonchi and rales present. No wheezing.   Abdominal:      General: Bowel sounds are normal. There is no distension.      Palpations: Abdomen is soft. There is no mass.      Tenderness: There is no abdominal tenderness. There is no guarding or rebound.   Musculoskeletal:         General: No tenderness. Normal range of motion.      Cervical back: Normal range of motion and neck supple.   Lymphadenopathy:      Cervical: No cervical adenopathy.   Skin:     General: Skin is warm and dry.   Neurological:      Mental Status: She is alert and oriented to person, place, and time.      Cranial Nerves: No cranial nerve deficit.   Psychiatric:         Behavior: Behavior normal.       Fluids    Intake/Output Summary (Last 24 hours) at 4/26/2023 1525  Last data filed at 4/26/2023 0000  Gross per 24 hour   Intake 700 ml   Output 200 ml   Net 500 ml         Laboratory  Recent Labs     04/24/23 0227 04/25/23 0338 04/26/23  0037   WBC 8.8 7.5 6.9   RBC 2.81* 2.60* 2.88*   HEMOGLOBIN 8.4* 7.7* 8.5*   HEMATOCRIT 25.6* 24.8* 27.2*   MCV 91.1 95.4 94.4   MCH 29.9 29.6 29.5   MCHC 32.8* 31.0* 31.3*   RDW 51.3* 52.3* 49.1   PLATELETCT 169 145* 147*   MPV 11.1 11.3 12.0       Recent Labs     04/24/23 0227 04/25/23 0338 04/26/23  0037   SODIUM 143 140 137   POTASSIUM 4.5 4.3 4.2   CHLORIDE 106 104 98   CO2 23 23 28   GLUCOSE 102* 88 148*   BUN 34* 40* 21   CREATININE 4.18* 5.24* 3.81*   CALCIUM 7.9* 7.5* 7.7*       Recent Labs     04/24/23 0227 04/25/23 0338 04/26/23  0324   INR 1.30* 1.17* 1.03                 Imaging  DX-CHEST-PORTABLE (1 VIEW)   Final Result         1.  Pulmonary edema and/or infiltrates are identified, which are stable since the prior exam.   2.  Trace bilateral pleural effusions, stable since prior study   3.  Cardiomegaly   4.  Atherosclerosis       DX-CHEST-PORTABLE (1 VIEW)   Final Result         1.  Pulmonary edema and/or infiltrates are identified, which are somewhat decreased since the prior exam.   2.  Trace bilateral pleural effusions   3.  Cardiomegaly   4.  Atherosclerosis      US-RENAL   Final Result      1.  Echogenic bilateral kidneys, in keeping with medical renal disease.   2.  No hydronephrosis. No renal calculus.      DX-CHEST-LIMITED (1 VIEW)   Final Result         1.  Ill-defined opacifications in each lung have increased minimally compared to the prior radiograph. This could indicate worsening of pulmonary edema or inflammation.      CTA ABDOMEN PELVIS W & W/O POST PROCESS   Final Result      1.  No evidence of aortic aneurysm or dissection.      2.  No arterial extravasation into the bowel is identified.      3.  There is edema around the pancreas and dilatation of the common bile duct. Consider pancreatitis.      4.  Liver surface is nodular consistent with cirrhosis.      5.  Small amount of ascites.      6.  Gallbladder wall edema. Portal hypertension is a possible cause.      CT-CTA CHEST PULMONARY ARTERY W/ RECONS   Final Result      1.  No CT evidence of pulmonary embolism.      2.  Consolidation is present in the lower lobes bilaterally. Pneumonia is a possibility.            CT-HEAD W/O   Final Result         NO ACUTE ABNORMALITIES ARE NOTED ON CT SCAN OF THE HEAD.               EC-ECHOCARDIOGRAM COMPLETE W/O CONT   Final Result      DX-CHEST-LIMITED (1 VIEW)   Final Result         1.  No consolidations identified.      2.  Mild perihilar opacification is noted which could be due to congestion.      IR-LADD,GROSHONG PLACEMENT >5    (Results Pending)          Assessment/Plan  * Bradycardia- (present on admission)  Assessment & Plan  Improved  Monitor on telemetry  Monitor and correct electrolytes    History of alcohol abuse  Assessment & Plan  History of, monitor,    Alcoholic cirrhosis (HCC)- (present on  admission)  Assessment & Plan  Maintain sobriety  F/u with outpatient GI    Type 2 diabetes mellitus (HCC)- (present on admission)  Assessment & Plan  Monitor accuchecks and cover with SSI  A1c:5.9    Primary hypertension  Assessment & Plan  Was hypotensive on admit and was on dopamine  Multimodality treatment, adjust  Monitor vitals.    HOCM (hypertrophic obstructive cardiomyopathy) ([facility])  Assessment & Plan  Cardiology assisting  Blood pressure control, push beta-blockade  Norpace  Careful with too much afterload reduction    Dyslipidemia  Assessment & Plan  History of    Anemia- (present on admission)  Assessment & Plan  Significant drop since admission, question dilution, check occult blood  Monitor, transfuse as indicated    Acute-on-chronic kidney injury (HCC)  Assessment & Plan  Patient with a history of longstanding hypertension, previous CKD, now RUBEN with resultant hyperkalemia  Nephrology consulting  Monitor labs  IV fluids  Ongoing RRT efforts      Hyperkalemia  Assessment & Plan  Resolved with hemodialysis  4/23 4.2 <6.8 <8.1  Monitor labs    Acute respiratory failure with hypoxia ([facility])  Assessment & Plan  Supplemental oxygen as needed wean to keep oxygen saturations greater than 92%  RT per protocol  Aspiration precautions  Mobilize patient and encourage deep inspiratory efforts    Shock (HCC)  Assessment & Plan  Secondary to bradycardia, related to pino agents with severe hyperkalemia  Recovered    Greater than 50 minutes spent prepping to see patient (e.g. review of tests) obtaining and/or reviewing separately obtained history. Performing a medically appropriate examination and/ evaluation.  Counseling and educating the patient/family/caregiver.  Ordering medications, tests, or procedures.  Referring and communicating with other health care professionals.  Documenting clinical information in EPIC.  Independently interpreting results and communicating results to patient/family/caregiver.  Care  coordination (not separately reported.  Please note that this dictation was created using voice recognition software. I have made every reasonable attempt to correct obvious errors, but I expect that there are errors of grammar and possibly context that I did not discover before finalizing the note.      VTE prophylaxis: heparin ppx    I have performed a physical exam and reviewed and updated ROS and Plan today (4/26/2023). In review of yesterday's note (4/25/2023), there are no changes except as documented above.

## 2023-04-26 NOTE — DISCHARGE PLANNING
Case Management Discharge Planning    Admission Date: 4/22/2023  GMLOS: 4.3  ALOS: 3    6-Clicks ADL Score: 22  6-Clicks Mobility Score: 23      Anticipated Discharge Dispo: Discharge Disposition: Discharged to home/self care (01)    DME Needed: No    Action(s) Taken: OTHER    Escalations Completed: None    Medically Clear: No    Next Steps: f/u with pt and medical team to discuss dc needs and barriers.  Assessment to be completed to assess for HCM needs.    Barriers to Discharge: Medical clearance

## 2023-04-26 NOTE — CARE PLAN
The patient is Stable - Low risk of patient condition declining or worsening    Shift Goals  Clinical Goals: Lower BP, siegel placement  Patient Goals: sleep, lower BP  Family Goals: ALONDRA    Progress made toward(s) clinical / shift goals:    Problem: Knowledge Deficit - Standard  Goal: Patient and family/care givers will demonstrate understanding of plan of care, disease process/condition, diagnostic tests and medications  Description: Target End Date:  1-3 days or as soon as patient condition allows    Document in Patient Education    1.  Patient and family/caregiver oriented to unit, equipment, visitation policy and means for communicating concern  2.  Complete/review Learning Assessment  3.  Assess knowledge level of disease process/condition, treatment plan, diagnostic tests and medications  4.  Explain disease process/condition, treatment plan, diagnostic tests and medications  Outcome: Progressing     Problem: Pain - Standard  Goal: Alleviation of pain or a reduction in pain to the patient’s comfort goal  Description: Target End Date:  Prior to discharge or change in level of care    Document on Vitals flowsheet    1.  Document pain using the appropriate pain scale per order or unit policy  2.  Educate and implement non-pharmacologic comfort measures (i.e. relaxation, distraction, massage, cold/heat therapy, etc.)  3.  Pain management medications as ordered  4.  Reassess pain after pain med administration per policy  5.  If opiods administered assess patient's response to pain medication is appropriate per POSS sedation scale  6.  Follow pain management plan developed in collaboration with patient and interdisciplinary team (including palliative care or pain specialists if applicable)  Outcome: Progressing     Problem: Psychosocial  Goal: Patient's level of anxiety will decrease  Description: Target End Date:  1-3 days or as soon as patient condition allows    1.  Collaborate with patient and family/caregiver  to identify triggers and develop strategies to cope with anxiety  2.  Implement stimuli reduction, calming techniques  3.  Pharmacologic management per provider order  4.  Encourage patient/family/care giver participation  5.  Collaborate with interdisciplinary team including Psychologist or Behavioral Health Team as needed  Outcome: Progressing     Problem: Hemodynamics  Goal: Patient's hemodynamics, fluid balance and neurologic status will be stable or improve  Description: Target End Date:  Prior to discharge or change in level of care    Document on Assessment and I/O flowsheet templates    1.  Monitor vital signs, pulse oximetry and cardiac monitor per provider order and/or policy  2.  Maintain blood pressure per provider order  3.  Hemodynamic monitoring per provider order  4.  Manage IV fluids and IV infusions  5.  Monitor intake and output  6.  Daily weights per unit policy or provider order  7.  Assess peripheral pulses and capillary refill  8.  Assess color and body temperature  9.  Position patient for maximum circulation/cardiac output  10. Monitor for signs/symptoms of excessive bleeding  11. Assess mental status, restlessness and changes in level of consciousness  12. Monitor temperature and report fever or hypothermia to provider immediately. Consideration of targeted temperature management.  Outcome: Progressing     Problem: Urinary Elimination  Goal: Establish and maintain regular urinary output  Description: Target End Date:  Prior to discharge or change in level of care    Document on I/O and Assessment flowsheets    1.  Evaluate need to continue indwelling catheter every shift  2.  Assess signs and symptoms of urinary retention  3.  Assess post-void residual volumes  4.  Implement bladder training program  5.  Encourage scheduled voidings  6.  Assist patient to sit on bedside commode or toilet for voiding  7.  Educate patient and family/caregiver on use and purpose of urine collection devices  (document in Patient Education)  Outcome: Progressing       Patient is not progressing towards the following goals:

## 2023-04-26 NOTE — OR SURGEON
Immediate Post- Operative Note    PostOp Diagnosis: RENAL FAILURE      Procedure(s): RIGHT INTERNAL JUGULAR 14.5 Irish 23 CM HEMOSPLIT TUNNELED HEMODIALYSIS CATHETER PLACEMENT WITH U/S AND FLUOROSCOPIC GUIDANCE    REMOVAL OF LEFT TEMP HD CATH      Estimated Blood Loss: <20CC (FLUSHES)      FINDINGS: CATHETER TIP IN RA        Complications: NONE          4/26/2023     4:23 PM     Duc Sumner M.D.

## 2023-04-26 NOTE — PROGRESS NOTES
In bed awake and watching tv, in no apparent distress, appropriate with answers to questions and following commands, denied chest pain or short of breath, no other concerns voiced, instructed to use call light for assistance and verbalized understanding, will monitor.

## 2023-04-26 NOTE — PROGRESS NOTES
Awake and unable to sleep, denies any need or concerns, new iv started to RT FA, 20g, patient tolerated well.

## 2023-04-26 NOTE — PROGRESS NOTES
"[facility] -  PROGRESS NOTE               Author: Gui Rendon M.D. Date & Time: 4/26/2023  9:36 AM     HPI:  57yo female with h/o CKD, DM2, HTN BIB EMS after c/o CP and family noted increasing altered mentation. Patient is able to provide history only limited history, much history obtained from chart review and discussion with ICU/ED staff. Patient reports experiencing chest pain and nausea, denies abdominal pain and diarrhea, unable to provide more details. Patient was found by EMS to be hypotensive with BP 49/33 and bradycardia with HR in the 30s. Patient was provided with atropine and trancutaneous pacing was initiated. She was found to be hyperkalemia with K of 8.1. She was provided with initial medication management for hyperkalemia, and HD catheter placed by the intensivist, and HD initiated with a 2K/3Ca bath. She was intubated, subsequently extubated this AM. Patient had CTA chest/abd/pelvis at presentation.      Patient was to establish with [facility] for nephrology care 2/17/23 but did not show for her appointment. SCr 3.55 1/2023, with K of 5.5 and CO2 of 19.    DAILY NEPHROLOGY SUMMARY:  4/23: consult done  4/24: tolerated HD, hypertensive-BP meds adjusted, transferred to [facility], feeling well  4/25: No acute events, BP improved, 400cc UOP, cr climbing, denies chest pain or SOB  4/26: transferred out of IM. Tolerated HD, feeling improved, pending permacath, has outpt HD unit    PAST FAMILY HISTORY: Reviewed and Unchanged  SOCIAL HISTORY: Reviewed and Unchanged  CURRENT MEDICATIONS: Reviewed  IMAGING STUDIES: Reviewed    ROS  10 point ROS performed, negative other than stated above    PHYSICAL EXAM  VS:  BP (!) 146/87   Pulse 77   Temp 36.7 °C (98.1 °F) (Temporal)   Resp 16   Ht 1.676 m (5' 6\")   Wt 62.5 kg (137 lb 12.6 oz)   SpO2 91%   BMI 22.24 kg/m²   GENERAL: no acute distress  CV: RRR, trace edema  RESP: non-labored  GI: Soft  MSK: No joint deformities   SKIN: No " concerning rashes  NEURO: AOx3  PSYCH: Cooperative    Fluids:  In: 1440 [P.O.:940; Dialysis:500]  Out: 2700     LABS:  Recent Results (from the past 24 hour(s))   EKG    Collection Time: 23  5:09 PM   Result Value Ref Range    Report       [facility]    Test Date:  2023  Pt Name:    PO TORRES                    Department: [facility]  MRN:        [id number]                      Room:       T704  Gender:     Female                       Technician: ANDREWS  :        [id number]                   Requested By:MAYA RAMIREZ  Order #:    [id number]                    Reading MD: Milvia Boggs    Measurements  Intervals                                Axis  Rate:       75                           P:          59  WA:         178                          QRS:        37  QRSD:       98                           T:          111  QT:         460  QTc:        514    Interpretive Statements  Sinus rhythm  LVH with ST and T changes  Prolonged QT interval  Compared to ECG 2023 05:22:13  No significant changes except rate is slightly increased  Electronically Signed On 2023 21:08:22 PDT by Milvia Boggs     CBC with Differential    Collection Time: 23 12:37 AM   Result Value Ref Range    WBC 6.9 4.8 - 10.8 K/uL    RBC 2.88 (L) 4.20 - 5.40 M/uL    Hemoglobin 8.5 (L) 12.0 - 16.0 g/dL    Hematocrit 27.2 (L) 37.0 - 47.0 %    MCV 94.4 81.4 - 97.8 fL    MCH 29.5 27.0 - 33.0 pg    MCHC 31.3 (L) 33.6 - 35.0 g/dL    RDW 49.1 35.9 - 50.0 fL    Platelet Count 147 (L) 164 - 446 K/uL    MPV 12.0 9.0 - 12.9 fL    Neutrophils-Polys 62.00 44.00 - 72.00 %    Lymphocytes 26.30 22.00 - 41.00 %    Monocytes 7.80 0.00 - 13.40 %    Eosinophils 3.20 0.00 - 6.90 %    Basophils 0.30 0.00 - 1.80 %    Immature Granulocytes 0.40 0.00 - 0.90 %    Nucleated RBC 0.00 /100 WBC    Neutrophils (Absolute) 4.26 2.00 - 7.15 K/uL    Lymphs (Absolute) 1.81 1.00 - 4.80 K/uL    Monos (Absolute) 0.54 0.00 - 0.85 K/uL    Eos (Absolute) 0.22 0.00 -  0.51 K/uL    Baso (Absolute) 0.02 0.00 - 0.12 K/uL    Immature Granulocytes (abs) 0.03 0.00 - 0.11 K/uL    NRBC (Absolute) 0.00 K/uL   Magnesium    Collection Time: 04/26/23 12:37 AM   Result Value Ref Range    Magnesium 1.7 1.5 - 2.5 mg/dL   Phosphorus    Collection Time: 04/26/23 12:37 AM   Result Value Ref Range    Phosphorus 3.9 2.5 - 4.5 mg/dL   Comp Metabolic Panel    Collection Time: 04/26/23 12:37 AM   Result Value Ref Range    Sodium 137 135 - 145 mmol/L    Potassium 4.2 3.6 - 5.5 mmol/L    Chloride 98 96 - 112 mmol/L    Co2 28 20 - 33 mmol/L    Anion Gap 11.0 7.0 - 16.0    Glucose 148 (H) 65 - 99 mg/dL    Bun 21 8 - 22 mg/dL    Creatinine 3.81 (H) 0.50 - 1.40 mg/dL    Calcium 7.7 (L) 8.5 - 10.5 mg/dL    AST(SGOT) 30 12 - 45 U/L    ALT(SGPT) 18 2 - 50 U/L    Alkaline Phosphatase 95 30 - 99 U/L    Total Bilirubin 0.3 0.1 - 1.5 mg/dL    Albumin 3.0 (L) 3.2 - 4.9 g/dL    Total Protein 6.7 6.0 - 8.2 g/dL    Globulin 3.7 (H) 1.9 - 3.5 g/dL    A-G Ratio 0.8 g/dL   CORRECTED CALCIUM    Collection Time: 04/26/23 12:37 AM   Result Value Ref Range    Correct Calcium 8.5 8.5 - 10.5 mg/dL   ESTIMATED GFR    Collection Time: 04/26/23 12:37 AM   Result Value Ref Range    GFR (CKD-EPI) 13 (A) >60 mL/min/1.73 m 2   Prothrombin Time    Collection Time: 04/26/23  3:24 AM   Result Value Ref Range    PT 13.4 12.0 - 14.6 sec    INR 1.03 0.87 - 1.13       (click the triangle to expand results)      ASSESSMENT:  # RUBEN vs progression of CKD  - echogenic kidneys  - suspect this is progression of CKD  # CKD 4  - likely associated with DM/HTN  # Hyperkalemia  - resolved s/p HD x 1  # Acidosis  - resolved s/p HD  # HTN  # DM  - A1c 5.9  # Anemia  - iron stores adequate   # CKD MBD  - phos elevated, Ca to goal  -   # Hypoalbuminemia  # AMS  # HOCM   # ETOH Cirrhosis      PLAN:  -holding HD today, next session tomorrow for TTS schedule going forward  -conversion to permacath  -Deferring further work-up for advanced CKD based on  imaging demonstrating advanced/irreversible disease  -Appreciate CM assistance in finding outp HD unit  -Renal diet once taking PO  -starting JARRET     Discussed with hospitalist    Gui Rendon MD

## 2023-04-26 NOTE — PROGRESS NOTES
4 Eyes Skin Assessment Completed by PRUDENCE Ambrocio and PRUDENCE Duron.    Head WDL  Ears WDL  Nose WDL  Mouth WDL  Neck WDL- bandage where temp pacer was inserted, IJ present  Breast/Chest WDL  Shoulder Blades WDL  Spine WDL  (R) Arm/Elbow/Hand Scab  (L) Arm/Elbow/Hand Scab  Abdomen WDL  Groin WDL  Scrotum/Coccyx/Buttocks Discoloration  (R) Leg WDL  (L) Leg WDL  (R) Heel/Foot/Toe WDL  (L) Heel/Foot/Toe WDL          Devices In Places Central Line, tele box        Interventions In Place Gray Ear Foams and Pillows    Possible Skin Injury Yes    Pictures Uploaded Into Epic Yes  Wound Consult Placed Yes  RN Wound Prevention Protocol Ordered Yes

## 2023-04-26 NOTE — PROGRESS NOTES
Cardiology Follow Up Progress Note    Date of Service  4/26/2023    Attending Physician  Alejandro Davenport M.D.    Chief Complaint   Weakness and abdominal pain    HPI  Rigoberto Adames is a 58 y.o. female with a history of hypertrophic obstructive cardiomyopathy, hypertension, diabetes mellitus type 2, chronic kidney disease stage IV, dyslipidemia and cirrhosis due to prior alcohol abuse admitted 4/22/2023 after an out of hospital arrest with bradycardia and cardiogenic shock.    Interim Events  4/26/2023: At 1640 she expressed concerns of 3 out of 10 left-sided chest pain.  An EKG was performed which did not demonstrate any acute changes.  No other cardiac events overnight.  Sinus rhythm at rates of 70s on telemetry without evidence of bradycardia.  She remains hypertensive.  Vitals otherwise stable.  SPO2 91 to 92% on room air. She reports she is feeling well and wants to go home. No chest pain or palpitations. No shortness of breath, dyspnea on exertion, orthopnea or PND. No lower extremity edema. No dizziness or lightheadedness. No syncope or presyncope.      Review of Systems  Review of Systems   Constitutional: Negative.  Negative for chills, diaphoresis, fatigue and fever.   HENT: Negative.     Eyes: Negative.    Respiratory: Negative.  Negative for cough, chest tightness and shortness of breath.    Cardiovascular: Negative.  Negative for chest pain, palpitations and leg swelling.   Gastrointestinal: Negative.  Negative for constipation, diarrhea, nausea and vomiting.   Endocrine: Negative.    Genitourinary: Negative.  Negative for decreased urine volume, difficulty urinating, dysuria and frequency.   Musculoskeletal: Negative.    Skin:  Positive for wound (on her feet nad back).   Allergic/Immunologic: Negative.    Neurological: Negative.  Negative for dizziness and light-headedness.        Neuropathic pain in her right leg which she attributes to diabetes   Hematological: Negative.  Does not bruise/bleed  easily.   Psychiatric/Behavioral: Negative.       Vital signs in last 24 hours  Temp:  [36.3 °C (97.3 °F)-37.8 °C (100 °F)] 36.7 °C (98.1 °F)  Pulse:  [74-88] 77  Resp:  [16-18] 16  BP: (125-179)/(69-95) 146/87  SpO2:  [91 %-100 %] 91 %    Physical Exam  Physical Exam  Vitals and nursing note reviewed.   Constitutional:       General: She is not in acute distress.     Appearance: Normal appearance. She is not toxic-appearing.   HENT:      Head: Normocephalic and atraumatic.      Right Ear: External ear normal.      Left Ear: External ear normal.      Nose: Nose normal.      Mouth/Throat:      Mouth: Mucous membranes are moist.      Pharynx: Oropharynx is clear.   Eyes:      General: No scleral icterus.     Extraocular Movements: Extraocular movements intact.      Conjunctiva/sclera: Conjunctivae normal.      Pupils: Pupils are equal, round, and reactive to light.   Neck:      Vascular: No JVD.   Cardiovascular:      Rate and Rhythm: Normal rate and regular rhythm.      Pulses: Normal pulses.      Heart sounds: Normal heart sounds. No murmur heard.    No friction rub. No gallop.   Pulmonary:      Effort: Pulmonary effort is normal.      Breath sounds: Normal breath sounds.   Abdominal:      General: Abdomen is flat. Bowel sounds are normal. There is no distension.      Palpations: Abdomen is soft.   Musculoskeletal:         General: Normal range of motion.      Cervical back: Normal range of motion and neck supple.      Right lower leg: No edema.      Left lower leg: No edema.   Skin:     General: Skin is warm and dry.      Capillary Refill: Capillary refill takes less than 2 seconds.      Coloration: Skin is not jaundiced.      Comments: Multiple small crusting lesions bilateral feet and legs.    Neurological:      General: No focal deficit present.      Mental Status: She is alert and oriented to person, place, and time.   Psychiatric:         Mood and Affect: Mood normal.         Behavior: Behavior normal.        Lab Review  Lab Results   Component Value Date/Time    WBC 6.9 04/26/2023 12:37 AM    RBC 2.88 (L) 04/26/2023 12:37 AM    HEMOGLOBIN 8.5 (L) 04/26/2023 12:37 AM    HEMATOCRIT 27.2 (L) 04/26/2023 12:37 AM    MCV 94.4 04/26/2023 12:37 AM    MCH 29.5 04/26/2023 12:37 AM    MCHC 31.3 (L) 04/26/2023 12:37 AM    MPV 12.0 04/26/2023 12:37 AM      Lab Results   Component Value Date/Time    SODIUM 137 04/26/2023 12:37 AM    POTASSIUM 4.2 04/26/2023 12:37 AM    CHLORIDE 98 04/26/2023 12:37 AM    CO2 28 04/26/2023 12:37 AM    GLUCOSE 148 (H) 04/26/2023 12:37 AM    BUN 21 04/26/2023 12:37 AM    CREATININE 3.81 (H) 04/26/2023 12:37 AM      Lab Results   Component Value Date/Time    ASTSGOT 30 04/26/2023 12:37 AM    ALTSGPT 18 04/26/2023 12:37 AM     Lab Results   Component Value Date/Time    TROPONINT 43 (H) 04/22/2023 11:55 PM       No results for input(s): NTPROBNP in the last 72 hours.    Cardiac Imaging and Procedures Review  EKG:  My personal interpretation of the EKG dated 4/25/2023 is sinus rhythm with LVH    Echocardiogram: 4/23/2023  CONCLUSIONS  Severe concentric left ventricular hypertrophy. Hyperdynamic left   ventricular systolic function. The left ventricular ejection fraction   is visually estimated to be greater than 75%. Evidence of increased   intracavity gradient; peak 27 mmHg without valsalva.  The right ventricle is normal in size and systolic function.  Mild, posteriorly directed mitral regurgitation.  Estimated right ventricular systolic pressure is 45 mmHg.  Right atrial pressure is estimated to be 15 mmHg.  Trivial pericardial effusion.  No prior study is available for comparison.     Imaging  Chest X-Ray: 4/25/2023  FINDINGS:  Position of medical devices appears stable. Cardiomegaly is observed.  Atherosclerotic calcification of the aorta is noted.  Bilateral perihilar interstitial prominence and bronchial wall cuffing is noted.  Bilateral lung volumes are diminished.  Hazy interstitial bilateral  pulmonary opacities are seen.  Blunting of bilateral costophrenic angles is seen, compatible with trace bilateral pleural effusions.  The bony structures appear age-appropriate.  IMPRESSION:  1.  Pulmonary edema and/or infiltrates are identified, which are stable since the prior exam.  2.  Trace bilateral pleural effusions, stable since prior study  3.  Cardiomegaly  4.  Atherosclerosis      Assessment/Plan  #Cardiogenic shock  #Bradycardia  #Hypertrophic obstructive cardiomyopathy  #Myocardial bridging of the Mid LAD  #Acute on chronic kidney disease stage IV  #Anemia  #Essential hypertension    Recommendations:  - No cardiac complaints today.  - No further episodes of bradycardia since electrolytes were corrected.  - Anemia shows mild improvement today.  - She underwent dialysis yesterday. Electrolytes remain stable  - She remains hypertensive. Continue clonidine 0.1mg TID and increase Coreg to 25mg BID.  - Continue reduced dose of disopyramide for HCOM.    Cardiology will continue to follow.     Please contact me with any questions.    Thank you for allowing me to participate in the care of Rigoberto Adames .    Edith Pool PA-C, Cardiology  [facility], [facility]  [address] [address]  Phone: [phone]  Fax: [phone]    PLEASE NOTE: This Note was created using voice recognition Software. I have made every reasonable attempt to correct obvious errors, but I expect that there are errors of grammar and possibly content that I did not discover before finalizing the note.     I personally spent a total of 14 minutes which includes face-to-face time and non-face-to-face time spent on preparing to see the patient, reviewing hospital notes and tests, obtaining history from the patient, performing a medically appropriate exam, counseling and educating the patient, ordering medications/tests/procedures/referrals as clinically  indicated, and documenting information in the electronic medical record.

## 2023-04-26 NOTE — PROGRESS NOTES
Pt presents to IR 2. Pt was consented by MD at bedside, confirmed by this RN and consent at bedside. Pt transferred to IR table in supine position. Patient underwent a tunneled dialysis catheter by Dr. Sumner. Procedure site was marked by MD and verified using imaging guidance. Pt placed on monitor, prepped and draped in a sterile fashion. Vitals were taken every 5 minutes and remained stable during procedure (see doc flow sheet for results). CO2 waveform capnography was monitored and remained WNL throughout procedure. Report called to Lolly FOSS. Pt transported by yessenia with RN to Tele 7.        HemoSplit Long-Term HD Catheter 14.5F 23cm  REF: [id number]  LOT: [id number]  Exp: 07/31/2024

## 2023-04-26 NOTE — CARE PLAN
The patient is Stable - Low risk of patient condition declining or worsening    Shift Goals  Clinical Goals: stable v/s  Patient Goals: rest  Family Goals: ALONDRA    Progress made toward(s) clinical / shift goals:    .  Problem: Knowledge Deficit - Standard  Goal: Patient and family/care givers will demonstrate understanding of plan of care, disease process/condition, diagnostic tests and medications  Description: Target End Date:  1-3 days or as soon as patient condition allows    Document in Patient Education    1.  Patient and family/caregiver oriented to unit, equipment, visitation policy and means for communicating concern  2.  Complete/review Learning Assessment  3.  Assess knowledge level of disease process/condition, treatment plan, diagnostic tests and medications  4.  Explain disease process/condition, treatment plan, diagnostic tests and medications  Outcome: Progressing       Patient is not progressing towards the following goals:

## 2023-04-26 NOTE — CARE PLAN
The patient is Stable - Low risk of patient condition declining or worsening    Shift Goals  Clinical Goals: Decrease b/p, wean O2  Patient Goals: Go home  Family Goals: ALONDRA    Progress made toward(s) clinical / shift goals:    Problem: Knowledge Deficit - Standard  Goal: Patient and family/care givers will demonstrate understanding of plan of care, disease process/condition, diagnostic tests and medications  Description: Target End Date:  1-3 days or as soon as patient condition allows    Document in Patient Education    1.  Patient and family/caregiver oriented to unit, equipment, visitation policy and means for communicating concern  2.  Complete/review Learning Assessment  3.  Assess knowledge level of disease process/condition, treatment plan, diagnostic tests and medications  4.  Explain disease process/condition, treatment plan, diagnostic tests and medications  Outcome: Progressing     Problem: Pain - Standard  Goal: Alleviation of pain or a reduction in pain to the patient’s comfort goal  Description: Target End Date:  Prior to discharge or change in level of care    Document on Vitals flowsheet    1.  Document pain using the appropriate pain scale per order or unit policy  2.  Educate and implement non-pharmacologic comfort measures (i.e. relaxation, distraction, massage, cold/heat therapy, etc.)  3.  Pain management medications as ordered  4.  Reassess pain after pain med administration per policy  5.  If opiods administered assess patient's response to pain medication is appropriate per POSS sedation scale  6.  Follow pain management plan developed in collaboration with patient and interdisciplinary team (including palliative care or pain specialists if applicable)  Outcome: Progressing     Problem: Hemodynamics  Goal: Patient's hemodynamics, fluid balance and neurologic status will be stable or improve  Description: Target End Date:  Prior to discharge or change in level of care    Document on  Assessment and I/O flowsheet templates    1.  Monitor vital signs, pulse oximetry and cardiac monitor per provider order and/or policy  2.  Maintain blood pressure per provider order  3.  Hemodynamic monitoring per provider order  4.  Manage IV fluids and IV infusions  5.  Monitor intake and output  6.  Daily weights per unit policy or provider order  7.  Assess peripheral pulses and capillary refill  8.  Assess color and body temperature  9.  Position patient for maximum circulation/cardiac output  10. Monitor for signs/symptoms of excessive bleeding  11. Assess mental status, restlessness and changes in level of consciousness  12. Monitor temperature and report fever or hypothermia to provider immediately. Consideration of targeted temperature management.  Outcome: Progressing     Problem: Nutrition  Goal: Patient's nutritional and fluid intake will be adequate or improve  Description: Target End Date:  Prior to discharge or change in level of care    Document on I/O flowsheet    1.  Monitor nutritional intake  2.  Monitor weight per provider order  3.  Assess patient's ability to take oral nutrition  4.  Collaborate with Speech Therapy, Dietitian and interdisciplinary team for appropriate feeding and fluid intake  5.  Assist with feeding  Outcome: Progressing       Patient is not progressing towards the following goals:

## 2023-04-27 ENCOUNTER — PHARMACY VISIT (OUTPATIENT)
Dept: PHARMACY | Facility: MEDICAL CENTER | Age: 58
End: 2023-04-27
Payer: COMMERCIAL

## 2023-04-27 VITALS
HEART RATE: 79 BPM | DIASTOLIC BLOOD PRESSURE: 71 MMHG | RESPIRATION RATE: 18 BRPM | BODY MASS INDEX: 22.04 KG/M2 | TEMPERATURE: 97.7 F | WEIGHT: 137.13 LBS | HEIGHT: 66 IN | SYSTOLIC BLOOD PRESSURE: 124 MMHG | OXYGEN SATURATION: 93 %

## 2023-04-27 LAB
ALBUMIN SERPL BCP-MCNC: 3.2 G/DL (ref 3.2–4.9)
ALBUMIN/GLOB SERPL: 0.9 G/DL
ALP SERPL-CCNC: 89 U/L (ref 30–99)
ALT SERPL-CCNC: 10 U/L (ref 2–50)
ANION GAP SERPL CALC-SCNC: 14 MMOL/L (ref 7–16)
AST SERPL-CCNC: 14 U/L (ref 12–45)
BASOPHILS # BLD AUTO: 0.3 % (ref 0–1.8)
BASOPHILS # BLD: 0.02 K/UL (ref 0–0.12)
BILIRUB SERPL-MCNC: 0.2 MG/DL (ref 0.1–1.5)
BUN SERPL-MCNC: 28 MG/DL (ref 8–22)
CALCIUM ALBUM COR SERPL-MCNC: 8.3 MG/DL (ref 8.5–10.5)
CALCIUM SERPL-MCNC: 7.7 MG/DL (ref 8.5–10.5)
CHLORIDE SERPL-SCNC: 100 MMOL/L (ref 96–112)
CO2 SERPL-SCNC: 24 MMOL/L (ref 20–33)
CREAT SERPL-MCNC: 5.21 MG/DL (ref 0.5–1.4)
EOSINOPHIL # BLD AUTO: 0.28 K/UL (ref 0–0.51)
EOSINOPHIL NFR BLD: 4.4 % (ref 0–6.9)
ERYTHROCYTE [DISTWIDTH] IN BLOOD BY AUTOMATED COUNT: 48.4 FL (ref 35.9–50)
GAMMA INTERFERON BACKGROUND BLD IA-ACNC: 0.06 IU/ML
GFR SERPLBLD CREATININE-BSD FMLA CKD-EPI: 9 ML/MIN/1.73 M 2
GLOBULIN SER CALC-MCNC: 3.7 G/DL (ref 1.9–3.5)
GLUCOSE SERPL-MCNC: 105 MG/DL (ref 65–99)
HCT VFR BLD AUTO: 27.5 % (ref 37–47)
HGB BLD-MCNC: 8.6 G/DL (ref 12–16)
IMM GRANULOCYTES # BLD AUTO: 0.02 K/UL (ref 0–0.11)
IMM GRANULOCYTES NFR BLD AUTO: 0.3 % (ref 0–0.9)
INR PPP: 1.02 (ref 0.87–1.13)
LYMPHOCYTES # BLD AUTO: 1.52 K/UL (ref 1–4.8)
LYMPHOCYTES NFR BLD: 23.9 % (ref 22–41)
M TB IFN-G BLD-IMP: NEGATIVE
M TB IFN-G CD4+ BCKGRND COR BLD-ACNC: 0.02 IU/ML
MAGNESIUM SERPL-MCNC: 1.8 MG/DL (ref 1.5–2.5)
MCH RBC QN AUTO: 29.3 PG (ref 27–33)
MCHC RBC AUTO-ENTMCNC: 31.3 G/DL (ref 33.6–35)
MCV RBC AUTO: 93.5 FL (ref 81.4–97.8)
MITOGEN IGNF BCKGRD COR BLD-ACNC: >10 IU/ML
MONOCYTES # BLD AUTO: 0.57 K/UL (ref 0–0.85)
MONOCYTES NFR BLD AUTO: 9 % (ref 0–13.4)
NEUTROPHILS # BLD AUTO: 3.94 K/UL (ref 2–7.15)
NEUTROPHILS NFR BLD: 62.1 % (ref 44–72)
NRBC # BLD AUTO: 0 K/UL
NRBC BLD-RTO: 0 /100 WBC
PHOSPHATE SERPL-MCNC: 5.6 MG/DL (ref 2.5–4.5)
PLATELET # BLD AUTO: 170 K/UL (ref 164–446)
PMV BLD AUTO: 11.5 FL (ref 9–12.9)
POTASSIUM SERPL-SCNC: 4.2 MMOL/L (ref 3.6–5.5)
PROT SERPL-MCNC: 6.9 G/DL (ref 6–8.2)
PROTHROMBIN TIME: 13.3 SEC (ref 12–14.6)
QFT TB2 - NIL TBQ2: 0.02 IU/ML
RBC # BLD AUTO: 2.94 M/UL (ref 4.2–5.4)
SODIUM SERPL-SCNC: 138 MMOL/L (ref 135–145)
WBC # BLD AUTO: 6.4 K/UL (ref 4.8–10.8)

## 2023-04-27 PROCEDURE — 700102 HCHG RX REV CODE 250 W/ 637 OVERRIDE(OP): Performed by: HOSPITALIST

## 2023-04-27 PROCEDURE — A9270 NON-COVERED ITEM OR SERVICE: HCPCS | Performed by: PHYSICIAN ASSISTANT

## 2023-04-27 PROCEDURE — 700102 HCHG RX REV CODE 250 W/ 637 OVERRIDE(OP): Performed by: PHYSICIAN ASSISTANT

## 2023-04-27 PROCEDURE — 90935 HEMODIALYSIS ONE EVALUATION: CPT

## 2023-04-27 PROCEDURE — 83735 ASSAY OF MAGNESIUM: CPT

## 2023-04-27 PROCEDURE — 80053 COMPREHEN METABOLIC PANEL: CPT

## 2023-04-27 PROCEDURE — 99233 SBSQ HOSP IP/OBS HIGH 50: CPT | Mod: FS | Performed by: INTERNAL MEDICINE

## 2023-04-27 PROCEDURE — A9270 NON-COVERED ITEM OR SERVICE: HCPCS

## 2023-04-27 PROCEDURE — 700111 HCHG RX REV CODE 636 W/ 250 OVERRIDE (IP): Performed by: INTERNAL MEDICINE

## 2023-04-27 PROCEDURE — 99239 HOSP IP/OBS DSCHRG MGMT >30: CPT | Performed by: STUDENT IN AN ORGANIZED HEALTH CARE EDUCATION/TRAINING PROGRAM

## 2023-04-27 PROCEDURE — A9270 NON-COVERED ITEM OR SERVICE: HCPCS | Performed by: HOSPITALIST

## 2023-04-27 PROCEDURE — RXMED WILLOW AMBULATORY MEDICATION CHARGE: Performed by: STUDENT IN AN ORGANIZED HEALTH CARE EDUCATION/TRAINING PROGRAM

## 2023-04-27 PROCEDURE — 85025 COMPLETE CBC W/AUTO DIFF WBC: CPT

## 2023-04-27 PROCEDURE — A9270 NON-COVERED ITEM OR SERVICE: HCPCS | Performed by: INTERNAL MEDICINE

## 2023-04-27 PROCEDURE — 84100 ASSAY OF PHOSPHORUS: CPT

## 2023-04-27 PROCEDURE — 700102 HCHG RX REV CODE 250 W/ 637 OVERRIDE(OP): Performed by: STUDENT IN AN ORGANIZED HEALTH CARE EDUCATION/TRAINING PROGRAM

## 2023-04-27 PROCEDURE — 700102 HCHG RX REV CODE 250 W/ 637 OVERRIDE(OP): Performed by: INTERNAL MEDICINE

## 2023-04-27 PROCEDURE — 85610 PROTHROMBIN TIME: CPT

## 2023-04-27 PROCEDURE — A9270 NON-COVERED ITEM OR SERVICE: HCPCS | Performed by: STUDENT IN AN ORGANIZED HEALTH CARE EDUCATION/TRAINING PROGRAM

## 2023-04-27 PROCEDURE — 700102 HCHG RX REV CODE 250 W/ 637 OVERRIDE(OP)

## 2023-04-27 PROCEDURE — 700111 HCHG RX REV CODE 636 W/ 250 OVERRIDE (IP): Performed by: HOSPITALIST

## 2023-04-27 RX ORDER — HEPARIN SODIUM 1000 [USP'U]/ML
INJECTION, SOLUTION INTRAVENOUS; SUBCUTANEOUS
Status: DISPENSED
Start: 2023-04-27 | End: 2023-04-27

## 2023-04-27 RX ORDER — CARVEDILOL 25 MG/1
25 TABLET ORAL 2 TIMES DAILY WITH MEALS
Qty: 60 TABLET | Refills: 3 | Status: SHIPPED | OUTPATIENT
Start: 2023-04-27 | End: 2023-09-19 | Stop reason: SDUPTHER

## 2023-04-27 RX ORDER — AMLODIPINE BESYLATE 10 MG/1
10 TABLET ORAL DAILY
Qty: 30 TABLET | Refills: 3 | Status: SHIPPED | OUTPATIENT
Start: 2023-04-28 | End: 2023-09-19 | Stop reason: SDUPTHER

## 2023-04-27 RX ORDER — ACETAMINOPHEN 325 MG/1
650 TABLET ORAL EVERY 4 HOURS PRN
Status: DISCONTINUED | OUTPATIENT
Start: 2023-04-27 | End: 2023-04-27 | Stop reason: HOSPADM

## 2023-04-27 RX ORDER — CLONIDINE HYDROCHLORIDE 0.1 MG/1
0.1 TABLET ORAL 3 TIMES DAILY
Qty: 90 TABLET | Refills: 3 | Status: SHIPPED | OUTPATIENT
Start: 2023-04-27 | End: 2023-05-12

## 2023-04-27 RX ORDER — DIPHENHYDRAMINE HCL 25 MG
25 TABLET ORAL ONCE
Status: COMPLETED | OUTPATIENT
Start: 2023-04-27 | End: 2023-04-27

## 2023-04-27 RX ORDER — LIDOCAINE 50 MG/G
1 PATCH TOPICAL EVERY 24 HOURS
Status: DISCONTINUED | OUTPATIENT
Start: 2023-04-27 | End: 2023-04-27 | Stop reason: HOSPADM

## 2023-04-27 RX ORDER — HEPARIN SODIUM 1000 [USP'U]/ML
3700 INJECTION, SOLUTION INTRAVENOUS; SUBCUTANEOUS
Status: DISCONTINUED | OUTPATIENT
Start: 2023-04-27 | End: 2023-04-27 | Stop reason: HOSPADM

## 2023-04-27 RX ADMIN — OMEPRAZOLE 20 MG: 20 CAPSULE, DELAYED RELEASE ORAL at 05:00

## 2023-04-27 RX ADMIN — CARVEDILOL 25 MG: 25 TABLET, FILM COATED ORAL at 11:28

## 2023-04-27 RX ADMIN — DISOPYRAMIDE PHOSPHATE 100 MG: 100 CAPSULE ORAL at 05:00

## 2023-04-27 RX ADMIN — CLONIDINE HYDROCHLORIDE 0.1 MG: 0.1 TABLET ORAL at 11:28

## 2023-04-27 RX ADMIN — HEPARIN SODIUM 3700 UNITS: 1000 INJECTION, SOLUTION INTRAVENOUS; SUBCUTANEOUS at 10:49

## 2023-04-27 RX ADMIN — ACETAMINOPHEN 650 MG: 325 TABLET, FILM COATED ORAL at 11:28

## 2023-04-27 RX ADMIN — HEPARIN SODIUM 5000 UNITS: 5000 INJECTION, SOLUTION INTRAVENOUS; SUBCUTANEOUS at 04:59

## 2023-04-27 RX ADMIN — DIPHENHYDRAMINE HYDROCHLORIDE 25 MG: 25 TABLET ORAL at 02:15

## 2023-04-27 RX ADMIN — EPOETIN ALFA-EPBX 3000 UNITS: 3000 INJECTION, SOLUTION INTRAVENOUS; SUBCUTANEOUS at 10:48

## 2023-04-27 RX ADMIN — AMLODIPINE BESYLATE 10 MG: 10 TABLET ORAL at 05:00

## 2023-04-27 RX ADMIN — CLONIDINE HYDROCHLORIDE 0.1 MG: 0.1 TABLET ORAL at 04:59

## 2023-04-27 ASSESSMENT — ENCOUNTER SYMPTOMS
CHEST TIGHTNESS: 0
WHEEZING: 0
APNEA: 0
STRIDOR: 0
CHOKING: 0
SHORTNESS OF BREATH: 0
COUGH: 0
FATIGUE: 1

## 2023-04-27 NOTE — DISCHARGE PLANNING
Outpatient Dialysis Note    Patient has been placed and confirmed at:    [facility]   [address]  [place] [address]    Ph: [phone]    Schedule: Tues, Thurs, Sat   Time: 2:45 PM    Patient to start Sat, 4/29 at 2:15 PM for first appointment     ESTELA Olivera and nephrologist Dr. Rendon notified of placement confirmation.   Provided patient dialysis schedule welcome letter.     Xitlaly Reyes   Dialysis Coordinator / [facility]   Ph: [phone]

## 2023-04-27 NOTE — PROGRESS NOTES
Cardiology Follow Up Progress Note    Date of Service  4/27/2023    Attending Physician  Alejandro Davenport M.D.    Chief Complaint         TERRI Adames is a 58 y.o. female with hypertrophic obstructive cardiomyopathy, hypertension type 2 diabetes, CKD stage IV, alcohol related cirrhosis admitted 4/22/2023 with out of hospital cardiac arrest, found profound bradycardia, shock, hyperkalemia, RUBEN on CKD.      Interim Events  No overnight cardiac events  Telemetry-SR  HD this morning  S/p permacath 4/26/23  BP improved after HD      Review of Systems  Review of Systems   Constitutional:  Positive for fatigue.   Respiratory:  Negative for apnea, cough, choking, chest tightness, shortness of breath, wheezing and stridor.      Vital signs in last 24 hours  Temp:  [36.5 °C (97.7 °F)-37.2 °C (99 °F)] 36.5 °C (97.7 °F)  Pulse:  [61-79] 79  Resp:  [14-19] 18  BP: (124-189)/() 124/71  SpO2:  [87 %-99 %] 93 %      Lab Review  Lab Results   Component Value Date/Time    WBC 6.4 04/27/2023 02:07 AM    RBC 2.94 (L) 04/27/2023 02:07 AM    HEMOGLOBIN 8.6 (L) 04/27/2023 02:07 AM    HEMATOCRIT 27.5 (L) 04/27/2023 02:07 AM    MCV 93.5 04/27/2023 02:07 AM    MCH 29.3 04/27/2023 02:07 AM    MCHC 31.3 (L) 04/27/2023 02:07 AM    MPV 11.5 04/27/2023 02:07 AM      Lab Results   Component Value Date/Time    SODIUM 138 04/27/2023 02:07 AM    POTASSIUM 4.2 04/27/2023 02:07 AM    CHLORIDE 100 04/27/2023 02:07 AM    CO2 24 04/27/2023 02:07 AM    GLUCOSE 105 (H) 04/27/2023 02:07 AM    BUN 28 (H) 04/27/2023 02:07 AM    CREATININE 5.21 (HH) 04/27/2023 02:07 AM      Lab Results   Component Value Date/Time    ASTSGOT 14 04/27/2023 02:07 AM    ALTSGPT 10 04/27/2023 02:07 AM     Lab Results   Component Value Date/Time    TROPONINT 43 (H) 04/22/2023 11:55 PM       No results for input(s): NTPROBNP in the last 72 hours.        Assessment/Plan    # OOH cardiac arrest/cardiogenic shock with profound bradycardia ( 2/2 to metabolic  derangement-resolved no need for pacemaker.  #Left ventricular hypertrophy with LVOT obstruction  #Hypertension  #Severe hyperkalemia on presentation, K8.1 requiring emergent HD  #RUBEN on CKD, Scr 5, continuous hemodialysis  #Acute on chronic anemia, stable    Recommendations  -Underwent successful permacath placement 4/26/23.  -Continue amlodipine 10 mg, carvedilol 25 twice daily.  -Continue Norpace in the setting of HOCM.  -Currently on clonidine 0.1 mg 3 times daily, uptitrate if remains hypertensive vs adding ACE/ARB if okay with nephrology.  BP improved significantly post HD this morning.    Follow-up appointment in cardiology office 5/3/2023 at 10 AM    I personally spent a total of 18  minutes which includes face-to-face time and non-face-to-face time spent on preparing to see the patient, reviewing hospital notes and tests, obtaining history from the patient, performing a medically appropriate exam, counseling and educating the patient, ordering medications/tests/procedures/referrals as clinically indicated, and documenting information in the electronic medical record.       Thank you for allowing me to participate in the care of this patient.  Cardiology will sign off on this patient    Please contact me with any questions.    NAVJOT Rosario.   Cardiologist, [facility]  [phone]

## 2023-04-27 NOTE — PROGRESS NOTES
Hemodialysis ordered by Dr. Rendon. Treatment started at 0809 and ended at 1109. Pt stable, vss, no c/o post tx. Net UF 1.5 L d/t hypotension during tx. Decreased UFG. Dr. Rendon notified and aware. Report to NILSA Dennis RN.

## 2023-04-27 NOTE — DISCHARGE SUMMARY
Discharge Summary    CHIEF COMPLAINT ON ADMISSION  Chief Complaint   Patient presents with    Hypotension     Pt having bradycardia today 30-40 bpm. dizziness       Reason for Admission  EMS     Admission Date  4/22/2023    CODE STATUS  Full Code    HPI & HOSPITAL COURSE  This is a 58 y.o. female here with hypotension  Rigoberto Adames is a 57yo F with pmhx of hypertensive obstructive cardiomyopathy, hypertension, DMII, CKDIV, EtOH use (sober x 2 years), cirrhosis, DLD. She is here for abdominal pain and generalized weakness.  She was found in ED to have bradycardia with HR in 30's and hypotensive.  She was intubated, given fluids and atropine.  A temporary pacemaker was inserted. Initial potassium was 8.  She had hemodialysis.  During hospitalization patient evaluated by nephrology in addition to cardiology service.  Patient was in acute renal failure without renal recovery requiring long-term dialysis.  Patient had appropriate long-term dialysis catheter placed.  Furthermore patient was also evaluated by cardiology with regards to hypertension.  Appropriate medications were given to patient as indicated below.  Patient was counseled to avoid taking hypertensive medications mornings of dialysis days these instructions were provided to her in hand prior to discharge.  Patient dialysis schedule is Tuesday Thursday Saturday.  Patient will have close follow-up with [facility] dialysis in addition to nephrology clinic.  Outpatient dialysis chair was confirmed to patient.  All information was provided to her in hand.  Confirmation was not obtained by teach back.  Therefore, she is discharged in good and stable condition to home with close outpatient follow-up.    The patient met 2-midnight criteria for an inpatient stay at the time of discharge.    Discharge Date  4/27/2023    FOLLOW UP ITEMS POST DISCHARGE  Take all medication as prescribed  Go to all follow-up appointments as indicated    DISCHARGE DIAGNOSES  Principal Problem:     Bradycardia POA: Yes  Active Problems:    Shock (HCC) POA: Unknown    Acute respiratory failure with hypoxia (HCC) POA: Unknown    Hyperkalemia POA: Unknown    Acute-on-chronic kidney injury (HCC) POA: Unknown    Anemia POA: Yes    Dyslipidemia POA: Unknown    HOCM (hypertrophic obstructive cardiomyopathy) (HCC) POA: Unknown    Primary hypertension POA: Unknown    Type 2 diabetes mellitus (HCC) POA: Yes    Alcoholic cirrhosis (HCC) POA: Yes    History of alcohol abuse POA: Unknown  Resolved Problems:    * No resolved hospital problems. *      FOLLOW UP  Future Appointments   Date Time Provider Department Center   5/3/2023 10:00 AM Payam Jack M.D. [facility] None   5/11/2023  9:00 AM Clay Day M.D. [facility]  [address]  [address]  [facility] [address]  [phone]  Follow up on 4/29/2023  For dialysis at 2:15 PM    Gui Rendon M.D.  [address] [place] [address] [facility] [id number] [phone]    Follow up  As needed      MEDICATIONS ON DISCHARGE     Medication List        START taking these medications        Instructions   amLODIPine 10 MG Tabs  Start taking on: April 28, 2023  Commonly known as: NORVASC   Doctor's comments: Do not take this medication the morning of dialysis days  Take 1 Tablet by mouth every day.  Dose: 10 mg     carvedilol 25 MG Tabs  Commonly known as: COREG   Doctor's comments: Do not take this medication the morning of dialysis days  Take 1 Tablet by mouth 2 times a day with meals.  Dose: 25 mg     cloNIDine 0.1 MG Tabs  Commonly known as: CATAPRES   Doctor's comments: Do not take this medication the morning of dialysis days  Take 1 Tablet by mouth 3 times a day.  Dose: 0.1 mg            CONTINUE taking these medications        Instructions   atorvastatin 20 MG Tabs  Commonly known as: LIPITOR   Take 20 mg by mouth every evening.  Dose: 20 mg     omeprazole 20 MG delayed-release capsule  Commonly known as: PRILOSEC   Take 20 mg by mouth every  day.  Dose: 20 mg            STOP taking these medications      cetirizine 10 MG Tabs  Commonly known as: ZYRTEC     dilTIAZem HCl  MG Tb24     metFORMIN  MG Tb24  Commonly known as: GLUCOPHAGE XR     metoprolol tartrate 100 MG Tabs  Commonly known as: LOPRESSOR     Ursodiol 500 MG Tabs              Allergies  No Known Allergies    DIET  Orders Placed This Encounter   Procedures    Diet Order Diet: Renal     Standing Status:   Standing     Number of Occurrences:   1     Order Specific Question:   Diet:     Answer:   Renal [8]       ACTIVITY  As tolerated.  Weight bearing as tolerated    CONSULTATIONS  Nephrology  Cardiology  Interventional radiology    PROCEDURES  4/26/2023:  Right internal jugular 14.5 German 23 cm tunneled hemodialysis catheter under ultrasound guidance    LABORATORY  Lab Results   Component Value Date    SODIUM 138 04/27/2023    POTASSIUM 4.2 04/27/2023    CHLORIDE 100 04/27/2023    CO2 24 04/27/2023    GLUCOSE 105 (H) 04/27/2023    BUN 28 (H) 04/27/2023    CREATININE 5.21 (HH) 04/27/2023        Lab Results   Component Value Date    WBC 6.4 04/27/2023    HEMOGLOBIN 8.6 (L) 04/27/2023    HEMATOCRIT 27.5 (L) 04/27/2023    PLATELETCT 170 04/27/2023      Please note that this dictation was created using voice recognition software. I have made every reasonable attempt to correct obvious errors, but I expect that there are errors of grammar and possibly context that I did not discover before finalizing the note.    Total time of the discharge process exceeds 35 minutes.

## 2023-04-27 NOTE — PROGRESS NOTES
Pt returned from IR for tunneled cath placement. Post op VS started. Site is CDI, no issues noted.     Gauze and tegaderm on previous temp cath site.

## 2023-04-27 NOTE — DISCHARGE INSTRUCTIONS
Bradycardia, Adult  Bradycardia is a slower-than-normal heartbeat. A normal resting heart rate for an adult ranges from 60 to 100 beats per minute. With bradycardia, the resting heart rate is less than 60 beats per minute.  Bradycardia can prevent enough oxygen from reaching certain areas of your body when you are active. It can be serious if it keeps enough oxygen from reaching your brain and other parts of your body. Bradycardia is not a problem for everyone. For some healthy adults, a slow resting heart rate is normal.  What are the causes?  This condition may be caused by:  A problem with the heart, including:  A problem with the heart's electrical system, such as a heart block. With a heart block, electrical signals between the chambers of the heart are partially or completely blocked, so they are not able to work as they should.  A problem with the heart's natural pacemaker (sinus node).  Heart disease.  A heart attack.  Heart damage.  Lyme disease.  A heart infection.  A heart condition that is present at birth (congenital heart defect).  Certain medicines that treat heart conditions.  Certain conditions, such as hypothyroidism and obstructive sleep apnea.  Problems with the balance of chemicals and other substances, like potassium, in the blood.  Trauma.  Radiation therapy.  What increases the risk?  You are more likely to develop this condition if you:  Are age 65 or older.  Have high blood pressure (hypertension), high cholesterol (hyperlipidemia), or diabetes.  Drink heavily, use tobacco or nicotine products, or use drugs.  What are the signs or symptoms?  Symptoms of this condition include:  Light-headedness.  Feeling faint or fainting.  Fatigue and weakness.  Trouble with activity or exercise.  Shortness of breath.  Chest pain (angina).  Drowsiness.  Confusion.  Dizziness.  How is this diagnosed?  This condition may be diagnosed based on:  Your symptoms.  Your medical history.  A physical exam.  During  the exam, your health care provider will listen to your heartbeat and check your pulse. To confirm the diagnosis, your health care provider may order tests, such as:  Blood tests.  An electrocardiogram (ECG). This test records the heart's electrical activity. The test can show how fast your heart is beating and whether the heartbeat is steady.  A test in which you wear a portable device (event recorder or Holter monitor) to record your heart's electrical activity while you go about your day.  An exercise test.  How is this treated?  Treatment for this condition depends on the cause of the condition and how severe your symptoms are. Treatment may involve:  Treatment of the underlying condition.  Changing your medicines or how much medicine you take.  Having a small, battery-operated device called a pacemaker implanted under the skin. When bradycardia occurs, this device can be used to increase your heart rate and help your heart beat in a regular rhythm.  Follow these instructions at home:  Lifestyle    Manage any health conditions that contribute to bradycardia as told by your health care provider.  Follow a heart-healthy diet. A nutrition specialist (dietitian) can help educate you about healthy food options and changes.  Follow an exercise program that is approved by your health care provider.  Maintain a healthy weight.  Try to reduce or manage your stress, such as with yoga or meditation. If you need help reducing stress, ask your health care provider.  Do not use any products that contain nicotine or tobacco, such as cigarettes, e-cigarettes, and chewing tobacco. If you need help quitting, ask your health care provider.  Do not use illegal drugs.  Limit alcohol intake to no more than 1 drink a day for nonpregnant women and 2 drinks a day for men. Be aware of how much alcohol is in your drink. In the [place] one drink equals one 12 oz bottle of beer (355 mL), one 5 oz glass of wine (148 mL), or one 1½ oz glass of  hard liquor (44 mL).  General instructions  Take over-the-counter and prescription medicines only as told by your health care provider.  Keep all follow-up visits as told by your health care provider. This is important.  How is this prevented?  In some cases, bradycardia may be prevented by:  Treating underlying medical problems.  Stopping behaviors or medicines that can trigger the condition.  Contact a health care provider if you:  Feel light-headed or dizzy.  Almost faint.  Feel weak or are easily fatigued during physical activity.  Experience confusion or have memory problems.  Get help right away if:  You faint.  You have:  An irregular heartbeat (palpitations).  Chest pain.  Trouble breathing.  Summary  Bradycardia is a slower-than-normal heartbeat. With bradycardia, the resting heart rate is less than 60 beats per minute.  Treatment for this condition depends on the cause.  Manage any health conditions that contribute to bradycardia as told by your health care provider.  Do not use any products that contain nicotine or tobacco, such as cigarettes, e-cigarettes, and chewing tobacco, and limit alcohol intake.  Keep all follow-up visits as told by your health care provider. This is important.  This information is not intended to replace advice given to you by your health care provider. Make sure you discuss any questions you have with your health care provider.  Document Released: 09/09/2003 Document Revised: 07/01/2019 Document Reviewed: 05/29/2019  [employer] Patient Education © 2020 [employer]

## 2023-04-27 NOTE — HOSPITAL COURSE
Rigoberto Adames is a 59yo F with pmhx of hypertensive obstructive cardiomyopathy, hypertension, DMII, CKDIV, EtOH use (sober x 2 years), cirrhosis, DLD. She is here for abdominal pain and generalized weakness.  She was found in ED to have bradycardia with HR in 30's and hypotensive.  She was intubated, given fluids and atropine.  A temporary pacemaker was inserted. Initial potassium was 8.  She had hemodialysis.

## 2023-04-27 NOTE — PROGRESS NOTES
"[facility] -  PROGRESS NOTE               Author: Gui Rendon M.D. Date & Time: 4/27/2023  7:44 AM     HPI:  57yo female with h/o CKD, DM2, HTN BIB EMS after c/o CP and family noted increasing altered mentation. Patient is able to provide history only limited history, much history obtained from chart review and discussion with ICU/ED staff. Patient reports experiencing chest pain and nausea, denies abdominal pain and diarrhea, unable to provide more details. Patient was found by EMS to be hypotensive with BP 49/33 and bradycardia with HR in the 30s. Patient was provided with atropine and trancutaneous pacing was initiated. She was found to be hyperkalemia with K of 8.1. She was provided with initial medication management for hyperkalemia, and HD catheter placed by the intensivist, and HD initiated with a 2K/3Ca bath. She was intubated, subsequently extubated this AM. Patient had CTA chest/abd/pelvis at presentation.      Patient was to establish with [facility] for nephrology care 2/17/23 but did not show for her appointment. SCr 3.55 1/2023, with K of 5.5 and CO2 of 19.    DAILY NEPHROLOGY SUMMARY:  4/23: consult done  4/24: tolerated HD, hypertensive-BP meds adjusted, transferred to IM, feeling well  4/25: No acute events, BP improved, 400cc UOP, cr climbing, denies chest pain or SOB  4/26: transferred out of IMCU. Tolerated HD, feeling improved, pending permacath, has outpt HD unit  4/27: s/p permacath placement, seen on dialysis-tolerating the procedure well, anxious to go home    PAST FAMILY HISTORY: Reviewed and Unchanged  SOCIAL HISTORY: Reviewed and Unchanged  CURRENT MEDICATIONS: Reviewed  IMAGING STUDIES: Reviewed    ROS  10 point ROS performed, negative other than stated above    PHYSICAL EXAM  VS:  BP (!) 163/86   Pulse 75   Temp 36.8 °C (98.2 °F) (Temporal)   Resp 18   Ht 1.676 m (5' 6\")   Wt 62.2 kg (137 lb 2 oz)   SpO2 96%   Breastfeeding No   BMI 22.13 kg/m² "   GENERAL: no acute distress  CV: RRR, trace edema  RESP: non-labored  GI: Soft  MSK: No joint deformities   SKIN: No concerning rashes  NEURO: AOx3  PSYCH: Cooperative    Fluids:  In: 650 [P.O.:650]  Out: 650     LABS:  Recent Results (from the past 24 hour(s))   OCCULT BLOOD X2 (STOOL)    Collection Time: 04/26/23 10:30 AM   Result Value Ref Range    Occult Blood 1 Negative Negative   CBC with Differential    Collection Time: 04/27/23  2:07 AM   Result Value Ref Range    WBC 6.4 4.8 - 10.8 K/uL    RBC 2.94 (L) 4.20 - 5.40 M/uL    Hemoglobin 8.6 (L) 12.0 - 16.0 g/dL    Hematocrit 27.5 (L) 37.0 - 47.0 %    MCV 93.5 81.4 - 97.8 fL    MCH 29.3 27.0 - 33.0 pg    MCHC 31.3 (L) 33.6 - 35.0 g/dL    RDW 48.4 35.9 - 50.0 fL    Platelet Count 170 164 - 446 K/uL    MPV 11.5 9.0 - 12.9 fL    Neutrophils-Polys 62.10 44.00 - 72.00 %    Lymphocytes 23.90 22.00 - 41.00 %    Monocytes 9.00 0.00 - 13.40 %    Eosinophils 4.40 0.00 - 6.90 %    Basophils 0.30 0.00 - 1.80 %    Immature Granulocytes 0.30 0.00 - 0.90 %    Nucleated RBC 0.00 /100 WBC    Neutrophils (Absolute) 3.94 2.00 - 7.15 K/uL    Lymphs (Absolute) 1.52 1.00 - 4.80 K/uL    Monos (Absolute) 0.57 0.00 - 0.85 K/uL    Eos (Absolute) 0.28 0.00 - 0.51 K/uL    Baso (Absolute) 0.02 0.00 - 0.12 K/uL    Immature Granulocytes (abs) 0.02 0.00 - 0.11 K/uL    NRBC (Absolute) 0.00 K/uL   Magnesium    Collection Time: 04/27/23  2:07 AM   Result Value Ref Range    Magnesium 1.8 1.5 - 2.5 mg/dL   Phosphorus    Collection Time: 04/27/23  2:07 AM   Result Value Ref Range    Phosphorus 5.6 (H) 2.5 - 4.5 mg/dL   Comp Metabolic Panel    Collection Time: 04/27/23  2:07 AM   Result Value Ref Range    Sodium 138 135 - 145 mmol/L    Potassium 4.2 3.6 - 5.5 mmol/L    Chloride 100 96 - 112 mmol/L    Co2 24 20 - 33 mmol/L    Anion Gap 14.0 7.0 - 16.0    Glucose 105 (H) 65 - 99 mg/dL    Bun 28 (H) 8 - 22 mg/dL    Creatinine 5.21 (HH) 0.50 - 1.40 mg/dL    Calcium 7.7 (L) 8.5 - 10.5 mg/dL    AST(SGOT)  14 12 - 45 U/L    ALT(SGPT) 10 2 - 50 U/L    Alkaline Phosphatase 89 30 - 99 U/L    Total Bilirubin 0.2 0.1 - 1.5 mg/dL    Albumin 3.2 3.2 - 4.9 g/dL    Total Protein 6.9 6.0 - 8.2 g/dL    Globulin 3.7 (H) 1.9 - 3.5 g/dL    A-G Ratio 0.9 g/dL   Prothrombin Time    Collection Time: 04/27/23  2:07 AM   Result Value Ref Range    PT 13.3 12.0 - 14.6 sec    INR 1.02 0.87 - 1.13   CORRECTED CALCIUM    Collection Time: 04/27/23  2:07 AM   Result Value Ref Range    Correct Calcium 8.3 (L) 8.5 - 10.5 mg/dL   ESTIMATED GFR    Collection Time: 04/27/23  2:07 AM   Result Value Ref Range    GFR (CKD-EPI) 9 (A) >60 mL/min/1.73 m 2       (click the triangle to expand results)      ASSESSMENT:  # RUBEN vs progression of CKD  - echogenic kidneys  - suspect this is progression of CKD  -s/p permacath   # CKD 4  - likely associated with DM/HTN  # Hyperkalemia  - resolved s/p HD x 1  # Acidosis  - resolved s/p HD  # HTN  # DM  - A1c 5.9  # Anemia  - iron stores adequate   # CKD MBD  - phos elevated, Ca to goal  -   # Hypoalbuminemia  # AMS  # HOCM   # ETOH Cirrhosis      PLAN:  -HD today for TTS schedule.   -Challenging UF to adding additional blood pressure medicines  -Has outpt HD unit, ok for dsicahrge after HD today  -Deferring further work-up for advanced CKD based on imaging demonstrating advanced/irreversible disease  -Renal diet once taking PO  -startedESA     Discussed with hospitalist    Gui Rendon MD

## 2023-04-27 NOTE — PROGRESS NOTES
Patient sent to [facility] with all belongings. Family present upon transfer. PIV and tele box removed.

## 2023-04-27 NOTE — DISCHARGE PLANNING
RNCM PC to dialysis coordinator to confirm outpatient dialysis chair; no answer, left detailed msg for callback.

## 2023-04-27 NOTE — DISCHARGE PLANNING
Care Transition Team Assessment    Information Source  Orientation Level: Oriented X4  Information Given By: Other (Comments)  Who is responsible for making decisions for patient? : Patient    Readmission Evaluation  Is this a readmission?: No    Elopement Risk  Legal Hold: No  Ambulatory or Self Mobile in Wheelchair: Yes  Disoriented: No  Psychiatric Symptoms: None  History of Wandering: No  Elopement this Admit: No  Vocalizing Wanting to Leave: No  Displays Behaviors, Body Language Wanting to Leave: No-Not at Risk for Elopement  Elopement Risk: Not at Risk for Elopement    Interdisciplinary Discharge Planning  Patient or legal guardian wants to designate a caregiver: No    Discharge Preparedness  What is your plan after discharge?: Home with help  What are your discharge supports?: Child  Prior Functional Level: Independent with Medication Management, Independent with Activities of Daily Living    Functional Assesment  Prior Functional Level: Independent with Medication Management, Independent with Activities of Daily Living    Finances  Financial Barriers to Discharge: No  Prescription Coverage: Yes    Vision / Hearing Impairment  Right Eye Vision: Wears Glasses  Left Eye Vision: Wears Glasses    Advance Directive  Advance Directive?: None         Psychological Assessment  History of Substance Abuse: Alcohol  History of Psychiatric Problems: No    Discharge Risks or Barriers  Discharge risks or barriers?: Complex medical needs  Patient risk factors: Complex medical needs, Vulnerable adult    Anticipated Discharge Information  Discharge Disposition: Discharged to home/self care (01)

## 2023-05-01 ENCOUNTER — PATIENT OUTREACH (OUTPATIENT)
Dept: HEALTH INFORMATION MANAGEMENT | Facility: OTHER | Age: 58
End: 2023-05-01
Payer: MEDICAID

## 2023-05-01 RX ORDER — BENZOCAINE/MENTHOL 6 MG-10 MG
1 LOZENGE MUCOUS MEMBRANE 2 TIMES DAILY
Qty: 1 EACH | Refills: 3 | OUTPATIENT
Start: 2023-05-01

## 2023-05-01 NOTE — PROGRESS NOTES
Community Health Worker Lennox attempted to reach the patient after discharge from the hospital to follow up. Patient did not answer and CHW left a detailed voicemail requesting a call back.     [facility] Selin High will attempt again at a later date.

## 2023-05-01 NOTE — TELEPHONE ENCOUNTER
Received request via: Pharmacy    Was the patient seen in the last year in this department? Yes    Does the patient have an active prescription (recently filled or refills available) for medication(s) requested? No    Does the patient have care home Plus and need 100 day supply (blood pressure, diabetes and cholesterol meds only)? Patient does not have SCP

## 2023-05-03 ENCOUNTER — OFFICE VISIT (OUTPATIENT)
Dept: CARDIOLOGY | Facility: MEDICAL CENTER | Age: 58
End: 2023-05-03
Payer: MEDICAID

## 2023-05-03 ENCOUNTER — TELEPHONE (OUTPATIENT)
Dept: CARDIOLOGY | Facility: MEDICAL CENTER | Age: 58
End: 2023-05-03

## 2023-05-03 VITALS
SYSTOLIC BLOOD PRESSURE: 110 MMHG | RESPIRATION RATE: 16 BRPM | WEIGHT: 136 LBS | OXYGEN SATURATION: 96 % | HEIGHT: 67 IN | DIASTOLIC BLOOD PRESSURE: 60 MMHG | BODY MASS INDEX: 21.35 KG/M2 | HEART RATE: 70 BPM

## 2023-05-03 DIAGNOSIS — K70.30 ALCOHOLIC CIRRHOSIS OF LIVER WITHOUT ASCITES (HCC): ICD-10-CM

## 2023-05-03 DIAGNOSIS — N18.6 ESRD (END STAGE RENAL DISEASE) (HCC): ICD-10-CM

## 2023-05-03 DIAGNOSIS — I10 HYPERTENSION, ESSENTIAL: ICD-10-CM

## 2023-05-03 DIAGNOSIS — E11.42 TYPE 2 DIABETES MELLITUS WITH DIABETIC POLYNEUROPATHY, WITHOUT LONG-TERM CURRENT USE OF INSULIN (HCC): ICD-10-CM

## 2023-05-03 DIAGNOSIS — R00.2 HEART PALPITATIONS: ICD-10-CM

## 2023-05-03 DIAGNOSIS — F10.11 HISTORY OF ALCOHOL ABUSE: ICD-10-CM

## 2023-05-03 DIAGNOSIS — I34.0 NONRHEUMATIC MITRAL VALVE REGURGITATION: ICD-10-CM

## 2023-05-03 DIAGNOSIS — I42.1 HOCM (HYPERTROPHIC OBSTRUCTIVE CARDIOMYOPATHY) (HCC): ICD-10-CM

## 2023-05-03 PROCEDURE — 99214 OFFICE O/P EST MOD 30 MIN: CPT | Performed by: INTERNAL MEDICINE

## 2023-05-03 ASSESSMENT — FIBROSIS 4 INDEX: FIB4 SCORE: 1.48

## 2023-05-03 NOTE — PROGRESS NOTES
CARDIOLOGY OUTPATIENT FOLLOWUP    PCP: Kamala Pierre M.D.    1. HOCM (hypertrophic obstructive cardiomyopathy) (HCC)    2. ESRD (end stage renal disease) (HCC)    3. Type 2 diabetes mellitus with diabetic polyneuropathy, without long-term current use of insulin (HCC)    4. Alcoholic cirrhosis of liver without ascites (HCC)    5. History of alcohol abuse    6. Nonrheumatic mitral valve regurgitation    7. Hypertension, essential        Rigoberto Adames is clinically stable following implementation of dialysis after presenting with hyperkalemia and bradycardia.  I recommend no changes to the medication regimen.  She is having symptoms of exertional intolerance are not clearly cardiac mediated and carries a perplexing history of hypertrophic cardiomyopathy in the absence of family history of the condition, genetic profile or LV thickening to support this.  Importantly, there were several medication changes recently which may affect loading conditions of the ventricle.  I advised that she complete an echocardiogram, monitor and treadmill stress.  She may benefit from transesophageal echo or repeat cardiac MRI.    Follow up: 3 months with Dr. Dowell    Chief Complaint   Patient presents with    Hypotension       History: Rigoberto Adames is a 58 y.o. female with history of alcoholism, cirrhosis, new end-stage renal disease presenting for hospital follow-up.  She also has a history of hypertrophic obstructive cardiomyopathy, moderate mitral regurgitation with systolic anterior motion and high pretension.  She regularly follows with Dr. Dowell, previously Dr. Galicia.    In 2014 she underwent catheterization with normal coronary arteries, cardiac MRI showed 11 mm wall thickness, mild concentric LVH, systolic anterior motion of the mitral valve and mitral regurgitation graded at moderate.  Echocardiograms have largely been consistent with this over the years.  She apparently took this disopyramide at some point.     Today she denies  "any exertional chest discomfort or near syncope.  She also denies edema.  She does report a sense of distortion of her position in space and gait instability which is led to difficulty walking short distances as well as operating a vehicle.      ROS:   10 point review systems is otherwise negative except as per the HPI    PE:  /60 (BP Location: Left arm, Patient Position: Sitting, BP Cuff Size: Adult)   Pulse 70   Resp 16   Ht 1.702 m (5' 7\")   Wt 61.7 kg (136 lb)   SpO2 96%   BMI 21.30 kg/m²   Gen: no acute distress  HEENT: Symmetric face. Anicteric sclerae. Moist mucus membranes  NECK: No JVD. No lymphadenopathy  CARDIAC: Regular, Normal S1, S2, +systolic murmur  VASCULATURE: carotids are normal bilaterally without bruit  RESP: Clear to auscultation bilaterally  ABD: Soft, non-tender, non-distended  EXT: No edema, no clubbing or cyanosis  SKIN: Warm and dry  NEURO: No gross deficits  PSYCH: Appropriate affect, participates in conversation    The 10-year ASCVD risk score (Shruthi CHEW, et al., 2019) is: 3.3%    Past Medical History:   Diagnosis Date    Acute kidney injury (HCC)     Acute necrotizing pyelonephritis     Alcohol withdrawal (HCC)     Arthritis     Bilateral legs; no formal diagnosis.    Cataract     Bilateral IOL    Cirrhosis of liver with ascites (HCC) 3/6/2017    Dental disorder     Upper and lower dentures    Diabetes mellitus, type 2 (HCC)     Oral medications    Diabetic ulcer of toe (HCC) 4/30/2020    diarrhea 2019    been going on for about a year    Foot pain, bilateral     Heart murmur     High cholesterol     HOCM (hypertrophic obstructive cardiomyopathy) (HCC)     Hyperammonemia (HCC) 6/24/2016    Hypertension     STEMI (ST elevation myocardial infarction) (HCC)     Thrombocytopenia (HCC)     Tobacco abuse 12/8/2014     No Known Allergies  Outpatient Encounter Medications as of 5/3/2023   Medication Sig Dispense Refill    amLODIPine (NORVASC) 10 MG Tab Take 1 Tablet by mouth every " day. Do not take this medication the morning of dialysis days 30 Tablet 3    carvedilol (COREG) 25 MG Tab Take 1 Tablet by mouth 2 times a day with meals. Do not take this medication the morning of dialysis days 60 Tablet 3    cloNIDine (CATAPRES) 0.1 MG Tab Take 1 Tablet by mouth 3 times a day. Do not take this medication the morning of dialysis days 90 Tablet 3    atorvastatin (LIPITOR) 20 MG Tab Take 20 mg by mouth every evening.      omeprazole (PRILOSEC) 20 MG delayed-release capsule Take 20 mg by mouth every day.      omeprazole (PRILOSEC) 20 MG delayed-release capsule TAKE 1 CAPSULE BY MOUTH ONCE DAILY 30 MINUTES BEFORE BREAKFAST MEAL      Ascorbic Acid (VITAMIN C PO) Take 1 Tablet by mouth every day.      hydrocortisone 1 % Cream Apply 1 Application topically 2 times a day. 1 Each 3    atorvastatin (LIPITOR) 20 MG Tab TAKE 1 TABLET BY MOUTH AT BEDTIME 90 Tab 0    [DISCONTINUED] metoprolol tartrate (LOPRESSOR) 100 MG Tab Take 1 Tablet by mouth 2 times a day. (Patient not taking: Reported on 5/3/2023) 180 Tablet 4    [DISCONTINUED] diltiazem CD (CARDIZEM CD) 360 MG ER capsule Take 1 Capsule by mouth every day. (Patient not taking: Reported on 5/3/2023) 90 Capsule 4    [DISCONTINUED] gabapentin (NEURONTIN) 100 MG Cap Take 1 Capsule by mouth 3 times a day. (Patient not taking: Reported on 5/3/2023) 90 Capsule 1    [DISCONTINUED] metFORMIN ER (GLUCOPHAGE XR) 500 MG TABLET SR 24 HR Take 1 tablet by mouth once daily (Patient not taking: Reported on 5/3/2023) 90 Tablet 11    [DISCONTINUED] cetirizine (ZYRTEC) 10 MG Tab Take 1 Tablet by mouth every day. (Patient not taking: Reported on 5/3/2023) 30 Tablet 6    [DISCONTINUED] Ursodiol 500 MG Tab Take  by mouth every day. (Patient not taking: Reported on 5/3/2023)       No facility-administered encounter medications on file as of 5/3/2023.     Social History     Socioeconomic History    Marital status: Single     Spouse name: Not on file    Number of children: Not on  file    Years of education: Not on file    Highest education level: Not on file   Occupational History    Occupation: restaurant owner   Tobacco Use    Smoking status: Former     Packs/day: 1.00     Years: 10.00     Pack years: 10.00     Types: Cigarettes     Start date: 2015     Quit date: 2020     Years since quittin.9    Smokeless tobacco: Never   Vaping Use    Vaping Use: Never used   Substance and Sexual Activity    Alcohol use: Not Currently     Alcohol/week: 0.6 oz     Types: 1 Glasses of wine per week    Drug use: No    Sexual activity: Not on file   Other Topics Concern    Not on file   Social History Narrative    Single- 2 children.     Social Determinants of Health     Financial Resource Strain: Not on file   Food Insecurity: Not on file   Transportation Needs: Not on file   Physical Activity: Not on file   Stress: Not on file   Social Connections: Not on file   Intimate Partner Violence: Not on file   Housing Stability: Not on file       Studies  Lab Results   Component Value Date/Time    CHOLSTRLTOT 136 2022 11:24 AM    LDL 60 2022 11:24 AM    HDL 53 2022 11:24 AM    TRIGLYCERIDE 117 2022 11:24 AM       Lab Results   Component Value Date/Time    SODIUM 138 2023 02:07 AM    POTASSIUM 4.2 2023 02:07 AM    CHLORIDE 100 2023 02:07 AM    CO2 24 2023 02:07 AM    GLUCOSE 105 (H) 2023 02:07 AM    BUN 28 (H) 2023 02:07 AM    CREATININE 5.21 (HH) 2023 02:07 AM    CREATININE 0.5 2006 05:10 PM     Lab Results   Component Value Date/Time    ALKPHOSPHAT 89 2023 02:07 AM    ASTSGOT 14 2023 02:07 AM    ALTSGPT 10 2023 02:07 AM    TBILIRUBIN 0.2 2023 02:07 AM

## 2023-05-03 NOTE — TELEPHONE ENCOUNTER
Notified that the cardiac monitor order needs to be changed to [employer] for insurance. Order updated.

## 2023-05-10 NOTE — PROGRESS NOTES
CHW called [facility]. Office states that the patient had an appointment but a relative called and cancelled as she started following with [facility]. Per office, pt does not need to follow with [facility].     CHW then called [facility] [phone].   Pt is scheduled Tuesday, Thursday, and Saturday at 2:45pm.   CHW spoke to office staff Chasity who reports that she tried to explain to the patient yesterday that she does not need to follow with [facility] since she is receiving dialysis at [facility]. Pt was confused due to the instruction on her AVS which asked her to follow with both.     CHW called the patient back. Pt was confused about instruction on AVS. CHW explained to the patient that she will see a nephrologist at Dialysis.   Pt states that she will attend dialysis tomorrow at 2:45pm.     CHW also reminded the patient that she has a primary care appointment tomorrow 5/11/2023 @ 9am with FRANCESCORI.   CHW confirmed appointment address with the patient. Pt plans to attend stating that she was unsatisfied with the care she received from her past PCP Kamala Pierre M.D.  CHW reassured the patient that she can schedule another PCP appointment with a new provider until she is satisfied.     Plan: CHW will remove the patient from [facility] caseload due to no needs / all goals met.

## 2023-05-10 NOTE — PROGRESS NOTES
"RANI High followed up with the patient.     Pt declines any barriers to housing, transportation, or food.   Pts only concern is that she \"needs to follow up with a Nephrologist\".   CHW offered to follow up with [facility] and [place].   "

## 2023-05-11 ENCOUNTER — APPOINTMENT (OUTPATIENT)
Dept: RADIOLOGY | Facility: MEDICAL CENTER | Age: 58
End: 2023-05-11
Attending: INTERNAL MEDICINE
Payer: MEDICAID

## 2023-05-11 ENCOUNTER — OFFICE VISIT (OUTPATIENT)
Dept: INTERNAL MEDICINE | Facility: OTHER | Age: 58
End: 2023-05-11
Payer: MEDICAID

## 2023-05-11 VITALS
OXYGEN SATURATION: 98 % | DIASTOLIC BLOOD PRESSURE: 76 MMHG | BODY MASS INDEX: 21.14 KG/M2 | SYSTOLIC BLOOD PRESSURE: 115 MMHG | TEMPERATURE: 96.8 F | HEART RATE: 70 BPM | WEIGHT: 135 LBS

## 2023-05-11 DIAGNOSIS — I10 PRIMARY HYPERTENSION: ICD-10-CM

## 2023-05-11 DIAGNOSIS — G62.9 NEUROPATHY: ICD-10-CM

## 2023-05-11 DIAGNOSIS — N18.6 ESRD (END STAGE RENAL DISEASE) (HCC): ICD-10-CM

## 2023-05-11 DIAGNOSIS — I42.1 HOCM (HYPERTROPHIC OBSTRUCTIVE CARDIOMYOPATHY) (HCC): ICD-10-CM

## 2023-05-11 DIAGNOSIS — E11.42 TYPE 2 DIABETES MELLITUS WITH DIABETIC POLYNEUROPATHY, WITHOUT LONG-TERM CURRENT USE OF INSULIN (HCC): ICD-10-CM

## 2023-05-11 DIAGNOSIS — R00.1 BRADYCARDIA: ICD-10-CM

## 2023-05-11 DIAGNOSIS — I95.1 ORTHOSTATIC HYPOTENSION: ICD-10-CM

## 2023-05-11 DIAGNOSIS — Z12.31 ENCOUNTER FOR SCREENING MAMMOGRAM FOR BREAST CANCER: ICD-10-CM

## 2023-05-11 DIAGNOSIS — L29.9 ITCHING: ICD-10-CM

## 2023-05-11 PROBLEM — E87.5 HYPERKALEMIA: Status: RESOLVED | Noted: 2023-04-23 | Resolved: 2023-05-11

## 2023-05-11 PROBLEM — J96.01 ACUTE RESPIRATORY FAILURE WITH HYPOXIA (HCC): Status: RESOLVED | Noted: 2023-04-23 | Resolved: 2023-05-11

## 2023-05-11 PROBLEM — N17.9 ACUTE-ON-CHRONIC KIDNEY INJURY (HCC): Status: RESOLVED | Noted: 2023-04-23 | Resolved: 2023-05-11

## 2023-05-11 PROBLEM — N18.9 ACUTE-ON-CHRONIC KIDNEY INJURY (HCC): Status: RESOLVED | Noted: 2023-04-23 | Resolved: 2023-05-11

## 2023-05-11 PROBLEM — R57.9 SHOCK (HCC): Status: RESOLVED | Noted: 2023-04-23 | Resolved: 2023-05-11

## 2023-05-11 LAB — GLUCOSE BLD-MCNC: 148 MG/DL (ref 65–99)

## 2023-05-11 PROCEDURE — 3074F SYST BP LT 130 MM HG: CPT | Performed by: STUDENT IN AN ORGANIZED HEALTH CARE EDUCATION/TRAINING PROGRAM

## 2023-05-11 PROCEDURE — 1126F AMNT PAIN NOTED NONE PRSNT: CPT | Performed by: STUDENT IN AN ORGANIZED HEALTH CARE EDUCATION/TRAINING PROGRAM

## 2023-05-11 PROCEDURE — 82962 GLUCOSE BLOOD TEST: CPT | Performed by: STUDENT IN AN ORGANIZED HEALTH CARE EDUCATION/TRAINING PROGRAM

## 2023-05-11 PROCEDURE — 99213 OFFICE O/P EST LOW 20 MIN: CPT | Mod: GE | Performed by: STUDENT IN AN ORGANIZED HEALTH CARE EDUCATION/TRAINING PROGRAM

## 2023-05-11 PROCEDURE — 3078F DIAST BP <80 MM HG: CPT | Performed by: STUDENT IN AN ORGANIZED HEALTH CARE EDUCATION/TRAINING PROGRAM

## 2023-05-11 RX ORDER — GLUCOSAMINE HCL/CHONDROITIN SU 500-400 MG
CAPSULE ORAL
Qty: 100 EACH | Refills: 0 | Status: SHIPPED | OUTPATIENT
Start: 2023-05-11 | End: 2023-05-23 | Stop reason: SDUPTHER

## 2023-05-11 RX ORDER — LANCETS 30 GAUGE
EACH MISCELLANEOUS
Qty: 100 EACH | Refills: 0 | Status: SHIPPED | OUTPATIENT
Start: 2023-05-11 | End: 2023-12-03

## 2023-05-11 RX ORDER — ASPIRIN 81 MG/1
81 TABLET ORAL DAILY
Qty: 90 TABLET | Refills: 3 | Status: CANCELLED | OUTPATIENT
Start: 2023-05-11

## 2023-05-11 RX ORDER — ATORVASTATIN CALCIUM 20 MG/1
20 TABLET, FILM COATED ORAL NIGHTLY
Qty: 60 TABLET | Refills: 1 | Status: SHIPPED | OUTPATIENT
Start: 2023-05-11 | End: 2023-10-02 | Stop reason: SDUPTHER

## 2023-05-11 ASSESSMENT — FIBROSIS 4 INDEX: FIB4 SCORE: 1.48

## 2023-05-11 NOTE — PATIENT INSTRUCTIONS
-Follow up 1-2 weeks  Slowly get up from sitting, do calf raises, squeeze hands a few times  -decrease clonidine to 0.1 mg twice daily from 0.1 mg 3 times daily   -Measure blood pressure once daily at the same time of the day. No smoking, alcohol, drugs before measuring blood pressure.  Rest for 10 minutes before measuring blood pressure with feet down on the ground and arm at heart level in sitting position. . Bring the blood pressure log on next visit.  -measure blood glucose once daily in the morning. If it is more than 200, let us know. Bring the blood sugar log   -placed referral to podiatry

## 2023-05-11 NOTE — PROGRESS NOTES
Established Patient    Patient Care Team:  Kamala Pierre M.D. as PCP - General (Internal Medicine)  Duc Castro M.D. (Neurology)  Julian Dixon M.D. (Neurology)  Liam Galicia M.D. (Cardiovascular Disease (Cardiology))  [facility]  ALY Flores (Inactive) as   Kamala Pierre M.D. (Internal Medicine)    HPI:  Rigoberto Adames is a 58 y.o. female with a history of ESRD on TTS dialysis, HOCM, alcoholic cirrhosis, type 2 diabetes (no longer insulin dependent), HTN  who presents today for hospital follow up. Patient was recently admitted to hospital for hypotension,bradycardia and severe hyperkalemia and renal failure. Patient was started on dialysis in the hospital after permcath placement. Bradycardia and hypotension resolved.    #orthostatic hypotension  #orthostatic dizziness  -likely the cause of her dizziness. Has been going on for 6 months but got worse after hospitalization. She would stand up from sitting and get dizzy for 1 minute.  -orthostatic vitals were positive in clinic today.with severe drop in systolic bp (84 from 143) and diastolic bp (62 from 87).   May be multifactorial including her antihypertensives and dehydration. Patient reports poor po intake and limited fluid intake given ESRD.    #type 2 diabetes  Fingerstick blood glucose obtained in clinic today is 148.   Previously on metformin 500 mg daily, discontinued at the hospital.a1c 5.9 in april 2023. Denies nausea, vomiting, polyuria, polydipsia. Diabetic foot exam done in clinic today 5/11    #neuropathy  Bilateral numbness and tingling in feet. Unclear if this is truly from diabetes or from alcohol. B12 was unremarkable in January 2023; No longer taking metformin    #ESRD  #history of hypokaelmia  On TTS dialysis. Patient states that she tolerates dialysis well. Recently admitted for hyperkalemia and renal failure.  Thought to be secondary to her comorbidities including HTN  and diabetes. Ongoing discussion with nephrology about long term dialysis. Patient does not have fistula yet but has permcath.     #HOCM  #bradycardia  Normal heart rate in clinic today. Remote monitor, stress test, echocardiogram ordered by cardiology  Denies chest pain, shortness of breath or palpitation    #scalp itching  scalp itching. 1year ago.  always dyed hair, had no problem before.  body was itching then but resolved after applying lotion to body.  dermatology appointment 2023. Patient is requesting increase in the dose of her hydrocortisone cream which helps with itching briefly. Patient states that she has been waiting 1 year for the appointment.  Will discuss this further on next visit.       moved to [place] 33 years ago. Denies smoking, alcohol, drugs. Used to drink heavy.    working on making appointments for colonoscopy and mammogram.    ROS:     See above    Past Medical History:   Diagnosis Date    Acute kidney injury (HCC)     Acute necrotizing pyelonephritis     Alcohol withdrawal (HCC)     Arthritis     Bilateral legs; no formal diagnosis.    Cataract     Bilateral IOL    Cirrhosis of liver with ascites (HCC) 3/6/2017    Dental disorder     Upper and lower dentures    Diabetes mellitus, type 2 (HCC)     Oral medications    Diabetic ulcer of toe (HCC) 2020    diarrhea 2019    been going on for about a year    Foot pain, bilateral     Heart murmur     High cholesterol     HOCM (hypertrophic obstructive cardiomyopathy) (HCC)     Hyperammonemia (HCC) 2016    Hypertension     STEMI (ST elevation myocardial infarction) (HCC)     Thrombocytopenia (HCC)     Tobacco abuse 2014     Social History     Tobacco Use    Smoking status: Former     Packs/day: 1.00     Years: 10.00     Pack years: 10.00     Types: Cigarettes     Start date: 2015     Quit date: 2020     Years since quittin.9    Smokeless tobacco: Never   Vaping Use    Vaping Use: Never used   Substance Use Topics     Alcohol use: Not Currently     Alcohol/week: 0.6 oz     Types: 1 Glasses of wine per week    Drug use: No     Current Outpatient Medications   Medication Sig Dispense Refill    amLODIPine (NORVASC) 10 MG Tab Take 1 Tablet by mouth every day. Do not take this medication the morning of dialysis days 30 Tablet 3    carvedilol (COREG) 25 MG Tab Take 1 Tablet by mouth 2 times a day with meals. Do not take this medication the morning of dialysis days 60 Tablet 3    cloNIDine (CATAPRES) 0.1 MG Tab Take 1 Tablet by mouth 3 times a day. Do not take this medication the morning of dialysis days 90 Tablet 3    atorvastatin (LIPITOR) 20 MG Tab Take 20 mg by mouth every evening.      omeprazole (PRILOSEC) 20 MG delayed-release capsule TAKE 1 CAPSULE BY MOUTH ONCE DAILY 30 MINUTES BEFORE BREAKFAST MEAL      Ascorbic Acid (VITAMIN C PO) Take 1 Tablet by mouth every day.      omeprazole (PRILOSEC) 20 MG delayed-release capsule Take 20 mg by mouth every day. (Patient not taking: Reported on 5/11/2023)      hydrocortisone 1 % Cream Apply 1 Application topically 2 times a day. (Patient not taking: Reported on 5/11/2023) 1 Each 3    atorvastatin (LIPITOR) 20 MG Tab TAKE 1 TABLET BY MOUTH AT BEDTIME (Patient not taking: Reported on 5/11/2023) 90 Tab 0     No current facility-administered medications for this visit.       Physical Exam:  /76 (BP Location: Left arm, Patient Position: Sitting, BP Cuff Size: Adult)   Pulse 70   Temp 36 °C (96.8 °F) (Temporal)   Wt 61.2 kg (135 lb)   SpO2 98%   BMI 21.14 kg/m²   Physical Exam  Constitutional:       General: She is not in acute distress.     Appearance: She is ill-appearing.      Comments: cachectic   HENT:      Head: Normocephalic and atraumatic.      Right Ear: External ear normal.      Left Ear: External ear normal.      Nose: Nose normal. No congestion or rhinorrhea.      Mouth/Throat:      Pharynx: No oropharyngeal exudate.   Eyes:      General:         Right eye: No discharge.          Left eye: No discharge.      Extraocular Movements: Extraocular movements intact.   Neck:      Comments: Has permcath placed in Right IJ.  Cardiovascular:      Rate and Rhythm: Normal rate and regular rhythm.      Pulses:           Dorsalis pedis pulses are 2+ on the right side and 2+ on the left side.        Posterior tibial pulses are 2+ on the right side and 2+ on the left side.      Heart sounds: No murmur heard.  Pulmonary:      Effort: Pulmonary effort is normal. No respiratory distress.      Breath sounds: Normal breath sounds. No wheezing or rales.   Abdominal:      General: Abdomen is flat. There is no distension.      Palpations: Abdomen is soft.      Tenderness: There is no abdominal tenderness. There is no guarding.   Musculoskeletal:         General: Normal range of motion.      Cervical back: Normal range of motion and neck supple.      Right lower leg: No edema.      Left lower leg: No edema.      Right foot: Normal range of motion. No deformity.      Left foot: Normal range of motion. No deformity.   Feet:      Right foot:      Protective Sensation: 10 sites tested.   1 site sensed.     Skin integrity: Skin integrity normal.      Toenail Condition: Right toenails are normal.      Left foot:      Protective Sensation: 10 sites tested.   1 site sensed.     Skin integrity: Skin integrity normal.      Toenail Condition: Left toenails are normal.      Comments: Sensation intact only right in the center of feet.  Skin:     Capillary Refill: Capillary refill takes less than 2 seconds.      Coloration: Skin is not jaundiced.      Findings: No rash.   Neurological:      General: No focal deficit present.      Mental Status: She is oriented to person, place, and time.      Comments: Gets dizzy, standing up from sitting   Psychiatric:         Mood and Affect: Mood normal.         Behavior: Behavior normal.         Thought Content: Thought content normal.         Judgment: Judgment normal.             Assessment and Plan:   Rigoberto Adames is a 58 y.o. female with a history of ESRD on TTS dialysis, HOCM, alcoholic cirrhosis, type 2 diabetes (no longer insulin dependent), HTN  who presents today for hospital follow up. Patient was recently admitted to hospital for hypotension,bradycardia and severe hyperkalemia and renal failure. Patient was started on dialysis in the hospital after permcath placement. Bradycardia and hypotension resolved. Today, patient is presenting with orthostasis.    #orthostatic hypotension  #orthostatic dizziness  -likely the cause of her dizziness. Has been going on for 6 months but got worse after hospitalization. She would stand up from sitting and get dizzy for 1 minute.  -orthostatic vitals were positive in clinic today.with severe drop in systolic bp (84 from 143) and diastolic bp (62 from 87).   May be multifactorial including her antihypertensives and dehydration. Patient reports poor po intake and limited fluid intake given ESRD.    Plan:  -decrease clonidine to 0.1 mg twice daily from 0.1 mg 3 times daily   -Slowly get up from sitting, do calf raises, squeeze hands a few times  -maximize fluid intake per nephrology    #HTN  Chronic. Clonidine was started in hospital    Plan:  -decrease clonidine to 0.1 mg twice daily from 0.1 mg 3 times daily   -continue amlodipine 10 mg daily, carvedilol 25 mg BID  -instructed patient to measure blood pressure and bring the log to next visit.     #type 2 diabetes  Fingerstick blood glucose obtained in clinic today is 148.   Previously on metformin 500 mg daily, discontinued at the hospital.a1c 5.9 in april 2023. Denies nausea, vomiting, polyuria, polydipsia. Diabetic foot exam done in clinic today 5/11. Patient may not need pharmacologic management of diabetes in the long term since a1c is very well controlled without medication    Plan:  -Instructed patient to measure blood glucose once daily in the morning. If it is more than 200, let us  know. Bring the blood sugar log   -referral to podiatry    #neuropathy  Bilateral numbness and tingling in feet. Unclear if this is truly from diabetes or from alcohol. B12 was unremarkable in January 2023; No longer taking metformin    Plan:  Podiatry referral    #ESRD  #history of hypokaelmia  On TTS dialysis. Patient states that she tolerates dialysis well. Recently admitted for hyperkalemia and renal failure.  Thought to be secondary to her comorbidities including HTN and diabetes. Ongoing discussion with nephrology about long term dialysis. Patient does not have fistula yet but has permcath.     Plan:  Continue following with nephrology; need to request nephro record ([facility])  Nephrotoxic agents  maximize fluid intake per nephrology    #HOCM  #bradycardia  Normal heart rate in clinic today. Remote monitor, stress test, echocardiogram ordered by cardiology  Denies chest pain, shortness of breath or palpitation    Plan:  Continue Following with cardiology  Cardiology ordered stress test, echocardiogram, remote cardiac monitor    #scalp itching  scalp itching. 1year ago.  always dyed hair, had no problem before.  body was itching then but resolved after applying lotion to body.  dermatology appointment july 2023. Patient is requesting increase in the dose of her hydrocortisone cream which helps with itching briefly. Patient states that she has been waiting 1 year for the appointment.  Will discuss this further on next visit.

## 2023-05-11 NOTE — PROGRESS NOTES
Teaching Physician Attestation      Level of Participation    I discussed with the resident physician the patient's history, exam, assessment and plan in detail.  Topics listed in my addendum were the focus of the visit.  Healthcare maintenance was not addressed this visit unless listed as a topic in my addendum.  I agree with plan as written along with the following additions/modifications:    Lightheaded, consistent with orthostasis  -Patient has lightheadedness for 1 minute when going from sitting to standing.  Current BP is low normal.  -Decrease clonidine to 0.1 twice daily, maximize fluid intake per nephrology recommendations in setting of end-stage renal disease, follow-up in 1 to 2 weeks.    Significant Periphearl neuropathy, possibly secondary to alcohol use, also with hx of dm  -Reported history of high alcohol use, also was previously diabetic although recently blood sugars are relatively well controlled.  Vit b12 nl 1/2023    Plan:  .  Podiatry referral for monitoring and footwear assessment, appreciate support  -monitoring of dm as below  -avoid alcohol    History of diabetes  -Given end-stage renal disease, A1c's are no longer reliable.  Fingerstick glucose here is 140s  -Patient will check a fasting blood sugars for 1 week, follow-up in 1 to 2 weeks for further assessment.      Hospital f/u for hyperkalemia in setting of acute renal failure, also with bradycardia believed per cardio note to be possibly related to dual av node blocking meds.  Hyperkalemia resolved, nephro and cardio following, apprecaite support    F/u closely for scalp itching and orhtostatic sx.

## 2023-05-12 RX ORDER — CLONIDINE HYDROCHLORIDE 0.1 MG/1
0.1 TABLET ORAL 2 TIMES DAILY
Qty: 60 TABLET | COMMUNITY
Start: 2023-05-12 | End: 2023-12-03

## 2023-05-16 ENCOUNTER — TELEPHONE (OUTPATIENT)
Dept: INTERNAL MEDICINE | Facility: OTHER | Age: 58
End: 2023-05-16
Payer: MEDICAID

## 2023-05-16 NOTE — PROGRESS NOTES
Home enrollment completed for the 30 day [employer] Heart monitoring program per [employer]..  >Monitor shipped to patient by Eva.  >Pending Baseline.  >Pending EOS.   As of 8/4/23, monitor is still awaiting activation.  Monitor was never used or returned. This enrollment has been cancelled. The encounter will be no-charged and closed.

## 2023-05-17 NOTE — TELEPHONE ENCOUNTER
I called patient to have her send us the records (office visit notes) from [facility] nephrology. I left voicemail asking her to call us back.

## 2023-05-18 ENCOUNTER — NON-PROVIDER VISIT (OUTPATIENT)
Dept: CARDIOLOGY | Facility: MEDICAL CENTER | Age: 58
End: 2023-05-18
Attending: INTERNAL MEDICINE
Payer: MEDICAID

## 2023-05-18 DIAGNOSIS — R00.2 HEART PALPITATIONS: ICD-10-CM

## 2023-05-26 ENCOUNTER — APPOINTMENT (OUTPATIENT)
Dept: INTERNAL MEDICINE | Facility: OTHER | Age: 58
End: 2023-05-26
Payer: MEDICAID

## 2023-06-02 PROCEDURE — RXMED WILLOW AMBULATORY MEDICATION CHARGE: Performed by: STUDENT IN AN ORGANIZED HEALTH CARE EDUCATION/TRAINING PROGRAM

## 2023-06-07 ENCOUNTER — PHARMACY VISIT (OUTPATIENT)
Dept: PHARMACY | Facility: MEDICAL CENTER | Age: 58
End: 2023-06-07
Payer: COMMERCIAL

## 2023-07-24 PROCEDURE — 99291 CRITICAL CARE FIRST HOUR: CPT

## 2023-07-24 ASSESSMENT — FIBROSIS 4 INDEX: FIB4 SCORE: 1.48

## 2023-07-25 ENCOUNTER — APPOINTMENT (OUTPATIENT)
Dept: RADIOLOGY | Facility: MEDICAL CENTER | Age: 58
DRG: 640 | End: 2023-07-25
Attending: STUDENT IN AN ORGANIZED HEALTH CARE EDUCATION/TRAINING PROGRAM
Payer: MEDICAID

## 2023-07-25 ENCOUNTER — HOSPITAL ENCOUNTER (INPATIENT)
Facility: MEDICAL CENTER | Age: 58
LOS: 1 days | DRG: 640 | End: 2023-07-25
Attending: STUDENT IN AN ORGANIZED HEALTH CARE EDUCATION/TRAINING PROGRAM | Admitting: INTERNAL MEDICINE
Payer: MEDICAID

## 2023-07-25 VITALS
OXYGEN SATURATION: 95 % | SYSTOLIC BLOOD PRESSURE: 189 MMHG | WEIGHT: 129.63 LBS | BODY MASS INDEX: 20.35 KG/M2 | DIASTOLIC BLOOD PRESSURE: 100 MMHG | TEMPERATURE: 98 F | RESPIRATION RATE: 50 BRPM | HEIGHT: 67 IN | HEART RATE: 83 BPM

## 2023-07-25 DIAGNOSIS — E87.5 HYPERKALEMIA: ICD-10-CM

## 2023-07-25 PROBLEM — N18.4 CHRONIC KIDNEY DISEASE (CKD) STAGE G4/A2, SEVERELY DECREASED GLOMERULAR FILTRATION RATE (GFR) BETWEEN 15-29 ML/MIN/1.73 SQUARE METER AND ALBUMINURIA CREATININE RATIO BETWEEN 30-299 MG/G (HCC): Status: ACTIVE | Noted: 2023-07-25

## 2023-07-25 LAB
ALBUMIN SERPL BCP-MCNC: 4.3 G/DL (ref 3.2–4.9)
ALBUMIN/GLOB SERPL: 1 G/DL
ALP SERPL-CCNC: 142 U/L (ref 30–99)
ALT SERPL-CCNC: 50 U/L (ref 2–50)
ANION GAP SERPL CALC-SCNC: 14 MMOL/L (ref 7–16)
APPEARANCE UR: CLEAR
AST SERPL-CCNC: 43 U/L (ref 12–45)
BACTERIA #/AREA URNS HPF: NEGATIVE /HPF
BASOPHILS # BLD AUTO: 0.3 % (ref 0–1.8)
BASOPHILS # BLD: 0.02 K/UL (ref 0–0.12)
BILIRUB SERPL-MCNC: 0.2 MG/DL (ref 0.1–1.5)
BILIRUB UR QL STRIP.AUTO: NEGATIVE
BUN SERPL-MCNC: 62 MG/DL (ref 8–22)
CALCIUM ALBUM COR SERPL-MCNC: 8.3 MG/DL (ref 8.5–10.5)
CALCIUM SERPL-MCNC: 8.5 MG/DL (ref 8.5–10.5)
CHLORIDE SERPL-SCNC: 102 MMOL/L (ref 96–112)
CO2 SERPL-SCNC: 19 MMOL/L (ref 20–33)
COLOR UR: YELLOW
CREAT SERPL-MCNC: 5.91 MG/DL (ref 0.5–1.4)
CRP SERPL HS-MCNC: <0.3 MG/DL (ref 0–0.75)
EKG IMPRESSION: NORMAL
EKG IMPRESSION: NORMAL
EOSINOPHIL # BLD AUTO: 0.59 K/UL (ref 0–0.51)
EOSINOPHIL NFR BLD: 8.6 % (ref 0–6.9)
EPI CELLS #/AREA URNS HPF: NEGATIVE /HPF
ERYTHROCYTE [DISTWIDTH] IN BLOOD BY AUTOMATED COUNT: 54.9 FL (ref 35.9–50)
ERYTHROCYTE [SEDIMENTATION RATE] IN BLOOD BY WESTERGREN METHOD: >140 MM/HOUR (ref 0–25)
GFR SERPLBLD CREATININE-BSD FMLA CKD-EPI: 8 ML/MIN/1.73 M 2
GLOBULIN SER CALC-MCNC: 4.2 G/DL (ref 1.9–3.5)
GLUCOSE SERPL-MCNC: 120 MG/DL (ref 65–99)
GLUCOSE UR STRIP.AUTO-MCNC: NEGATIVE MG/DL
HCT VFR BLD AUTO: 33.2 % (ref 37–47)
HGB BLD-MCNC: 10.1 G/DL (ref 12–16)
HYALINE CASTS #/AREA URNS LPF: ABNORMAL /LPF
IMM GRANULOCYTES # BLD AUTO: 0.01 K/UL (ref 0–0.11)
IMM GRANULOCYTES NFR BLD AUTO: 0.1 % (ref 0–0.9)
KETONES UR STRIP.AUTO-MCNC: NEGATIVE MG/DL
LACTATE SERPL-SCNC: 0.7 MMOL/L (ref 0.5–2)
LEUKOCYTE ESTERASE UR QL STRIP.AUTO: NEGATIVE
LYMPHOCYTES # BLD AUTO: 1.86 K/UL (ref 1–4.8)
LYMPHOCYTES NFR BLD: 27 % (ref 22–41)
MCH RBC QN AUTO: 30.6 PG (ref 27–33)
MCHC RBC AUTO-ENTMCNC: 30.4 G/DL (ref 32.2–35.5)
MCV RBC AUTO: 100.6 FL (ref 81.4–97.8)
MICRO URNS: ABNORMAL
MONOCYTES # BLD AUTO: 0.43 K/UL (ref 0–0.85)
MONOCYTES NFR BLD AUTO: 6.2 % (ref 0–13.4)
NEUTROPHILS # BLD AUTO: 3.98 K/UL (ref 1.82–7.42)
NEUTROPHILS NFR BLD: 57.8 % (ref 44–72)
NITRITE UR QL STRIP.AUTO: NEGATIVE
NRBC # BLD AUTO: 0 K/UL
NRBC BLD-RTO: 0 /100 WBC (ref 0–0.2)
PH UR STRIP.AUTO: 8 [PH] (ref 5–8)
PLATELET # BLD AUTO: 151 K/UL (ref 164–446)
PMV BLD AUTO: 11.6 FL (ref 9–12.9)
POTASSIUM SERPL-SCNC: 7.2 MMOL/L (ref 3.6–5.5)
PROT SERPL-MCNC: 8.5 G/DL (ref 6–8.2)
PROT UR QL STRIP: 100 MG/DL
RBC # BLD AUTO: 3.3 M/UL (ref 4.2–5.4)
RBC # URNS HPF: ABNORMAL /HPF
RBC UR QL AUTO: NEGATIVE
SODIUM SERPL-SCNC: 135 MMOL/L (ref 135–145)
SP GR UR STRIP.AUTO: 1.01
TROPONIN T SERPL-MCNC: 36 NG/L (ref 6–19)
TROPONIN T SERPL-MCNC: 39 NG/L (ref 6–19)
UROBILINOGEN UR STRIP.AUTO-MCNC: 0.2 MG/DL
WBC # BLD AUTO: 6.9 K/UL (ref 4.8–10.8)
WBC #/AREA URNS HPF: ABNORMAL /HPF

## 2023-07-25 PROCEDURE — 84484 ASSAY OF TROPONIN QUANT: CPT | Mod: 91

## 2023-07-25 PROCEDURE — 36415 COLL VENOUS BLD VENIPUNCTURE: CPT

## 2023-07-25 PROCEDURE — 700111 HCHG RX REV CODE 636 W/ 250 OVERRIDE (IP): Performed by: INTERNAL MEDICINE

## 2023-07-25 PROCEDURE — 85652 RBC SED RATE AUTOMATED: CPT

## 2023-07-25 PROCEDURE — 83605 ASSAY OF LACTIC ACID: CPT

## 2023-07-25 PROCEDURE — 93971 EXTREMITY STUDY: CPT | Mod: LT

## 2023-07-25 PROCEDURE — 71275 CT ANGIOGRAPHY CHEST: CPT

## 2023-07-25 PROCEDURE — 80053 COMPREHEN METABOLIC PANEL: CPT

## 2023-07-25 PROCEDURE — 90935 HEMODIALYSIS ONE EVALUATION: CPT

## 2023-07-25 PROCEDURE — 99255 IP/OBS CONSLTJ NEW/EST HI 80: CPT | Performed by: HOSPITALIST

## 2023-07-25 PROCEDURE — 96375 TX/PRO/DX INJ NEW DRUG ADDON: CPT

## 2023-07-25 PROCEDURE — A9270 NON-COVERED ITEM OR SERVICE: HCPCS | Performed by: STUDENT IN AN ORGANIZED HEALTH CARE EDUCATION/TRAINING PROGRAM

## 2023-07-25 PROCEDURE — 70498 CT ANGIOGRAPHY NECK: CPT

## 2023-07-25 PROCEDURE — 5A1D70Z PERFORMANCE OF URINARY FILTRATION, INTERMITTENT, LESS THAN 6 HOURS PER DAY: ICD-10-PCS | Performed by: INTERNAL MEDICINE

## 2023-07-25 PROCEDURE — 700117 HCHG RX CONTRAST REV CODE 255: Performed by: STUDENT IN AN ORGANIZED HEALTH CARE EDUCATION/TRAINING PROGRAM

## 2023-07-25 PROCEDURE — 87040 BLOOD CULTURE FOR BACTERIA: CPT

## 2023-07-25 PROCEDURE — 700111 HCHG RX REV CODE 636 W/ 250 OVERRIDE (IP): Mod: JZ,UD | Performed by: STUDENT IN AN ORGANIZED HEALTH CARE EDUCATION/TRAINING PROGRAM

## 2023-07-25 PROCEDURE — 700101 HCHG RX REV CODE 250: Mod: UD | Performed by: STUDENT IN AN ORGANIZED HEALTH CARE EDUCATION/TRAINING PROGRAM

## 2023-07-25 PROCEDURE — A9270 NON-COVERED ITEM OR SERVICE: HCPCS | Performed by: INTERNAL MEDICINE

## 2023-07-25 PROCEDURE — 86140 C-REACTIVE PROTEIN: CPT

## 2023-07-25 PROCEDURE — 770020 HCHG ROOM/CARE - TELE (206)

## 2023-07-25 PROCEDURE — 93005 ELECTROCARDIOGRAM TRACING: CPT | Performed by: STUDENT IN AN ORGANIZED HEALTH CARE EDUCATION/TRAINING PROGRAM

## 2023-07-25 PROCEDURE — 700102 HCHG RX REV CODE 250 W/ 637 OVERRIDE(OP): Mod: UD | Performed by: STUDENT IN AN ORGANIZED HEALTH CARE EDUCATION/TRAINING PROGRAM

## 2023-07-25 PROCEDURE — 85025 COMPLETE CBC W/AUTO DIFF WBC: CPT

## 2023-07-25 PROCEDURE — 99291 CRITICAL CARE FIRST HOUR: CPT | Performed by: INTERNAL MEDICINE

## 2023-07-25 PROCEDURE — 700111 HCHG RX REV CODE 636 W/ 250 OVERRIDE (IP)

## 2023-07-25 PROCEDURE — 700102 HCHG RX REV CODE 250 W/ 637 OVERRIDE(OP): Performed by: INTERNAL MEDICINE

## 2023-07-25 PROCEDURE — 81001 URINALYSIS AUTO W/SCOPE: CPT

## 2023-07-25 PROCEDURE — 96365 THER/PROPH/DIAG IV INF INIT: CPT

## 2023-07-25 PROCEDURE — 71045 X-RAY EXAM CHEST 1 VIEW: CPT

## 2023-07-25 RX ORDER — HEPARIN SODIUM 1000 [USP'U]/ML
3700 INJECTION, SOLUTION INTRAVENOUS; SUBCUTANEOUS
Status: DISCONTINUED | OUTPATIENT
Start: 2023-07-25 | End: 2023-07-25 | Stop reason: HOSPADM

## 2023-07-25 RX ORDER — HEPARIN SODIUM 5000 [USP'U]/ML
5000 INJECTION, SOLUTION INTRAVENOUS; SUBCUTANEOUS EVERY 8 HOURS
Status: DISCONTINUED | OUTPATIENT
Start: 2023-07-25 | End: 2023-07-25 | Stop reason: HOSPADM

## 2023-07-25 RX ORDER — DEXTROSE MONOHYDRATE 25 G/50ML
25 INJECTION, SOLUTION INTRAVENOUS ONCE
Status: COMPLETED | OUTPATIENT
Start: 2023-07-25 | End: 2023-07-25

## 2023-07-25 RX ORDER — CARVEDILOL 25 MG/1
25 TABLET ORAL 2 TIMES DAILY WITH MEALS
Status: DISCONTINUED | OUTPATIENT
Start: 2023-07-25 | End: 2023-07-25 | Stop reason: HOSPADM

## 2023-07-25 RX ORDER — ASPIRIN 325 MG
325 TABLET ORAL ONCE
Status: COMPLETED | OUTPATIENT
Start: 2023-07-25 | End: 2023-07-25

## 2023-07-25 RX ORDER — HEPARIN SODIUM 1000 [USP'U]/ML
1500 INJECTION, SOLUTION INTRAVENOUS; SUBCUTANEOUS
Status: DISCONTINUED | OUTPATIENT
Start: 2023-07-25 | End: 2023-07-25 | Stop reason: HOSPADM

## 2023-07-25 RX ORDER — HEPARIN SODIUM 1000 [USP'U]/ML
INJECTION, SOLUTION INTRAVENOUS; SUBCUTANEOUS
Status: COMPLETED
Start: 2023-07-25 | End: 2023-07-25

## 2023-07-25 RX ORDER — BISACODYL 10 MG
10 SUPPOSITORY, RECTAL RECTAL
Status: DISCONTINUED | OUTPATIENT
Start: 2023-07-25 | End: 2023-07-25 | Stop reason: HOSPADM

## 2023-07-25 RX ORDER — AMLODIPINE BESYLATE 10 MG/1
10 TABLET ORAL
Status: DISCONTINUED | OUTPATIENT
Start: 2023-07-25 | End: 2023-07-25 | Stop reason: HOSPADM

## 2023-07-25 RX ORDER — POLYETHYLENE GLYCOL 3350 17 G/17G
1 POWDER, FOR SOLUTION ORAL
Status: DISCONTINUED | OUTPATIENT
Start: 2023-07-25 | End: 2023-07-25 | Stop reason: HOSPADM

## 2023-07-25 RX ORDER — AMOXICILLIN 250 MG
2 CAPSULE ORAL 2 TIMES DAILY
Status: DISCONTINUED | OUTPATIENT
Start: 2023-07-25 | End: 2023-07-25 | Stop reason: HOSPADM

## 2023-07-25 RX ORDER — CALCIUM GLUCONATE 20 MG/ML
2 INJECTION, SOLUTION INTRAVENOUS ONCE
Status: COMPLETED | OUTPATIENT
Start: 2023-07-25 | End: 2023-07-25

## 2023-07-25 RX ADMIN — HEPARIN SODIUM 1500 UNITS: 1000 INJECTION, SOLUTION INTRAVENOUS; SUBCUTANEOUS at 07:10

## 2023-07-25 RX ADMIN — DEXTROSE MONOHYDRATE 25 G: 25 INJECTION, SOLUTION INTRAVENOUS at 04:12

## 2023-07-25 RX ADMIN — AMLODIPINE BESYLATE 10 MG: 10 TABLET ORAL at 10:28

## 2023-07-25 RX ADMIN — CALCIUM GLUCONATE 2 G: 20 INJECTION, SOLUTION INTRAVENOUS at 04:15

## 2023-07-25 RX ADMIN — HEPARIN SODIUM 3700 UNITS: 1000 INJECTION, SOLUTION INTRAVENOUS; SUBCUTANEOUS at 10:15

## 2023-07-25 RX ADMIN — ASPIRIN 325 MG: 325 TABLET ORAL at 05:24

## 2023-07-25 RX ADMIN — FENTANYL CITRATE 50 MCG: 50 INJECTION, SOLUTION INTRAMUSCULAR; INTRAVENOUS at 04:25

## 2023-07-25 RX ADMIN — CARVEDILOL 25 MG: 25 TABLET, FILM COATED ORAL at 10:27

## 2023-07-25 RX ADMIN — INSULIN HUMAN 6 UNITS: 100 INJECTION, SOLUTION PARENTERAL at 04:12

## 2023-07-25 RX ADMIN — IOHEXOL 80 ML: 350 INJECTION, SOLUTION INTRAVENOUS at 03:45

## 2023-07-25 ASSESSMENT — PAIN DESCRIPTION - PAIN TYPE
TYPE: ACUTE PAIN
TYPE: ACUTE PAIN

## 2023-07-25 ASSESSMENT — ENCOUNTER SYMPTOMS
PSYCHIATRIC NEGATIVE: 1
ABDOMINAL PAIN: 0
FEVER: 1
NECK PAIN: 1
VOMITING: 0
FEVER: 0
EYES NEGATIVE: 1
DIARRHEA: 1
COUGH: 0
PALPITATIONS: 0
NAUSEA: 0
DIAPHORESIS: 1
NEUROLOGICAL NEGATIVE: 1
CHILLS: 1
SHORTNESS OF BREATH: 0

## 2023-07-25 NOTE — PROGRESS NOTES
[facility] Nursing Notes     HD today x 3hrs per Dr Aidan Christianson  New admission STAT order, K - 7.2  Consent signed for HD  Initiated at 0710 and ended 1010     Pre HD assessment     Received patient alert and oriented.   No Sob. No chest pain  Lungs Clear. Vital Signs stable  No complains pre HD.     Edema - mild edema     Access: -Right chest catheter, tunneled      No s/s of infection: no redness, no tenderness, no pus, no oozing of blood, warm to touch.     Intra HD    No issues during HD  Vital signs stable  No complaints        Post HD     UF goal  achieved:   1500mLs - 500 mLs (200mls prime + 300mls rinseback)       Target Net UF : 1000mls     Completed HD tolerated well  Post vital signs stable  Nil complaints, pulse rate within normal limits, no chestpain  Dressing: dry and intact  Heparin Lock A1.8mls. V 1.9mls     Documented by  JUVENAL FIERRO RN    Report given to PCN Hannah Haase RN

## 2023-07-25 NOTE — CONSULTS
Critical Care Consultation    Date of consult: 7/25/2023    Referring Physician  Emiliano Cortes M.D.    Reason for Consultation  Hyperkalemia    History of Presenting Illness  58 y.o. female who presented 7/25/2023 with history of symptomatic bradycardia for which a permanent pacemaker was placed last April.  During that hospitalization she suffered acute kidney injury and has remained hemodialysis dependent.  She receives IHD on Tuesday Thursday and Saturdays.  She presented to the emergency department complaining of left neck pain rating to the back of her head associated with chills and night sweats.  She denied any other symptoms including chest pain, shortness of breath, fevers, nausea vomiting or abdominal pain.    Emergency department evaluation revealed hyperkalemia with a potassium of 7.2.  BUN 62 creatinine 5.9.  She did receive insulin, dextrose, and calcium.  Nephrology was consulted and will coordinate inpatient hemodialysis.  Critical care consultation has been requested for further evaluation and management.      Code Status  Full Code    Review of Systems  Review of Systems   Constitutional:  Positive for chills and fever.   HENT: Negative.     Eyes: Negative.    Respiratory:  Negative for cough and shortness of breath.    Cardiovascular:  Negative for chest pain, palpitations and leg swelling.   Gastrointestinal:  Negative for abdominal pain, nausea and vomiting.   Genitourinary: Negative.    Musculoskeletal:  Positive for neck pain.   Neurological: Negative.    Psychiatric/Behavioral: Negative.     All other systems reviewed and are negative.      Past Medical History   has a past medical history of Acute kidney injury (HCC), Acute necrotizing pyelonephritis, Acute respiratory failure with hypoxia (HCC) (4/23/2023), Acute-on-chronic kidney injury (HCC) (4/23/2023), Alcohol withdrawal (HCC), Arthritis, Cataract, Cirrhosis of liver with ascites (HCC) (3/6/2017), Dental disorder, Diabetes mellitus,  type 2 (HCC), Diabetic ulcer of toe (HCC) (4/30/2020), diarrhea (2019), Foot pain, bilateral, Heart murmur, High cholesterol, HOCM (hypertrophic obstructive cardiomyopathy) (HCC), Hyperammonemia (HCC) (6/24/2016), Hyperkalemia (4/23/2023), Hypertension, Shock (HCC) (4/23/2023), STEMI (ST elevation myocardial infarction) (HCC), Thrombocytopenia (HCC), and Tobacco abuse (12/8/2014).    She has no past medical history of Anesthesia, Asthma, Blood clotting disorder (HCC), Breath shortness, Cancer (HCC), Disorder of thyroid, Glaucoma, Heart burn, Hepatitis A, Hepatitis B, Hepatitis C, Indigestion, Seizure (HCC), Sleep apnea, Snoring, Stroke (HCC), or Urinary incontinence.    Surgical History   has a past surgical history that includes other; pr upper gi endoscopy,ligat varix (9/25/2019); gastroscopy-endo (9/25/2019); primary c section (2006); and pr upper gi endoscopy,diagnosis (N/A, 3/4/2022).    Family History  family history includes Cancer in her father; Hypertension in her mother.    Social History   reports that she quit smoking about 3 years ago. Her smoking use included cigarettes. She started smoking about 7 years ago. She has a 10.00 pack-year smoking history. She has never used smokeless tobacco. She reports that she does not currently use alcohol after a past usage of about 0.6 oz of alcohol per week. She reports that she does not use drugs.    Medications  Home Medications       Reviewed by Renny Limon R.N. (Registered Nurse) on 07/24/23 at 2302  Med List Status: Partial     Medication Last Dose Status   Alcohol Swabs  Active   amLODIPine (NORVASC) 10 MG Tab  Active   Ascorbic Acid (VITAMIN C PO)  Active   atorvastatin (LIPITOR) 20 MG Tab  Active   Blood Glucose Meter Kit  Active   Blood Glucose Test Strips  Active   carvedilol (COREG) 25 MG Tab  Active   cloNIDine (CATAPRES) 0.1 MG Tab  Active   Lancets  Active   omeprazole (PRILOSEC) 20 MG delayed-release capsule  Active                  Current  Facility-Administered Medications   Medication Dose Route Frequency Provider Last Rate Last Admin    aspirin (Asa) tablet 325 mg  325 mg Oral Once Severo Jorgensen M.D.        senna-docusate (Pericolace Or Senokot S) 8.6-50 MG per tablet 2 Tablet  2 Tablet Oral BID Emiliano Cortes M.D.        And    polyethylene glycol/lytes (Miralax) PACKET 1 Packet  1 Packet Oral QDAY PRN Emiliano Cortes M.D.        And    magnesium hydroxide (Milk Of Magnesia) suspension 30 mL  30 mL Oral QDAY PRN Emiliano Cortes M.D.        And    bisacodyl (Dulcolax) suppository 10 mg  10 mg Rectal QDAY PRN Emiliano Cortes M.D.        heparin injection 5,000 Units  5,000 Units Subcutaneous Q8HRS Emiliano Cortes M.D.        amLODIPine (Norvasc) tablet 10 mg  10 mg Oral Q DAY Emiliano Cortes M.D.        carvedilol (Coreg) tablet 25 mg  25 mg Oral BID WITH MEALS Emiliano Cortes M.D.         Current Outpatient Medications   Medication Sig Dispense Refill    Alcohol Swabs Wipe site with prep pad prior to injection. 100 Each 6    cloNIDine (CATAPRES) 0.1 MG Tab Take 1 Tablet by mouth 2 times a day. 60 Tablet     atorvastatin (LIPITOR) 20 MG Tab Take 1 Tablet by mouth every evening. 60 Tablet 1    Blood Glucose Meter Kit Test blood sugar as recommended by provider. Freestyle Negro blood glucose monitoring kit. 1 Kit 0    Blood Glucose Test Strips Use one Freestyle Negro strip to test blood sugar once daily . 100 Strip 0    Lancets Use one Freestyle Negro lancet to test blood sugar once daily . 100 Each 0    amLODIPine (NORVASC) 10 MG Tab Take 1 Tablet by mouth every day. Do not take this medication the morning of dialysis days 30 Tablet 3    carvedilol (COREG) 25 MG Tab Take 1 Tablet by mouth 2 times a day with meals. Do not take this medication the morning of dialysis days 60 Tablet 3    omeprazole (PRILOSEC) 20 MG delayed-release capsule TAKE 1 CAPSULE BY MOUTH ONCE DAILY 30 MINUTES BEFORE BREAKFAST MEAL      Ascorbic Acid (VITAMIN C PO) Take 1  Tablet by mouth every day.         Allergies  No Known Allergies    Vital Signs last 24 hours  Temp:  [36.2 °C (97.2 °F)] 36.2 °C (97.2 °F)  Pulse:  [72-77] 77  Resp:  [16-18] 17  BP: (132-166)/(74-84) 154/79  SpO2:  [93 %-97 %] 95 %      Physical Exam  Constitutional:       General: She is not in acute distress.  HENT:      Mouth/Throat:      Mouth: Mucous membranes are moist.   Eyes:      Pupils: Pupils are equal, round, and reactive to light.   Cardiovascular:      Rate and Rhythm: Normal rate and regular rhythm.      Heart sounds: No murmur heard.     No friction rub. No gallop.   Pulmonary:      Effort: Pulmonary effort is normal. No respiratory distress.      Breath sounds: Normal breath sounds.   Abdominal:      General: Abdomen is flat. There is no distension.      Palpations: Abdomen is soft. There is no mass.      Tenderness: There is no abdominal tenderness.   Musculoskeletal:      Cervical back: Neck supple.      Right lower leg: No edema.      Left lower leg: No edema.   Skin:     General: Skin is warm and dry.   Neurological:      General: No focal deficit present.      Mental Status: She is alert and oriented to person, place, and time.      Cranial Nerves: No cranial nerve deficit.      Motor: No weakness.   Psychiatric:         Mood and Affect: Mood normal.         Behavior: Behavior normal.         Fluids  No intake or output data in the 24 hours ending 07/25/23 0523    Laboratory  Recent Results (from the past 48 hour(s))   CBC With Differential    Collection Time: 07/25/23  1:33 AM   Result Value Ref Range    WBC 6.9 4.8 - 10.8 K/uL    RBC 3.30 (L) 4.20 - 5.40 M/uL    Hemoglobin 10.1 (L) 12.0 - 16.0 g/dL    Hematocrit 33.2 (L) 37.0 - 47.0 %    .6 (H) 81.4 - 97.8 fL    MCH 30.6 27.0 - 33.0 pg    MCHC 30.4 (L) 32.2 - 35.5 g/dL    RDW 54.9 (H) 35.9 - 50.0 fL    Platelet Count 151 (L) 164 - 446 K/uL    MPV 11.6 9.0 - 12.9 fL    Neutrophils-Polys 57.80 44.00 - 72.00 %    Lymphocytes 27.00 22.00  - 41.00 %    Monocytes 6.20 0.00 - 13.40 %    Eosinophils 8.60 (H) 0.00 - 6.90 %    Basophils 0.30 0.00 - 1.80 %    Immature Granulocytes 0.10 0.00 - 0.90 %    Nucleated RBC 0.00 0.00 - 0.20 /100 WBC    Neutrophils (Absolute) 3.98 1.82 - 7.42 K/uL    Lymphs (Absolute) 1.86 1.00 - 4.80 K/uL    Monos (Absolute) 0.43 0.00 - 0.85 K/uL    Eos (Absolute) 0.59 (H) 0.00 - 0.51 K/uL    Baso (Absolute) 0.02 0.00 - 0.12 K/uL    Immature Granulocytes (abs) 0.01 0.00 - 0.11 K/uL    NRBC (Absolute) 0.00 K/uL   Comp Metabolic Panel    Collection Time: 07/25/23  1:33 AM   Result Value Ref Range    Sodium 135 135 - 145 mmol/L    Potassium 7.2 (HH) 3.6 - 5.5 mmol/L    Chloride 102 96 - 112 mmol/L    Co2 19 (L) 20 - 33 mmol/L    Anion Gap 14.0 7.0 - 16.0    Glucose 120 (H) 65 - 99 mg/dL    Bun 62 (H) 8 - 22 mg/dL    Creatinine 5.91 (HH) 0.50 - 1.40 mg/dL    Calcium 8.5 8.5 - 10.5 mg/dL    Correct Calcium 8.3 (L) 8.5 - 10.5 mg/dL    AST(SGOT) 43 12 - 45 U/L    ALT(SGPT) 50 2 - 50 U/L    Alkaline Phosphatase 142 (H) 30 - 99 U/L    Total Bilirubin 0.2 0.1 - 1.5 mg/dL    Albumin 4.3 3.2 - 4.9 g/dL    Total Protein 8.5 (H) 6.0 - 8.2 g/dL    Globulin 4.2 (H) 1.9 - 3.5 g/dL    A-G Ratio 1.0 g/dL   Lactic Acid    Collection Time: 07/25/23  1:33 AM   Result Value Ref Range    Lactic Acid 0.7 0.5 - 2.0 mmol/L   C Reactive Protein Quantitative (Non-Cardiac)    Collection Time: 07/25/23  1:33 AM   Result Value Ref Range    Stat C-Reactive Protein <0.30 0.00 - 0.75 mg/dL   ESTIMATED GFR    Collection Time: 07/25/23  1:33 AM   Result Value Ref Range    GFR (CKD-EPI) 8 (A) >60 mL/min/1.73 m 2   TROPONIN    Collection Time: 07/25/23  1:55 AM   Result Value Ref Range    Troponin T 36 (H) 6 - 19 ng/L   EKG    Collection Time: 07/25/23  2:29 AM   Result Value Ref Range    Report       [facility] Emergency Dept.    Test Date:  2023-07-25  Pt Name:    PO ELKINS BRIAN                Department: ER  MRN:        [id number]                       Room:       BL 17  Gender:     Female                       Technician: [id number]  :        1965                   Requested By:SUJATHA MONTGOMERY  Order #:    [id number]                    Reading MD:    Measurements  Intervals                                Axis  Rate:       71                           P:          73  NC:         200                          QRS:        57  QRSD:       106                          T:          99  QT:         453  QTc:        493    Interpretive Statements  Sinus rhythm  Probable LVH with secondary repol abnrm  Borderline prolonged QT interval  Compared to ECG 2023 17:09:34  No significant changes     EKG    Collection Time: 23  4:50 AM   Result Value Ref Range    Report       [facility] Emergency Dept.    Test Date:  2023  Pt Name:    PO TORRES                Department: ER  MRN:        [id number]                      Room:        17  Gender:     Female                       Technician: [id number]  :        1965                   Requested By:SUJATHA MONTGOMERY  Order #:    [id number]                    Reading MD:    Measurements  Intervals                                Axis  Rate:       78                           P:          50  NC:         204                          QRS:        50  QRSD:       98                           T:          102  QT:         402  QTc:        458    Interpretive Statements  Sinus rhythm  Borderline prolonged NC interval  Probable left atrial enlargement  Left ventricular hypertrophy  Anterior infarct, old  Abnormal T, consider ischemia, lateral leads  Compared to ECG 2023 02:29:03  Myocardial infarct finding now present  T-wave abnormality now present  Possible ischemia now present         Imaging  CT-CTA NECK WITH & W/O-POST PROCESSING   Final Result         1.  CT angiogram of the neck within normal limits.         CT-CTA CHEST PULMONARY ARTERY W/ RECONS   Final Result         1.  No pulmonary  embolus appreciated.   2.  Irregular hepatic contour favoring changes of cirrhosis   3.  Atherosclerosis and atherosclerotic coronary artery disease.      US-EXTREMITY VENOUS UPPER UNILAT LEFT   Final Result      DX-CHEST-PORTABLE (1 VIEW)   Final Result         1.  No acute cardiopulmonary disease.   2.  Atherosclerosis          Assessment/Plan  * Hyperkalemia- (present on admission)  Assessment & Plan  Consistent with CKD 4 on hemodialysis  No pathologic EKG changes  Hemodialysis pending    Chronic kidney disease (CKD) stage G4/A2, severely decreased glomerular filtration rate (GFR) between 15-29 mL/min/1.73 square meter and albuminuria creatinine ratio between  mg/g ([facility])  Assessment & Plan  Nephrology consulted  Hemodialysis pending    Primary hypertension- (present on admission)  Assessment & Plan  Continue Coreg regimen 25 mg twice daily  Continue amlodipine regimen 10 mg daily  Continue clonidine 0.1 mg 3 times daily as needed breakthrough hypertension    Elevated troponin  Assessment & Plan  Troponin mildly elevated, consistent with reduced GFR  No ST segment elevation on EKG.  Doubt ACS  Continuous telemetry  Cardiology consultation as clinically indicated    Type 2 diabetes mellitus (HCC)- (present on admission)  Assessment & Plan  Monitor glycemic control with insulin therapy        Discussed patient condition and risk of morbidity and/or mortality with Family, Patient, and ERP .      The patient remains critically ill,  I have assessed and reassessed the blood pressure,  cardiovascular status, labs and response to interventions. This patient remains at high risk for worsening shock and death without the above critical care interventions.    Critical care time = 90 minutes in directly providing and coordinating critical care and extensive data review.  No time overlap and excludes procedures.

## 2023-07-25 NOTE — PROGRESS NOTES
4 Eyes Skin Assessment Completed by PRUDENCE Oropeza and PRUDENCE Hairston.    Head WDL  Ears WDL  Nose WDL  Mouth WDL  Neck WDL  Breast/Chest WDL  Shoulder Blades WDL  Spine WDL  (R) Arm/Elbow/Hand WDL  (L) Arm/Elbow/Hand WDL  Abdomen WDL  Groin WDL  Scrotum/Coccyx/Buttocks Redness and Blanching  (R) Leg WDL  (L) Leg WDL  (R) Heel/Foot/Toe WDL  (L) Heel/Foot/Toe WDL          Devices In Places ECG, Blood Pressure Cuff, and Pulse Ox      Interventions In Place Pillows, Q2 Turns, and Low Air Loss Mattress    Possible Skin Injury No    Pictures Uploaded Into Epic N/A  Wound Consult Placed N/A  RN Wound Prevention Protocol Ordered Yes

## 2023-07-25 NOTE — ED TRIAGE NOTES
"Chief Complaint   Patient presents with    Flu Like Symptoms     Pt presents with headache, arm pain and cold sweats and diarrhea x3 days.        Pt ambulatory to triage. Pt A&Ox4, for above complaint.     Pt to lobby . Pt educated on alerting staff in changes to condition. Pt verbalized understanding.     BP (!) 149/84   Pulse 76   Temp 36.2 °C (97.2 °F) (Temporal)   Resp 16   Ht 1.702 m (5' 7\")   Wt 58.8 kg (129 lb 10.1 oz)   SpO2 97%   BMI 20.30 kg/m²   "

## 2023-07-25 NOTE — PROGRESS NOTES
Pulmonary/Critical Care Medicine   Progress Note    Date of service: 7/25/2023  Time: 0700    Ms. Lesvia Adames is a 58 year old lady with the past medical history of symptomatic bradycardia s/p permanent pacemaker placement in April 2023 whose hospital course of complicated by RUBEN and remains hemodialysis dependent who presented to the ER with neck pain with chills and night sweats.  She has been compliant with dialysis.  She gets HD on Tuesdays, Thursdays, and Saturdays.  He was found to have a potassium of 7.2.  She received insulin, dextrose, and calcium.  She is getting emergent HD now in the ICU.  No EKG changes consistent with hyperkalemia.  She has no ICU needs since she is receiving HD.  I discussed the case with Dr. Bloom who will take over care and the critical care team will sign off at this time.    Kaylene Alicea MD  Pulmonary and Critical Care Medicine

## 2023-07-25 NOTE — ASSESSMENT & PLAN NOTE
Reported history of though she has not any medications and her last hemoglobin A1c was 5.93 months ago

## 2023-07-25 NOTE — ASSESSMENT & PLAN NOTE
Severely elevated potassium at 7.2 despite compliance with dialysis and not starting any new medications that would elevate potassium  She has been admitted to the intensive care unit for emergent dialysis we will check a basic metabolic panel after dialysis to follow the potassium and continue to monitor on telemetry monitoring  She is not on Aldactone, ARB, nor an ACE inhibitor

## 2023-07-25 NOTE — DISCHARGE SUMMARY
Discharge Summary    CHIEF COMPLAINT ON ADMISSION  Chief Complaint   Patient presents with    Flu Like Symptoms     Pt presents with headache, arm pain and cold sweats and diarrhea x3 days.        Reason for Admission  Head and L arm pain     Admission Date  7/25/2023    CODE STATUS  Full Code    HPI & HOSPITAL COURSE  This is a 58 y.o. female here with  diarrhea, night sweats, chills.  Ms. Adames has a history of severe concentric left ventricular hypertrophy, diabetes mellitus, cirrhosis, and hypertension that was recently admitted in April with hypotension blood pressures 49/33 and acute on chronic renal failure for which she was initiated on dialysis and is followed by [facility] nephrology.  She presented to emergency room with complaints of diarrhea, headache, arm pain, sweats.  She was found to have a potassium 7.2 and was admitted to the intensive care unit for emergent dialysis.  She states she has been compliant with dialysis Tuesday, Thursday, Saturday.  In the emergency room because of neck pain a CT angiogram of the pulmonary arteries as well as neck was performed and was negative.     7/25/2023: Patient seen and evaluated in ICU.  She is underwent dialysis.  We discussed her outpatient dialysis and she states she has not missed any sessions.  She states she has not been started on any blood pressure medications that would raise her potassium. I discussed with Dr. Magana nephrology who has strongly advocated a low potassium diet and has arranged for an extra dialysis treatment tomorrow at 3:30 at her dialysis unit.     Therefore, she is discharged in good and stable condition to home with close outpatient follow-up.    The patient recovered much more quickly than anticipated on admission.    Discharge Date  7/25    FOLLOW UP ITEMS POST DISCHARGE  nephrology    DISCHARGE DIAGNOSES  Principal Problem:    Hyperkalemia (POA: Yes)  Active Problems:    Chronic kidney disease (CKD) stage G4/A2, severely  decreased glomerular filtration rate (GFR) between 15-29 mL/min/1.73 square meter and albuminuria creatinine ratio between  mg/g (HCC) (POA: Yes)    HOCM (hypertrophic obstructive cardiomyopathy) (HCC) (POA: Yes)      Overview: Symptomatic CP/COURTNEY/Orthostatis. Dx 12/2014. Normal coronaries. Complicated       by alcohol abuse and liver disease.    Elevated troponin (POA: Yes)    Primary hypertension (POA: Yes)    Type 2 diabetes mellitus (HCC) (POA: Yes)  Resolved Problems:    * No resolved hospital problems. *      FOLLOW UP  Future Appointments   Date Time Provider Department Center   8/28/2023  3:15 PM IHV EXAM 12 ECHO [facility] [address]   9/8/2023  1:00 PM [facility] [place] T[facility]MILL ONLY [facility]   9/29/2023  9:00 AM Sung Dowell M.D. [facility] None     Kamala Pierre M.D.  [address] [facility] [address]  [phone]    Follow up        MEDICATIONS ON DISCHARGE     Medication List        CONTINUE taking these medications        Instructions   Alcohol Swabs   Doctor's comments: Per formulary preference. ICD-10 code: E11.9 Controlled type 2 Diabetes Mellitus  Wipe site with prep pad prior to injection.     amLODIPine 10 MG Tabs  Commonly known as: Norvasc   Doctor's comments: Do not take this medication the morning of dialysis days  Take 1 Tablet by mouth every day. Do not take this medication the morning of dialysis days  Dose: 10 mg     atorvastatin 20 MG Tabs  Commonly known as: Lipitor   Take 1 Tablet by mouth every evening.  Dose: 20 mg     * Blood Glucose Meter Kit   Doctor's comments: Or per formulary preference. ICD-10 code: E11.65 Uncontrolled type 2 Diabetes Mellitus  Test blood sugar as recommended by provider. Freestyle Negro blood glucose monitoring kit.     * Blood Glucose Test Strips   Doctor's comments: Or per formulary preference. ICD-10 code: E11.9 Controlled type 2 Diabetes Mellitus  Use one Freestyle Negro strip to test blood sugar once daily .     carvedilol 25 MG Tabs  Commonly  known as: Coreg   Doctor's comments: Do not take this medication the morning of dialysis days  Take 1 Tablet by mouth 2 times a day with meals. Do not take this medication the morning of dialysis days  Dose: 25 mg     cloNIDine 0.1 MG Tabs  Commonly known as: Catapres   Take 1 Tablet by mouth 2 times a day.  Dose: 0.1 mg     Lancets   Doctor's comments: Or per formulary preference. ICD-10 code: E11.9 Controlled type 2 Diabetes Mellitus  Use one Freestyle Negro lancet to test blood sugar once daily .     omeprazole 20 MG delayed-release capsule  Commonly known as: PriLOSEC   TAKE 1 CAPSULE BY MOUTH ONCE DAILY 30 MINUTES BEFORE BREAKFAST MEAL           * This list has 2 medication(s) that are the same as other medications prescribed for you. Read the directions carefully, and ask your doctor or other care provider to review them with you.                ASK your doctor about these medications        Instructions   VITAMIN C PO   Take 1 Tablet by mouth every day.  Dose: 1 Tablet              Allergies  No Known Allergies    DIET  Orders Placed This Encounter   Procedures    Diet Order Diet: Renal     Standing Status:   Standing     Number of Occurrences:   1     Order Specific Question:   Diet:     Answer:   Renal [8]       ACTIVITY  As tolerated.      CONSULTATIONS  Nephrology  Critical care    PROCEDURES  Dialysis     LABORATORY  Lab Results   Component Value Date    SODIUM 135 07/25/2023    POTASSIUM 7.2 (HH) 07/25/2023    CHLORIDE 102 07/25/2023    CO2 19 (L) 07/25/2023    GLUCOSE 120 (H) 07/25/2023    BUN 62 (H) 07/25/2023    CREATININE 5.91 (HH) 07/25/2023    CREATININE 0.5 05/27/2006        Lab Results   Component Value Date    WBC 6.9 07/25/2023    HEMOGLOBIN 10.1 (L) 07/25/2023    HEMATOCRIT 33.2 (L) 07/25/2023    PLATELETCT 151 (L) 07/25/2023      CT-CTA NECK WITH & W/O-POST PROCESSING   Final Result         1.  CT angiogram of the neck within normal limits.         CT-CTA CHEST PULMONARY ARTERY W/ RECONS    Final Result         1.  No pulmonary embolus appreciated.   2.  Irregular hepatic contour favoring changes of cirrhosis   3.  Atherosclerosis and atherosclerotic coronary artery disease.      US-EXTREMITY VENOUS UPPER UNILAT LEFT   Final Result      DX-CHEST-PORTABLE (1 VIEW)   Final Result         1.  No acute cardiopulmonary disease.   2.  Atherosclerosis            Total time of the discharge process exceeds 35 minutes.

## 2023-07-25 NOTE — ASSESSMENT & PLAN NOTE
Continue Coreg regimen 25 mg twice daily  Continue amlodipine regimen 10 mg daily  Continue clonidine 0.1 mg 3 times daily as needed breakthrough hypertension

## 2023-07-25 NOTE — CONSULTS
[facility] Medicine Consultation    Date of Service  7/25/2023    Referring Physician  Kaylene Alicea M.D.    Consulting Physician  Markus Bloom M.D.    Reason for Consultation  Hypertension     History of Presenting Illness  58 y.o. female who presented 7/25/2023 with diarrhea, night sweats, chills.  Ms. Adames has a history of severe concentric left ventricular hypertrophy, diabetes mellitus, cirrhosis, and hypertension that was recently admitted in April with hypotension blood pressures 49/33 and acute on chronic renal failure for which she was initiated on dialysis and is followed by [facility] nephrology.  She presented to emergency room with complaints of diarrhea, headache, arm pain, sweats.  She was found to have a potassium 7.2 and was admitted to the intensive care unit for emergent dialysis.  She states she has been compliant with dialysis Tuesday, Thursday, Saturday.  In the emergency room because of neck pain a CT angiogram of the pulmonary arteries as well as neck was performed and was negative.    7/25/2023: Patient seen and evaluated in ICU.  She is undergoing dialysis.  We discussed her outpatient dialysis and she states she has not missed any sessions.  She states she has not been started on any blood pressure medications that would raise her potassium.  She had some diarrhea though no vomiting prior to admission.    Review of Systems  Review of Systems   Constitutional:  Positive for chills and diaphoresis. Negative for fever.   Respiratory:  Negative for shortness of breath.    Cardiovascular:  Negative for chest pain and leg swelling.   Gastrointestinal:  Positive for diarrhea. Negative for abdominal pain, nausea and vomiting.   Neurological:         Left posterior headache   All other systems reviewed and are negative.      Past Medical History   has a past medical history of Acute kidney injury (HCC), Acute necrotizing pyelonephritis, Acute respiratory failure with hypoxia (HCC) (4/23/2023),  Acute-on-chronic kidney injury (HCC) (4/23/2023), Alcohol withdrawal (HCC), Arthritis, Cataract, Cirrhosis of liver with ascites (HCC) (3/6/2017), Dental disorder, Diabetes mellitus, type 2 (HCC), Diabetic ulcer of toe (HCC) (4/30/2020), diarrhea (2019), Foot pain, bilateral, Heart murmur, High cholesterol, HOCM (hypertrophic obstructive cardiomyopathy) (HCC), Hyperammonemia (HCC) (6/24/2016), Hyperkalemia (4/23/2023), Hypertension, Shock (HCC) (4/23/2023), STEMI (ST elevation myocardial infarction) (HCC), Thrombocytopenia (HCC), and Tobacco abuse (12/8/2014).      Surgical History   has a past surgical history that includes other; pr upper gi endoscopy,ligat varix (9/25/2019); gastroscopy-endo (9/25/2019); primary c section (2006); and pr upper gi endoscopy,diagnosis (N/A, 3/4/2022).    Family History  family history includes Cancer in her father; Hypertension in her mother.    Social History   reports that she quit smoking about 3 years ago. Her smoking use included cigarettes. She started smoking about 7 years ago. She has a 10.00 pack-year smoking history. She has never used smokeless tobacco. She reports that she does not currently use alcohol after a past usage of about 0.6 oz of alcohol per week. She reports that she does not use drugs.    Medications  Prior to Admission Medications   Prescriptions Last Dose Informant Patient Reported? Taking?   Alcohol Swabs   No No   Sig: Wipe site with prep pad prior to injection.   Ascorbic Acid (VITAMIN C PO)   Yes No   Sig: Take 1 Tablet by mouth every day.   Blood Glucose Meter Kit   No No   Sig: Test blood sugar as recommended by provider. [employer] blood glucose monitoring kit.   Blood Glucose Test Strips   No No   Sig: Use one Freestyle [employer] strip to test blood sugar once daily .   Lancets   No No   Sig: Use one [employer]estyle [employer] lancet to test blood sugar once daily .   amLODIPine (NORVASC) 10 MG Tab   No No   Sig: Take 1 Tablet by mouth every day. Do not take  this medication the morning of dialysis days   atorvastatin (LIPITOR) 20 MG Tab   No No   Sig: Take 1 Tablet by mouth every evening.   carvedilol (COREG) 25 MG Tab   No No   Sig: Take 1 Tablet by mouth 2 times a day with meals. Do not take this medication the morning of dialysis days   cloNIDine (CATAPRES) 0.1 MG Tab   Yes No   Sig: Take 1 Tablet by mouth 2 times a day.   omeprazole (PRILOSEC) 20 MG delayed-release capsule   Yes No   Sig: TAKE 1 CAPSULE BY MOUTH ONCE DAILY 30 MINUTES BEFORE BREAKFAST MEAL      Facility-Administered Medications: None       Allergies  No Known Allergies    Physical Exam  Temp:  [36.2 °C (97.2 °F)] 36.2 °C (97.2 °F)  Pulse:  [72-80] 80  Resp:  [13-18] 16  BP: (132-166)/(71-91) 158/86  SpO2:  [91 %-97 %] 93 %    Physical Exam  Vitals and nursing note reviewed.   Constitutional:       Appearance: She is ill-appearing.   Cardiovascular:      Rate and Rhythm: Normal rate and regular rhythm.      Heart sounds: Murmur heard.      Comments: Right chest dialysis catheter  Pulmonary:      Effort: Pulmonary effort is normal.      Breath sounds: Normal breath sounds.   Abdominal:      General: There is no distension.      Palpations: Abdomen is soft.      Tenderness: There is no abdominal tenderness.   Musculoskeletal:      Cervical back: Neck supple.      Right lower leg: No edema.      Left lower leg: No edema.   Skin:     General: Skin is warm and dry.   Neurological:      General: No focal deficit present.      Mental Status: She is oriented to person, place, and time.   Psychiatric:         Mood and Affect: Mood normal.         Fluids      Laboratory  Recent Labs     07/25/23  0133   WBC 6.9   RBC 3.30*   HEMOGLOBIN 10.1*   HEMATOCRIT 33.2*   .6*   MCH 30.6   MCHC 30.4*   RDW 54.9*   PLATELETCT 151*   MPV 11.6     Recent Labs     07/25/23 0133   SODIUM 135   POTASSIUM 7.2*   CHLORIDE 102   CO2 19*   GLUCOSE 120*   BUN 62*   CREATININE 5.91*   CALCIUM 8.5                      Imaging  CT-CTA NECK WITH & W/O-POST PROCESSING   Final Result         1.  CT angiogram of the neck within normal limits.         CT-CTA CHEST PULMONARY ARTERY W/ RECONS   Final Result         1.  No pulmonary embolus appreciated.   2.  Irregular hepatic contour favoring changes of cirrhosis   3.  Atherosclerosis and atherosclerotic coronary artery disease.      US-EXTREMITY VENOUS UPPER UNILAT LEFT   Final Result      DX-CHEST-PORTABLE (1 VIEW)   Final Result         1.  No acute cardiopulmonary disease.   2.  Atherosclerosis          Assessment/Plan  * Hyperkalemia- (present on admission)  Assessment & Plan  Severely elevated potassium at 7.2 despite compliance with dialysis and not starting any new medications that would elevate potassium  She has been admitted to the intensive care unit for emergent dialysis we will check a basic metabolic panel after dialysis to follow the potassium and continue to monitor on telemetry monitoring  She is not on Aldactone, ARB, nor an ACE inhibitor    Chronic kidney disease (CKD) stage G4/A2, severely decreased glomerular filtration rate (GFR) between 15-29 mL/min/1.73 square meter and albuminuria creatinine ratio between  mg/g (HCC)- (present on admission)  Assessment & Plan  She has a right sided tunneled dialysis catheter  Nephrology has consulted    Type 2 diabetes mellitus (HCC)- (present on admission)  Assessment & Plan  Reported history of though she has not any medications and her last hemoglobin A1c was 5.93 months ago    Primary hypertension- (present on admission)  Assessment & Plan  Continue Norvasc and Coreg with holding parameters    Elevated troponin- (present on admission)  Assessment & Plan  Mild elevation likely a stress response in setting of hyperkalemia    HOCM (hypertrophic obstructive cardiomyopathy) ([facility])- (present on admission)  Assessment & Plan  Noted on echocardiogram in April known history of this prior  She has been followed by  cardiology

## 2023-07-25 NOTE — DISCHARGE INSTRUCTIONS
Discharge Instructions per Markus Bloom M.D.    Please undergo 2 hours of dialysis tomorrow at your dialysis unit at 3:30    DIET: low potassium diet as discussed    ACTIVITY: as tolerated    DIAGNOSIS: elevated potassium levels    Return to ER if symptoms worsen          Discharge Instructions    Discharged to home by car with self. Discharged via walking, hospital escort: Yes.  Special equipment needed: Not Applicable    Be sure to schedule a follow-up appointment with your primary care doctor or any specialists as instructed.     Discharge Plan:   Diet Plan: Discussed  Activity Level: Discussed  Confirmed Follow up Appointment: Patient to Call and Schedule Appointment  Confirmed Symptoms Management: Discussed  Medication Reconciliation Updated: Yes    I understand that a diet low in cholesterol, fat, and sodium is recommended for good health. Unless I have been given specific instructions below for another diet, I accept this instruction as my diet prescription.   Other diet: Low potassium    Special Instructions: None    -Is this patient being discharged with medication to prevent blood clots?  No    Is patient discharged on Warfarin / Coumadin?   No

## 2023-07-25 NOTE — ASSESSMENT & PLAN NOTE
Troponin mildly elevated, consistent with reduced GFR  No ST segment elevation on EKG.  Doubt ACS  Continuous telemetry  Cardiology consultation as clinically indicated

## 2023-07-25 NOTE — ED PROVIDER NOTES
ED Provider Note    CHIEF COMPLAINT  Chief Complaint   Patient presents with    Flu Like Symptoms     Pt presents with headache, arm pain and cold sweats and diarrhea x3 days.        EXTERNAL RECORDS REVIEWED  Inpatient Notes patient discharged 4/27/2023 after an inpatient admission for 4 days in the setting of symptomatic bradycardia, hypotension at that time she was intubated and a temporary pacemaker was inserted.  She had a dialysis catheter placed.  Permanent pacemaker was placed.    HPI/ROS  LIMITATION TO HISTORY   Select: : None  OUTSIDE HISTORIAN(S):  Family daughter at bedside    Rigoberto Adames is a 58 y.o. female who presents to the emergency department for evaluation of chills, night sweats and pain extending from her left upper neck down her left arm.  Recently initiated on hemodialysis after an extended hospitalization and last dialyzed today.  Patient denies fevers.  She denies vomiting but does report nausea and diarrhea.  She denies chest pain or pressure, shortness of breath, swelling of the arm.  She denies any falls, injuries or trauma to the area.  She reports a history of a heart murmur.    PAST MEDICAL HISTORY   has a past medical history of Acute kidney injury (HCC), Acute necrotizing pyelonephritis, Acute respiratory failure with hypoxia (HCC) (4/23/2023), Acute-on-chronic kidney injury (HCC) (4/23/2023), Alcohol withdrawal (HCC), Arthritis, Cataract, Cirrhosis of liver with ascites (HCC) (3/6/2017), Dental disorder, Diabetes mellitus, type 2 (HCC), Diabetic ulcer of toe (HCC) (4/30/2020), diarrhea (2019), Foot pain, bilateral, Heart murmur, High cholesterol, HOCM (hypertrophic obstructive cardiomyopathy) (HCC), Hyperammonemia (HCC) (6/24/2016), Hyperkalemia (4/23/2023), Hypertension, Shock (HCC) (4/23/2023), STEMI (ST elevation myocardial infarction) (HCC), Thrombocytopenia (HCC), and Tobacco abuse (12/8/2014).    SURGICAL HISTORY   has a past surgical history that includes other; upper gi  "endoscopy,ligat varix (9/25/2019); gastroscopy-endo (9/25/2019); primary c section (2006); and upper gi endoscopy,diagnosis (N/A, 3/4/2022).    FAMILY HISTORY  Family History   Problem Relation Age of Onset    Cancer Father         stomach    Hypertension Mother        SOCIAL HISTORY  Social History     Tobacco Use    Smoking status: Former     Packs/day: 1.00     Years: 10.00     Pack years: 10.00     Types: Cigarettes     Start date: 8/12/2015     Quit date: 06/2020     Years since quitting: 3.1    Smokeless tobacco: Never   Vaping Use    Vaping Use: Never used   Substance and Sexual Activity    Alcohol use: Not Currently     Alcohol/week: 0.6 oz     Types: 1 Glasses of wine per week    Drug use: No    Sexual activity: Not on file       CURRENT MEDICATIONS  Home Medications       Reviewed by NILSA ClemensNJessica (Registered Nurse) on 07/24/23 at 2302  Med List Status: Partial     Medication Last Dose Status   Alcohol Swabs  Active   amLODIPine (NORVASC) 10 MG Tab  Active   Ascorbic Acid (VITAMIN C PO)  Active   atorvastatin (LIPITOR) 20 MG Tab  Active   Blood Glucose Meter Kit  Active   Blood Glucose Test Strips  Active   carvedilol (COREG) 25 MG Tab  Active   cloNIDine (CATAPRES) 0.1 MG Tab  Active   Lancets  Active   omeprazole (PRILOSEC) 20 MG delayed-release capsule  Active                    ALLERGIES  No Known Allergies    PHYSICAL EXAM  VITAL SIGNS: BP (!) 154/79   Pulse 77   Temp 36.2 °C (97.2 °F) (Temporal)   Resp 17   Ht 1.702 m (5' 7\")   Wt 58.8 kg (129 lb 10.1 oz)   SpO2 95%   BMI 20.30 kg/m²    Constitutional: No acute distress, sitting up in bed resting comfortably.  HEENT: Atraumatic, normocephalic, pupils are equal round reactive to light, nose normal, mouth shows moist mucous membranes  Neck: Supple, no JVD, no tracheal deviation.  No tenderness with palpation of the left lateral neck.  No subcutaneous emphysema.  No bruit.  Cardiovascular: Regular rate and rhythm, grade 4 holosystolic " murmur, rub or gallop, 2+ pulses peripherally x4  Thorax & Lungs: No respiratory distress, no wheezes, rales or rhonchi, no chest wall tenderness, hemodialysis catheter in place in the right anterior chest wall with no surrounding erythema.  GI: Soft, non-distended, non-tender, no rebound  Skin: Warm, dry, no acute rash or lesion  Musculoskeletal: Moving all extremities, no acute deformity, no edema, no tenderness  Neurologic: A&Ox3, at baseline mentation, cranial nerves II through XII are grossly intact, no sensory deficit, no ataxia  Psychiatric: Appropriate affect for situation at this time      DIAGNOSTIC STUDIES / PROCEDURES  EKG  I have independently interpreted this EKG  EKG interpretation: Sinus rhythm at a rate of 71, normal axis, normal intervals, slight upsloping ST elevation in V1 through V3 following deep S wave.  Half millimeter of ST elevation in 3 and aVF.  Peaked T waves throughout.  Compared to prior EKG peak T waves are more prominent.    LABS  Labs Reviewed   CBC WITH DIFFERENTIAL - Abnormal; Notable for the following components:       Result Value    RBC 3.30 (*)     Hemoglobin 10.1 (*)     Hematocrit 33.2 (*)     .6 (*)     MCHC 30.4 (*)     RDW 54.9 (*)     Platelet Count 151 (*)     Eosinophils 8.60 (*)     Eos (Absolute) 0.59 (*)     All other components within normal limits   COMP METABOLIC PANEL - Abnormal; Notable for the following components:    Potassium 7.2 (*)     Co2 19 (*)     Glucose 120 (*)     Bun 62 (*)     Creatinine 5.91 (*)     Correct Calcium 8.3 (*)     Alkaline Phosphatase 142 (*)     Total Protein 8.5 (*)     Globulin 4.2 (*)     All other components within normal limits   ESTIMATED GFR - Abnormal; Notable for the following components:    GFR (CKD-EPI) 8 (*)     All other components within normal limits   TROPONIN - Abnormal; Notable for the following components:    Troponin T 36 (*)     All other components within normal limits    Narrative:     Biotin intake of  "greater than 5 mg per day may interfere with  troponin levels, causing false low values.   LACTIC ACID   CRP QUANTITIVE (NON-CARDIAC)   BLOOD CULTURE    Narrative:     1 of 2 for Blood Culture x 2 sites order. Per Hospital  Policy: Only change Specimen Src: to \"Line\" if specified by  physician order.   BLOOD CULTURE    Narrative:     2 of 2 blood culture x2  Sites order. Per Hospital Policy:  Only change Specimen Src: to \"Line\" if specified by physician  order.   URINALYSIS   SED RATE   TROPONIN   POCT GLUCOSE   POCT GLUCOSE   POCT GLUCOSE   POCT GLUCOSE         RADIOLOGY  I have independently interpreted the diagnostic imaging associated with this visit and am waiting the final reading from the radiologist.   My preliminary interpretation is as follows:     No large saddle pulmonary embolism.  Radiologist interpretation:   CT-CTA NECK WITH & W/O-POST PROCESSING   Final Result         1.  CT angiogram of the neck within normal limits.         CT-CTA CHEST PULMONARY ARTERY W/ RECONS   Final Result         1.  No pulmonary embolus appreciated.   2.  Irregular hepatic contour favoring changes of cirrhosis   3.  Atherosclerosis and atherosclerotic coronary artery disease.      US-EXTREMITY VENOUS UPPER UNILAT LEFT         DX-CHEST-PORTABLE (1 VIEW)   Final Result         1.  No acute cardiopulmonary disease.   2.  Atherosclerosis            COURSE & MEDICAL DECISION MAKING    ED Observation Status? No; Patient does not meet criteria for ED Observation.     INITIAL ASSESSMENT, COURSE AND PLAN  Care Narrative:     58-year-old female with recent significant hospitalization for symptomatic bradycardia status post transvenous pacemaker placement, ICU admission, renal failure status post hemodialysis catheter insertion, prolonged intubation presenting today for evaluation of left neck and left arm pain.  Patient also endorsing chills and night sweats.  I am concerned for potential bacteremia, endocarditis, septic " thrombophlebitis.  No meningismus, well appearance, no fever makes me less concerned for meningitis.  Possible atypical ACS and will screen with EKG and troponin.  Will obtain CTA neck and CTA chest to evaluate for pulmonary embolism given prolonged hospitalization.  Will obtain blood cultures lactate, inflammatory markers.    3:36 AM called with critical lab values.  Patient with a potassium of 7.2.  This explains peaked T waves on EKG.  No QRS widening however will repeat EKG now to serially assess.  We will treat with IV calcium gluconate, insulin and dextrose.  Will discuss with nephrology for emergent dialysis.  Patient maintained on continuous telemetry.    3:55 AM discussed case with Dr. Christianson, nephrology who will evaluate the patient for emergent dialysis.    4:13 AM Maurilio Pitt, patient's nurse following up with lab with respect to patient's previously collected troponin being discontinued.  Reordered at this time.  Repeat EKG pending.    4:51 AM initial troponin elevated at 36.  Plan to administer aspirin, repeat troponin.  Repeat EKG with no new ischemic changes.  Patient asymptomatic at this time.  Discussed with her the need for admission to the intensive care unit for emergent dialysis.    5:00 AM subsequent EKG with perhaps slightly more pronounced centimeter of ST elevation in lead III.  Otherwise no significant change from prior.  On recheck patient denies any symptoms.  Awaiting repeat troponin.    5:06 AM Case discussed with Dr. Cortes, intensivist who will admit the patient to the hospital.  Appreciate his assistance.    CRITICAL CARE  The very real possibilty of a deterioration of this patient's condition required the highest level of my preparedness for sudden, emergent intervention.  I provided critical care services, which included medication orders, frequent reevaluations of the patient's condition and response to treatment, ordering and reviewing test results, and discussing the case with  various consultants.  The critical care time associated with the care of the patient was 40 minutes. Review chart for interventions. This time is exclusive of any other billable procedures.     ADDITIONAL PROBLEM LIST  End-stage renal disease    DISPOSITION AND DISCUSSIONS  I have discussed management of the patient with the following physicians and JUDI's: Dr. Christianson, nephrology, Dr. Cortes, intensivist    Discussion of management with other [facility] or appropriate source(s): None       FINAL DIAGNOSIS  1. Hyperkalemia           Electronically signed by: Severo Jorgensen M.D., 7/25/2023 1:30 AM

## 2023-07-25 NOTE — ASSESSMENT & PLAN NOTE
Noted on echocardiogram in April known history of this prior  She has been followed by cardiology

## 2023-07-25 NOTE — CONSULTS
"[facility] -  CONSULTATION NOTE               Author: Juanis Magana D.O. Date & Time: 7/25/2023  10:31 AM       REASON FOR CONSULTATION:   - Inpatient hemodialysis management.    CHIEF COMPLAINT:   -  diarrhea, chills, pain    HISTORY OF PRESENT ILLNESS:    59yo female with h/o HTN, DM, cirrhosis, ESRD on HD via TDC presented to ED with c/o diarrhea, chills, L chest/arm/neck pain (all now fully resolved), found to have K of 7.2. She was admitted and provided with HD, seen on HD. Last HD prior to admission was 7/22/23. She reports recent high K diet, denies missing treatment or reducing treatment time. CTA neck/chest completed in ED, no acute concerning findings noted.    Patient is eager to DC to home. Tolerated HD well.     No active F/C/N/V/CP/SOB.  No melena, hematochezia, hematemesis.  No HA, visual changes, or abdominal pain.    REVIEW OF SYSTEMS:    10 point ROS was performed and is as per HPI or otherwise negative    PAST MEDICAL HISTORY:   - ESRD  - HTN  - Anemia of CKD  - CKD-MBD    PAST SURGICAL HISTORY:   - R chest TDC  - EGD    FAMILY HISTORY:   - Reviewed and non contributory to current illness    SOCIAL HISTORY:   - No active tobacco, quit 3 years ago  - No active EtOH  - No illicits    HOME MEDICATIONS:   - Reviewed and documented in chart    LABORATORY STUDIES:   - Reviewed and documented in chart    ALLERGIES:  Patient has no known allergies.    VS:  BP (!) 179/80   Pulse 83   Temp 36.7 °C (98 °F) (Temporal)   Resp 18   Ht 1.702 m (5' 7\")   Wt 58.8 kg (129 lb 10.1 oz)   SpO2 99%   BMI 20.30 kg/m²   Physical Exam  Vitals and nursing note reviewed.   Constitutional:       Appearance: Normal appearance. She is not toxic-appearing.   HENT:      Head: Normocephalic and atraumatic.      Right Ear: External ear normal.      Left Ear: External ear normal.      Nose: Nose normal.      Mouth/Throat:      Mouth: Mucous membranes are moist.      Pharynx: Oropharynx is " clear.   Eyes:      General: No scleral icterus.     Extraocular Movements: Extraocular movements intact.      Conjunctiva/sclera: Conjunctivae normal.   Cardiovascular:      Rate and Rhythm: Normal rate and regular rhythm.      Comments: R chest TDC c/d/d  Pulmonary:      Effort: Pulmonary effort is normal. No respiratory distress.      Breath sounds: No wheezing.   Abdominal:      General: Abdomen is flat. There is no distension.   Musculoskeletal:      Right lower leg: No edema.      Left lower leg: No edema.   Skin:     General: Skin is warm and dry.      Findings: No bruising.   Neurological:      General: No focal deficit present.      Mental Status: She is alert.      Cranial Nerves: No cranial nerve deficit.   Psychiatric:         Mood and Affect: Mood normal.         Behavior: Behavior normal.            FLUID BALANCE:  No intake/output data recorded.    LABS:  Recent Labs     07/25/23  0133   SODIUM 135   POTASSIUM 7.2*   CHLORIDE 102   CO2 19*   GLUCOSE 120*   BUN 62*   CREATININE 5.91*   CALCIUM 8.5        IMAGING:  - All imaging reviewed from admission to present day    IMPRESSION:  # ESRD  - HD qTTS via R chest TDC  # HTN  - Goal BP < 140/90  - labile  # Anemia of CKD  - Goal Hgb 10-11  - At goal  # CKD-MBD  - Ca 8.3  # Pain  - resolved    PLAN:  - tolerated HD  - ok to DC from renal standpoint, has HD arranged x 2 hours at her regular unit at 3:30pm tomorrow, then she can resume qTTS  - low K diet  - primary nephrology team will need to consider adjustment to outpatient HD prescription  - UF as tolerated  - No dietary protein restrictions  - Dose all meds per ESRD    Thank you for the consultation!

## 2023-07-25 NOTE — PROGRESS NOTES
Reviewed/discussed discharge information with patient, provided with copy. Patient discharged to home with family, self ambulatory out.

## 2023-07-27 NOTE — TELEPHONE ENCOUNTER
Group Therapy Note    Date: 7/27/2023    Group Start Time: 1060  Group End Time: 4630  Group Topic: Recreational        [address]        Group Therapy Note    Attendees: 5    Group Type- Game: My Name Is      Group Focus: stimulate endorphins,  boost serotonin, relieve symptoms of anxiety. Status After Intervention:  Unchanged    Participation Level:  Active Listener and Interactive    Participation Quality: Appropriate, Attentive, and Sharing      Speech:  normal      Thought Process/Content: Logical      Affective Functioning: Congruent      Mood: euthymic      Level of consciousness:  Alert and Attentive        Endings: None Reported      Modes of Intervention: Socialization, Problem-solving, and Activity      Recreational Therapist  [address] Received clearance request from [facility] for pt to have an EGD with Wu Mackenzie PA-C. Form completed stating procedure should be in hospital, pt should not hold any cardiac medications, and vasodilators should be avoided during anesthesia.  Form faxed back to [phone] as requested. Confirmation received. Form sent for scanning.

## 2023-07-30 LAB
BACTERIA BLD CULT: NORMAL
BACTERIA BLD CULT: NORMAL
SIGNIFICANT IND 70042: NORMAL
SIGNIFICANT IND 70042: NORMAL
SITE SITE: NORMAL
SITE SITE: NORMAL
SOURCE SOURCE: NORMAL
SOURCE SOURCE: NORMAL

## 2023-08-28 ENCOUNTER — APPOINTMENT (OUTPATIENT)
Dept: CARDIOLOGY | Facility: MEDICAL CENTER | Age: 58
End: 2023-08-28
Attending: INTERNAL MEDICINE
Payer: MEDICAID

## 2023-09-08 ENCOUNTER — APPOINTMENT (OUTPATIENT)
Dept: RADIOLOGY | Facility: MEDICAL CENTER | Age: 58
End: 2023-09-08
Attending: INTERNAL MEDICINE
Payer: MEDICAID

## 2023-09-15 ENCOUNTER — OFFICE VISIT (OUTPATIENT)
Dept: INTERNAL MEDICINE | Facility: OTHER | Age: 58
End: 2023-09-15
Payer: MEDICAID

## 2023-09-15 VITALS
WEIGHT: 128 LBS | SYSTOLIC BLOOD PRESSURE: 144 MMHG | HEART RATE: 76 BPM | TEMPERATURE: 97.1 F | OXYGEN SATURATION: 96 % | HEIGHT: 67 IN | DIASTOLIC BLOOD PRESSURE: 89 MMHG | BODY MASS INDEX: 20.09 KG/M2

## 2023-09-15 DIAGNOSIS — H91.91 HEARING LOSS OF RIGHT EAR, UNSPECIFIED HEARING LOSS TYPE: ICD-10-CM

## 2023-09-15 DIAGNOSIS — N18.6 ESRD (END STAGE RENAL DISEASE) (HCC): ICD-10-CM

## 2023-09-15 DIAGNOSIS — E11.42 TYPE 2 DIABETES MELLITUS WITH DIABETIC POLYNEUROPATHY, WITHOUT LONG-TERM CURRENT USE OF INSULIN (HCC): ICD-10-CM

## 2023-09-15 DIAGNOSIS — I10 HYPERTENSION, BENIGN: ICD-10-CM

## 2023-09-15 DIAGNOSIS — Z23 NEED FOR VACCINATION: ICD-10-CM

## 2023-09-15 PROBLEM — E11.9 TYPE 2 DIABETES MELLITUS (HCC): Status: RESOLVED | Noted: 2023-04-23 | Resolved: 2023-09-15

## 2023-09-15 PROBLEM — F19.11 HISTORY OF SUBSTANCE ABUSE (HCC): Status: ACTIVE | Noted: 2023-04-23

## 2023-09-15 PROBLEM — N18.4 CHRONIC KIDNEY DISEASE (CKD) STAGE G4/A2, SEVERELY DECREASED GLOMERULAR FILTRATION RATE (GFR) BETWEEN 15-29 ML/MIN/1.73 SQUARE METER AND ALBUMINURIA CREATININE RATIO BETWEEN 30-299 MG/G (HCC): Status: RESOLVED | Noted: 2023-07-25 | Resolved: 2023-09-15

## 2023-09-15 PROCEDURE — 90472 IMMUNIZATION ADMIN EACH ADD: CPT

## 2023-09-15 PROCEDURE — 99214 OFFICE O/P EST MOD 30 MIN: CPT | Mod: 25,GC

## 2023-09-15 PROCEDURE — 90471 IMMUNIZATION ADMIN: CPT

## 2023-09-15 PROCEDURE — 3077F SYST BP >= 140 MM HG: CPT

## 2023-09-15 PROCEDURE — 90715 TDAP VACCINE 7 YRS/> IM: CPT

## 2023-09-15 PROCEDURE — 3079F DIAST BP 80-89 MM HG: CPT

## 2023-09-15 PROCEDURE — 90686 IIV4 VACC NO PRSV 0.5 ML IM: CPT

## 2023-09-15 RX ORDER — FLUTICASONE PROPIONATE 50 MCG
1 SPRAY, SUSPENSION (ML) NASAL DAILY
Qty: 16 G | Refills: 4 | Status: ON HOLD | OUTPATIENT
Start: 2023-09-15 | End: 2023-11-29

## 2023-09-15 RX ORDER — CETIRIZINE HYDROCHLORIDE 10 MG/1
10 TABLET ORAL DAILY
Qty: 30 TABLET | Refills: 3 | Status: SHIPPED | OUTPATIENT
Start: 2023-09-15 | End: 2023-11-03 | Stop reason: SDUPTHER

## 2023-09-15 RX ORDER — RIBOFLAVIN (VITAMIN B2) 100 MG
1 TABLET ORAL DAILY
Qty: 30 TABLET | Refills: 3 | Status: SHIPPED | OUTPATIENT
Start: 2023-09-15

## 2023-09-15 ASSESSMENT — FIBROSIS 4 INDEX: FIB4 SCORE: 2.3

## 2023-09-15 NOTE — PROGRESS NOTES
Established Patient    Patient Care Team:  Kamala Pierre M.D. as PCP - General (Internal Medicine)  Duc Castro M.D. (Neurology)  Julian Dixon M.D. (Neurology)  Liam Galicia M.D. (Cardiovascular Disease (Cardiology))  [facility] as Home Health Provider  ALY Flores (Inactive) as   Kamala Pierre M.D. (Internal Medicine)    Rigoberto Adames is a 58 y.o. female who presents today with the following Chief Complaint(s): Follow up for Diagnoses of Hearing loss of right ear, unspecified hearing loss type, Type 2 diabetes mellitus with diabetic polyneuropathy, without long-term current use of insulin (HCC), ESRD (end stage renal disease) (HCC), Need for vaccination, and History of substance abuse (HCC) were pertinent to this visit.    HPI:  Rigoberto Adames is a 58 year-old female with a past medical history of ESRD on dialysis, HOCM, Type 2 DM (A1c 5.9 4/2023), alcoholic cirrhosis, HTN who presents to the clinic for acute complaint of hearing loss in the right ear.     Noticed feeling of pressure and decreased hearing 30 days ago. 3 days ago woke up and could not hear. Able to hear muffled noises when she turns up the television volume loud. Sleeps on her right side. Denies any allergies or recent illnesses. Has not tried to pop her ears. No trauma or long term exposure to loud noise from heavy machinery. No new medications. Denies any fevers, chills, lightheadedness, dizziness, ear pain, ear discharge, rhinorrhea, nasal congestion, post nasal drip, cough or sore throat.     Recently hospitalized 7/25 for hyperkalemia with K+ 7.2 and underwent emergent dialysis. Discharged same day with outpatient follow-up with nephrology for additional dialysis session. Reports undergoing dialysis Mon, Wed, Fri. Followed by [facility] nephrology. Has right sided tunneled dialysis cath.     BP in clinic 144/89. Reports not taking any medications today, but has been taking them daily.   Currently taking clonidine 0.1 mg twice daily, amlodipine 10 mg and carvedilol 25 mg twice daily.  Denies any lightheadedness, dizziness, headache, blurry vision, tinnitus, chest pain, palpitations, shortness of breath, numbness/tingling or weakness.    Has a history of diabetes with most recent A1c 5.9 (4/23/2023).  Not on any medications and currently controlled with diet and exercise.  Denies any hypoglycemic episodes.  No increased thirst or urination or numbness/tingling.    ROS:     General: No fevers, chills, night sweats, weight loss or gain  HEENT: Hearing changes. No vision changes, eye pain, ear pain, nasal discharge, sore throat  Neck: No swelling in neck  Pulmonary: No shortness of breath, cough, sputum, or hemoptysis  Cardiovascular: No chest pain, palpitations, or LE swelling  GI: No nausea, vomiting, diarrhea, constipation, abdominal pain, hematochezia or melena  : No dysuria or frequency  Neuro: No focal weakness, no general weakness, no headaches, no lightheadedness, no dizziness  Psych: No anxiety or depression    Past Medical History:   Diagnosis Date    Acute kidney injury (HCC)     Acute necrotizing pyelonephritis     Acute respiratory failure with hypoxia (HCC) 4/23/2023    Acute-on-chronic kidney injury (HCC) 4/23/2023    Alcohol withdrawal (HCC)     Arthritis     Bilateral legs; no formal diagnosis.    Cataract     Bilateral IOL    Cirrhosis of liver with ascites (HCC) 3/6/2017    Dental disorder     Upper and lower dentures    Diabetes mellitus, type 2 (HCC)     Oral medications    Diabetic ulcer of toe (HCC) 4/30/2020    diarrhea 2019    been going on for about a year    Foot pain, bilateral     Heart murmur     High cholesterol     HOCM (hypertrophic obstructive cardiomyopathy) (HCC)     Hyperammonemia (HCC) 6/24/2016    Hyperkalemia 4/23/2023    Hypertension     Shock (HCC) 4/23/2023    STEMI (ST elevation myocardial infarction) (HCC)     Thrombocytopenia (HCC)     Tobacco abuse  12/8/2014     Social History     Tobacco Use    Smoking status: Former     Current packs/day: 0.00     Average packs/day: 1 pack/day for 10.0 years (10.0 ttl pk-yrs)     Types: Cigarettes     Start date: 8/12/2015     Quit date: 06/2020     Years since quitting: 3.2    Smokeless tobacco: Never   Vaping Use    Vaping Use: Never used   Substance Use Topics    Alcohol use: Not Currently     Alcohol/week: 0.6 oz     Types: 1 Glasses of wine per week    Drug use: No     Current Outpatient Medications   Medication Sig Dispense Refill    cetirizine (ZYRTEC) 10 MG Tab Take 1 Tablet by mouth every day. 30 Tablet 3    fluticasone (FLONASE) 50 MCG/ACT nasal spray Administer 1 Spray into affected nostril(S) every day. 16 g 4    Ascorbic Acid (VITAMIN C) 100 MG Tab Take 1 Tablet by mouth every day. 30 Tablet 3    Alcohol Swabs Wipe site with prep pad prior to injection. 100 Each 6    cloNIDine (CATAPRES) 0.1 MG Tab Take 1 Tablet by mouth 2 times a day. 60 Tablet     atorvastatin (LIPITOR) 20 MG Tab Take 1 Tablet by mouth every evening. 60 Tablet 1    Blood Glucose Meter Kit Test blood sugar as recommended by provider. Freestyle Negro blood glucose monitoring kit. 1 Kit 0    Blood Glucose Test Strips Use one Freestyle Negro strip to test blood sugar once daily . 100 Strip 0    Lancets Use one Freestyle Negro lancet to test blood sugar once daily . 100 Each 0    amLODIPine (NORVASC) 10 MG Tab Take 1 Tablet by mouth every day. Do not take this medication the morning of dialysis days 30 Tablet 3    carvedilol (COREG) 25 MG Tab Take 1 Tablet by mouth 2 times a day with meals. Do not take this medication the morning of dialysis days 60 Tablet 3    omeprazole (PRILOSEC) 20 MG delayed-release capsule TAKE 1 CAPSULE BY MOUTH ONCE DAILY 30 MINUTES BEFORE BREAKFAST MEAL       No current facility-administered medications for this visit.       Physical Exam:  BP (!) 144/89 (BP Location: Right arm, Patient Position: Sitting, BP Cuff Size:  "Large adult)   Pulse 76   Temp 36.2 °C (97.1 °F) (Temporal)   Ht 1.702 m (5' 7\")   Wt 58.1 kg (128 lb)   SpO2 96%   BMI 20.05 kg/m²   General: Well developed, well nourished female, in no distress.  HEENT: NC/AT, PERRL, EOMI, no scleral icterus or conjunctival pallor.  No TTP to right pinna.  Do not appreciate any erythema or discharge in the ear canal.  Tympanic membrane brain is intact, not erythematous, nonbulging with light reflex.  Air fluid levels behind tympanic membrane.  Neck: Supple, No cervical or supraclavicular LAD  CV:RRR, no murmurs gallops or Rubs, no JVD, right sided tunneled catheter  Pulm: LCAB, no crackles, rales, rhonchi, or wheezing  GI: Normal bowel sounds, abdomen soft, nontender, nondistended to deep or light palpation in all 4 quadrants, no HSM.  MSK: Radial and dorsalis pedis pulses 2+ and equal bilaterally, respectively.  Strength 5 out of 5 in upper and lower extremities.  No lower extremity edema  Neuro: Patient is alert and oriented x3, no focal deficits  Psych: Appropriate mood and affect       Assessment and Plan:   1. Hearing loss of right ear, unspecified hearing loss type  Likely secondary to fluid   No history of trauma  On physical exam air-fluid levels behind tympanic membrane  Discussed with patient to trial antihistamine such as Zyrtec and Flonase  Discussed other conservative measures such as staying hydrated and gargling salt water to reduce pressure in ear  If no improvement in 1 month, return to clinic for reevaluation    2. Type 2 diabetes mellitus with diabetic polyneuropathy, without long-term current use of insulin ([facility])  A1c 5.9 (4/2023)  Controlled with diet and exercise.  Will address diabetes in full, next visit.   - Comp Metabolic Panel; Future  - Hemoglobin A1c; Future  - Lipid Profile; Future  - Microalbumin Creat Ratio Urine - Lab Collect; Future  Follow-up in 6 weeks    3. ESRD (end stage renal disease) ([facility])  Admitted to hospital 7/25 for hyperkalemia " 7.2 and underwent emergent dialysis. Discharged same day with follow-up with nephrology.   Reports dialysis MWF  Followed by [facility] nephrology  Will recheck electrolytes  - Comp Metabolic Panel; Future    4. Hypertension   BP in clinic 144/89.   Currently taking clonidine 0.1 mg twice daily, amlodipine 10 mg and carvedilol 25 mg twice daily.    Did not take medications today.  Discussed with patient to continue to monitor her blood pressures at home and bring log next visit.  Continue clonidine, amlodipine and carvedilol for blood pressure control.    5. Need for vaccination  - Influenza Vaccine Quad Injection (PF)  - Tdap Vaccine =>6YO IM    Return in about 6 weeks (around 10/27/2023).    Patient Instructions   -Take over the counter antihistamine: Zyrtec (cetirizine) and use flonase spray daily to help relieve the pressure. Can see if pharmacy has discount.   -Drink enough fluids.   -Gargle with salt water to help relieve pressure in the ear  -Schedule an appointment only for pap smear  -Schedule an appointment to follow-up on chronic medical conditions  -Get labs prior to next appointment, fasting   -If no improvement in 1 month, return to clinic for re-evaluation and possible referral to ENT  -Take It was nice visiting with you today!      Kamala Pierre M.D. PGY-3  [employer]    This note was created using voice recognition software.  While every attempt is made to ensure accuracy of transcription, occasionally errors occur.

## 2023-09-15 NOTE — PATIENT INSTRUCTIONS
-Take over the counter antihistamine: Zyrtec (cetirizine) and use flonase spray daily to help relieve the pressure. Can see if pharmacy has discount.   -Drink enough fluids.   -Gargle with salt water to help relieve pressure in the ear  -Schedule an appointment only for pap smear  -Schedule an appointment to follow-up on chronic medical conditions  -Get labs prior to next appointment, fasting   -If no improvement in 1 month, return to clinic for re-evaluation and possible referral to ENT  -Take It was nice visiting with you today!

## 2023-09-19 DIAGNOSIS — I10 PRIMARY HYPERTENSION: ICD-10-CM

## 2023-09-20 PROCEDURE — RXMED WILLOW AMBULATORY MEDICATION CHARGE

## 2023-09-20 RX ORDER — CARVEDILOL 25 MG/1
25 TABLET ORAL 2 TIMES DAILY WITH MEALS
Qty: 60 TABLET | Refills: 3 | Status: SHIPPED | OUTPATIENT
Start: 2023-09-20 | End: 2024-02-29

## 2023-09-20 RX ORDER — AMLODIPINE BESYLATE 10 MG/1
10 TABLET ORAL DAILY
Qty: 30 TABLET | Refills: 3 | Status: SHIPPED | OUTPATIENT
Start: 2023-09-20 | End: 2023-12-18

## 2023-09-24 ENCOUNTER — PHARMACY VISIT (OUTPATIENT)
Dept: PHARMACY | Facility: MEDICAL CENTER | Age: 58
End: 2023-09-24
Payer: COMMERCIAL

## 2023-09-29 ENCOUNTER — APPOINTMENT (OUTPATIENT)
Dept: CARDIOLOGY | Facility: MEDICAL CENTER | Age: 58
End: 2023-09-29
Attending: INTERNAL MEDICINE
Payer: MEDICAID

## 2023-10-03 ENCOUNTER — APPOINTMENT (OUTPATIENT)
Dept: INTERNAL MEDICINE | Facility: OTHER | Age: 58
End: 2023-10-03
Payer: MEDICARE

## 2023-10-04 RX ORDER — ATORVASTATIN CALCIUM 20 MG/1
20 TABLET, FILM COATED ORAL NIGHTLY
Qty: 60 TABLET | Refills: 0 | Status: SHIPPED | OUTPATIENT
Start: 2023-10-04 | End: 2023-12-18

## 2023-11-03 ENCOUNTER — OFFICE VISIT (OUTPATIENT)
Dept: INTERNAL MEDICINE | Facility: OTHER | Age: 58
End: 2023-11-03
Payer: MEDICARE

## 2023-11-03 VITALS
HEIGHT: 67 IN | OXYGEN SATURATION: 95 % | TEMPERATURE: 97 F | WEIGHT: 129 LBS | HEART RATE: 80 BPM | SYSTOLIC BLOOD PRESSURE: 140 MMHG | DIASTOLIC BLOOD PRESSURE: 79 MMHG | BODY MASS INDEX: 20.25 KG/M2

## 2023-11-03 DIAGNOSIS — H91.91 HEARING LOSS OF RIGHT EAR, UNSPECIFIED HEARING LOSS TYPE: ICD-10-CM

## 2023-11-03 DIAGNOSIS — E11.42 TYPE 2 DIABETES MELLITUS WITH DIABETIC POLYNEUROPATHY, WITHOUT LONG-TERM CURRENT USE OF INSULIN (HCC): ICD-10-CM

## 2023-11-03 DIAGNOSIS — Z12.31 ENCOUNTER FOR SCREENING MAMMOGRAM FOR BREAST CANCER: ICD-10-CM

## 2023-11-03 DIAGNOSIS — J30.9 ALLERGIC SINUSITIS: ICD-10-CM

## 2023-11-03 DIAGNOSIS — R19.7 DIARRHEA, UNSPECIFIED TYPE: ICD-10-CM

## 2023-11-03 PROCEDURE — 3077F SYST BP >= 140 MM HG: CPT

## 2023-11-03 PROCEDURE — 3078F DIAST BP <80 MM HG: CPT

## 2023-11-03 PROCEDURE — 99214 OFFICE O/P EST MOD 30 MIN: CPT | Mod: GC

## 2023-11-03 RX ORDER — LOPERAMIDE HYDROCHLORIDE 2 MG/1
2 CAPSULE ORAL 4 TIMES DAILY PRN
Qty: 30 CAPSULE | Refills: 2 | Status: SHIPPED | OUTPATIENT
Start: 2023-11-03

## 2023-11-03 RX ORDER — CETIRIZINE HYDROCHLORIDE 10 MG/1
10 TABLET ORAL DAILY
Qty: 90 TABLET | Refills: 3 | Status: ON HOLD | OUTPATIENT
Start: 2023-11-03 | End: 2023-11-29

## 2023-11-03 ASSESSMENT — FIBROSIS 4 INDEX: FIB4 SCORE: 2.3

## 2023-11-03 NOTE — PATIENT INSTRUCTIONS
-Get labs prior to next visit, fasting  -Continue diet and exercise  -Use loperamide daily as needed for diarrhea  -Referral to ENT, if you don't hear from anyone in 2 weeks, call our office  -Schedule mammogram  -Schedule pap smear next month  -It was nice visiting with you today!  -See you in 6 weeks to go over results

## 2023-11-03 NOTE — PROGRESS NOTES
Established Patient    Patient Care Team:  Kamala Pierre M.D. as PCP - General (Internal Medicine)  Duc Castro M.D. (Neurology)  Julian Dixon M.D. (Neurology)  Liam Galicia M.D. (Cardiovascular Disease (Cardiology))  [facility] as Home Health Provider  ALY Flores (Inactive) as   Kamala Pierre M.D. (Internal Medicine)    Rigoberto Adames is a 58 y.o. female who presents today with the following Chief Complaint(s): Follow up for Diagnoses of Type 2 diabetes mellitus with diabetic polyneuropathy, without long-term current use of insulin (HCC), Hearing loss of right ear, unspecified hearing loss type, Allergic sinusitis, Diarrhea, unspecified type, and Encounter for screening mammogram for breast cancer were pertinent to this visit.    HPI:  Rigoberto Adames is a 58 year-old female with a past medical history of ESRD on dialysis, HOCM, Type 2 DM (A1c 5.9 4/2023), alcoholic cirrhosis, HTN who presents to the clinic for follow-up.     Was seen last visit for acute complaint of hearing loss in her right ear when she woke up one morning.  She was prescribed cetirizine and Flonase.  Reports her symptoms slightly improved, however still hears muffled noises out of her right ear.  Does have a history of ear infections in the past.  No trauma or long term exposure to loud noises from heavy machinery. No new medications. Denies any fevers, chills, lightheadedness, dizziness, ear pain, ear discharge, nasal congestion, post nasal drip, cough or sore throat.  Currently, feels like she is having rhinorrhea and itchy eyes due to allergies.  Requesting refill of cetirizine.    Currently complains of having more than 6 loose stools a day.  Symptoms have been ongoing for over the past 2 years.  Symptoms typically happen after eating.  Does report having to get up at night for bowel movements.  Does dialysis Monday, Wednesday, Friday at [facility] nephrology.  Denies any fevers,  chills, lightheadedness, dizziness, fatigue, chest pain, palpitations, shortness of breath, nausea, vomiting, abdominal pain, bloating, black/tarry stools, bloody stools, pus, hematuria, dysuria, urgency, frequency or weakness.  No history of abdominal surgeries.  No recent illnesses or known sick contacts.  No recent travel outside of the country.     Hemoglobin A1c 5.9 (4/23/2023). Lipid panel (12/9/2022):   HDL 53 LDL 60. Urine microalbumin/Cr 8294 (4/24/2023). Patient currently controlling blood sugars with diet and exercise. Previously on metformin  mg. Takes atorvastatin 20 mg for cholesterol. Denies any polyuria, polydipsia and polyphagia.     ROS:     General: No fevers, chills, night sweats, weight loss or gain  HEENT: Hearing changes.  No vision changes, eye pain, ear pain, nasal discharge, sore throat  Neck: No swelling in neck  Pulmonary: No shortness of breath, cough, sputum, or hemoptysis  Cardiovascular: No chest pain, palpitations, or LE swelling  GI: No nausea, vomiting, diarrhea, constipation, abdominal pain, hematochezia or melena  : No dysuria or frequency  Neuro: No focal weakness, no general weakness, no headaches, no lightheadedness, no dizziness  Psych: No anxiety or depression    Past Medical History:   Diagnosis Date    Acute kidney injury (HCC)     Acute necrotizing pyelonephritis     Acute respiratory failure with hypoxia (HCC) 4/23/2023    Acute-on-chronic kidney injury (HCC) 4/23/2023    Alcohol withdrawal (HCC)     Arthritis     Bilateral legs; no formal diagnosis.    Cataract     Bilateral IOL    Chronic kidney disease (CKD) stage G4/A2, severely decreased glomerular filtration rate (GFR) between 15-29 mL/min/1.73 square meter and albuminuria creatinine ratio between  mg/g (HCC) 7/25/2023    Cirrhosis of liver with ascites (HCC) 3/6/2017    Dental disorder     Upper and lower dentures    Diabetes mellitus, type 2 (HCC)     Oral medications    Diabetic ulcer  of toe ([facility]) 4/30/2020    diarrhea 2019    been going on for about a year    Foot pain, bilateral     Heart murmur     High cholesterol     HOCM (hypertrophic obstructive cardiomyopathy) ([facility])     Hyperammonemia ([facility]) 6/24/2016    Hyperkalemia 4/23/2023    Hypertension     Shock ([facility]) 4/23/2023    STEMI (ST elevation myocardial infarction) ([facility])     Thrombocytopenia ([facility])     Tobacco abuse 12/8/2014     Social History     Tobacco Use    Smoking status: Former     Current packs/day: 0.00     Average packs/day: 1 pack/day for 10.0 years (10.0 ttl pk-yrs)     Types: Cigarettes     Start date: 8/12/2015     Quit date: 06/2020     Years since quitting: 3.4    Smokeless tobacco: Never   Vaping Use    Vaping Use: Never used   Substance Use Topics    Alcohol use: Not Currently     Alcohol/week: 0.6 oz     Types: 1 Glasses of wine per week    Drug use: No     Current Outpatient Medications   Medication Sig Dispense Refill    cetirizine (ZYRTEC) 10 MG Tab Take 1 Tablet by mouth every day. 90 Tablet 3    loperamide (IMODIUM) 2 MG Cap Take 1 Capsule by mouth 4 times a day as needed for Diarrhea. 30 Capsule 2    atorvastatin (LIPITOR) 20 MG Tab Take 1 Tablet by mouth every evening. 60 Tablet 0    amLODIPine (NORVASC) 10 MG Tab Take 1 Tablet by mouth every day. Do not take this medication the morning of dialysis days 30 Tablet 3    carvedilol (COREG) 25 MG Tab Take 1 Tablet by mouth 2 times a day with meals. Do not take this medication the morning of dialysis days 60 Tablet 3    fluticasone (FLONASE) 50 MCG/ACT nasal spray Administer 1 Spray into affected nostril(S) every day. 16 g 4    Ascorbic Acid (VITAMIN C) 100 MG Tab Take 1 Tablet by mouth every day. 30 Tablet 3    Alcohol Swabs Wipe site with prep pad prior to injection. 100 Each 6    cloNIDine (CATAPRES) 0.1 MG Tab Take 1 Tablet by mouth 2 times a day. 60 Tablet     Blood Glucose Meter Kit Test blood sugar as recommended by provider. Freestyle Negro blood glucose  "monitoring kit. 1 Kit 0    Blood Glucose Test Strips Use one Freestyle Negro strip to test blood sugar once daily . 100 Strip 0    Lancets Use one Freestyle Negro lancet to test blood sugar once daily . 100 Each 0    omeprazole (PRILOSEC) 20 MG delayed-release capsule TAKE 1 CAPSULE BY MOUTH ONCE DAILY 30 MINUTES BEFORE BREAKFAST MEAL       No current facility-administered medications for this visit.       Physical Exam:  BP (!) 140/79 (BP Location: Right arm, Patient Position: Sitting, BP Cuff Size: Adult)   Pulse 80   Temp 36.1 °C (97 °F) (Temporal)   Ht 1.702 m (5' 7\")   Wt 58.5 kg (129 lb)   SpO2 95%   BMI 20.20 kg/m²   General: Well developed, well nourished female, in no distress.  HEENT: NC/AT, PERRL, EOMI, no scleral icterus or conjunctival pallor.  No TTP to right pinna.  Do not appreciate any erythema or discharge in the right ear canal.  Right tympanic membrane brain is intact, not erythematous, nonbulging with light reflex.  Air fluid levels behind tympanic membrane.  Neck: Supple, No cervical or supraclavicular LAD  CV:RRR, no murmurs gallops or Rubs  Pulm: LCAB, no crackles, rales, rhonchi, or wheezing  GI: Normal bowel sounds, abdomen soft, nontender, nondistended to deep or light palpation in all 4 quadrants, no HSM.  MSK: Radial and dorsalis pedis pulses 2+ and equal bilaterally, respectively.  Strength 5 out of 5 in upper and lower extremities.  No lower extremity edema  Neuro: Patient is alert and oriented x3, no focal deficits  Skin: No ulcers or wounds on feet.   Psych: Appropriate mood and affect       Assessment and Plan:   1. Hearing loss of right ear, unspecified hearing loss type  Likely secondary to fluid  No history of trauma  On physical exam air-fluid levels behind tympanic membrane. No signs of infection.   Slight improvement on trial of antihistamine and Flonase  Continue conservative measures such as staying hydrated and gargling salt water to reduce pressure in ear  Continue " cetirizine and Flonase  Discussed referral to ENT as symptoms still persist, appreciate the help.  - Referral to ENT    2. Allergic sinusitis  Rhinorrhea and itchy eyes due to allergies.    Requesting refill of cetirizine.  Sent refills to pharmacy.  Discuss continuing antihistamine and Flonase as well as using nasal lavage and humidifier at home.  Also, discussed using eyedrops specifically for allergies.  Continue cetirizine and Flonase.  If symptoms last longer than 14 days or spiking fevers, return to clinic for further evaluation.    3. Diarrhea, unspecified type  Chronic  No alarm symptoms and benign abdominal exam  Will rule out thyroid, inflammatory etiology and infectious etiology  Check CBC for leukocytosis and anemia  Check CMP and magnesium for electrolytes  Prescribed Imodium as needed for diarrhea  Discussed with patient to follow-up in 6 weeks.   - CBC WITH DIFFERENTIAL; Future  - Comp Metabolic Panel; Future  - MAGNESIUM; Future  - TSH WITH REFLEX TO FT4; Future  - CELIAC DISEASE AB PANEL; Future  - OCCULT BLOOD STOOL; Future  - CALPROTECTIN,FECAL; Future  - CRP QUANTITIVE (NON-CARDIAC); Future  - CRYPTO/GIARDIA RAPID ASSAY; Future  - HIV AG/AB COMBO ASSAY SCREENING; Future  - STOOL PH    4. Type 2 diabetes mellitus with diabetic polyneuropathy, without long-term current use of insulin ([facility])  Hemoglobin A1c 5.9 (4/23/2023).   Lipid panel (12/9/2022):   HDL 53 LDL 60.   Urine microalbumin/Cr 8294 (4/24/2023).   Currently controlled with diet and exercise.Previously on metformin  mg.   Takes atorvastatin 20 mg for cholesterol.   Has numbness of both feet.   Continue diet and exercise.  Continue atorvastatin 20 mg.  Has retinal exam scheduled for January 2024.  Ordered labs to monitor diabetes.  - Comp Metabolic Panel; Future  - Hemoglobin A1c; Future  - Lipid Profile; Future  - Microalbumin Creat Ratio Urine - Lab Collect; Future    5. Encounter for screening mammogram for breast  cancer  - MA-SCREENING MAMMO BILAT W/TOMOSYNTHESIS W/CAD; Future    Return in about 6 weeks (around 12/15/2023).    Patient Instructions   -Get labs prior to next visit, fasting  -Continue diet and exercise  -Use loperamide daily as needed for diarrhea  -Referral to ENT, if you don't hear from anyone in 2 weeks, call our office  -Schedule mammogram  -Schedule pap smear next month  -It was nice visiting with you today!  -See you in 6 weeks to go over results       Kamala Pierre M.D. PGY-3  [facility] [[email or web address]    This note was created using voice recognition software.  While every attempt is made to ensure accuracy of transcription, occasionally errors occur.

## 2023-11-04 ENCOUNTER — HOSPITAL ENCOUNTER (OUTPATIENT)
Facility: MEDICAL CENTER | Age: 58
End: 2023-11-04
Attending: INTERNAL MEDICINE
Payer: COMMERCIAL

## 2023-11-05 LAB
GRAM STN SPEC: NORMAL
SIGNIFICANT IND 70042: NORMAL
SITE SITE: NORMAL
SOURCE SOURCE: NORMAL

## 2023-11-08 LAB
BACTERIA WND AEROBE CULT: NORMAL
GRAM STN SPEC: NORMAL
SIGNIFICANT IND 70042: NORMAL
SITE SITE: NORMAL
SOURCE SOURCE: NORMAL

## 2023-11-21 ENCOUNTER — OFFICE VISIT (OUTPATIENT)
Dept: VASCULAR SURGERY | Facility: MEDICAL CENTER | Age: 58
End: 2023-11-21
Payer: MEDICARE

## 2023-11-21 VITALS
HEART RATE: 64 BPM | WEIGHT: 129 LBS | OXYGEN SATURATION: 97 % | SYSTOLIC BLOOD PRESSURE: 120 MMHG | BODY MASS INDEX: 20.25 KG/M2 | DIASTOLIC BLOOD PRESSURE: 62 MMHG | HEIGHT: 67 IN | TEMPERATURE: 97.9 F

## 2023-11-21 DIAGNOSIS — N18.6 ESRD (END STAGE RENAL DISEASE) (HCC): ICD-10-CM

## 2023-11-21 PROCEDURE — 99204 OFFICE O/P NEW MOD 45 MIN: CPT | Performed by: SURGERY

## 2023-11-21 PROCEDURE — 3078F DIAST BP <80 MM HG: CPT | Performed by: SURGERY

## 2023-11-21 PROCEDURE — 3074F SYST BP LT 130 MM HG: CPT | Performed by: SURGERY

## 2023-11-21 ASSESSMENT — FIBROSIS 4 INDEX: FIB4 SCORE: 2.3

## 2023-11-22 ENCOUNTER — TELEPHONE (OUTPATIENT)
Dept: VASCULAR SURGERY | Facility: MEDICAL CENTER | Age: 58
End: 2023-11-22
Payer: MEDICARE

## 2023-11-22 NOTE — PROGRESS NOTES
VASCULAR SURGERY SERVICE  CONSULT NOTE      Date: 11/21/2023    Referring Provider: [facility].    Consulting Physician: Sarahi Carey MD - [facility]     -------------------------------------------------------------------------------------------------    Reason for consultation:  Access creation.    HPI:  This is a 58 y.o. right-handed female with multiple medical problems including end-stage renal disease who has been on hemodialysis since April of this year via a right IJ permacath.  Patient is on hemodialysis Monday, Wednesday, and Friday from 3 PM to 6 PM.  She is referred to see me for access creation.    Noninvasive vascular study done at outside facility showed bilateral cephalic and basilic veins to be small, not suitable for use in fistula creation.    Past Medical History:   Diagnosis Date    Acute kidney injury (HCC)     Acute necrotizing pyelonephritis     Acute respiratory failure with hypoxia ([facility]) 4/23/2023    Acute-on-chronic kidney injury (HCC) 4/23/2023    Alcohol withdrawal ([facility])     Arthritis     Bilateral legs; no formal diagnosis.    Cataract     Bilateral IOL    Chronic kidney disease (CKD) stage G4/A2, severely decreased glomerular filtration rate (GFR) between 15-29 mL/min/1.73 square meter and albuminuria creatinine ratio between  mg/g ([facility]) 7/25/2023    Cirrhosis of liver with ascites ([facility]) 3/6/2017    Dental disorder     Upper and lower dentures    Diabetes mellitus, type 2 (HCC)     Oral medications    Diabetic ulcer of toe ([facility]) 4/30/2020    diarrhea 2019    been going on for about a year    Foot pain, bilateral     Heart murmur     High cholesterol     HOCM (hypertrophic obstructive cardiomyopathy) ([facility])     Hyperammonemia ([facility]) 6/24/2016    Hyperkalemia 4/23/2023    Hypertension     Shock ([facility]) 4/23/2023    STEMI (ST elevation myocardial infarction) ([facility])     Thrombocytopenia ([facility])     Tobacco abuse 12/8/2014       Past Surgical History:    Procedure Laterality Date    UT UPPER GI ENDOSCOPY,DIAGNOSIS N/A 3/4/2022    Procedure: GASTROSCOPY;  Surgeon: Kashif Rhodes M.D.;  Location: SURGERY [facility];  Service: Gastroenterology    UT UPPER GI ENDOSCOPY,LIGAT VARIX  9/25/2019    Procedure: GASTROSCOPY, WITH VARICEAL BANDING - WITH GASTRIC MAPPING;  Surgeon: Sandi Espinoza M.D.;  Location: SURGERY [facility];  Service: Gastroenterology    GASTROSCOPY-ENDO  9/25/2019    Procedure: GASTROSCOPY;  Surgeon: Sandi Espinoza M.D.;  Location: SURGERY [facility];  Service: Gastroenterology    PRIMARY C SECTION  2006    OTHER      c- section       Current Outpatient Medications   Medication Sig Dispense Refill    cetirizine (ZYRTEC) 10 MG Tab Take 1 Tablet by mouth every day. 90 Tablet 3    loperamide (IMODIUM) 2 MG Cap Take 1 Capsule by mouth 4 times a day as needed for Diarrhea. 30 Capsule 2    atorvastatin (LIPITOR) 20 MG Tab Take 1 Tablet by mouth every evening. 60 Tablet 0    amLODIPine (NORVASC) 10 MG Tab Take 1 Tablet by mouth every day. Do not take this medication the morning of dialysis days 30 Tablet 3    carvedilol (COREG) 25 MG Tab Take 1 Tablet by mouth 2 times a day with meals. Do not take this medication the morning of dialysis days 60 Tablet 3    fluticasone (FLONASE) 50 MCG/ACT nasal spray Administer 1 Spray into affected nostril(S) every day. 16 g 4    Ascorbic Acid (VITAMIN C) 100 MG Tab Take 1 Tablet by mouth every day. 30 Tablet 3    Alcohol Swabs Wipe site with prep pad prior to injection. 100 Each 6    cloNIDine (CATAPRES) 0.1 MG Tab Take 1 Tablet by mouth 2 times a day. 60 Tablet     Blood Glucose Meter Kit Test blood sugar as recommended by provider. Freestyle Negro blood glucose monitoring kit. 1 Kit 0    Blood Glucose Test Strips Use one Freestyle Negro strip to test blood sugar once daily . 100 Strip 0    Lancets Use one Freestyle Negro lancet to test blood sugar once daily . 100 Each 0    omeprazole  (PRILOSEC) 20 MG delayed-release capsule TAKE 1 CAPSULE BY MOUTH ONCE DAILY 30 MINUTES BEFORE BREAKFAST MEAL       No current facility-administered medications for this visit.       Social History     Socioeconomic History    Marital status: Single     Spouse name: Not on file    Number of children: Not on file    Years of education: Not on file    Highest education level: Not on file   Occupational History    Occupation: restaurant owner   Tobacco Use    Smoking status: Former     Current packs/day: 0.00     Average packs/day: 1 pack/day for 10.0 years (10.0 ttl pk-yrs)     Types: Cigarettes     Start date: 8/12/2015     Quit date: 06/2020     Years since quitting: 3.4    Smokeless tobacco: Never   Vaping Use    Vaping Use: Never used   Substance and Sexual Activity    Alcohol use: Not Currently     Alcohol/week: 0.6 oz     Types: 1 Glasses of wine per week    Drug use: No    Sexual activity: Not on file   Other Topics Concern    Not on file   Social History Narrative    Single- 2 children.     Social Determinants of Health     Financial Resource Strain: Low Risk  (5/8/2020)    Overall Financial Resource Strain (CARDIA)     Difficulty of Paying Living Expenses: Not hard at all   Food Insecurity: No Food Insecurity (5/8/2020)    Hunger Vital Sign     Worried About Running Out of Food in the Last Year: Never true     Ran Out of Food in the Last Year: Never true   Transportation Needs: No Transportation Needs (5/8/2020)    PRAPARE - Transportation     Lack of Transportation (Medical): No     Lack of Transportation (Non-Medical): No   Physical Activity: Not on file   Stress: Not on file   Social Connections: Not on file   Intimate Partner Violence: Not on file   Housing Stability: Not on file       Family History   Problem Relation Age of Onset    Cancer Father         stomach    Hypertension Mother        Allergies:  Patient has no known allergies.    Review of Systems:    Constitutional: Negative for fever, chills,  "weight loss,   HENT:   Negative for hearing loss or tinnitus    Eyes:    Negative for blurred vision, double vision, or loss of vision  Respiratory:  Negative for cough, hemoptysis, or wheezing    Cardiac:  Negative for chest pain or palpitations or orthopnea  Vascular:  Negative for claudication or rest pain   Gastrointestinal: Negative for nausea, vomiting, or abdominal pain     Negative for hematochezia or melena   Genitourinary: Negative for dysuria, frequency, or hematuria   Musculoskeletal: Negative for myalgias, back pain, or joint pain  Skin:   Negative for itching or rash  Neurological:  Negative for dizziness, headaches, or tremors     Negative for speech disturbance     Negative for extremity weakness or paresthesias  Endo/Heme:  Negative for easy bruising or bleeding  Psychiatric:  Negative for depression, suicidal ideas, or hallucinations    Physical Exam:  /62 (BP Location: Right arm, Patient Position: Sitting, BP Cuff Size: Adult)   Pulse 64   Temp 36.6 °C (97.9 °F) (Temporal)   Ht 1.702 m (5' 7\")   Wt 58.5 kg (129 lb)   SpO2 97%     Constitutional: Alert, oriented, no acute distress  HEENT:  Normocephalic and atraumatic, EOMI  Neck:   Supple, no JVD.  Right IJ permacath in place.  Cardiovascular: Regular rate and rhythm  Pulmonary:  Good air entry bilaterally  Abdominal:  Soft, non-tender, non-distended     Aortic impulse not widened  Musculoskeletal: No edema, no tenderness  Neurological:  CN II-XII grossly intact, no focal deficits  Skin:   Skin is warm and dry. No rash noted.  Psychiatric:  Normal mood and affect.  Upper extremities:    Palpable bilateral brachial and radial pulses.  No obvious superficial veins.      Radiology:  Reviewed.    Assessment:  -End-stage renal disease.  -Hypertension.  -Dyslipidemia.    Plan:  I had a long discussion with patient and her family member.  Study results were reviewed with them.  Unfortunately, her veins are small, not suitable for use and " fistula creation.  I therefore discussed with patient the option of having the dialysis graft placed in the left upper arm.  Risks and benefits were discussed.  Risks include, but not limited to, bleeding, infection, nerve injury, seroma formation, seroma leak, arterial steal syndrome which if severe enough would require ligation of the graft, and perioperative anesthetic complications.  Patient indicated understanding and would like to proceed.  All questions were answered.      Sarahi Carey MD  [facility]   [place] preferred or call my office [phone]  __________________________________________________________________  Patient:Rigoberto Lakhani Zacarias   MRN:[id number]   CSN:[id number]

## 2023-11-22 NOTE — TELEPHONE ENCOUNTER
I called patient and scheduled procedure with Dr. Carey for 11/29 @ 1:00 pm. Patient is to check in @ 11:00 am in the [facility] 1st floor.     Patient has been advised nothing to eat or drink 8 hours prior and she will need a .

## 2023-11-27 ENCOUNTER — APPOINTMENT (OUTPATIENT)
Dept: ADMISSIONS | Facility: MEDICAL CENTER | Age: 58
End: 2023-11-27
Attending: SURGERY
Payer: MEDICARE

## 2023-11-29 ENCOUNTER — ANESTHESIA (OUTPATIENT)
Dept: SURGERY | Facility: MEDICAL CENTER | Age: 58
End: 2023-11-29
Payer: MEDICARE

## 2023-11-29 ENCOUNTER — ANESTHESIA EVENT (OUTPATIENT)
Dept: SURGERY | Facility: MEDICAL CENTER | Age: 58
End: 2023-11-29
Payer: MEDICARE

## 2023-11-29 ENCOUNTER — HOSPITAL ENCOUNTER (OUTPATIENT)
Facility: MEDICAL CENTER | Age: 58
End: 2023-11-29
Attending: SURGERY | Admitting: SURGERY
Payer: MEDICARE

## 2023-11-29 VITALS
WEIGHT: 129.63 LBS | TEMPERATURE: 97.3 F | HEART RATE: 74 BPM | DIASTOLIC BLOOD PRESSURE: 73 MMHG | OXYGEN SATURATION: 93 % | HEIGHT: 67 IN | RESPIRATION RATE: 17 BRPM | BODY MASS INDEX: 20.35 KG/M2 | SYSTOLIC BLOOD PRESSURE: 141 MMHG

## 2023-11-29 DIAGNOSIS — I77.0 FISTULA, ARTERIOVENOUS, ACQUIRED (HCC): ICD-10-CM

## 2023-11-29 LAB
ANION GAP SERPL CALC-SCNC: 14 MMOL/L (ref 7–16)
BUN SERPL-MCNC: 50 MG/DL (ref 8–22)
CALCIUM SERPL-MCNC: 8.8 MG/DL (ref 8.5–10.5)
CHLORIDE SERPL-SCNC: 103 MMOL/L (ref 96–112)
CO2 SERPL-SCNC: 21 MMOL/L (ref 20–33)
CREAT SERPL-MCNC: 5.77 MG/DL (ref 0.5–1.4)
ERYTHROCYTE [DISTWIDTH] IN BLOOD BY AUTOMATED COUNT: 57.2 FL (ref 35.9–50)
GFR SERPLBLD CREATININE-BSD FMLA CKD-EPI: 8 ML/MIN/1.73 M 2
GLUCOSE SERPL-MCNC: 132 MG/DL (ref 65–99)
HCT VFR BLD AUTO: 35.2 % (ref 37–47)
HGB BLD-MCNC: 11 G/DL (ref 12–16)
MCH RBC QN AUTO: 30.1 PG (ref 27–33)
MCHC RBC AUTO-ENTMCNC: 31.3 G/DL (ref 32.2–35.5)
MCV RBC AUTO: 96.4 FL (ref 81.4–97.8)
PLATELET # BLD AUTO: 143 K/UL (ref 164–446)
PMV BLD AUTO: 10.4 FL (ref 9–12.9)
POTASSIUM SERPL-SCNC: 5.3 MMOL/L (ref 3.6–5.5)
RBC # BLD AUTO: 3.65 M/UL (ref 4.2–5.4)
SODIUM SERPL-SCNC: 138 MMOL/L (ref 135–145)
WBC # BLD AUTO: 6.6 K/UL (ref 4.8–10.8)

## 2023-11-29 PROCEDURE — 160039 HCHG SURGERY MINUTES - EA ADDL 1 MIN LEVEL 3: Performed by: SURGERY

## 2023-11-29 PROCEDURE — 160025 RECOVERY II MINUTES (STATS): Performed by: SURGERY

## 2023-11-29 PROCEDURE — 160048 HCHG OR STATISTICAL LEVEL 1-5: Performed by: SURGERY

## 2023-11-29 PROCEDURE — 700111 HCHG RX REV CODE 636 W/ 250 OVERRIDE (IP): Mod: UD | Performed by: SURGERY

## 2023-11-29 PROCEDURE — 700101 HCHG RX REV CODE 250: Mod: UD | Performed by: STUDENT IN AN ORGANIZED HEALTH CARE EDUCATION/TRAINING PROGRAM

## 2023-11-29 PROCEDURE — 160036 HCHG PACU - EA ADDL 30 MINS PHASE I: Performed by: SURGERY

## 2023-11-29 PROCEDURE — 700101 HCHG RX REV CODE 250: Mod: UD | Performed by: SURGERY

## 2023-11-29 PROCEDURE — 36415 COLL VENOUS BLD VENIPUNCTURE: CPT

## 2023-11-29 PROCEDURE — 160002 HCHG RECOVERY MINUTES (STAT): Performed by: SURGERY

## 2023-11-29 PROCEDURE — 160009 HCHG ANES TIME/MIN: Performed by: SURGERY

## 2023-11-29 PROCEDURE — C1768 GRAFT, VASCULAR: HCPCS | Performed by: SURGERY

## 2023-11-29 PROCEDURE — 160035 HCHG PACU - 1ST 60 MINS PHASE I: Performed by: SURGERY

## 2023-11-29 PROCEDURE — 85027 COMPLETE CBC AUTOMATED: CPT

## 2023-11-29 PROCEDURE — 80048 BASIC METABOLIC PNL TOTAL CA: CPT

## 2023-11-29 PROCEDURE — 700105 HCHG RX REV CODE 258: Mod: UD | Performed by: SURGERY

## 2023-11-29 PROCEDURE — 700111 HCHG RX REV CODE 636 W/ 250 OVERRIDE (IP): Mod: UD | Performed by: STUDENT IN AN ORGANIZED HEALTH CARE EDUCATION/TRAINING PROGRAM

## 2023-11-29 PROCEDURE — 160046 HCHG PACU - 1ST 60 MINS PHASE II: Performed by: SURGERY

## 2023-11-29 PROCEDURE — C1713 ANCHOR/SCREW BN/BN,TIS/BN: HCPCS | Performed by: SURGERY

## 2023-11-29 PROCEDURE — 36830 ARTERY-VEIN NONAUTOGRAFT: CPT | Performed by: SURGERY

## 2023-11-29 PROCEDURE — 160028 HCHG SURGERY MINUTES - 1ST 30 MINS LEVEL 3: Performed by: SURGERY

## 2023-11-29 DEVICE — GRAFT BOVINE ARTEGRAFT 5MM X 40CM: Type: IMPLANTABLE DEVICE | Site: ARM | Status: FUNCTIONAL

## 2023-11-29 RX ORDER — LIDOCAINE HYDROCHLORIDE 20 MG/ML
INJECTION, SOLUTION EPIDURAL; INFILTRATION; INTRACAUDAL; PERINEURAL PRN
Status: DISCONTINUED | OUTPATIENT
Start: 2023-11-29 | End: 2023-11-29 | Stop reason: SURG

## 2023-11-29 RX ORDER — OXYCODONE HCL 5 MG/5 ML
5 SOLUTION, ORAL ORAL
Status: DISCONTINUED | OUTPATIENT
Start: 2023-11-29 | End: 2023-11-29 | Stop reason: HOSPADM

## 2023-11-29 RX ORDER — HYDRALAZINE HYDROCHLORIDE 20 MG/ML
5 INJECTION INTRAMUSCULAR; INTRAVENOUS
Status: DISCONTINUED | OUTPATIENT
Start: 2023-11-29 | End: 2023-11-29 | Stop reason: HOSPADM

## 2023-11-29 RX ORDER — HEPARIN SODIUM 1000 [USP'U]/ML
INJECTION, SOLUTION INTRAVENOUS; SUBCUTANEOUS PRN
Status: DISCONTINUED | OUTPATIENT
Start: 2023-11-29 | End: 2023-11-29 | Stop reason: SURG

## 2023-11-29 RX ORDER — DEXAMETHASONE SODIUM PHOSPHATE 4 MG/ML
INJECTION, SOLUTION INTRA-ARTICULAR; INTRALESIONAL; INTRAMUSCULAR; INTRAVENOUS; SOFT TISSUE PRN
Status: DISCONTINUED | OUTPATIENT
Start: 2023-11-29 | End: 2023-11-29 | Stop reason: SURG

## 2023-11-29 RX ORDER — HYDROMORPHONE HYDROCHLORIDE 1 MG/ML
0.2 INJECTION, SOLUTION INTRAMUSCULAR; INTRAVENOUS; SUBCUTANEOUS
Status: DISCONTINUED | OUTPATIENT
Start: 2023-11-29 | End: 2023-11-29 | Stop reason: HOSPADM

## 2023-11-29 RX ORDER — OXYCODONE HCL 5 MG/5 ML
10 SOLUTION, ORAL ORAL
Status: DISCONTINUED | OUTPATIENT
Start: 2023-11-29 | End: 2023-11-29 | Stop reason: HOSPADM

## 2023-11-29 RX ORDER — BUPIVACAINE HYDROCHLORIDE 5 MG/ML
INJECTION, SOLUTION EPIDURAL; INTRACAUDAL
Status: DISCONTINUED | OUTPATIENT
Start: 2023-11-29 | End: 2023-11-29 | Stop reason: HOSPADM

## 2023-11-29 RX ORDER — ONDANSETRON 2 MG/ML
INJECTION INTRAMUSCULAR; INTRAVENOUS PRN
Status: DISCONTINUED | OUTPATIENT
Start: 2023-11-29 | End: 2023-11-29 | Stop reason: SURG

## 2023-11-29 RX ORDER — SODIUM CHLORIDE, SODIUM LACTATE, POTASSIUM CHLORIDE, CALCIUM CHLORIDE 600; 310; 30; 20 MG/100ML; MG/100ML; MG/100ML; MG/100ML
INJECTION, SOLUTION INTRAVENOUS CONTINUOUS
Status: DISCONTINUED | OUTPATIENT
Start: 2023-11-29 | End: 2023-11-29 | Stop reason: HOSPADM

## 2023-11-29 RX ORDER — HALOPERIDOL 5 MG/ML
1 INJECTION INTRAMUSCULAR
Status: DISCONTINUED | OUTPATIENT
Start: 2023-11-29 | End: 2023-11-29 | Stop reason: HOSPADM

## 2023-11-29 RX ORDER — HYDROMORPHONE HYDROCHLORIDE 1 MG/ML
0.1 INJECTION, SOLUTION INTRAMUSCULAR; INTRAVENOUS; SUBCUTANEOUS
Status: DISCONTINUED | OUTPATIENT
Start: 2023-11-29 | End: 2023-11-29 | Stop reason: HOSPADM

## 2023-11-29 RX ORDER — ALBUTEROL SULFATE 2.5 MG/3ML
2.5 SOLUTION RESPIRATORY (INHALATION)
Status: DISCONTINUED | OUTPATIENT
Start: 2023-11-29 | End: 2023-11-29 | Stop reason: HOSPADM

## 2023-11-29 RX ORDER — PROTAMINE SULFATE 10 MG/ML
INJECTION, SOLUTION INTRAVENOUS PRN
Status: DISCONTINUED | OUTPATIENT
Start: 2023-11-29 | End: 2023-11-29 | Stop reason: SURG

## 2023-11-29 RX ORDER — EPHEDRINE SULFATE 50 MG/ML
5 INJECTION, SOLUTION INTRAVENOUS
Status: DISCONTINUED | OUTPATIENT
Start: 2023-11-29 | End: 2023-11-29 | Stop reason: HOSPADM

## 2023-11-29 RX ORDER — HYDROMORPHONE HYDROCHLORIDE 1 MG/ML
0.5 INJECTION, SOLUTION INTRAMUSCULAR; INTRAVENOUS; SUBCUTANEOUS
Status: DISCONTINUED | OUTPATIENT
Start: 2023-11-29 | End: 2023-11-29 | Stop reason: HOSPADM

## 2023-11-29 RX ORDER — HYDROCODONE BITARTRATE AND ACETAMINOPHEN 7.5; 325 MG/1; MG/1
1 TABLET ORAL EVERY 6 HOURS PRN
Qty: 16 TABLET | Refills: 0 | Status: SHIPPED | OUTPATIENT
Start: 2023-11-29 | End: 2023-12-03

## 2023-11-29 RX ORDER — MIDAZOLAM HYDROCHLORIDE 1 MG/ML
INJECTION INTRAMUSCULAR; INTRAVENOUS PRN
Status: DISCONTINUED | OUTPATIENT
Start: 2023-11-29 | End: 2023-11-29 | Stop reason: SURG

## 2023-11-29 RX ORDER — DIPHENHYDRAMINE HYDROCHLORIDE 50 MG/ML
12.5 INJECTION INTRAMUSCULAR; INTRAVENOUS
Status: DISCONTINUED | OUTPATIENT
Start: 2023-11-29 | End: 2023-11-29 | Stop reason: HOSPADM

## 2023-11-29 RX ORDER — KETOROLAC TROMETHAMINE 30 MG/ML
INJECTION, SOLUTION INTRAMUSCULAR; INTRAVENOUS PRN
Status: DISCONTINUED | OUTPATIENT
Start: 2023-11-29 | End: 2023-11-29 | Stop reason: SURG

## 2023-11-29 RX ORDER — ONDANSETRON 2 MG/ML
4 INJECTION INTRAMUSCULAR; INTRAVENOUS
Status: DISCONTINUED | OUTPATIENT
Start: 2023-11-29 | End: 2023-11-29 | Stop reason: HOSPADM

## 2023-11-29 RX ORDER — CEFAZOLIN SODIUM 1 G/3ML
INJECTION, POWDER, FOR SOLUTION INTRAMUSCULAR; INTRAVENOUS PRN
Status: DISCONTINUED | OUTPATIENT
Start: 2023-11-29 | End: 2023-11-29 | Stop reason: SURG

## 2023-11-29 RX ADMIN — KETOROLAC TROMETHAMINE 15 MG: 30 INJECTION, SOLUTION INTRAMUSCULAR; INTRAVENOUS at 14:26

## 2023-11-29 RX ADMIN — HEPARIN SODIUM 3000 UNITS: 1000 INJECTION, SOLUTION INTRAVENOUS; SUBCUTANEOUS at 13:54

## 2023-11-29 RX ADMIN — PROPOFOL 100 MG: 10 INJECTION, EMULSION INTRAVENOUS at 13:35

## 2023-11-29 RX ADMIN — LIDOCAINE HYDROCHLORIDE 100 MG: 20 INJECTION, SOLUTION EPIDURAL; INFILTRATION; INTRACAUDAL at 13:35

## 2023-11-29 RX ADMIN — DEXAMETHASONE SODIUM PHOSPHATE 4 MG: 4 INJECTION INTRA-ARTICULAR; INTRALESIONAL; INTRAMUSCULAR; INTRAVENOUS; SOFT TISSUE at 13:39

## 2023-11-29 RX ADMIN — SODIUM CHLORIDE, POTASSIUM CHLORIDE, SODIUM LACTATE AND CALCIUM CHLORIDE: 600; 310; 30; 20 INJECTION, SOLUTION INTRAVENOUS at 13:28

## 2023-11-29 RX ADMIN — MIDAZOLAM HYDROCHLORIDE 2 MG: 1 INJECTION, SOLUTION INTRAMUSCULAR; INTRAVENOUS at 13:28

## 2023-11-29 RX ADMIN — CEFAZOLIN 2 G: 1 INJECTION, POWDER, FOR SOLUTION INTRAMUSCULAR; INTRAVENOUS at 13:35

## 2023-11-29 RX ADMIN — FENTANYL CITRATE 50 MCG: 50 INJECTION, SOLUTION INTRAMUSCULAR; INTRAVENOUS at 13:28

## 2023-11-29 RX ADMIN — FENTANYL CITRATE 50 MCG: 50 INJECTION, SOLUTION INTRAMUSCULAR; INTRAVENOUS at 14:24

## 2023-11-29 RX ADMIN — PROTAMINE SULFATE 20 MG: 10 INJECTION, SOLUTION INTRAVENOUS at 14:25

## 2023-11-29 RX ADMIN — ONDANSETRON 4 MG: 2 INJECTION INTRAMUSCULAR; INTRAVENOUS at 14:26

## 2023-11-29 ASSESSMENT — FIBROSIS 4 INDEX: FIB4 SCORE: 2.3

## 2023-11-29 ASSESSMENT — PAIN SCALES - GENERAL: PAIN_LEVEL: 0

## 2023-11-29 NOTE — OR NURSING
1439: Pt arrived via gurney with anesthesia and RN. VSS. Dressings CDI. Thrill and bruit present. Left radial pulse thready but palpable. Orders reviewed and initiated.   1601: Report called to PRUDENCE Tobar. All questions answered. VSS. Dressing CDI. No patient distress. Alert and oriented x4. CMS intact. Pt transported to phase II in stable condition on room air with 1 bag personal belongings. Family updated.

## 2023-11-29 NOTE — ANESTHESIA PROCEDURE NOTES
Airway    Date/Time: 11/29/2023 1:36 PM    Performed by: Brayden Sanderson M.D.  Authorized by: Brayden Sanderson M.D.    Location:  OR  Urgency:  Elective  Indications for Airway Management:  Anesthesia      Spontaneous Ventilation: absent    Sedation Level:  Deep  Preoxygenated: Yes    Mask Difficulty Assessment:  0 - not attempted  Final Airway Type:  Supraglottic airway  Final Supraglottic Airway:  Standard LMA    SGA Size:  4  Number of Attempts at Approach:  1

## 2023-11-29 NOTE — OR SURGEON
Immediate Post OP Note    PreOp Diagnosis: End-stage renal disease.      PostOp Diagnosis: Same.      Procedure(s):  LEFT UPPER ARM DIALYSIS GRAFT PLACEMENT using 5 mm bovine carotid graft - Wound Class: Clean    Surgeon(s):  Sarahi Carey M.D.    Anesthesiologist/Type of Anesthesia:  Anesthesiologist: Brayden Sanderson M.D./General    Surgical Staff:  Circulator: Loly Joy R.N.  Scrub Person: Sabrina Valiente    Specimens removed if any:  * No specimens in log *    Estimated Blood Loss: 10 mL.    IV fluid: 400 mL.    Findings: Good flow post graft placement.    Complications: None.    Dictated, #[id number].        11/29/2023 2:43 PM Sarahi Carey M.D.

## 2023-11-29 NOTE — ANESTHESIA PREPROCEDURE EVALUATION
Case: [id number] Date/Time: 11/29/23 1245    Procedure: LEFT UPPER ARM DIALYSIS GRAFT PLACEMENT    Pre-op diagnosis: END STAGE RENAL DISEASE    Location: [facility] OR 09 / SURGERY [facility]    Surgeons: Sarahi Carey M.D.            Relevant Problems   CARDIAC   (positive) Hypertension, benign   (positive) Primary hypertension         (positive) Alcoholic cirrhosis (HCC)   (positive) ESRD (end stage renal disease) (HCC)      ENDO   (positive) Type 2 diabetes mellitus, without long-term current use of insulin (HCC)       Physical Exam    Airway   Mallampati: II  TM distance: >3 FB  Neck ROM: full       Cardiovascular - normal exam  Rhythm: regular  Rate: normal  (-) murmur     Dental - normal exam           Pulmonary - normal exam  Breath sounds clear to auscultation     Abdominal    Neurological - normal exam                   Anesthesia Plan    ASA 3   ASA physical status 3 criteria: ESRD undergoing regularly scheduled dialysis and CAD/stents (> 3 months)    Plan - general       Airway plan will be LMA          Induction: intravenous    Postoperative Plan: Postoperative administration of opioids is intended.    Pertinent diagnostic labs and testing reviewed    Informed Consent:    Anesthetic plan and risks discussed with patient.

## 2023-11-29 NOTE — DISCHARGE INSTRUCTIONS
HOME CARE INSTRUCTIONS    ACTIVITY: Rest and take it easy for the first 24 hours.  A responsible adult is recommended to remain with you during that time.  It is normal to feel sleepy.  We encourage you to not do anything that requires balance, judgment or coordination.    FOR 24 HOURS DO NOT:  Drive, operate machinery or run household appliances.  Drink beer or alcoholic beverages.  Make important decisions or sign legal documents.    SPECIAL INSTRUCTIONS:   1) Activity: No lifting more than half a gallon of milk using left arm for 2 weeks.  Keep left arm straight and elevated as much as possible.  No blood procedure/IV/blood draws/sleeping on left arm.  2) Resume home medications.  May take Tylenol as needed for mild pain.  For moderate pain, take Norco as prescribed.  Do not drive or operate machinery while taking Norco.  Do not take Norco on an empty stomach.  3) May remove dressing after 3 days.  4) Follow-up with Dr. Carey in 2 to 3 weeks.  Call [phone] for appointment and for any problem, especially numbness, tingling, or discoloration of the left hand/fingers.    DIET: To avoid nausea, slowly advance diet as tolerated, avoiding spicy or greasy foods for the first day.  Add more substantial food to your diet according to your physician's instructions.  Babies can be fed formula or breast milk as soon as they are hungry.  INCREASE FLUIDS AND FIBER TO AVOID CONSTIPATION.    SURGICAL DRESSING/BATHING: See above.    MEDICATIONS: Resume taking daily medication.  Take prescribed pain medication with food.  If no medication is prescribed, you may take non-aspirin pain medication if needed.  PAIN MEDICATION CAN BE VERY CONSTIPATING.  Take a stool softener or laxative such as senokot, pericolace, or milk of magnesia if needed.    Prescription given for Norco.  No pain medication given in recovery.    A follow-up appointment should be arranged with your doctor in 2-3 weeks; call to schedule.    You should CALL YOUR  PHYSICIAN if you develop:  Fever greater than 101 degrees F.  Pain not relieved by medication, or persistent nausea or vomiting.  Excessive bleeding (blood soaking through dressing) or unexpected drainage from the wound.  Extreme redness or swelling around the incision site, drainage of pus or foul smelling drainage.  Inability to urinate or empty your bladder within 8 hours.  Problems with breathing or chest pain.    You should call 911 if you develop problems with breathing or chest pain.  If you are unable to contact your doctor or surgical center, you should go to the nearest emergency room or urgent care center.  Physician's telephone #: Dr. Carey [phone]    MILD FLU-LIKE SYMPTOMS ARE NORMAL.  YOU MAY EXPERIENCE GENERALIZED MUSCLE ACHES, THROAT IRRITATION, HEADACHE AND/OR SOME NAUSEA.    If any questions arise, call your doctor.  If your doctor is not available, please feel free to call the Surgical Center at [phone].  The Center is open Monday through Friday from 7AM to 7PM.      A registered nurse may call you a few days after your surgery to see how you are doing after your procedure.    You may also receive a survey in the mail within the next two weeks and we ask that you take a few moments to complete the survey and return it to us.  Our goal is to provide you with very good care and we value your comments.     Depression / Suicide Risk    As you are discharged from this [facility] facility, it is important to learn how to keep safe from harming yourself.    Recognize the warning signs:  Abrupt changes in personality, positive or negative- including increase in energy   Giving away possessions  Change in eating patterns- significant weight changes-  positive or negative  Change in sleeping patterns- unable to sleep or sleeping all the time   Unwillingness or inability to communicate  Depression  Unusual sadness, discouragement and loneliness  Talk of wanting to die  Neglect of personal  appearance   Rebelliousness- reckless behavior  Withdrawal from people/activities they love  Confusion- inability to concentrate     If you or a loved one observes any of these behaviors or has concerns about self-harm, here's what you can do:  Talk about it- your feelings and reasons for harming yourself  Remove any means that you might use to hurt yourself (examples: pills, rope, extension cords, firearm)  Get professional help from the community (Mental Health, Substance Abuse, psychological counseling)  Do not be alone:Call your Safe Contact- someone whom you trust who will be there for you.  Call your local CRISIS HOTLINE 126-2512 or 621-354-7800  Call your local Children's Mobile Crisis Response Team [place] (513) 979-6785 or www.[place]  Call the toll free National Suicide Prevention Hotlines   National Suicide Prevention Lifeline 994-768-IBQP (4097)  National Hope Line Network 800-SUICIDE (588-1287)    I acknowledge receipt and understanding of these Home Care instructions.

## 2023-11-29 NOTE — ANESTHESIA TIME REPORT
Anesthesia Start and Stop Event Times       Date Time Event    11/29/2023 1324 Ready for Procedure     1328 Anesthesia Start     1442 Anesthesia Stop          Responsible Staff  11/29/23      Name Role Begin End    Brayden Sanderson M.D. Anesth 1328 1442          Overtime Reason:  no overtime (within assigned shift)    Comments:

## 2023-11-29 NOTE — PROGRESS NOTES
Medication history reviewed with PT at bedside    Med [facility] is complete per PT reporting    Allergies reviewed.     Patient denies any outpatient antibiotics in the last 30 days.     Patient is not taking anticoagulants.    PT gets dialysis every Monday, Wednesday and Friday at [facility] ([phone]).    Preferred pharmacy for this visit - [facility] on [address] in [place] ([phone]).

## 2023-11-30 NOTE — OP REPORT
DATE OF SERVICE:  11/29/2023     SURGEON:  Sarahi Carey MD     ANESTHESIOLOGIST:  Brayden Sanderson M.D.     TYPE OF ANESTHESIA:  General anesthesia and local anesthesia with 30 mL 0.25% Marcaine solution.     PREOPERATIVE DIAGNOSIS:  End-stage renal disease.     POSTOPERATIVE DIAGNOSIS:  End-stage renal disease.     PROCEDURE:  Left upper arm dialysis graft placement using 5 mm bovine carotid   graft.     INDICATIONS FOR PROCEDURE:  This is a pleasant 58-year-old female with   multiple medical problems including end-stage renal disease, on hemodialysis   via Perm-A-Cath.  The patient is referred to see me for access creation.    Noninvasive vascular study was performed and showed the veins to be small, not   suitable for use in fistula creation.  Discussion was made with the patient   regarding graft placement in the left upper arm.  She would like to proceed,   fully understanding all risks.     DESCRIPTION OF PROCEDURE:  Informed consent was obtained. The patient was   taken to the operating room and was placed in the supine position.  Sequential   compression devices were applied.  The patient was given Ancef intravenously.    General anesthesia was induced.     Next, her left upper extremity was sterilely prepped and draped in the normal   fashion.  The skin and subcutaneous tissues over the brachial artery in the   medial distal upper arm and the axillary area were anesthetized with 0.25%   Marcaine solution done.  An incision was made over the brachial artery.  The   incision was extended through subcutaneous tissue using electrocautery.  The   brachial artery was identified and carefully dissected free from the   surrounding tissues.     Next, an incision was made in the axillary area.  The incision was extended   through subcutaneous tissue using electrocautery.  The axillary vein was   identified, carefully dissected free from the surrounding tissues.     Next, a 5 mm bovine carotid graft was  brought through subcutaneous tunnel   connecting the brachial artery with the axillary vein area, after the skin and subcutaneous tissues were anesthetized with Marcaine solution.     The patient was given heparin 3000 units intravenously.  After the heparin was   allowed to circulate systemically for 3 minutes, vascular control of the   brachial artery was obtained with vascular clamps.  An arteriotomy was made on   the brachial artery.  The graft was anastomosed end-to-side to the brachial   artery using running 6-0 Prolene suture.  After this was done, backbleeding   and flushing were obtained through the graft.  The graft was clamped above the   anastomosis.  Flow was restored distally.     Next, vascular control of the axillary vein was obtained with vascular clamps.    A venotomy was made.  The graft was cut to appropriate length, bevelled, and   anastomosed end-to-side to the axillary vein using running 6-0 Prolene   suture.  Prior to completing anastomosis, backbleeding and flushing were   obtained.  The anastomosis was completed.  Flow was restored.  A sterile   Doppler probe was brought into the operative field.  Excellent Doppler flow   Signald were obtained over the graft.  Excellent Doppler flow signals with   significant diastolic flow component were also obtained over the brachial   artery, both above and below the anastomosis.     The patient was given protamine 20 mg intravenously.  The wounds were   irrigated and were found to have excellent hemostasis.  The wounds were closed   subcutaneously with running 3-0 Vicryl suture and subcuticularly with 4-0   Monocryl suture.  The wounds were cleaned and sterile dressing was applied.     ESTIMATED BLOOD LOSS:  10 mL.     INTRAVENOUS FLUIDS:  400 mL.     COUNTS:  Sponge and instrument count was correct x2.     The patient was then awakened, extubated, taken to recovery area in stable   condition with good bruits over the graft and intact neurovascular  examination   of the left hand.        ______________________________  MD SARAHY Anderson/CAROLYN/STEPHANY    DD:  11/29/2023 14:52  DT:  11/29/2023 15:55    Job#:  [id number]

## 2023-11-30 NOTE — OR NURSING
VSS, pt steady with ambulation, meets discharge criteria. Discharged home with family. Wheeled to car with hospital escort. Rx to be picked up at outpatient pharmacy. Andrew CDI. IV dc'd, cathlon intact. Pt to f/u with physician as directed by physician. Pt to return to ER for any emergent sx. Pt verbalizes understanding of discharge instructions and all questions were answered.

## 2023-11-30 NOTE — ANESTHESIA POSTPROCEDURE EVALUATION
Patient: Rigoberto Adames    Procedure Summary       Date: 11/29/23 Room / Location: [facility] 09 / SURGERY [facility]    Anesthesia Start: 1328 Anesthesia Stop: 1442    Procedure: LEFT UPPER ARM DIALYSIS GRAFT PLACEMENT (Left: Arm Upper) Diagnosis: (END STAGE RENAL DISEASE)    Surgeons: Sarahi Carey M.D. Responsible Provider: Brayden Sanderson M.D.    Anesthesia Type: general ASA Status: 3            Final Anesthesia Type: general  Last vitals  BP   Blood Pressure: (!) 141/73    Temp   36.3 °C (97.3 °F)    Pulse   74   Resp   17    SpO2   93 %      Anesthesia Post Evaluation    Patient location during evaluation: PACU  Patient participation: complete - patient participated  Level of consciousness: awake and alert  Pain score: 0    Airway patency: patent  Anesthetic complications: no  Cardiovascular status: hemodynamically stable  Respiratory status: acceptable  Hydration status: euvolemic    PONV: none          No notable events documented.     Nurse Pain Score: 0 (NPRS)

## 2023-12-01 ENCOUNTER — APPOINTMENT (OUTPATIENT)
Dept: CARDIOLOGY | Facility: MEDICAL CENTER | Age: 58
End: 2023-12-01
Attending: INTERNAL MEDICINE
Payer: MEDICAID

## 2023-12-01 ENCOUNTER — APPOINTMENT (OUTPATIENT)
Dept: RADIOLOGY | Facility: MEDICAL CENTER | Age: 58
End: 2023-12-01
Payer: MEDICARE

## 2023-12-03 ENCOUNTER — ANESTHESIA EVENT (OUTPATIENT)
Dept: SURGERY | Facility: MEDICAL CENTER | Age: 58
End: 2023-12-03
Payer: MEDICARE

## 2023-12-03 ENCOUNTER — APPOINTMENT (OUTPATIENT)
Dept: RADIOLOGY | Facility: MEDICAL CENTER | Age: 58
End: 2023-12-03
Attending: EMERGENCY MEDICINE
Payer: MEDICARE

## 2023-12-03 ENCOUNTER — HOSPITAL ENCOUNTER (EMERGENCY)
Facility: MEDICAL CENTER | Age: 58
End: 2023-12-03
Attending: EMERGENCY MEDICINE
Payer: MEDICARE

## 2023-12-03 ENCOUNTER — APPOINTMENT (OUTPATIENT)
Dept: RADIOLOGY | Facility: MEDICAL CENTER | Age: 58
End: 2023-12-03
Attending: SURGERY
Payer: MEDICARE

## 2023-12-03 ENCOUNTER — ANESTHESIA (OUTPATIENT)
Dept: SURGERY | Facility: MEDICAL CENTER | Age: 58
End: 2023-12-03
Payer: MEDICARE

## 2023-12-03 VITALS
HEART RATE: 68 BPM | OXYGEN SATURATION: 96 % | WEIGHT: 127.87 LBS | BODY MASS INDEX: 20.07 KG/M2 | SYSTOLIC BLOOD PRESSURE: 162 MMHG | DIASTOLIC BLOOD PRESSURE: 90 MMHG | TEMPERATURE: 97.8 F | RESPIRATION RATE: 15 BRPM | HEIGHT: 67 IN

## 2023-12-03 DIAGNOSIS — T82.898A STEAL SYNDROME AS COMPLICATION OF DIALYSIS ACCESS, INITIAL ENCOUNTER (HCC): ICD-10-CM

## 2023-12-03 PROBLEM — Z87.891 FORMER SMOKER: Status: ACTIVE | Noted: 2023-12-03

## 2023-12-03 LAB
ALBUMIN SERPL BCP-MCNC: 4 G/DL (ref 3.2–4.9)
ALBUMIN/GLOB SERPL: 0.9 G/DL
ALP SERPL-CCNC: 88 U/L (ref 30–99)
ALT SERPL-CCNC: 5 U/L (ref 2–50)
ANION GAP SERPL CALC-SCNC: 13 MMOL/L (ref 7–16)
APTT PPP: 35.7 SEC (ref 24.7–36)
AST SERPL-CCNC: 18 U/L (ref 12–45)
BASOPHILS # BLD AUTO: 0.3 % (ref 0–1.8)
BASOPHILS # BLD: 0.02 K/UL (ref 0–0.12)
BILIRUB SERPL-MCNC: 0.5 MG/DL (ref 0.1–1.5)
BUN SERPL-MCNC: 37 MG/DL (ref 8–22)
CALCIUM ALBUM COR SERPL-MCNC: 8.8 MG/DL (ref 8.5–10.5)
CALCIUM SERPL-MCNC: 8.8 MG/DL (ref 8.5–10.5)
CHLORIDE SERPL-SCNC: 96 MMOL/L (ref 96–112)
CO2 SERPL-SCNC: 25 MMOL/L (ref 20–33)
CREAT SERPL-MCNC: 4.27 MG/DL (ref 0.5–1.4)
EOSINOPHIL # BLD AUTO: 0.51 K/UL (ref 0–0.51)
EOSINOPHIL NFR BLD: 7.3 % (ref 0–6.9)
ERYTHROCYTE [DISTWIDTH] IN BLOOD BY AUTOMATED COUNT: 54.5 FL (ref 35.9–50)
GFR SERPLBLD CREATININE-BSD FMLA CKD-EPI: 11 ML/MIN/1.73 M 2
GLOBULIN SER CALC-MCNC: 4.3 G/DL (ref 1.9–3.5)
GLUCOSE BLD STRIP.AUTO-MCNC: 105 MG/DL (ref 65–99)
GLUCOSE SERPL-MCNC: 126 MG/DL (ref 65–99)
HCT VFR BLD AUTO: 36.1 % (ref 37–47)
HGB BLD-MCNC: 11.6 G/DL (ref 12–16)
IMM GRANULOCYTES # BLD AUTO: 0.01 K/UL (ref 0–0.11)
IMM GRANULOCYTES NFR BLD AUTO: 0.1 % (ref 0–0.9)
INR PPP: 1.02 (ref 0.87–1.13)
LYMPHOCYTES # BLD AUTO: 1.85 K/UL (ref 1–4.8)
LYMPHOCYTES NFR BLD: 26.6 % (ref 22–41)
MCH RBC QN AUTO: 30.9 PG (ref 27–33)
MCHC RBC AUTO-ENTMCNC: 32.1 G/DL (ref 32.2–35.5)
MCV RBC AUTO: 96 FL (ref 81.4–97.8)
MONOCYTES # BLD AUTO: 0.56 K/UL (ref 0–0.85)
MONOCYTES NFR BLD AUTO: 8.1 % (ref 0–13.4)
NEUTROPHILS # BLD AUTO: 4 K/UL (ref 1.82–7.42)
NEUTROPHILS NFR BLD: 57.6 % (ref 44–72)
NRBC # BLD AUTO: 0 K/UL
NRBC BLD-RTO: 0 /100 WBC (ref 0–0.2)
PLATELET # BLD AUTO: 170 K/UL (ref 164–446)
PMV BLD AUTO: 10.1 FL (ref 9–12.9)
POTASSIUM SERPL-SCNC: 5.6 MMOL/L (ref 3.6–5.5)
PROT SERPL-MCNC: 8.3 G/DL (ref 6–8.2)
PROTHROMBIN TIME: 13.6 SEC (ref 12–14.6)
RBC # BLD AUTO: 3.76 M/UL (ref 4.2–5.4)
SODIUM SERPL-SCNC: 134 MMOL/L (ref 135–145)
WBC # BLD AUTO: 7 K/UL (ref 4.8–10.8)

## 2023-12-03 PROCEDURE — 93931 UPPER EXTREMITY STUDY: CPT | Mod: LT

## 2023-12-03 PROCEDURE — 110454 HCHG SHELL REV 250: Performed by: SURGERY

## 2023-12-03 PROCEDURE — 82962 GLUCOSE BLOOD TEST: CPT

## 2023-12-03 PROCEDURE — 700111 HCHG RX REV CODE 636 W/ 250 OVERRIDE (IP): Mod: UD | Performed by: SURGERY

## 2023-12-03 PROCEDURE — C1894 INTRO/SHEATH, NON-LASER: HCPCS | Performed by: SURGERY

## 2023-12-03 PROCEDURE — 700111 HCHG RX REV CODE 636 W/ 250 OVERRIDE (IP): Mod: JZ,UD | Performed by: ANESTHESIOLOGY

## 2023-12-03 PROCEDURE — 160035 HCHG PACU - 1ST 60 MINS PHASE I: Performed by: SURGERY

## 2023-12-03 PROCEDURE — 160009 HCHG ANES TIME/MIN: Performed by: SURGERY

## 2023-12-03 PROCEDURE — 700105 HCHG RX REV CODE 258: Mod: UD | Performed by: ANESTHESIOLOGY

## 2023-12-03 PROCEDURE — 700101 HCHG RX REV CODE 250: Mod: UD | Performed by: SURGERY

## 2023-12-03 PROCEDURE — 700105 HCHG RX REV CODE 258: Mod: UD | Performed by: SURGERY

## 2023-12-03 PROCEDURE — 93931 UPPER EXTREMITY STUDY: CPT | Mod: 26,LT | Performed by: INTERNAL MEDICINE

## 2023-12-03 PROCEDURE — 85610 PROTHROMBIN TIME: CPT

## 2023-12-03 PROCEDURE — 160002 HCHG RECOVERY MINUTES (STAT): Performed by: SURGERY

## 2023-12-03 PROCEDURE — 99291 CRITICAL CARE FIRST HOUR: CPT

## 2023-12-03 PROCEDURE — 700117 HCHG RX CONTRAST REV CODE 255: Mod: UD | Performed by: SURGERY

## 2023-12-03 PROCEDURE — 36140 INTRO NDL ICATH UPR/LXTR ART: CPT | Performed by: SURGERY

## 2023-12-03 PROCEDURE — 160048 HCHG OR STATISTICAL LEVEL 1-5: Performed by: SURGERY

## 2023-12-03 PROCEDURE — 160025 RECOVERY II MINUTES (STATS): Performed by: SURGERY

## 2023-12-03 PROCEDURE — 36415 COLL VENOUS BLD VENIPUNCTURE: CPT

## 2023-12-03 PROCEDURE — 160028 HCHG SURGERY MINUTES - 1ST 30 MINS LEVEL 3: Performed by: SURGERY

## 2023-12-03 PROCEDURE — 80053 COMPREHEN METABOLIC PANEL: CPT

## 2023-12-03 PROCEDURE — 160039 HCHG SURGERY MINUTES - EA ADDL 1 MIN LEVEL 3: Performed by: SURGERY

## 2023-12-03 PROCEDURE — 37607 LIG/BANDING ANGIOACS AV FSTL: CPT | Mod: 78 | Performed by: SURGERY

## 2023-12-03 PROCEDURE — 700101 HCHG RX REV CODE 250: Mod: UD | Performed by: ANESTHESIOLOGY

## 2023-12-03 PROCEDURE — 160046 HCHG PACU - 1ST 60 MINS PHASE II: Performed by: SURGERY

## 2023-12-03 PROCEDURE — 85730 THROMBOPLASTIN TIME PARTIAL: CPT

## 2023-12-03 PROCEDURE — 85025 COMPLETE CBC W/AUTO DIFF WBC: CPT

## 2023-12-03 PROCEDURE — 75710 ARTERY X-RAYS ARM/LEG: CPT | Mod: 26 | Performed by: SURGERY

## 2023-12-03 RX ORDER — ONDANSETRON 2 MG/ML
4 INJECTION INTRAMUSCULAR; INTRAVENOUS
Status: DISCONTINUED | OUTPATIENT
Start: 2023-12-03 | End: 2023-12-03 | Stop reason: HOSPADM

## 2023-12-03 RX ORDER — SODIUM CHLORIDE 9 MG/ML
INJECTION, SOLUTION INTRAVENOUS
Status: DISCONTINUED | OUTPATIENT
Start: 2023-12-03 | End: 2023-12-03 | Stop reason: SURG

## 2023-12-03 RX ORDER — LIDOCAINE HYDROCHLORIDE 20 MG/ML
INJECTION, SOLUTION EPIDURAL; INFILTRATION; INTRACAUDAL; PERINEURAL PRN
Status: DISCONTINUED | OUTPATIENT
Start: 2023-12-03 | End: 2023-12-03 | Stop reason: SURG

## 2023-12-03 RX ORDER — SODIUM CHLORIDE 9 MG/ML
INJECTION, SOLUTION INTRAVENOUS CONTINUOUS
Status: DISCONTINUED | OUTPATIENT
Start: 2023-12-03 | End: 2023-12-03 | Stop reason: HOSPADM

## 2023-12-03 RX ORDER — EPHEDRINE SULFATE 50 MG/ML
5 INJECTION, SOLUTION INTRAVENOUS
Status: DISCONTINUED | OUTPATIENT
Start: 2023-12-03 | End: 2023-12-03 | Stop reason: HOSPADM

## 2023-12-03 RX ORDER — DIPHENHYDRAMINE HYDROCHLORIDE 50 MG/ML
12.5 INJECTION INTRAMUSCULAR; INTRAVENOUS
Status: DISCONTINUED | OUTPATIENT
Start: 2023-12-03 | End: 2023-12-03 | Stop reason: HOSPADM

## 2023-12-03 RX ORDER — HALOPERIDOL 5 MG/ML
1 INJECTION INTRAMUSCULAR
Status: DISCONTINUED | OUTPATIENT
Start: 2023-12-03 | End: 2023-12-03 | Stop reason: HOSPADM

## 2023-12-03 RX ORDER — CEFAZOLIN SODIUM 1 G/3ML
INJECTION, POWDER, FOR SOLUTION INTRAMUSCULAR; INTRAVENOUS PRN
Status: DISCONTINUED | OUTPATIENT
Start: 2023-12-03 | End: 2023-12-03 | Stop reason: SURG

## 2023-12-03 RX ORDER — LABETALOL HYDROCHLORIDE 5 MG/ML
5 INJECTION, SOLUTION INTRAVENOUS
Status: DISCONTINUED | OUTPATIENT
Start: 2023-12-03 | End: 2023-12-03 | Stop reason: HOSPADM

## 2023-12-03 RX ORDER — OXYCODONE HCL 5 MG/5 ML
5 SOLUTION, ORAL ORAL
Status: DISCONTINUED | OUTPATIENT
Start: 2023-12-03 | End: 2023-12-03 | Stop reason: HOSPADM

## 2023-12-03 RX ORDER — HYDROMORPHONE HYDROCHLORIDE 1 MG/ML
0.1 INJECTION, SOLUTION INTRAMUSCULAR; INTRAVENOUS; SUBCUTANEOUS
Status: DISCONTINUED | OUTPATIENT
Start: 2023-12-03 | End: 2023-12-03 | Stop reason: HOSPADM

## 2023-12-03 RX ORDER — MIDAZOLAM HYDROCHLORIDE 1 MG/ML
1 INJECTION INTRAMUSCULAR; INTRAVENOUS
Status: DISCONTINUED | OUTPATIENT
Start: 2023-12-03 | End: 2023-12-03 | Stop reason: HOSPADM

## 2023-12-03 RX ORDER — BUPIVACAINE HYDROCHLORIDE 5 MG/ML
INJECTION, SOLUTION EPIDURAL; INTRACAUDAL
Status: DISCONTINUED | OUTPATIENT
Start: 2023-12-03 | End: 2023-12-03 | Stop reason: HOSPADM

## 2023-12-03 RX ORDER — HYDRALAZINE HYDROCHLORIDE 20 MG/ML
5 INJECTION INTRAMUSCULAR; INTRAVENOUS
Status: DISCONTINUED | OUTPATIENT
Start: 2023-12-03 | End: 2023-12-03 | Stop reason: HOSPADM

## 2023-12-03 RX ORDER — HYDROMORPHONE HYDROCHLORIDE 1 MG/ML
0.4 INJECTION, SOLUTION INTRAMUSCULAR; INTRAVENOUS; SUBCUTANEOUS
Status: DISCONTINUED | OUTPATIENT
Start: 2023-12-03 | End: 2023-12-03 | Stop reason: HOSPADM

## 2023-12-03 RX ORDER — HYDROMORPHONE HYDROCHLORIDE 1 MG/ML
0.2 INJECTION, SOLUTION INTRAMUSCULAR; INTRAVENOUS; SUBCUTANEOUS
Status: DISCONTINUED | OUTPATIENT
Start: 2023-12-03 | End: 2023-12-03 | Stop reason: HOSPADM

## 2023-12-03 RX ORDER — OXYCODONE HCL 5 MG/5 ML
10 SOLUTION, ORAL ORAL
Status: DISCONTINUED | OUTPATIENT
Start: 2023-12-03 | End: 2023-12-03 | Stop reason: HOSPADM

## 2023-12-03 RX ORDER — IPRATROPIUM BROMIDE AND ALBUTEROL SULFATE 2.5; .5 MG/3ML; MG/3ML
3 SOLUTION RESPIRATORY (INHALATION)
Status: DISCONTINUED | OUTPATIENT
Start: 2023-12-03 | End: 2023-12-03 | Stop reason: HOSPADM

## 2023-12-03 RX ADMIN — FENTANYL CITRATE 50 MCG: 50 INJECTION, SOLUTION INTRAMUSCULAR; INTRAVENOUS at 18:49

## 2023-12-03 RX ADMIN — PROPOFOL 100 MG: 10 INJECTION, EMULSION INTRAVENOUS at 18:37

## 2023-12-03 RX ADMIN — CEFAZOLIN 2 G: 1 INJECTION, POWDER, FOR SOLUTION INTRAMUSCULAR; INTRAVENOUS at 18:41

## 2023-12-03 RX ADMIN — SODIUM CHLORIDE: 9 INJECTION, SOLUTION INTRAVENOUS at 18:34

## 2023-12-03 RX ADMIN — LIDOCAINE HYDROCHLORIDE 60 MG: 20 INJECTION, SOLUTION EPIDURAL; INFILTRATION; INTRACAUDAL at 18:37

## 2023-12-03 ASSESSMENT — PAIN DESCRIPTION - PAIN TYPE: TYPE: ACUTE PAIN

## 2023-12-03 ASSESSMENT — PAIN SCALES - GENERAL: PAIN_LEVEL: 0

## 2023-12-03 ASSESSMENT — FIBROSIS 4 INDEX: FIB4 SCORE: 2.42

## 2023-12-03 NOTE — ED NOTES
Med rec updated  and complete. Allergies reviewed. Confirmed name and date of birth.   Pt denies antibiotic use in last 30 days.  Pt denies anticoagulant and antiplatelet medications.  Pt took no medications today.      Home pharmacy   [facility] = [phone]

## 2023-12-03 NOTE — CONSULTS
Vascular Surgery          New Patient Consultation    Patient:Rigoberto Adames  MRN:[id number]    Date: 12/3/2023    Referring Provider: Viri Contreras M.d.    Consulting Physician: Payam Marroquin MD  _____________________________________________________    Reason for consultation:  Left hand pain and weakness after AV graft insertion    HPI:  This is a 58 y.o. female who underwent insertion of a left upper arm brachial axillary AV graft by Dr. Carey on Wednesday, November 29.  Patient reports the day after her operation she began to feel some numbness in her left hand and then after that she began to experience some degree of weakness.  The symptoms progressed until today when she decided to come to the ER for evaluation.  Currently she has minimal motor movement of the left hand.  Radial and ulnar pulses are not detectable even with Doppler however the radial pulse returns with the graft compressed.  Findings are consistent with a profound steal syndrome of the left upper arm and the graft ligation will be required.    Past Medical History:   Diagnosis Date    Acute kidney injury (HCC)     Acute necrotizing pyelonephritis     Acute respiratory failure with hypoxia ([facility]) 04/23/2023    Acute-on-chronic kidney injury ([facility]) 04/23/2023    Alcohol withdrawal ([facility])     Arthritis     Bilateral legs; no formal diagnosis.    Breath shortness     Cataract     Bilateral IOL    Chronic kidney disease (CKD) stage G4/A2, severely decreased glomerular filtration rate (GFR) between 15-29 mL/min/1.73 square meter and albuminuria creatinine ratio between  mg/g (HCC) 07/25/2023    Cirrhosis of liver with ascites (HCC) 03/06/2017    Dental disorder     Upper and lower dentures    Diabetes mellitus, type 2 (HCC)     Oral medications    Diabetic ulcer of toe (HCC) 04/30/2020    Dialysis patient ([facility])     since 4/2023    diarrhea 2019    been going on for about a year    Foot pain, bilateral     Heart murmur      High cholesterol     HOCM (hypertrophic obstructive cardiomyopathy) (HCC)     Hyperammonemia (HCC) 06/24/2016    Hyperkalemia 04/23/2023    Hypertension     Shock (HCC) 04/23/2023    STEMI (ST elevation myocardial infarction) (HCC)     Thrombocytopenia (HCC)     Tobacco abuse 12/08/2014       Past Surgical History:   Procedure Laterality Date    AV FISTULA CREATION Left 11/29/2023    Procedure: LEFT UPPER ARM DIALYSIS GRAFT PLACEMENT;  Surgeon: Sarahi Carey M.D.;  Location: SURGERY [facility];  Service: General    [place] UPPER GI ENDOSCOPY,DIAGNOSIS N/A 03/04/2022    Procedure: GASTROSCOPY;  Surgeon: Kashif Rhodes M.D.;  Location: SURGERY [facility];  Service: Gastroenterology    RI UPPER GI ENDOSCOPY,LIGAT VARIX  09/25/2019    Procedure: GASTROSCOPY, WITH VARICEAL BANDING - WITH GASTRIC MAPPING;  Surgeon: Sandi Espinoza M.D.;  Location: SURGERY [facility];  Service: Gastroenterology    GASTROSCOPY-ENDO  09/25/2019    Procedure: GASTROSCOPY;  Surgeon: Sandi Espinoza M.D.;  Location: SURGERY [facility];  Service: Gastroenterology    PRIMARY C SECTION  2006    CATARACT EXTRACTION WITH IOL Bilateral     OTHER      c- section       No current facility-administered medications for this encounter.     Current Outpatient Medications   Medication Sig Dispense Refill    HYDROcodone-acetaminophen (NORCO) 7.5-325 MG tab Take 1 Tablet by mouth every 6 hours as needed for Moderate Pain for up to 4 days. 16 Tablet 0    loperamide (IMODIUM) 2 MG Cap Take 1 Capsule by mouth 4 times a day as needed for Diarrhea. 30 Capsule 2    atorvastatin (LIPITOR) 20 MG Tab Take 1 Tablet by mouth every evening. 60 Tablet 0    amLODIPine (NORVASC) 10 MG Tab Take 1 Tablet by mouth every day. Do not take this medication the morning of dialysis days 30 Tablet 3    carvedilol (COREG) 25 MG Tab Take 1 Tablet by mouth 2 times a day with meals. Do not take this medication the morning of dialysis days 60 Tablet 3     Ascorbic Acid (VITAMIN C) 100 MG Tab Take 1 Tablet by mouth every day. 30 Tablet 3    Alcohol Swabs Wipe site with prep pad prior to injection. 100 Each 6       Social History     Socioeconomic History    Marital status: Single     Spouse name: Not on file    Number of children: Not on file    Years of education: Not on file    Highest education level: Not on file   Occupational History    Occupation: restaurant owner   Tobacco Use    Smoking status: Former     Current packs/day: 0.00     Average packs/day: 1 pack/day for 10.0 years (10.0 ttl pk-yrs)     Types: Cigarettes     Start date: 8/12/2015     Quit date: 06/2020     Years since quitting: 3.5    Smokeless tobacco: Never   Vaping Use    Vaping Use: Never used   Substance and Sexual Activity    Alcohol use: Not Currently     Alcohol/week: 0.6 oz     Types: 1 Glasses of wine per week    Drug use: No    Sexual activity: Not on file   Other Topics Concern    Not on file   Social History Narrative    Single- 2 children.     Social Determinants of Health     Financial Resource Strain: Low Risk  (5/8/2020)    Overall Financial Resource Strain (CARDIA)     Difficulty of Paying Living Expenses: Not hard at all   Food Insecurity: No Food Insecurity (5/8/2020)    Hunger Vital Sign     Worried About Running Out of Food in the Last Year: Never true     Ran Out of Food in the Last Year: Never true   Transportation Needs: No Transportation Needs (5/8/2020)    PRAPARE - Transportation     Lack of Transportation (Medical): No     Lack of Transportation (Non-Medical): No   Physical Activity: Not on file   Stress: Not on file   Social Connections: Not on file   Intimate Partner Violence: Not on file   Housing Stability: Not on file       Family History   Problem Relation Age of Onset    Cancer Father         stomach    Hypertension Mother        Allergies:  Patient has no known allergies.    Review of Systems:    Constitutional: Negative for fever or chills  HENT:   Negative for  "hearing loss or tinnitus    Eyes:    Negative for blurred vision or loss of vision  Respiratory:  Negative for cough or hemoptysis  Cardiac:  Negative for chest pain or palpitations  Vascular:  Negative for claudication, Negative for rest pain  Gastrointestinal: Negative for vomiting or abdominal pain     Negative for hematochezia or melena   Genitourinary: Negative for dysuria or hematuria   Musculoskeletal: Negative for myalgias or acute joint pain  Skin:   Negative for itching or rash  Neurological:  Negative for dizziness or headaches     Negative for speech disturbance     Negative for extremity weakness or paresthesias  Endo/Heme:  Negative for easy bruising or bleeding    Physical Exam:  BP (!) 172/86   Pulse 70   Temp 36 °C (96.8 °F) (Temporal)   Resp 18   Ht 1.702 m (5' 7\")   Wt 58 kg (127 lb 13.9 oz)   SpO2 97%   BMI 20.03 kg/m²     Constitutional: Alert, oriented, no acute distress  HEENT:  Normocephalic and atraumatic, EOMI  Neck:   Supple, no JVD,   Cardiovascular: Regular rate and rhythm,   Pulmonary:  Good air entry bilaterally,    Abdominal:  Soft, non-tender, non-distended       Musculoskeletal: No tenderness, no deformity  Neurological:  CN II-XII grossly intact, no focal deficits except weakness of the left hand  Skin:   Skin is warm and dry. No rash noted.  Vascular exam: Extremities are warm and well-perfused however the left hand although pink and has about 3-second capillary refill has no detectable pulses unless the AV graft is compressed.    Labs:  Recent Labs     12/03/23  1220   WBC 7.0   RBC 3.76*   HEMOGLOBIN 11.6*   HEMATOCRIT 36.1*   MCV 96.0   MCH 30.9   MCHC 32.1*   RDW 54.5*   PLATELETCT 170   MPV 10.1     Recent Labs     12/03/23  1220   SODIUM 134*   POTASSIUM 5.6*   CHLORIDE 96   CO2 25   GLUCOSE 126*   BUN 37*   CREATININE 4.27*   CALCIUM 8.8     Recent Labs     12/03/23  1220   APTT 35.7   INR 1.02     Recent Labs     12/03/23  1220   ASTSGOT 18   ALTSGPT 5   TBILIRUBIN " 0.5   ALKPHOSPHAT 88   GLOBULIN 4.3*   INR 1.02         Assessment/Plan:   -Left upper extremity steal syndrome after AV graft insertion    Findings are consistent with a profound steal syndrome of the left upper arm and the graft ligation will be required.  We will also perform intraoperative angiogram to verify good perfusion down the forearm and into the left hand and rule out any evidence of arterial stenosis or thrombus.  OR tonight, tentatively around 1530.      Payam Marroquin MD  [facility]   [place] preferred or call my office [phone]  __________________________________________________________________  Patient:Rigoberto Adames   MRN:[id number]   CSN:[id number]

## 2023-12-03 NOTE — ED TRIAGE NOTES
Chief Complaint   Patient presents with    Post-Op Complications     Had dialysis fistula graft to left arm on Wednesday. States since Wednesday left hand has been numb and unable to move it.      Pt ambulates to triage for above.     Left hand cool to touch compared to right. No radial pulse palpated.     ER charge notified. Room obtained.

## 2023-12-03 NOTE — ED PROVIDER NOTES
ED Provider Note    CHIEF COMPLAINT  Chief Complaint   Patient presents with    Post-Op Complications     Had dialysis fistula graft to left arm on Wednesday. States since Wednesday left hand has been numb and unable to move it.      EXTERNAL RECORDS REVIEWED  Patient recently had dialysis graft placed by Dr. Carey on 11/29/2023    HPI/ROS  LIMITATION TO HISTORY   Select: : None  OUTSIDE HISTORIAN(S):  None    Rigoberto Adames is a 58 y.o. female who presents to the Emergency Department with post op complications following surgery on Wednesday. The patient had a dialysis fistula graft and states she has had gradual worsening numbness of her hand.  She states that it has been present since the surgery however has worsened.  She now is having associated weakness.  She describes pain around the fistula site.  No chest pain or shortness of breath.  She states that she called her doctor two days ago but was unable to get a hold of their office. The patient went to dialysis yesterday and the nurses there told her to come into the ED.     PAST MEDICAL HISTORY  Past Medical History:   Diagnosis Date    Acute kidney injury (HCC)     Acute necrotizing pyelonephritis     Acute respiratory failure with hypoxia ([facility]) 04/23/2023    Acute-on-chronic kidney injury ([facility]) 04/23/2023    Alcohol withdrawal ([facility])     Arthritis     Bilateral legs; no formal diagnosis.    Breath shortness     Cataract     Bilateral IOL    Chronic kidney disease (CKD) stage G4/A2, severely decreased glomerular filtration rate (GFR) between 15-29 mL/min/1.73 square meter and albuminuria creatinine ratio between  mg/g ([facility]) 07/25/2023    Cirrhosis of liver with ascites ([facility]) 03/06/2017    Dental disorder     Upper and lower dentures    Diabetes mellitus, type 2 (HCC)     Oral medications    Diabetic ulcer of toe ([facility]) 04/30/2020    Dialysis patient ([facility])     since 4/2023    diarrhea 2019    been going on for about a year    Foot pain, bilateral     Heart  murmur     High cholesterol     HOCM (hypertrophic obstructive cardiomyopathy) (HCC)     Hyperammonemia (HCC) 06/24/2016    Hyperkalemia 04/23/2023    Hypertension     Shock (HCC) 04/23/2023    STEMI (ST elevation myocardial infarction) (HCC)     Thrombocytopenia (HCC)     Tobacco abuse 12/08/2014        SURGICAL HISTORY  Past Surgical History:   Procedure Laterality Date    AV FISTULA CREATION Left 11/29/2023    Procedure: LEFT UPPER ARM DIALYSIS GRAFT PLACEMENT;  Surgeon: Sarahi Carey M.D.;  Location: SURGERY [facility];  Service: General    [place] UPPER GI ENDOSCOPY,DIAGNOSIS N/A 03/04/2022    Procedure: GASTROSCOPY;  Surgeon: Kashif Rhodes M.D.;  Location: SURGERY [facility];  Service: Gastroenterology    [place] UPPER GI ENDOSCOPY,LIGAT VARIX  09/25/2019    Procedure: GASTROSCOPY, WITH VARICEAL BANDING - WITH GASTRIC MAPPING;  Surgeon: Sandi Espinoza M.D.;  Location: SURGERY [facility];  Service: Gastroenterology    GASTROSCOPY-ENDO  09/25/2019    Procedure: GASTROSCOPY;  Surgeon: Sandi Espinoza M.D.;  Location: SURGERY [facility];  Service: Gastroenterology    PRIMARY C SECTION  2006    CATARACT EXTRACTION WITH IOL Bilateral     OTHER      c- section        FAMILY HISTORY  Family History   Problem Relation Age of Onset    Cancer Father         stomach    Hypertension Mother        SOCIAL HISTORY   reports that she quit smoking about 3 years ago. Her smoking use included cigarettes. She started smoking about 8 years ago. She has a 10.0 pack-year smoking history. She has never used smokeless tobacco. She reports that she does not currently use alcohol after a past usage of about 0.6 oz of alcohol per week. She reports that she does not use drugs.    CURRENT MEDICATIONS  Current Discharge Medication List        CONTINUE these medications which have NOT CHANGED    Details   HYDROcodone-acetaminophen (NORCO) 7.5-325 MG tab Take 1 Tablet by mouth every 6 hours as needed for Moderate  "Pain for up to 4 days.  Qty: 16 Tablet, Refills: 0    Associated Diagnoses: Fistula, arteriovenous, acquired (HCC)      loperamide (IMODIUM) 2 MG Cap Take 1 Capsule by mouth 4 times a day as needed for Diarrhea.  Qty: 30 Capsule, Refills: 2      atorvastatin (LIPITOR) 20 MG Tab Take 1 Tablet by mouth every evening.  Qty: 60 Tablet, Refills: 0      amLODIPine (NORVASC) 10 MG Tab Take 1 Tablet by mouth every day. Do not take this medication the morning of dialysis days  Qty: 30 Tablet, Refills: 3    Comments: Do not take this medication the morning of dialysis days  Associated Diagnoses: Primary hypertension      carvedilol (COREG) 25 MG Tab Take 1 Tablet by mouth 2 times a day with meals. Do not take this medication the morning of dialysis days  Qty: 60 Tablet, Refills: 3    Comments: Do not take this medication the morning of dialysis days  Associated Diagnoses: Primary hypertension      Ascorbic Acid (VITAMIN C) 100 MG Tab Take 1 Tablet by mouth every day.  Qty: 30 Tablet, Refills: 3      Alcohol Swabs Wipe site with prep pad prior to injection.  Qty: 100 Each, Refills: 6    Comments: Per formulary preference. ICD-10 code: E11.9 Controlled type 2 Diabetes Mellitus             ALLERGIES  Patient has no known allergies.    PHYSICAL EXAM  /65   Pulse 72   Temp 36 °C (96.8 °F) (Temporal)   Resp 18   Ht 1.702 m (5' 7\")   Wt 58 kg (127 lb 13.9 oz)   SpO2 95%      Constitutional: Nontoxic appearing. Alert in no apparent distress.  HENT: Normocephalic, Atraumatic. Bilateral external ears normal. Nose normal.  Moist mucous membranes.  Oropharynx clear.  Eyes: Pupils are equal and reactive. Conjunctiva normal.   Neck: Supple, full range of motion  Heart: Regular rate and rhythm.  No murmurs.    Lungs: No respiratory distress, normal work of breathing. Lungs clear to auscultation bilaterally.  Abdomen Soft, no distention.  No tenderness to palpation.  Musculoskeletal: Atraumatic. No obvious deformities noted.  No " lower extremity edema.   Extremities: Bandage in place over left upper extremity fistula. No thrill palpated. Unable to palpate radial or brachial pulses. Left hand is slightly cool compared to right.   Skin: Warm, Dry.  No erythema, No rash.   Neurologic: Alert and oriented x3. Left upper extremity with diminished sensation throughout. Weakness noted with diminished  strength and wrist strength.   Psychiatric: Affect normal, Mood normal, Appears appropriate and not intoxicated.     DIAGNOSTIC STUDIES / PROCEDURES    LABS  Labs Reviewed   CBC WITH DIFFERENTIAL - Abnormal; Notable for the following components:       Result Value    RBC 3.76 (*)     Hemoglobin 11.6 (*)     Hematocrit 36.1 (*)     MCHC 32.1 (*)     RDW 54.5 (*)     Eosinophils 7.30 (*)     All other components within normal limits    Narrative:     Indicate which anticoagulants the patient is on:->UNKNOWN   COMP METABOLIC PANEL - Abnormal; Notable for the following components:    Sodium 134 (*)     Potassium 5.6 (*)     Glucose 126 (*)     Bun 37 (*)     Creatinine 4.27 (*)     Total Protein 8.3 (*)     Globulin 4.3 (*)     All other components within normal limits    Narrative:     Indicate which anticoagulants the patient is on:->UNKNOWN   ESTIMATED GFR - Abnormal; Notable for the following components:    GFR (CKD-EPI) 11 (*)     All other components within normal limits    Narrative:     Indicate which anticoagulants the patient is on:->UNKNOWN   POCT GLUCOSE DEVICE RESULTS - Abnormal; Notable for the following components:    POC Glucose, Blood 105 (*)     All other components within normal limits   PROTHROMBIN TIME    Narrative:     Indicate which anticoagulants the patient is on:->UNKNOWN   APTT    Narrative:     Indicate which anticoagulants the patient is on:->UNKNOWN        RADIOLOGY  I have independently interpreted the diagnostic imaging associated with this visit and am waiting the final reading from the radiologist.   My preliminary  interpretation is a follows: no radial pulse  Radiologist interpretation:   US-EXTREMITY ARTERY UPPER UNILAT LEFT   Final Result      DX-PORTABLE FLUOROSCOPY < 1 HOUR Is the patient pregnant? No    (Results Pending)       COURSE & MEDICAL DECISION MAKING  11:41 AM - Patient seen and examined at bedside. The patient presents with post op complications. Discussed plan of care, including consult with vascular and reassessment. The patient verbalizes understanding with the plan of care. Ordered for APTT, INR, CMP, CBC w/ diff, and US-Extremity to evaluate her symptoms. The patient was given an opportunity to ask questions.      ED Observation Status? No    INITIAL ASSESSMENT, COURSE AND PLAN  Care Narrative: Patient with recent dialysis fistula placed a few days ago presenting with worsening numbness to her hand distally as well as worsening weakness.  She has reassuring vital signs on arrival other than hypertension.  She I am unable to palpate or Doppler a radial or brachial pulse on exam.  She also has notable weakness to the hand.  Vascular surgery was immediately consulted.  He recommends placing of a blood pressure cuff with low pressure around 60 mmHg to attempt to compress the graft and see if this improves blood flow.  Ultrasound was performed showing significant steal and no distal blood flow.  After a discussion with Dr. Marroquin, he will take the patient to the operating room for graft ligation.      ADDITIONAL PROBLEM LIST  Problem #1: Steal syndrome as a complication of dialysis access -to the OR with vascular surgery    Problem #2: Hypertension -continue to monitor      DISPOSITION AND DISCUSSIONS  I have discussed management of the patient with the following physicians and JUDI's:    11:47 AM Initially spoke with Dr. Marroquin Vascular, about the patient's condition.      2:00 PM Spoke with Sujey Savage, about the patient's condition following ultrasound results. He agrees to take her to the OR for  graft ligation due to profound steel and she will likely go home after surgery.         DISPOSITION:  Patient will be transferred to the operating room with Dr Marroquin, vascular surgery    FINAL DIAGNOSIS  1. Steal syndrome as complication of dialysis access, initial encounter ([facility])      The note accurately reflects work and decisions made by me.  Viri Contreras M.D.  12/3/2023  7:13 PM     Alice WHEELER (Scribe), am scribing for, and in the presence of, Viri Contreras M.D..    Electronically signed by: Alice Whaley (Shala), 12/3/2023    Viri WHEELER M.D. personally performed the services described in this documentation, as scribed by Alice Whaley in my presence, and it is both accurate and complete.

## 2023-12-03 NOTE — ED NOTES
ERP at bedside, BP cuff applied to graft per ERP, inflated to 60mmHg and O2 sats obtained with each phalange

## 2023-12-04 NOTE — ANESTHESIA PROCEDURE NOTES
Airway    Date/Time: 12/3/2023 6:38 PM    Performed by: Papito Hopson M.D.  Authorized by: Papito Hopson M.D.    Location:  OR  Urgency:  Elective  Difficult Airway: No    Indications for Airway Management:  Anesthesia      Spontaneous Ventilation: absent    Sedation Level:  Deep  Preoxygenated: Yes    Mask Difficulty Assessment:  1 - vent by mask  Final Airway Type:  Supraglottic airway  Final Supraglottic Airway:  Standard LMA    SGA Size:  3  Number of Attempts at Approach:  1

## 2023-12-04 NOTE — DISCHARGE INSTRUCTIONS
______________________________________________________________________  ______________________________________________________________________    [facility]  Discharge Instructions     Procedure: Creation or Revision of AV Fistula or AV Graft     - ACTIVITIES: No strenuous activities or heavy lifting (greater than 15 pounds) for 3 days. Regular activities of daily living are fine including walking and going up/down stairs.     - DRIVING: You may drive whenever you are off pain medications and are able to perform the activities needed to drive, i.e. turning, bending, twisting, etc.      - WOUND/BATHING: It is not unusual for patients to experience swelling and even bruising, as well as a small amount of drainage from the incisions. This is normal and will resolve over the next 1-2 weeks.  OK to shower starting anytime after surgery.  The incision is covered with skin glue which is waterproof.  It will start to peel off in 5-7 days which is normal.  Avoid submerging the incision in water (tub, bath, pool) for one week.     - BOWEL FUNCTION: After surgery, it is not uncommon for patients to experience constipation. This is due to decreasing activity levels as well as pain medications. You may need to use a laxative (Milk of Magnesia, Ex-lax; Senokot, etc.) if you go more than 1-2 days without a bowel movement.     - CALL IF YOU HAVE: (1) Fevers to more than 101.5 F, (2) Unusual or excessive pain, (3) Drainage or fluid from incision that may be foul smelling, increased tenderness or soreness at the wound or the wound edges are no longer together, redness or swelling at the incision site. If you have any additional questions or concerns, please contact the office at [phone].     - Please call our office to schedule a follow up visit with your surgeon 2-3 weeks after discharge. It is also recommended that you schedule a visit with your primary care doctor 2-4 weeks after discharge so they can evaluate  you after the surgery as well and review your medications.     Office address:   [facility]  [address]  [place] [address]  [phone]  Fax [phone]    ______________________________________________________________________  ______________________________________________________________________

## 2023-12-04 NOTE — ANESTHESIA POSTPROCEDURE EVALUATION
Patient: Rigoberto Adames    Procedure Summary       Date: 12/03/23 Room / Location: [facility] 09 / SURGERY [facility]    Anesthesia Start: 1834 Anesthesia Stop: 1914    Procedure: left upper extremity angio, graft ligation (Left: Arm Upper) Diagnosis: (left upper extremity ischemic steal)    Surgeons: Payam Marroquin M.D. Responsible Provider: Papito Hopson M.D.    Anesthesia Type: general ASA Status: 3            Final Anesthesia Type: general  Last vitals  BP   Blood Pressure: 135/87    Temp   36.2 °C (97.1 °F)    Pulse   75   Resp   14    SpO2   91 %      Anesthesia Post Evaluation    Patient location during evaluation: PACU  Patient participation: complete - patient participated  Level of consciousness: awake and alert  Pain score: 0    Airway patency: patent  Anesthetic complications: no  Cardiovascular status: hemodynamically stable  Respiratory status: acceptable  Hydration status: euvolemic    PONV: none          No notable events documented.     Nurse Pain Score: 0 (NPRS)

## 2023-12-04 NOTE — PROGRESS NOTES
Pt arrived to PACU stable. Pt remains A&Ox4, VSS, incisions are CDI, all belongings in PACU and returned to pt at time of d/c. Ex  called and updated on pt status and plan of care along with d/c instructions. Pt currently resting in bed in no acute distress.    Pt IV removed, instructions discussed, all belongings returned and wheeled out to safe . No questions or concerns at time of d/c

## 2023-12-04 NOTE — ANESTHESIA PREPROCEDURE EVALUATION
Case: [id number] Date/Time: 12/03/23 1910    Procedure: left upper extremity angio, graft ligation (Left)    Location: [facility] OR 09 / SURGERY [facility]    Surgeons: Payam Marroquin M.D.            Relevant Problems   CARDIAC   (positive) Hypertension, benign   (positive) Primary hypertension         (positive) Alcoholic cirrhosis (HCC)   (positive) ESRD (end stage renal disease) (HCC)      ENDO   (positive) Type 2 diabetes mellitus, without long-term current use of insulin (HCC)      Other   (positive) Anemia   (positive) HOCM (hypertrophic obstructive cardiomyopathy) (HCC)   (positive) History of substance abuse (HCC)   (positive) Hyperkalemia   (positive) Normocytic anemia   (positive) Weakness of both lower extremities       Physical Exam    Airway   Mallampati: II  TM distance: >3 FB  Neck ROM: full       Cardiovascular - normal exam  Rhythm: regular  Rate: normal  (-) murmur     Dental - normal exam           Pulmonary - normal exam  Breath sounds clear to auscultation     Abdominal    Neurological - normal exam                   Anesthesia Plan    ASA 3   ASA physical status 3 criteria: ESRD undergoing regularly scheduled dialysis    Plan - general       Airway plan will be LMA          Induction: intravenous    Postoperative Plan: Postoperative administration of opioids is intended.    Pertinent diagnostic labs and testing reviewed    Informed Consent:    Anesthetic plan and risks discussed with patient.    Use of blood products discussed with: patient whom consented to blood products.

## 2023-12-04 NOTE — ANESTHESIA TIME REPORT
Anesthesia Start and Stop Event Times       Date Time Event    12/3/2023 1829 Ready for Procedure     1834 Anesthesia Start     1914 Anesthesia Stop          Responsible Staff  12/03/23      Name Role Begin End    Papito Hopson M.D. Anesth 1834 1914          Overtime Reason:  no overtime (within assigned shift)    Comments:

## 2023-12-12 ENCOUNTER — OFFICE VISIT (OUTPATIENT)
Dept: VASCULAR SURGERY | Facility: MEDICAL CENTER | Age: 58
End: 2023-12-12
Payer: MEDICARE

## 2023-12-12 VITALS
SYSTOLIC BLOOD PRESSURE: 134 MMHG | DIASTOLIC BLOOD PRESSURE: 70 MMHG | OXYGEN SATURATION: 94 % | HEART RATE: 78 BPM | RESPIRATION RATE: 16 BRPM | TEMPERATURE: 97.7 F | BODY MASS INDEX: 20.09 KG/M2 | HEIGHT: 67 IN | WEIGHT: 128 LBS

## 2023-12-12 DIAGNOSIS — M79.642 PAIN OF LEFT HAND: ICD-10-CM

## 2023-12-12 DIAGNOSIS — N18.6 ESRD (END STAGE RENAL DISEASE) (HCC): ICD-10-CM

## 2023-12-12 PROCEDURE — 99024 POSTOP FOLLOW-UP VISIT: CPT | Performed by: SURGERY

## 2023-12-12 PROCEDURE — 3075F SYST BP GE 130 - 139MM HG: CPT | Performed by: SURGERY

## 2023-12-12 PROCEDURE — 3078F DIAST BP <80 MM HG: CPT | Performed by: SURGERY

## 2023-12-12 RX ORDER — HYDROCODONE BITARTRATE AND ACETAMINOPHEN 5; 325 MG/1; MG/1
1 TABLET ORAL EVERY 6 HOURS PRN
Qty: 12 TABLET | Refills: 0 | Status: SHIPPED | OUTPATIENT
Start: 2023-12-12 | End: 2023-12-15

## 2023-12-12 ASSESSMENT — FIBROSIS 4 INDEX: FIB4 SCORE: 2.7

## 2023-12-13 NOTE — PROGRESS NOTES
"                 VASCULAR SURGERY                    Postop Note  _________________________________    12/12/2023    Ms. Adames comes to the clinic today for follow-up, s/p left upper arm dialysis graft placement on November 29, 2023 by myself and ligation of the graft on December 3, 2023 by Dr. Marroquin for arterial steal syndrome.  On follow-up today, she complains of some numbness and tingling sensation of the left hand/fingers which has been improving.  She is also complaining of some restriction of the range of motion of her left fingers.      Vitals  /70 (BP Location: Right arm, Patient Position: Sitting, BP Cuff Size: Adult)   Pulse 78   Temp 36.5 °C (97.7 °F)   Resp 16   Ht 1.702 m (5' 7\")   Wt 58.1 kg (128 lb)   SpO2 94%   BMI 20.05 kg/m²     Exam  General: Pleasant female in none apparent distress.  Left upper extremity: Incisions are healing well without erythema, drainage, or fluctuation.  Graft has no flow.  Biphasic Doppler flow signals are obtained over the left distal radial artery.  Left hand and fingers are warm, well-perfused.      Assessment: Postop.    Plan: I had a long discussion with patient and her family member.  I asked her to start exercising her right hand using a soft medicine ball.  I also prescribed her Norco 5/325 1 p.o. every 6 hours as needed for pain, dispense #12, at her request.  I asked patient to follow-up with me next week for recheck.  She knows to call me for any problem in the meantime.          Sarahi Carey MD  [facility]  [phone]  [address], [place] [address]   "

## 2023-12-16 DIAGNOSIS — I10 PRIMARY HYPERTENSION: ICD-10-CM

## 2023-12-18 RX ORDER — ATORVASTATIN CALCIUM 20 MG/1
20 TABLET, FILM COATED ORAL EVERY EVENING
Qty: 90 TABLET | Refills: 3 | Status: SHIPPED | OUTPATIENT
Start: 2023-12-18

## 2023-12-18 RX ORDER — AMLODIPINE BESYLATE 10 MG/1
TABLET ORAL
Qty: 90 TABLET | Refills: 3 | Status: SHIPPED | OUTPATIENT
Start: 2023-12-18

## 2023-12-19 ENCOUNTER — OFFICE VISIT (OUTPATIENT)
Dept: VASCULAR SURGERY | Facility: MEDICAL CENTER | Age: 58
End: 2023-12-19
Payer: MEDICARE

## 2023-12-19 ENCOUNTER — TELEPHONE (OUTPATIENT)
Dept: VASCULAR SURGERY | Facility: MEDICAL CENTER | Age: 58
End: 2023-12-19

## 2023-12-19 ENCOUNTER — APPOINTMENT (OUTPATIENT)
Dept: VASCULAR SURGERY | Facility: MEDICAL CENTER | Age: 58
End: 2023-12-19
Payer: MEDICARE

## 2023-12-19 VITALS
HEIGHT: 67 IN | BODY MASS INDEX: 20.09 KG/M2 | OXYGEN SATURATION: 98 % | WEIGHT: 128 LBS | HEART RATE: 74 BPM | TEMPERATURE: 98.3 F | DIASTOLIC BLOOD PRESSURE: 50 MMHG | SYSTOLIC BLOOD PRESSURE: 102 MMHG

## 2023-12-19 DIAGNOSIS — R20.2 NUMBNESS AND TINGLING IN LEFT HAND: ICD-10-CM

## 2023-12-19 DIAGNOSIS — I77.89 ARTERIAL STEAL SYNDROME (HCC): ICD-10-CM

## 2023-12-19 DIAGNOSIS — R20.0 NUMBNESS AND TINGLING IN LEFT HAND: ICD-10-CM

## 2023-12-19 PROCEDURE — 3078F DIAST BP <80 MM HG: CPT | Performed by: SURGERY

## 2023-12-19 PROCEDURE — 3074F SYST BP LT 130 MM HG: CPT | Performed by: SURGERY

## 2023-12-19 PROCEDURE — 99024 POSTOP FOLLOW-UP VISIT: CPT | Performed by: SURGERY

## 2023-12-19 ASSESSMENT — FIBROSIS 4 INDEX: FIB4 SCORE: 2.7

## 2023-12-20 ENCOUNTER — APPOINTMENT (OUTPATIENT)
Dept: RADIOLOGY | Facility: MEDICAL CENTER | Age: 58
End: 2023-12-20
Payer: MEDICARE

## 2023-12-22 ENCOUNTER — OCCUPATIONAL THERAPY (OUTPATIENT)
Dept: OCCUPATIONAL THERAPY | Facility: REHABILITATION | Age: 58
End: 2023-12-22
Attending: SURGERY
Payer: MEDICARE

## 2023-12-22 ENCOUNTER — APPOINTMENT (OUTPATIENT)
Dept: CARDIOLOGY | Facility: MEDICAL CENTER | Age: 58
End: 2023-12-22
Attending: INTERNAL MEDICINE
Payer: MEDICARE

## 2023-12-22 DIAGNOSIS — N18.6 ESRD (END STAGE RENAL DISEASE) (HCC): ICD-10-CM

## 2023-12-22 DIAGNOSIS — E11.42 TYPE 2 DIABETES MELLITUS WITH DIABETIC POLYNEUROPATHY, WITHOUT LONG-TERM CURRENT USE OF INSULIN (HCC): ICD-10-CM

## 2023-12-22 DIAGNOSIS — R20.0 NUMBNESS OF HAND: ICD-10-CM

## 2023-12-22 PROCEDURE — 97166 OT EVAL MOD COMPLEX 45 MIN: CPT

## 2023-12-22 PROCEDURE — 97110 THERAPEUTIC EXERCISES: CPT

## 2023-12-22 ASSESSMENT — ENCOUNTER SYMPTOMS
QUALITY: SHARP
PAIN LOCATION: LEFT ARM AND HAND
QUALITY: TIGHT
PAIN SCALE AT HIGHEST: 9
ALLEVIATING FACTORS: NOTHING
QUALITY: SHOOTING
PAIN SCALE: 9
PAIN SCALE AT LOWEST: 9
PAIN TIMING: ALL DAY

## 2023-12-22 NOTE — OP THERAPY EVALUATION
Outpatient Occupational Therapy  HAND THERAPY INITIAL EVALUATION    [facility] Occupational Therapy [facility]  [address]  [address]  [place] [address]  Phone:  [phone]  Fax:  [phone]    Date of Evaluation: 2023    Patient: Rigoberto Adames  YOB: 1965  MRN: [id number]     Referring Provider: Sarahi Carey M.D.  [address]  [address]  [place] [address]   Referring Diagnosis Anesthesia of skin [R20.0];Paresthesia of skin [R20.2]     Time Calculation    Start time: 0100  Stop time: 0145 Time Calculation (min): 45 minutes             Chief Complaint: Hand Problem    Visit Diagnoses     ICD-10-CM   1. Numbness of hand  R20.0   2. Type 2 diabetes mellitus with diabetic polyneuropathy, without long-term current use of insulin ([facility])  E11.42   3. ESRD (end stage renal disease) ([facility])  N18.6       Subjective:   History of Present Illness:     Date of onset:  12/3/2023    Mechanism of injury:  As per referring provider's office visit:  2023      Ms. Adames comes to the clinic today her family member for follow-up at my request, s/p left upper arm dialysis graft placement on 2023 by myself and ligation of the graft on December 3, 2023 by Dr. Marroquin for arterial steal syndrome.  On follow-up today, she has no new complaints.  The range of motion of the left hand and fingers has been improving.  She still has some numbness.     Improving.  I asked patient to continue exercising her left hand using a soft medicine ball.  I also referred her to outpatient hand therapy.  I asked patient to contact me if she is not contacted by hand therapy within 2 weeks; otherwise, I will see her back as needed.  I asked her to contact me for any problem at any time.  Patient and her family member indicated understanding.  All questions were answered.  S/P ligation of left arm dialysis graft for arterial steal syndrome.    Pain:     Current pain ratin    At best pain ratin    At  worst pain ratin    Location:  Left arm and hand    Quality:  Shooting, sharp and tight    Pain timing:  All day    Relieving factors:  Nothing    Progression:  Unchanged  Social Support:     Lives in:  One-story house    Lives with:  Spouse and adult children  Hand dominance:  Right  Diagnostic Tests:     None      Diagnostic Tests Comments:  No diagnostic tests regarding this issue  Treatments:     None    Patient Goals:     Patient goals for therapy:  Decreased edema, decreased pain, increased strength and independence with ADLs/IADLs      Past Medical History:   Diagnosis Date    Acute kidney injury (HCC)     Acute necrotizing pyelonephritis     Acute respiratory failure with hypoxia (HCC) 2023    Acute-on-chronic kidney injury (HCC) 2023    Alcohol withdrawal (HCC)     Arthritis     Bilateral legs; no formal diagnosis.    Breath shortness     Cataract     Bilateral IOL    Chronic kidney disease (CKD) stage G4/A2, severely decreased glomerular filtration rate (GFR) between 15-29 mL/min/1.73 square meter and albuminuria creatinine ratio between  mg/g (HCC) 2023    Cirrhosis of liver with ascites ([facility]) 2017    Dental disorder     Upper and lower dentures    Diabetes mellitus, type 2 ([facility])     Oral medications    Diabetic ulcer of toe (HCC) 2020    Dialysis patient ([facility])     since 2023    diarrhea 2019    been going on for about a year    Foot pain, bilateral     Heart murmur     High cholesterol     HOCM (hypertrophic obstructive cardiomyopathy) (HCC)     Hyperammonemia (HCC) 2016    Hyperkalemia 2023    Hypertension     Shock (HCC) 2023    STEMI (ST elevation myocardial infarction) (HCC)     Thrombocytopenia (HCC)     Tobacco abuse 2014     Past Surgical History:   Procedure Laterality Date    AV FISTULA REVISION Left 12/3/2023    Procedure: left upper extremity angio, graft ligation;  Surgeon: Payam Marroquin M.D.;  Location: SURGERY [facility]  [place];  Service: Vascular    AV FISTULA CREATION Left 11/29/2023    Procedure: LEFT UPPER ARM DIALYSIS GRAFT PLACEMENT;  Surgeon: Sarahi Carey M.D.;  Location: [facility];  Service: General    IA UPPER GI ENDOSCOPY,DIAGNOSIS N/A 03/04/2022    Procedure: GASTROSCOPY;  Surgeon: Kashif Rhodes M.D.;  Location: SURGERY [facility];  Service: Gastroenterology    IA UPPER GI ENDOSCOPY,LIGAT VARIX  09/25/2019    Procedure: GASTROSCOPY, WITH VARICEAL BANDING - WITH GASTRIC MAPPING;  Surgeon: Sandi Espinoza M.D.;  Location: SURGERY [facility];  Service: Gastroenterology    GASTROSCOPY-ENDO  09/25/2019    Procedure: GASTROSCOPY;  Surgeon: Sandi Espinoza M.D.;  Location: SURGERY [facility];  Service: Gastroenterology    PRIMARY C SECTION  2006    CATARACT EXTRACTION WITH IOL Bilateral     OTHER      c- section     Social History     Tobacco Use    Smoking status: Former     Current packs/day: 0.00     Average packs/day: 1 pack/day for 10.0 years (10.0 ttl pk-yrs)     Types: Cigarettes     Start date: 8/12/2015     Quit date: 06/2020     Years since quitting: 3.5    Smokeless tobacco: Never   Substance Use Topics    Alcohol use: Not Currently     Alcohol/week: 0.6 oz     Types: 1 Glasses of wine per week     Family and Occupational History     Socioeconomic History    Marital status: Single     Spouse name: Not on file    Number of children: Not on file    Years of education: Not on file    Highest education level: Not on file   Occupational History    Occupation: restaurant owner       Objective     Neurological Testing   Sensation   Wrist/Hand   Left   Paresthesia: light touch and sharp/dull discrimination      Active Range of Motion     Left Wrist   Normal active range of motion    Left Thumb     Normal active range of motion    Left Digits    Normal active range of motion    Additional Active Range of Motion Details  Severe tightness of both median and ulnar nerves in left  arm    Strength     Left Wrist/Hand      (2nd hand position)     Trial 1: 1    Swelling     Left Wrist/Hand   Circumference wrist: 16.1 cm    Additional Swelling Details  Edema noted at palm and carpal area of hand and wrist    General Comments     Wrist/Hand Comments  9 hole peg test:  R = 26 seconds  L = 1 minute and 24 seconds         Therapeutic Exercises (CPT 35829):     1. AROM for edema management and ST length    2. median and ulnar nerve glides    3. medium resistance theraputty    4. fine motor manipulation and dexterity activities for home    Therapeutic Exercise Summary:  Initiated HEP and to complete 3 x a day       Time-based treatments/modalities:  Occupational Therapy Timed Treatment Charges  Therapeutic exercise minutes (CPT 71863): 15 minutes      Assessment and Plan:   Problem list/assessment: decreased coordination, decreased HEP knowledge, decreased sensation, decreased strength, limited ADL's and pain    Assessment details:  Patient is a 57 y/o female referred to OT for left UE weakness and hand pain s/p dialysis graft placement and then litigation of the graft.  Patient presenting with tightness of both median and ulnar nerves, weakness, impaired fine motor manipulation/dexterity, impaired sensation and edema at the palm and wrist.  Patient would benefit from OT intervention to minimize limitations and enhance functional use of her non-dominant arm.   Barriers to therapy:  Comorbidities    Goals:   Short Term Goals: decrease edema, decrease pain, increase ADL independence, increase soft tissue/skin mobility, increase strength and independent with HEP performance  Short term goal timespan:  2-4 weeks    Long Term Goals:   Patient will increase left  strength to at least 25 pounds to enhance functional use  Patient will have 2/10 pain in left arm during functional tasks  Patient will score at least 45 seconds or less on the 9 hole peg test due to improved fine motor manipulation and  dexterity of left hand   Patient will score at least 40/80 on the UEFI    Long term goal timespan:  6-8 weeks    Plan:   Occupational/Hand Therapy options:  Occupational therapy treatment to continue  Planned therapy interventions:  Adaptive training to increase functional performance, fine motor coordination training, home exercise training, patient/family/caregiver education, AROM, A/AROM, PROM, passive stretch, graded resistive tasks to increase strength and pain management  Other planned therapy interventions:  27975, 86508 and 27865  Prognosis: good    Frequency:  1x week  Duration in weeks:  8  Duration in visits:  8  Discussed with:  Patient  Patient/caregiver understanding of therapy treatment and goals: Good understanding of therapy treatment and goals      Functional Assessment Used:  UEFI = 9/80         Referring provider co-signature:  I have reviewed this plan of care and my co-signature certifies the need for services.    Certification Period: 12/22/2023 to  02/16/24    Physician Signature: ________________________________ Date: ______________

## 2023-12-26 ENCOUNTER — APPOINTMENT (OUTPATIENT)
Dept: OCCUPATIONAL THERAPY | Facility: REHABILITATION | Age: 58
End: 2023-12-26
Attending: SURGERY
Payer: MEDICARE

## 2023-12-26 NOTE — OP THERAPY DAILY TREATMENT
"  Outpatient Occupational Therapy  DAILY TREATMENT     [facility] Occupational [address]  [address]  [place] [address]  Phone:  [phone]  Fax:  [phone]    Date: 12/26/2023    Patient: Rigoberto Adames  YOB: 1965  MRN: [id number]     Time Calculation                 Chief Complaint: No chief complaint on file.    Visit #: 2    SUBJECTIVE:  ***    OBJECTIVE:  Current objective measures:      Neurological Testing   Sensation   Wrist/Hand   Left   Paresthesia: light touch and sharp/dull discrimination        Active Range of Motion      Left Wrist   Normal active range of motion     Left Thumb     Normal active range of motion     Left Digits    Normal active range of motion     Additional Active Range of Motion Details  Severe tightness of both median and ulnar nerves in left arm     Strength      Left Wrist/Hand       (2nd hand position)     Trial 1: 1     Swelling      Left Wrist/Hand   Circumference wrist: 16.1 cm     Additional Swelling Details  Edema noted at palm and carpal area of hand and wrist     General Comments      Wrist/Hand Comments  9 hole peg test:  R = 26 seconds  L = 1 minute and 24 seconds        Therapeutic Exercises (CPT 73117):     1. AROM for edema management and ST length    2. median and ulnar nerve glides    3. medium resistance theraputty    4. fine motor manipulation and dexterity activities for home      Time-based treatments/modalities:           Pain rating before treatment: {PAIN NUMBERS_1-10:20649}  Pain rating after treatment: {PAIN NUMBERS_1-10:58408}    ASSESSMENT:   Response to treatment: ***    PLAN/RECOMMENDATIONS:   Plan for treatment: {Therapy Plan:480960191::\"therapy treatment to continue next visit\"}.  Planned interventions for next visit: {planned OT Interventions:[id number]}         "

## 2024-01-04 ENCOUNTER — OCCUPATIONAL THERAPY (OUTPATIENT)
Dept: OCCUPATIONAL THERAPY | Facility: REHABILITATION | Age: 59
End: 2024-01-04
Attending: SURGERY
Payer: MEDICARE

## 2024-01-04 DIAGNOSIS — N18.6 ESRD (END STAGE RENAL DISEASE) (HCC): ICD-10-CM

## 2024-01-04 DIAGNOSIS — R20.0 NUMBNESS OF HAND: ICD-10-CM

## 2024-01-04 DIAGNOSIS — E11.42 TYPE 2 DIABETES MELLITUS WITH DIABETIC POLYNEUROPATHY, WITHOUT LONG-TERM CURRENT USE OF INSULIN (HCC): ICD-10-CM

## 2024-01-04 PROCEDURE — 97110 THERAPEUTIC EXERCISES: CPT

## 2024-01-04 NOTE — OP THERAPY DAILY TREATMENT
Outpatient Occupational Therapy  DAILY TREATMENT     [facility] Occupational Therapy [address]  [address]  [place] [address]  Phone:  [phone]  Fax:  [phone]    Date: 01/04/2024    Patient: Rigoberto Adames  YOB: 1965  MRN: [id number]     Time Calculation  Start time: 1015  Stop time: 1100 Time Calculation (min): 45 minutes         Chief Complaint: Extremity Weakness and Self Care Duties    Visit #: 2    SUBJECTIVE:  The pain in my arm ( left) is not getting any better     OBJECTIVE:  Current objective measures:   Neurological Testing   Sensation   Wrist/Hand   Left   Paresthesia: light touch and sharp/dull discrimination        Active Range of Motion      Left Wrist   Normal active range of motion     Left Thumb     Normal active range of motion     Left Digits    Normal active range of motion     Additional Active Range of Motion Details  moderate tightness of both median and ulnar nerves in left arm      Strength :  L = 1 pound  R = 41 pounds     Swelling      Left Wrist/Hand   Circumference wrist: 15.5 cm   minimal edema noted now     General Comments      Wrist/Hand Comments  9 hole peg test:  R = 26 seconds  L = 1 minute and 5 seconds         Therapeutic Exercises (CPT 39804):     1. AROM for edema management and ST length    2. median and ulnar nerve glides    3. medium resistance theraputty    4. fine motor manipulation and dexterity activities for home, 9 hole peg board, grooved pegboard, manipulation of coins and shuffling cards    5. 3# digi-flex    6. 2# graded clip with 3 point prehension    7. isolated strengthening of wrist flexion/extension, 1 x 10 with 2# hand weight.      Time-based treatments/modalities:  Therapeutic exercise minutes (CPT 53493): 45 minutes        Pain rating before treatment: 8  Pain rating after treatment: 7  Patient stated pain is 10/10 at night time.      ASSESSMENT:   Response to treatment: improvements noted with less edema at  wrist/palm and improved dexterity.  Improving nerve glides as well.  Patient encouraged to attend therapy sessions as she has missed 2 follow ups.  Recommended follow up with Vascular surgeon regarding high pain levels in left arm.      PLAN/RECOMMENDATIONS:   Plan for treatment: therapy treatment to continue next visit.  Planned interventions for next visit: continue with current treatment, self care ADL training (CPT 35677), therapeutic activities (CPT 11365), and therapeutic exercise (CPT 34215)

## 2024-01-08 ENCOUNTER — OFFICE VISIT (OUTPATIENT)
Dept: INTERNAL MEDICINE | Facility: OTHER | Age: 59
End: 2024-01-08
Payer: MEDICARE

## 2024-01-08 VITALS
HEIGHT: 67 IN | OXYGEN SATURATION: 98 % | BODY MASS INDEX: 20.47 KG/M2 | HEART RATE: 69 BPM | TEMPERATURE: 97.1 F | DIASTOLIC BLOOD PRESSURE: 72 MMHG | SYSTOLIC BLOOD PRESSURE: 132 MMHG | WEIGHT: 130.4 LBS

## 2024-01-08 DIAGNOSIS — E09.21: ICD-10-CM

## 2024-01-08 DIAGNOSIS — Z99.2 TYPE 2 DIABETES MELLITUS WITH CHRONIC KIDNEY DISEASE ON CHRONIC DIALYSIS, WITHOUT LONG-TERM CURRENT USE OF INSULIN (HCC): ICD-10-CM

## 2024-01-08 DIAGNOSIS — E11.22 TYPE 2 DIABETES MELLITUS WITH CHRONIC KIDNEY DISEASE ON CHRONIC DIALYSIS, WITHOUT LONG-TERM CURRENT USE OF INSULIN (HCC): ICD-10-CM

## 2024-01-08 DIAGNOSIS — E78.5 DYSLIPIDEMIA: ICD-10-CM

## 2024-01-08 DIAGNOSIS — N18.6 TYPE 2 DIABETES MELLITUS WITH CHRONIC KIDNEY DISEASE ON CHRONIC DIALYSIS, WITHOUT LONG-TERM CURRENT USE OF INSULIN (HCC): ICD-10-CM

## 2024-01-08 DIAGNOSIS — G62.9 NEUROPATHY: ICD-10-CM

## 2024-01-08 DIAGNOSIS — Z76.89 ENCOUNTER TO ESTABLISH CARE: ICD-10-CM

## 2024-01-08 DIAGNOSIS — I10 PRIMARY HYPERTENSION: ICD-10-CM

## 2024-01-08 DIAGNOSIS — G56.92 NEUROPATHY, ARM, LEFT: ICD-10-CM

## 2024-01-08 PROBLEM — L29.9 ITCHING: Status: RESOLVED | Noted: 2023-05-11 | Resolved: 2024-01-08

## 2024-01-08 PROBLEM — R80.9 MICROALBUMINURIA: Status: RESOLVED | Noted: 2023-01-14 | Resolved: 2024-01-08

## 2024-01-08 PROBLEM — R00.1 BRADYCARDIA: Status: RESOLVED | Noted: 2023-04-23 | Resolved: 2024-01-08

## 2024-01-08 PROBLEM — D64.9 ANEMIA: Status: RESOLVED | Noted: 2023-04-23 | Resolved: 2024-01-08

## 2024-01-08 PROBLEM — E87.5 HYPERKALEMIA: Status: RESOLVED | Noted: 2023-07-25 | Resolved: 2024-01-08

## 2024-01-08 PROCEDURE — 99214 OFFICE O/P EST MOD 30 MIN: CPT | Mod: GC | Performed by: INTERNAL MEDICINE

## 2024-01-08 PROCEDURE — 3075F SYST BP GE 130 - 139MM HG: CPT | Performed by: INTERNAL MEDICINE

## 2024-01-08 PROCEDURE — 3078F DIAST BP <80 MM HG: CPT | Performed by: INTERNAL MEDICINE

## 2024-01-08 RX ORDER — GABAPENTIN 100 MG/1
100 CAPSULE ORAL
Qty: 60 CAPSULE | Refills: 0 | Status: SHIPPED | OUTPATIENT
Start: 2024-01-08

## 2024-01-08 ASSESSMENT — ENCOUNTER SYMPTOMS
WEAKNESS: 1
COUGH: 0
PALPITATIONS: 0
DOUBLE VISION: 0
BLURRED VISION: 0
FEVER: 0
SENSORY CHANGE: 1
NECK PAIN: 0
PHOTOPHOBIA: 0
CHILLS: 0
NAUSEA: 0
HEMOPTYSIS: 0
HEADACHES: 0
TINGLING: 1
MYALGIAS: 0
NERVOUS/ANXIOUS: 0
HEARTBURN: 0
DIZZINESS: 0

## 2024-01-08 ASSESSMENT — PATIENT HEALTH QUESTIONNAIRE - PHQ9: CLINICAL INTERPRETATION OF PHQ2 SCORE: 0

## 2024-01-08 ASSESSMENT — FIBROSIS 4 INDEX: FIB4 SCORE: 2.7

## 2024-01-08 NOTE — PROGRESS NOTES
CC:   Chief Complaint   Patient presents with    Referral Needed     Allergist     [facility] Request     Pain medication for arm pain             HPI:   Rigoberto Lakhani presents today with past medical history of type 2 diabetes mellitus with diabetic polyneuropathy, without long-term current use of insulin, HOCM, alcoholic cirrhosis, hypertension, end-stage renal disease on dialysis.    She underwent left upper arm brachial axillary AV graft placement (dialysis graft placement) 11/29 which was complicated by left upper extremity steal syndrome s/p ligation of the graft 12/3/2023 with improvement in symptoms.   This has been associated with ongoing left arm pain and numbness since her initial procedure 11/29.  Describes she cannot hold stuff with her left hand and cannot close the buttons of her shirt.  She furthermore endorses numbness in her left hand as well.  She was given pain medication for this initially that helped, however it is out of refills.  She is further requesting referral to neurology for further testing and management regarding this.      Problem   Hyperkalemia (Resolved)   Itching (Resolved)   Bradycardia (Resolved)   Anemia (Resolved)   Microalbuminuria (Resolved)      Microalbumin/Cr ratio  unable to calculate.   Urine creatinine 115.37   Urine microalbumin >440     Weakness of Both Lower Extremities (Resolved)   Hypertension, Benign (Resolved)       Health Maintenance: Completed    ROS:  Review of Systems   Constitutional:  Negative for chills and fever.   HENT:  Negative for hearing loss and tinnitus.    Eyes:  Negative for blurred vision, double vision and photophobia.   Respiratory:  Negative for cough and hemoptysis.    Cardiovascular:  Negative for chest pain and palpitations.   Gastrointestinal:  Negative for heartburn and nausea.   Genitourinary:  Negative for dysuria and urgency.   Musculoskeletal:  Negative for myalgias and neck pain.   Skin:  Negative for itching and rash.   Neurological:   "Positive for tingling, sensory change and weakness. Negative for dizziness and headaches.   Psychiatric/Behavioral:  The patient is not nervous/anxious.        Objective:     Exam:  Ht 1.702 m (5' 7\")   Wt 59.1 kg (130 lb 6.4 oz)   BMI 20.42 kg/m²  Body mass index is 20.42 kg/m².    Physical Exam  Constitutional:       General: She is not in acute distress.     Appearance: Normal appearance. She is not ill-appearing.   HENT:      Head: Normocephalic and atraumatic.      Right Ear: External ear normal.      Left Ear: External ear normal.      Nose: Nose normal.      Mouth/Throat:      Mouth: Mucous membranes are moist.   Eyes:      Extraocular Movements: Extraocular movements intact.      Pupils: Pupils are equal, round, and reactive to light.   Cardiovascular:      Rate and Rhythm: Normal rate.      Pulses: Normal pulses.   Pulmonary:      Effort: Pulmonary effort is normal.   Musculoskeletal:         General: Normal range of motion.      Right lower leg: No edema.      Left lower leg: No edema.      Comments: Radial artery pulses are 2+ and equal bilaterally   Skin:     General: Skin is warm.      Capillary Refill: Capillary refill takes less than 2 seconds.      Coloration: Skin is not jaundiced.      Findings: No bruising or lesion.   Neurological:      Mental Status: She is alert and oriented to person, place, and time.      Comments: Left hand with strength 4/5 to finger gripping, decreased  strength  Left arm with intact flexion, extension, internal and external rotation, however with definite weakness when compared to the right   Psychiatric:         Mood and Affect: Mood normal.                 Assessment & Plan:     58 y.o. female with the following -       # Neuropathy, arm, left  # Diabetic polyneuropathy  She underwent left upper arm brachial axillary AV graft placement (dialysis graft placement) 11/29 which was complicated by left upper extremity steal syndrome s/p ligation of the graft 12/3/2023 " with improvement in symptoms.     This has been associated with ongoing left arm pain and numbness since her initial procedure 11/29.  Describes she cannot hold stuff with her left hand and cannot close the buttons of her shirt.  She furthermore endorses numbness in her left hand as well.  She was given pain medication for this initially that helped, however it is out of refills.  She is further requesting referral to neurology for further testing and management regarding this.    On examination, there is  weakness in strength of left arm when compared to right.  Decreased  strength left hand.    Plan:  - Considering prior history of diabetic neuropathy which has been now complicated with severe symptoms at the left arm since her procedure 11/23-we will go ahead and screen her for thyroid disorder with TSH plus T4 and get further neuropathy workup with B12 levels, thiamine, HIV screening, RPR for syphilis  - Trial of gabapentin (dose adjusted for renal failure), 100 mg at bedtime. RTC 2-3 weeks to f/u with Dr. Pierre regarding this  - Referral to neurology placed       # Type 2 diabetes mellitus with neuropathy without long-term current use of insulin  Repeat CMP, urine microalbumin to creatinine ratio and A1c prior to next visit.    Describes she does take medications for diabetes, however does not remember which ones. Encouraged her to bring home medications so we may review them.      # Essential hypertension   Blood pressure on presentation high 148/81 which was repeated after 10 minutes of relaxation and was 132/72  Plan:  1- Dietary sal restriction  2- Educated on weight loss (as weight loss in obese or overweight patients is associated with significant fall in BP independent of exercise.   3- DASH diet   4- Regular exercise (Aerobic, dynamic resistance and isometric resistance exercise can decrease systolic and diastolic pressure by, on average, 4 to 6 mmHg and 3 mmHg, respectively, independent of weight  loss)  5- Limited alcohol intake (as Women who consume two or more alcoholic beverages per day and men who have three or more drinks per day have a significantly increased incidence of hypertension compared with nondrinkers)   6- Continue amlodipine 10 mg daily along with Coreg 25 mg twice daily  7- Alarm signs and symptoms prompting calling 911 and/or going to the nearest emergency department addressed in detail today including but not limited to chest pain, ongoing palpitations, dizziness, dyspnea/difficulty breathing etc.  8- Keep a blood pressure log and bring at the next visit  9- Bring home blood pressure cuff for calibration  10- Repeat chem panel prior to next visit      # Dyslipidemia   - I advise reducing sugars/carbohydrates/saturated fats/alcohol, and eating more vegetables and lean meats such as fish/chicken/turkey. I also recommend 30 minutes of cardiovascular exercise most days of the week.  - Repeat lipid panel prior to next visit         I spent a total of 30 minutes with record review, exam, communication with the patient, communication with other providers, and documentation of this encounter.      No follow-ups on file.    Please note that this dictation was created using voice recognition software. I have made every reasonable attempt to correct obvious errors, but I expect that there are errors of grammar and possibly content that I did not discover before finalizing the note.

## 2024-01-08 NOTE — PATIENT INSTRUCTIONS
Thank you for visiting today!  Please follow-up in 4 weeks   Please follow-up on referrals and schedule an appointment with neurology   Please get lab work done at least 5 days before next visit.  Please try and eat healthy, get at least 30 minutes of cardiovascular exercise a day to help keep your health as best as it can be.  If you have any questions or concerns please feel free to contact us at [phone].  If you feel like you are experiencing a medical emergency please seek immediate medical attention at an urgent care or in the emergency department.

## 2024-01-09 ENCOUNTER — OCCUPATIONAL THERAPY (OUTPATIENT)
Dept: OCCUPATIONAL THERAPY | Facility: REHABILITATION | Age: 59
End: 2024-01-09
Attending: SURGERY
Payer: MEDICARE

## 2024-01-09 DIAGNOSIS — E11.42 TYPE 2 DIABETES MELLITUS WITH DIABETIC POLYNEUROPATHY, WITHOUT LONG-TERM CURRENT USE OF INSULIN (HCC): ICD-10-CM

## 2024-01-09 DIAGNOSIS — R20.0 NUMBNESS OF HAND: ICD-10-CM

## 2024-01-09 DIAGNOSIS — N18.6 ESRD (END STAGE RENAL DISEASE) (HCC): ICD-10-CM

## 2024-01-09 PROCEDURE — 97110 THERAPEUTIC EXERCISES: CPT

## 2024-01-09 NOTE — OP THERAPY DAILY TREATMENT
Outpatient Occupational Therapy  DAILY TREATMENT     [facility] [address]  [address]  [place] [address]  Phone:  [phone]  Fax:  [phone]    Date: 01/09/2024    Patient: Rigoberto Adames  YOB: 1965  MRN: [id number]     Time Calculation  Start time: 0100  Stop time: 0145 Time Calculation (min): 45 minutes         Chief Complaint: Extremity Weakness and Self Care Duties    Visit #: 3    SUBJECTIVE:  The pain in my left arm is so bad at night, I can't sleep     OBJECTIVE:  Current objective measures:   Neurological Testing   Sensation   Wrist/Hand   Left   Paresthesia: light touch and sharp/dull discrimination    Active Range of Motion    All ROM WNL     Additional Active Range of Motion Details  moderate tightness of both median and ulnar nerves in left arm      Strength :  L = 3 pound  R = 41 pounds     Swelling   Left Wrist/Hand   Circumference wrist: 15.5 cm   minimal edema noted now     General Comments      Wrist/Hand Comments  9 hole peg test:  R = 26 seconds  L = 1 minute and 5 seconds         Therapeutic Exercises (CPT 30770):     1. AROM for edema management and ST length    2. median and ulnar nerve glides    3. medium resistance theraputty    4. fine motor manipulation and dexterity activities for home, 9 hole peg board, grooved pegboard, manipulation of coins and shuffling cards    5. 5# digi-flex, 10 x    6. 2# graded clip with 3 point prehension, 10 x    7. isolated strengthening of wrist flexion/extension, 1 x 10 with 2# hand weight.    8. flex bar, 10 x in horizontal and vertical position      Time-based treatments/modalities:  Therapeutic exercise minutes (CPT 13739): 45 minutes        Pain rating before treatment: 5  Pain rating after treatment: 7    ASSESSMENT:   Response to treatment: Continued high pain levels; however left  strength has improved by 2 pounds.      PLAN/RECOMMENDATIONS:   Plan for treatment: therapy treatment to  continue next visit.  Planned interventions for next visit: continue with current treatment, self care ADL training (CPT 18231), therapeutic activities (CPT 16122), and therapeutic exercise (CPT 54088)

## 2024-01-11 ENCOUNTER — APPOINTMENT (OUTPATIENT)
Dept: RADIOLOGY | Facility: MEDICAL CENTER | Age: 59
End: 2024-01-11
Payer: MEDICARE

## 2024-01-16 ENCOUNTER — HOSPITAL ENCOUNTER (OUTPATIENT)
Dept: RADIOLOGY | Facility: MEDICAL CENTER | Age: 59
End: 2024-01-16
Attending: INTERNAL MEDICINE
Payer: MEDICARE

## 2024-01-16 ENCOUNTER — HOSPITAL ENCOUNTER (EMERGENCY)
Facility: MEDICAL CENTER | Age: 59
End: 2024-01-16
Attending: EMERGENCY MEDICINE
Payer: MEDICARE

## 2024-01-16 ENCOUNTER — APPOINTMENT (OUTPATIENT)
Dept: RADIOLOGY | Facility: MEDICAL CENTER | Age: 59
End: 2024-01-16
Attending: EMERGENCY MEDICINE
Payer: MEDICARE

## 2024-01-16 VITALS
SYSTOLIC BLOOD PRESSURE: 131 MMHG | BODY MASS INDEX: 20.45 KG/M2 | WEIGHT: 130.29 LBS | HEART RATE: 68 BPM | TEMPERATURE: 98 F | DIASTOLIC BLOOD PRESSURE: 68 MMHG | RESPIRATION RATE: 16 BRPM | OXYGEN SATURATION: 97 % | HEIGHT: 67 IN

## 2024-01-16 DIAGNOSIS — M79.672 LEFT FOOT PAIN: ICD-10-CM

## 2024-01-16 DIAGNOSIS — R20.2 LEFT HAND PARESTHESIA: ICD-10-CM

## 2024-01-16 DIAGNOSIS — L03.116 CELLULITIS OF LEFT LOWER EXTREMITY: ICD-10-CM

## 2024-01-16 LAB
ALBUMIN SERPL BCP-MCNC: 3.9 G/DL (ref 3.2–4.9)
ALBUMIN/GLOB SERPL: 1 G/DL
ALP SERPL-CCNC: 74 U/L (ref 30–99)
ALT SERPL-CCNC: 15 U/L (ref 2–50)
ANION GAP SERPL CALC-SCNC: 13 MMOL/L (ref 7–16)
AST SERPL-CCNC: 20 U/L (ref 12–45)
BASOPHILS # BLD AUTO: 0.3 % (ref 0–1.8)
BASOPHILS # BLD: 0.02 K/UL (ref 0–0.12)
BILIRUB SERPL-MCNC: 0.4 MG/DL (ref 0.1–1.5)
BUN SERPL-MCNC: 33 MG/DL (ref 8–22)
CALCIUM ALBUM COR SERPL-MCNC: 8.8 MG/DL (ref 8.5–10.5)
CALCIUM SERPL-MCNC: 8.7 MG/DL (ref 8.5–10.5)
CHLORIDE SERPL-SCNC: 98 MMOL/L (ref 96–112)
CO2 SERPL-SCNC: 24 MMOL/L (ref 20–33)
CREAT SERPL-MCNC: 4.74 MG/DL (ref 0.5–1.4)
EOSINOPHIL # BLD AUTO: 0.21 K/UL (ref 0–0.51)
EOSINOPHIL NFR BLD: 3.5 % (ref 0–6.9)
ERYTHROCYTE [DISTWIDTH] IN BLOOD BY AUTOMATED COUNT: 58.2 FL (ref 35.9–50)
GFR SERPLBLD CREATININE-BSD FMLA CKD-EPI: 10 ML/MIN/1.73 M 2
GLOBULIN SER CALC-MCNC: 3.8 G/DL (ref 1.9–3.5)
GLUCOSE BLD STRIP.AUTO-MCNC: 149 MG/DL (ref 65–99)
GLUCOSE SERPL-MCNC: 146 MG/DL (ref 65–99)
HCT VFR BLD AUTO: 32.1 % (ref 37–47)
HGB BLD-MCNC: 10 G/DL (ref 12–16)
IMM GRANULOCYTES # BLD AUTO: 0.02 K/UL (ref 0–0.11)
IMM GRANULOCYTES NFR BLD AUTO: 0.3 % (ref 0–0.9)
LIPASE SERPL-CCNC: 87 U/L (ref 11–82)
LYMPHOCYTES # BLD AUTO: 1.48 K/UL (ref 1–4.8)
LYMPHOCYTES NFR BLD: 24.5 % (ref 22–41)
MCH RBC QN AUTO: 31 PG (ref 27–33)
MCHC RBC AUTO-ENTMCNC: 31.2 G/DL (ref 32.2–35.5)
MCV RBC AUTO: 99.4 FL (ref 81.4–97.8)
MONOCYTES # BLD AUTO: 0.73 K/UL (ref 0–0.85)
MONOCYTES NFR BLD AUTO: 12.1 % (ref 0–13.4)
NEUTROPHILS # BLD AUTO: 3.58 K/UL (ref 1.82–7.42)
NEUTROPHILS NFR BLD: 59.3 % (ref 44–72)
NRBC # BLD AUTO: 0 K/UL
NRBC BLD-RTO: 0 /100 WBC (ref 0–0.2)
PLATELET # BLD AUTO: 185 K/UL (ref 164–446)
PMV BLD AUTO: 10.5 FL (ref 9–12.9)
POTASSIUM SERPL-SCNC: 4.8 MMOL/L (ref 3.6–5.5)
PROT SERPL-MCNC: 7.7 G/DL (ref 6–8.2)
RBC # BLD AUTO: 3.23 M/UL (ref 4.2–5.4)
SODIUM SERPL-SCNC: 135 MMOL/L (ref 135–145)
WBC # BLD AUTO: 6 K/UL (ref 4.8–10.8)

## 2024-01-16 PROCEDURE — 82962 GLUCOSE BLOOD TEST: CPT

## 2024-01-16 PROCEDURE — 36415 COLL VENOUS BLD VENIPUNCTURE: CPT

## 2024-01-16 PROCEDURE — 80053 COMPREHEN METABOLIC PANEL: CPT

## 2024-01-16 PROCEDURE — 700102 HCHG RX REV CODE 250 W/ 637 OVERRIDE(OP): Mod: UD | Performed by: EMERGENCY MEDICINE

## 2024-01-16 PROCEDURE — 99283 EMERGENCY DEPT VISIT LOW MDM: CPT

## 2024-01-16 PROCEDURE — 83690 ASSAY OF LIPASE: CPT

## 2024-01-16 PROCEDURE — 85025 COMPLETE CBC W/AUTO DIFF WBC: CPT

## 2024-01-16 PROCEDURE — 73630 X-RAY EXAM OF FOOT: CPT | Mod: LT

## 2024-01-16 PROCEDURE — A9270 NON-COVERED ITEM OR SERVICE: HCPCS | Mod: UD | Performed by: EMERGENCY MEDICINE

## 2024-01-16 RX ORDER — CEPHALEXIN 500 MG/1
500 CAPSULE ORAL ONCE
Status: COMPLETED | OUTPATIENT
Start: 2024-01-16 | End: 2024-01-16

## 2024-01-16 RX ORDER — HYDROCODONE BITARTRATE AND ACETAMINOPHEN 5; 325 MG/1; MG/1
1 TABLET ORAL ONCE
Status: COMPLETED | OUTPATIENT
Start: 2024-01-16 | End: 2024-01-16

## 2024-01-16 RX ORDER — CEPHALEXIN 500 MG/1
500 CAPSULE ORAL 2 TIMES DAILY
Qty: 14 CAPSULE | Refills: 0 | Status: ACTIVE | OUTPATIENT
Start: 2024-01-16 | End: 2024-01-23

## 2024-01-16 RX ORDER — HYDROCODONE BITARTRATE AND ACETAMINOPHEN 5; 325 MG/1; MG/1
1 TABLET ORAL EVERY 6 HOURS PRN
Qty: 10 TABLET | Refills: 0 | Status: SHIPPED | OUTPATIENT
Start: 2024-01-16 | End: 2024-01-21

## 2024-01-16 RX ADMIN — HYDROCODONE BITARTRATE AND ACETAMINOPHEN 1 TABLET: 5; 325 TABLET ORAL at 11:31

## 2024-01-16 RX ADMIN — CEPHALEXIN 500 MG: 500 CAPSULE ORAL at 11:31

## 2024-01-16 ASSESSMENT — FIBROSIS 4 INDEX: FIB4 SCORE: 2.7

## 2024-01-16 NOTE — ED TRIAGE NOTES
Rigoberto Lakhani Zacarias  58 y.o.  female  Chief Complaint   Patient presents with    Foot Pain     L foot pain x 2 days + redness that pt noticed when she woke up, area outlined in triage. Has pain to pinky toe on L side, & healing ulcer to pad of L great toe. No fevers/chills. Pt is diabetic, not on insulin,  in triage. Dialysis pt, M/W/F.     Labs ordered. Patient educated on triage process, to alert staff if any changes in condition.

## 2024-01-16 NOTE — ED NOTES
Discharge instructions discussed with pt, verbalizes understanding. Medication given per MAR. Pt educated to follow up with PCP and TARYN. All belongings with pt when leaving unit. Pt amb to lobby steady gait.

## 2024-01-16 NOTE — ED PROVIDER NOTES
ED Provider Note    Scribed for Liseth Tinajero M.D. by Mary Jane Lloyd. 1/16/2024, 10:54 AM.    Primary care provider: Kamala Pierre M.D.  Means of arrival: Walk-in  History obtained from: Patient  History limited by: N/A    CHIEF COMPLAINT  Chief Complaint   Patient presents with    Foot Pain     L foot pain x 2 days + redness that pt noticed when she woke up, area outlined in triage. Has pain to pinky toe on L side, & healing ulcer to pad of L great toe. No fevers/chills. Pt is diabetic, not on insulin,  in triage. Dialysis pt, M/W/F.     HPI/ROS  Rigoberto Adames is a 58 y.o. female who presents to the Emergency Department left foot pain onset two days ago. The patient reports that she has had left foot pain for a couple of days and noticed that there was some redness to the top of her foot today and therefore she presented for further evaluation.  The patient denies pain in her right foot, fever or chills. The patient has a history of diabetes, but is not currently medicated with insulin.  She reports that her blood sugars are usually well-controlled.  She also has end-stage renal disease and is on dialysis on Monday Wednesdays and Fridays.    The patient additionally notes that she had a fistula creation surgery done a few months ago in her left upper extremity and has been having paresthesias in the left arm since then.  She reports that she was referred to physical therapy by her vascular surgeon but this has not been helping and she is requesting follow-up for this.    EXTERNAL RECORDS REVIEWED  Patient was last seen at [facility] on 12/3/23 for left upper extremity angiogram.     LIMITATION TO HISTORY   Select: : None    OUTSIDE HISTORIAN(S):  None    PAST MEDICAL HISTORY   has a past medical history of Acute kidney injury (HCC), Acute necrotizing pyelonephritis, Acute respiratory failure with hypoxia (HCC) (04/23/2023), Acute-on-chronic kidney injury (HCC) (04/23/2023), Alcohol withdrawal (HCC), Arthritis,  Breath shortness, Cataract, Chronic kidney disease (CKD) stage G4/A2, severely decreased glomerular filtration rate (GFR) between 15-29 mL/min/1.73 square meter and albuminuria creatinine ratio between  mg/g (HCC) (07/25/2023), Cirrhosis of liver with ascites (HCC) (03/06/2017), Dental disorder, Diabetes mellitus, type 2 (HCC), Diabetic ulcer of toe (HCC) (04/30/2020), Dialysis patient (HCC), diarrhea (2019), Foot pain, bilateral, Heart murmur, High cholesterol, HOCM (hypertrophic obstructive cardiomyopathy) (HCC), Hyperammonemia (HCC) (06/24/2016), Hyperkalemia (04/23/2023), Hypertension, Shock (HCC) (04/23/2023), STEMI (ST elevation myocardial infarction) (HCC), Thrombocytopenia (HCC), and Tobacco abuse (12/08/2014).    SURGICAL HISTORY   has a past surgical history that includes other; upper gi endoscopy,ligat varix (09/25/2019); gastroscopy-endo (09/25/2019); primary c section (2006); upper gi endoscopy,diagnosis (N/A, 03/04/2022); cataract extraction with iol (Bilateral); av fistula creation (Left, 11/29/2023); and av fistula revision (Left, 12/3/2023).    SOCIAL HISTORY  Social History     Tobacco Use    Smoking status: Former     Current packs/day: 0.00     Average packs/day: 1 pack/day for 10.0 years (10.0 ttl pk-yrs)     Types: Cigarettes     Start date: 8/12/2015     Quit date: 06/2020     Years since quitting: 3.6    Smokeless tobacco: Never   Vaping Use    Vaping Use: Never used   Substance Use Topics    Alcohol use: Not Currently     Alcohol/week: 0.6 oz     Types: 1 Glasses of wine per week    Drug use: No      FAMILY HISTORY  Family History   Problem Relation Age of Onset    Cancer Father         stomach    Hypertension Mother      CURRENT MEDICATIONS  Home Medications       Reviewed by Jeanne Sauer R.N. (Registered Nurse) on 01/16/24 at 1001  Med List Status: Not Addressed     Medication Last Dose Status   Alcohol Swabs  Active   amLODIPine (NORVASC) 10 MG Tab  Active   Ascorbic Acid  "(VITAMIN C) 100 MG Tab  Active   atorvastatin (LIPITOR) 20 MG Tab  Active   carvedilol (COREG) 25 MG Tab  Active   gabapentin (NEURONTIN) 100 MG Cap  Active   loperamide (IMODIUM) 2 MG Cap  Active                  ALLERGIES  No Known Allergies    PHYSICAL EXAM  VITAL SIGNS: /53   Pulse 71   Temp 36.6 °C (97.9 °F) (Temporal)   Resp 18   Ht 1.702 m (5' 7\")   Wt 59.1 kg (130 lb 4.7 oz)   SpO2 94%   BMI 20.41 kg/m²   Vitals reviewed by myself.  Nursing note and vitals reviewed.  Constitutional: Well-developed and well-nourished. No acute distress.   HENT: Head is normocephalic and atraumatic.  Eyes: extra-ocular movements intact  Cardiovascular: Regular rate and regular rhythm.  2+ bilateral radial and distal pedal pulses  Pulmonary/Chest: Breath sounds normal. No wheezes or rales.   Left Hand: Patient reports decreased sensation in the tips of all left fingertips, she has slightly decreased  strength in left hand compared to the right  Left Foot: Patient has full range of motion of all toes and ankle without difficulty.  Slight erythema noted to the top of the foot on the lateral aspect, it is warm to the touch.  No wounds on the bottom of the foot or top of the foot.  Neurological: Awake and alert  Skin: Skin is warm and dry. No rash.     DIAGNOSTIC STUDIES:  LABS  Labs Reviewed   CBC WITH DIFFERENTIAL - Abnormal; Notable for the following components:       Result Value    RBC 3.23 (*)     Hemoglobin 10.0 (*)     Hematocrit 32.1 (*)     MCV 99.4 (*)     MCHC 31.2 (*)     RDW 58.2 (*)     All other components within normal limits   COMP METABOLIC PANEL - Abnormal; Notable for the following components:    Glucose 146 (*)     Bun 33 (*)     Creatinine 4.74 (*)     Globulin 3.8 (*)     All other components within normal limits   LIPASE - Abnormal; Notable for the following components:    Lipase 87 (*)     All other components within normal limits   ESTIMATED GFR - Abnormal; Notable for the following " components:    GFR (CKD-EPI) 10 (*)     All other components within normal limits   POCT GLUCOSE DEVICE RESULTS - Abnormal; Notable for the following components:    POC Glucose, Blood 149 (*)     All other components within normal limits   URINALYSIS     All labs reviewed and independently interpreted by myself    RADIOLOGY  Final interpretation by radiology demonstrates:    DX-FOOT-COMPLETE 3+ LEFT   Final Result      1.  Osteopenia.   2.  No fracture or dislocation of LEFT foot.   3.  No focal bony destruction although osteomyelitis is not excluded by plain film alone.        The radiologist's interpretation of all radiological studies have been reviewed by me.    COURSE & MEDICAL DECISION MAKING    ED Observation Status? No; Patient does not meet criteria for ED Observation.     INITIAL ASSESSMENT, ED COURSE AND PLAN    Patient is a 58-year-old female who presents for evaluation of left foot erythema and pain.  Differential diagnosis includes musculoskeletal strain, bony pathology, cellulitis.  On exam this appears most consistent with simple cellulitis I will obtain x-ray of the foot to assess for any bony destruction.  Screening labs were ordered in triage as well.  Patient also complaining of paresthesias in her left upper extremity as well as decreased  strength after having fistula surgery.  I advised her that I will have her follow-up with a hand specialist for further evaluation of this.  She is amenable to this plan.  I have placed orthopedics referral for follow-up.    Patient's initial vitals are within normal limits.  She is neurovascularly intact on exam.  X-ray returns and demonstrates no acute abnormalities.  Labs are consistent with end-stage renal disease, glucose is well-controlled at 146.  She is chronically anemic.  Labs are otherwise unremarkable.  Therefore at this time I advised patient that I will start her on Keflex for management of simple cellulitis.  She will be started on Norco for  pain management.  She is advised to follow-up with primary care physician within the next 5 to 7 days for recheck to ensure that it is improving.  She is advised to return to the emergency department for acute or worsening symptoms.  Patient is amenable to this plan.  She is then given strict return precautions and discharged in stable condition.        DISPOSITION AND DISCUSSIONS  I have discussed management of the patient with the following physicians and JUDI's:  None    Discussion of management with other QHP or appropriate source(s): None     Escalation of care considered, and ultimately not performed:see above    Barriers to care at this time, including but not limited to:  None .     Decision tools and prescription drugs considered including, but not limited to: Antibiotics Keflex .    Please see review of records as noted above    The patient will return for new or worsening symptoms and is stable at the time of discharge.    In prescribing controlled substances to this patient, I certify that I have obtained and reviewed the medical history of Rigoberto Adames. I have also made a good iesha effort to obtain applicable records from other providers who have treated the patient and records did not demonstrate any increased risk of substance abuse that would prevent me from prescribing controlled substances.     I have conducted a physical exam and documented it. I have reviewed Ms. Adames’s prescription history as maintained by the [place] Prescription Monitoring Program.     I have assessed the patient’s risk for abuse, dependency, and addiction using the validated Opioid Risk Tool available at https://www.mdcalc.com/tfvhfa-clyk-yrzj-ort-narcotic-abuse.     Given the above, I believe the benefits of controlled substance therapy outweigh the risks. The reasons for prescribing controlled substances include non-narcotic, oral analgesic alternatives have been inadequate for pain control. Accordingly, I have discussed  the risk and benefits, treatment plan, and alternative therapies with the patient.     DISPOSITION:  Patient will be discharged home in stable condition.    FOLLOW UP:  Kamala Pierre M.D.  [address] [facility] [address]  [phone]          [facility] - [address] [address]  [phone]        OUTPATIENT MEDICATIONS:  New Prescriptions    CEPHALEXIN (KEFLEX) 500 MG CAP    Take 1 Capsule by mouth 2 times a day for 7 days.     FINAL IMPRESSION  1. Cellulitis of left lower extremity    2. Left hand paresthesia        Mary Jane WHEELER (Scribe), am scribing for, and in the presence of, Liseth Tinajero M.D..    Electronically signed by: Mary Jane Lloyd (Shala), 1/16/2024    Liseth WHEELER M.D. personally performed the services described in this documentation, as scribed by Mary Jane Lloyd in my presence, and it is both accurate and complete.    The note accurately reflects work and decisions made by me.  Liseth Tinajero M.D.  1/16/2024  4:27 PM

## 2024-01-17 ENCOUNTER — OFFICE VISIT (OUTPATIENT)
Dept: NEUROLOGY | Facility: MEDICAL CENTER | Age: 59
End: 2024-01-17
Attending: PSYCHIATRY & NEUROLOGY
Payer: MEDICARE

## 2024-01-17 VITALS
BODY MASS INDEX: 20.45 KG/M2 | DIASTOLIC BLOOD PRESSURE: 62 MMHG | OXYGEN SATURATION: 97 % | WEIGHT: 130.29 LBS | HEART RATE: 67 BPM | SYSTOLIC BLOOD PRESSURE: 118 MMHG | TEMPERATURE: 96.8 F | HEIGHT: 67 IN

## 2024-01-17 DIAGNOSIS — R29.898 LEFT ARM WEAKNESS: ICD-10-CM

## 2024-01-17 DIAGNOSIS — E11.42 DIABETIC POLYNEUROPATHY ASSOCIATED WITH TYPE 2 DIABETES MELLITUS (HCC): ICD-10-CM

## 2024-01-17 PROCEDURE — 99212 OFFICE O/P EST SF 10 MIN: CPT | Performed by: PSYCHIATRY & NEUROLOGY

## 2024-01-17 PROCEDURE — 99205 OFFICE O/P NEW HI 60 MIN: CPT | Performed by: PSYCHIATRY & NEUROLOGY

## 2024-01-17 ASSESSMENT — ENCOUNTER SYMPTOMS
TINGLING: 1
SENSORY CHANGE: 1
LOSS OF CONSCIOUSNESS: 0
MEMORY LOSS: 0
FOCAL WEAKNESS: 1
DIZZINESS: 0
TREMORS: 0
FALLS: 0

## 2024-01-17 ASSESSMENT — FIBROSIS 4 INDEX: FIB4 SCORE: 1.59

## 2024-01-17 NOTE — Clinical Note
Erika, this is more of an FYI.  I would recommend getting this woman back into hand therapy, there are a few good hand therapy specialist and clinics in [facility] including at [facility].  Dr. Binh Lau MD, runs the clinic and he is quite good.  Kashif

## 2024-01-18 ENCOUNTER — APPOINTMENT (OUTPATIENT)
Dept: OCCUPATIONAL THERAPY | Facility: REHABILITATION | Age: 59
End: 2024-01-18
Attending: SURGERY
Payer: MEDICARE

## 2024-01-18 NOTE — OP THERAPY DAILY TREATMENT
"  Outpatient Occupational Therapy  DAILY TREATMENT     [facility] [address].  [address]  [place] [address]  Phone:  [phone]  Fax:  [phone]    Date: 01/18/2024    Patient: Rigoberto Adames  YOB: 1965  MRN: [id number]     Time Calculation                 Chief Complaint: No chief complaint on file.    Visit #: 4    SUBJECTIVE:  ***    OBJECTIVE:  Current objective measures:   Neurological Testing   Sensation   Wrist/Hand   Left   Paresthesia: light touch and sharp/dull discrimination     Active Range of Motion    All ROM WNL     Additional Active Range of Motion Details  moderate tightness of both median and ulnar nerves in left arm      Strength :  L = 3 pound  R = 41 pounds     Swelling   Left Wrist/Hand   Circumference wrist: 15.5 cm   minimal edema noted now     General Comments      Wrist/Hand Comments  9 hole peg test:  R = 26 seconds  L = 1 minute and 5 seconds         Therapeutic Exercises (CPT 57843):     1. AROM for edema management and ST length    2. median and ulnar nerve glides    3. medium resistance theraputty    4. fine motor manipulation and dexterity activities for home, 9 hole peg board, grooved pegboard, manipulation of coins and shuffling cards    5. 5# digi-flex, 10 x    6. 2# graded clip with 3 point prehension, 10 x    7. isolated strengthening of wrist flexion/extension, 1 x 10 with 2# hand weight.    8. flex bar, 10 x in horizontal and vertical position      Time-based treatments/modalities:           Pain rating before treatment: {PAIN NUMBERS_1-10:18604}  Pain rating after treatment: {PAIN NUMBERS_1-10:39185}    ASSESSMENT:   Response to treatment: ***    PLAN/RECOMMENDATIONS:   Plan for treatment: {Therapy Plan:278595656::\"therapy treatment to continue next visit\"}.  Planned interventions for next visit: {planned OT Interventions:189463962}         "

## 2024-01-18 NOTE — PROGRESS NOTES
"Subjective     Rigoberto Adames is a 58 y.o. female who presents from the office of Dr. Kamala Pierre MD, for consultation, with a history of persistent left upper extremity weakness and paresthesias following proximal hemodialysis catheter placement.  Primarily Yi speaking, the patient does have enough English comprehension and communication skill to allow adequate communication.     TERRI Franklin is a very pleasant, though unfortunate, 58-year-old right-handed Yi American woman who underwent elective Angiocath placement for anticipated hemodialysis late last year.  Immediately postop, she noted significant numbness and pain radiating distally from the axilla over the medial upper arm and circumferentially down the forearm and into the hand.  There was weakness with the entire upper extremity as well.  The symptoms persisted though they did evolve over time, the sensory distortions remained only distally in the hand involving the hypothenar and thenar eminences along with all of the fingers, but the weakness remains especially in the hand.      She now notes the weakness is mostly with grasp, she thinks she can elevate the arm over her head without major issue.  She is not sure about elbow flexion since she has difficulty with the grasp movement itself.  There is no neck pain.  There is no tenderness.  She describes the sensory distortions as painful \"pins\" being stabbed into the fingers as well as the hyperthenar and thenar eminences on the left.  There is no loss of feeling with the left hand and fingers.  The weakness has resulted in significant loss of strength and dexterity with any kind of fine motor control.    She did undergo physical therapy for 1 month after surgery, providing limited benefit only.  Diagnostics have not been done.  Unfortunately, the catheter placement itself did not take, it was occluded, and she is scheduled for right upper extremity procedure of similar nature.    She has a " "history of diabetes with neuropathy, dating back about 5 years.  The neuropathy symptoms are only in her feet, but her balance is curtailed, she denies an actual foot drop, but she looks at the ground consistently to avoid tripping.  Darkness and uneven surfaces are problematic at best when she walks.  It is mostly a numbness, she denies burning dysesthesias.  She has none of the sensory distortions in the hands predating the surgery.    Other than the above, she has a history of alcoholic cirrhosis, hypertrophic obstructive cardiomyopathy, ESRD, hypertension, and dyslipidemia.  There is no history of CVA, MS, seizure, autoimmune disease, psychiatric disease, or pulmonary disease.    There is no surgical history of note other than the above.    No one in her family has a history of similar symptoms or holds a diagnosis of diabetes.  Both her parents have passed.  Her brother and sister are alive and well.    She does not smoke or drink.  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    She is on Keflex 5 mg twice daily, Vicodin, Norvasc, Coreg, Lipitor, Neurontin 100 mg nightly, Imodium and vitamin C.    Review of Systems   Musculoskeletal:  Negative for falls.   Neurological:  Positive for tingling, sensory change and focal weakness. Negative for dizziness, tremors and loss of consciousness.   Psychiatric/Behavioral:  Negative for memory loss.    All other systems reviewed and are negative.    Objective     /62 (BP Location: Right arm, Patient Position: Sitting, BP Cuff Size: Small adult)   Pulse 67   Temp 36 °C (96.8 °F) (Temporal)   Ht 1.702 m (5' 7.01\")   Wt 59.1 kg (130 lb 4.7 oz)   SpO2 97%   BMI 20.40 kg/m²      Physical Exam    She appears in no acute distress.  Vital signs are stable.  There is no malar rash.  The neck is supple, range of motion is full, compression maneuvers are negative.  There is a well-healed surgical scar extending from the " mid axilla down the medial aspect of the left upper extremity.  There is also an operative scar in the cubital fossa.  Distal pulses in the left upper extremity are intact.  Cardiac evaluation is unremarkable.     Neurological Exam    She is fully oriented, there is no aphasia or inattention.    PERRLA/EOMI, visual fields are full, facial movements are symmetric, sensory exam is intact to pinprick, temperature and light touch bilaterally.  The tongue and uvula are midline without bulbar dysfunction.  Shoulder shrug and head rotation are normal.    Musculoskeletal exam reveals atrophy of the thenar and hypothenar eminence in the left hand, some atrophy of the forearm musculatures as well.  Strength is intact in the right upper extremity, and the left there is weakness involving biceps at 4/5, triceps 4+/5, deltoid is intact and 5/5, supra and infraspinatus as well at 5/5.  Supination and pronation are both weakened at 4/5.  There is weakness of the wrist and finger extension as well as flexion, and abduction.  At rest the hand is held in a clawed position.  In the lower extremities the only weakness is with dorsiflexion at 4+/5, plantarflexion is intact at 5/5.    Reflexes are absent at the ankles, trace present at the knees bilaterally.  There may be a slight reduction of biceps reflex on the left.  Tricep reflexes are easily elicited without asymmetry.  The toes are equivocal bilaterally.    Repetitive movements are slowed with toe tapping, to a lesser degree, lateral movements bilaterally.  Movements are normal in the right hand, slow proportionate to weakness in the left hand.  There is no appendicular dystaxia except for some slight incoordination with the left hand.    Sensory exam reveals a decrement of vibration in the left hand when compared to the right, JPS is well diminished in the fingers.  Pinprick and temperature are lost below the wrist on the left only.  There is a stocking pattern decrement of  vibration below the knee on the left, mid shin on the right, pinprick below the knees bilaterally, temperature is lost above the knees bilaterally, JPS at and below the level of the ankles bilaterally.    Assessment & Plan     1. Left arm weakness  She has suffered from a rather significant postoperative complication, seemingly involving both medial cord but also the radial nerve and possibly the musculocutaneous nerve initially.  The deficits are quite significant, and at this point in time, there is likely to be some permanent weakness and sensory distortion distally.  Physical therapy will likely be required over the long-term, I would recommend reinitiating with a hand specialty clinic.  Splinting might be necessary to avoid contracture.  EMG/NCV studies are critical now to help localize the insult itself as well as whether or not there is persistent, active denervation or evidence of reinnervation.    - Referral to Neurodiagnostics (EEG,EP,EMG/NCS/DBS)    2. Diabetic polyneuropathy associated with type 2 diabetes mellitus (HCC)  She has a rather severe diabetic polyneuropathy, fortunately few symptoms in the right upper extremity that would be consistent with this.  The deficits in the left have more to do with the postoperative mishap.  EMG/NCV studies will also be done to help stage this process.  We will follow-up 1 more time after studies are complete for final evaluation and recommendations.    - Referral to Neurodiagnostics (EEG,EP,EMG/NCS/DBS)    Time: 70 minutes in total spent on patient care including pre-charting, record review, discussion with healthcare staff and documentation.  This includes face-to-face time for exam, review, discussion, as well as counseling and coordinating care.

## 2024-01-23 ENCOUNTER — OCCUPATIONAL THERAPY (OUTPATIENT)
Dept: OCCUPATIONAL THERAPY | Facility: REHABILITATION | Age: 59
End: 2024-01-23
Attending: SURGERY
Payer: MEDICARE

## 2024-01-23 DIAGNOSIS — E11.42 TYPE 2 DIABETES MELLITUS WITH DIABETIC POLYNEUROPATHY, WITHOUT LONG-TERM CURRENT USE OF INSULIN (HCC): ICD-10-CM

## 2024-01-23 DIAGNOSIS — R20.0 NUMBNESS OF HAND: ICD-10-CM

## 2024-01-23 DIAGNOSIS — N18.6 ESRD (END STAGE RENAL DISEASE) (HCC): ICD-10-CM

## 2024-01-23 PROCEDURE — 97110 THERAPEUTIC EXERCISES: CPT

## 2024-01-23 NOTE — OP THERAPY DAILY TREATMENT
Outpatient Occupational Therapy  DAILY TREATMENT     [facility] Occupational [address].  [address]  [place] [address]  Phone:  [phone]  Fax:  [phone]    Date: 01/23/2024    Patient: Rigoberto Adames  YOB: 1965  MRN: [id number]     Time Calculation  Start time: 1145  Stop time: 1230 Time Calculation (min): 45 minutes         Chief Complaint: Extremity Weakness and Self Care Duties    Visit #: 4    SUBJECTIVE:  I feel my arm is improving and the main problem now is my hand    OBJECTIVE:  Current objective measures:   Neurological Testing   Sensation   Wrist/Hand   Left   Paresthesia: light touch and sharp/dull discrimination     Active Range of Motion    All ROM WNL     Additional Active Range of Motion Details  moderate tightness of both median and ulnar nerves in left arm      Strength :  L = 3 pound  R = 41 pounds     Wrist/Hand Comments  9 hole peg test:  R = 26 seconds  L = 1 minute and 5 seconds         Therapeutic Exercises (CPT 59889):     1. medium resistance theraband for UE strengthening    2. median and ulnar nerve glides    3. medium resistance theraputty    4. fine motor manipulation and dexterity activities for home, 9 hole peg board, grooved pegboard, manipulation of coins and shuffling cards    5. 5# digi-flex, 10 x    6. 2# graded clip with 3 point prehension, 10 x    7. isolated strengthening of wrist flexion/extension, 1 x 10 with 2# hand weight.    8. flex bar, 10 x in horizontal and vertical position      Time-based treatments/modalities:  Therapeutic exercise minutes (CPT 29988): 45 minutes        Pain rating before treatment: 4  Pain rating after treatment: 6  Pain is much worse at night  ASSESSMENT:   Response to treatment: lower pain level noted today.  Full AROM of left UE.   strength not improving since previous visit.      PLAN/RECOMMENDATIONS:   Plan for treatment: therapy treatment to continue next visit.  Planned interventions for  next visit: continue with current treatment, self care ADL training (CPT 00260), therapeutic activities (CPT 92400), and therapeutic exercise (CPT 94950)

## 2024-01-24 DIAGNOSIS — G56.92 NEUROPATHY, ARM, LEFT: ICD-10-CM

## 2024-01-24 DIAGNOSIS — R29.898 LEFT ARM WEAKNESS: ICD-10-CM

## 2024-01-25 ENCOUNTER — APPOINTMENT (OUTPATIENT)
Dept: INTERNAL MEDICINE | Facility: OTHER | Age: 59
End: 2024-01-25
Payer: MEDICARE

## 2024-01-30 ENCOUNTER — OCCUPATIONAL THERAPY (OUTPATIENT)
Dept: OCCUPATIONAL THERAPY | Facility: REHABILITATION | Age: 59
End: 2024-01-30
Attending: SURGERY
Payer: MEDICARE

## 2024-01-30 DIAGNOSIS — R20.0 NUMBNESS OF HAND: ICD-10-CM

## 2024-01-30 PROCEDURE — 97110 THERAPEUTIC EXERCISES: CPT

## 2024-01-30 NOTE — OP THERAPY DAILY TREATMENT
Outpatient Occupational Therapy  DAILY TREATMENT     [facility] Occupational Therapy [address]  [address]  [place] [address]  Phone:  [phone]  Fax:  [phone]    Date: 01/30/2024    Patient: Rigoberto Adames  YOB: 1965  MRN: [id number]     Time Calculation  Start time: 1145  Stop time: 1225 Time Calculation (min): 40 minutes         Chief Complaint: Extremity Weakness    Visit #: 5    SUBJECTIVE:  I still have a lot of pain in my left arm at night and it is really affecting my sleep    OBJECTIVE:  Current objective measures:   Neurological Testing   Sensation   Wrist/Hand   Left   Paresthesia: light touch and sharp/dull discrimination     Active Range of Motion    All ROM WNL     Additional Active Range of Motion Details  moderate tightness of both median and ulnar nerves in left arm      Strength :  L = 3 pound  R = 45 pounds     Wrist/Hand Comments  9 hole peg test:  R = 26 seconds  L = 1 minute and 5 seconds         Therapeutic Exercises (CPT 55939):     1. medium resistance theraband for UE strengthening    2. median and ulnar nerve glides    3. medium resistance theraputty    4. fine motor manipulation and dexterity activities for home, 9 hole peg board, grooved pegboard, manipulation of coins and shuffling cards    5. 5# digi-flex, 10 x    6. 2# graded clip with 3 point prehension, 10 x    7. isolated strengthening of wrist flexion/extension, 1 x 10 with 2# hand weight.    8. flex bar, 10 x in horizontal and vertical position      Time-based treatments/modalities:  Therapeutic exercise minutes (CPT 96631): 40 minutes        Pain rating before treatment: 5  Pain rating after treatment: 5    ASSESSMENT:   Response to treatment: strength and dexterity remain status quo.  PN completed today    PLAN/RECOMMENDATIONS:   Plan for treatment: therapy treatment to continue next visit.  Planned interventions for next visit: continue with current treatment, self care ADL training  (CPT 29849), therapeutic activities (CPT 84257), and therapeutic exercise (CPT 34719)

## 2024-01-30 NOTE — OP THERAPY PROGRESS SUMMARY
Outpatient Occupational Therapy  PROGRESS SUMMARY NOTE    [facility] Occup[facility]  [address]  [address]  [place] [address]  Phone:  [phone]  Fax:  [phone]    Date of Visit: 01/30/2024    Patient: Rigoberto Adames  YOB: 1965  MRN: [id number]     Referring Provider: Sarahi Carey M.D.  [address]  [address]  [place] [address]   Referring Diagnosis Anesthesia of skin [R20.0];Paresthesia of skin [R20.2]     Visit Diagnoses     ICD-10-CM   1. Numbness of hand  R20.0       Rehab Potential: good    Progress Report Period: 12/22/23-1/30/24    Functional Assessment Used  UEFI = 12/80           Objective Findings and Assessment:   Patient progression towards goals: Patient has been seen 4 x since initial evaluation to work on left UE strengthening, coordination and fine motor manipulation/dexterity.  There has been light improvement with strength and fine motor manipulation along as lower pain levels during the day; however continues to have high pain levels at night and decreased functional use as expected.  Patient would benefit from continued OT intervention to minimize symptoms/limitations and enhance functional use    Objective findings and assessment details: Neurological Testing   Sensation   Wrist/Hand   Left   Paresthesia: light touch and sharp/dull discrimination     Active Range of Motion    All ROM WNL     Additional Active Range of Motion Details  moderate tightness of both median and ulnar nerves in left arm      Strength :  L = 3 pound  R = 45 pounds     Wrist/Hand Comments  9 hole peg test:  R = 26 seconds  L = 1 minute and 5 seconds       Goals:   Short Term Goals:    decrease edema, decrease pain, increase ADL independence, increase soft tissue/skin mobility, increase strength and independent with HEP performance  Short term goal timespan:  2-4 weeks    Long Term Goals:   Patient will increase left  strength to at least 15 pounds to enhance  functional use  Patient will have 2/10 pain in left arm during functional tasks  Patient will score at least 45 seconds or less on the 9 hole peg test due to improved fine motor manipulation and dexterity of left hand   Patient will score at least 40/80 on the UEFI      Long term goal timespan:  4-6 weeks    Plan:   Planned therapy interventions:  Self Care ADL Training (CPT 14634), Therapeutic Activities (CPT 99532) and Therapeutic Exercise (CPT 41443)  Frequency:  1x week  Duration in weeks:  5  Duration in visits:  5      Referring provider co-signature:  I have reviewed this plan of care and my co-signature certifies the need for services.    Certification Period: 01/30/2024 to 03/05/24    Physician Signature: ________________________________ Date: ______________           no

## 2024-02-06 ENCOUNTER — OCCUPATIONAL THERAPY (OUTPATIENT)
Dept: OCCUPATIONAL THERAPY | Facility: REHABILITATION | Age: 59
End: 2024-02-06
Attending: SURGERY
Payer: MEDICARE

## 2024-02-06 DIAGNOSIS — R20.0 NUMBNESS OF HAND: ICD-10-CM

## 2024-02-06 DIAGNOSIS — N18.6 ESRD (END STAGE RENAL DISEASE) (HCC): ICD-10-CM

## 2024-02-06 DIAGNOSIS — E11.42 TYPE 2 DIABETES MELLITUS WITH DIABETIC POLYNEUROPATHY, WITHOUT LONG-TERM CURRENT USE OF INSULIN (HCC): ICD-10-CM

## 2024-02-06 PROCEDURE — 97110 THERAPEUTIC EXERCISES: CPT

## 2024-02-06 NOTE — OP THERAPY DAILY TREATMENT
Outpatient Occupational Therapy  DAILY TREATMENT     [facility] Occupational Therapy [address].  [address]  [place] [address]  Phone:  [phone]  Fax:  [phone]    Date: 02/06/2024    Patient: Rigoberto Adames  YOB: 1965  MRN: [id number]     Time Calculation  Start time: 0100  Stop time: 0145 Time Calculation (min): 45 minutes         Chief Complaint: Extremity Weakness and Self Care Duties    Visit #: 6    SUBJECTIVE:  The pain is not improving.  I am awaiting an x-ray for my arm.      OBJECTIVE:  Current objective measures:   Sensation   Wrist/Hand   Left   Paresthesia: light touch and sharp/dull discrimination     Active Range of Motion    All ROM WNL     Additional Active Range of Motion Details  minimal tightness of both median and ulnar nerves in left arm      Strength :  L = 3 pound  R = 45 pounds     Wrist/Hand Comments  9 hole peg test:  R = 26 seconds  L = 1 minute and 5 seconds        Therapeutic Exercises (CPT 61920):     1. medium resistance theraband for UE strengthening    2. median and ulnar nerve glides    3. medium resistance theraputty    4. fine motor manipulation and dexterity activities for home, 9 hole peg board, grooved pegboard, manipulation of coins and shuffling cards    5. 5# digi-flex, 10 x    6. 2# graded clip with 3 point prehension, 10 x    7. isolated strengthening of wrist flexion/extension, 1 x 10 with 2# hand weight.    8. flex bar, 10 x in horizontal and vertical position      Time-based treatments/modalities:  Therapeutic exercise minutes (CPT 66730): 45 minutes        Pain rating before treatment: 6  Pain rating after treatment: 7    ASSESSMENT:   Response to treatment: Patient remains status quo with functional levels since last appointment.      PLAN/RECOMMENDATIONS:   Plan for treatment: therapy treatment to continue next visit.  Planned interventions for next visit: continue with current treatment, self care ADL training (CPT 19147),  therapeutic activities (CPT 41734), and therapeutic exercise (CPT 28496)

## 2024-02-13 ENCOUNTER — OCCUPATIONAL THERAPY (OUTPATIENT)
Dept: OCCUPATIONAL THERAPY | Facility: REHABILITATION | Age: 59
End: 2024-02-13
Attending: SURGERY
Payer: MEDICARE

## 2024-02-13 DIAGNOSIS — E11.42 TYPE 2 DIABETES MELLITUS WITH DIABETIC POLYNEUROPATHY, WITHOUT LONG-TERM CURRENT USE OF INSULIN (HCC): ICD-10-CM

## 2024-02-13 DIAGNOSIS — R20.0 NUMBNESS OF HAND: ICD-10-CM

## 2024-02-13 DIAGNOSIS — N18.6 ESRD (END STAGE RENAL DISEASE) (HCC): ICD-10-CM

## 2024-02-13 PROCEDURE — 97110 THERAPEUTIC EXERCISES: CPT

## 2024-02-13 NOTE — OP THERAPY DAILY TREATMENT
Outpatient Occupational Therapy  DAILY TREATMENT     [facility] Occupational [address]  [address]  [place] [address]  Phone:  [phone]  Fax:  [phone]    Date: 02/13/2024    Patient: Rigoberto Adames  YOB: 1965  MRN: [id number]     Time Calculation  Start time: 0145  Stop time: 0230 Time Calculation (min): 45 minutes         Chief Complaint: Hand Weakness and Self Care Duties    Visit #: 7    SUBJECTIVE:  No issues noted since last visit     OBJECTIVE:  Current objective measures:   Sensation   Wrist/Hand   Left   Paresthesia: light touch and sharp/dull discrimination     Active Range of Motion    All ROM WNL     Additional Active Range of Motion Details  minimal tightness of both median and ulnar nerves in left arm      Strength :  L = 11 pound  R = 45 pounds     Wrist/Hand Comments  9 hole peg test:  R = 26 seconds  L = 1 minute and 5 seconds        Therapeutic Exercises (CPT 31714):     1. medium resistance theraband for UE strengthening    2. median and ulnar nerve glides    3. medium resistance theraputty    4. fine motor manipulation and dexterity activities for home, 9 hole peg board, grooved pegboard, manipulation of coins and shuffling cards    5. 5# digi-flex, 10 x    6. 2# graded clip with 3 point prehension, 10 x    7. isolated strengthening of wrist flexion/extension, 1 x 10 with 2# hand weight.    8. flex bar, 10 x in horizontal and vertical position      Time-based treatments/modalities:  Therapeutic exercise minutes (CPT 48831): 45 minutes        Pain rating before treatment: 6  Pain rating after treatment: 5    ASSESSMENT:   Response to treatment: improving  strength; however sensation continues to be impaired, affecting fine motor manipulation     PLAN/RECOMMENDATIONS:   Plan for treatment: therapy treatment to continue next visit.  Planned interventions for next visit: continue with current treatment, self care ADL training (CPT 35888),  therapeutic activities (CPT 34094), and therapeutic exercise (CPT 61766)

## 2024-02-15 ENCOUNTER — APPOINTMENT (OUTPATIENT)
Dept: RADIOLOGY | Facility: MEDICAL CENTER | Age: 59
End: 2024-02-15
Payer: MEDICARE

## 2024-02-20 ENCOUNTER — OCCUPATIONAL THERAPY (OUTPATIENT)
Dept: OCCUPATIONAL THERAPY | Facility: REHABILITATION | Age: 59
End: 2024-02-20
Attending: SURGERY
Payer: MEDICARE

## 2024-02-20 DIAGNOSIS — N18.6 ESRD (END STAGE RENAL DISEASE) (HCC): ICD-10-CM

## 2024-02-20 DIAGNOSIS — E11.42 TYPE 2 DIABETES MELLITUS WITH DIABETIC POLYNEUROPATHY, WITHOUT LONG-TERM CURRENT USE OF INSULIN (HCC): ICD-10-CM

## 2024-02-20 DIAGNOSIS — R20.0 NUMBNESS OF HAND: ICD-10-CM

## 2024-02-20 PROCEDURE — 97110 THERAPEUTIC EXERCISES: CPT

## 2024-02-20 NOTE — OP THERAPY DAILY TREATMENT
Outpatient Occupational Therapy  DAILY TREATMENT     [facility] Occupational Therapy [address].  [address]  [place] [address]  Phone:  [phone]  Fax:  [phone]    Date: 02/20/2024    Patient: Rigoberto Adames  YOB: 1965  MRN: [id number]     Time Calculation  Start time: 0100  Stop time: 0145 Time Calculation (min): 45 minutes         Chief Complaint: Hand Weakness and Self Care Duties    Visit #: 8    SUBJECTIVE:  I had a new port placed in my abdomen this morning,  I am quite tired from the procedure     OBJECTIVE:  Current objective measures:   Sensation   Wrist/Hand   Left   Paresthesia: light touch and sharp/dull discrimination     Active Range of Motion    All ROM WNL     Additional Active Range of Motion Details  minimal tightness of both median and ulnar nerves in left arm      Strength :  L = 8 pound  R = 45 pounds     Wrist/Hand Comments  9 hole peg test:  R = 26 seconds  L = 1 minute and 25 seconds        Therapeutic Exercises (CPT 61465):     1. medium resistance theraband for UE strengthening    2. median and ulnar nerve glides    3. medium resistance theraputty    4. fine motor manipulation and dexterity activities for home, 9 hole peg board, grooved pegboard, manipulation of coins and shuffling cards    5. 5# digi-flex, 10 x    6. 2# graded clip with 3 point prehension, 10 x    7. isolated strengthening of wrist flexion/extension, 2 x 10 with 2# hand weight.    8. flex bar, 10 x in horizontal and vertical position      Time-based treatments/modalities:  Therapeutic exercise minutes (CPT 02074): 45 minutes        Pain rating before treatment: 7  Pain rating after treatment: 7    ASSESSMENT:   Response to treatment: left  and dexterity fluctuating; however continues to be quite impaired     PLAN/RECOMMENDATIONS:   Plan for treatment: therapy treatment to continue next visit.  Planned interventions for next visit: continue with current treatment, self care ADL  training (CPT 00996), therapeutic activities (CPT 59994), and therapeutic exercise (CPT 91792)

## 2024-02-27 ENCOUNTER — OCCUPATIONAL THERAPY (OUTPATIENT)
Dept: OCCUPATIONAL THERAPY | Facility: REHABILITATION | Age: 59
End: 2024-02-27
Attending: SURGERY
Payer: MEDICARE

## 2024-02-27 DIAGNOSIS — N18.6 ESRD (END STAGE RENAL DISEASE) (HCC): ICD-10-CM

## 2024-02-27 DIAGNOSIS — R20.0 NUMBNESS OF HAND: ICD-10-CM

## 2024-02-27 DIAGNOSIS — E11.42 TYPE 2 DIABETES MELLITUS WITH DIABETIC POLYNEUROPATHY, WITHOUT LONG-TERM CURRENT USE OF INSULIN (HCC): ICD-10-CM

## 2024-02-27 PROCEDURE — 97110 THERAPEUTIC EXERCISES: CPT

## 2024-02-27 NOTE — OP THERAPY DISCHARGE SUMMARY
Outpatient Occupational Therapy  DISCHARGE SUMMARY NOTE    [facility] Occupational Therapy [facility]  [address]  [address]  [place] [address]  Phone:  [phone]  Fax:  [phone]    Date of Visit: 02/27/2024    Patient: Rigoberto Adames  YOB: 1965  MRN: [id number]     Referring Provider: Sarahi Carey M.D.  [address]  [address]  [place] [address]   Referring Diagnosis: Anesthesia of skin [R20.0];Paresthesia of skin [R20.2]             Your patient is being discharged from Occupational Therapy with the following comments:   Progress plateau    Comments:  Patient seen 1 x a week for 9 visits to work on left UE strength and fine motor manipulation skills  s/p issues with dialysis graft in left upper arm.     Limitations Remaining:  As of 2/27/24:  Sensation   Wrist/Hand   Left   Paresthesia: light touch and sharp/dull discrimination     Active Range of Motion    All ROM WNL     Additional Active Range of Motion Details  minimal tightness of both median and ulnar nerves in left arm      Strength :  L = 11 pound  R = 45 pounds     Wrist/Hand Comments  9 hole peg test:  R = 26 seconds  L = 1 minute and 25 seconds      Recommendations:  Continue with HEP and happy to see again if/when needed with a new referral.     KEHINDE Victor/ELMA    Date: 2/27/2024

## 2024-02-27 NOTE — OP THERAPY DAILY TREATMENT
Outpatient Occupational Therapy  DAILY TREATMENT     [facility] Occupational [address].  [address]  [place] [address]  Phone:  [phone]  Fax:  [phone]    Date: 02/27/2024    Patient: Rigoberto Adames  YOB: 1965  MRN: [id number]     Time Calculation  Start time: 0100  Stop time: 0145 Time Calculation (min): 45 minutes         Chief Complaint: Extremity Weakness and Self Care Duties    Visit #: 9    SUBJECTIVE:  No issues noted since last visit.     OBJECTIVE:  Current objective measures:   Sensation   Wrist/Hand   Left   Paresthesia: light touch and sharp/dull discrimination     Active Range of Motion    All ROM WNL     Additional Active Range of Motion Details  minimal tightness of both median and ulnar nerves in left arm      Strength :  L = 11 pound  R = 45 pounds     Wrist/Hand Comments  9 hole peg test:  R = 26 seconds  L = 1 minute and 25 seconds        Therapeutic Exercises (CPT 33905):     1. medium resistance theraband for UE strengthening    2. median and ulnar nerve glides    3. medium resistance theraputty    4. fine motor manipulation and dexterity activities for home, 9 hole peg board, grooved pegboard, manipulation of coins and shuffling cards    5. 5# digi-flex, 10 x    6. 2# graded clip with 3 point prehension, 10 x    7. isolated strengthening of wrist flexion/extension, 2 x 10 with 2# hand weight.    8. flex bar, 10 x in horizontal and vertical position      Time-based treatments/modalities:  Therapeutic exercise minutes (CPT 04991): 45 minutes        Pain rating before treatment: 7  Pain rating after treatment: 7  9/10 pain in left arm at night.   ASSESSMENT:   Response to treatment: Patient has reached a functional plateau with left arm. To discharge today and patient to continue with HEP.      PLAN/RECOMMENDATIONS:   Plan for treatment: no further treatment needed.  Planned interventions for next visit:  last therapy session today.

## 2024-02-29 DIAGNOSIS — I10 PRIMARY HYPERTENSION: ICD-10-CM

## 2024-02-29 RX ORDER — CARVEDILOL 25 MG/1
TABLET ORAL
Qty: 60 TABLET | Refills: 4 | Status: SHIPPED | OUTPATIENT
Start: 2024-02-29

## 2024-03-01 ENCOUNTER — APPOINTMENT (OUTPATIENT)
Dept: INTERNAL MEDICINE | Facility: OTHER | Age: 59
End: 2024-03-01
Payer: MEDICARE

## 2024-03-01 NOTE — TELEPHONE ENCOUNTER
Received request via: Pharmacy    Was the patient seen in the last year in this department? Yes    Does the patient have an active prescription (recently filled or refills available) for medication(s) requested? No    Pharmacy Name: [facility]    Does the patient have longterm Plus and need 100 day supply (blood pressure, diabetes and cholesterol meds only)? Patient does not have SCP

## 2024-04-08 ENCOUNTER — APPOINTMENT (OUTPATIENT)
Dept: INTERNAL MEDICINE | Facility: OTHER | Age: 59
End: 2024-04-08
Payer: MEDICARE

## 2024-04-09 ENCOUNTER — APPOINTMENT (OUTPATIENT)
Dept: INTERNAL MEDICINE | Facility: OTHER | Age: 59
End: 2024-04-09
Payer: MEDICARE

## 2024-05-15 ENCOUNTER — APPOINTMENT (OUTPATIENT)
Dept: NEUROLOGY | Facility: MEDICAL CENTER | Age: 59
End: 2024-05-15
Attending: PSYCHIATRY & NEUROLOGY
Payer: MEDICARE

## 2024-05-28 ENCOUNTER — APPOINTMENT (OUTPATIENT)
Dept: INTERNAL MEDICINE | Facility: OTHER | Age: 59
End: 2024-05-28
Payer: MEDICARE

## 2024-05-28 VITALS
OXYGEN SATURATION: 98 % | HEART RATE: 76 BPM | TEMPERATURE: 97.6 F | WEIGHT: 130.4 LBS | SYSTOLIC BLOOD PRESSURE: 111 MMHG | HEIGHT: 67 IN | BODY MASS INDEX: 20.47 KG/M2 | DIASTOLIC BLOOD PRESSURE: 69 MMHG

## 2024-05-28 DIAGNOSIS — L85.3 DRY SKIN: ICD-10-CM

## 2024-05-28 DIAGNOSIS — R29.898 LEFT ARM WEAKNESS: ICD-10-CM

## 2024-05-28 RX ORDER — DULOXETIN HYDROCHLORIDE 30 MG/1
30 CAPSULE, DELAYED RELEASE ORAL DAILY
Qty: 30 CAPSULE | Refills: 1 | Status: SHIPPED | OUTPATIENT
Start: 2024-05-28 | End: 2024-05-28

## 2024-05-28 ASSESSMENT — FIBROSIS 4 INDEX: FIB4 SCORE: 1.62

## 2024-05-28 NOTE — PROGRESS NOTES
Teaching Physician Attestation      Level of Participation    I have personally interviewed and examined the patient.  In addition, I discussed with the resident physician the patient's history, exam, assessment and plan in detail.  Topics listed in my addendum were the focus of the visit.  Healthcare maintenance was not addressed this visit unless listed as a topic in my addendum.  I agree with the plan as written along with the following additions/modifications:      Diffuse itching, question eczema vs dry skin, etiology unclear  -Patient reports 2 to 3 years of itching, diffusely on back arms and face, denies any new contacts of allergens, no link to food ingestions, of note is end-stage renal disease.  Recent CMP this January with normal bilirubin/LFTs.  No response to Aquaphor, obvious excoriations across the back, no clear dry skin seen, some redness also noted on the extensor surfaces (elbows), she does note some silvery plaques also at times, question of psoriasis.  Hep serologies neg last year.  Cbc with diff shows mild relatively stable nc anemia.  Tsh nl 2020.    Plan  -Discussed continued moisturization, also potentially topical antihistamine (suggested as safer) versus oral antihistamine, patient is asking for oral antihistamine, will renally dose at conservative dosing 5 mg every 48 hours, asked to please discuss with her nephrologist.  Also discussed possibility of dermatology referral, plan per resident note.  Discussed may also be related to ESRD itself and asked to please f/u with her nephrologist to discuss her concerns, appreciate nephro support  -discussed lifestyle modifications such as avoiding prolonged hot showers    LUE weakness  -encouraged f/u with specialists, already connected, appreciate support    -At follow-up would also ensure patient is up-to-date on age-appropriate cancer screenings, appears is overdue.    Return to clinic within 1 week for dm, will also need annual exam coming  up.  pcr

## 2024-05-28 NOTE — PATIENT INSTRUCTIONS
-Use aquaphor on skin daily. Avoid hot water. Avoid skin care products and soaps that contain alcohol, fragrances, dyes, or other chemicals. Avoid scratching your skin.   -Follow-up with Dr. Brewer.   -Continue gabapentin.   -Take loratadine 5 mg every other day.        Hand therapy at [facility]:    MITCHELL BREWER  [address] [facility] [address]  [place] [address]  Phone: [phone]

## 2024-05-29 ENCOUNTER — OFFICE VISIT (OUTPATIENT)
Dept: INTERNAL MEDICINE | Facility: OTHER | Age: 59
End: 2024-05-29
Payer: MEDICARE

## 2024-05-29 VITALS
HEART RATE: 76 BPM | WEIGHT: 130.4 LBS | DIASTOLIC BLOOD PRESSURE: 73 MMHG | TEMPERATURE: 97.1 F | OXYGEN SATURATION: 97 % | HEIGHT: 67 IN | SYSTOLIC BLOOD PRESSURE: 118 MMHG | BODY MASS INDEX: 20.47 KG/M2

## 2024-05-29 DIAGNOSIS — Z01.419 ENCOUNTER FOR CERVICAL PAP SMEAR WITH PELVIC EXAM: ICD-10-CM

## 2024-05-29 DIAGNOSIS — Z12.31 ENCOUNTER FOR SCREENING MAMMOGRAM FOR BREAST CANCER: ICD-10-CM

## 2024-05-29 ASSESSMENT — FIBROSIS 4 INDEX: FIB4 SCORE: 1.62

## 2024-05-29 NOTE — PATIENT INSTRUCTIONS
-Referral placed for mammogram. Schedule this at your convenience.  -Will call with your results of PAP.   -You may experience small spotting in the next couple of days.  -Schedule appointment in the future to address other chronic medical conditions.   -It was nice visiting with you today!

## 2024-05-29 NOTE — PROGRESS NOTES
Established Patient      Rigoberto Lakhani presents today with the following:    CC: Here for routine pap smear.     HPI: 59 y.o. female for annual routine gynecologic exam. Doing well today with no acute complaints. Last pap smear 2016. She does report having an abnormal pap in the past, but no records per chart review. Last mammogram 2021, negative for malignancy.       Chief Complaint   Patient presents with    Gynecologic Exam       OB History    Para Term  AB Living   2 2 0 0 0 2   SAB IAB Ectopic Molar Multiple Live Births   0 0 0 0 0 0      Last Pap: 2016  Social History     Substance and Sexual Activity   Sexual Activity Not on file     H/O Abnormal Pap yes, patient self reports  She  reports that she quit smoking about 3 years ago. Her smoking use included cigarettes. She started smoking about 8 years ago. She has a 10 pack-year smoking history. She has never used smokeless tobacco.  Patient has GYN provider: No  /Para:  2  Gyn Surgery:  Tubal ligation .  Current Contraceptive Method:  Abstinence. Not currently sexually active.  Last menstrual period:  18 years ago .  Periods regular. Menopausal. Cramping is none.   She does not take OTC analgesics for cramps.  No significant bloating/fluid retention, pelvic pain, or dyspareunia. No vaginal discharge  Post-menopausal bleeding: No   Urinary incontinence: No   Folate intake: NA         Allergies: Patient has no known allergies.     ROS:    Menopausal. No bleeding.  Cramping is none.   She does not take OTC analgesics for cramping  Reports no menopause symptoms of hot flashes, night sweats, sleep disruption, mood changes, vaginal dryness.   No significant bloating/fluid retention, pelvic pain, or dyspareunia. No vaginal discharge   No breast tenderness, mass, nipple discharge, changes in size or contour, or abnormal cyclic discomfort.  No urinary tract symptoms, no incontinence.   No abdominal pain, change in bowel habits, black or  bloody stools.    No unusual headaches, no visual changes, menstrual migraines   No prolonged cough. No dyspnea or chest pain on exertion.  No depression, labile mood, anxiety ,libido changes, insomnia.  No new/concerning skin lesions, concerns.     Exercise: minimal exercise  Preventive Care:  Health Maintenance Topics with due status: Overdue       Topic Date Due    HIV Screening Never done    Annual Wellness Visit Never done    Hepatitis A Vaccine (Hep A) Never done    Hepatitis B Vaccine (Hep B) Never done    Zoster (Shingles) Vaccines Never done    Diabetes: Retinopathy Screening 11/01/2018    Colorectal Cancer Screening 02/21/2019    Cervical Cancer Screening 04/13/2019    Mammogram 05/14/2023    COVID-19 Vaccine 09/01/2023    A1c Screening 10/23/2023    Fasting Lipid Profile 12/09/2023    Diabetes: Urine Protein Screening 04/24/2024    Diabetes: Monofilament / LE Exam 05/11/2024         Patient Active Problem List    Diagnosis Date Noted    Diabetic polyneuropathy associated with type 2 diabetes mellitus (HCC) 01/17/2024    Left arm weakness 01/17/2024    Former smoker 12/03/2023    Dyslipidemia 04/23/2023    Primary hypertension 04/23/2023    Alcoholic cirrhosis (HCC) 04/23/2023    History of substance abuse (HCC) 04/23/2023    ESRD (end stage renal disease) (HCC) 01/14/2023    Type 2 diabetes mellitus, without long-term current use of insulin (HCC) 07/22/2022    Normocytic anemia 04/27/2020    HOCM (hypertrophic obstructive cardiomyopathy) ([facility]) 01/20/2015         Social History     Tobacco Use    Smoking status: Former     Current packs/day: 0.00     Average packs/day: 1 pack/day for 10.0 years (10.0 ttl pk-yrs)     Types: Cigarettes     Start date: 8/12/2015     Quit date: 06/2020     Years since quitting: 3.9    Smokeless tobacco: Never   Vaping Use    Vaping status: Never Used   Substance Use Topics    Alcohol use: Not Currently     Alcohol/week: 0.6 oz     Types: 1 Glasses of wine per week    Drug use:  No         Current medicines (including changes today)  Current Outpatient Medications   Medication Sig Dispense Refill    Loratadine 5 MG Chew Tab Chew 1 Tablet every 48 hours. 30 Tablet 1    carvedilol (COREG) 25 MG Tab TAKE 1 TABLET BY MOUTH 2 TIMES A DAY WITH MEALS.DO NOT TAKE THIS MEDICATION THE MORNING OF DIALYSIS 60 Tablet 4    gabapentin (NEURONTIN) 100 MG Cap Take 1 Capsule by mouth at bedtime. 60 Capsule 0    amLODIPine (NORVASC) 10 MG Tab TAKE ONE TABLET BY MOUTH DAILY. DO NOT TAKE ON MORNING OF DIALYSIS 90 Tablet 3    atorvastatin (LIPITOR) 20 MG Tab TAKE 1 TABLET BY MOUTH ONCE DAILY IN THE EVENING 90 Tablet 3    loperamide (IMODIUM) 2 MG Cap Take 1 Capsule by mouth 4 times a day as needed for Diarrhea. 30 Capsule 2    Ascorbic Acid (VITAMIN C) 100 MG Tab Take 1 Tablet by mouth every day. 30 Tablet 3    Alcohol Swabs Wipe site with prep pad prior to injection. 100 Each 6     No current facility-administered medications for this visit.     She  has a past medical history of Acute kidney injury ([facility]), Acute necrotizing pyelonephritis, Acute respiratory failure with hypoxia ([facility]ly [facility]) (04/23/2023), Acute-on-chronic kidney injury (Formerly [facility]) (04/23/2023), Alcohol withdrawal (Formerly [facility]), Arthritis, Breath shortness, Cataract, Chronic kidney disease (CKD) stage G4/A2, severely decreased glomerular filtration rate (GFR) between 15-29 mL/min/1.73 square meter and albuminuria creatinine ratio between  mg/g ([facility]) (07/25/2023), Cirrhosis of liver with ascites ([facility]ly [facility]) (03/06/2017), Dental disorder, Diabetes mellitus, type 2 ([facility]ly [facility]), Diabetic ulcer of toe ([facility]ly [facility]) (04/30/2020), Dialysis patient (Formerly [facility]), diarrhea (2019), Foot pain, bilateral, Heart murmur, High cholesterol, HOCM (hypertrophic obstructive cardiomyopathy) ([facility]ly [facility]), Hyperammonemia ([facility]ly [facility]) (06/24/2016), Hyperkalemia (04/23/2023), Hypertension, Shock ([facility]) (04/23/2023), STEMI (ST elevation myocardial infarction) ([facility]ly [facility]), Thrombocytopenia ([facility]ly [facility]), and Tobacco abuse (12/08/2014).    She  "has no past medical history of Anesthesia, Asthma, Blood clotting disorder (HCC), Cancer (HCC), Disorder of thyroid, Glaucoma, Heart burn, Hepatitis A, Hepatitis B, Hepatitis C, Indigestion, Seizure (HCC), Sleep apnea, Snoring, Stroke (HCC), or Urinary incontinence.  She  has a past surgical history that includes other; pr upper gi endoscopy,ligat varix (09/25/2019); gastroscopy-endo (09/25/2019); primary c section (2006); pr upper gi endoscopy,diagnosis (N/A, 03/04/2022); cataract extraction with iol (Bilateral); av fistula creation (Left, 11/29/2023); and av fistula revision (Left, 12/3/2023).     Family History:   Family History   Problem Relation Age of Onset    Cancer Father         stomach    Hypertension Mother        Family History negative for : Breast, Colon, Lung, or female organ cancer, thyroid disease, CAD, Diabetes, osteoporosis.     OBJECTIVE:   /73 (BP Location: Right arm, Patient Position: Sitting, BP Cuff Size: Adult)   Pulse 76   Temp 36.2 °C (97.1 °F) (Temporal)   Ht 1.702 m (5' 7.01\")   Wt 59.1 kg (130 lb 6.4 oz)   SpO2 97%   BMI 20.42 kg/m²   Body mass index is 20.42 kg/m².    Physical Exam:  General: no acute distress, sitting comfortably  HEAD AND NECK:  Ears normal.  Throat, oral cavity and tongue normal.    Neck supple. No adenopathy or masses in the neck or supraclavicular regions.   CHEST:  Clear, good air entry, no wheezes or rales.   HEART:  Regular rate and rhythm.  S1 and S2 normal.  No edema.   ABDOMEN:  Soft without tenderness, guarding, mass or organomegaly.   EXTREMITIES:  Extremities, reflexes and peripheral pulses are normal.   SKIN: color normal, vascularity normal, no edema, temperature normal   No rashes or suspicious skin lesions noted.  NEURO: Cranial nerves are normal. DTR's normal and symmetric.    PSYCH: cooperative, euthymic  Pelvic Exam -  Normal external genitalia with no lesions. Normal vaginal mucosa with normal rugation and no discharge. Cervix with no " visible lesions. No cervical motion tenderness. Uterus is normal sized with no masses. No adnexal tenderness or enlargement appreciated. Sure Path Pap is obtained, vaginal swab is not obtained and specimen(s) sent to lab    Chaperone present in the room, Giulia De Jesus       Note: I have reviewed all pertinent labs and diagnostic tests associated with this visit with specific comments listed under the assessment and plan below    Assessment and Plan    1. Encounter for cervical Pap smear with pelvic exam  THINPREP PAP WITH HPV    CANCELED: PAP + HPV [[id number]]      2. Encounter for screening mammogram for breast cancer  MA-SCREENING MAMMO BILAT W/TOMOSYNTHESIS W/CAD            Followup: Return in about 6 weeks (around 7/10/2024) for Short.      Signed by: Kamala Pierre M.D.        Discussed  mammography screening   Follow-up in 1 years for next Gyn exam and 5 years for next Pap.   Next office visit for recheck of chronic medical conditions is due in 3 months

## 2024-05-29 NOTE — PROGRESS NOTES
Established Patient    Patient Care Team:  Kamala Pierre M.D. as PCP - General (Internal Medicine)  Duc Castro M.D. (Neurology)  Julian Dixon M.D. (Neurology)  Liam Galicia M.D. (Cardiovascular Disease (Cardiology))  [facility] as Home Health Provider  ALY Flores (Inactive) as   Kamala Pierre M.D. (Internal Medicine)    Rigoberto Adames is a 59 y.o. female who presents today with the following Chief Complaint(s): Follow up for Diagnoses of Dry skin and Left arm weakness were pertinent to this visit.    HPI:  Rigoberto Adames is a 59 year-old female with a past medical history of ESRD on dialysis, HOCM, Type 2 DM (A1c 5.9 4/2023), alcoholic cirrhosis, HTN and diabetic polyneuropathy who presents to the clinic for dry skin and follow-up on left arm weakness.    Has been experiencing dry, itchy skin for the past 2-3 years. Places that have been affected include her face, neck, back and arms. Has been using Aquaphor daily with no improvement. Does scratch the areas sometimes. Denies any new soaps, lotions, detergents or perfumes. Uses warm water. Denies any visible rashes, but does report seeing silver/whitish plaques on skin at times and reports some mild erythema on her elbows. Requesting oral medication to help with the itching.     Following up on left arm weakness. Has been having left arm pain, numbness and weakness since 11/29. Underwent  left upper arm brachial axillary AV graft placement (dialysis graft placement) 11/29 which was complicated by left upper extremity steal syndrome s/p ligation of the graft 12/3/2023. Has been followed by vascular medicine, neurology and ortho for this issue. Has seen occupational therapy with no further improvement. Will see ortho in 1 month to get imaging of her left arm to evaluate her symptoms. Takes gabapentin 100 mg at night which helps with the pain.       ROS:     General: No fevers, chills, night sweats, weight loss or  gain  HEENT: No hearing changes, vision changes, eye pain, ear pain, nasal discharge, sore throat  Neck: No swelling in neck  Pulmonary: No shortness of breath, cough, sputum, or hemoptysis  Cardiovascular: No chest pain, palpitations, or LE swelling  GI: No nausea, vomiting, diarrhea, constipation, abdominal pain, hematochezia or melena  : No dysuria or frequency  Neuro: Focal weakness (left arm), no general weakness, no headaches, no lightheadedness, no dizziness  Psych: No anxiety or depression    Past Medical History:   Diagnosis Date    Acute kidney injury (HCC)     Acute necrotizing pyelonephritis     Acute respiratory failure with hypoxia (HCC) 04/23/2023    Acute-on-chronic kidney injury (HCC) 04/23/2023    Alcohol withdrawal (HCC)     Arthritis     Bilateral legs; no formal diagnosis.    Breath shortness     Cataract     Bilateral IOL    Chronic kidney disease (CKD) stage G4/A2, severely decreased glomerular filtration rate (GFR) between 15-29 mL/min/1.73 square meter and albuminuria creatinine ratio between  mg/g (HCC) 07/25/2023    Cirrhosis of liver with ascites (HCC) 03/06/2017    Dental disorder     Upper and lower dentures    Diabetes mellitus, type 2 (HCC)     Oral medications    Diabetic ulcer of toe (HCC) 04/30/2020    Dialysis patient (Former[facility])     since 4/2023    diarrhea 2019    been going on for about a year    Foot pain, bilateral     Heart murmur     High cholesterol     HOCM (hypertrophic obstructive cardiomyopathy) (HCC)     Hyperammonemia (HCC) 06/24/2016    Hyperkalemia 04/23/2023    Hypertension     Shock (HCC) 04/23/2023    STEMI (ST elevation myocardial infarction) (HCC)     Thrombocytopenia (HCC)     Tobacco abuse 12/08/2014     Social History     Tobacco Use    Smoking status: Former     Current packs/day: 0.00     Average packs/day: 1 pack/day for 10.0 years (10.0 ttl pk-yrs)     Types: Cigarettes     Start date: 8/12/2015     Quit date: 06/2020     Years since quitting: 3.9     "Smokeless tobacco: Never   Vaping Use    Vaping status: Never Used   Substance Use Topics    Alcohol use: Not Currently     Alcohol/week: 0.6 oz     Types: 1 Glasses of wine per week    Drug use: No     Current Outpatient Medications   Medication Sig Dispense Refill    Loratadine 5 MG Chew Tab Chew 1 Tablet every 48 hours. 30 Tablet 1    carvedilol (COREG) 25 MG Tab TAKE 1 TABLET BY MOUTH 2 TIMES A DAY WITH MEALS.DO NOT TAKE THIS MEDICATION THE MORNING OF DIALYSIS 60 Tablet 4    gabapentin (NEURONTIN) 100 MG Cap Take 1 Capsule by mouth at bedtime. 60 Capsule 0    amLODIPine (NORVASC) 10 MG Tab TAKE ONE TABLET BY MOUTH DAILY. DO NOT TAKE ON MORNING OF DIALYSIS 90 Tablet 3    atorvastatin (LIPITOR) 20 MG Tab TAKE 1 TABLET BY MOUTH ONCE DAILY IN THE EVENING 90 Tablet 3    loperamide (IMODIUM) 2 MG Cap Take 1 Capsule by mouth 4 times a day as needed for Diarrhea. 30 Capsule 2    Ascorbic Acid (VITAMIN C) 100 MG Tab Take 1 Tablet by mouth every day. 30 Tablet 3    Alcohol Swabs Wipe site with prep pad prior to injection. 100 Each 6     No current facility-administered medications for this visit.       Physical Exam:  /69 (BP Location: Right arm, Patient Position: Sitting, BP Cuff Size: Adult)   Pulse 76   Temp 36.4 °C (97.6 °F) (Temporal)   Ht 1.702 m (5' 7.01\")   Wt 59.1 kg (130 lb 6.4 oz)   SpO2 98%   BMI 20.42 kg/m²   General: Well developed, well nourished female, in no distress.  HEENT: NC/AT, PERRL, EOMI, no scleral icterus or conjunctival pallor, fair dentition  CV:RRR, no murmurs gallops or Rubs  Pulm: LCAB, no crackles, rales, rhonchi, or wheezing  GI: Normal bowel sounds, abdomen soft, nontender, nondistended to deep or light palpation in all 4 quadrants  MSK: Radial pulses 2+ and equal bilaterally.  Strength 4 out of 5 in left upper extremity. Strength 5 out of 5 in right upper extremity and both lower extremities.  No lower extremity edema  Neuro: Patient is alert and oriented x3, no focal " deficits  Skin: Elbows with thickened skin and mild erythema. Noticeable excoriations on mid-lower back.   Psych: Appropriate mood and affect       Assessment and Plan:   1. Dry skin  Possible atopic dermatitis. Can not rule out psoriasis as patient reports silver/whitish plaques, which was not evident on physical exam.   Discussed lifestyle changes which include avoiding hot water, staying hydrated, continue use of Aquaphor, avoid harsh products, avoid exfoliating skin, and no scratching skin.   Discussed antihistamines, which patient preferred oral tablet. Start loratadine 5 mg every 48 hours. Discussed with patient she should  notify her nephrologist about medication change,  Discussed referral to dermatology, however patient deferred.  -Follow-up in 6 weeks.     2. Left arm weakness  Left arm pain, numbness and weakness since 11/29. Underwent  left upper arm brachial axillary AV graft placement (dialysis graft placement) 11/29 which was complicated by left upper extremity steal syndrome s/p ligation of the graft 12/3/2023.   Followed by vascular medicine, neurology and ortho for this issue, appreciate the help  Completed OT sessions.  Taking gabapentin 100 mg at night.  Appointment with ortho in 1 month to get imaging of her left arm to evaluate her symptoms.   -Follow-up with ortho, appreciate the help       Return in about 6 weeks (around 7/9/2024).    Patient Instructions     -Use aquaphor on skin daily. Avoid hot water. Avoid skin care products and soaps that contain alcohol, fragrances, dyes, or other chemicals. Avoid scratching your skin.   -Follow-up with Dr. Brewer.   -Continue gabapentin.   -Take loratadine 5 mg every other day.        Hand therapy at [facility]:    MITCHELL BREWER  [address] [address]  Phone: [phone]      Kamala Pierre M.D. PGY-3  [facility] [employer] [facility]    This note was created using voice recognition software.   While every attempt is made to ensure accuracy of transcription, occasionally errors occur.

## 2024-06-04 DIAGNOSIS — Z01.419 ENCOUNTER FOR CERVICAL PAP SMEAR WITH PELVIC EXAM: ICD-10-CM

## 2024-07-15 ENCOUNTER — OFFICE VISIT (OUTPATIENT)
Dept: INTERNAL MEDICINE | Facility: OTHER | Age: 59
End: 2024-07-15
Payer: MEDICARE

## 2024-07-15 VITALS
WEIGHT: 130.2 LBS | TEMPERATURE: 97.6 F | DIASTOLIC BLOOD PRESSURE: 66 MMHG | BODY MASS INDEX: 20.93 KG/M2 | HEART RATE: 74 BPM | SYSTOLIC BLOOD PRESSURE: 111 MMHG | OXYGEN SATURATION: 97 % | HEIGHT: 66 IN

## 2024-07-15 DIAGNOSIS — E11.621 DIABETIC ULCER OF TOE OF LEFT FOOT ASSOCIATED WITH TYPE 2 DIABETES MELLITUS, WITH OTHER ULCER SEVERITY (HCC): ICD-10-CM

## 2024-07-15 DIAGNOSIS — I10 PRIMARY HYPERTENSION: ICD-10-CM

## 2024-07-15 DIAGNOSIS — E11.621 TYPE 2 DIABETES MELLITUS WITH FOOT ULCER, WITHOUT LONG-TERM CURRENT USE OF INSULIN (HCC): ICD-10-CM

## 2024-07-15 DIAGNOSIS — R29.898 LEFT ARM WEAKNESS: ICD-10-CM

## 2024-07-15 DIAGNOSIS — L97.509 TYPE 2 DIABETES MELLITUS WITH FOOT ULCER, WITHOUT LONG-TERM CURRENT USE OF INSULIN (HCC): ICD-10-CM

## 2024-07-15 DIAGNOSIS — I42.1 HOCM (HYPERTROPHIC OBSTRUCTIVE CARDIOMYOPATHY) (HCC): ICD-10-CM

## 2024-07-15 DIAGNOSIS — N18.6 ESRD (END STAGE RENAL DISEASE) (HCC): ICD-10-CM

## 2024-07-15 DIAGNOSIS — L97.528 DIABETIC ULCER OF TOE OF LEFT FOOT ASSOCIATED WITH TYPE 2 DIABETES MELLITUS, WITH OTHER ULCER SEVERITY (HCC): ICD-10-CM

## 2024-07-15 DIAGNOSIS — K70.30 ALCOHOLIC CIRRHOSIS OF LIVER WITHOUT ASCITES (HCC): ICD-10-CM

## 2024-07-15 DIAGNOSIS — E11.42 DIABETIC POLYNEUROPATHY ASSOCIATED WITH TYPE 2 DIABETES MELLITUS (HCC): ICD-10-CM

## 2024-07-15 DIAGNOSIS — E78.5 DYSLIPIDEMIA: ICD-10-CM

## 2024-07-15 PROCEDURE — 3074F SYST BP LT 130 MM HG: CPT | Mod: GC

## 2024-07-15 PROCEDURE — 99214 OFFICE O/P EST MOD 30 MIN: CPT | Mod: GC

## 2024-07-15 PROCEDURE — 3078F DIAST BP <80 MM HG: CPT | Mod: GC

## 2024-07-15 RX ORDER — DOXYCYCLINE HYCLATE 100 MG
100 TABLET ORAL 2 TIMES DAILY
Qty: 14 TABLET | Refills: 0 | Status: SHIPPED | OUTPATIENT
Start: 2024-07-15 | End: 2024-07-22

## 2024-07-15 ASSESSMENT — ENCOUNTER SYMPTOMS
COUGH: 0
SEIZURES: 0
EYE PAIN: 0
BLURRED VISION: 0
CONSTIPATION: 0
HEADACHES: 0
HALLUCINATIONS: 0
FEVER: 0
DOUBLE VISION: 0
ORTHOPNEA: 0
SORE THROAT: 0
DIZZINESS: 0
MYALGIAS: 0
NERVOUS/ANXIOUS: 0
PALPITATIONS: 0
WEIGHT LOSS: 0
DEPRESSION: 0
SHORTNESS OF BREATH: 0
WHEEZING: 0
INSOMNIA: 0
SINUS PAIN: 0
BRUISES/BLEEDS EASILY: 0
HEARTBURN: 0
EYE REDNESS: 0
ABDOMINAL PAIN: 0
CHILLS: 0
PND: 0
MEMORY LOSS: 0
FALLS: 0
DIARRHEA: 0
TREMORS: 0

## 2024-07-15 ASSESSMENT — FIBROSIS 4 INDEX: FIB4 SCORE: 1.62

## 2024-07-22 DIAGNOSIS — I10 PRIMARY HYPERTENSION: ICD-10-CM

## 2024-07-23 RX ORDER — CARVEDILOL 25 MG/1
25 TABLET ORAL 2 TIMES DAILY WITH MEALS
Qty: 60 TABLET | Refills: 4 | Status: SHIPPED | OUTPATIENT
Start: 2024-07-23 | End: 2024-10-21

## 2024-07-25 ENCOUNTER — HOSPITAL ENCOUNTER (OUTPATIENT)
Dept: LAB | Facility: MEDICAL CENTER | Age: 59
End: 2024-07-25
Payer: MEDICARE

## 2024-07-25 DIAGNOSIS — E11.621 TYPE 2 DIABETES MELLITUS WITH FOOT ULCER, WITHOUT LONG-TERM CURRENT USE OF INSULIN (HCC): ICD-10-CM

## 2024-07-25 DIAGNOSIS — L97.509 TYPE 2 DIABETES MELLITUS WITH FOOT ULCER, WITHOUT LONG-TERM CURRENT USE OF INSULIN (HCC): ICD-10-CM

## 2024-07-25 DIAGNOSIS — L97.528 DIABETIC ULCER OF TOE OF LEFT FOOT ASSOCIATED WITH TYPE 2 DIABETES MELLITUS, WITH OTHER ULCER SEVERITY (HCC): ICD-10-CM

## 2024-07-25 DIAGNOSIS — E78.5 DYSLIPIDEMIA: ICD-10-CM

## 2024-07-25 DIAGNOSIS — E11.621 DIABETIC ULCER OF TOE OF LEFT FOOT ASSOCIATED WITH TYPE 2 DIABETES MELLITUS, WITH OTHER ULCER SEVERITY (HCC): ICD-10-CM

## 2024-07-25 LAB
ALBUMIN SERPL BCP-MCNC: 3.5 G/DL (ref 3.2–4.9)
ALBUMIN/GLOB SERPL: 1 G/DL
ALP SERPL-CCNC: 69 U/L (ref 30–99)
ALT SERPL-CCNC: 25 U/L (ref 2–50)
ANION GAP SERPL CALC-SCNC: 17 MMOL/L (ref 7–16)
AST SERPL-CCNC: 25 U/L (ref 12–45)
BASOPHILS # BLD AUTO: 0.3 % (ref 0–1.8)
BASOPHILS # BLD: 0.02 K/UL (ref 0–0.12)
BILIRUB SERPL-MCNC: 0.2 MG/DL (ref 0.1–1.5)
BUN SERPL-MCNC: 41 MG/DL (ref 8–22)
CALCIUM ALBUM COR SERPL-MCNC: 9 MG/DL (ref 8.5–10.5)
CALCIUM SERPL-MCNC: 8.6 MG/DL (ref 8.5–10.5)
CHLORIDE SERPL-SCNC: 107 MMOL/L (ref 96–112)
CHOLEST SERPL-MCNC: 134 MG/DL (ref 100–199)
CO2 SERPL-SCNC: 18 MMOL/L (ref 20–33)
CREAT SERPL-MCNC: 5.65 MG/DL (ref 0.5–1.4)
CREAT UR-MCNC: 107.09 MG/DL
CRP SERPL HS-MCNC: <0.3 MG/DL (ref 0–0.75)
EOSINOPHIL # BLD AUTO: 0.41 K/UL (ref 0–0.51)
EOSINOPHIL NFR BLD: 7 % (ref 0–6.9)
ERYTHROCYTE [DISTWIDTH] IN BLOOD BY AUTOMATED COUNT: 47.8 FL (ref 35.9–50)
ERYTHROCYTE [SEDIMENTATION RATE] IN BLOOD BY WESTERGREN METHOD: 108 MM/HOUR (ref 0–25)
EST. AVERAGE GLUCOSE BLD GHB EST-MCNC: 126 MG/DL
FASTING STATUS PATIENT QL REPORTED: NORMAL
GFR SERPLBLD CREATININE-BSD FMLA CKD-EPI: 8 ML/MIN/1.73 M 2
GLOBULIN SER CALC-MCNC: 3.6 G/DL (ref 1.9–3.5)
GLUCOSE SERPL-MCNC: 111 MG/DL (ref 65–99)
HBA1C MFR BLD: 6 % (ref 4–5.6)
HCT VFR BLD AUTO: 31.1 % (ref 37–47)
HDLC SERPL-MCNC: 53 MG/DL
HGB BLD-MCNC: 10.3 G/DL (ref 12–16)
IMM GRANULOCYTES # BLD AUTO: 0.01 K/UL (ref 0–0.11)
IMM GRANULOCYTES NFR BLD AUTO: 0.2 % (ref 0–0.9)
LDLC SERPL CALC-MCNC: 56 MG/DL
LYMPHOCYTES # BLD AUTO: 1.85 K/UL (ref 1–4.8)
LYMPHOCYTES NFR BLD: 31.7 % (ref 22–41)
MCH RBC QN AUTO: 32.9 PG (ref 27–33)
MCHC RBC AUTO-ENTMCNC: 33.1 G/DL (ref 32.2–35.5)
MCV RBC AUTO: 99.4 FL (ref 81.4–97.8)
MICROALBUMIN UR-MCNC: 72.3 MG/DL
MICROALBUMIN/CREAT UR: 675 MG/G (ref 0–30)
MONOCYTES # BLD AUTO: 0.38 K/UL (ref 0–0.85)
MONOCYTES NFR BLD AUTO: 6.5 % (ref 0–13.4)
NEUTROPHILS # BLD AUTO: 3.17 K/UL (ref 1.82–7.42)
NEUTROPHILS NFR BLD: 54.3 % (ref 44–72)
NRBC # BLD AUTO: 0 K/UL
NRBC BLD-RTO: 0 /100 WBC (ref 0–0.2)
PLATELET # BLD AUTO: 151 K/UL (ref 164–446)
PMV BLD AUTO: 12.1 FL (ref 9–12.9)
POTASSIUM SERPL-SCNC: 4.6 MMOL/L (ref 3.6–5.5)
PROT SERPL-MCNC: 7.1 G/DL (ref 6–8.2)
RBC # BLD AUTO: 3.13 M/UL (ref 4.2–5.4)
SODIUM SERPL-SCNC: 142 MMOL/L (ref 135–145)
TRIGL SERPL-MCNC: 126 MG/DL (ref 0–149)
WBC # BLD AUTO: 5.8 K/UL (ref 4.8–10.8)

## 2024-07-25 PROCEDURE — 82570 ASSAY OF URINE CREATININE: CPT

## 2024-07-25 PROCEDURE — 36415 COLL VENOUS BLD VENIPUNCTURE: CPT

## 2024-07-25 PROCEDURE — 80053 COMPREHEN METABOLIC PANEL: CPT

## 2024-07-25 PROCEDURE — 85652 RBC SED RATE AUTOMATED: CPT

## 2024-07-25 PROCEDURE — 85025 COMPLETE CBC W/AUTO DIFF WBC: CPT

## 2024-07-25 PROCEDURE — 82043 UR ALBUMIN QUANTITATIVE: CPT

## 2024-07-25 PROCEDURE — 80061 LIPID PANEL: CPT

## 2024-07-25 PROCEDURE — 83036 HEMOGLOBIN GLYCOSYLATED A1C: CPT | Mod: GZ

## 2024-07-25 PROCEDURE — 86140 C-REACTIVE PROTEIN: CPT

## 2024-09-09 ENCOUNTER — APPOINTMENT (OUTPATIENT)
Dept: RADIOLOGY | Facility: MEDICAL CENTER | Age: 59
End: 2024-09-09
Payer: MEDICARE

## 2024-09-10 ENCOUNTER — OFFICE VISIT (OUTPATIENT)
Dept: CARDIOLOGY | Facility: MEDICAL CENTER | Age: 59
End: 2024-09-10
Attending: PHYSICIAN ASSISTANT
Payer: MEDICARE

## 2024-09-10 VITALS
WEIGHT: 130 LBS | RESPIRATION RATE: 16 BRPM | SYSTOLIC BLOOD PRESSURE: 128 MMHG | DIASTOLIC BLOOD PRESSURE: 90 MMHG | HEIGHT: 66 IN | BODY MASS INDEX: 20.89 KG/M2 | HEART RATE: 80 BPM | OXYGEN SATURATION: 98 %

## 2024-09-10 DIAGNOSIS — I10 HTN (HYPERTENSION), MALIGNANT: ICD-10-CM

## 2024-09-10 DIAGNOSIS — N18.6 ESRD (END STAGE RENAL DISEASE) (HCC): ICD-10-CM

## 2024-09-10 DIAGNOSIS — I42.1 HOCM (HYPERTROPHIC OBSTRUCTIVE CARDIOMYOPATHY) (HCC): ICD-10-CM

## 2024-09-10 DIAGNOSIS — K70.30 ALCOHOLIC CIRRHOSIS OF LIVER WITHOUT ASCITES (HCC): ICD-10-CM

## 2024-09-10 PROCEDURE — 99212 OFFICE O/P EST SF 10 MIN: CPT | Performed by: PHYSICIAN ASSISTANT

## 2024-09-10 PROCEDURE — 3080F DIAST BP >= 90 MM HG: CPT | Performed by: PHYSICIAN ASSISTANT

## 2024-09-10 PROCEDURE — 99214 OFFICE O/P EST MOD 30 MIN: CPT | Performed by: PHYSICIAN ASSISTANT

## 2024-09-10 PROCEDURE — 3074F SYST BP LT 130 MM HG: CPT | Performed by: PHYSICIAN ASSISTANT

## 2024-09-10 RX ORDER — CALCIUM ACETATE 667 MG/1
1334 CAPSULE ORAL
COMMUNITY

## 2024-09-10 RX ORDER — CALCITRIOL 0.5 UG/1
0.5 CAPSULE, LIQUID FILLED ORAL DAILY
COMMUNITY

## 2024-09-10 RX ORDER — MULTIVIT-MIN/IRON/FOLIC ACID/K 18-600-40
CAPSULE ORAL
COMMUNITY

## 2024-09-10 ASSESSMENT — ENCOUNTER SYMPTOMS
COUGH: 0
SHORTNESS OF BREATH: 0
PALPITATIONS: 0
DIZZINESS: 0

## 2024-09-10 ASSESSMENT — FIBROSIS 4 INDEX: FIB4 SCORE: 1.92

## 2024-09-10 NOTE — PROGRESS NOTES
"Chief Complaint   Patient presents with    Hypertension     Follow up          Subjective:   Rigoberto Adames is a 59 y.o. female who presents today for follow-up.     Patient of Dr. Galicia but has been seen by Dr Dowell and most recently Dr. Jack in May 2023.  Current medical problems include out-of-hospital cardiac arrest in April 2023 in the setting of profound bradycardia, electrolyte derangements and RUBEN ultimately reversed with urgent hemodialysis now on iHD, she also has hypertrophic obstructive cardiomyopathy, hypertension, liver cirrhosis secondary to alcohol abuse.     She returns today because she was recently \"turned down\" for a kidney transplant through [facility] due to her heart condition and her liver condition.  She asks what the diagnosis of her heart condition is and why it is not better.    She continues on regular dialysis and seems to be doing well without any cardiac symptoms including near-syncope, syncope, chest pressure.  Her blood pressures are now well-controlled since she has been on dialysis.      Past Medical History:   Diagnosis Date    Acute kidney injury (HCC)     Acute necrotizing pyelonephritis     Acute respiratory failure with hypoxia ([facility]) 04/23/2023    Acute-on-chronic kidney injury (HCC) 04/23/2023    Alcohol withdrawal ([facility])     Arthritis     Bilateral legs; no formal diagnosis.    Breath shortness     Cataract     Bilateral IOL    Chronic kidney disease (CKD) stage G4/A2, severely decreased glomerular filtration rate (GFR) between 15-29 mL/min/1.73 square meter and albuminuria creatinine ratio between  mg/g (HCC) 07/25/2023    Cirrhosis of liver with ascites ([facility]) 03/06/2017    Dental disorder     Upper and lower dentures    Diabetes mellitus, type 2 (HCC)     Oral medications    Diabetic ulcer of toe (HCC) 04/30/2020    Dialysis patient ([facility])     since 4/2023    diarrhea 2019    been going on for about a year    Foot pain, bilateral     Heart murmur     High cholesterol  " "   HOCM (hypertrophic obstructive cardiomyopathy) (HCC)     Hyperammonemia (HCC) 2016    Hyperkalemia 2023    Hypertension     Shock (HCC) 2023    STEMI (ST elevation myocardial infarction) (HCC)     Thrombocytopenia (HCC)     Tobacco abuse 2014         Family History   Problem Relation Age of Onset    Cancer Father         stomach    Hypertension Mother          Social History     Tobacco Use    Smoking status: Former     Current packs/day: 0.00     Average packs/day: 1 pack/day for 10.0 years (10.0 ttl pk-yrs)     Types: Cigarettes     Start date: 2015     Quit date: 2020     Years since quittin.2    Smokeless tobacco: Never   Vaping Use    Vaping status: Never Used   Substance Use Topics    Alcohol use: Not Currently     Alcohol/week: 0.6 oz     Types: 1 Glasses of wine per week    Drug use: No         No Known Allergies      Current Outpatient Medications   Medication Sig    calcitRIOL (ROCALTROL) 0.5 MCG Cap Take 0.5 mcg by mouth every day.    calcium acetate (PHOS-LO) 667 MG Cap Take 1,334 mg by mouth 3 times a day before meals.    Cholecalciferol (VITAMIN D) 50 MCG (2000 UT) Cap Take  by mouth.    carvedilol (COREG) 25 MG Tab Take 1 Tablet by mouth 2 times a day with meals for 90 days.    amLODIPine (NORVASC) 10 MG Tab TAKE ONE TABLET BY MOUTH DAILY. DO NOT TAKE ON MORNING OF DIALYSIS    atorvastatin (LIPITOR) 20 MG Tab TAKE 1 TABLET BY MOUTH ONCE DAILY IN THE EVENING    Ascorbic Acid (VITAMIN C) 100 MG Tab Take 1 Tablet by mouth every day.    Alcohol Swabs Wipe site with prep pad prior to injection.         Review of Systems   Respiratory:  Negative for cough and shortness of breath.    Cardiovascular:  Negative for chest pain, palpitations and leg swelling.   Neurological:  Negative for dizziness.          Objective:   BP (!) 128/90 (BP Location: Right arm, Patient Position: Sitting)   Pulse 80   Resp 16   Ht 1.676 m (5' 6\")   Wt 59 kg (130 lb)   SpO2 98%  Body mass " index is 20.98 kg/m².         Physical Exam  Vitals reviewed.   HENT:      Head: Normocephalic and atraumatic.   Cardiovascular:      Rate and Rhythm: Normal rate and regular rhythm.      Heart sounds: Murmur heard.   Pulmonary:      Effort: Pulmonary effort is normal. No respiratory distress.      Breath sounds: No rales.   Musculoskeletal:      Right lower leg: No edema.      Left lower leg: No edema.   Skin:     General: Skin is warm and dry.   Neurological:      General: No focal deficit present.      Mental Status: She is alert.      Gait: Gait normal.   Psychiatric:         Judgment: Judgment normal.            Assessment:     1. HOCM (hypertrophic obstructive cardiomyopathy) ([facility])  NM-CARDIAC TREADMILL ONLY-NO IMAGING    EC-ECHOCARDIOGRAM COMPLETE W/O CONT           Medical Decision Making:  Today's Assessment / Plan:   HOCM  - symptomatically she seems to be doing well. She is tolerating the carvedilol surprisingly well, she is off the metoprolol now that she is on dialysis. She has not completed the work up Dr. Jack recommended including treadmill stress test to check for arrhythmias and repeat echocardiogram. Once she does this she can see her cardiologist, Dr Dowell and discuss optimization for renal transplant surgery if she becomes a candidate        Return in about 3 months (around 12/10/2024) for Dr Dowell.  Sooner if problems.

## 2024-10-01 ENCOUNTER — TELEPHONE (OUTPATIENT)
Dept: INTERNAL MEDICINE | Facility: OTHER | Age: 59
End: 2024-10-01
Payer: MEDICARE

## 2024-10-01 ENCOUNTER — HOSPITAL ENCOUNTER (OUTPATIENT)
Dept: LAB | Facility: MEDICAL CENTER | Age: 59
End: 2024-10-01
Attending: INTERNAL MEDICINE
Payer: MEDICARE

## 2024-10-01 ENCOUNTER — HOSPITAL ENCOUNTER (OUTPATIENT)
Facility: MEDICAL CENTER | Age: 59
End: 2024-10-01
Payer: MEDICARE

## 2024-10-01 ENCOUNTER — APPOINTMENT (OUTPATIENT)
Dept: LAB | Facility: MEDICAL CENTER | Age: 59
End: 2024-10-01
Payer: MEDICARE

## 2024-10-01 DIAGNOSIS — E11.42 TYPE 2 DIABETES MELLITUS WITH DIABETIC POLYNEUROPATHY, WITHOUT LONG-TERM CURRENT USE OF INSULIN (HCC): ICD-10-CM

## 2024-10-01 DIAGNOSIS — R19.7 DIARRHEA, UNSPECIFIED TYPE: ICD-10-CM

## 2024-10-01 LAB
ALBUMIN SERPL BCP-MCNC: 3.5 G/DL (ref 3.2–4.9)
ALBUMIN SERPL BCP-MCNC: 3.6 G/DL (ref 3.2–4.9)
ALBUMIN/GLOB SERPL: 0.9 G/DL
ALBUMIN/GLOB SERPL: 1 G/DL
ALP SERPL-CCNC: 68 U/L (ref 30–99)
ALP SERPL-CCNC: 68 U/L (ref 30–99)
ALT SERPL-CCNC: 29 U/L (ref 2–50)
ALT SERPL-CCNC: 30 U/L (ref 2–50)
ANION GAP SERPL CALC-SCNC: 10 MMOL/L (ref 7–16)
ANION GAP SERPL CALC-SCNC: 11 MMOL/L (ref 7–16)
AST SERPL-CCNC: 26 U/L (ref 12–45)
AST SERPL-CCNC: 27 U/L (ref 12–45)
BASOPHILS # BLD AUTO: 0.1 % (ref 0–1.8)
BASOPHILS # BLD AUTO: 0.3 % (ref 0–1.8)
BASOPHILS # BLD: 0.01 K/UL (ref 0–0.12)
BASOPHILS # BLD: 0.02 K/UL (ref 0–0.12)
BILIRUB SERPL-MCNC: 0.3 MG/DL (ref 0.1–1.5)
BILIRUB SERPL-MCNC: 0.4 MG/DL (ref 0.1–1.5)
BUN SERPL-MCNC: 33 MG/DL (ref 8–22)
BUN SERPL-MCNC: 33 MG/DL (ref 8–22)
CALCIUM ALBUM COR SERPL-MCNC: 8.8 MG/DL (ref 8.5–10.5)
CALCIUM ALBUM COR SERPL-MCNC: 8.8 MG/DL (ref 8.5–10.5)
CALCIUM SERPL-MCNC: 8.4 MG/DL (ref 8.5–10.5)
CALCIUM SERPL-MCNC: 8.5 MG/DL (ref 8.5–10.5)
CHLORIDE SERPL-SCNC: 107 MMOL/L (ref 96–112)
CHLORIDE SERPL-SCNC: 108 MMOL/L (ref 96–112)
CHOLEST SERPL-MCNC: 122 MG/DL (ref 100–199)
CO2 SERPL-SCNC: 21 MMOL/L (ref 20–33)
CO2 SERPL-SCNC: 22 MMOL/L (ref 20–33)
CREAT SERPL-MCNC: 5.45 MG/DL (ref 0.5–1.4)
CREAT SERPL-MCNC: 5.46 MG/DL (ref 0.5–1.4)
CRP SERPL HS-MCNC: <0.3 MG/DL (ref 0–0.75)
EOSINOPHIL # BLD AUTO: 0.4 K/UL (ref 0–0.51)
EOSINOPHIL # BLD AUTO: 0.46 K/UL (ref 0–0.51)
EOSINOPHIL NFR BLD: 6.2 % (ref 0–6.9)
EOSINOPHIL NFR BLD: 6.9 % (ref 0–6.9)
ERYTHROCYTE [DISTWIDTH] IN BLOOD BY AUTOMATED COUNT: 48.3 FL (ref 35.9–50)
ERYTHROCYTE [DISTWIDTH] IN BLOOD BY AUTOMATED COUNT: 49 FL (ref 35.9–50)
EST. AVERAGE GLUCOSE BLD GHB EST-MCNC: 117 MG/DL
FASTING STATUS PATIENT QL REPORTED: NORMAL
GFR SERPLBLD CREATININE-BSD FMLA CKD-EPI: 8 ML/MIN/1.73 M 2
GFR SERPLBLD CREATININE-BSD FMLA CKD-EPI: 8 ML/MIN/1.73 M 2
GLOBULIN SER CALC-MCNC: 3.6 G/DL (ref 1.9–3.5)
GLOBULIN SER CALC-MCNC: 3.7 G/DL (ref 1.9–3.5)
GLUCOSE SERPL-MCNC: 122 MG/DL (ref 65–99)
GLUCOSE SERPL-MCNC: 124 MG/DL (ref 65–99)
HBA1C MFR BLD: 5.7 % (ref 4–5.6)
HCT VFR BLD AUTO: 36.3 % (ref 37–47)
HCT VFR BLD AUTO: 36.7 % (ref 37–47)
HDLC SERPL-MCNC: 41 MG/DL
HGB BLD-MCNC: 11.4 G/DL (ref 12–16)
HGB BLD-MCNC: 11.5 G/DL (ref 12–16)
IMM GRANULOCYTES # BLD AUTO: 0.02 K/UL (ref 0–0.11)
IMM GRANULOCYTES # BLD AUTO: 0.03 K/UL (ref 0–0.11)
IMM GRANULOCYTES NFR BLD AUTO: 0.3 % (ref 0–0.9)
IMM GRANULOCYTES NFR BLD AUTO: 0.4 % (ref 0–0.9)
INR PPP: 1.02 (ref 0.87–1.13)
LDLC SERPL CALC-MCNC: 58 MG/DL
LYMPHOCYTES # BLD AUTO: 2.04 K/UL (ref 1–4.8)
LYMPHOCYTES # BLD AUTO: 2.05 K/UL (ref 1–4.8)
LYMPHOCYTES NFR BLD: 30.6 % (ref 22–41)
LYMPHOCYTES NFR BLD: 31.6 % (ref 22–41)
MAGNESIUM SERPL-MCNC: 2.2 MG/DL (ref 1.5–2.5)
MCH RBC QN AUTO: 31.7 PG (ref 27–33)
MCH RBC QN AUTO: 31.8 PG (ref 27–33)
MCHC RBC AUTO-ENTMCNC: 31.3 G/DL (ref 32.2–35.5)
MCHC RBC AUTO-ENTMCNC: 31.4 G/DL (ref 32.2–35.5)
MCV RBC AUTO: 100.8 FL (ref 81.4–97.8)
MCV RBC AUTO: 101.4 FL (ref 81.4–97.8)
MONOCYTES # BLD AUTO: 0.41 K/UL (ref 0–0.85)
MONOCYTES # BLD AUTO: 0.47 K/UL (ref 0–0.85)
MONOCYTES NFR BLD AUTO: 6.3 % (ref 0–13.4)
MONOCYTES NFR BLD AUTO: 7 % (ref 0–13.4)
NEUTROPHILS # BLD AUTO: 3.59 K/UL (ref 1.82–7.42)
NEUTROPHILS # BLD AUTO: 3.66 K/UL (ref 1.82–7.42)
NEUTROPHILS NFR BLD: 55 % (ref 44–72)
NEUTROPHILS NFR BLD: 55.3 % (ref 44–72)
NRBC # BLD AUTO: 0 K/UL
NRBC # BLD AUTO: 0 K/UL
NRBC BLD-RTO: 0 /100 WBC (ref 0–0.2)
NRBC BLD-RTO: 0 /100 WBC (ref 0–0.2)
PLATELET # BLD AUTO: 150 K/UL (ref 164–446)
PLATELET # BLD AUTO: 151 K/UL (ref 164–446)
PMV BLD AUTO: 12 FL (ref 9–12.9)
PMV BLD AUTO: 12.5 FL (ref 9–12.9)
POTASSIUM SERPL-SCNC: 4.8 MMOL/L (ref 3.6–5.5)
POTASSIUM SERPL-SCNC: 4.8 MMOL/L (ref 3.6–5.5)
PROT SERPL-MCNC: 7.2 G/DL (ref 6–8.2)
PROT SERPL-MCNC: 7.2 G/DL (ref 6–8.2)
PROTHROMBIN TIME: 13.4 SEC (ref 12–14.6)
RBC # BLD AUTO: 3.6 M/UL (ref 4.2–5.4)
RBC # BLD AUTO: 3.62 M/UL (ref 4.2–5.4)
SODIUM SERPL-SCNC: 139 MMOL/L (ref 135–145)
SODIUM SERPL-SCNC: 140 MMOL/L (ref 135–145)
TRIGL SERPL-MCNC: 114 MG/DL (ref 0–149)
WBC # BLD AUTO: 6.5 K/UL (ref 4.8–10.8)
WBC # BLD AUTO: 6.7 K/UL (ref 4.8–10.8)

## 2024-10-01 PROCEDURE — 86140 C-REACTIVE PROTEIN: CPT

## 2024-10-01 PROCEDURE — 86364 TISS TRNSGLTMNASE EA IG CLAS: CPT | Mod: 91

## 2024-10-01 PROCEDURE — 83036 HEMOGLOBIN GLYCOSYLATED A1C: CPT | Mod: GA

## 2024-10-01 PROCEDURE — 86258 DGP ANTIBODY EACH IG CLASS: CPT

## 2024-10-01 PROCEDURE — 80053 COMPREHEN METABOLIC PANEL: CPT | Mod: 91

## 2024-10-01 PROCEDURE — 36415 COLL VENOUS BLD VENIPUNCTURE: CPT

## 2024-10-01 PROCEDURE — 85025 COMPLETE CBC W/AUTO DIFF WBC: CPT | Mod: 91

## 2024-10-01 PROCEDURE — 82043 UR ALBUMIN QUANTITATIVE: CPT

## 2024-10-01 PROCEDURE — 82570 ASSAY OF URINE CREATININE: CPT

## 2024-10-01 PROCEDURE — 83735 ASSAY OF MAGNESIUM: CPT

## 2024-10-01 PROCEDURE — 82105 ALPHA-FETOPROTEIN SERUM: CPT

## 2024-10-01 PROCEDURE — 80061 LIPID PANEL: CPT

## 2024-10-01 PROCEDURE — 85610 PROTHROMBIN TIME: CPT

## 2024-10-01 PROCEDURE — 84439 ASSAY OF FREE THYROXINE: CPT

## 2024-10-01 PROCEDURE — 84443 ASSAY THYROID STIM HORMONE: CPT

## 2024-10-01 PROCEDURE — 85025 COMPLETE CBC W/AUTO DIFF WBC: CPT

## 2024-10-01 PROCEDURE — 80053 COMPREHEN METABOLIC PANEL: CPT

## 2024-10-02 LAB
CREAT UR-MCNC: 106.64 MG/DL
MICROALBUMIN UR-MCNC: 76 MG/DL
MICROALBUMIN/CREAT UR: 713 MG/G (ref 0–30)
T4 FREE SERPL-MCNC: 1 NG/DL (ref 0.93–1.7)
TSH SERPL DL<=0.005 MIU/L-ACNC: 11 UIU/ML (ref 0.38–5.33)

## 2024-10-03 LAB
AFP-TM SERPL-MCNC: 5 NG/ML (ref 0–9)
GLIADIN IGA SER IA-ACNC: <0.72 FLU (ref 0–4.99)
TTG IGA SER IA-ACNC: <1.02 FLU (ref 0–4.99)

## 2024-10-04 LAB
GLIADIN IGG SER IA-ACNC: <0.56 FLU (ref 0–4.99)
TTG IGG SER IA-ACNC: <0.82 FLU (ref 0–4.99)

## 2024-10-04 RX ORDER — SPIRONOLACTONE 25 MG/1
1 TABLET ORAL DAILY
COMMUNITY

## 2024-10-04 RX ORDER — ATORVASTATIN CALCIUM 20 MG/1
1 TABLET, FILM COATED ORAL DAILY
COMMUNITY

## 2024-10-04 RX ORDER — URSODIOL 500 MG/1
1 TABLET, FILM COATED ORAL 2 TIMES DAILY
COMMUNITY

## 2024-10-04 RX ORDER — AMLODIPINE BESYLATE 10 MG/1
1 TABLET ORAL DAILY
COMMUNITY

## 2024-10-04 RX ORDER — BLOOD PRESSURE TEST KIT
KIT MISCELLANEOUS
COMMUNITY
End: 2024-10-07

## 2024-10-04 RX ORDER — VARENICLINE TARTRATE 0.5 MG/1
TABLET, FILM COATED ORAL
COMMUNITY

## 2024-10-04 RX ORDER — ATORVASTATIN CALCIUM 20 MG/1
1 TABLET, FILM COATED ORAL EVERY EVENING
COMMUNITY

## 2024-10-04 RX ORDER — CARVEDILOL 25 MG/1
TABLET ORAL
COMMUNITY

## 2024-10-07 ENCOUNTER — OFFICE VISIT (OUTPATIENT)
Dept: INTERNAL MEDICINE | Facility: OTHER | Age: 59
End: 2024-10-07
Payer: MEDICARE

## 2024-10-07 VITALS
HEART RATE: 79 BPM | HEIGHT: 65 IN | BODY MASS INDEX: 21.46 KG/M2 | SYSTOLIC BLOOD PRESSURE: 102 MMHG | WEIGHT: 128.8 LBS | OXYGEN SATURATION: 98 % | TEMPERATURE: 98.5 F | RESPIRATION RATE: 16 BRPM | DIASTOLIC BLOOD PRESSURE: 62 MMHG

## 2024-10-07 DIAGNOSIS — E11.42 TYPE 2 DIABETES MELLITUS WITH DIABETIC POLYNEUROPATHY, WITHOUT LONG-TERM CURRENT USE OF INSULIN (HCC): ICD-10-CM

## 2024-10-07 DIAGNOSIS — N18.6 ESRD (END STAGE RENAL DISEASE) (HCC): ICD-10-CM

## 2024-10-07 DIAGNOSIS — I10 PRIMARY HYPERTENSION: ICD-10-CM

## 2024-10-07 DIAGNOSIS — I42.1 HOCM (HYPERTROPHIC OBSTRUCTIVE CARDIOMYOPATHY) (HCC): ICD-10-CM

## 2024-10-07 DIAGNOSIS — D53.9 MACROCYTIC ANEMIA: ICD-10-CM

## 2024-10-07 DIAGNOSIS — E78.5 DYSLIPIDEMIA: ICD-10-CM

## 2024-10-07 PROCEDURE — 3074F SYST BP LT 130 MM HG: CPT | Mod: GC

## 2024-10-07 PROCEDURE — 3078F DIAST BP <80 MM HG: CPT | Mod: GC

## 2024-10-07 PROCEDURE — 99214 OFFICE O/P EST MOD 30 MIN: CPT | Mod: GC

## 2024-10-07 RX ORDER — OXYCODONE AND ACETAMINOPHEN 5; 325 MG/1; MG/1
1 TABLET ORAL EVERY 6 HOURS PRN
COMMUNITY

## 2024-10-07 RX ORDER — AMOXICILLIN 500 MG/1
1 CAPSULE ORAL EVERY 8 HOURS
COMMUNITY

## 2024-10-07 RX ORDER — DILTIAZEM HYDROCHLORIDE 120 MG/1
1 CAPSULE, COATED, EXTENDED RELEASE ORAL DAILY
COMMUNITY

## 2024-10-07 RX ORDER — CALCIUM CARBONATE 500(1250)
1 TABLET,CHEWABLE ORAL DAILY
COMMUNITY

## 2024-10-07 RX ORDER — CHLORHEXIDINE GLUCONATE ORAL RINSE 1.2 MG/ML
15 SOLUTION DENTAL 2 TIMES DAILY
COMMUNITY

## 2024-10-07 RX ORDER — ERGOCALCIFEROL 1.25 MG/1
50000 CAPSULE, LIQUID FILLED ORAL
COMMUNITY

## 2024-10-07 RX ORDER — DILTIAZEM HYDROCHLORIDE 240 MG/1
1 CAPSULE, COATED, EXTENDED RELEASE ORAL DAILY
COMMUNITY

## 2024-10-07 RX ORDER — MULTIVIT-MIN/IRON/FOLIC ACID/K 18-600-40
1 CAPSULE ORAL DAILY
COMMUNITY

## 2024-10-07 RX ORDER — METOPROLOL TARTRATE 100 MG/1
1 TABLET ORAL 2 TIMES DAILY
COMMUNITY

## 2024-10-07 RX ORDER — IBUPROFEN 800 MG/1
1 TABLET, FILM COATED ORAL EVERY 6 HOURS
COMMUNITY

## 2024-10-07 RX ORDER — GABAPENTIN 100 MG/1
100 CAPSULE ORAL 3 TIMES DAILY
COMMUNITY

## 2024-10-07 RX ORDER — LISINOPRIL 5 MG/1
1 TABLET ORAL DAILY
COMMUNITY

## 2024-10-07 RX ORDER — LOPERAMIDE HYDROCHLORIDE 2 MG/1
2 TABLET ORAL 2 TIMES DAILY PRN
COMMUNITY

## 2024-10-07 RX ORDER — AMMONIUM LACTATE 12 G/100G
LOTION TOPICAL 2 TIMES DAILY
COMMUNITY

## 2024-10-07 RX ORDER — BLOOD-GLUCOSE METER
EACH MISCELLANEOUS
COMMUNITY

## 2024-10-07 RX ORDER — OXYBUTYNIN CHLORIDE 10 MG/1
1 TABLET, EXTENDED RELEASE ORAL DAILY
COMMUNITY

## 2024-10-07 RX ORDER — OMEPRAZOLE 40 MG/1
1 CAPSULE, DELAYED RELEASE ORAL DAILY
COMMUNITY

## 2024-10-07 RX ORDER — LANCETS 28 GAUGE
EACH MISCELLANEOUS DAILY
COMMUNITY

## 2024-10-07 RX ORDER — CETIRIZINE HYDROCHLORIDE 10 MG/1
1 TABLET ORAL DAILY
COMMUNITY

## 2024-10-07 RX ORDER — BENZOCAINE/MENTHOL 6 MG-10 MG
1 LOZENGE MUCOUS MEMBRANE 2 TIMES DAILY
COMMUNITY

## 2024-10-07 RX ORDER — PREGABALIN 50 MG/1
50 CAPSULE ORAL 2 TIMES DAILY
COMMUNITY
Start: 2024-09-05

## 2024-10-07 RX ORDER — HYDROXYZINE HYDROCHLORIDE 25 MG/1
25 TABLET, FILM COATED ORAL EVERY 8 HOURS PRN
COMMUNITY

## 2024-10-07 RX ORDER — DILTIAZEM HYDROCHLORIDE 360 MG/1
1 CAPSULE, EXTENDED RELEASE ORAL DAILY
COMMUNITY

## 2024-10-07 RX ORDER — METFORMIN HYDROCHLORIDE 500 MG/1
1 TABLET, EXTENDED RELEASE ORAL DAILY
COMMUNITY

## 2024-10-07 RX ORDER — SODIUM CHLORIDE 0.9 % (FLUSH) 0.9 %
SYRINGE (ML) INJECTION
COMMUNITY

## 2024-10-07 RX ORDER — DIAPER,BRIEF,INFANT-TODD,DISP
EACH MISCELLANEOUS 2 TIMES DAILY
COMMUNITY

## 2024-10-07 ASSESSMENT — FIBROSIS 4 INDEX: FIB4 SCORE: 1.82

## 2024-10-08 ENCOUNTER — ANCILLARY PROCEDURE (OUTPATIENT)
Dept: CARDIOLOGY | Facility: MEDICAL CENTER | Age: 59
End: 2024-10-08
Attending: INTERNAL MEDICINE
Payer: MEDICARE

## 2024-10-08 DIAGNOSIS — I42.1 HOCM (HYPERTROPHIC OBSTRUCTIVE CARDIOMYOPATHY) (HCC): ICD-10-CM

## 2024-10-08 PROCEDURE — 93306 TTE W/DOPPLER COMPLETE: CPT

## 2024-10-08 ASSESSMENT — ENCOUNTER SYMPTOMS
DOUBLE VISION: 0
HALLUCINATIONS: 0
FALLS: 0
CONSTIPATION: 0
FEVER: 0
ABDOMINAL PAIN: 0
BRUISES/BLEEDS EASILY: 0
BLURRED VISION: 0
WHEEZING: 0
EYE REDNESS: 0
DIZZINESS: 0
PALPITATIONS: 0
MYALGIAS: 0
SHORTNESS OF BREATH: 0
SEIZURES: 0
INSOMNIA: 0
WEAKNESS: 0
EYE PAIN: 0
COUGH: 0
WEIGHT LOSS: 0
SORE THROAT: 0
HEARTBURN: 0
NERVOUS/ANXIOUS: 0
MEMORY LOSS: 0
SINUS PAIN: 0
ORTHOPNEA: 0
CHILLS: 0
PND: 0
DIARRHEA: 0
DEPRESSION: 0
TREMORS: 0
FOCAL WEAKNESS: 0
HEADACHES: 0

## 2024-10-14 LAB
LV EJECT FRACT  99904: 70
LV EJECT FRACT MOD 2C 99903: 63.86
LV EJECT FRACT MOD 4C 99902: 68.7
LV EJECT FRACT MOD BP 99901: 67.13

## 2024-10-14 PROCEDURE — 93306 TTE W/DOPPLER COMPLETE: CPT | Mod: 26 | Performed by: INTERNAL MEDICINE

## 2024-10-15 ENCOUNTER — HOSPITAL ENCOUNTER (OUTPATIENT)
Dept: RADIOLOGY | Facility: MEDICAL CENTER | Age: 59
End: 2024-10-15
Attending: INTERNAL MEDICINE
Payer: MEDICARE

## 2024-10-15 ENCOUNTER — TELEPHONE (OUTPATIENT)
Dept: CARDIOLOGY | Facility: MEDICAL CENTER | Age: 59
End: 2024-10-15
Payer: MEDICARE

## 2024-10-15 DIAGNOSIS — I42.1 HOCM (HYPERTROPHIC OBSTRUCTIVE CARDIOMYOPATHY) (HCC): ICD-10-CM

## 2024-10-15 PROCEDURE — 93018 CV STRESS TEST I&R ONLY: CPT | Performed by: INTERNAL MEDICINE

## 2024-10-15 PROCEDURE — 93017 CV STRESS TEST TRACING ONLY: CPT

## 2024-10-21 DIAGNOSIS — I10 PRIMARY HYPERTENSION: ICD-10-CM

## 2024-10-28 ENCOUNTER — HOSPITAL ENCOUNTER (OUTPATIENT)
Dept: RADIOLOGY | Facility: MEDICAL CENTER | Age: 59
End: 2024-10-28
Payer: MEDICARE

## 2024-10-28 ENCOUNTER — APPOINTMENT (OUTPATIENT)
Dept: RADIOLOGY | Facility: MEDICAL CENTER | Age: 59
End: 2024-10-28
Payer: MEDICARE

## 2024-10-28 RX ORDER — CARVEDILOL 25 MG/1
25 TABLET ORAL 2 TIMES DAILY WITH MEALS
Qty: 180 TABLET | Refills: 1 | Status: SHIPPED | OUTPATIENT
Start: 2024-10-28

## 2024-11-13 ENCOUNTER — OFFICE VISIT (OUTPATIENT)
Dept: CARDIOLOGY | Facility: MEDICAL CENTER | Age: 59
End: 2024-11-13
Attending: INTERNAL MEDICINE
Payer: MEDICARE

## 2024-11-13 VITALS
HEIGHT: 66 IN | DIASTOLIC BLOOD PRESSURE: 72 MMHG | HEART RATE: 78 BPM | OXYGEN SATURATION: 98 % | RESPIRATION RATE: 16 BRPM | WEIGHT: 133.6 LBS | BODY MASS INDEX: 21.47 KG/M2 | SYSTOLIC BLOOD PRESSURE: 136 MMHG

## 2024-11-13 DIAGNOSIS — N18.6 ESRD (END STAGE RENAL DISEASE) (HCC): ICD-10-CM

## 2024-11-13 DIAGNOSIS — K70.30 ALCOHOLIC CIRRHOSIS OF LIVER WITHOUT ASCITES (HCC): ICD-10-CM

## 2024-11-13 DIAGNOSIS — D89.89 LIGHT CHAIN DISEASE, KAPPA TYPE (HCC): ICD-10-CM

## 2024-11-13 DIAGNOSIS — D89.89 LIGHT CHAIN DISEASE, LAMBDA TYPE (HCC): ICD-10-CM

## 2024-11-13 DIAGNOSIS — E11.42 TYPE 2 DIABETES MELLITUS WITH DIABETIC POLYNEUROPATHY, WITHOUT LONG-TERM CURRENT USE OF INSULIN (HCC): ICD-10-CM

## 2024-11-13 DIAGNOSIS — I10 HTN (HYPERTENSION), MALIGNANT: ICD-10-CM

## 2024-11-13 DIAGNOSIS — I42.1 HOCM (HYPERTROPHIC OBSTRUCTIVE CARDIOMYOPATHY) (HCC): ICD-10-CM

## 2024-11-13 DIAGNOSIS — I51.7 LVH (LEFT VENTRICULAR HYPERTROPHY): ICD-10-CM

## 2024-11-13 PROCEDURE — 3075F SYST BP GE 130 - 139MM HG: CPT | Performed by: INTERNAL MEDICINE

## 2024-11-13 PROCEDURE — 3078F DIAST BP <80 MM HG: CPT | Performed by: INTERNAL MEDICINE

## 2024-11-13 PROCEDURE — 99213 OFFICE O/P EST LOW 20 MIN: CPT | Performed by: INTERNAL MEDICINE

## 2024-11-13 PROCEDURE — 99211 OFF/OP EST MAY X REQ PHY/QHP: CPT | Performed by: INTERNAL MEDICINE

## 2024-11-13 PROCEDURE — 99214 OFFICE O/P EST MOD 30 MIN: CPT | Performed by: INTERNAL MEDICINE

## 2024-11-13 PROCEDURE — G2211 COMPLEX E/M VISIT ADD ON: HCPCS | Performed by: INTERNAL MEDICINE

## 2024-11-13 ASSESSMENT — ENCOUNTER SYMPTOMS
NAUSEA: 0
ORTHOPNEA: 0
HEADACHES: 0
FEVER: 0
DOUBLE VISION: 0
CHILLS: 0
SENSORY CHANGE: 0
DEPRESSION: 0
FALLS: 0
SPEECH CHANGE: 0
BLURRED VISION: 0
BRUISES/BLEEDS EASILY: 0
SHORTNESS OF BREATH: 0
WEIGHT LOSS: 0
VOMITING: 0
PALPITATIONS: 0
PND: 0
COUGH: 0
BLOOD IN STOOL: 0
LOSS OF CONSCIOUSNESS: 0
ABDOMINAL PAIN: 0
CLAUDICATION: 0
EYE DISCHARGE: 0
HALLUCINATIONS: 0
DIZZINESS: 0
EYE PAIN: 0
MYALGIAS: 0

## 2024-11-13 ASSESSMENT — FIBROSIS 4 INDEX: FIB4 SCORE: 1.82

## 2024-11-13 NOTE — PROGRESS NOTES
Chief Complaint   Patient presents with    Follow-Up     Dx: HOCM (hypertrophic obstructive cardiomyopathy) (HCC)        Hypertension    Dyslipidemia       Subjective:   Rigoberto Adames is a 59 y.o. female who presents today for cardiac care and management for HOCM in setting of hypertension, liver cirrhosis secondary to alcohol abuse.    Patient has stopped drinking alcohol via her report.    She feels well overall.    She used to see Dr. Knowles.    In the interim, patient has been doing well without having any symptoms. Patient denies having chest pain, dyspnea, palpitation, presyncope, syncope episodes.     She is on HD.    I have independently interpreted and reviewed blood tests results with patient in clinic which shows LDL level of 58, triglycerides level of 114, GFR of 8, K of 4.8. Hgba1c of 5.7. UACR of 713.      Past Medical History:   Diagnosis Date    Acute kidney injury (HCC)     Acute necrotizing pyelonephritis     Acute respiratory failure with hypoxia (HCC) 04/23/2023    Acute-on-chronic kidney injury (HCC) 04/23/2023    Alcohol withdrawal ([facility])     Arthritis     Bilateral legs; no formal diagnosis.    Breath shortness     Cataract     Bilateral IOL    Chronic kidney disease (CKD) stage G4/A2, severely decreased glomerular filtration rate (GFR) between 15-29 mL/min/1.73 square meter and albuminuria creatinine ratio between  mg/g (HCC) 07/25/2023    Cirrhosis of liver with ascites (HCC) 03/06/2017    Dental disorder     Upper and lower dentures    Diabetes mellitus, type 2 (HCC)     Oral medications    Diabetic ulcer of toe (HCC) 04/30/2020    Dialysis patient ([facility])     since 4/2023    diarrhea 2019    been going on for about a year    Foot pain, bilateral     Heart murmur     High cholesterol     HOCM (hypertrophic obstructive cardiomyopathy) (HCC)     Hyperammonemia (HCC) 06/24/2016    Hyperkalemia 04/23/2023    Hypertension     Shock ([facility]) 04/23/2023    STEMI (ST elevation myocardial  infarction) (HCC)     Thrombocytopenia (HCC)     Tobacco abuse 2014     Past Surgical History:   Procedure Laterality Date    AV FISTULA REVISION Left 12/3/2023    Procedure: left upper extremity angio, graft ligation;  Surgeon: Payam Marroquin M.D.;  Location: SURGERY [facility];  Service: Vascular    AV FISTULA CREATION Left 2023    Procedure: LEFT UPPER ARM DIALYSIS GRAFT PLACEMENT;  Surgeon: Sarahi Carey M.D.;  Location: SURGERY [facility];  Service: General    DE UPPER GI ENDOSCOPY,DIAGNOSIS N/A 2022    Procedure: GASTROSCOPY;  Surgeon: Kashif Rhodes M.D.;  Location: SURGERY [facility];  Service: Gastroenterology    DE UPPER GI ENDOSCOPY,LIGAT VARIX  2019    Procedure: GASTROSCOPY, WITH VARICEAL BANDING - WITH GASTRIC MAPPING;  Surgeon: Sandi Espinoza M.D.;  Location: [facility];  Service: Gastroenterology    GASTROSCOPY-ENDO  2019    Procedure: GASTROSCOPY;  Surgeon: Sandi Espinoza M.D.;  Location: SURGERY [facility];  Service: Gastroenterology    PRIMARY C SECTION  2006    CATARACT EXTRACTION WITH IOL Bilateral     OTHER      c- section     Family History   Problem Relation Age of Onset    Cancer Father         stomach    Hypertension Mother      Social History     Socioeconomic History    Marital status: Single     Spouse name: Not on file    Number of children: Not on file    Years of education: Not on file    Highest education level: Not on file   Occupational History    Occupation: restaurant owner   Tobacco Use    Smoking status: Former     Current packs/day: 0.00     Average packs/day: 1 pack/day for 10.0 years (10.0 ttl pk-yrs)     Types: Cigarettes     Start date: 2015     Quit date: 2020     Years since quittin.4    Smokeless tobacco: Never   Vaping Use    Vaping status: Never Used   Substance and Sexual Activity    Alcohol use: Not Currently     Alcohol/week: 0.6 oz     Types: 1 Glasses of wine per week     Drug use: No    Sexual activity: Not on file   Other Topics Concern    Not on file   Social History Narrative    Single- 2 children.     Social Drivers of Health     Financial Resource Strain: Low Risk  (5/8/2020)    Overall Financial Resource Strain (CARDIA)     Difficulty of Paying Living Expenses: Not hard at all   Food Insecurity: No Food Insecurity (5/8/2020)    Hunger Vital Sign     Worried About Running Out of Food in the Last Year: Never true     Ran Out of Food in the Last Year: Never true   Transportation Needs: No Transportation Needs (5/8/2020)    PRAPARE - Transportation     Lack of Transportation (Medical): No     Lack of Transportation (Non-Medical): No   Physical Activity: Not on file   Stress: Not on file   Social Connections: Not on file   Intimate Partner Violence: Not on file   Housing Stability: Not on file     No Known Allergies  Outpatient Encounter Medications as of 11/13/2024   Medication Sig Dispense Refill    carvedilol (COREG) 25 MG Tab TAKE 1 TABLET BY MOUTH 2 TIMES A DAY WITH MEALS 180 Tablet 1    amLODIPine (NORVASC) 10 MG Tab Take 1 Tablet by mouth every day.      calcitRIOL (ROCALTROL) 0.5 MCG Cap Take 0.5 mcg by mouth every day.      calcium acetate (PHOS-LO) 667 MG Cap Take 667 mg by mouth every day.      atorvastatin (LIPITOR) 20 MG Tab TAKE 1 TABLET BY MOUTH ONCE DAILY IN THE EVENING 90 Tablet 3    [DISCONTINUED] Insulin Pen Needle 32G X 6 MM Misc every day. (Patient not taking: Reported on 11/13/2024)      [DISCONTINUED] gabapentin (NEURONTIN) 100 MG Cap Take 100 mg by mouth 3 times a day. (Patient not taking: Reported on 11/13/2024)      [DISCONTINUED] hydrOXYzine HCl (ATARAX) 25 MG Tab Take 25 mg by mouth every 8 hours as needed for Itching. (Patient not taking: Reported on 11/13/2024)      [DISCONTINUED] ibuprofen (MOTRIN) 800 MG Tab Take 1 Tablet by mouth every 6 hours. (Patient not taking: Reported on 11/13/2024)      [DISCONTINUED] lisinopril (PRINIVIL) 5 MG Tab Take 1  Tablet by mouth every day. (Patient not taking: Reported on 11/13/2024)      [DISCONTINUED] loperamide (IMODIUM A-D) 2 MG tablet Take 2 mg by mouth 2 times a day as needed for Diarrhea. (Patient not taking: Reported on 11/13/2024)      [DISCONTINUED] metFORMIN ER (GLUCOPHAGE XR) 500 MG TABLET SR 24 HR Take 1 Tablet by mouth every day. (Patient not taking: Reported on 11/13/2024)      [DISCONTINUED] metoprolol tartrate (LOPRESSOR) 100 MG Tab Take 1 Tablet by mouth 2 times a day. (Patient not taking: Reported on 11/13/2024)      [DISCONTINUED] omeprazole (PRILOSEC) 40 MG delayed-release capsule Take 1 Capsule by mouth every day. (Patient not taking: Reported on 11/13/2024)      [DISCONTINUED] oxybutynin SR (DITROPAN-XL) 10 MG CR tablet Take 1 Tablet by mouth every day. (Patient not taking: Reported on 11/13/2024)      [DISCONTINUED] oxyCODONE-acetaminophen (PERCOCET) 5-325 MG Tab Take 1 Tablet by mouth every 6 hours as needed. (Patient not taking: Reported on 11/13/2024)      [DISCONTINUED] patiromer (VELTASSA) 8.4 g Pack Take 8.4 g by mouth every day. (Patient not taking: Reported on 11/13/2024)      [DISCONTINUED] pregabalin (LYRICA) 50 MG capsule Take 50 mg by mouth 2 times a day. for 30 days (Patient not taking: Reported on 11/13/2024)      [DISCONTINUED] normal saline flush 0.9 % Solution  (Patient not taking: Reported on 11/13/2024)      [DISCONTINUED] dilTIAZem CD (CARDIZEM CD) 240 MG CAPSULE SR 24 HR Take 1 Capsule by mouth every day. (Patient not taking: Reported on 11/13/2024)      [DISCONTINUED] DILTIAZem CD (CARTIA XT) 120 MG CAPSULE SR 24 HR Take 1 Capsule by mouth every day. (Patient not taking: Reported on 11/13/2024)      [DISCONTINUED] diltiazem CD (CARDIZEM CD) 360 MG ER capsule Take 1 Capsule by mouth every day. (Patient not taking: Reported on 11/13/2024)      [DISCONTINUED] cetirizine (ZYRTEC) 10 MG Tab Take 1 Tablet by mouth every day. (Patient not taking: Reported on 11/13/2024)       [DISCONTINUED] chlorhexidine (PERIDEX) 0.12 % Solution Take 15 mL by mouth 2 times a day. (Patient not taking: Reported on 11/13/2024)      [DISCONTINUED] FreeStyle Lancets Misc every day. (Patient not taking: Reported on 11/13/2024)      [DISCONTINUED] hydrocortisone 1 % Cream Apply 1 Application topically 2 times a day. (Patient not taking: Reported on 11/13/2024)      [DISCONTINUED] hydrocortisone 1 % Ointment Apply  topically 2 times a day. (Patient not taking: Reported on 11/13/2024)      [DISCONTINUED] glucose blood strip 1 Each by Other route every day. (Patient not taking: Reported on 11/13/2024)      [DISCONTINUED] ammonium lactate (LAC-HYDRIN) 12 % Lotion Apply  topically 2 times a day. (Patient not taking: Reported on 11/13/2024)      [DISCONTINUED] amoxicillin (AMOXIL) 500 MG Cap Take 1 Capsule by mouth every 8 hours. (Patient not taking: Reported on 11/13/2024)      [DISCONTINUED] calcium carbonate (OS-RAMO) 1250 (500 Ca) MG chewable tablet Chew 1 Tablet every day. (Patient not taking: Reported on 11/13/2024)      [DISCONTINUED] vitamin D2, Ergocalciferol, (DRISDOL) 1.25 MG (52363 UT) Cap capsule Take 50,000 Units by mouth every 7 days. (Patient not taking: Reported on 11/13/2024)      [DISCONTINUED] Ascorbic Acid (VITAMIN C) 500 MG Cap Take 1 Tablet by mouth every day. (Patient not taking: Reported on 11/13/2024)      [DISCONTINUED] Blood Glucose Monitoring Suppl (ONE TOUCH ULTRA 2) w/Device Kit USE AS DIRECTED TO CHECK GLUCOSE LEVELS (Patient not taking: Reported on 11/13/2024)      [DISCONTINUED] glucose blood strip 1 Each by Other route every day. (Patient not taking: Reported on 11/13/2024)      [DISCONTINUED] spironolactone (ALDACTONE) 25 MG Tab Take 1 Tablet by mouth every day. (Patient not taking: Reported on 11/13/2024)      [DISCONTINUED] Ursodiol 500 MG Tab Take 1 Tablet by mouth 2 times a day. (Patient not taking: Reported on 11/13/2024)      [DISCONTINUED] varenicline (CHANTIX) 0.5 MG tablet   (Patient not taking: Reported on 11/13/2024)      [DISCONTINUED] atorvastatin (LIPITOR) 20 MG Tab Take 1 Tablet by mouth every evening. (Patient not taking: Reported on 11/13/2024)      [DISCONTINUED] atorvastatin (LIPITOR) 20 MG Tab Take 1 Tablet by mouth every day. (Patient not taking: Reported on 11/13/2024)      [DISCONTINUED] carvedilol (COREG) 25 MG Tab Take  by mouth. (Patient not taking: Reported on 11/13/2024)      [DISCONTINUED] Cholecalciferol (VITAMIN D) 50 MCG (2000 UT) Cap Take  by mouth. (Patient not taking: Reported on 11/13/2024)      [DISCONTINUED] amLODIPine (NORVASC) 10 MG Tab TAKE ONE TABLET BY MOUTH DAILY. DO NOT TAKE ON MORNING OF DIALYSIS (Patient not taking: Reported on 11/13/2024) 90 Tablet 3    [DISCONTINUED] Ascorbic Acid (VITAMIN C) 100 MG Tab Take 1 Tablet by mouth every day. (Patient not taking: Reported on 11/13/2024) 30 Tablet 3    [DISCONTINUED] Alcohol Swabs Wipe site with prep pad prior to injection. (Patient not taking: Reported on 11/13/2024) 100 Each 6     No facility-administered encounter medications on file as of 11/13/2024.     Review of Systems   Constitutional:  Negative for chills, fever, malaise/fatigue and weight loss.   HENT:  Negative for ear discharge, ear pain, hearing loss and nosebleeds.    Eyes:  Negative for blurred vision, double vision, pain and discharge.   Respiratory:  Negative for cough and shortness of breath.    Cardiovascular:  Negative for chest pain, palpitations, orthopnea, claudication, leg swelling and PND.   Gastrointestinal:  Negative for abdominal pain, blood in stool, melena, nausea and vomiting.   Genitourinary:  Negative for dysuria and hematuria.   Musculoskeletal:  Negative for falls, joint pain and myalgias.   Skin:  Negative for itching and rash.   Neurological:  Negative for dizziness, sensory change, speech change, loss of consciousness and headaches.   Endo/Heme/Allergies:  Negative for environmental allergies. Does not bruise/bleed  "easily.   Psychiatric/Behavioral:  Negative for depression, hallucinations and suicidal ideas.         Objective:   /72 (BP Location: Right arm, Patient Position: Sitting, BP Cuff Size: Adult)   Pulse 78   Resp 16   Ht 1.676 m (5' 6\")   Wt 60.6 kg (133 lb 9.6 oz)   SpO2 98%   BMI 21.56 kg/m²     Physical Exam  Vitals and nursing note reviewed.   Constitutional:       General: She is not in acute distress.     Appearance: She is not diaphoretic.   HENT:      Head: Normocephalic and atraumatic.      Right Ear: External ear normal.      Left Ear: External ear normal.   Eyes:      General:         Right eye: No discharge.         Left eye: No discharge.   Neck:      Thyroid: No thyromegaly.      Vascular: No JVD.   Cardiovascular:      Rate and Rhythm: Normal rate and regular rhythm.   Pulmonary:      Effort: No respiratory distress.   Abdominal:      General: There is no distension.      Tenderness: There is no abdominal tenderness.   Skin:     General: Skin is warm and dry.      Comments: Jaundiced.   Neurological:      Mental Status: She is alert and oriented to person, place, and time.      Cranial Nerves: No cranial nerve deficit.   Psychiatric:         Behavior: Behavior normal.         Assessment:     1. HOCM (hypertrophic obstructive cardiomyopathy) (HCC)        2. Alcoholic cirrhosis of liver without ascites (HCC)        3. ESRD (end stage renal disease) (HCC)        4. HTN (hypertension), malignant        5. Type 2 diabetes mellitus with diabetic polyneuropathy, without long-term current use of insulin (HCC)        6. Light chain disease, kappa type ([facility])  Referral to Hematology Oncology    FREE K&L LT CHAINS, QT, SERUM    NM-CARDIAC AMYLOIDOSIS PYP SCAN      7. Light chain disease, lambda type ([facility])  Referral to Hematology Oncology    FREE K&L LT CHAINS, QT, SERUM    NM-CARDIAC AMYLOIDOSIS PYP SCAN      8. LVH (left ventricular hypertrophy)  NM-CARDIAC AMYLOIDOSIS PYP SCAN            Medical " Decision Making:  Today's Assessment / Status / Plan:   Will refer to hematology due to elevated light chains.    I want to make sure she does not have cardiac amyloidosis. Will obtain pyrophosphate scan. With renal failure, cardiac MRI might not be the best option at this time.    Blood pressure is well controlled.  We will continue Cardizem at 360 mg p.o. once a day.  Continue with metoprolol 100 mg p.o. twice a day.  Heart rate goal of less than 70.      This visit encounter signifies the visit complexity inherent to evaluation and management associated with medical care services that serve as the continuing focal point for all needed health care services and/or with medical care services that are part of ongoing care related to this patient's single, serious condition, complex cardiac condition.    Sung Dowell M.D.

## 2024-12-02 ENCOUNTER — HOSPITAL ENCOUNTER (OUTPATIENT)
Dept: RADIOLOGY | Facility: MEDICAL CENTER | Age: 59
End: 2024-12-02
Attending: INTERNAL MEDICINE
Payer: MEDICARE

## 2024-12-02 DIAGNOSIS — K70.30 ALCOHOLIC CIRRHOSIS OF LIVER WITHOUT ASCITES (HCC): ICD-10-CM

## 2024-12-02 PROCEDURE — 76700 US EXAM ABDOM COMPLETE: CPT

## 2024-12-20 ENCOUNTER — TELEPHONE (OUTPATIENT)
Dept: CARDIOLOGY | Facility: MEDICAL CENTER | Age: 59
End: 2024-12-20
Payer: MEDICARE

## 2024-12-20 NOTE — LETTER
PROCEDURE/SURGERY CLEARANCE FORM      Encounter Date: 12/20/2024    Patient: Rigoberto Adames  YOB: 1965    CARDIOLOGIST: Dr. Sung Dowell MD   REFERRING DOCTOR:  No ref. provider found    The following procedure/surgery: Colonoscopy - EGD                                            PROCEDURE/SURGERY CLEARANCE FORM    Date: 12/23/2024   Patient Name: Rigoberto Adames    Dear Surgeon or Proceduralist,      Thank you for your request for cardiac stratification of our mutual patient Rigoberto Adames 1965. We have reviewed their [facility] records; and to the best of our understanding this patient has not had stenting, ablation, watchman, cardiothoracic surgery or hospitalization for cardiovascular reasons in the past 6 months.  Rigoberto Adames has been seen within the past 15 months and is considered to have non-modifiable cardiac risk for this low-risk procedure/surgery. They may proceed from a cardiovascular standpoint and may hold their antiplatelet/anticoagulation as briefly as possible. Please have patient resume this medication when hemodynamically stable to do so.     Aspirin or Prasugrel   - hold 7 days prior to procedure/surgery, resume when hemodynamically stable      Clopidrogrel or Ticagrelor  - hold 7 days for all neurological procedures, hold 5 days prior to all other procedure/surgery,  resume when hemodynamically stable     Warfarin - hold 7 days for all neurological procedures, hold 5 days prior to all other procedure/surgery and coordinate with [facility] ([phone]) INR testing and dose management.      Pradaxa/Xarelto/Eliquis/Savesya - hold 1 day prior to procedure for low bleeding risk procedure, 2 days for high bleeding risk procedure, or consider holding 3 days or longer for patients with reduced kidney function (CrCl <30mL/min) or spinal/cranial surgeries/procedures.      If they have a mechanical heart valve, please coordinate with [facility]  ([phone]) the proper management of their anticoagulant in the periprocedural or perioperative period.      Some patients have higher risk for cardiovascular complications or holding medication. If our patient has had prior complications of holding antiplatelet or anticoagulants in the past and we have seen them after these events, we have addressed these concerns with the patient. They are at an unknown degree of increased risk for recurrent complication.  You may hold anticoagulation/antiplatelets for the procedure or surgery if the benefits of the procedure or surgery outweigh this nonmodifiable risk.      If Rigoberto Adames 1965 has new symptoms of heart failure decompensation, unstable arrythmia, or angina please reach out and we will assess the patient.      If you have other patient-specific concerns, please feel free to reach out to the patient's cardiologist directly at [phone].     Thank you,       [facility]         Electronically Signed   MD Signature   Dr. Sung Dowell MD

## 2024-12-20 NOTE — TELEPHONE ENCOUNTER
TT    Caller:  Carlos Alberto     Office Name, phone number, fax number:   [facility]  Fax  - [phone]    Fax clearance to [phone]    Procedure Name:   Colonoscopy - EGD    Procedure Scheduled Date:   12.23.24    Callback Number:   [phone]    Thank you   Mihaela DINH

## 2024-12-21 NOTE — TELEPHONE ENCOUNTER
TT      Caller: Carlos Alberto with [facility]    Topic/issue: Their office was calling to follow up on a call from earlier today and regarding the clearance for the patient and the surgery that they have coming up Monday 12/23. Please advise      Callback Number: [phone]      Thank you    -Sandip JACOBS

## 2024-12-23 NOTE — TELEPHONE ENCOUNTER
Last OV: 11/13/2024  Proposed Surgery: Colonoscopy - EGD   Surgery Date: 11/13/2024  Requesting Office Name: [facility]  Fax Number: [phone]   Preference of Location (default is surgery center unless specified by Cardiologist or JUDI)  Prior Clearance Addressed: No    Is this a general clearance? YES   Anticoags/Antiplatelets: Other N/A  Anticoags/Antiplatelet managed by Cardiology? YES    Outstanding Cardiac Imaging : Yes Provider to Advise    NM-CARDIAC AMYLOIDOSIS PYP SCAN   Ablation, Cardioversion, Stent, Cardiac Devices, Catheterization, Watchman: No  TAVR/Valve, Mitral Clip, Watchman (including open heart),: N/A   Recent Cardiac Hospitalization: No            When: N/A  History (cardiac history):   Past Medical History:   Diagnosis Date    Acute kidney injury (HCC)     Acute necrotizing pyelonephritis     Acute respiratory failure with hypoxia (HCC) 04/23/2023    Acute-on-chronic kidney injury (HCC) 04/23/2023    Alcohol withdrawal (HCC)     Arthritis     Bilateral legs; no formal diagnosis.    Breath shortness     Cataract     Bilateral IOL    Chronic kidney disease (CKD) stage G4/A2, severely decreased glomerular filtration rate (GFR) between 15-29 mL/min/1.73 square meter and albuminuria creatinine ratio between  mg/g (HCC) 07/25/2023    Cirrhosis of liver with ascites (HCC) 03/06/2017    Dental disorder     Upper and lower dentures    Diabetes mellitus, type 2 (HCC)     Oral medications    Diabetic ulcer of toe (HCC) 04/30/2020    Dialysis patient ([facility])     since 4/2023    diarrhea 2019    been going on for about a year    Foot pain, bilateral     Heart murmur     High cholesterol     HOCM (hypertrophic obstructive cardiomyopathy) (HCC)     Hyperammonemia (HCC) 06/24/2016    Hyperkalemia 04/23/2023    Hypertension     Shock (HCC) 04/23/2023    STEMI (ST elevation myocardial infarction) (HCC)     Thrombocytopenia (HCC)     Tobacco abuse 12/08/2014           Is this a dental clearance?  NO  Ablation, Cardioversion, Watchman, Stents, Cath, Devices within the last 3 months? No   If yes- Send dental wait letter, do not forward to provider for review.     TAVR / Valve, Mitral clip within the last 6 months? No  If yes- Send dental wait letter, do not forward to provider for review.     If completing a general clearance, continue per protocol.           Surgical Clearance Letter Sent: No Provider to advise.   **Scan clearance request letter into [facility].**

## 2025-01-13 ENCOUNTER — HOSPITAL ENCOUNTER (OUTPATIENT)
Dept: RADIOLOGY | Facility: MEDICAL CENTER | Age: 60
End: 2025-01-13
Attending: INTERNAL MEDICINE
Payer: MEDICARE

## 2025-01-13 DIAGNOSIS — D89.89 LIGHT CHAIN DISEASE, KAPPA TYPE (HCC): ICD-10-CM

## 2025-01-13 DIAGNOSIS — D89.89 LIGHT CHAIN DISEASE, LAMBDA TYPE (HCC): ICD-10-CM

## 2025-01-13 DIAGNOSIS — I51.7 LVH (LEFT VENTRICULAR HYPERTROPHY): ICD-10-CM

## 2025-01-13 PROCEDURE — 78803 RP LOCLZJ TUM SPECT 1 AREA: CPT

## 2025-01-20 ENCOUNTER — APPOINTMENT (OUTPATIENT)
Dept: RADIOLOGY | Facility: MEDICAL CENTER | Age: 60
End: 2025-01-20
Payer: MEDICARE

## 2025-01-28 RX ORDER — ATORVASTATIN CALCIUM 20 MG/1
20 TABLET, FILM COATED ORAL EVERY EVENING
Qty: 90 TABLET | Refills: 3 | Status: SHIPPED | OUTPATIENT
Start: 2025-01-28

## 2025-01-28 NOTE — TELEPHONE ENCOUNTER
Received request via: Pharmacy    Was the patient seen in the last year in this department? Yes    Does the patient have an active prescription (recently filled or refills available) for medication(s) requested? No    Pharmacy Name:   [facility]/pharmacy #[id number] - [place] - [address]  [facility] [place] [address]  Phone: [phone] Fax: [phone]    Does the patient have retirement Plus and need 100-day supply? (This applies to ALL medications) Patient does not have SCP

## 2025-02-18 ENCOUNTER — TELEPHONE (OUTPATIENT)
Dept: CARDIOLOGY | Facility: MEDICAL CENTER | Age: 60
End: 2025-02-18
Payer: MEDICARE

## 2025-02-18 ENCOUNTER — HOSPITAL ENCOUNTER (OUTPATIENT)
Dept: LAB | Facility: MEDICAL CENTER | Age: 60
End: 2025-02-18
Attending: INTERNAL MEDICINE
Payer: MEDICARE

## 2025-02-18 DIAGNOSIS — D89.89 LIGHT CHAIN DISEASE, KAPPA TYPE (HCC): ICD-10-CM

## 2025-02-18 DIAGNOSIS — D89.89 LIGHT CHAIN DISEASE, LAMBDA TYPE (HCC): ICD-10-CM

## 2025-02-18 PROCEDURE — 83521 IG LIGHT CHAINS FREE EACH: CPT

## 2025-02-18 PROCEDURE — 36415 COLL VENOUS BLD VENIPUNCTURE: CPT

## 2025-02-18 NOTE — TELEPHONE ENCOUNTER
Called patient in regards to her labs that where order on her last visit patient did not answer LVM reminder of appt and labs.

## 2025-02-20 ENCOUNTER — RESULTS FOLLOW-UP (OUTPATIENT)
Dept: CARDIOLOGY | Facility: MEDICAL CENTER | Age: 60
End: 2025-02-20

## 2025-02-20 LAB
KAPPA LC FREE SER-MCNC: 230.5 MG/L (ref 3.3–19.4)
KAPPA LC FREE/LAMBDA FREE SER NEPH: 1.38 {RATIO} (ref 0.26–1.65)
LAMBDA LC FREE SERPL-MCNC: 167.34 MG/L (ref 5.71–26.3)

## 2025-02-24 ENCOUNTER — OFFICE VISIT (OUTPATIENT)
Dept: CARDIOLOGY | Facility: MEDICAL CENTER | Age: 60
End: 2025-02-24
Attending: INTERNAL MEDICINE
Payer: MEDICARE

## 2025-02-24 VITALS
BODY MASS INDEX: 21.53 KG/M2 | WEIGHT: 134 LBS | HEART RATE: 84 BPM | OXYGEN SATURATION: 96 % | DIASTOLIC BLOOD PRESSURE: 72 MMHG | RESPIRATION RATE: 16 BRPM | SYSTOLIC BLOOD PRESSURE: 144 MMHG | HEIGHT: 66 IN

## 2025-02-24 DIAGNOSIS — I42.1 HOCM (HYPERTROPHIC OBSTRUCTIVE CARDIOMYOPATHY) (HCC): ICD-10-CM

## 2025-02-24 DIAGNOSIS — K70.30 ALCOHOLIC CIRRHOSIS OF LIVER WITHOUT ASCITES (HCC): ICD-10-CM

## 2025-02-24 DIAGNOSIS — I10 HTN (HYPERTENSION), MALIGNANT: ICD-10-CM

## 2025-02-24 DIAGNOSIS — E11.42 TYPE 2 DIABETES MELLITUS WITH DIABETIC POLYNEUROPATHY, WITHOUT LONG-TERM CURRENT USE OF INSULIN (HCC): ICD-10-CM

## 2025-02-24 DIAGNOSIS — N18.6 ESRD (END STAGE RENAL DISEASE) (HCC): ICD-10-CM

## 2025-02-24 DIAGNOSIS — I83.813 VARICOSE VEINS OF BOTH LOWER EXTREMITIES WITH PAIN: ICD-10-CM

## 2025-02-24 PROCEDURE — 99212 OFFICE O/P EST SF 10 MIN: CPT | Performed by: INTERNAL MEDICINE

## 2025-02-24 PROCEDURE — 99214 OFFICE O/P EST MOD 30 MIN: CPT | Performed by: INTERNAL MEDICINE

## 2025-02-24 PROCEDURE — G2211 COMPLEX E/M VISIT ADD ON: HCPCS | Performed by: INTERNAL MEDICINE

## 2025-02-24 PROCEDURE — 3078F DIAST BP <80 MM HG: CPT | Performed by: INTERNAL MEDICINE

## 2025-02-24 PROCEDURE — 3077F SYST BP >= 140 MM HG: CPT | Performed by: INTERNAL MEDICINE

## 2025-02-24 RX ORDER — HYDRALAZINE HYDROCHLORIDE 50 MG/1
50 TABLET, FILM COATED ORAL
Qty: 180 TABLET | Refills: 4 | Status: SHIPPED | OUTPATIENT
Start: 2025-02-24

## 2025-02-24 ASSESSMENT — ENCOUNTER SYMPTOMS
CLAUDICATION: 0
SHORTNESS OF BREATH: 0
BLURRED VISION: 0
HEADACHES: 0
EYE DISCHARGE: 0
FEVER: 0
SENSORY CHANGE: 0
PALPITATIONS: 0
ABDOMINAL PAIN: 0
FALLS: 0
BLOOD IN STOOL: 0
VOMITING: 0
MYALGIAS: 0
BRUISES/BLEEDS EASILY: 0
NAUSEA: 0
EYE PAIN: 0
HALLUCINATIONS: 0
CHILLS: 0
DOUBLE VISION: 0
ORTHOPNEA: 0
DEPRESSION: 0
SPEECH CHANGE: 0
DIZZINESS: 0
COUGH: 0
WEIGHT LOSS: 0
PND: 0
LOSS OF CONSCIOUSNESS: 0

## 2025-02-24 ASSESSMENT — FIBROSIS 4 INDEX: FIB4 SCORE: 1.85

## 2025-02-24 NOTE — PROGRESS NOTES
Chief Complaint   Patient presents with    Follow-Up     HOCM (hypertrophic obstructive cardiomyopathy) (HCC)    Hypertension    Dyslipidemia       Subjective:   Rigoberto Adames is a 60 y.o. female who presents today for cardiac care and management for HOCM in setting of hypertension, liver cirrhosis secondary to alcohol abuse.    Patient has stopped drinking alcohol via her report.    She feels well overall.    She used to see Dr. Knowles.    In the interim, patient has been doing well without having any symptoms. Patient denies having chest pain, dyspnea, palpitation, presyncope, syncope episodes.     She is on HD.    I have independently interpreted and reviewed blood tests results with patient in clinic which shows LDL level of 58, triglycerides level of 114, GFR of 8, K of 4.8. Hgba1c of 5.7. UACR of 713.    01/2025 Negative for ATTR cardiac amyloidosis.      Past Medical History:   Diagnosis Date    Acute kidney injury (HCC)     Acute necrotizing pyelonephritis     Acute respiratory failure with hypoxia (HCC) 04/23/2023    Acute-on-chronic kidney injury (HCC) 04/23/2023    Alcohol withdrawal ([facility])     Arthritis     Bilateral legs; no formal diagnosis.    Breath shortness     Cataract     Bilateral IOL    Chronic kidney disease (CKD) stage G4/A2, severely decreased glomerular filtration rate (GFR) between 15-29 mL/min/1.73 square meter and albuminuria creatinine ratio between  mg/g (HCC) 07/25/2023    Cirrhosis of liver with ascites (HCC) 03/06/2017    Dental disorder     Upper and lower dentures    Diabetes mellitus, type 2 (HCC)     Oral medications    Diabetic ulcer of toe (HCC) 04/30/2020    Dialysis patient ([facility])     since 4/2023    diarrhea 2019    been going on for about a year    Foot pain, bilateral     Heart murmur     High cholesterol     HOCM (hypertrophic obstructive cardiomyopathy) (HCC)     Hyperammonemia (HCC) 06/24/2016    Hyperkalemia 04/23/2023    Hypertension     Shock ([facility])  2023    STEMI (ST elevation myocardial infarction) (HCC)     Thrombocytopenia (HCC)     Tobacco abuse 2014     Past Surgical History:   Procedure Laterality Date    AV FISTULA REVISION Left 12/3/2023    Procedure: left upper extremity angio, graft ligation;  Surgeon: Payam Marroquin M.D.;  Location: SURGERY [facility];  Service: Vascular    AV FISTULA CREATION Left 2023    Procedure: LEFT UPPER ARM DIALYSIS GRAFT PLACEMENT;  Surgeon: Sarahi Carey M.D.;  Location: SURGERY [facility];  Service: General    [place] UPPER GI ENDOSCOPY,DIAGNOSIS N/A 2022    Procedure: GASTROSCOPY;  Surgeon: Kashif Rhodes M.D.;  Location: SURGERY [facility];  Service: Gastroenterology    [place] UPPER GI ENDOSCOPY,LIGAT VARIX  2019    Procedure: GASTROSCOPY, WITH VARICEAL BANDING - WITH GASTRIC MAPPING;  Surgeon: Sandi Espinoza M.D.;  Location: SURGERY [facility];  Service: Gastroenterology    GASTROSCOPY-ENDO  2019    Procedure: GASTROSCOPY;  Surgeon: Sandi Espinoza M.D.;  Location: SURGERY [facility];  Service: Gastroenterology    PRIMARY C SECTION  2006    CATARACT EXTRACTION WITH IOL Bilateral     OTHER      c- section     Family History   Problem Relation Age of Onset    Cancer Father         stomach    Hypertension Mother      Social History     Socioeconomic History    Marital status: Single     Spouse name: Not on file    Number of children: Not on file    Years of education: Not on file    Highest education level: Not on file   Occupational History    Occupation: restaurant owner   Tobacco Use    Smoking status: Former     Current packs/day: 0.00     Average packs/day: 1 pack/day for 10.0 years (10.0 ttl pk-yrs)     Types: Cigarettes     Start date: 2015     Quit date: 2020     Years since quittin.7    Smokeless tobacco: Never   Vaping Use    Vaping status: Never Used   Substance and Sexual Activity    Alcohol use: Not Currently     Alcohol/week: 0.6  oz     Types: 1 Glasses of wine per week    Drug use: No    Sexual activity: Not on file   Other Topics Concern    Not on file   Social History Narrative    Single- 2 children.     Social Drivers of Health     Financial Resource Strain: Low Risk  (5/8/2020)    Overall Financial Resource Strain (CARDIA)     Difficulty of Paying Living Expenses: Not hard at all   Food Insecurity: No Food Insecurity (5/8/2020)    Hunger Vital Sign     Worried About Running Out of Food in the Last Year: Never true     Ran Out of Food in the Last Year: Never true   Transportation Needs: No Transportation Needs (5/8/2020)    PRAPARE - Transportation     Lack of Transportation (Medical): No     Lack of Transportation (Non-Medical): No   Physical Activity: Not on file   Stress: Not on file   Social Connections: Not on file   Intimate Partner Violence: Not on file   Housing Stability: Not on file     No Known Allergies  Outpatient Encounter Medications as of 2/24/2025   Medication Sig Dispense Refill    hydrALAZINE (APRESOLINE) 50 MG Tab Take 1 Tablet by mouth 2 times a day. 180 Tablet 4    calcium acetate (PHOS-LO) 667 MG Tab tablet Take 667 mg by mouth 3 times a day with meals.      carvedilol (COREG) 25 MG Tab TAKE 1 TABLET BY MOUTH 2 TIMES A DAY WITH MEALS 180 Tablet 1    amLODIPine (NORVASC) 10 MG Tab Take 1 Tablet by mouth every day.      calcitRIOL (ROCALTROL) 0.5 MCG Cap Take 0.5 mcg by mouth every day.      calcium acetate (PHOS-LO) 667 MG Cap Take 667 mg by mouth every day.      [DISCONTINUED] atorvastatin (LIPITOR) 20 MG Tab Take 1 Tablet by mouth every evening. (Patient not taking: Reported on 2/24/2025) 90 Tablet 3     No facility-administered encounter medications on file as of 2/24/2025.     Review of Systems   Constitutional:  Negative for chills, fever, malaise/fatigue and weight loss.   HENT:  Negative for ear discharge, ear pain, hearing loss and nosebleeds.    Eyes:  Negative for blurred vision, double vision, pain and  "discharge.   Respiratory:  Negative for cough and shortness of breath.    Cardiovascular:  Negative for chest pain, palpitations, orthopnea, claudication, leg swelling and PND.   Gastrointestinal:  Negative for abdominal pain, blood in stool, melena, nausea and vomiting.   Genitourinary:  Negative for dysuria and hematuria.   Musculoskeletal:  Negative for falls, joint pain and myalgias.   Skin:  Negative for itching and rash.   Neurological:  Negative for dizziness, sensory change, speech change, loss of consciousness and headaches.   Endo/Heme/Allergies:  Negative for environmental allergies. Does not bruise/bleed easily.   Psychiatric/Behavioral:  Negative for depression, hallucinations and suicidal ideas.         Objective:   BP (!) 144/72 (BP Location: Left arm, Patient Position: Sitting, BP Cuff Size: Adult)   Pulse 84   Resp 16   Ht 1.676 m (5' 6\")   Wt 60.8 kg (134 lb)   SpO2 96%   BMI 21.63 kg/m²     Physical Exam  Vitals and nursing note reviewed.   Constitutional:       General: She is not in acute distress.     Appearance: She is not diaphoretic.   HENT:      Head: Normocephalic and atraumatic.      Right Ear: External ear normal.      Left Ear: External ear normal.   Eyes:      General:         Right eye: No discharge.         Left eye: No discharge.   Neck:      Thyroid: No thyromegaly.      Vascular: No JVD.   Cardiovascular:      Rate and Rhythm: Normal rate and regular rhythm.   Pulmonary:      Effort: No respiratory distress.   Abdominal:      General: There is no distension.      Tenderness: There is no abdominal tenderness.   Musculoskeletal:      Comments: + varicose veins without evidence of ulcer, discoloration.     Skin:     General: Skin is warm and dry.      Comments: Jaundiced.   Neurological:      Mental Status: She is alert and oriented to person, place, and time.      Cranial Nerves: No cranial nerve deficit.   Psychiatric:         Behavior: Behavior normal.         Assessment: "     1. HOCM (hypertrophic obstructive cardiomyopathy) (HCC)        2. Alcoholic cirrhosis of liver without ascites (HCC)        3. ESRD (end stage renal disease) (HCC)        4. HTN (hypertension), malignant  hydrALAZINE (APRESOLINE) 50 MG Tab      5. Type 2 diabetes mellitus with diabetic polyneuropathy, without long-term current use of insulin (HCC)        6. Varicose veins of both lower extremities with pain  US-EXTREMITY VENOUS LOWER BILAT              Medical Decision Making:  Today's Assessment / Status / Plan:   Referred to hematology due to elevated light chains.    No ATTR cardiac amyloidosis.    Blood pressure is nit well controlled. Will add Hydralazine 50 mg BID.  We will continue Carvedilol 25 mg BID, Amlodipine 10 mg daily.  Heart rate goal of less than 70.    We will also check lower extremities venous duplex to assess the anatomy for her lower extremities venous system.    This visit encounter signifies the visit complexity inherent to evaluation and management associated with medical care services that serve as the continuing focal point for all needed health care services and/or with medical care services that are part of ongoing care related to this patient's single, serious condition, complex cardiac condition.    Sung Dowell M.D.

## 2025-02-27 ENCOUNTER — DOCUMENTATION (OUTPATIENT)
Facility: MEDICAL CENTER | Age: 60
End: 2025-02-27
Payer: MEDICARE

## 2025-02-28 ENCOUNTER — DOCUMENTATION (OUTPATIENT)
Facility: MEDICAL CENTER | Age: 60
End: 2025-02-28
Payer: MEDICARE

## 2025-02-28 ENCOUNTER — TELEPHONE (OUTPATIENT)
Facility: MEDICAL CENTER | Age: 60
End: 2025-02-28
Payer: MEDICARE

## 2025-02-28 DIAGNOSIS — Z01.818 PRE-TRANSPLANT EVALUATION FOR KIDNEY TRANSPLANT: ICD-10-CM

## 2025-02-28 NOTE — PROGRESS NOTES
"Transplant Coordinator   Kidney Transplant Referral Review    Date of Review: March 3, 2025    Patient Information  Patient Name Rigoberto Adames   MRN [id number]    / Age  1965 / 60 y.o.   Sex Female    Race Divehi [16]    Ethnicity  Not , /a, or Micronesian origin [1]    Preferred Language / Is an  Required?  English / No  May need Divehi  during Eval for better understanding   Current Ambulatory Status Independent     Referral Information   Referral Date 2025    Referring Nephrologist  Dr. Reji ESCOBAR Skdavid    Dialysis Facility [facility]  [address] [facility] [address]    Dialysis Modality  PD   Dialysis Schedule / Shift (if applicable) Daily    Primary Cause of ESRD (if applicable) E11.22: Type 2 Diabetes Mellitus with Diabetic Chronic Kidney Disease   Chronic Dialysis Start Date  2023      Care Team      PCP: Willy Reddy M.D.   Neurology: Duc Castro M.D., Julian Dixon M.D.   Cardiology: Sugn Dowell M.D.   Vascular Surgeon: [facility]     Transplant History & Status Overview    Currently Listed or Evaluated Elsewhere? No   Previously Declined by Another Transplant Center? Yes   Kidney Candidate   [facility] ([place], [place]) - CAS   Evaluation began on 2024   Marked as Ineligible on 2024   Reason: Medical Contraindication     Potential Living Donors? Yes   If Applicable, List Who and/or Relationship to Patient (if available): Julian Dominguez; Surjit   Blood Type: O     Sensitizing Events    Prior Transplant(s): No   Blood Transfusion History: Yes, list date(s) if available: 2023  Pregnancy History (if applicable): Unknown     Medical History & Current Status    Estimated body mass index is 21.63 kg/m² as calculated from the following:    Height as of 25: 1.676 m (5' 6\").    Weight as of 25: 60.8 kg (134 lb).    No Known Allergies    Current Outpatient " Medications:     hydrALAZINE, 50 mg, Oral, BID    calcium acetate, 667 mg, Oral, TID WITH MEALS    carvedilol, 25 mg, Oral, BID WITH MEALS    amLODIPine, 1 Tablet, Oral, DAILY    calcitRIOL, 0.5 mcg, Oral, DAILY    calcium acetate, 667 mg, Oral, DAILY     Social History     Tobacco Use    Smoking status: Former     Current packs/day: 0.00     Average packs/day: 1 pack/day for 10.0 years (10.0 ttl pk-yrs)     Types: Cigarettes     Start date: 2015     Quit date: 2020     Years since quittin.7    Smokeless tobacco: Never   Vaping Use    Vaping status: Never Used   Substance Use Topics    Alcohol use: Not Currently     Alcohol/week: 0.6 oz     Types: 1 Glasses of wine per week    Drug use: No   If a history of smoking is present, document pack-year history (# packs/day × years smoked): 10 PYH    Family History   Problem Relation Age of Onset    Cancer Father         stomach    Hypertension Mother      Past Surgical History:   Procedure Laterality Date    AV FISTULA REVISION Left 12/3/2023    Procedure: left upper extremity angio, graft ligation;  Surgeon: Payam Marroquin M.D.;  Location: [facility];  Service: Vascular    AV FISTULA CREATION Left 2023    Procedure: LEFT UPPER ARM DIALYSIS GRAFT PLACEMENT;  Surgeon: Sarahi Carey M.D.;  Location: [facility];  Service: General    NH UPPER GI ENDOSCOPY,DIAGNOSIS N/A 2022    Procedure: GASTROSCOPY;  Surgeon: Kashif Rhodes M.D.;  Location: [facility];  Service: Gastroenterology    NH UPPER GI ENDOSCOPY,LIGAT VARIX  2019    Procedure: GASTROSCOPY, WITH VARICEAL BANDING - WITH GASTRIC MAPPING;  Surgeon: Sandi Espinoza M.D.;  Location: [facility];  Service: Gastroenterology    GASTROSCOPY-ENDO  2019    Procedure: GASTROSCOPY;  Surgeon: Sandi Espinoza M.D.;  Location: [facility];  Service: Gastroenterology    PRIMARY C SECTION  2006    CATARACT EXTRACTION WITH  IOL Bilateral     OTHER      c- section      Active Ambulatory Problems     Diagnosis Date Noted    HOCM (hypertrophic obstructive cardiomyopathy) ([facility]) 01/20/2015    Normocytic anemia 04/27/2020    Type 2 diabetes mellitus, without long-term current use of insulin ([facility]) 07/22/2022    ESRD (end stage renal disease) ([facility]) 01/14/2023    Dyslipidemia 04/23/2023    Primary hypertension 04/23/2023    Alcoholic cirrhosis ([facility]) 04/23/2023    History of substance abuse ([facility]) 04/23/2023    Former smoker 12/03/2023    Diabetic polyneuropathy associated with type 2 diabetes mellitus ([facility]) 01/17/2024    Left arm weakness 01/17/2024     Resolved Ambulatory Problems     Diagnosis Date Noted    Uncontrolled type 2 DM with hyperosmolar nonketotic hyperglycemia ([facility]) 06/27/2011    Lower extremity edema 06/27/2011    Cardiac murmur 06/27/2011    STEMI (ST elevation myocardial infarction) ([facility]) 12/07/2014    Alcohol abuse 12/08/2014    Tobacco abuse 12/08/2014    Left ventricular outflow obstruction 12/09/2014    Hypertension, benign 12/09/2014    Alcohol withdrawal ([facility]) 10/12/2015    Altered mental status 10/12/2015    Macrocytic anemia 10/12/2015    Hypokalemia 10/12/2015    Hypertensive urgency 10/13/2015    HTN (hypertension) 10/14/2015    Weakness 10/14/2015    Foot pain, bilateral 10/19/2015    Abnormal gait 10/19/2015    Ataxia 10/21/2015    Protein-calorie malnutrition, moderate ([facility]) 10/23/2015    Epistaxis 10/27/2015    Anemia associated with acute blood loss 10/27/2015    Cardiomyopathy, dilated ([facility]) 01/20/2016    Weakness of both lower extremities 06/23/2016    Elevated troponin 06/23/2016    Hypotension 06/23/2016    Hyperammonemia ([facility]) 06/24/2016    Thrombocytopenia ([facility]) 06/24/2016    Cirrhosis of liver with ascites ([facility]) 03/06/2017    Neuropathy 07/18/2019    Diarrhea 07/18/2019    Septic shock due to Escherichia coli ([facility]) 04/26/2020    Hyponatremia 04/26/2020    Acute kidney injury ([facility]) 04/26/2020    URI (upper  respiratory infection) 04/26/2020    Acute necrotizing pyelonephritis 04/26/2020    Hypophosphatemia 04/27/2020    Hypomagnesemia 04/27/2020    DKA (diabetic ketoacidosis) ([facility]) 04/27/2020    Refeeding syndrome 04/28/2020    Diabetic ulcer of toe ([facility]) 04/30/2020    Microalbuminuria 01/14/2023    Bradycardia 04/23/2023    Shock ([facility]) 04/23/2023    Acute respiratory failure with hypoxia ([facility]) 04/23/2023    Hyperkalemia 04/23/2023    Acute-on-chronic kidney injury ([facility]) 04/23/2023    Anemia 04/23/2023    Type 2 diabetes mellitus ([facility]) 04/23/2023    Itching 05/11/2023    Hyperkalemia 07/25/2023    Chronic kidney disease (CKD) stage G4/A2, severely decreased glomerular filtration rate (GFR) between 15-29 mL/min/1.73 square meter and albuminuria creatinine ratio between  mg/g ([facility]) 07/25/2023    Ischemic steal syndrome ([facility]) 12/03/2023     Past Medical History:   Diagnosis Date    Arthritis     Breath shortness     Cataract     Dental disorder     Diabetes mellitus, type 2 ([facility])     Dialysis patient ([facility])     diarrhea 2019    Heart murmur     High cholesterol     Hypertension      Relevant Medical History    Cardiac & Vascular History:   History of Heart Disease: HTN, severe concentric left ventricular hypertrophy, hypertrophic obstructive cardiomyopathy, DLD, murmur, STEMI (2014), CAD  History of Stroke/TIA: None Noted During Initial Chart Review  PVD/PAD: None Noted During Initial Chart Review    Bleeding & Clotting History:  Hx of Bleeding/Clotting Disorder: Hx Thrombocytopenia   Currently on Anticoagulation Therapy: None Noted During Initial Chart Review    Diabetes & Metabolic History:  Diabetes Status: T2DM  Age at Diabetes Onset: Unknown  If Type 1, date started on Insulin Therapy: N/A    Neurological & Cognitive:  Cognitive Status Concerns: None Noted During Initial Chart Review    Dialysis Access History:  Current Dialysis Access (if applicable): PDC (LLQ); previous AVF creation and ligation     Infections  & Malignancies:  Recent Infections: Previous diabetic ulcer on L big toe improved following a course of doxycycline per 10/7/24 MD visit.  Her scheduled MRI to assess for possible osteomyelitis or further soft tissue damage was rescheduled for 10/28/24.   Additionally, she reports being seen by a podiatrist, who confirmed that the toe infection has resolved.   History of Malignancy: None Noted During Initial Chart Review    Other Pertinent History:   Liver cirrhosis 2/2 alcohol abuse per notes pt has stopped drinking alcohol.   Random liver biopsy in 11/2022: Biopsy core demonstrates benign liver parenchyma demonstrating areas of regenerative nodule formation and areas of bridging fibrosis, also highlighted by trichrome and reticulin stains, overall findings most consistent with cirrhosis.   Referred to hematology due to elevated light chains.  Patient reports ongoing follow-up with a new neurologist for arm weakness     Recent Hospitalization(s)    None noted recently     Diagnostic Studies Review  Test/Procedure Study Date Study Location Key Findings   Cardiac & Vascular Testing      EKG 07/25/23  [facility] Sinus rhythm   Borderline prolonged CO interval   Probable left atrial enlargement   Left ventricular hypertrophy   Anterior infarct, old   Abnormal T, consider ischemia, lateral leads    NM Cardiac Stress Test 06/24/16  [facility]  No evidence of significant jeopardized viable myocardium or prior myocardial  infarction.   Normal left ventricular size, ejection fraction, and wall motion. LVEF = 58%.    NM-CARDIAC TREADMILL ONLY-  NO IMAGING  10/15/24  [facility] Negative stress ECG for ischemia.   Termination Reason: request to stop, did not meet target heart rate, legs hurt, can't feel feet   Echocardiogram (TTE/REYES) 10/08/24 [facility] Compared to the prior study on 4/23/23, left ventricular hypertrophy has increased.   Moderate concentric left ventricular hypertrophy. Work up for cardiac amyloidosis is advised if  clinically indicated.  LVEF 70%   NM-CARDIAC AMYLOIDOSIS PYP SCAN  01/13/25  [facility] Finding is negative for ATTR cardiac amyloidosis    General Imaging & Vascular Assessment      CXR 07/25/23  [facility] No acute cardiopulmonary disease.  Atherosclerosis   CT-A/P  04/30/21  [facility] Liver surface is nodular consistent with cirrhosis.  Cholelithiasis  Mild splenomegaly  Left nephrostomy tube has been removed. Perirenal stranding around the left kidney is decreased compared to the prior CT. No hydronephrosis appears to be present.   CT-CTA NECK WITH & W/O 07/25/23 [facility] CT angiogram of the neck within normal limits.    CTA ABDOMEN PELVIS W & W/O 04/23/23  [facility] No evidence of aortic aneurysm or dissection.  No arterial extravasation into the bowel is identified.  There is edema around the pancreas and dilatation of the common bile duct. Consider pancreatitis.  Liver surface is nodular consistent with cirrhosis.  Small amount of ascites.  Gallbladder wall edema. Portal hypertension is a possible cause.   CT-CTA CHEST PULMONARY ARTERY 07/25/23  [facility] No pulmonary embolus appreciated.  Irregular hepatic contour favoring changes of cirrhosis  Atherosclerosis and atherosclerotic coronary artery disease.   DX-FOOT-COMPLETE 3+ LEFT 01/16/24 [facility] Osteopenia.  No fracture or dislocation of LEFT foot.  No focal bony destruction although osteomyelitis is not excluded by plain film alone.   CT-HEAD W/O  04/23/23  [facility] NO ACUTE ABNORMALITIES ARE NOTED ON CT SCAN OF THE HEAD.    CT-ABDOMEN LIVER FOR HEPATIC MASS (CIRRHOSIS)  12/11/21  [facility] Nodular liver surface consistent with cirrhosis.  No liver mass or abnormal arterial phase enhancement in the liver identified.  Cardiomegaly and atherosclerosis.  Cholelithiasis again noted.   CT-RENAL WITH & W/O  04/27/20  [facility] Moderate to severe LEFT hydroureteronephrosis with gas present within the renal collecting system indicating pyonephrosis.  Distal ureters are not visualized as the  pelvis was not imaged.  Complex fluid collections containing gas in the LEFT kidney at the midportion anteriorly and posteriorly concerning for renal abscess is suspected on previous MRI lumbar spine.  Atrophic RIGHT kidney with scarring.  Small amount of peritoneal fluid.  Findings suggest hepatic cirrhosis.  Distended gallbladder with stone.  Extrahepatic biliary dilation.  Small bilateral pleural effusions with associated atelectasis.  Probable splenic cyst.   MR-CARDIAC MORPH/FUNC WITH & W/O  12/16/14  [facility] Narrowing of the aortic outflow tract secondary to mid and basal asymmetric septal hypertrophy and systolic anterior motion of the anterior leaflet of the mitral valve.   Mild concentric left ventricular hypertrophy.   Hyperdynamic left ventricle with an ejection fraction of 74%.   Moderate mitral regurgitation.   Mild tricuspid regurgitation.    MR-LUMBAR SPINE-WITH & W/O  04/26/20  [facility] Mild lumbar spondylotic changes at the L4-5 and L5-S1 levels.  Mild central canal stenosis at the L4-5 level secondary to facet arthropathy.  No evidence of epidural abscess or transverse myelitis.   US-EXTREMITY ARTERY UPPER UNILAT LEFT  12/03/23  [facility] Left upper extremity:  Monophasic Doppler flow pattern is noted throughout the left upper extremity arteries.   Possible proximal subclavian artery stenosis.   EGD  3/4/22 [facility] POSTOPERATIVE DIAGNOSIS: Esophageal varices without bleeding    Cancer Screening      EGD-Colonoscopy 2/21/2018 [facility]  Consultants EGD Impressions:  Islands of columnar mucosa in the distal esophagus  Mosaic mucosal patterns in the fundus  Erythema in the antrum and stomach body  Varices in the lower third of the esophagus  Colonic Impressions:  Mild diverticulosis of the ascending colon  Polyp (3 mm) in the ascending colon (Polypectomy)    Mammogram 05/14/21  [facility] Breasts are heterogeneously dense, which may obscure small masses. No gross evidence of malignancy.   Screening  mammogram in one year is recommended.   [facility]    F: Pap w/ or w/o HPV co-test  5/9/24 [employer] Per Notes annual routine gynecologic exam; need to obtain results.    Renal Imaging & Biopsy      Abdominal U/S 12/2/24 [facility] Heterogeneous hyperechoic liver consistent with hepatocellular disease. Small ascites.  Cholelithiasis and gallbladder wall thickening. The possibility of cholecystitis is not excluded.  Borderline dilatation of the common duct measuring 7.1 mm.   SETH 04/23/23  [facility] Echogenic bilateral kidneys, in keeping with medical renal disease.  No hydronephrosis. No renal calculus.     Plan & Next Steps    Referral Review Outcome:  The patient has been referred for a pre-transplant evaluation for kidney transplantation.    Next Steps:   The multidisciplinary transplant team will review preliminary findings.  A final referral review will be completed.  If appropriate, the patient will be scheduled for an intake evaluation at the [facility] [place], pending insurance authorization.      Cheng Webster R.N.  Transplant Coordinator  [facility] [place]

## 2025-02-28 NOTE — TELEPHONE ENCOUNTER
"[facility]  Kidney Transplant Program- Referral Review    Patient Information  Patient Name:Rigoberto Adames   YOB: 1965   MRN: [id number]   Preferred Language: Yes,  English - Speaks English but may need a Albanian  during eval for better understanding  Address: [address] [place] [id number]    Emergency Contact Name: Mónica Morrell   Phone Number:   Relationship to Patient: Daughter [2]   Patient Email (optional): [email or web address]   [place] Citizenship: Yes  BMI: Estimated body mass index is 21.63 kg/m² as calculated from the following:    Height as of 2/24/25: 1.676 m (5' 6\").    Weight as of 2/24/25: 60.8 kg (134 lb).     Referral Information  Dialysis: Yes  Unit Name: [facility]  [address] [place] [id number]  Dialysis Modality (if applicable): PD  Dialysis Schedule: Other      Did patient provide social security number?  Yes  Is an  needed: Speaks English but may need Albanian  for more in-depth conversations  Ambulatory assistance needed: Yes,  cane  What hospital or medical center is most care received? [facility]  Have you had any recent hospitalizations? No    Hospital Name:   When:    Specialty Providers  Do you have a PCP? Yes   Name: Dr. Willy Reddy   PCP Phone: [phone]   Do you have a cardiologist or heart doctor? Yes   Name: Dr. Wilman FRANKS  Do you have a urologist? No    Name:   Do you have any other specialty doctors (e.g.,Cardiology, Pulmonary, GI)?Yes   Names(s): States she has a Liver Doctor at [facility]   Have you had a colonoscopy? Yes   When: January 2025   Where: [facility]  Have you had a Mammogram? Yes   When: 5/14/21, has another one coming up on 3/3/25 at [facility]   Where: [facility]    Provider information will be added to care team and most recent notes and testing will be requested.     Transplant Information:  Previously received a transplant? No   Organ: N/A  Where:  When:  Currently listed for a transplant? No  If yes, list " the transplant center(s):  Currently being evaluated for a transplant but not yet listed? Yes  If yes, list the transplant center(s): Denied at [facility]   Potential living donors? Yes  Name(s): Julian Patton  Relationship(s): son    Insurance Information  Primary Insurance: Medicare/Medicaid  Secondary Insurance (if applicable): N/A  Insurance Cards (front and back) Uploaded: Yes    Documentation Checklist  The following documents are required and will be requested if not already provided:  Demographics Sheet  Most Recent H&P (History and Physical)  Most Recent Nephrology Notes  Current Medication List  Most Recent Labs  Most Recent Hospital Discharge Summary (if available)  Vaccine Records  CMS 2728 Form (required for ESRD patients)      Records will be requested and sent to RN coordinator for further review and plan. Patient encouraged to contact us with any questions or medical updates in the interm.     At this time, does [facility] have verbal consent from you, Rigoberto Adames, to request records from external facilities for your consult/evaluation? Yes  If patient stated no, we advised that the patient is required to obtain their records and bring them in with them to their first appointment.       Electronically Signed by   Any Alvarado  2/28/2025 10:47 AM

## 2025-03-03 ENCOUNTER — HOSPITAL ENCOUNTER (OUTPATIENT)
Dept: RADIOLOGY | Facility: MEDICAL CENTER | Age: 60
End: 2025-03-03
Payer: MEDICARE

## 2025-03-03 DIAGNOSIS — Z12.31 ENCOUNTER FOR SCREENING MAMMOGRAM FOR BREAST CANCER: ICD-10-CM

## 2025-03-03 PROCEDURE — 77067 SCR MAMMO BI INCL CAD: CPT

## 2025-03-08 NOTE — ED NOTES
EKG performed. Presented to ERP.  
Lab reports critical K of 7.2.  Informed MD.  
Medicated Pt according to MAR.    Rounded on Pt.    Updated Pt on plan of care. Pt verbalized understanding.  No acute distress at this time.  Will continue to monitor.  
Medicated Pt according to MAR.    Rounded on Pt.    Updated Pt on plan of care. Pt verbalized understanding.  No acute distress at this time.  Will continue to monitor.  
Placed PIV; collected labs.  
Pt ambulated well to room.  
Pt up to restroom. Pt able to pivot and stand under her own strength without assistance. Pt ambulated well to bathroom with standby assistance.    Pt returned to room without incident.    Pt provided urine sample; sample sent to lab.  
Rounded on Pt.    Updated Pt on plan of care. Pt verbalized understanding.  No acute distress at this time.  Will continue to monitor.  
0 = swallows foods/liquids without difficulty

## 2025-04-28 ENCOUNTER — APPOINTMENT (OUTPATIENT)
Dept: RADIOLOGY | Facility: MEDICAL CENTER | Age: 60
End: 2025-04-28
Attending: INTERNAL MEDICINE
Payer: MEDICARE

## 2025-05-06 ENCOUNTER — TELEPHONE (OUTPATIENT)
Dept: CARDIOLOGY | Facility: MEDICAL CENTER | Age: 60
End: 2025-05-06
Payer: MEDICARE

## 2025-05-06 NOTE — TELEPHONE ENCOUNTER
TT    Patient walked into office. She is seeking to know an update on her surgical clearance for Kidney transplant through [facility]. States that she needs authorization provided from Dr Dowell, and wants to know if she is approved.

## 2025-05-07 NOTE — TELEPHONE ENCOUNTER
I do not see a surgical clearance request form. Request for surgical clearance request faxed to [facility] at [phone].

## 2025-05-19 ENCOUNTER — APPOINTMENT (OUTPATIENT)
Dept: CARDIOLOGY | Facility: MEDICAL CENTER | Age: 60
End: 2025-05-19
Attending: INTERNAL MEDICINE
Payer: MEDICARE

## 2025-06-11 ENCOUNTER — OFFICE VISIT (OUTPATIENT)
Dept: CARDIOLOGY | Facility: MEDICAL CENTER | Age: 60
End: 2025-06-11
Attending: INTERNAL MEDICINE
Payer: MEDICARE

## 2025-06-11 ENCOUNTER — APPOINTMENT (OUTPATIENT)
Dept: RADIOLOGY | Facility: MEDICAL CENTER | Age: 60
End: 2025-06-11
Attending: INTERNAL MEDICINE
Payer: MEDICARE

## 2025-06-11 VITALS
OXYGEN SATURATION: 98 % | HEIGHT: 66 IN | RESPIRATION RATE: 16 BRPM | SYSTOLIC BLOOD PRESSURE: 140 MMHG | HEART RATE: 72 BPM | DIASTOLIC BLOOD PRESSURE: 70 MMHG | WEIGHT: 125 LBS | BODY MASS INDEX: 20.09 KG/M2

## 2025-06-11 DIAGNOSIS — K70.30 ALCOHOLIC CIRRHOSIS OF LIVER WITHOUT ASCITES (HCC): ICD-10-CM

## 2025-06-11 DIAGNOSIS — I51.7 LVH (LEFT VENTRICULAR HYPERTROPHY): ICD-10-CM

## 2025-06-11 DIAGNOSIS — I83.813 VARICOSE VEINS OF BOTH LOWER EXTREMITIES WITH PAIN: ICD-10-CM

## 2025-06-11 DIAGNOSIS — I42.1 HOCM (HYPERTROPHIC OBSTRUCTIVE CARDIOMYOPATHY) (HCC): Primary | ICD-10-CM

## 2025-06-11 DIAGNOSIS — E11.42 TYPE 2 DIABETES MELLITUS WITH DIABETIC POLYNEUROPATHY, WITHOUT LONG-TERM CURRENT USE OF INSULIN (HCC): ICD-10-CM

## 2025-06-11 DIAGNOSIS — D89.89 LIGHT CHAIN DISEASE, LAMBDA TYPE (HCC): ICD-10-CM

## 2025-06-11 DIAGNOSIS — D89.89 LIGHT CHAIN DISEASE, KAPPA TYPE (HCC): ICD-10-CM

## 2025-06-11 DIAGNOSIS — N18.6 ESRD (END STAGE RENAL DISEASE) (HCC): ICD-10-CM

## 2025-06-11 DIAGNOSIS — I10 HTN (HYPERTENSION), MALIGNANT: ICD-10-CM

## 2025-06-11 PROCEDURE — 99214 OFFICE O/P EST MOD 30 MIN: CPT | Performed by: INTERNAL MEDICINE

## 2025-06-11 PROCEDURE — G2211 COMPLEX E/M VISIT ADD ON: HCPCS | Performed by: INTERNAL MEDICINE

## 2025-06-11 PROCEDURE — 99213 OFFICE O/P EST LOW 20 MIN: CPT | Performed by: INTERNAL MEDICINE

## 2025-06-11 PROCEDURE — 3078F DIAST BP <80 MM HG: CPT | Performed by: INTERNAL MEDICINE

## 2025-06-11 PROCEDURE — 3077F SYST BP >= 140 MM HG: CPT | Performed by: INTERNAL MEDICINE

## 2025-06-11 RX ORDER — HYDRALAZINE HYDROCHLORIDE 100 MG/1
100 TABLET, FILM COATED ORAL 2 TIMES DAILY
Qty: 300 TABLET | Refills: 3 | Status: SHIPPED | OUTPATIENT
Start: 2025-06-11

## 2025-06-11 RX ORDER — ATORVASTATIN CALCIUM 20 MG/1
20 TABLET, FILM COATED ORAL EVERY EVENING
COMMUNITY
Start: 2025-05-13

## 2025-06-11 ASSESSMENT — ENCOUNTER SYMPTOMS
ORTHOPNEA: 0
PALPITATIONS: 0
PND: 0
COUGH: 0
NAUSEA: 0
SHORTNESS OF BREATH: 0
HEADACHES: 0
SPEECH CHANGE: 0
MYALGIAS: 0
BLOOD IN STOOL: 0
VOMITING: 0
EYE DISCHARGE: 0
CHILLS: 0
DEPRESSION: 0
FALLS: 0
DIZZINESS: 0
EYE PAIN: 0
SENSORY CHANGE: 0
BRUISES/BLEEDS EASILY: 0
FEVER: 0
LOSS OF CONSCIOUSNESS: 0
HALLUCINATIONS: 0
DOUBLE VISION: 0
CLAUDICATION: 0
WEIGHT LOSS: 0
BLURRED VISION: 0
ABDOMINAL PAIN: 0

## 2025-06-11 ASSESSMENT — FIBROSIS 4 INDEX: FIB4 SCORE: 1.85

## 2025-06-11 NOTE — PROGRESS NOTES
Chief Complaint   Patient presents with    Hypertrophic Cardiomyopathy (HOCM)     F/V Dx: HOCM (hypertrophic obstructive cardiomyopathy) (HCC)    Hypertension     F/V Dx: Primary hypertension    Dyslipidemia       Subjective:   Rigoberto Adames is a 60 y.o. female who presents today for cardiac care and management for HOCM in setting of hypertension, liver cirrhosis secondary to alcohol abuse.    Patient has stopped drinking alcohol via her report.    She feels well overall.    She used to see Dr. Knowles.    In the interim, patient has been doing well without having any symptoms. Patient denies having chest pain, dyspnea, palpitation, presyncope, syncope episodes.     She is on HD.    I have independently interpreted and reviewed blood tests results with patient in clinic which shows LDL level of 58, triglycerides level of 114, GFR of 8, K of 4.8. Hgba1c of 5.7. UACR of 713.    01/2025 Negative for ATTR cardiac amyloidosis.      Past Medical History:   Diagnosis Date    Acute kidney injury (HCC)     Acute necrotizing pyelonephritis     Acute respiratory failure with hypoxia (Formerly [facility]) 04/23/2023    Acute-on-chronic kidney injury (HCC) 04/23/2023    Alcohol withdrawal (Formerly [facility])     Arthritis     Bilateral legs; no formal diagnosis.    Breath shortness     Cataract     Bilateral IOL    Chronic kidney disease (CKD) stage G4/A2, severely decreased glomerular filtration rate (GFR) between 15-29 mL/min/1.73 square meter and albuminuria creatinine ratio between  mg/g 07/25/2023    Cirrhosis of liver with ascites (Formerly [facility]) 03/06/2017    Dental disorder     Upper and lower dentures    Diabetes mellitus, type 2 (HCC)     Oral medications    Diabetic ulcer of toe (HCC) 04/30/2020    Dialysis patient (Formerly [facility])     since 4/2023    diarrhea 2019    been going on for about a year    Foot pain, bilateral     Heart murmur     High cholesterol     HOCM (hypertrophic obstructive cardiomyopathy) (HCC)     Hyperammonemia (HCC) 06/24/2016     Hyperkalemia 2023    Hypertension     Shock (HCC) 2023    STEMI (ST elevation myocardial infarction) (HCC)     Thrombocytopenia (HCC)     Tobacco abuse 2014     Past Surgical History:   Procedure Laterality Date    AV FISTULA REVISION Left 12/3/2023    Procedure: left upper extremity angio, graft ligation;  Surgeon: Payam Marroquin M.D.;  Location: SURGERY [facility];  Service: Vascular    AV FISTULA CREATION Left 2023    Procedure: LEFT UPPER ARM DIALYSIS GRAFT PLACEMENT;  Surgeon: Sarahi Carey M.D.;  Location: SURGERY [facility];  Service: General    [place] UPPER GI ENDOSCOPY,DIAGNOSIS N/A 2022    Procedure: GASTROSCOPY;  Surgeon: Kashif Rhodes M.D.;  Location: SURGERY [facility];  Service: Gastroenterology    [place] UPPER GI ENDOSCOPY,LIGAT VARIX  2019    Procedure: GASTROSCOPY, WITH VARICEAL BANDING - WITH GASTRIC MAPPING;  Surgeon: Sandi Espinoza M.D.;  Location: SURGERY [facility];  Service: Gastroenterology    GASTROSCOPY-ENDO  2019    Procedure: GASTROSCOPY;  Surgeon: Sandi Espinoza M.D.;  Location: SURGERY [facility];  Service: Gastroenterology    PRIMARY C SECTION  2006    CATARACT EXTRACTION WITH IOL Bilateral     OTHER      c- section     Family History   Problem Relation Age of Onset    Cancer Father         stomach    Hypertension Mother      Social History     Socioeconomic History    Marital status: Single     Spouse name: Not on file    Number of children: Not on file    Years of education: Not on file    Highest education level: Not on file   Occupational History    Occupation: restaurant owner   Tobacco Use    Smoking status: Former     Current packs/day: 0.00     Average packs/day: 1 pack/day for 10.0 years (10.0 ttl pk-yrs)     Types: Cigarettes     Start date: 2015     Quit date: 2020     Years since quittin.0    Smokeless tobacco: Never   Vaping Use    Vaping status: Never Used   Substance and Sexual  Activity    Alcohol use: Not Currently     Alcohol/week: 0.6 oz     Types: 1 Glasses of wine per week    Drug use: No    Sexual activity: Not on file   Other Topics Concern    Not on file   Social History Narrative    Single- 2 children.     Social Drivers of Health     Financial Resource Strain: Low Risk  (5/8/2020)    Overall Financial Resource Strain (CARDIA)     Difficulty of Paying Living Expenses: Not hard at all   Food Insecurity: No Food Insecurity (5/8/2020)    Hunger Vital Sign     Worried About Running Out of Food in the Last Year: Never true     Ran Out of Food in the Last Year: Never true   Transportation Needs: No Transportation Needs (5/8/2020)    PRAPARE - Transportation     Lack of Transportation (Medical): No     Lack of Transportation (Non-Medical): No   Physical Activity: Not on file   Stress: Not on file   Social Connections: Not on file   Intimate Partner Violence: Not on file   Housing Stability: Not on file     No Known Allergies  Outpatient Encounter Medications as of 6/11/2025   Medication Sig Dispense Refill    atorvastatin (LIPITOR) 20 MG Tab Take 20 mg by mouth every evening.      hydrALAZINE (APRESOLINE) 100 MG tablet Take 1 Tablet by mouth 2 times a day. 300 Tablet 3    carvedilol (COREG) 25 MG Tab TAKE 1 TABLET BY MOUTH 2 TIMES A DAY WITH MEALS 180 Tablet 1    amLODIPine (NORVASC) 10 MG Tab Take 1 Tablet by mouth every day.      calcitRIOL (ROCALTROL) 0.5 MCG Cap Take 0.5 mcg by mouth every day.      calcium acetate (PHOS-LO) 667 MG Cap Take 667 mg by mouth every day.      [DISCONTINUED] hydrALAZINE (APRESOLINE) 50 MG Tab Take 1 Tablet by mouth 2 times a day. 180 Tablet 4    [DISCONTINUED] calcium acetate (PHOS-LO) 667 MG Tab tablet Take 667 mg by mouth 3 times a day with meals.       No facility-administered encounter medications on file as of 6/11/2025.     Review of Systems   Constitutional:  Negative for chills, fever, malaise/fatigue and weight loss.   HENT:  Negative for ear  "discharge, ear pain, hearing loss and nosebleeds.    Eyes:  Negative for blurred vision, double vision, pain and discharge.   Respiratory:  Negative for cough and shortness of breath.    Cardiovascular:  Negative for chest pain, palpitations, orthopnea, claudication, leg swelling and PND.   Gastrointestinal:  Negative for abdominal pain, blood in stool, melena, nausea and vomiting.   Genitourinary:  Negative for dysuria and hematuria.   Musculoskeletal:  Negative for falls, joint pain and myalgias.   Skin:  Negative for itching and rash.   Neurological:  Negative for dizziness, sensory change, speech change, loss of consciousness and headaches.   Endo/Heme/Allergies:  Negative for environmental allergies. Does not bruise/bleed easily.   Psychiatric/Behavioral:  Negative for depression, hallucinations and suicidal ideas.         Objective:   BP (!) 140/70 (BP Location: Left arm, Patient Position: Sitting, BP Cuff Size: Adult)   Pulse 72   Resp 16   Ht 1.676 m (5' 5.98\")   Wt 56.7 kg (125 lb)   SpO2 98%   BMI 20.19 kg/m²     Physical Exam  Vitals and nursing note reviewed.   Constitutional:       General: She is not in acute distress.     Appearance: She is not diaphoretic.   HENT:      Head: Normocephalic and atraumatic.      Right Ear: External ear normal.      Left Ear: External ear normal.   Eyes:      General:         Right eye: No discharge.         Left eye: No discharge.   Neck:      Thyroid: No thyromegaly.      Vascular: No JVD.   Cardiovascular:      Rate and Rhythm: Normal rate and regular rhythm.   Pulmonary:      Effort: No respiratory distress.   Abdominal:      General: There is no distension.      Tenderness: There is no abdominal tenderness.   Musculoskeletal:      Comments: + varicose veins without evidence of ulcer, discoloration.     Skin:     General: Skin is warm and dry.      Comments: Jaundiced.   Neurological:      Mental Status: She is alert and oriented to person, place, and time.     "  Cranial Nerves: No cranial nerve deficit.   Psychiatric:         Behavior: Behavior normal.         Assessment:     1. HOCM (hypertrophic obstructive cardiomyopathy) (HCC)  EC-ECHOCARDIOGRAM COMPLETE W/O CONT      2. Alcoholic cirrhosis of liver without ascites (HCC)        3. ESRD (end stage renal disease) (HCC)        4. HTN (hypertension), malignant  hydrALAZINE (APRESOLINE) 100 MG tablet      5. Type 2 diabetes mellitus with diabetic polyneuropathy, without long-term current use of insulin (HCC)        6. Varicose veins of both lower extremities with pain        7. Light chain disease, kappa type (HCC)        8. Light chain disease, lambda type (HCC)        9. LVH (left ventricular hypertrophy)                  Medical Decision Making:  Today's Assessment / Status / Plan:   Referred to hematology due to elevated light chains.    No ATTR cardiac amyloidosis.    Blood pressure is not well controlled but better than before. Will increase Hydralazine to 100 mg BID.  We will continue Carvedilol 25 mg BID, Amlodipine 10 mg daily.  Heart rate goal of less than 70.    Await to check lower extremities venous duplex to assess the anatomy for her lower extremities venous system.    This visit encounter signifies the visit complexity inherent to evaluation and management associated with medical care services that serve as the continuing focal point for all needed health care services and/or with medical care services that are part of ongoing care related to this patient's single, serious condition, complex cardiac condition.    Sung Dowell M.D.

## 2025-07-07 ENCOUNTER — HOSPITAL ENCOUNTER (OUTPATIENT)
Dept: RADIOLOGY | Facility: MEDICAL CENTER | Age: 60
End: 2025-07-07
Attending: INTERNAL MEDICINE
Payer: MEDICARE

## 2025-07-07 DIAGNOSIS — I83.813 VARICOSE VEINS OF BOTH LOWER EXTREMITIES WITH PAIN: ICD-10-CM

## 2025-07-07 PROCEDURE — 93970 EXTREMITY STUDY: CPT

## 2025-07-07 PROCEDURE — 93970 EXTREMITY STUDY: CPT | Mod: 26 | Performed by: INTERNAL MEDICINE

## 2025-07-08 ENCOUNTER — APPOINTMENT (OUTPATIENT)
Facility: MEDICAL CENTER | Age: 60
End: 2025-07-08
Attending: INTERNAL MEDICINE
Payer: MEDICARE

## 2025-07-29 ENCOUNTER — APPOINTMENT (OUTPATIENT)
Facility: MEDICAL CENTER | Age: 60
End: 2025-07-29
Attending: INTERNAL MEDICINE
Payer: MEDICARE

## 2025-08-15 DIAGNOSIS — I10 PRIMARY HYPERTENSION: ICD-10-CM

## 2025-08-17 RX ORDER — CARVEDILOL 25 MG/1
25 TABLET ORAL 2 TIMES DAILY WITH MEALS
Qty: 180 TABLET | Refills: 1 | Status: SHIPPED | OUTPATIENT
Start: 2025-08-17

## (undated) DEVICE — GLOVE, LITE (PAIR)

## (undated) DEVICE — KIT SURGIFLO W/OUT THROMBIN - (6EA/CA)

## (undated) DEVICE — GLOVE BIOGEL SZ 8 SURGICAL PF LTX - (50PR/BX 4BX/CA)

## (undated) DEVICE — TOWELS CLOTH SURGICAL - (4/PK 20PK/CA)

## (undated) DEVICE — SET EXTENSION WITH 2 PORTS (48EA/CA) ***PART #2C8610 IS A SUBSTITUTE*****

## (undated) DEVICE — TUBE E-T HI-LO CUFF 7.0MM (10EA/PK)

## (undated) DEVICE — BITE BLOCK ADULT 60FR (100EA/CA)

## (undated) DEVICE — CANISTER SUCTION RIGID RED 1500CC (40EA/CA)

## (undated) DEVICE — SUTURE 7-0 PROLENE BV-1 D/A 24 (36PK/BX)"

## (undated) DEVICE — TUBE CONNECTING SUCTION - CLEAR PLASTIC STERILE 72 IN (50EA/CA)

## (undated) DEVICE — CHLORAPREP 26 ML APPLICATOR - ORANGE TINT(25/CA)

## (undated) DEVICE — SUTURE GENERAL

## (undated) DEVICE — SODIUM CHL. INJ. 0.9% 500ML (24EA/CA 50CA/PF)

## (undated) DEVICE — CATHETER IV SAFETY 20 GA X 1-1/4 (50/BX)

## (undated) DEVICE — SODIUM CHL IRRIGATION 0.9% 1000ML (12EA/CA)

## (undated) DEVICE — SENSOR OXIMETER ADULT SPO2 RD SET (20EA/BX)

## (undated) DEVICE — SYRINGE 20 ML LL (50EA/BX 4BX/CA)

## (undated) DEVICE — SUCTION INSTRUMENT YANKAUER BULBOUS TIP W/O VENT (50EA/CA)

## (undated) DEVICE — SYRINGE SAFETY 3 ML 18 GA X 1 1/2 BLUNT LL (100/BX 8BX/CA)

## (undated) DEVICE — COVER LIGHT HANDLE ALC PLUS DISP (18EA/BX)

## (undated) DEVICE — TUBE E-T HI-LO CUFF 8.5MM (10EA/PK)

## (undated) DEVICE — TUBING CLEARLINK DUO-VENT - C-FLO (48EA/CA)

## (undated) DEVICE — GLOVE SZ 6.5 BIOGEL PI MICRO - PF LF (50PR/BX)

## (undated) DEVICE — ELECTRODE DUAL RETURN W/ CORD - (50/PK)

## (undated) DEVICE — CANISTER SUCTION 3000ML MECHANICAL FILTER AUTO SHUTOFF MEDI-VAC NONSTERILE LF DISP  (40EA/CA)

## (undated) DEVICE — GLOVE BIOGEL PI INDICATOR SZ 7.5 SURGICAL PF LF -(50/BX 4BX/CA)

## (undated) DEVICE — NEPTUNE 4 PORT MANIFOLD - (20/PK)

## (undated) DEVICE — TOWEL STOP TIMEOUT SAFETY FLAG (40EA/CA)

## (undated) DEVICE — DRAPE LOWER EXTREMETY - (6/CA)

## (undated) DEVICE — PACK AV FISTULA (4EA/CA)

## (undated) DEVICE — SUTURE 3-0 VICRYL PLUS SH - 27 INCH (36/BX)

## (undated) DEVICE — LACTATED RINGERS INJ 1000 ML - (14EA/CA 60CA/PF)

## (undated) DEVICE — SUTURE 4-0 MONOCRYL PLUS PS-1 - 27 INCH (36/BX)

## (undated) DEVICE — TUBE E-T HI-LO CUFF 8.0MM (10EA/PK)

## (undated) DEVICE — DRAPE LARGE 3 QUARTER - (20/CA)

## (undated) DEVICE — MASK WITH FACE SHIELD (25/BX 4BX/CA)

## (undated) DEVICE — INSTRUMENT JAWCOVER SUTURE - BOOTS (5PK/BX)

## (undated) DEVICE — SENSOR SPO2 ADULT LNCS ADTX (20/BX) ORDER ITEM #19593

## (undated) DEVICE — SET INTRO MIRCROPUNCTURE - MPIS-501-SST

## (undated) DEVICE — SET LEADWIRE 5 LEAD BEDSIDE DISPOSABLE ECG (1SET OF 5/EA)

## (undated) DEVICE — STAPLER SKIN DISP - (6/BX 10BX/CA) VISISTAT

## (undated) DEVICE — GOWN SURGEONS LARGE - (32/CA)

## (undated) DEVICE — GOWN WARMING STANDARD FLEX - (30/CA)

## (undated) DEVICE — KIT  I.V. START (100EA/CA)

## (undated) DEVICE — SUTURE 5-0 PROLENE C-1 D/A 24 (36PK/BX)"

## (undated) DEVICE — SUTURE 6-0 PROLENE C-1 D/A 24 (36PK/BX)"

## (undated) DEVICE — TUBE SUCTION YANKAUER  1/4 X 6FT (20EA/CA)"

## (undated) DEVICE — SLEEVE VASO CALF MED - (10PR/CA)

## (undated) DEVICE — VESSELOOP MAXI BLUE STERILE- SURG-I-LOOP (10EA/BX)

## (undated) DEVICE — SUTURE 6-0 PROLENE BV-1 D/A 30 (36PK/BX)"

## (undated) DEVICE — SYRINGE SAFETY 10 ML 18 GA X 1 1/2 BLUNT LL (100/BX 4BX/CA)

## (undated) DEVICE — SYRINGE DISP. 50CC LS - (40/BX)

## (undated) DEVICE — DERMABOND ADVANCED - (12EA/BX)

## (undated) DEVICE — MEDICINE CUP STERILE 2 OZ - (100/CA)

## (undated) DEVICE — SYRINGE SAFETY 5 ML 18 GA X 1-1/2 BLUNT LL (100/BX 4BX/CA)

## (undated) DEVICE — MASK ANESTHESIA ADULT  - (100/CA)

## (undated) DEVICE — TUBE E-T HI-LO CUFF 6.5MM (10EA/BX)

## (undated) DEVICE — ELECTRODE 850 FOAM ADHESIVE - HYDROGEL RADIOTRNSPRNT (50/PK)

## (undated) DEVICE — FORCEP RADIAL JAW 4 STANDARD CAPACITY W/NEEDLE 240CM (40EA/BX)

## (undated) DEVICE — GOWN SURGEONS X-LARGE - DISP. (30/CA)

## (undated) DEVICE — CATHETER IV SAFETY 22 GA X 1 (50EA/BX)

## (undated) DEVICE — GLOVE BIOGEL INDICATOR SZ 7.5 SURGICAL PF LTX - (50PR/BX 4BX/CA)

## (undated) DEVICE — KIT ANESTHESIA W/CIRCUIT & 3/LT BAG W/FILTER (20EA/CA)

## (undated) DEVICE — SUTURE 3-0 SILK 12 X 18 IN - (36/BX)

## (undated) DEVICE — SYRINGE STERILE 10 ML LL (200/BX)

## (undated) DEVICE — SYRINGE DISP. 60 CC LL - (30/BX, 12BX/CA)**WHEN THESE ARE GONE ORDER #500206**

## (undated) DEVICE — SOD. CHL. INJ. 0.9% 250 ML - (36/CA 50CA/PF)

## (undated) DEVICE — ADHESIVE DERMABOND HVD MINI (12EA/BX)

## (undated) DEVICE — SUTURE 4-0 SILK 12 X 18 INCH - (36/BX)

## (undated) DEVICE — GLOVE BIOGEL SZ 7 SURGICAL PF LTX - (50PR/BX 4BX/CA)

## (undated) DEVICE — SUTURE 3-0 VICRYL PLUS SH - 8X 18 INCH (12/BX)

## (undated) DEVICE — CANNULA W/ SUPPLY TUBING O2 - (50/CA)

## (undated) DEVICE — GLOVE BIOGEL PI INDICATOR SZ 7.0 SURGICAL PF LF - (50/BX 4BX/CA)

## (undated) DEVICE — WATER IRRIGATION STERILE 1000ML (12EA/CA)

## (undated) DEVICE — PAD PREP 24 X 48 CUFFED - (100/CA)

## (undated) DEVICE — SPONGE GAUZE NON-STERILE 4X4 - (2000/CA 10PK/CA)

## (undated) DEVICE — SUTURE CV

## (undated) DEVICE — COVER PROBE STERILE CONE (12EA/CA)

## (undated) DEVICE — GLOVE BIOGEL PI ORTHO SZ 6 SURGICAL PF LF (40PR/BX)

## (undated) DEVICE — SYRINGE 30 ML LL (56/BX)

## (undated) DEVICE — GELAQUASONIC 100 ULTRASOUND - 48/BX 20GM STERILE FOIL POUCH

## (undated) DEVICE — MASK AIRWAY SIZE 3 UNIQUE SILICON (10/BX)

## (undated) DEVICE — KIT CUSTOM PROCEDURE SINGLE FOR ENDO  (15/CA)

## (undated) DEVICE — TUBE E-T HI-LO CUFF 7.5MM (10EA/PK)

## (undated) DEVICE — BOWL

## (undated) DEVICE — SLEEVE, VASO, THIGH, MED

## (undated) DEVICE — SUTURE 4-0 30CM STRATAFIX SPIRAL PS-2 (12EA/BX)